# Patient Record
Sex: FEMALE | Race: WHITE | Employment: OTHER | URBAN - NONMETROPOLITAN AREA
[De-identification: names, ages, dates, MRNs, and addresses within clinical notes are randomized per-mention and may not be internally consistent; named-entity substitution may affect disease eponyms.]

---

## 2017-08-17 ENCOUNTER — COMMUNICATION - GICH (OUTPATIENT)
Dept: INTERNAL MEDICINE | Facility: OTHER | Age: 57
End: 2017-08-17

## 2017-08-18 ENCOUNTER — AMBULATORY - GICH (OUTPATIENT)
Dept: SCHEDULING | Facility: OTHER | Age: 57
End: 2017-08-18

## 2017-08-19 ENCOUNTER — AMBULATORY - GICH (OUTPATIENT)
Dept: RADIOLOGY | Facility: OTHER | Age: 57
End: 2017-08-19

## 2017-08-23 ENCOUNTER — AMBULATORY - GICH (OUTPATIENT)
Dept: INTERNAL MEDICINE | Facility: OTHER | Age: 57
End: 2017-08-23

## 2017-08-23 ENCOUNTER — HISTORY (OUTPATIENT)
Dept: INTERNAL MEDICINE | Facility: OTHER | Age: 57
End: 2017-08-23

## 2017-08-24 ENCOUNTER — HISTORY (OUTPATIENT)
Dept: INTERNAL MEDICINE | Facility: OTHER | Age: 57
End: 2017-08-24

## 2017-08-24 ENCOUNTER — OFFICE VISIT - GICH (OUTPATIENT)
Dept: INTERNAL MEDICINE | Facility: OTHER | Age: 57
End: 2017-08-24

## 2017-08-24 DIAGNOSIS — R53.81 OTHER MALAISE: ICD-10-CM

## 2017-08-24 DIAGNOSIS — Z71.89 OTHER SPECIFIED COUNSELING: Chronic | ICD-10-CM

## 2017-08-24 DIAGNOSIS — L92.1 NECROBIOSIS LIPOIDICA, NOT ELSEWHERE CLASSIFIED: ICD-10-CM

## 2017-08-24 DIAGNOSIS — R73.09 OTHER ABNORMAL GLUCOSE: ICD-10-CM

## 2017-08-24 DIAGNOSIS — Z87.891 PERSONAL HISTORY OF NICOTINE DEPENDENCE: ICD-10-CM

## 2017-08-24 DIAGNOSIS — E88.01 ALPHA-1-ANTITRYPSIN DEFICIENCY (H): ICD-10-CM

## 2017-08-24 DIAGNOSIS — K22.2 ESOPHAGEAL OBSTRUCTION: ICD-10-CM

## 2017-08-24 DIAGNOSIS — L30.1 DYSHIDROSIS: ICD-10-CM

## 2017-08-24 DIAGNOSIS — E55.9 VITAMIN D DEFICIENCY: ICD-10-CM

## 2017-08-24 DIAGNOSIS — J44.9 CHRONIC OBSTRUCTIVE PULMONARY DISEASE (H): ICD-10-CM

## 2017-08-24 DIAGNOSIS — I70.0 ATHEROSCLEROSIS OF AORTA (H): ICD-10-CM

## 2017-08-24 DIAGNOSIS — M25.542 ARTHRALGIA OF LEFT HAND: ICD-10-CM

## 2017-08-24 DIAGNOSIS — M17.11 PRIMARY OSTEOARTHRITIS OF RIGHT KNEE: ICD-10-CM

## 2017-08-24 DIAGNOSIS — R73.03 PREDIABETES: ICD-10-CM

## 2017-08-24 DIAGNOSIS — L40.9 PSORIASIS: ICD-10-CM

## 2017-08-24 DIAGNOSIS — R94.2 ABNORMAL RESULTS OF PULMONARY FUNCTION STUDIES: ICD-10-CM

## 2017-08-24 DIAGNOSIS — M85.80 OTHER SPECIFIED DISORDERS OF BONE DENSITY AND STRUCTURE, UNSPECIFIED SITE (CODE): ICD-10-CM

## 2017-08-24 DIAGNOSIS — R53.83 OTHER FATIGUE: ICD-10-CM

## 2017-08-24 DIAGNOSIS — Z98.890 OTHER SPECIFIED POSTPROCEDURAL STATES: ICD-10-CM

## 2017-08-24 DIAGNOSIS — M25.541 ARTHRALGIA OF RIGHT HAND: ICD-10-CM

## 2017-08-24 DIAGNOSIS — K44.9 DIAPHRAGMATIC HERNIA WITHOUT OBSTRUCTION OR GANGRENE: ICD-10-CM

## 2017-08-24 LAB
CHOL/HDL RATIO - HISTORICAL: 5.42
CHOLESTEROL TOTAL: 206 MG/DL
CK SERPL-CCNC: 73 IU/L (ref 30–223)
ESTIMATED AVERAGE GLUCOSE: 120 MG/DL
HDLC SERPL-MCNC: 38 MG/DL (ref 23–92)
HEMOGLOBIN A1C MONITORING (POCT) - HISTORICAL: 5.8 % (ref 4–6.2)
LDLC SERPL CALC-MCNC: 136 MG/DL
NON-HDL CHOLESTEROL - HISTORICAL: 168 MG/DL
PATIENT STATUS - HISTORICAL: ABNORMAL
RHEUMATOID FACTOR - HISTORICAL: <14 IU/ML
TRIGL SERPL-MCNC: 162 MG/DL
VIT B12 SERPL-MCNC: 802 PG/ML (ref 180–914)

## 2017-08-24 ASSESSMENT — PATIENT HEALTH QUESTIONNAIRE - PHQ9: SUM OF ALL RESPONSES TO PHQ QUESTIONS 1-9: 0

## 2017-08-25 LAB
ANA INTERPRETATION: POSITIVE
ANA PATTERN 1 - HISTORICAL: ABNORMAL
ANA TITER 1: ABNORMAL
SPECIMEN STATUS - HISTORICAL: ABNORMAL

## 2017-08-29 LAB — CCP ANTIBODY,IGG/IGA - HISTORICAL: <4.6 CU

## 2017-09-05 ENCOUNTER — COMMUNICATION - GICH (OUTPATIENT)
Dept: INTERNAL MEDICINE | Facility: OTHER | Age: 57
End: 2017-09-05

## 2017-09-15 ENCOUNTER — HISTORY (OUTPATIENT)
Dept: INTERNAL MEDICINE | Facility: OTHER | Age: 57
End: 2017-09-15

## 2017-09-15 ENCOUNTER — HOSPITAL ENCOUNTER (OUTPATIENT)
Dept: RADIOLOGY | Facility: OTHER | Age: 57
End: 2017-09-15
Attending: INTERNAL MEDICINE

## 2017-09-15 ENCOUNTER — OFFICE VISIT - GICH (OUTPATIENT)
Dept: INTERNAL MEDICINE | Facility: OTHER | Age: 57
End: 2017-09-15

## 2017-09-15 DIAGNOSIS — E55.9 VITAMIN D DEFICIENCY: ICD-10-CM

## 2017-09-15 DIAGNOSIS — J44.9 CHRONIC OBSTRUCTIVE PULMONARY DISEASE (H): ICD-10-CM

## 2017-09-15 DIAGNOSIS — J43.2 CENTRILOBULAR EMPHYSEMA (H): ICD-10-CM

## 2017-09-15 DIAGNOSIS — L30.1 DYSHIDROSIS: ICD-10-CM

## 2017-09-15 DIAGNOSIS — R73.03 PREDIABETES: ICD-10-CM

## 2017-09-15 DIAGNOSIS — M54.81 OCCIPITAL NEURALGIA: ICD-10-CM

## 2017-09-15 DIAGNOSIS — L40.9 PSORIASIS: ICD-10-CM

## 2017-09-21 ENCOUNTER — AMBULATORY - GICH (OUTPATIENT)
Dept: PEDIATRICS | Facility: OTHER | Age: 57
End: 2017-09-21

## 2017-09-25 ENCOUNTER — HOSPITAL ENCOUNTER (OUTPATIENT)
Dept: RADIOLOGY | Facility: OTHER | Age: 57
End: 2017-09-25
Attending: INTERNAL MEDICINE

## 2017-10-05 ENCOUNTER — HOSPITAL ENCOUNTER (OUTPATIENT)
Dept: RADIOLOGY | Facility: OTHER | Age: 57
End: 2017-10-05
Attending: INTERNAL MEDICINE

## 2017-10-05 DIAGNOSIS — M54.81 OCCIPITAL NEURALGIA: ICD-10-CM

## 2017-10-16 ENCOUNTER — HOSPITAL ENCOUNTER (OUTPATIENT)
Dept: RADIOLOGY | Facility: OTHER | Age: 57
End: 2017-10-16
Attending: INTERNAL MEDICINE

## 2017-10-16 ENCOUNTER — HISTORY (OUTPATIENT)
Dept: RADIOLOGY | Facility: OTHER | Age: 57
End: 2017-10-16

## 2017-10-16 ENCOUNTER — HISTORY (OUTPATIENT)
Dept: INTERNAL MEDICINE | Facility: OTHER | Age: 57
End: 2017-10-16

## 2017-10-16 ENCOUNTER — OFFICE VISIT - GICH (OUTPATIENT)
Dept: INTERNAL MEDICINE | Facility: OTHER | Age: 57
End: 2017-10-16

## 2017-10-16 DIAGNOSIS — Z86.59 PERSONAL HISTORY OF OTHER MENTAL AND BEHAVIORAL DISORDERS: ICD-10-CM

## 2017-10-16 DIAGNOSIS — M54.81 OCCIPITAL NEURALGIA: ICD-10-CM

## 2017-10-16 DIAGNOSIS — Z23 ENCOUNTER FOR IMMUNIZATION: ICD-10-CM

## 2017-10-16 DIAGNOSIS — R10.13 EPIGASTRIC PAIN: ICD-10-CM

## 2017-10-16 DIAGNOSIS — E53.8 DEFICIENCY OF OTHER SPECIFIED B GROUP VITAMINS (CODE): ICD-10-CM

## 2017-10-16 DIAGNOSIS — M54.2 CERVICALGIA: ICD-10-CM

## 2017-10-16 DIAGNOSIS — J44.9 CHRONIC OBSTRUCTIVE PULMONARY DISEASE (H): ICD-10-CM

## 2017-10-17 ENCOUNTER — HOSPITAL ENCOUNTER (OUTPATIENT)
Dept: RADIOLOGY | Facility: OTHER | Age: 57
End: 2017-10-17
Attending: INTERNAL MEDICINE

## 2017-10-17 DIAGNOSIS — M54.2 CERVICALGIA: ICD-10-CM

## 2017-10-17 DIAGNOSIS — M54.81 OCCIPITAL NEURALGIA: ICD-10-CM

## 2017-10-23 ENCOUNTER — AMBULATORY - GICH (OUTPATIENT)
Dept: LAB | Facility: OTHER | Age: 57
End: 2017-10-23

## 2017-10-23 DIAGNOSIS — R10.13 EPIGASTRIC PAIN: ICD-10-CM

## 2017-11-01 ENCOUNTER — HISTORY (OUTPATIENT)
Dept: SURGERY | Facility: OTHER | Age: 57
End: 2017-11-01

## 2017-11-01 ENCOUNTER — OFFICE VISIT - GICH (OUTPATIENT)
Dept: SURGERY | Facility: OTHER | Age: 57
End: 2017-11-01

## 2017-11-01 DIAGNOSIS — K21.9 GASTRO-ESOPHAGEAL REFLUX DISEASE WITHOUT ESOPHAGITIS: ICD-10-CM

## 2017-11-07 ENCOUNTER — AMBULATORY - GICH (OUTPATIENT)
Dept: SCHEDULING | Facility: OTHER | Age: 57
End: 2017-11-07

## 2017-11-20 ENCOUNTER — OFFICE VISIT - GICH (OUTPATIENT)
Dept: INTERNAL MEDICINE | Facility: OTHER | Age: 57
End: 2017-11-20

## 2017-11-20 ENCOUNTER — HISTORY (OUTPATIENT)
Dept: INTERNAL MEDICINE | Facility: OTHER | Age: 57
End: 2017-11-20

## 2017-11-20 DIAGNOSIS — G89.29 OTHER CHRONIC PAIN: ICD-10-CM

## 2017-11-20 DIAGNOSIS — M54.2 CERVICALGIA: ICD-10-CM

## 2017-11-20 DIAGNOSIS — M54.12 RADICULOPATHY OF CERVICAL REGION: ICD-10-CM

## 2017-11-20 DIAGNOSIS — L40.9 PSORIASIS: ICD-10-CM

## 2017-11-20 DIAGNOSIS — L40.50 ARTHROPATHIC PSORIASIS (H): ICD-10-CM

## 2017-11-21 ENCOUNTER — COMMUNICATION - GICH (OUTPATIENT)
Dept: INTERNAL MEDICINE | Facility: OTHER | Age: 57
End: 2017-11-21

## 2017-11-21 DIAGNOSIS — L40.9 PSORIASIS: ICD-10-CM

## 2017-11-28 ENCOUNTER — AMBULATORY - GICH (OUTPATIENT)
Dept: SCHEDULING | Facility: OTHER | Age: 57
End: 2017-11-28

## 2017-11-29 ENCOUNTER — HISTORY (OUTPATIENT)
Dept: SURGERY | Facility: OTHER | Age: 57
End: 2017-11-29

## 2017-11-29 ENCOUNTER — OFFICE VISIT - GICH (OUTPATIENT)
Dept: SURGERY | Facility: OTHER | Age: 57
End: 2017-11-29

## 2017-11-29 ENCOUNTER — HOSPITAL ENCOUNTER (OUTPATIENT)
Dept: RADIOLOGY | Facility: OTHER | Age: 57
End: 2017-11-29
Attending: INTERNAL MEDICINE

## 2017-11-29 ENCOUNTER — AMBULATORY - GICH (OUTPATIENT)
Dept: SCHEDULING | Facility: OTHER | Age: 57
End: 2017-11-29

## 2017-11-29 DIAGNOSIS — G89.29 OTHER CHRONIC PAIN: ICD-10-CM

## 2017-11-29 DIAGNOSIS — M54.12 RADICULOPATHY OF CERVICAL REGION: ICD-10-CM

## 2017-11-29 DIAGNOSIS — M54.2 CERVICALGIA: ICD-10-CM

## 2017-11-29 DIAGNOSIS — K21.9 GASTRO-ESOPHAGEAL REFLUX DISEASE WITHOUT ESOPHAGITIS: ICD-10-CM

## 2017-12-21 ENCOUNTER — AMBULATORY - GICH (OUTPATIENT)
Dept: SCHEDULING | Facility: OTHER | Age: 57
End: 2017-12-21

## 2017-12-27 NOTE — PROGRESS NOTES
Patient Information     Patient Name MRN Sex Ember Lanza [id number] Female 1960      Progress Notes by Carola Ortiz at 10/16/2017  1:17 PM     Author:  Carola Ortiz Service:  (none) Author Type:  Other Clinical Staff     Filed:  10/16/2017  1:17 PM Date of Service:  10/16/2017  1:17 PM Status:  Signed     :  Carola Ortiz (Other Clinical Staff)            Falls Risk Criteria:    Age 65 and older or under age 4        Sensory deficits    Poor vision    Use of ambulatory aides    Impaired judgment    Unable to walk independently    Meets High Risk criteria for falls:  no

## 2017-12-27 NOTE — PROGRESS NOTES
Patient Information     Patient Name MRN Sex Ember Lanza [id number] Female 1960      Progress Notes by Pay[facility]. [facility] at 2017 10:38 AM     Author:  Payton [facility].T. [facility] Service:  (none) Author Type:  [employer] - Registered Radiologic Technologist     Filed:  2017 10:38 AM Date of Service:  2017 10:38 AM Status:  Signed     :  Payton German R.T. (AR[facility]) ([facility] - Registered Radiologic Technologist)            Falls Risk Criteria:    Age 65 and older or under age 4        Sensory deficits    Poor vision    Use of ambulatory aides    Impaired judgment    Unable to walk independently    Meets High Risk criteria for falls:  no

## 2017-12-27 NOTE — PROGRESS NOTES
Patient Information     Patient Name MRN Sex Ember Waggoner [id number] Female 1960      Progress Notes by Jagdish Ruiz MD at 2017  9:50 AM     Author:  Jagdish Ruiz MD Service:  (none) Author Type:  Physician     Filed:  2017 10:59 AM Encounter Date:  2017 Status:  Signed     :  Jagdish Ruiz MD (Physician)            GENERAL SURGERY CONSULTATION NOTE    Ember Tavares   [address] [address] [address]  56 y.o.  female  Admission Date/Time: No admission date for patient encounter.  Primary Care Provider:  Tim Boyd MD    I was asked to see this patient by Tim Boyd MD for evaluation of GERD.     HPI: Ember has had years of reflux symptoms. She has gained weigh over the last 10 years and had worsened reflux.  She takes a H2 blocker bid and TUMS 2-5 times a week. She has regurgitation symptoms with food 3-5 nights. She sleeps with the head of the bed elevated. She avoids chololate as a trigger. Mint does not seem to bother. She had violent nausea and vomiting with Carafate. She has had multiple EGDs with balloon dilations for dysphagia and stricture vs achalasia.  She does not know what her exact diagnosis has been.  She has not had manometery or a ph probe.  She has had an esophogram already. Has dysphagia of meats and breads when needing dilation.     REVIEW OF SYSTEMS:    GENERAL: No fevers or chills. Denies fatigue, recent weight loss. + weight gain, loss of appetite  HEENT: No sinus drainage. No changes with vision or hearing. + difficulty swallowing.   LYMPHATICS:  No swollen nodes in axilla, neck or groin.  CARDIOVASCULAR: Denies chest pain, palpitations and dyspnea on exertion.  PULMONARY: No shortness of breath or cough. No increase in sputum production.  GI: Denies melena, bright red blood in stools. No hematemesis. Has had IBS  : No dysuria or hematuria.  SKIN: No recent rashes or ulcers.   HEMATOLOGY:  No history of  easy bruising or bleeding.  ENDOCRINE:  No history of diabetes or thyroid problems.  NEUROLOGY:  No history of seizures or headaches. No motor or sensory changes.        Patient Active Problem List       Diagnosis  Date Noted     Epigastric pain  10/16/2017     Occipital neuralgia of left side  09/15/2017     Hiatal hernia  08/24/2017     Schatzki's ring of distal esophagus  08/24/2017     History of esophageal dilatation  08/24/2017     Atherosclerosis of abdominal aorta (HC)  08/24/2017     [facility] Studies:  2/19/2013 -- CTA ABDOMEN AND PELVIS WITH BILATERAL LOWER EXTREMITY RUNOFF    CLINICAL HISTORY: Iliac and mesenteric ischemia.    TECHNIQUE: 125 mL Omnipaque 300 IV contrast was administered. 3-D  arterial reformations were performed.    FINDINGS: There is a well-defined ovoid density at the medial right  costophrenic angle. This measures 2.3 x 0.9 x 0.6 cm. It demonstrates  central low attenuation similar to the adjacent abdominal fat near the  liver. This could be a tiny Bochdalek hernia. The liver, spleen,  pancreas, adrenal glands, and kidneys are unremarkable. The  gallbladder is surgically absent. No biliary ductal dilation. No  mesenteric or retroperitoneal adenopathy. No ascites or fluid  collection. There is a moderate amount of retained stool seen  throughout most of the colon. No obstruction. No adjacent inflammatory  change. No ascites or fluid collection. Ureters and bladder are  unremarkable.    There is mild partially calcified atherosclerotic plaque within the  abdominal aorta. No aneurysm. There is narrowing of the lumen just  proximal to the bifurcation to 6 mm.   There is moderate narrowing of the proximal celiac artery without associated calcification.   The superior mesenteric artery appears to be widely patent with good opacification of distal branch vessels. The MARIAH also appears to be well opacified.   There are single bilateral renal arteries which are widely patent.    There is  mild partially calcified plaque within the common iliac arteries with minimal narrowing.   The external iliac and common femoral arteries are widely patent.   Superficial femoral and popliteal arteries are widely patent bilaterally.   There is three-vessel runoff with good opacification in the lower leg to the ankle and foot.    IMPRESSION:  1. Mild aortoiliac atherosclerotic disease. No aneurysm. There is narrowing of the distal aorta just proximal to the bifurcation.  2. There is moderate narrowing of the celiac artery which could  be due to noncalcified plaque. No SMA or MARIAH compromise definitely seen.  3. Probable tiny Bochdalek hernia at the right costophrenic angle. A hamartoma would be a differential consideration though is less likely given the central fat density.  4. Possible constipation.  5. No evidence of vascular compromise in the lower extremities.      Examination Interpreted at: [facility], [place], [place]  The Interpreting Physician is MONICA VORA  Exam was completed at [facility]        Dyshidrotic hand dermatitis  08/24/2017     Psoriasis  08/24/2017     Prediabetes - New Dx 8/24/2017 - A1c 5.8%  08/24/2017     Vitamin D deficiency  08/24/2017     COPD, severe (HC) - PFTs 8/4/2014 [facility] - follows with Dr. Luna, Pulmonology at [facility]  08/24/2017 8/4/2014 -- PULMONARY FUNCTION    SITE:  [facility]  ORDERED BY:  VANNESA LUNA    CLINICAL SUMMARY: 53-year-old female with a history of severe COPD.     TECHNICIAN NOTE: Good effort.     DATA: FVC 1.85 (61%). FEV1 1.04 (45%). FEV1/FVC ratio 56%. DLCO 13.5 to 62%.     Flow volume curve is consistent with airway obstruction.     IMPRESSION:  1. Severe obstructive pulmonary disease.   2. Mild decrease in diffusing capacity.        Decreased diffusion capacity of lung - MILD - PFT 8/2014 08/24/2017     Decreased bone density - 12/20/2013 -- DXA SPINE HIP --  [facility] - Hip T-score of -1.7  08/24/2017 12/20/2013 -- DXA SPINE HIP -- [facility]        FINDINGS: The bone mineral density was done using a [employer] machine.   The lumbar spine was 1.139 g/cm2 which is in approximately one half standard deviations above the mean for age-matched persons.   The total hip BMD was 0.789 g/cm2 which is approximately one standard deviations below expected for age.   The T-score was -0.4 at the spine and -1.7 at the total hip for females which places her in the WHO category of osteopenic with bone density between 10 and 25% below young normal. Fracture risk is moderate.    RECOMMENDATION: Treatment is recommended in the form of bisphosphonate and Evista. Hormone therapy may be an option based on review of risks and benefits of treatment. All patients should assure an adequate intake of dietary calcium (1200 mg per day) and   vitamin D (400-800 international units daily). Followup examination recommended in December of 2015.        Arthritis of knee, right - medial compartment  08/24/2017     Centrilobular emphysema (HC)  08/23/2017     Alpha-1-antitrypsin deficiency carrier (HC) - Heterozygous  08/23/2017     Osteopenia  08/23/2017     Irritable bowel syndrome (IBS)  08/23/2017     Esophageal reflux  08/23/2017     CONTACT DERMATITIS  07/01/2010     MYALGIA  07/01/2010     ABDOMINAL PAIN, CHRONIC  03/10/2010     16 year duration          CONSTIPATION  03/10/2010     SINUSITIS, CHRONIC  03/10/2010     History of tobacco abuse  03/10/2010     Past Medical History:     Diagnosis  Date     Chronic abdominal pain      Chronic sinusitis      Dysphagia      Fractured skull (HC) 1972     Past Surgical History:      Procedure  Laterality Date     ANORECTAL MANOMETRY  10/09/2007     BIOPSY BREAST  1999, 2001, 2004, 2008    benign       CHOLECYSTECTOMY  2004     COLONOSCOPY  x's 3 last 2009    negative       CT CORONARY ANGIOGRAM (IA)  2009    negative       CVL HEART CATH LEFT   04/20/2009    [facility]       ESOPHAGEAL DILATION  10/30/2015    Dr. Rainer Allen, [facility]       ESOPHAGOGASTRODUODENOSCOPY      dilated in 04,07,09  Dr. Allen       LAPAROTOMY      1990 lysis of adhesions/endometriosis       REPAIR RECTOCELE  07/29/2009     VAGINAL HYSTERECTOMY  1994    ovaries remain       Family History      Problem  Relation Age of Onset     Arthritis Father      Peripheral vascular disease Mother      Diabetes Mother      Arthritis Mother      Premenopausal breast cancer Sister      Eczema Brother      Narcolepsy Daughter      Vasculitis Daughter      Seizures Daughter      Social History Narrative    Graduated from high school plus 3 years collage    EMT and .  Lives in [place]    Patient previously smoked.      Alcohol Use - yes    Drug Use - no    Regular Exercise - yes     - Ervin.  3 children.        Works at [employer]      Social History     Social History Main Topics          Smoking status:   Former Smoker      Packs/day:  0.50      Years:  32.00      Types:  Cigarettes      Quit date:  8/18/2012      Smokeless tobacco:   Never Used       Comment: quit date of 8-       Alcohol use   1.2 oz/week     2 Standard drinks or equivalent per week        Comment: 1-2 drinks every 1-2 weeks       Drug use:   No      Sexual activity:   Not on file      Current Outpatient Prescriptions       Medication  Sig Dispense Refill     albuterol HFA (VENTOLIN HFA) 90 mcg/actuation inhaler 3 puffs TID as needed for breathing difficulties       aspirin chewable 81 mg chewable tablet Take 1 tablet by mouth once daily with a meal.       cetirizine (ZYRTEC) 10 mg tablet Take one tablet by mouth once or twice daily as needed for hand dermatitis/itching.  0     Cholecalciferol, Vitamin D3, (VITAMIN D-3) 5,000 unit tab Take 1 tablet by mouth once weekly.  0     cyanocobalamin (VITAMIN B12) 1,000 mcg/mL injection Inject 1 mL intramuscular every 3 weeks. To 4 weeks  1000 mcg 15     fluticasone-salmeterol (ADVAIR DISKUS) 500-50 mcg/Dose diskus inhaler Inhale 1 Puff by mouth every 12 hours.       medication order composer Daily topical solution as needed for Psoriasis flare up, patient unsure of medication name at this time.       ranitidine (ZANTAC 75) 75 mg tablet Take 1 tablet by mouth once daily if needed.       tiotropium bromide (SPIRIVA RESPIMAT) 2.5 mcg/actuation mist Inhale 2 Puffs by mouth once daily. For COPD 4 g 11     triamcinolone (ARISTOCORT) 0.1 % ointment Apply  topically to affected area(s) 3 times daily. -- for hand dermatitis 80 g 3     venlafaxine (EFFEXOR XR) 37.5 mg Extended-Release capsule Take 1 capsule by mouth once daily with a meal. 90 capsule 3     No current facility-administered medications for this visit.      Medications have been reviewed by me and are current to the best of my knowledge and ability.      ALLERGIES/SENSITIVITIES:   Allergies     Allergen  Reactions     Carafate [Sucralfate] Nausea And Vomiting     Crestor [Rosuvastatin] Nausea And Vomiting     Lipitor [Atorvastatin] Myalgia     Bactrim [Sulfamethoxazole-Trimethoprim] Yeast Infection     Levofloxacin Nausea And Vomiting     Morphine Chest Pain     Penicillins Respiratory Arrest       PHYSICAL EXAM:     General Appearance:  Appears well  /70  Pulse 72  Wt 64.7 kg (142 lb 9.6 oz)  Breastfeeding? No  BMI 25.26 kg/m2  Body mass index is 25.26 kg/(m^2).  RESPIRATORY: CTAB, no wheeze  CARDIOVASCULAR: RRR, S1, S2, no murmur  GASTROINTESTINAL: Mildly protuberant, Lap emily incisions noted, nontender.     ADDITIONAL COMMENTS:  I reviewed the patient s new imaging test results.      Her esophagram shows mild narrowing of the distal esophagus with reflux to the cervical esophagus.    CONSULTATION ASSESSMENT AND PLAN:    56 y.o. female with lifestyle limiting GERD and some dysphagia. Dysphagia is likely related to Schatzki ring.      - pH probe and manometry  - Will review procedure  note from EGDs from [facility]  - Follow up in 2-4 weeks  - Appears to be a good candidate for antireflux surgery.  - May plan on EDG at completion of case given Deepti Ruiz MD  General Surgery  10:35 AM 11/1/2017

## 2017-12-28 NOTE — PROGRESS NOTES
Patient Information     Patient Name MRN Sex     Ember Tavares [id number] Female 1960      Progress Notes by Jagdish Ruiz MD at 2017  9:30 AM     Author:  Jagdish Ruiz MD Service:  (none) Author Type:  Physician     Filed:  2017  9:57 AM Encounter Date:  2017 Status:  Signed     :  Jagdish Ruiz MD (Physician)             PROGRESS NOTE    cc: Nissen evaluation    HPI: Ember Tavares is a 57 y.o. female with long standing reflux and esophageal ring requiring multiple dilations for associated dysphagia. He has gone off H2 blockers and has had significant worsening of reflux and acid symptoms. She notes improved bloating.  She is scheduled for manometry and pH probe studies on the .      EXAM:    /70  Pulse 64  Wt 62.1 kg (137 lb)  Breastfeeding? No  BMI 24.27 kg/m2@TMAXR(24)@     GENERAL: alert, NAD  CV: RRR, no murmurs  RESPIRATORY: no dyspnea, clear bilat  ABDOMEN: non-distended, +BS, soft, appropriately tender, incision c/d/i, no signs of infection  EXTREMITY: no edema, neg Sima's  SKIN: warm and dry, no jaundice no rashes  NEURO: cranial nerves II-XII grossly intact, motor exam intact    LABS  No results for input(s): WBC, RBC, HGB, HCT, MCV, MCH, MCHC, PLT, MPV in the last 720 hours.    No results for input(s): SODIUM, POTASSIUM, CHLORIDE, [id number], BUN, CREATININE, GLUCOSE, CALCIUM in the last 720 hours.  No results for input(s): ALKPHOSPH, PROTEIN, BILITOTAL, AST, ALT in the last 720 hours.    IMAGING  I personally reviewed patient's GI consultation     ASSESSMENT  Seems appropriate for nissen fundoplication.  Await manometry and pH probe studies.     PLAN  - Would suggest 4 quadrant biopsy of ring at time of EGD or cutting with electrocautery knife as she has failed repeated dilations.    - Follow up in 4 weeks once studies are done..      Jagdish Ruiz MD  9:52 AM 2017

## 2017-12-28 NOTE — PROGRESS NOTES
Patient Information     Patient Name MRN Sex Ember Lanza [id number] Female 1960      Progress Notes by Va Gauthier R.T. ([facility]) at 10/5/2017 10:49 AM     Author:  [facility] Service:  (none) Author Type:  [employer] - Registered Radiologic Technologist     Filed:  10/5/2017 10:49 AM Date of Service:  10/5/2017 10:49 AM Status:  Signed     :  Va Gauthier R.T. (ARRT) ([facility] - Registered Radiologic Technologist)            Falls Risk Criteria:    Age 65 and older or under age 4        Sensory deficits    Poor vision    Use of ambulatory aides    Impaired judgment    Unable to walk independently    Meets High Risk criteria for falls:  no

## 2017-12-28 NOTE — PATIENT INSTRUCTIONS
Patient Information     Patient Name MRN Sex Ember Lanza [id number] Female 1960      Patient Instructions by Tim Boyd MD at 9/15/2017  1:20 PM     Author:  Tim Boyd MD  Service:  (none) Author Type:  Physician     Filed:  9/15/2017  2:06 PM  Encounter Date:  9/15/2017 Status:  Addendum     :  Tim Boyd MD (Physician)        Related Notes: Original Note by Tim Boyd MD (Physician) filed at 9/15/2017  2:06 PM            1. Vitamin D deficiency  -- continue vitamin D replacement    2. Prediabetes - New Dx 2017 - A1c 5.8%  -- reduce carbohydrate intake and watch the flour / processed foods.    3. Dyshidrotic hand dermatitis  4. Psoriasis  -- glad the steroid topical ointment has helped.    5. Occipital neuralgia of left side  - US INJ ANES OCCIPITAL NERVE LEFT  - they will call with date/time of appointment.      6. Centrilobular emphysema (HC)  7. COPD, severe (HC) - PFTs 2014 [facility] - follows with Dr. Akers, Pulmonology at [facility]    Start:  - tiotropium bromide (SPIRIVA RESPIMAT) 2.5 mcg/actuation mist; Inhale 2 Puffs by mouth once daily. For COPD  Dispense: 4 g; Refill: 11    - XR CHEST 2 VIEWS PA AND LATERAL - today.     Return in approximately 1 month(s), or sooner as needed for follow-up with Dr. Boyd.  -- follow-up COPD    Clinic : [phone]  Appointment line: [phone]

## 2017-12-28 NOTE — PROGRESS NOTES
Patient Information     Patient Name MRN Sex Ember Lanza [id number] Female 1960      Progress Notes by Tim Boyd MD at 10/16/2017  9:00 AM     Author:  Tim Boyd MD Service:  (none) Author Type:  Physician     Filed:  10/16/2017 10:50 AM Encounter Date:  10/16/2017 Status:  Signed     :  Tim Boyd MD (Physician)            Nursing Notes:   Mercedez Cordova  10/16/2017  9:24 AM  Signed  Patient presents in the clinic for a follow up. Patient also has concerns regarding ongoing neck pain.  Mercedez Cordova, ROGERS............................ 10/16/2017 9:12 AM    Ember Tavares presents to clinic today for:   Chief Complaint    Patient presents with      Follow Up     HPI: Ms. Tavares is a 56 y.o. female who presents today for evaluation of above.     (J44.9) COPD, severe (HC) - PFTs 2014 [facility] - follows with Dr. Akers, Pulmonology at [facility]  (primary encounter diagnosis)  (Z86.59) History of depression  (E53.8) B12 deficiency  (Z23) Needs flu shot  (M54.2) Cervical pain  (M54.81) Occipital neuralgia of left side  (R10.13) Epigastric pain     Patient has severe COPD.  She's doing quite well with her current medication regimen.  Patient states she can take a deep breath now.  Which she is very happy about.  She would like to make sure these are all refilled.    Depression, doing quite well, she restarted Effexor again.  She would like refills of this.  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    B12 deficiency, improved energy levels once given a B12 shot at her last appointment.  She would like another one today.    Flu shot due today, orders placed.    Cervical pain.  States that she has a lot of issues with neck spasms even has some headache induction due to occipital neuralgia of the left side of her posterior scalp/head.  She got a occipital nerve injection/block done recently but states it only helped moderately.  She  is wondering about having more neck problems.  She would like to get cervical imaging.  Orders placed.    Epigastric pain, ongoing for 2-4 weeks now.  States that she gets a lot of belching and bloating abdominal distention cramping under her left upper ribs.  Worse in the morning before she eats.  She almost can milk it or push it down -- with rubbing on her upper abdomen down towards her navel.  States that it does hurt but it does seem to make it better.  She will then later on have more pain in her lower to midabdomen.  + will get some upper abdominal pain as well.  No bloody or tarry stools.  No notable stool changes.    Ms. Tavares's Body mass index is 25.33 kg/(m^2). This is out of the normal range for a 56 y.o. Normal range for ages 18+ is between 18.5 and 24.9. To lose weight we reviewed risks and benefits of appropriate options such as diet, exercise, and medications. Patient's strategy will be  self-directed nutrition plan and self-directed exercise program   BP Readings from Last 1 Encounters:10/16/17 : 118/70  Ms. Trotter blood pressure is within the normal range for adults. Per JNC-8 guidelines normal adult blood pressure is < 120/80, pre-hypertensive is between 120/80 and 139/89, and hypertension is 140/90 or greater.    Functional Capacity: > 4 METS.   Reports that she can climb a flight of stairs without any chest pain/heaviness   shortness of breath on exertion is stable.   Patient reports no current symptoms of fevers, chills  No cough. No resting shortness of breath.   No change in bladder habits. No melena, hematochezia. No Hematuria.   No rashes. No palpitations.  No orthopnea/paroxysmal nocturnal dyspnea   No vision or hearing issues.   No significant mood issues   No bruising.     KARL:  No flowsheet data found.    PHQ9:  PHQ Depression Screening 9/15/2017 10/16/2017   Date of PHQ exam (doc flow) 9/15/2017 10/16/2017   1. Lack of interest/pleasure 0 - Not at all 0 - Not at all   2. Feeling  down/depressed 0 - Not at all 0 - Not at all   PHQ-2 TOTAL SCORE 0 0   3. Trouble sleeping - -   4. Decreased energy - -   5. Appetite change - -   6. Feelings of failure - -   7. Trouble concentrating - -   8. Activity level - -   9. Hurting yourself - -   PHQ-9 TOTAL SCORE - -   PHQ-9 Severity Level - -   Functional Impairment - -   Some recent data might be hidden          I have personally reviewed the past medical history, past surgical history, medications, allergies, family and social history as listed below, on 10/16/2017.    Patient Active Problem List       Diagnosis  Date Noted     Epigastric pain  10/16/2017     Occipital neuralgia of left side  09/15/2017     Hiatal hernia  08/24/2017     Schatzki's ring of distal esophagus  08/24/2017     History of esophageal dilatation  08/24/2017     Atherosclerosis of abdominal aorta (HC)  08/24/2017     [facility] Studies:  2/19/2013 -- CTA ABDOMEN AND PELVIS WITH BILATERAL LOWER EXTREMITY RUNOFF    CLINICAL HISTORY: Iliac and mesenteric ischemia.    TECHNIQUE: 125 mL Omnipaque 300 IV contrast was administered. 3-D  arterial reformations were performed.    FINDINGS: There is a well-defined ovoid density at the medial right  costophrenic angle. This measures 2.3 x 0.9 x 0.6 cm. It demonstrates  central low attenuation similar to the adjacent abdominal fat near the  liver. This could be a tiny Bochdalek hernia. The liver, spleen,  pancreas, adrenal glands, and kidneys are unremarkable. The  gallbladder is surgically absent. No biliary ductal dilation. No  mesenteric or retroperitoneal adenopathy. No ascites or fluid  collection. There is a moderate amount of retained stool seen  throughout most of the colon. No obstruction. No adjacent inflammatory  change. No ascites or fluid collection. Ureters and bladder are  unremarkable.    There is mild partially calcified atherosclerotic plaque within the  abdominal aorta. No aneurysm. There is narrowing of the lumen  just  proximal to the bifurcation to 6 mm.   There is moderate narrowing of the proximal celiac artery without associated calcification.   The superior mesenteric artery appears to be widely patent with good opacification of distal branch vessels. The MARIAH also appears to be well opacified.   There are single bilateral renal arteries which are widely patent.    There is mild partially calcified plaque within the common iliac arteries with minimal narrowing.   The external iliac and common femoral arteries are widely patent.   Superficial femoral and popliteal arteries are widely patent bilaterally.   There is three-vessel runoff with good opacification in the lower leg to the ankle and foot.    IMPRESSION:  1. Mild aortoiliac atherosclerotic disease. No aneurysm. There is narrowing of the distal aorta just proximal to the bifurcation.  2. There is moderate narrowing of the celiac artery which could  be due to noncalcified plaque. No SMA or MARIAH compromise definitely seen.  3. Probable tiny Bochdalek hernia at the right costophrenic angle. A hamartoma would be a differential consideration though is less likely given the central fat density.  4. Possible constipation.  5. No evidence of vascular compromise in the lower extremities.      Examination Interpreted at: [facility], [place], [place]  The Interpreting Physician is MONICA VORA  Exam was completed at [facility]        Dyshidrotic hand dermatitis  08/24/2017     Psoriasis  08/24/2017     Prediabetes - New Dx 8/24/2017 - A1c 5.8%  08/24/2017     Vitamin D deficiency  08/24/2017     COPD, severe (HC) - PFTs 8/4/2014 [facility] - follows with Dr. Luna, Pulmonology at [facility]  08/24/2017 8/4/2014 -- PULMONARY FUNCTION    SITE:  [facility]  ORDERED BY:  VANNESA LUNA    CLINICAL SUMMARY: 53-year-old female with a history of severe COPD.     TECHNICIAN NOTE: Good effort.      DATA: FVC 1.85 (61%). FEV1 1.04 (45%). FEV1/FVC ratio 56%. DLCO 13.5 to 62%.     Flow volume curve is consistent with airway obstruction.     IMPRESSION:  1. Severe obstructive pulmonary disease.   2. Mild decrease in diffusing capacity.        Decreased diffusion capacity of lung - MILD - PFT 8/2014 08/24/2017     Decreased bone density - 12/20/2013 -- DXA SPINE HIP -- [facility] - Hip T-score of -1.7  08/24/2017 12/20/2013 -- DXA SPINE HIP -- [facility]        FINDINGS: The bone mineral density was done using a Cardeeo machine.   The lumbar spine was 1.139 g/cm2 which is in approximately one half standard deviations above the mean for age-matched persons.   The total hip BMD was 0.789 g/cm2 which is approximately one standard deviations below expected for age.   The T-score was -0.4 at the spine and -1.7 at the total hip for females which places her in the WHO category of osteopenic with bone density between 10 and 25% below young normal. Fracture risk is moderate.    RECOMMENDATION: Treatment is recommended in the form of bisphosphonate and Evista. Hormone therapy may be an option based on review of risks and benefits of treatment. All patients should assure an adequate intake of dietary calcium (1200 mg per day) and   vitamin D (400-800 international units daily). Followup examination recommended in December of 2015.        Arthritis of knee, right - medial compartment  08/24/2017     Centrilobular emphysema (HC)  08/23/2017     Alpha-1-antitrypsin deficiency carrier (HC) - Heterozygous  08/23/2017     Osteopenia  08/23/2017     Irritable bowel syndrome (IBS)  08/23/2017     Esophageal reflux  08/23/2017     CONTACT DERMATITIS  07/01/2010     MYALGIA  07/01/2010     ABDOMINAL PAIN, CHRONIC  03/10/2010     16 year duration          CONSTIPATION  03/10/2010     SINUSITIS, CHRONIC  03/10/2010     History of tobacco abuse  03/10/2010     Past Medical History:     Diagnosis  Date     Chronic  abdominal pain      Chronic sinusitis      Dysphagia      Fractured skull (HC) 1972     Past Surgical History:      Procedure  Laterality Date     ANORECTAL MANOMETRY  10/09/2007     BIOPSY BREAST  1999, 2001, 2004, 2008    benign       CHOLECYSTECTOMY  2004     COLONOSCOPY  x's 3 last 2009    negative       CT CORONARY ANGIOGRAM (IA)  2009    negative       CVL HEART CATH LEFT  04/20/2009    [facility]       ESOPHAGEAL DILATION  10/30/2015    Dr. Rainer Allen, [facility]       ESOPHAGOGASTRODUODENOSCOPY      dilated in 04,07,09  Dr. Allen       LAPAROTOMY      1990 lysis of adhesions/endometriosis       REPAIR RECTOCELE  07/29/2009     VAGINAL HYSTERECTOMY  1994    ovaries remain       Current Outpatient Prescriptions       Medication  Sig Dispense Refill     albuterol HFA (VENTOLIN HFA) 90 mcg/actuation inhaler 3 puffs TID as needed for breathing difficulties       aspirin chewable 81 mg chewable tablet Take 1 tablet by mouth once daily with a meal.       cetirizine (ZYRTEC) 10 mg tablet Take one tablet by mouth once or twice daily as needed for hand dermatitis/itching.  0     Cholecalciferol, Vitamin D3, (VITAMIN D-3) 5,000 unit tab Take 1 tablet by mouth once weekly.  0     cyanocobalamin (VITAMIN B12) 1,000 mcg/mL injection Inject 1 mL intramuscular every 3 weeks. To 4 weeks 1000 mcg 15     fluticasone-salmeterol (ADVAIR DISKUS) 500-50 mcg/Dose diskus inhaler Inhale 1 Puff by mouth every 12 hours.       medication order composer Daily topical solution as needed for Psoriasis flare up, patient unsure of medication name at this time.       ranitidine (ZANTAC 75) 75 mg tablet Take 1 tablet by mouth once daily if needed.       tiotropium bromide (SPIRIVA RESPIMAT) 2.5 mcg/actuation mist Inhale 2 Puffs by mouth once daily. For COPD 4 g 11     triamcinolone (ARISTOCORT) 0.1 % ointment Apply  topically to affected area(s) 3 times daily. -- for hand dermatitis 80 g 3     venlafaxine (EFFEXOR XR) 37.5 mg  "Extended-Release capsule Take 1 capsule by mouth once daily with a meal. 90 capsule 3     Allergies     Allergen  Reactions     Carafate [Sucralfate] Nausea And Vomiting     Crestor [Rosuvastatin] Nausea And Vomiting     Lipitor [Atorvastatin] Myalgia     Bactrim [Sulfamethoxazole-Trimethoprim] Yeast Infection     Levofloxacin Nausea And Vomiting     Morphine Chest Pain     Penicillins Respiratory Arrest     Family History      Problem  Relation Age of Onset     Arthritis Father      Peripheral vascular disease Mother      Diabetes Mother      Arthritis Mother      Premenopausal breast cancer Sister      Eczema Brother      Narcolepsy Daughter      Vasculitis Daughter      Seizures Daughter      Family Status     Relation  Status     Father  at age 51     Mother      Sister      Brother      Daughter Alive     Social History     Social History        Marital status:       Spouse name: Ervin     Number of children:  N/A     Years of education:  N/A     Social History Main Topics          Smoking status:   Former Smoker      Packs/day:  0.50      Years:  32.00      Types:  Cigarettes      Quit date:  2012      Smokeless tobacco:   Never Used       Comment: quit date of 2012       Alcohol use   1.2 oz/week     2 Standard drinks or equivalent per week        Comment: 1-2 drinks every 1-2 weeks       Drug use:   No      Sexual activity:   Not on file      Other Topics  Concern     Not on file      Social History Narrative     Graduated from high school plus 3 years collage    [employer] and .  Lives in [place]    Patient previously smoked.      Alcohol Use - yes    Drug Use - no    Regular Exercise - yes     - Ervin.  3 children.        Works at [employer]     Pertinent ROS was performed and was negative as noted in HPI above.     EXAM:   Vitals:     10/16/17 0916   BP: 118/70   Pulse: 64   Weight: 64.9 kg (143 lb)   Height: 1.6 m (5' 3\")     BP Readings from Last 3 " "Encounters:    10/16/17 118/70   09/15/17 120/72   08/24/17 100/64     Wt Readings from Last 3 Encounters:    10/16/17 64.9 kg (143 lb)   09/15/17 65.2 kg (143 lb 12.8 oz)   08/24/17 62.2 kg (137 lb 2 oz)     Estimated body mass index is 25.33 kg/(m^2) as calculated from the following:    Height as of this encounter: 1.6 m (5' 3\").    Weight as of this encounter: 64.9 kg (143 lb).     EXAM:  Constitutional: Pleasant, alert, appropriate appearance for age. No acute distress  Eyes:  Extraocular muscles intact, Sclera non-icteric, Conjunctiva without erythema  Lymphatic Exam: Non-palpable nodes in neck, clavicular regions  Pulmonary: Prolonged expiratory phase noted and Lungs are clear to auscultation bilaterally, without wheezes or crackles  Cardiovascular Exam: regular rate and rhythm, no pedal edema  Gastrointestinal Exam: Soft, + tender in upper abdomen, mildly distended, positive bowel sounds  Integument: No abnormal rashes, sores, or ulcerations noted  Neurologic Exam: CN 3-12 grossly intact   Musculoskeletal Exam: Gait and station appear grossly normal  Psychiatric Exam: Awake and Alert, Affect and mood appropriate  Speech is fluent, Thought process is normal    INVESTIGATIONS:  Results for orders placed or performed in visit on 08/24/17      Hgb A1c      Result  Value Ref Range    HEMOGLOBIN A1C MONITORING (POCT) 5.8 4.0 - 6.2 %    ESTIMATED AVERAGE GLUCOSE  120 mg/dL   VITAMIN B12      Result  Value Ref Range    VITAMIN B12 802 180 - 914 pg/mL   CK TOTAL      Result  Value Ref Range    CK,TOTAL 73 30 - 223 IU/L   JENS TEST      Result  Value Ref Range    ANTINUCLEAR ANTIBODY (JENS) Positive (A) Negative    JENS PATTERN 1 Homogenous (A) (none)    JENS TITER 1 1:320 (A) (none)    SPECIMEN STATUS Serum will be saved for 30 Days    RHEUMATOID FACTOR      Result  Value Ref Range    RHEUMATOID FACTOR QUANT. <14.0 <14.0 IU/mL   CCP      Result  Value Ref Range    CCP Antibody,IgG/IgA <4.6 <=19.9 CU   LIPID PANEL    "   Result  Value Ref Range    CHOLESTEROL,TOTAL 206 (H) <200 mg/dL    TRIGLYCERIDES 162 (H) <150 mg/dL    HDL CHOLESTEROL 38 23 - 92 mg/dL    NON-HDL CHOLESTEROL 168 (H) <145 mg/dl    CHOL/HDL RATIO            5.42 (H) <4.50                    LDL CHOLESTEROL 136 (H) <100 mg/dL    PATIENT STATUS            FASTING                       ASSESSMENT AND PLAN:  Ember was seen today for follow up.    Diagnoses and all orders for this visit:    COPD, severe (HC) - PFTs 8/4/2014 [facility] - follows with Dr. Akers, Pulmonology at [facility]    History of depression  -     venlafaxine (EFFEXOR XR) 37.5 mg Extended-Release capsule; Take 1 capsule by mouth once daily with a meal.    B12 deficiency  -     cyanocobalamin 1,000 mcg injection (VITAMIN B12); Inject 1 mL intramuscular one time.  -     cyanocobalamin (VITAMIN B12) 1,000 mcg/mL injection; Inject 1 mL intramuscular every 3 weeks. To 4 weeks  -     CA ADMIN VACC INITIAL    Needs flu shot  -     FLU VACCINE => 3 YRS PF QUADRIVALENT IIV4 IM  -     CA ADMIN VACC INITIAL SEASONAL AFFILIATE ONLY    Cervical pain  -     MR SPINE CERVICAL WO; Future    Occipital neuralgia of left side  -     MR SPINE CERVICAL WO; Future    Epigastric pain  -     H PYLORI ANTIGEN,STOOL; Future  -     XR ESOPHAGUS AIR CONTRAST; Future      lab results and schedule of future lab studies reviewed with patient, reviewed diet, exercise and weight control, recommended sodium restriction    -- Expected clinical course discussed   -- Medications and their side effects discussed    The 10-year ASCVD risk score ([place] HO Jr, et al., 2013) is: 2.5%    Values used to calculate the score:      Age: 56 years      Sex: Female      Is Non- : No      Diabetic: No      Tobacco smoker: No      Systolic Blood Pressure: 118 mmHg      Is BP treated: No      HDL Cholesterol: 38 mg/dL      Total Cholesterol: 206 mg/dL      Return in about 3 weeks (around 11/6/2017).    Patient  Instructions   1. History of depression  - venlafaxine (EFFEXOR XR) 37.5 mg Extended-Release capsule; Take 1 capsule by mouth once daily with a meal.  Dispense: 90 capsule; Refill: 3    2. COPD, severe (HC) - PFTs 8/4/2014 [facility] - follows with Dr. Akers, Pulmonology at [facility]    3. B12 deficiency  - cyanocobalamin 1,000 mcg injection (VITAMIN B12); Inject 1 mL intramuscular one time.  - cyanocobalamin (VITAMIN B12) 1,000 mcg/mL injection; Inject 1 mL intramuscular every 3 weeks. To 4 weeks  Dispense: 1000 mcg; Refill: 15    4. Needs flu shot  - FLU VACCINE => 3 YRS PF QUADRIVALENT IIV4 IM    5. Cervical pain  6. Occipital neuralgia of left side  - MR SPINE CERVICAL WO  - they will call with date/time of appointment.      7. Epigastric pain  - H PYLORI ANTIGEN,STOOL; Future  - XR ESOPHAGUS AIR CONTRAST; Future    -- bring in stool sample for Helicobacter Pylori testing.     ? Hiatal hernia.       Return in approximately 3 week(s), or sooner as needed for follow-up with Dr. Boyd.  -- follow-up MRI results and stomach issues.     Clinic : [phone]  Appointment line: [phone]        Return in approximately 6 month(s), or sooner as needed for follow-up with Dr. Boyd.  -- for follow-up COPD.     Clinic : [phone]  Appointment line: [phone]      Tim Boyd MD

## 2017-12-28 NOTE — PROGRESS NOTES
Patient Information     Patient Name MRN Sex Ember Lanza [id number] Female 1960      Progress Notes by Tim Boyd MD at 2017 10:20 AM     Author:  Tim Boyd MD Service:  (none) Author Type:  Physician     Filed:  2017  8:23 AM Encounter Date:  2017 Status:  Signed     :  Tim Boyd MD (Physician)            Nursing Notes:   Melissa Li  2017 10:48 AM  Signed  Patient presents to the clinic for establish care.    Melissa Li LPN        2017 10:32 AM    Ember Tavares presents to clinic today for:   Chief Complaint    Patient presents with      Establish Care     HPI: Ms. Tavares is a 56 y.o. female who presents today for evaluation of above.     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.1) Necrobiosis lipoidica  (R73.09) Elevated random blood glucose level  (R53.81,  R53.83) Malaise and fatigue  (M25.541,  M25.542) Arthralgia of both hands  (R73.03) Prediabetes - New Dx 2017 - A1c 5.8%  (E55.9) Vitamin D deficiency  (R94.2) Decreased diffusion capacity of lung - MILD - PFT 2014  (M85.80) Decreased bone density - 2013 -- DXA SPINE HIP -- [facility] - Hip T-score of -1.7  (E88.01) Alpha-1-antitrypsin deficiency carrier (HC) - Heterozygous  (J44.9) COPD, severe (HC) - PFTs 2014 [facility] - follows with Dr. Akers, Pulmonology at [facility]  (M17.11) Arthritis of knee, right - medial compartment     Patient presents to establish care.     Reports most of her issues are related to her gastrointestinal tract.  She has had to have her esophagus dilated a few times.  She has had a number of workups for stool changes,  diarrhea.    Celiac testing was negative.  Testing for carcinoid syndrome was negative - had normal 5-HIAA.     COPD/emphysema.  She follows with Dr. Akers at [facility].  She was last seen there in 2014.  She has been doing well with her current inhalers.  Previously smoked. Now quit.     Skin rash - on hands, arms, left shin - appears to be dyshidrotic dermatitis of hands, has psoriasis on elbows and knees.   Has a large lesion on her left shin -- appears to be necrobiosis lipoidica.  She is wondered about diabetes.  Her family has diabetes.  Previous and noted to have random elevated glucose levels.    Check A1c today.  Hemoglobin A1c came back elevated in the prediabetic range today.  -- Statin already started today due to her aortic atherosclerosis.  She does not have peripheral vascular disease of the legs.  -- Patient is already taking low-dose aspirin daily.    + Patient reports fatigue and malaise. -- We talked about low vitamin D, low B12.  Her B12 levels previously were in the 400 range.  Vitamin D level previously was around 7.  This was improved up to around 70 with oral supplementation.  Uncertain what her recent B12 level was.  Check labs today.  -- TSH was recently checked with [facility] and was normal.    Bone density was updated a couple years ago. --Her lumbar spine was okay but she was in the lower end of the osteopenia or even early osteoporosis range for her hips.  She is now on vitamin D supplementation.  She may need to consider - NON-ORAL - bone density medications.  Due to her swallowing problems.     Right knee arthritis, noted on outside record x-rays.    Severe COPD noted on her last only function studies.    Ms. Tavares's Body mass index is 24.29 kg/(m^2). This is within the normal range for a 56 y.o. Normal range for ages 18+ is between 18.5 and 24.9.   BP Readings from Last 1 Encounters:08/24/17 : 100/64  Ms. Trotter blood pressure is within the normal range for adults.  Per JNC-8 guidelines normal adult blood pressure is < 120/80, pre-hypertensive is between 120/80 and 139/89, and hypertension is 140/90 or greater.    Functional Capacity: > or about 4 METS.   Reports that she can climb a flight of stairs without any chest pain/heaviness.   + some exertional but stable shortness of breath.   Patient reports no current symptoms of fevers, chills, nausea/vomiting.   No cough. No resting shortness of breath.   No change in bowel/bladder habits. No melena, hematochezia. No Hematuria.   No rashes. No palpitations.  No orthopnea/paroxysmal nocturnal dyspnea   No vision or hearing issues.   No significant mood issues   No bruising.     KARL:  No flowsheet data found.    PHQ9:  PHQ Depression Screening 8/24/2017   Date of PHQ exam (doc flow) 8/24/2017   1. Lack of interest/pleasure 0 - Not at all   2. Feeling down/depressed 0 - Not at all   PHQ-2 TOTAL SCORE 0   3. Trouble sleeping 0 - Not at all   4. Decreased energy 0 - Not at all   5. Appetite change 0 - Not at all   6. Feelings of failure 0 - Not at all   7. Trouble concentrating 0 - Not at all   8. Activity level 0 - Not at all   9. Hurting yourself 0 - Not at all   PHQ-9 TOTAL SCORE 0   PHQ-9 Severity Level none   Functional Impairment not difficult at all   Some recent data might be hidden         I have personally reviewed the past medical history, past surgical history, medications, allergies, family and social history as listed below, on 8/24/2017.    Patient Active Problem List       Diagnosis  Date Noted     Hiatal hernia  08/24/2017     Schatzki's ring of distal esophagus  08/24/2017     History of esophageal dilatation  08/24/2017     Atherosclerosis of abdominal aorta (HC)  08/24/2017      Studies:  2/19/2013 -- CTA ABDOMEN AND PELVIS WITH BILATERAL LOWER EXTREMITY RUNOFF    CLINICAL HISTORY: Iliac and mesenteric ischemia.    TECHNIQUE: 125 mL Omnipaque 300 IV contrast was administered. 3-D  arterial  reformations were performed.    FINDINGS: There is a well-defined ovoid density at the medial right  costophrenic angle. This measures 2.3 x 0.9 x 0.6 cm. It demonstrates  central low attenuation similar to the adjacent abdominal fat near the  liver. This could be a tiny Bochdalek hernia. The liver, spleen,  pancreas, adrenal glands, and kidneys are unremarkable. The  gallbladder is surgically absent. No biliary ductal dilation. No  mesenteric or retroperitoneal adenopathy. No ascites or fluid  collection. There is a moderate amount of retained stool seen  throughout most of the colon. No obstruction. No adjacent inflammatory  change. No ascites or fluid collection. Ureters and bladder are  unremarkable.    There is mild partially calcified atherosclerotic plaque within the  abdominal aorta. No aneurysm. There is narrowing of the lumen just  proximal to the bifurcation to 6 mm.   There is moderate narrowing of the proximal celiac artery without associated calcification.   The superior mesenteric artery appears to be widely patent with good opacification of distal branch vessels. The MARIAH also appears to be well opacified.   There are single bilateral renal arteries which are widely patent.    There is mild partially calcified plaque within the common iliac arteries with minimal narrowing.   The external iliac and common femoral arteries are widely patent.   Superficial femoral and popliteal arteries are widely patent bilaterally.   There is three-vessel runoff with good opacification in the lower leg to the ankle and foot.    IMPRESSION:  1. Mild aortoiliac atherosclerotic disease. No aneurysm. There is narrowing of the distal aorta just proximal to the bifurcation.  2. There is moderate narrowing of the celiac artery which could  be due to noncalcified plaque. No SMA or MARIAH compromise definitely seen.  3. Probable tiny Bochdalek hernia at the right costophrenic angle. A hamartoma would be a differential  consideration though is less likely given the central fat density.  4. Possible constipation.  5. No evidence of vascular compromise in the lower extremities.      Examination Interpreted at: [facility], [place], [place]  The Interpreting Physician is MONICA VORA  Exam was completed at [facility]        Dyshidrotic hand dermatitis  08/24/2017     Psoriasis  08/24/2017     Necrobiosis lipoidica  08/24/2017     Prediabetes - New Dx 8/24/2017 - A1c 5.8%  08/24/2017     Vitamin D deficiency  08/24/2017     COPD, severe (HC) - PFTs 8/4/2014 [facility] - follows with Dr. Luna, Pulmonology at [facility]  08/24/2017 8/4/2014 -- PULMONARY FUNCTION    SITE:  [facility]  ORDERED BY:  VANNESA LUNA    CLINICAL SUMMARY: 53-year-old female with a history of severe COPD.     TECHNICIAN NOTE: Good effort.     DATA: FVC 1.85 (61%). FEV1 1.04 (45%). FEV1/FVC ratio 56%. DLCO 13.5 to 62%.     Flow volume curve is consistent with airway obstruction.     IMPRESSION:  1. Severe obstructive pulmonary disease.   2. Mild decrease in diffusing capacity.        Decreased diffusion capacity of lung - MILD - PFT 8/2014 08/24/2017     Decreased bone density - 12/20/2013 -- DXA SPINE HIP -- [facility] - Hip T-score of -1.7  08/24/2017 12/20/2013 -- DXA SPINE HIP -- [facility]        FINDINGS: The bone mineral density was done using a Marley Spoon machine.   The lumbar spine was 1.139 g/cm2 which is in approximately one half standard deviations above the mean for age-matched persons.   The total hip BMD was 0.789 g/cm2 which is approximately one standard deviations below expected for age.   The T-score was -0.4 at the spine and -1.7 at the total hip for females which places her in the WHO category of osteopenic with bone density between 10 and 25% below young normal. Fracture risk is moderate.    RECOMMENDATION: Treatment is recommended in the form  of bisphosphonate and Evista. Hormone therapy may be an option based on review of risks and benefits of treatment. All patients should assure an adequate intake of dietary calcium (1200 mg per day) and   vitamin D (400-800 international units daily). Followup examination recommended in December of 2015.        Arthritis of knee, right - medial compartment  08/24/2017     Centrilobular emphysema (HC)  08/23/2017     Alpha-1-antitrypsin deficiency carrier (HC) - Heterozygous  08/23/2017     Osteopenia  08/23/2017     Irritable bowel syndrome (IBS)  08/23/2017     Esophageal reflux  08/23/2017     CONTACT DERMATITIS  07/01/2010     MYALGIA  07/01/2010     ABDOMINAL PAIN, CHRONIC  03/10/2010     16 year duration          CONSTIPATION  03/10/2010     SINUSITIS, CHRONIC  03/10/2010     History of tobacco abuse  03/10/2010     Past Medical History:     Diagnosis  Date     Chronic abdominal pain      Chronic sinusitis      Dysphagia      Fractured skull (HC) 1972     Past Surgical History:      Procedure  Laterality Date     ANORECTAL MANOMETRY  10/09/2007     BIOPSY BREAST  1999, 2001, 2004, 2008    benign       CHOLECYSTECTOMY  2004     COLONOSCOPY  x's 3 last 2009    negative       CT CORONARY ANGIOGRAM (IA)  2009    negative       CVL HEART CATH LEFT  04/20/2009    [facility]       ESOPHAGEAL DILATION  10/30/2015    Dr. Rainer Allen, [facility]       ESOPHAGOGASTRODUODENOSCOPY      dilated in 04,07,09  Dr. Allen       LAPAROTOMY      1990 lysis of adhesions/endometriosis       REPAIR RECTOCELE  07/29/2009     VAGINAL HYSTERECTOMY  1994    ovaries remain       Current Outpatient Prescriptions       Medication  Sig Dispense Refill     albuterol HFA (VENTOLIN HFA) 90 mcg/actuation inhaler 3 puffs TID as needed for breathing difficulties       aspirin chewable 81 mg chewable tablet Take 1 tablet by mouth once daily with a meal.       cetirizine (ZYRTEC) 10 mg tablet Take one tablet by mouth once or twice daily as  needed for hand dermatitis/itching.  0     cetirizine (ZYRTEC) 10 mg tablet Take 1 tablet by mouth once daily if needed for Other (Specify).       Cholecalciferol, Vitamin D3, (VITAMIN D-3) 5,000 unit tab Take 1 tablet by mouth once every other day.       fluticasone-salmeterol (ADVAIR DISKUS) 500-50 mcg/Dose diskus inhaler Inhale 1 Puff by mouth every 12 hours.       medication order composer Daily topical solution as needed for Psoriasis flare up, patient unsure of medication name at this time.       ranitidine (ZANTAC 75) 75 mg tablet Take 1 tablet by mouth once daily if needed.       rosuvastatin (CRESTOR) 5 mg tablet Take 1 tablet by mouth at bedtime. -- start once weekly for now 30 tablet 1     sucralfate (CARAFATE) 1 gram tablet Take 1 tablet by mouth 4 times daily before meals and at bedtime. - as needed for Heartburn/hiatal hernia 120 tablet 11     triamcinolone (ARISTOCORT) 0.1 % ointment Apply  topically to affected area(s) 3 times daily. -- for hand dermatitis 80 g 3     Allergies     Allergen  Reactions     Bactrim [Sulfamethoxazole-Trimethoprim] Yeast Infection     Levofloxacin Nausea And Vomiting     Morphine Chest Pain     Penicillins Respiratory Arrest     Family History      Problem  Relation Age of Onset     Arthritis Father      Peripheral vascular disease Mother      Diabetes Mother      Arthritis Mother      Premenopausal breast cancer Sister      Eczema Brother      Narcolepsy Daughter      Vasculitis Daughter      Seizures Daughter      Family Status     Relation  Status     Father  at age 51     Mother      Sister      Brother      Daughter Alive     Social History     Social History        Marital status:       Spouse name: Ervin     Number of children:  N/A     Years of education:  N/A     Social History Main Topics          Smoking status:   Former Smoker      Packs/day:  0.50      Years:  32.00      Types:  Cigarettes      Quit date:  2012      Smokeless tobacco:    "Never Used       Comment: quit date of 8-       Alcohol use   1.2 oz/week     2 Standard drinks or equivalent per week        Comment: 1-2 drinks every 1-2 weeks       Drug use:   No      Sexual activity:   Not on file      Other Topics  Concern     Not on file      Social History Narrative     Graduated from high school plus 3 years collage    EM[employer] and .  Lives in [place]    Patient previously smoked.      Alcohol Use - yes    Drug Use - no    Regular Exercise - yes     - Ervin.  3 children.        Works at [employer]     Complete ROS was performed and was negative unless otherwise noted in HPI above.     EXAM:   Vitals:     08/24/17 1042   BP: 100/64   Pulse: 88   Weight: 62.2 kg (137 lb 2 oz)   Height: 1.6 m (5' 3\")     BP Readings from Last 3 Encounters:    08/24/17 100/64   07/01/10 102/62   03/10/10 116/70     Wt Readings from Last 3 Encounters:    08/24/17 62.2 kg (137 lb 2 oz)   07/01/10 49 kg (108 lb)   03/10/10 47.2 kg (104 lb)     Estimated body mass index is 24.29 kg/(m^2) as calculated from the following:    Height as of this encounter: 1.6 m (5' 3\").    Weight as of this encounter: 62.2 kg (137 lb 2 oz).     EXAM:  Constitutional: Pleasant, alert, appropriate appearance for age. No acute distress  ENT: Normocephalic, Atraumatic, Thyroid without nodules or tenderness   Nose/Mouth: Oral pharynx without erythema or exudates, Nose is patent bilaterally, no rhinorrhea and Dental hygeine adequate   Eyes:  Extraocular muscles intact, Sclera non-icteric, Conjunctiva without erythema  Lymphatic Exam: Non-palpable nodes in neck, clavicular regions  Pulmonary: Lungs are clear to auscultation bilaterally, without wheezes or crackles  Cardiovascular Exam: regular rate and rhythm, brisk carotid upstroke without bruits, peripheral pulses very brisk, no pedal edema  Gastrointestinal Exam: Soft, non-tender, non-distended, positive bowel sounds  Integument: left shin rash - has small " water blisters as well as some crusty dry rash.  Some of the water blisters are running along the sides of her fingers.   Hand rash - overlying left palm.     Psoriasis on knees, bilateral elbows -- right > left .     Appears to have a moderately sized necrobiosis lipoidica lesion of the left shin, pretibial area.  She reports this is essentially asymptomatic.    -- Has been slowly enlarging over time.  Uncertain if this was ever biopsied.  Also records indicate that they had talked about biopsying this but I do not see any pathology reports.    Neurologic Exam: CN 3-12 grossly intact Nonfocal; symmetric DTRs, normal gross motor movement, tone, and coordination. No tremor.  Sensation intact to light touch in upper and lower extremities  Musculoskeletal Exam: Moves upper and lower extremities symmetrically, No focal weakness  Gait and station appear grossly normal  Psychiatric Exam: Awake and Alert, Affect and mood appropriate  Speech is fluent, Thought process is normal    INVESTIGATIONS:   Results for orders placed or performed in visit on 08/24/17      Hgb A1c      Result  Value Ref Range    HEMOGLOBIN A1C MONITORING (POCT) 5.8 4.0 - 6.2 %    ESTIMATED AVERAGE GLUCOSE  120 mg/dL   VITAMIN B12      Result  Value Ref Range    VITAMIN B12 802 180 - 914 pg/mL   CK TOTAL      Result  Value Ref Range    CK,TOTAL 73 30 - 223 IU/L   RHEUMATOID FACTOR      Result  Value Ref Range    RHEUMATOID FACTOR QUANT. <14.0 <14.0 IU/mL   LIPID PANEL      Result  Value Ref Range    CHOLESTEROL,TOTAL 206 (H) <200 mg/dL    TRIGLYCERIDES 162 (H) <150 mg/dL    HDL CHOLESTEROL 38 23 - 92 mg/dL    NON-HDL CHOLESTEROL 168 (H) <145 mg/dl    CHOL/HDL RATIO            5.42 (H) <4.50                    LDL CHOLESTEROL 136 (H) <100 mg/dL    PATIENT STATUS            FASTING                       Outside hospital records are requested/reviewed.  Patient has numerous records from [facility].  Old DEXA scans are reviewed.  Pulmonologist  function studies are reviewed.  Mammography.  Abdominal CT.  Lungs CTs.  Vascular CT / studies.      ASSESSMENT AND PLAN:  Ember was seen today for establish care.    Diagnoses and all orders for this visit:    Encounter to establish care with new doctor    Hiatal hernia  -     sucralfate (CARAFATE) 1 gram tablet; Take 1 tablet by mouth 4 times daily before meals and at bedtime. - as needed for Heartburn/hiatal hernia    Schatzki's ring of distal esophagus  -     sucralfate (CARAFATE) 1 gram tablet; Take 1 tablet by mouth 4 times daily before meals and at bedtime. - as needed for Heartburn/hiatal hernia    History of esophageal dilatation  -     sucralfate (CARAFATE) 1 gram tablet; Take 1 tablet by mouth 4 times daily before meals and at bedtime. - as needed for Heartburn/hiatal hernia    History of tobacco abuse    Atherosclerosis of abdominal aorta (HC)  -     rosuvastatin (CRESTOR) 5 mg tablet; Take 1 tablet by mouth at bedtime. -- start once weekly for now  -     LIPID PANEL; Future  -     LIPID PANEL    Dyshidrotic hand dermatitis  -     triamcinolone (ARISTOCORT) 0.1 % ointment; Apply  topically to affected area(s) 3 times daily. -- for hand dermatitis    Psoriasis    Necrobiosis lipoidica    Elevated random blood glucose level  -     Hgb A1c; Future  -     Hgb A1c    Malaise and fatigue  -     cyanocobalamin 1,000 mcg injection (VITAMIN B12); Inject 1 mL intramuscular one time.  -     VITAMIN B12; Future  -     CK TOTAL; Future  -     JENS TEST; Future  -     VITAMIN B12  -     CK TOTAL  -     JENS TEST    Arthralgia of both hands  -     CK TOTAL; Future  -     JENS TEST; Future  -     RHEUMATOID FACTOR; Future  -     CCP; Future  -     CK TOTAL  -     JENS TEST  -     RHEUMATOID FACTOR  -     CCP    Prediabetes - New Dx 8/24/2017 - A1c 5.8%    Vitamin D deficiency    Decreased diffusion capacity of lung - MILD - PFT 8/2014    Decreased bone density - 12/20/2013 -- DXA SPINE HIP -- [facility] - Hip T-score  of -1.7    Alpha-1-antitrypsin deficiency carrier (HC) - Heterozygous    COPD, severe (HC) - PFTs 8/4/2014 [facility] - follows with Dr. Akers, Pulmonology at [facility]    Arthritis of knee, right - medial compartment    lab results and schedule of future lab studies reviewed with patient, reviewed diet, exercise and weight control, cardiovascular risk and specific lipid/LDL goals reviewed    -- Expected clinical course discussed   -- Medications and their side effects discussed    The 10-year ASCVD risk score (Gloster HO Jr, et al., 2013) is: 1.8%    Values used to calculate the score:      Age: 56 years      Sex: Female      Is Non- : No      Diabetic: No      Tobacco smoker: No      Systolic Blood Pressure: 100 mmHg      Is BP treated: No      HDL Cholesterol: 38 mg/dL      Total Cholesterol: 206 mg/dL    Ember is also recommended to eat a heart-healthy diet, do regular aerobic exercises, maintain a desirable body weight, and avoid tobacco products. These recommendations are from the American Heart Association (AHA) which stresses the importance of lifestyle changes to lower cardiovascular disease risk.     Return in about 1 month (around 9/24/2017) for -- follow-up B12, fatigue.    Patient Instructions   [facility] Studies:  2/19/2013 -- CTA ABDOMEN AND PELVIS WITH BILATERAL LOWER EXTREMITY RUNOFF    CLINICAL HISTORY: Iliac and mesenteric ischemia.    TECHNIQUE: 125 mL Omnipaque 300 IV contrast was administered. 3-D  arterial reformations were performed.    FINDINGS: There is a well-defined ovoid density at the medial right  costophrenic angle. This measures 2.3 x 0.9 x 0.6 cm. It demonstrates  central low attenuation similar to the adjacent abdominal fat near the  liver. This could be a tiny Bochdalek hernia. The liver, spleen,  pancreas, adrenal glands, and kidneys are unremarkable. The  gallbladder is surgically absent. No biliary ductal dilation. No  mesenteric or  retroperitoneal adenopathy. No ascites or fluid  collection. There is a moderate amount of retained stool seen  throughout most of the colon. No obstruction. No adjacent inflammatory  change. No ascites or fluid collection. Ureters and bladder are  unremarkable.    There is mild partially calcified atherosclerotic plaque within the  abdominal aorta. No aneurysm. There is narrowing of the lumen just  proximal to the bifurcation to 6 mm. There is moderate narrowing of  the proximal celiac artery without associated calcification. The  superior mesenteric artery appears to be widely patent with good  opacification of distal branch vessels. The MARIAH also appears to be  well opacified. There are single bilateral renal arteries which are  widely patent.    There is mild partially calcified plaque within the common iliac  arteries with minimal narrowing. The external iliac and common femoral  arteries are widely patent. Superficial femoral and popliteal arteries  are widely patent bilaterally. There is three-vessel runoff with good  opacification in the lower leg to the ankle and foot.    IMPRESSION:  1. Mild aortoiliac atherosclerotic disease. No aneurysm. There is  narrowing of the distal aorta just proximal to the bifurcation.  2. There is moderate narrowing of the celiac artery which could be due  to noncalcified plaque. No SMA or MARIAH compromise definitely seen.  3. Probable tiny Bochdalek hernia at the right costophrenic angle. A  hamartoma would be a differential consideration though is less likely  given the central fat density.  4. Possible constipation.  5. No evidence of vascular compromise in the lower extremities.      Examination Interpreted at: [facility], [place], [place]  The Interpreting Physician is MONICA VORA  Exam was completed at [facility]    1. Encounter to establish care with new doctor    2. Hiatal hernia  3. Schatzki's ring of distal esophagus  4.  History of esophageal dilatation    START:   - sucralfate (CARAFATE) 1 gram tablet; Take 1 tablet by mouth 4 times daily before meals and at bedtime. - as needed for Heartburn/hiatal hernia  Dispense: 120 tablet; Refill: 11    5. History of tobacco abuse      6. Atherosclerosis of abdominal aorta (HC)    -- Start trial of crestor -- once weekly for now, increase as tolerated.     - rosuvastatin (CRESTOR) 5 mg tablet; Take 1 tablet by mouth at bedtime. -- start once weekly for now  Dispense: 30 tablet; Refill: 1    7. Dyshidrotic hand dermatitis    -- Dose Increase.     - cetirizine (ZYRTEC) 10 mg tablet; Take one tablet by mouth once or twice daily as needed for hand dermatitis/itching.; Refill: 0  - triamcinolone (ARISTOCORT) 0.1 % ointment; Apply  topically to affected area(s) 3 times daily. -- for hand dermatitis  Dispense: 80 g; Refill: 3    8. Psoriasis    -- consider dermatology referral.     9. Necrobiosis lipoidica  -- evaluate for possible diabetes.     10. Elevated random blood glucose level  - Hgb A1c; Future    11. Malaise and fatigue  - cyanocobalamin 1,000 mcg injection (VITAMIN B12); Inject 1 mL intramuscular one time.  - VITAMIN B12; Future  - CK TOTAL; Future  - JENS TEST; Future    12. Arthralgia of both hands  - CK TOTAL; Future  - JENS TEST; Future  - RHEUMATOID FACTOR; Future  - CCP; Future    Vitamin B12 deficiency:    Vitamin B12 deficiency can cause numerous symptoms.  Fatigue and malaise, low energy levels, low blood counts or anemia, poor mood or depression, neuropathy or pins and needles/burning sensation of the hands and feet.    -- trial of B12 shot today.    -- If B12 shot improves your energy and your blood counts, we can do B12 shots every 3-4 weeks up to every 2 or 3 months if needed.    -- Omeprazole (Proton Pump Inhibitors in general) and metformin can lower B12 levels (inhibits absorption).    -- Consider B12 tablet or B-complex tablet -- once daily.     -- Some people are able to  take and absorb oral B12 or B complex tablets.   -- Some people are NOT able to absorb oral B12 very well.    If you decide to proceed with oral B12 replacement, but your B12 levels do not improve, you likely will need to use B12 shots instead.        Return in approximately 1 month(s), or sooner as needed for follow-up with Dr. Boyd.  -- follow-up B12, fatigue    Clinic : [phone]  Appointment line: [phone]   Index [place] Related topics   Hiatal Hernia   ________________________________________________________________________  KEY POINTS    A hiatal hernia is a condition in which part of the stomach pokes through the diaphragm up into the chest. Usually it does not cause symptoms or problems.    Treatment is usually not needed if you have no symptoms. If you have symptoms, treatment may include quitting smoking, changing your diet, or losing weight. Your healthcare provider may prescribe medicines or you may need surgery.    Ask your healthcare provider how to take care of yourself at home, and what symptoms or problems you should watch for and what to do if you have them.  ________________________________________________________________________  What is a hiatal hernia?   A hiatal hernia is a condition in which part of the stomach pokes through the diaphragm up into the chest. The diaphragm is a muscle between your chest and belly that helps you breathe.  Hiatal hernias are common after middle age. Usually they do not cause symptoms or problems.  What is the cause?  The exact cause of hiatal hernias is not known. They happen more often after age 50 and in people who smoke or are overweight.  What are the symptoms?  Many people with a hiatal hernia never have any symptoms. However, in some cases it causes stomach acid to flow back into the esophagus. The esophagus is the tube that carries food from your throat to your stomach. The backward movement of stomach acid is called reflux. Symptoms of  reflux may include:    Burning pain or warmth in your chest or throat, usually in the breastbone area    Bitter or sour taste in your mouth    Belching and a feeling of bloating or fullness in your stomach    Frequent unexplained dry cough  How is it diagnosed?  Because many hiatal hernias do not cause symptoms, they are often found during exams for other problems. Your healthcare provider will ask about your symptoms and medical history and examine you. Tests may include:    Barium swallow, which is an X-ray taken of the upper part of your digestive tract after you swallow barium. Barium is a liquid that helps your esophagus and stomach show up well on the X-ray.    Endoscopy, which uses a slim, flexible, lighted tube passed through your mouth to look at your esophagus and stomach  How is it treated?  Treatment is usually not needed if you have no symptoms.  If you have heartburn or other symptoms of reflux, your healthcare provider may recommend or prescribe:    Lifestyle changes such as quitting smoking, changing your diet, or losing weight    Medicine to lower the acid in your stomach  If your symptoms are severe and are not controlled by changes in your diet, weight loss, or medicine, your provider may recommend surgery to repair the hernia.  How can I take care of myself?  To feel better and prevent problems:    Follow your healthcare provider s treatment plan. Take your medicines exactly as prescribed.    Take nonprescription antacids after meals and at bedtime, according to your provider s recommendation.    Eat smaller, more frequent meals. Avoid overeating and late-evening snacks or meals.    If certain foods or drinks seem to cause your symptoms or make them worse, avoid those foods.    Try to keep a healthy weight. If you are overweight, lose weight. Extra weight puts pressure on your stomach. The pressure can cause stomach contents to push up into your esophagus.    If you smoke, try to quit. Smoking  can increase stomach acid. Talk to your healthcare provider about ways to quit smoking.    Wear loose fitting clothing without belts.  It may also help if you:    Sit up during meals and wait 2 to 3 hours after eating before you lie down. It s best not to eat for 2 to 3 hours before you go to bed.    Raise the head of your bed 6 to 8 inches by putting the frame on wood blocks. If you cannot raise the frame of the bed, try placing a foam wedge under the head of your mattress. Sleeping on your left side may also help. Just using extra pillows will not help.    Chew sugarless gum after meals. Some studies have shown that this decreases reflux.  Ask your healthcare provider:    How and when you will get your test results    How long it will take to recover from this illness    If there are activities you should avoid and when you can return to your normal activities    How to take care of yourself at home    What symptoms or problems you should watch for and what to do if you have them  Make sure you know when you should come back for a checkup. Keep all appointments for provider visits or tests.  Developed by [employer].  Adult [employer]visor 2016.3 published by [employer].  Last modified: 2016-03-23  Last reviewed: 2015-10-19  This content is reviewed periodically and is subject to change as new health information becomes available. The information is intended to inform and educate and is not a replacement for medical evaluation, advice, diagnosis or treatment by a healthcare professional.  References   Adult [employer]visor 2016.3 Index    Copyright   2016 [employer], a division of [employer] All rights reserved.           Tim Boyd MD

## 2017-12-28 NOTE — PROGRESS NOTES
Patient Information     Patient Name MRN Sex Ember Lanza [id number] Female 1960      Progress Notes by Tim Boyd MD at 9/15/2017  1:20 PM     Author:  Tim Boyd MD Service:  (none) Author Type:  Physician     Filed:  2017  1:18 PM Encounter Date:  9/15/2017 Status:  Signed     :  Tim Boyd MD (Physician)            Nursing Notes:   Tiffany Ji  9/15/2017  2:22 PM  Signed  Patient is here for a 1 month follow up.  Tiffany Ji LPN...................9/15/2017   2:15 PM   Ember Tavares presents to clinic today for:   Chief Complaint     Patient presents with       Follow Up      1 Month Follow Up-B12     Fatigue      HPI: Ms. Tavares is a 56 y.o. female who presents today for evaluation of above.     (E55.9) Vitamin D deficiency  (primary encounter diagnosis)  (R73.03) Prediabetes - New Dx 2017 - A1c 5.8%  (L30.1) Dyshidrotic hand dermatitis  (L40.9) Psoriasis  (M54.81) Occipital neuralgia of left side  (J43.2) Centrilobular emphysema (HC)  (J44.9) COPD, severe (HC) - PFTs 2014 [facility] - follows with Dr. Akers, Pulmonology at [facility]     Patient presents for follow-up of a number of issues.    Vitamin D deficiency - recent diagnosis, aching all replacement.  Vitamin D levels have improved.  Advised to continue.    Prediabetes, new diagnosis.  Discussed need for reduced oral caloric intake, regular exercise.  Try to avoid carbohydrates and baked goods.  We talked about medication options at this time she would like to try diet and exercise.    Hand dermatitis, significantly improved with topical triamcinolone ointment.    Psoriasis plaques also improve with topical triamcinolone ointment.  Advised to continue.    Left-sided occipital neuralgia, reports history of occipital nerve block in the past.  She's been having some sharp stabbing electrical pins and needles symptoms originating from the left occiput radiating superiorly  and anteriorly over the left side of her scalp.  At times her scalp will be hypersensitive.  We discussed treatment options.  She was on gabapentin in the past she would like to try nerve block.  Orders placed.    Emphysema with severe COPD.  She has not currently taking any type of ipratropium.  She does not make much of any phlegm or mucus.  We discussed some inhaler options.  Once daily long-lasting epitrochlear and recommended. New Prescription sent to pharmacy.  Start Spiriva.     Ms. Hoyoss Body mass index is 25.47 kg/(m^2). This is out of the normal range for a 56 y.o. Normal range for ages 18+ is between 18.5 and 24.9. To lose weight we reviewed risks and benefits of appropriate options such as diet, exercise, and medications. Patient's strategy will be  self-directed nutrition plan and self-directed exercise program   BP Readings from Last 1 Encounters:09/15/17 : 120/72  Ms. Trotter blood pressure is out of the normal range for adults. Per JNC-8 guidelines normal adult blood pressure is < 120/80, pre-hypertensive is between 120/80 and 139/89, and hypertension is 140/90 or greater. Risks of hypertension were discussed. Patient's strategy will be weight loss, increased activity and reduced salt intake    Functional Capacity: > 4 METS.   Reports that she can climb a flight of stairs without any chest pain/heaviness   Patient reports no current symptoms of fevers, chills, nausea/vomiting.   Occasional cough. + some exertional shortness of breath. -- wants to try new / additional inhaler.     No change in bowel/bladder habits. No melena, hematochezia. No Hematuria.   + Improvement in her rashes. No palpitations.  No orthopnea/paroxysmal nocturnal dyspnea   No vision or hearing issues.   No significant mood issues   No bruising.     KARL:  No flowsheet data found.    PHQ9:  PHQ Depression Screening 8/24/2017 9/15/2017   Date of PHQ exam (doc flow) 8/24/2017 9/15/2017   1. Lack of interest/pleasure 0 - Not  at all 0 - Not at all   2. Feeling down/depressed 0 - Not at all 0 - Not at all   PHQ-2 TOTAL SCORE 0 0   3. Trouble sleeping 0 - Not at all -   4. Decreased energy 0 - Not at all -   5. Appetite change 0 - Not at all -   6. Feelings of failure 0 - Not at all -   7. Trouble concentrating 0 - Not at all -   8. Activity level 0 - Not at all -   9. Hurting yourself 0 - Not at all -   PHQ-9 TOTAL SCORE 0 -   PHQ-9 Severity Level none -   Functional Impairment not difficult at all -   Some recent data might be hidden          I have personally reviewed the past medical history, past surgical history, medications, allergies, family and social history as listed below, on 9/15/2017.    Patient Active Problem List       Diagnosis  Date Noted     Occipital neuralgia of left side  09/15/2017     Hiatal hernia  08/24/2017     Schatzki's ring of distal esophagus  08/24/2017     History of esophageal dilatation  08/24/2017     Atherosclerosis of abdominal aorta (HC)  08/24/2017     [facility] Studies:  2/19/2013 -- CTA ABDOMEN AND PELVIS WITH BILATERAL LOWER EXTREMITY RUNOFF    CLINICAL HISTORY: Iliac and mesenteric ischemia.    TECHNIQUE: 125 mL Omnipaque 300 IV contrast was administered. 3-D  arterial reformations were performed.    FINDINGS: There is a well-defined ovoid density at the medial right  costophrenic angle. This measures 2.3 x 0.9 x 0.6 cm. It demonstrates  central low attenuation similar to the adjacent abdominal fat near the  liver. This could be a tiny Bochdalek hernia. The liver, spleen,  pancreas, adrenal glands, and kidneys are unremarkable. The  gallbladder is surgically absent. No biliary ductal dilation. No  mesenteric or retroperitoneal adenopathy. No ascites or fluid  collection. There is a moderate amount of retained stool seen  throughout most of the colon. No obstruction. No adjacent inflammatory  change. No ascites or fluid collection. Ureters and bladder are  unremarkable.    There is mild  partially calcified atherosclerotic plaque within the  abdominal aorta. No aneurysm. There is narrowing of the lumen just  proximal to the bifurcation to 6 mm.   There is moderate narrowing of the proximal celiac artery without associated calcification.   The superior mesenteric artery appears to be widely patent with good opacification of distal branch vessels. The MARIAH also appears to be well opacified.   There are single bilateral renal arteries which are widely patent.    There is mild partially calcified plaque within the common iliac arteries with minimal narrowing.   The external iliac and common femoral arteries are widely patent.   Superficial femoral and popliteal arteries are widely patent bilaterally.   There is three-vessel runoff with good opacification in the lower leg to the ankle and foot.    IMPRESSION:  1. Mild aortoiliac atherosclerotic disease. No aneurysm. There is narrowing of the distal aorta just proximal to the bifurcation.  2. There is moderate narrowing of the celiac artery which could  be due to noncalcified plaque. No SMA or MARIAH compromise definitely seen.  3. Probable tiny Bochdalek hernia at the right costophrenic angle. A hamartoma would be a differential consideration though is less likely given the central fat density.  4. Possible constipation.  5. No evidence of vascular compromise in the lower extremities.      Examination Interpreted at: [facility], [place], [place]  The Interpreting Physician is MONICA VORA  Exam was completed at [facility]        Dyshidrotic hand dermatitis  08/24/2017     Psoriasis  08/24/2017     Prediabetes - New Dx 8/24/2017 - A1c 5.8%  08/24/2017     Vitamin D deficiency  08/24/2017     COPD, severe (HC) - PFTs 8/4/2014 [facility] - follows with Dr. Akers, Pulmonology at [facility]  08/24/2017 8/4/2014 -- PULMONARY FUNCTION    SITE:  [facility]  ORDERED BY:  VANNESA CHILDERS  JEREMY    CLINICAL SUMMARY: 53-year-old female with a history of severe COPD.     TECHNICIAN NOTE: Good effort.     DATA: FVC 1.85 (61%). FEV1 1.04 (45%). FEV1/FVC ratio 56%. DLCO 13.5 to 62%.     Flow volume curve is consistent with airway obstruction.     IMPRESSION:  1. Severe obstructive pulmonary disease.   2. Mild decrease in diffusing capacity.        Decreased diffusion capacity of lung - MILD - PFT 8/2014 08/24/2017     Decreased bone density - 12/20/2013 -- DXA SPINE HIP -- [employer] - Hip T-score of -1.7  08/24/2017 12/20/2013 -- DXA SPINE HIP -- [email or web address]        FINDINGS: The bone mineral density was done using a [employer] machine.   The lumbar spine was 1.139 g/cm2 which is in approximately one half standard deviations above the mean for age-matched persons.   The total hip BMD was 0.789 g/cm2 which is approximately one standard deviations below expected for age.   The T-score was -0.4 at the spine and -1.7 at the total hip for females which places her in the WHO category of osteopenic with bone density between 10 and 25% below young normal. Fracture risk is moderate.    RECOMMENDATION: Treatment is recommended in the form of bisphosphonate and Evista. Hormone therapy may be an option based on review of risks and benefits of treatment. All patients should assure an adequate intake of dietary calcium (1200 mg per day) and   vitamin D (400-800 international units daily). Followup examination recommended in December of 2015.        Arthritis of knee, right - medial compartment  08/24/2017     Centrilobular emphysema (HC)  08/23/2017     Alpha-1-antitrypsin deficiency carrier (HC) - Heterozygous  08/23/2017     Osteopenia  08/23/2017     Irritable bowel syndrome (IBS)  08/23/2017     Esophageal reflux  08/23/2017     CONTACT DERMATITIS  07/01/2010     MYALGIA  07/01/2010     ABDOMINAL PAIN, CHRONIC  03/10/2010     16 year duration          CONSTIPATION  03/10/2010     SINUSITIS, CHRONIC   03/10/2010     History of tobacco abuse  03/10/2010     Past Medical History:     Diagnosis  Date     Chronic abdominal pain      Chronic sinusitis      Dysphagia      Fractured skull (HC) 1972     Past Surgical History:      Procedure  Laterality Date     ANORECTAL MANOMETRY  10/09/2007     BIOPSY BREAST  1999, 2001, 2004, 2008    benign       CHOLECYSTECTOMY  2004     COLONOSCOPY  x's 3 last 2009    negative       CT CORONARY ANGIOGRAM (IA)  2009    negative       CVL HEART CATH LEFT  04/20/2009    [facility]       ESOPHAGEAL DILATION  10/30/2015    Dr. Rainer Allen, [facility]       ESOPHAGOGASTRODUODENOSCOPY      dilated in 04,07,09  Dr. Allen       LAPAROTOMY      1990 lysis of adhesions/endometriosis       REPAIR RECTOCELE  07/29/2009     VAGINAL HYSTERECTOMY  1994    ovaries remain       Current Outpatient Prescriptions       Medication  Sig Dispense Refill     albuterol HFA (VENTOLIN HFA) 90 mcg/actuation inhaler 3 puffs TID as needed for breathing difficulties       aspirin chewable 81 mg chewable tablet Take 1 tablet by mouth once daily with a meal.       cetirizine (ZYRTEC) 10 mg tablet Take one tablet by mouth once or twice daily as needed for hand dermatitis/itching.  0     cetirizine (ZYRTEC) 10 mg tablet Take 1 tablet by mouth once daily if needed for Other (Specify).       Cholecalciferol, Vitamin D3, (VITAMIN D-3) 5,000 unit tab Take 1 tablet by mouth once weekly.  0     fluticasone-salmeterol (ADVAIR DISKUS) 500-50 mcg/Dose diskus inhaler Inhale 1 Puff by mouth every 12 hours.       medication order composer Daily topical solution as needed for Psoriasis flare up, patient unsure of medication name at this time.       ranitidine (ZANTAC 75) 75 mg tablet Take 1 tablet by mouth once daily if needed.       rosuvastatin (CRESTOR) 5 mg tablet Take 1 tablet by mouth at bedtime. -- start once weekly for now 30 tablet 1     tiotropium bromide (SPIRIVA RESPIMAT) 2.5 mcg/actuation mist Inhale 2  "Puffs by mouth once daily. For COPD 4 g 11     triamcinolone (ARISTOCORT) 0.1 % ointment Apply  topically to affected area(s) 3 times daily. -- for hand dermatitis 80 g 3     Allergies     Allergen  Reactions     Carafate [Sucralfate] Nausea And Vomiting     Bactrim [Sulfamethoxazole-Trimethoprim] Yeast Infection     Levofloxacin Nausea And Vomiting     Morphine Chest Pain     Penicillins Respiratory Arrest     Family History      Problem  Relation Age of Onset     Arthritis Father      Peripheral vascular disease Mother      Diabetes Mother      Arthritis Mother      Premenopausal breast cancer Sister      Eczema Brother      Narcolepsy Daughter      Vasculitis Daughter      Seizures Daughter      Family Status     Relation  Status     Father  at age 51     Mother      Sister      Brother      Daughter Alive     Social History     Social History        Marital status:       Spouse name: Ervin     Number of children:  N/A     Years of education:  N/A     Social History Main Topics          Smoking status:   Former Smoker      Packs/day:  0.50      Years:  32.00      Types:  Cigarettes      Quit date:  2012      Smokeless tobacco:   Never Used       Comment: quit date of 2012       Alcohol use   1.2 oz/week     2 Standard drinks or equivalent per week        Comment: 1-2 drinks every 1-2 weeks       Drug use:   No      Sexual activity:   Not on file      Other Topics  Concern     Not on file      Social History Narrative     Graduated from high school plus 3 years collage    [employer] and .  Lives in [place]    Patient previously smoked.      Alcohol Use - yes    Drug Use - no    Regular Exercise - yes     - Ervin.  3 children.        Works at [employer]       Pertinent ROS was performed and was negative as noted in HPI above.     EXAM:   Vitals:     09/15/17 1304   BP: 120/72   Pulse: 76   Resp: 16   Weight: 65.2 kg (143 lb 12.8 oz)   Height: 1.6 m (5' 3\")     BP " "Readings from Last 3 Encounters:    09/15/17 120/72   08/24/17 100/64   07/01/10 102/62     Wt Readings from Last 3 Encounters:    09/15/17 65.2 kg (143 lb 12.8 oz)   08/24/17 62.2 kg (137 lb 2 oz)   07/01/10 49 kg (108 lb)     Estimated body mass index is 25.47 kg/(m^2) as calculated from the following:    Height as of this encounter: 1.6 m (5' 3\").    Weight as of this encounter: 65.2 kg (143 lb 12.8 oz).     EXAM:  Constitutional: Pleasant, alert, appropriate appearance for age. No acute distress  Eyes:  Extraocular muscles intact, Sclera non-icteric, Conjunctiva without erythema  Lymphatic Exam: Non-palpable nodes in neck, clavicular regions  Pulmonary: Prolonged expiratory phase noted and Lungs are clear to auscultation bilaterally, without wheezes or crackles  Cardiovascular Exam: regular rate and rhythm, no pedal edema  Gastrointestinal Exam: Soft, non-tender, non-distended, positive bowel sounds  Integument: Hand dermatitis and left shin - hand dermatitis has improved.  A lot of the previous inflammatory changes have healed.  Only has a small patch of dermatitis in the mid palm now.  The left shin dermatitis or psoriasis lesion also has improved.  Previously looked like a patch of necrobiosis lipoidica, today it is more scaly silver-like appearance with mildly redder pinkish underlying base, more consistent with psoriasis patch.  Neurologic Exam: + Left occipital neuralgia noted - scalp and skin hypersensitivity noted to light touch and palpation.  More focal pain over the left occiput.  CN 3-12 grossly intact   Musculoskeletal Exam: Moves upper and lower extremities symmetrically, No focal weakness  Gait and station appear grossly normal  Psychiatric Exam: Awake and Alert, Affect and mood appropriate  Speech is fluent, Thought process is normal    INVESTIGATIONS:  Results for orders placed or performed in visit on 08/24/17      Hgb A1c      Result  Value Ref Range    HEMOGLOBIN A1C MONITORING (POCT) 5.8 4.0 " - 6.2 %    ESTIMATED AVERAGE GLUCOSE  120 mg/dL   VITAMIN B12      Result  Value Ref Range    VITAMIN B12 802 180 - 914 pg/mL   CK TOTAL      Result  Value Ref Range    CK,TOTAL 73 30 - 223 IU/L   JENS TEST      Result  Value Ref Range    ANTINUCLEAR ANTIBODY (JENS) Positive (A) Negative    JENS PATTERN 1 Homogenous (A) (none)    JENS TITER 1 1:320 (A) (none)    SPECIMEN STATUS Serum will be saved for 30 Days    RHEUMATOID FACTOR      Result  Value Ref Range    RHEUMATOID FACTOR QUANT. <14.0 <14.0 IU/mL   CCP      Result  Value Ref Range    CCP Antibody,IgG/IgA <4.6 <=19.9 CU   LIPID PANEL      Result  Value Ref Range    CHOLESTEROL,TOTAL 206 (H) <200 mg/dL    TRIGLYCERIDES 162 (H) <150 mg/dL    HDL CHOLESTEROL 38 23 - 92 mg/dL    NON-HDL CHOLESTEROL 168 (H) <145 mg/dl    CHOL/HDL RATIO            5.42 (H) <4.50                    LDL CHOLESTEROL 136 (H) <100 mg/dL    PATIENT STATUS            FASTING                     9/15/2017 - PROCEDURE:  XR CHEST 2 VIEWS PA AND LATERAL    HISTORY: Centrilobular emphysema (HC).    COMPARISON:  None.    FINDINGS:   The cardiac silhouette is normal in size. The pulmonary vasculature is normal.  The lungs are hyperinflated but clear. No pleural effusion or pneumothorax.    IMPRESSION:  Hyperinflation, suggesting COPD.      Electronically Signed By: Cristina Chung M.D. on 9/15/2017 2:47 PM    ASSESSMENT AND PLAN:  Ember was seen today for follow up and fatigue.    Diagnoses and all orders for this visit:    Vitamin D deficiency    Prediabetes - New Dx 8/24/2017 - A1c 5.8%    Dyshidrotic hand dermatitis    Psoriasis    Occipital neuralgia of left side  -     US INJ ANES OCCIPITAL NERVE LEFT; Future    Centrilobular emphysema (HC)  -     tiotropium bromide (SPIRIVA RESPIMAT) 2.5 mcg/actuation mist; Inhale 2 Puffs by mouth once daily. For COPD  -     XR CHEST 2 VIEWS PA AND LATERAL; Future    COPD, severe (HC) - PFTs 8/4/2014 [facility] - follows with Dr. Akers, Pulmonology at  [facility]  -     tiotropium bromide (SPIRIVA RESPIMAT) 2.5 mcg/actuation mist; Inhale 2 Puffs by mouth once daily. For COPD  -     XR CHEST 2 VIEWS PA AND LATERAL; Future    lab results and schedule of future lab studies reviewed with patient, recommended sodium restriction, cardiovascular risk and specific lipid/LDL goals reviewed    -- Expected clinical course discussed   -- Medications and their side effects discussed    The 10-year ASCVD risk score (Javiercomfort TEAGUE Jr, et al., 2013) is: 2.6%    Values used to calculate the score:      Age: 56 years      Sex: Female      Is Non- : No      Diabetic: No      Tobacco smoker: No      Systolic Blood Pressure: 120 mmHg      Is BP treated: No      HDL Cholesterol: 38 mg/dL      Total Cholesterol: 206 mg/dL    Ember is also recommended to eat a heart-healthy diet, do regular aerobic exercises, maintain a desirable body weight, and avoid tobacco products. These recommendations are from the American Heart Association (AHA) which stresses the importance of lifestyle changes to lower cardiovascular disease risk.     Return in about 1 month (around 10/15/2017) for -- follow-up COPD.    Patient Instructions   1. Vitamin D deficiency  -- continue vitamin D replacement    2. Prediabetes - New Dx 8/24/2017 - A1c 5.8%  -- reduce carbohydrate intake and watch the flour / processed foods.    3. Dyshidrotic hand dermatitis  4. Psoriasis  -- glad the steroid topical ointment has helped.    5. Occipital neuralgia of left side  - US INJ ANES OCCIPITAL NERVE LEFT  - they will call with date/time of appointment.      6. Centrilobular emphysema (HC)  7. COPD, severe (HC) - PFTs 8/4/2014 [facility] - follows with Dr. Akers, Pulmonology at [facility]    Start:  - tiotropium bromide (SPIRIVA RESPIMAT) 2.5 mcg/actuation mist; Inhale 2 Puffs by mouth once daily. For COPD  Dispense: 4 g; Refill: 11    - XR CHEST 2 VIEWS PA AND LATERAL - today.     Return in  approximately 1 month(s), or sooner as needed for follow-up with Dr. Boyd.  -- follow-up COPD    Clinic : [phone]  Appointment line: [phone]        Tim Boyd MD

## 2017-12-28 NOTE — PROGRESS NOTES
Patient Information     Patient Name MRN Sex Ember Lanza [id number] Female 1960      Progress Notes by Payton German R.T. (ARRT) at 2017 10:38 AM     Author:  Payton German R.T. ([facility] Service:  (none) Author Type:  [employer] - Registered Radiologic Technologist     Filed:  2017 10:38 AM Date of Service:  2017 10:38 AM Status:  Signed     :  Payton German R.T. ([facility]) ([facility] - Registered Radiologic Technologist)            [place] Protocol    A. Pre-procedure verification complete yes  1-relevant information / documentation available, reviewed and properly matched to the patient; 2-consent accurate and complete, 3-equipment and supplies available    B. Site marking complete Yes  Site marked if not in continuous attendance with patient    C. TIME OUT completed yes  Time Out was conducted just prior to starting procedure to verify the eight required elements: 1-patient identity, 2-consent accurate and complete, 3-position, 4-correct side/site marked (if applicable), 5-procedure, 6-relevant images / results properly labeled and displayed (if applicable), 7-antibiotics / irrigation fluids (if applicable), 8-safety precautions.

## 2017-12-28 NOTE — TELEPHONE ENCOUNTER
Patient Information     Patient Name MRN Sex Ember Lanza [id number] Female 1960      Telephone Encounter by Denise Pierson at 2017 10:10 AM     Author:  Denise Pierson Service:  (none) Author Type:  (none)     Filed:  2017 10:13 AM Encounter Date:  2017 Status:  Signed     :  Denise Pierson

## 2017-12-28 NOTE — PROGRESS NOTES
Patient Information     Patient Name MRN Sex Ember Lanza [id number] Female 1960      Progress Notes by Tim Boyd MD at 2017  8:20 AM     Author:  Tim Boyd MD Service:  (none) Author Type:  Physician     Filed:  2017  2:55 PM Encounter Date:  2017 Status:  Signed     :  Tim Boyd MD (Physician)            Nursing Notes:   GuillermoMelissa aguirre  2017  8:45 AM  Signed  Patient presents to the clinic for follow up with MRI.      Melissa GuillermoROGERS        2017 8:27 AM    Ember Tavares presents to clinic today for:   Chief Complaint    Patient presents with      Follow Up     HPI: Ms. Tavares is a 57 y.o. female who presents today for evaluation of above.     (L40.9) Psoriasis  (primary encounter diagnosis)  (L40.50) Psoriatic arthritis (HC)  (M54.2) Pain of cervical facet joint  (M54.12,  G89.29) Chronic radicular cervical pain     Patient is for follow-up of a few issues.  She is wondering if she has psoriatic arthritis.  A number of her joints hurt.  She has nail changes, skin changes.  -- Topical steroids have not been super helpful.  Start different medication today.    -- She would like to start oral medication for her psoriasis.  She would also like to see rheumatology.  Referral sent.  She may need to consider a biologic agent.    Still with neck pain - low, in approximately C5-C7 range, ongoing for past 6-7 months with pain rating down to the mid back level.  Reports pain currently 2 out of 10.  Does have some shooting pain down the back.  Some localized pain as well.  Mostly using over-the-counter pain medication for treatment management.  - MRI recently obtained - reviewed results with patient today. Has some reproducible pain with palpation over facet joints in this area.    -- reviewed MRI with Dr. Clayton.      -- He recommended consideration of #1 cervical epidural steroid injection, second -- potential -- injection would be a C3-4,  C4-5 facet injection.    Ms. Trotter Body mass index is 24.69 kg/(m^2). This is within the normal range for a 57 y.o. Normal range for ages 18+ is between 18.5 and 24.9.   BP Readings from Last 1 Encounters:11/20/17 : 108/64  Ms. Trotter blood pressure is within the normal range for adults. Per JNC-8 guidelines normal adult blood pressure is < 120/80, pre-hypertensive is between 120/80 and 139/89, and hypertension is 140/90 or greater.    Functional Capacity: > 4 METS.   -- reports breathing is controlled.   -- at times, limited due to joint pains.   + worsening rash, nail changes.     Reports that she can climb a flight of stairs without any chest pain/heaviness or shortness of breath.   Patient reports no current symptoms of fevers, chills, nausea/vomiting.   No cough. No shortness of breath.   No change in bowel/bladder habits. No melena, hematochezia. No Hematuria.   No palpitations.  No orthopnea/paroxysmal nocturnal dyspnea   No vision or hearing issues.  No bruising.     KARL:  No flowsheet data found.    PHQ9:  PHQ Depression Screening 10/16/2017 11/20/2017   Date of PHQ exam (doc flow) 10/16/2017 11/20/2017   1. Lack of interest/pleasure 0 - Not at all 0 - Not at all   2. Feeling down/depressed 0 - Not at all 0 - Not at all   PHQ-2 TOTAL SCORE 0 0   3. Trouble sleeping - 0 - Not at all   4. Decreased energy - 0 - Not at all   5. Appetite change - 0 - Not at all   6. Feelings of failure - 0 - Not at all   7. Trouble concentrating - 0 - Not at all   8. Activity level - 0 - Not at all   9. Hurting yourself - 0 - Not at all   PHQ-9 TOTAL SCORE - 0   PHQ-9 Severity Level - none   Functional Impairment - not difficult at all   Some recent data might be hidden          I have personally reviewed the past medical history, past surgical history, medications, allergies, family and social history as listed below, on 11/20/2017.    Patient Active Problem List       Diagnosis  Date Noted     Psoriatic arthritis  (HC)  11/20/2017     Pain of cervical facet joint  11/20/2017     Chronic radicular cervical pain  11/20/2017     Epigastric pain  10/16/2017     Occipital neuralgia of left side  09/15/2017     Hiatal hernia  08/24/2017     Schatzki's ring of distal esophagus  08/24/2017     History of esophageal dilatation  08/24/2017     Atherosclerosis of abdominal aorta (HC)  08/24/2017     [facility] Studies:  2/19/2013 -- CTA ABDOMEN AND PELVIS WITH BILATERAL LOWER EXTREMITY RUNOFF    CLINICAL HISTORY: Iliac and mesenteric ischemia.    TECHNIQUE: 125 mL Omnipaque 300 IV contrast was administered. 3-D  arterial reformations were performed.    FINDINGS: There is a well-defined ovoid density at the medial right  costophrenic angle. This measures 2.3 x 0.9 x 0.6 cm. It demonstrates  central low attenuation similar to the adjacent abdominal fat near the  liver. This could be a tiny Bochdalek hernia. The liver, spleen,  pancreas, adrenal glands, and kidneys are unremarkable. The  gallbladder is surgically absent. No biliary ductal dilation. No  mesenteric or retroperitoneal adenopathy. No ascites or fluid  collection. There is a moderate amount of retained stool seen  throughout most of the colon. No obstruction. No adjacent inflammatory  change. No ascites or fluid collection. Ureters and bladder are  unremarkable.    There is mild partially calcified atherosclerotic plaque within the  abdominal aorta. No aneurysm. There is narrowing of the lumen just  proximal to the bifurcation to 6 mm.   There is moderate narrowing of the proximal celiac artery without associated calcification.   The superior mesenteric artery appears to be widely patent with good opacification of distal branch vessels. The MARIAH also appears to be well opacified.   There are single bilateral renal arteries which are widely patent.    There is mild partially calcified plaque within the common iliac arteries with minimal narrowing.   The external iliac and  common femoral arteries are widely patent.   Superficial femoral and popliteal arteries are widely patent bilaterally.   There is three-vessel runoff with good opacification in the lower leg to the ankle and foot.    IMPRESSION:  1. Mild aortoiliac atherosclerotic disease. No aneurysm. There is narrowing of the distal aorta just proximal to the bifurcation.  2. There is moderate narrowing of the celiac artery which could  be due to noncalcified plaque. No SMA or MARIAH compromise definitely seen.  3. Probable tiny Bochdalek hernia at the right costophrenic angle. A hamartoma would be a differential consideration though is less likely given the central fat density.  4. Possible constipation.  5. No evidence of vascular compromise in the lower extremities.      Examination Interpreted at: [facility], [place], [place]  The Interpreting Physician is MONICA VORA  Exam was completed at [facility]        Dyshidrotic hand dermatitis  08/24/2017     Psoriasis  08/24/2017     Prediabetes - New Dx 8/24/2017 - A1c 5.8%  08/24/2017     Vitamin D deficiency  08/24/2017     COPD, severe (HC) - PFTs 8/4/2014 [facility] - follows with Dr. Luna, Pulmonology at [facility]  08/24/2017 8/4/2014 -- PULMONARY FUNCTION    SITE:  [facility]  ORDERED BY:  VANNESA LUNA    CLINICAL SUMMARY: 53-year-old female with a history of severe COPD.     TECHNICIAN NOTE: Good effort.     DATA: FVC 1.85 (61%). FEV1 1.04 (45%). FEV1/FVC ratio 56%. DLCO 13.5 to 62%.     Flow volume curve is consistent with airway obstruction.     IMPRESSION:  1. Severe obstructive pulmonary disease.   2. Mild decrease in diffusing capacity.        Decreased diffusion capacity of lung - MILD - PFT 8/2014 08/24/2017     Decreased bone density - 12/20/2013 -- DXA SPINE HIP -- [facility] - Hip T-score of -1.7  08/24/2017 12/20/2013 -- DXA SPINE HIP -- [facility]  [facility]        FINDINGS: The bone mineral density was done using a MoboFree machine.   The lumbar spine was 1.139 g/cm2 which is in approximately one half standard deviations above the mean for age-matched persons.   The total hip BMD was 0.789 g/cm2 which is approximately one standard deviations below expected for age.   The T-score was -0.4 at the spine and -1.7 at the total hip for females which places her in the WHO category of osteopenic with bone density between 10 and 25% below young normal. Fracture risk is moderate.    RECOMMENDATION: Treatment is recommended in the form of bisphosphonate and Evista. Hormone therapy may be an option based on review of risks and benefits of treatment. All patients should assure an adequate intake of dietary calcium (1200 mg per day) and   vitamin D (400-800 international units daily). Followup examination recommended in December of 2015.        Arthritis of knee, right - medial compartment  08/24/2017     Centrilobular emphysema (HC)  08/23/2017     Alpha-1-antitrypsin deficiency carrier (HC) - Heterozygous  08/23/2017     Osteopenia  08/23/2017     Irritable bowel syndrome (IBS)  08/23/2017     Esophageal reflux  08/23/2017     CONTACT DERMATITIS  07/01/2010     MYALGIA  07/01/2010     ABDOMINAL PAIN, CHRONIC  03/10/2010     16 year duration          CONSTIPATION  03/10/2010     SINUSITIS, CHRONIC  03/10/2010     History of tobacco abuse  03/10/2010     Past Medical History:     Diagnosis  Date     Chronic abdominal pain      Chronic sinusitis      Dysphagia      Fractured skull (HC) 1972     Past Surgical History:      Procedure  Laterality Date     ANORECTAL MANOMETRY  10/09/2007     BIOPSY BREAST  1999, 2001, 2004, 2008    benign       CHOLECYSTECTOMY  2004     COLONOSCOPY  x's 3 last 2009    negative       CT CORONARY ANGIOGRAM (IA)  2009    negative       CVL HEART CATH LEFT  04/20/2009    [facility]       ESOPHAGEAL DILATION  10/30/2015    Dr. Rainer Allen,  [facility]       ESOPHAGOGASTRODUODENOSCOPY      dilated in 04,07,09  Dr. Allen       LAPAROTOMY      1990 lysis of adhesions/endometriosis       REPAIR RECTOCELE  07/29/2009     VAGINAL HYSTERECTOMY  1994    ovaries remain       Current Outpatient Prescriptions       Medication  Sig Dispense Refill     albuterol HFA (VENTOLIN HFA) 90 mcg/actuation inhaler 3 puffs TID as needed for breathing difficulties       aspirin chewable 81 mg chewable tablet Take 1 tablet by mouth once daily with a meal.       calcipotriene 0.005% (DOVONEX) 0.005 % ointment Apply  topically to affected area(s) 2 times daily. For Plaque Psoriasis -- mix with Clobetasol cream/ointment 120 g 6     cetirizine (ZYRTEC) 10 mg tablet Take one tablet by mouth once or twice daily as needed for hand dermatitis/itching.  0     Cholecalciferol, Vitamin D3, (VITAMIN D-3) 5,000 unit tab Take 1 tablet by mouth once weekly.  0     cyanocobalamin (VITAMIN B12) 1,000 mcg/mL injection Inject 1 mL intramuscular every 3 weeks. To 4 weeks 1000 mcg 15     fluticasone-salmeterol (ADVAIR DISKUS) 500-50 mcg/Dose diskus inhaler Inhale 1 Puff by mouth every 12 hours.       medication order composer Daily topical solution as needed for Psoriasis flare up, patient unsure of medication name at this time.       ranitidine (ZANTAC 75) 75 mg tablet Take 1 tablet by mouth once daily if needed.       tiotropium bromide (SPIRIVA RESPIMAT) 2.5 mcg/actuation mist Inhale 2 Puffs by mouth once daily. For COPD 4 g 11     triamcinolone (ARISTOCORT) 0.1 % ointment Apply  topically to affected area(s) 3 times daily. -- for hand dermatitis 80 g 3     venlafaxine (EFFEXOR XR) 37.5 mg Extended-Release capsule Take 1 capsule by mouth once daily with a meal. 90 capsule 3     Allergies     Allergen  Reactions     Carafate [Sucralfate] Nausea And Vomiting     Crestor [Rosuvastatin] Nausea And Vomiting     Lipitor [Atorvastatin] Myalgia     Bactrim [Sulfamethoxazole-Trimethoprim] Yeast  "Infection     Levofloxacin Nausea And Vomiting     Morphine Chest Pain     Penicillins Respiratory Arrest     Family History      Problem  Relation Age of Onset     Arthritis Father      Peripheral vascular disease Mother      Diabetes Mother      Arthritis Mother      Premenopausal breast cancer Sister      Eczema Brother      Narcolepsy Daughter      Vasculitis Daughter      Seizures Daughter      Family Status     Relation  Status     Father  at age 51     Mother      Sister      Brother      Daughter Alive     Social History     Social History        Marital status:       Spouse name: Ervin     Number of children:  N/A     Years of education:  N/A     Social History Main Topics          Smoking status:   Former Smoker      Packs/day:  0.50      Years:  32.00      Types:  Cigarettes      Quit date:  2012      Smokeless tobacco:   Never Used       Comment: quit date of 2012       Alcohol use   1.2 oz/week     2 Standard drinks or equivalent per week        Comment: 1-2 drinks every 1-2 weeks       Drug use:   No      Sexual activity:   Not on file      Other Topics  Concern     Not on file      Social History Narrative     Graduated from high school plus 3 years collage    [employer] and .  Lives in [place]    Patient previously smoked.      Alcohol Use - yes    Drug Use - no    Regular Exercise - yes     - Ervin.  3 children.        Works at [employer]     Pertinent ROS was performed and was negative as noted in HPI above.     EXAM:   Vitals:     17 0828   BP: 108/64   Pulse: 72   Weight: 63.2 kg (139 lb 6 oz)     BP Readings from Last 3 Encounters:    17 108/64   17 110/70   10/16/17 118/70     Wt Readings from Last 3 Encounters:    17 63.2 kg (139 lb 6 oz)   17 64.7 kg (142 lb 9.6 oz)   10/16/17 64.9 kg (143 lb)     Estimated body mass index is 24.69 kg/(m^2) as calculated from the following:    Height as of 10/16/17: 1.6 m (5' 3\").    " Weight as of this encounter: 63.2 kg (139 lb 6 oz).     EXAM:  Constitutional: well groomed / good hygiene, casual dress  Lymphatic Exam: Non-palpable nodes in neck, clavicular regions  Pulmonary: Lungs are clear to auscultation bilaterally, without wheezes or crackles  Cardiovascular Exam: regular rate and rhythm, no pedal edema  Integument: + plaque psoriasis noted on shins, hands. Pitting nail changes noted.   Neurologic Exam: CN 3-12 grossly intact   Musculoskeletal Exam: right hand, 2nd finger DIP joint - with tenderness to palpation.   Psychiatric Exam: Awake and Alert, Affect and mood appropriate  Speech is fluent, Thought process is normal    INVESTIGATIONS:  Results for orders placed or performed in visit on 10/23/17      H PYLORI ANTIGEN,STOOL      Result  Value Ref Range    H.PYLORI AGN STOOL Negative Negative       ASSESSMENT AND PLAN:  Ember was seen today for follow up.    Diagnoses and all orders for this visit:    Psoriasis  -     AMB CONSULT TO RHEUMATOLOGY; Future  -     calcipotriene 0.005% (DOVONEX) 0.005 % ointment; Apply  topically to affected area(s) 2 times daily. For Plaque Psoriasis -- mix with Clobetasol cream/ointment    Psoriatic arthritis (HC)  -     AMB CONSULT TO RHEUMATOLOGY; Future    Pain of cervical facet joint  -     XR INJ EPIDURAL INTERLAMINAR CERVICAL; Future    Chronic radicular cervical pain  -     XR INJ EPIDURAL INTERLAMINAR CERVICAL; Future    reviewed diet, exercise and weight control, recommended sodium restriction    -- Expected clinical course discussed   -- Medications and their side effects discussed    The 10-year ASCVD risk score (Breakscomfort TEAGUE Jr, et al., 2013) is: 2.3%    Values used to calculate the score:      Age: 57 years      Sex: Female      Is Non- : No      Diabetic: No      Tobacco smoker: No      Systolic Blood Pressure: 108 mmHg      Is BP treated: No      HDL Cholesterol: 38 mg/dL      Total Cholesterol: 206 mg/dL    Ember is also  recommended to eat a heart-healthy diet, do regular aerobic exercises, maintain a desirable body weight, and avoid tobacco products. These recommendations are from the American Heart Association (AHA) which stresses the importance of lifestyle changes to lower cardiovascular disease risk.     Return if symptoms worsen or fail to improve.    Patient Instructions   1. Psoriasis    - AMB CONSULT TO RHEUMATOLOGY  - they will call with date/time of appointment.      - calcipotriene 0.005% (DOVONEX) 0.005 % ointment; Apply  topically to affected area(s) 2 times daily. For Plaque Psoriasis -- mix with Clobetasol cream/ointment  Dispense: 120 g; Refill: 6    2. Psoriatic arthritis (HC)  - AMB CONSULT TO RHEUMATOLOGY; Future    3. Pain of cervical facet joint    -- will review your recent MRI with Dr. Clayton - consider facet injection.     Return as needed for follow-up with Dr. Boyd.    Clinic : [phone]  Appointment line: [phone]        Tim Boyd MD

## 2017-12-28 NOTE — PROGRESS NOTES
Patient Information     Patient Name MRN Sex Ember Lanza [id number] Female 1960      Progress Notes by Mignon Estrada at 10/17/2017 11:15 AM     Author:  Mignon Estrada Service:  (none) Author Type:  Other Clinical Staff     Filed:  10/17/2017 11:15 AM Date of Service:  10/17/2017 11:15 AM Status:  Signed     :  Mignon Estrada (Other Clinical Staff)            Falls Risk Criteria:    Age 65 and older or under age 4        Sensory deficits    Poor vision    Use of ambulatory aides    Impaired judgment    Unable to walk independently    Meets High Risk criteria for falls:  no

## 2017-12-28 NOTE — PROGRESS NOTES
Patient Information     Patient Name MRN Sex Ember Lanza [id number] Female 1960      Progress Notes by Payton German R.T. (ARRT) at 2017 10:38 AM     Author:  Payton German R.T. (ARRT) Service:  (none) Author Type:  [employer] - Registered Radiologic Technologist     Filed:  2017 10:38 AM Date of Service:  2017 10:38 AM Status:  Signed     :  Payton German R.T. (ARRT) ([facility] - Registered Radiologic Technologist)            RECOVERY TIME  You may experience numbness and/or relief of your pain for up to 4-6 hours after the injection.  Your usual symptoms may return the night of the procedure and may possible be more severe than usual a day or two following.  Please keep track of your pain over the next several days and report how long the relief lasts to the doctor who referred you for this procedure.    The beneficial effects of the steroids usually require 2 to 3 days to take effect, buy may take as long as 5 to 7 days.  If there is no change in the pain, then investigation can be focused on other possible sources of your pain.  In either case, the information is useful to the doctor who referred you for this procedure.    POSSIBLE SIDE EFFECTS  Facial flushing (redness), occasional low grade fevers of 99.5F or less, hiccups, insomnia, headaches, increased heart rate, abdominal cramping, and/or a bloating feeling are side effects of the steroid medications and will go away 3 to 4 days after the injection.    Diabetic Patients  The steroids you have received may significantly increase your blood sugar levels.  Monitor your blood sugar level closely (4-6 times per day) for a period of 4 days or until your blood sugar level normalizes.  If your blood sugar level elevates significantly or you experience confusion, dizziness, sweating, please notify our primary physician and make him/her aware that you have received steroids.

## 2017-12-29 ENCOUNTER — HISTORY (OUTPATIENT)
Dept: FAMILY MEDICINE | Facility: OTHER | Age: 57
End: 2017-12-29

## 2017-12-29 ENCOUNTER — OFFICE VISIT - GICH (OUTPATIENT)
Dept: FAMILY MEDICINE | Facility: OTHER | Age: 57
End: 2017-12-29

## 2017-12-29 DIAGNOSIS — J04.0 ACUTE LARYNGITIS: ICD-10-CM

## 2017-12-29 DIAGNOSIS — J02.9 ACUTE PHARYNGITIS: ICD-10-CM

## 2017-12-29 NOTE — PATIENT INSTRUCTIONS
Patient Information     Patient Name MRN Sex Ember Lanza [id number] Female 1960      Patient Instructions by Tim Boyd MD at 10/16/2017  9:00 AM     Author:  Tim Boyd MD  Service:  (none) Author Type:  Physician     Filed:  10/16/2017  9:34 AM  Encounter Date:  10/16/2017 Status:  Addendum     :  Tim Boyd MD (Physician)        Related Notes: Original Note by Tim Boyd MD (Physician) filed at 10/16/2017  9:33 AM            1. History of depression  - venlafaxine (EFFEXOR XR) 37.5 mg Extended-Release capsule; Take 1 capsule by mouth once daily with a meal.  Dispense: 90 capsule; Refill: 3    2. COPD, severe (HC) - PFTs 2014 [facility] - follows with Dr. Akers, Pulmonology at [facility]    3. B12 deficiency  - cyanocobalamin 1,000 mcg injection (VITAMIN B12); Inject 1 mL intramuscular one time.  - cyanocobalamin (VITAMIN B12) 1,000 mcg/mL injection; Inject 1 mL intramuscular every 3 weeks. To 4 weeks  Dispense: 1000 mcg; Refill: 15    4. Needs flu shot  - FLU VACCINE => 3 YRS PF QUADRIVALENT IIV4 IM    5. Cervical pain  6. Occipital neuralgia of left side  - MR SPINE CERVICAL WO  - they will call with date/time of appointment.      7. Epigastric pain  - H PYLORI ANTIGEN,STOOL; Future  - XR ESOPHAGUS AIR CONTRAST; Future    -- bring in stool sample for Helicobacter Pylori testing.     ? Hiatal hernia.       Return in approximately 3 week(s), or sooner as needed for follow-up with Dr. Boyd.  -- follow-up MRI results and stomach issues.     Clinic : [phone]  Appointment line: [phone]        Return in approximately 6 month(s), or sooner as needed for follow-up with Dr. Boyd.  -- for follow-up COPD.     Clinic : [phone]  Appointment line: [phone]

## 2017-12-29 NOTE — PATIENT INSTRUCTIONS
Patient Information     Patient Name MRN Sex Ember Lanza [id number] Female 1960      Patient Instructions by Tim Boyd MD at 2017  8:20 AM     Author:  Tim Boyd MD Service:  (none) Author Type:  Physician     Filed:  2017  8:49 AM Encounter Date:  2017 Status:  Signed     :  Tim Boyd MD (Physician)            1. Psoriasis    - AMB CONSULT TO RHEUMATOLOGY  - they will call with date/time of appointment.      - calcipotriene 0.005% (DOVONEX) 0.005 % ointment; Apply  topically to affected area(s) 2 times daily. For Plaque Psoriasis -- mix with Clobetasol cream/ointment  Dispense: 120 g; Refill: 6    2. Psoriatic arthritis (HC)  - AMB CONSULT TO RHEUMATOLOGY; Future    3. Pain of cervical facet joint    -- will review your recent MRI with Dr. Clayton - consider facet injection.     Return as needed for follow-up with Dr. Boyd.    Clinic : [phone]  Appointment line: [phone]

## 2017-12-30 NOTE — NURSING NOTE
Patient Information     Patient Name MRN Sex Ember Lanza [id number] Female 1960      Nursing Note by Gale Camargo at 2017  9:50 AM     Author:  Gale Camargo Service:  (none) Author Type:  (none)     Filed:  2017 10:05 AM Encounter Date:  2017 Status:  Signed     :  Gale Camargo            Patient is here today for a nissen work up. Patient is aware that she is due for a mammogram.  Gale Camargo LPN.......................... 2017  10:02 AM

## 2017-12-30 NOTE — NURSING NOTE
Patient Information     Patient Name MRN Ember Padron [id number] Female 1960      Nursing Note by Mercedez Cordova at 10/16/2017  9:00 AM     Author:  Mercedez Cordova Service:  (none) Author Type:  (none)     Filed:  10/16/2017  9:24 AM Encounter Date:  10/16/2017 Status:  Signed     :  Mercedez Cordova            Patient presents in the clinic for a follow up. Patient also has concerns regarding ongoing neck pain.  Mercedez Cordova LPN............................ 10/16/2017 9:12 AM

## 2017-12-30 NOTE — NURSING NOTE
Patient Information     Patient Name MRN Sex Ember Lanza [id number] Female 1960      Nursing Note by Melissa Li at 2017  8:20 AM     Author:  Melissa Li Service:  (none) Author Type:  (none)     Filed:  2017  8:45 AM Encounter Date:  2017 Status:  Signed     :  Melissa Li            Patient presents to the clinic for follow up with MRI.      Melissa Li LPN        2017 8:27 AM

## 2017-12-30 NOTE — NURSING NOTE
Patient Information     Patient Name MRN Sex Ember Lanza [id number] Female 1960      Nursing Note by Gale Camargo at 2017  9:30 AM     Author:  Gale Camargo Service:  (none) Author Type:  (none)     Filed:  2017  9:37 AM Encounter Date:  2017 Status:  Signed     :  Gale Camargo            Patient is here today for a follow up, she had a EGD done yesterday.   Gale Camargo LPN.......................... 2017  9:35 AM

## 2017-12-30 NOTE — NURSING NOTE
Patient Information     Patient Name MRN Ember Padron [id number] Female 1960      Nursing Note by Tiffany Ji at 9/15/2017  1:20 PM     Author:  Tiffany Ji Service:  (none) Author Type:  (none)     Filed:  9/15/2017  2:22 PM Encounter Date:  9/15/2017 Status:  Signed     :  Tiffany Ji            Patient is here for a 1 month follow up.  Tiffany Ji LPN...................9/15/2017   2:15 PM

## 2017-12-30 NOTE — NURSING NOTE
Patient Information     Patient Name MRN Sex Ember Lanza [id number] Female 1960      Nursing Note by Melissa iL at 2017 10:20 AM     Author:  Melissa Li Service:  (none) Author Type:  (none)     Filed:  2017 10:48 AM Encounter Date:  2017 Status:  Signed     :  Melissa Li            Patient presents to the clinic for establish care.    Melissa Li LPN        2017 10:32 AM

## 2018-01-26 ENCOUNTER — DOCUMENTATION ONLY (OUTPATIENT)
Dept: FAMILY MEDICINE | Facility: OTHER | Age: 58
End: 2018-01-26

## 2018-01-26 PROBLEM — J43.2 CENTRILOBULAR EMPHYSEMA (H): Status: ACTIVE | Noted: 2017-08-23

## 2018-01-26 PROBLEM — K44.9 HIATAL HERNIA: Status: ACTIVE | Noted: 2017-08-24

## 2018-01-26 PROBLEM — J44.9 COPD, SEVERE (H): Status: ACTIVE | Noted: 2017-08-24

## 2018-01-26 PROBLEM — M17.11 ARTHRITIS OF KNEE, RIGHT: Status: ACTIVE | Noted: 2017-08-24

## 2018-01-26 PROBLEM — M54.2 PAIN OF CERVICAL FACET JOINT: Status: ACTIVE | Noted: 2017-11-20

## 2018-01-26 PROBLEM — R73.03 PREDIABETES: Status: ACTIVE | Noted: 2017-08-24

## 2018-01-26 PROBLEM — M54.81 OCCIPITAL NEURALGIA OF LEFT SIDE: Status: ACTIVE | Noted: 2017-09-15

## 2018-01-26 PROBLEM — Z14.8 ALPHA-1-ANTITRYPSIN DEFICIENCY CARRIER: Status: ACTIVE | Noted: 2017-08-23

## 2018-01-26 PROBLEM — M85.80 OSTEOPENIA: Status: ACTIVE | Noted: 2017-08-23

## 2018-01-26 PROBLEM — Z98.890 HISTORY OF ESOPHAGEAL DILATATION: Status: ACTIVE | Noted: 2017-08-24

## 2018-01-26 PROBLEM — M85.80 DECREASED BONE DENSITY: Status: ACTIVE | Noted: 2017-08-24

## 2018-01-26 PROBLEM — L40.50 PSORIATIC ARTHRITIS (H): Status: ACTIVE | Noted: 2017-11-20

## 2018-01-26 PROBLEM — M54.12 CHRONIC RADICULAR CERVICAL PAIN: Status: ACTIVE | Noted: 2017-11-20

## 2018-01-26 PROBLEM — I70.0 ATHEROSCLEROSIS OF ABDOMINAL AORTA (H): Status: ACTIVE | Noted: 2017-08-24

## 2018-01-26 PROBLEM — L40.9 PSORIASIS: Status: ACTIVE | Noted: 2017-08-24

## 2018-01-26 PROBLEM — K22.2 SCHATZKI'S RING OF DISTAL ESOPHAGUS: Status: ACTIVE | Noted: 2017-08-24

## 2018-01-26 PROBLEM — R94.2 DECREASED DIFFUSION CAPACITY OF LUNG: Status: ACTIVE | Noted: 2017-08-24

## 2018-01-26 PROBLEM — K58.9 IRRITABLE BOWEL SYNDROME (IBS): Status: ACTIVE | Noted: 2017-08-23

## 2018-01-26 PROBLEM — R10.13 EPIGASTRIC PAIN: Status: ACTIVE | Noted: 2017-10-16

## 2018-01-26 PROBLEM — G89.29 CHRONIC RADICULAR CERVICAL PAIN: Status: ACTIVE | Noted: 2017-11-20

## 2018-01-26 PROBLEM — L30.1 DYSHIDROTIC HAND DERMATITIS: Status: ACTIVE | Noted: 2017-08-24

## 2018-01-26 PROBLEM — E55.9 VITAMIN D DEFICIENCY: Status: ACTIVE | Noted: 2017-08-24

## 2018-01-26 PROBLEM — K21.9 ESOPHAGEAL REFLUX: Status: ACTIVE | Noted: 2017-08-23

## 2018-01-26 PROBLEM — K21.9 GERD (GASTROESOPHAGEAL REFLUX DISEASE): Status: ACTIVE | Noted: 2017-11-29

## 2018-01-26 RX ORDER — CYANOCOBALAMIN 1000 UG/ML
1000 INJECTION, SOLUTION INTRAMUSCULAR; SUBCUTANEOUS
COMMUNITY
Start: 2017-10-16 | End: 2019-03-28

## 2018-01-26 RX ORDER — CALCIPOTRIENE 50 UG/G
OINTMENT TOPICAL 2 TIMES DAILY PRN
COMMUNITY
Start: 2017-11-20 | End: 2019-09-27

## 2018-01-26 RX ORDER — CETIRIZINE HYDROCHLORIDE 10 MG/1
TABLET ORAL
COMMUNITY
Start: 2017-08-24 | End: 2018-04-17

## 2018-01-26 RX ORDER — CLOBETASOL PROPIONATE 0.5 MG/G
OINTMENT TOPICAL 2 TIMES DAILY PRN
COMMUNITY
Start: 2017-11-21 | End: 2019-09-27

## 2018-01-26 RX ORDER — TRIAMCINOLONE ACETONIDE 1 MG/G
OINTMENT TOPICAL 3 TIMES DAILY
Status: ON HOLD | COMMUNITY
Start: 2017-08-24 | End: 2019-03-29

## 2018-01-26 RX ORDER — ASPIRIN 81 MG/1
81 TABLET, CHEWABLE ORAL
COMMUNITY
End: 2019-09-27

## 2018-01-26 RX ORDER — VENLAFAXINE HYDROCHLORIDE 37.5 MG/1
37.5 CAPSULE, EXTENDED RELEASE ORAL
COMMUNITY
Start: 2017-10-16 | End: 2018-04-17

## 2018-01-26 RX ORDER — ALBUTEROL SULFATE 90 UG/1
3 AEROSOL, METERED RESPIRATORY (INHALATION) 3 TIMES DAILY PRN
COMMUNITY
End: 2018-09-19

## 2018-01-27 VITALS
DIASTOLIC BLOOD PRESSURE: 64 MMHG | RESPIRATION RATE: 16 BRPM | HEIGHT: 63 IN | HEART RATE: 76 BPM | WEIGHT: 143 LBS | HEART RATE: 72 BPM | SYSTOLIC BLOOD PRESSURE: 110 MMHG | WEIGHT: 137 LBS | BODY MASS INDEX: 25.48 KG/M2 | BODY MASS INDEX: 24.27 KG/M2 | HEART RATE: 64 BPM | SYSTOLIC BLOOD PRESSURE: 120 MMHG | DIASTOLIC BLOOD PRESSURE: 72 MMHG | WEIGHT: 139.38 LBS | BODY MASS INDEX: 24.69 KG/M2 | HEART RATE: 64 BPM | BODY MASS INDEX: 25.34 KG/M2 | WEIGHT: 143.8 LBS | DIASTOLIC BLOOD PRESSURE: 70 MMHG | HEIGHT: 63 IN | SYSTOLIC BLOOD PRESSURE: 108 MMHG | SYSTOLIC BLOOD PRESSURE: 118 MMHG | DIASTOLIC BLOOD PRESSURE: 70 MMHG

## 2018-01-27 VITALS
WEIGHT: 142.6 LBS | SYSTOLIC BLOOD PRESSURE: 110 MMHG | HEART RATE: 72 BPM | WEIGHT: 137.13 LBS | BODY MASS INDEX: 24.3 KG/M2 | HEART RATE: 88 BPM | DIASTOLIC BLOOD PRESSURE: 64 MMHG | DIASTOLIC BLOOD PRESSURE: 70 MMHG | BODY MASS INDEX: 25.26 KG/M2 | SYSTOLIC BLOOD PRESSURE: 100 MMHG | HEIGHT: 63 IN

## 2018-01-28 ENCOUNTER — HEALTH MAINTENANCE LETTER (OUTPATIENT)
Age: 58
End: 2018-01-28

## 2018-02-03 ASSESSMENT — PATIENT HEALTH QUESTIONNAIRE - PHQ9
SUM OF ALL RESPONSES TO PHQ QUESTIONS 1-9: 0
SUM OF ALL RESPONSES TO PHQ QUESTIONS 1-9: 0

## 2018-02-04 ENCOUNTER — OFFICE VISIT - GICH (OUTPATIENT)
Dept: FAMILY MEDICINE | Facility: OTHER | Age: 58
End: 2018-02-04

## 2018-02-04 ENCOUNTER — HISTORY (OUTPATIENT)
Dept: FAMILY MEDICINE | Facility: OTHER | Age: 58
End: 2018-02-04

## 2018-02-04 DIAGNOSIS — R11.2 NAUSEA WITH VOMITING: ICD-10-CM

## 2018-02-04 DIAGNOSIS — K52.9 NONINFECTIVE GASTROENTERITIS AND COLITIS: ICD-10-CM

## 2018-02-08 ENCOUNTER — TRANSFERRED RECORDS (OUTPATIENT)
Dept: HEALTH INFORMATION MANAGEMENT | Facility: OTHER | Age: 58
End: 2018-02-08

## 2018-02-09 VITALS
SYSTOLIC BLOOD PRESSURE: 128 MMHG | TEMPERATURE: 98.3 F | RESPIRATION RATE: 20 BRPM | DIASTOLIC BLOOD PRESSURE: 70 MMHG | BODY MASS INDEX: 25.43 KG/M2 | HEIGHT: 62 IN | HEART RATE: 62 BPM | WEIGHT: 138.2 LBS

## 2018-02-09 VITALS
RESPIRATION RATE: 16 BRPM | SYSTOLIC BLOOD PRESSURE: 122 MMHG | HEART RATE: 88 BPM | WEIGHT: 127.4 LBS | HEIGHT: 62 IN | BODY MASS INDEX: 23.45 KG/M2 | DIASTOLIC BLOOD PRESSURE: 86 MMHG | TEMPERATURE: 98 F

## 2018-02-12 NOTE — PROGRESS NOTES
Patient Information     Patient Name MRN Sex Ember Lanza [id number] Female 1960      Progress Notes by Soraya Roa NP at 2017  2:45 PM     Author:  Soraya Roa NP Service:  (none) Author Type:  PHYS- Nurse Practitioner     Filed:  2017  4:50 PM Encounter Date:  2017 Status:  Signed     :  Soraya Roa NP (PHYS- Nurse Practitioner)            Nursing Notes:   Griselda Jimenez  2017  3:38 PM  Signed  Patient presents to the clinic for sore throat that started on . C/o pain on right side of neck. C/o no appetite.   Griselda Jimenez LPN............................ 2017 3:09 PM    SUBJECTIVE:    Ember Tavares is a 57 y.o. female who presents for sore throat, lost voice    Pharyngitis    This is a new problem. Episode onset: 3-4 days. The problem has been unchanged. The pain is worse on the right side. There has been no fever (Hot and cold, no fever). The pain is at a severity of 2/10. Associated symptoms include congestion, coughing, headaches and a hoarse voice. Pertinent negatives include no drooling, ear discharge, plugged ear sensation, shortness of breath, stridor, swollen glands, trouble swallowing or vomiting. Associated symptoms comments: Ears are itching. She has had no exposure to strep or mono. She has tried gargles, NSAIDs and acetaminophen (Excedrin) for the symptoms. The treatment provided mild relief.       Current Outpatient Prescriptions on File Prior to Visit       Medication  Sig Dispense Refill     albuterol HFA (VENTOLIN HFA) 90 mcg/actuation inhaler 3 puffs TID as needed for breathing difficulties       aspirin chewable 81 mg chewable tablet Take 1 tablet by mouth once daily with a meal.       calcipotriene 0.005% (DOVONEX) 0.005 % ointment Apply  topically to affected area(s) 2 times daily. For Plaque Psoriasis -- mix with Clobetasol cream/ointment 120 g 6     cetirizine (ZYRTEC) 10 mg tablet Take one tablet by  "mouth once or twice daily as needed for hand dermatitis/itching.  0     Cholecalciferol, Vitamin D3, (VITAMIN D-3) 5,000 unit tab Take 1 tablet by mouth once weekly.  0     clobetasol 0.05% (TEMOVATE 0.05% OINTMENT) 0.05 % ointment Apply  topically to affected area(s) 2 times daily. Apply topically to affected area(s) 2 times daily.For Plaque Psoriasis-mix with Dovonex 120 g 6     cyanocobalamin (VITAMIN B12) 1,000 mcg/mL injection Inject 1 mL intramuscular every 3 weeks. To 4 weeks 1000 mcg 15     fluticasone-salmeterol (ADVAIR DISKUS) 500-50 mcg/Dose diskus inhaler Inhale 1 Puff by mouth every 12 hours.       medication order composer Daily topical solution as needed for Psoriasis flare up, patient unsure of medication name at this time.       ranitidine (ZANTAC 75) 75 mg tablet Take 1 tablet by mouth once daily if needed.       tiotropium bromide (SPIRIVA RESPIMAT) 2.5 mcg/actuation mist Inhale 2 Puffs by mouth once daily. For COPD 4 g 11     triamcinolone (ARISTOCORT) 0.1 % ointment Apply  topically to affected area(s) 3 times daily. -- for hand dermatitis 80 g 3     venlafaxine (EFFEXOR XR) 37.5 mg Extended-Release capsule Take 1 capsule by mouth once daily with a meal. 90 capsule 3     No current facility-administered medications on file prior to visit.        REVIEW OF SYSTEMS:  Review of Systems   HENT: Positive for congestion and hoarse voice. Negative for drooling, ear discharge and trouble swallowing.    Respiratory: Positive for cough. Negative for shortness of breath and stridor.    Gastrointestinal: Negative for vomiting.   Neurological: Positive for headaches.       OBJECTIVE:  /70 (Cuff Site: Left Arm, Position: Sitting, Cuff Size: Adult Regular)  Pulse 62  Temp 98.3  F (36.8  C) (Tympanic)  Resp 20  Ht 1.575 m (5' 2\")  Wt 62.7 kg (138 lb 3.2 oz)  Breastfeeding? No  BMI 25.28 kg/m2    EXAM:   Physical Exam   Constitutional: She is well-developed, well-nourished, and in no distress. "   HENT:   Head: Normocephalic and atraumatic.   Right Ear: Tympanic membrane and ear canal normal.   Left Ear: Tympanic membrane and ear canal normal.   Nose: Nose normal. Right sinus exhibits no maxillary sinus tenderness and no frontal sinus tenderness. Left sinus exhibits no maxillary sinus tenderness and no frontal sinus tenderness.   Mouth/Throat: Uvula is midline, oropharynx is clear and moist and mucous membranes are normal.   Eyes: Conjunctivae are normal.   Neck: Neck supple.   Cardiovascular: Normal rate, regular rhythm and normal heart sounds.    Pulmonary/Chest: Effort normal and breath sounds normal. No respiratory distress. She has no wheezes. She has no rales.   Lymphadenopathy:     She has no cervical adenopathy.   Skin: Skin is warm and dry.   Psychiatric: Mood and affect normal.   Nursing note and vitals reviewed.      ASSESSMENT/PLAN:    ICD-10-CM    1. Sore throat J02.9 dexamethasone 8 mg inj for oral, topical or inhalation use (DECADRON)   2. Laryngitis, acute J04.0         Plan:  Home cares and OTC gone over. Exam normal. Voice is hoarse. Decadron gone over. F/U if needed. Symptoms likely due to virus. No antibiotic is needed at this time. Symptoms typically worse on days 2-5 and then stabilize and you are sick for days 5-12. Days 12-14 there is slow resolution and if there is a cough, studies show it can linger longer, however one is not as ill as in the beginning. If symptoms begin worsening or fail to improve after 14 days, return to clinic for reevaluation. All questions were answered and she is in agreement with plan.         BEENA GEORGE NP ....................  12/29/2017   4:50 PM

## 2018-02-12 NOTE — PATIENT INSTRUCTIONS
Patient Information     Patient Name MRN Ember Padron [id number] Female 1960      Patient Instructions by Soraya Roa NP at 2017  2:45 PM     Author:  Soraya Roa NP Service:  (none) Author Type:  PHYS- Nurse Practitioner     Filed:  2017  3:47 PM Encounter Date:  2017 Status:  Signed     :  Soraya Roa NP (PHYS- Nurse Practitioner)            Laryngitis   ________________________________________________________________________  KEY POINTS    Laryngitis is irritation and swelling of your vocal cords and voice box (larynx). It causes hoarseness, which means your voice sounds unnaturally low, deep, or raspy. Sometimes you can only whisper or hardly speak at all.    If another health problem is causing the laryngitis, treatment for that problem will also treat the laryngitis. The main treatment is resting your voice as much as you can.    Follow the full course of treatment prescribed by your healthcare provider. Ask your healthcare provider if there are activities you should avoid and when you can return to your normal activities.  ________________________________________________________________________  What is laryngitis?  Laryngitis is irritation and swelling of your vocal cords and voice box (larynx). It causes hoarseness, which means your voice sounds unnaturally low, deep, or raspy. Sometimes you can only whisper or hardly speak at all.  Laryngitis may be acute or chronic. Acute laryngitis starts suddenly and lasts no more than a few days. Laryngitis is chronic if symptoms last for at least 3 weeks.  What is the cause?  Acute laryngitis is usually a symptom of a cold, flu, bronchitis, sinusitis, and other infections or allergies. Chronic laryngitis can be caused by:    Heavy smoking or drinking    Overuse of your voice, such as in teaching or public speaking, singing or shouting    Coughing often and hard    Exposure to dust, chemicals, or  cigarette smoke  Health problems that can cause changes in your vocal cords include:    Drainage from your sinuses (post-nasal drip) that you may not notice    Thyroid disease    Noncancerous growths on the vocal cords    Acid reflux from the stomach    Cancer of the vocal cords  What are the symptoms?  Symptoms may include:    Low, raspy voice and hoarseness    A dry cough (meaning that you don t cough up mucus)    A throat that feels dry or sore    A voice that weakens throughout the day  You may lose your voice completely and just be able to whisper.  How is it diagnosed?  Your healthcare provider will ask about your symptoms and medical history and examine you. To check for possible causes of your symptoms, you may have tests such as:    Laryngoscopy, in which a slim, flexible, lighted tube is passed through your mouth to look at your vocal cords    A biopsy, which is the removal of a small sample of tissue for testing    CT scan, which uses X-rays and a computer to show detailed pictures of the throat    MRI, which uses a strong magnetic field and radio waves to show detailed pictures of the throat  Your healthcare provider may check you for allergies or infections also.  How is it treated?  If another health problem is causing the laryngitis, such as thyroid disease, acid reflux, or sinusitis, treatment for that problem will also treat the laryngitis. If no health problem has caused laryngitis, the main treatment is resting your voice as much as you can. Symptoms should improve in a few days or a couple of weeks.  Your healthcare provider may recommend using a steroid spray to help the larynx (voice box) heal faster. Using a steroid for a long time can have serious side effects. Take steroid medicine exactly as your healthcare provider prescribes. Don t take more or less of it than prescribed by your provider and don t take it longer than prescribed. Don t stop taking a steroid without your provider's  approval. You may have to lower your dosage slowly before stopping it.  How can I take care of myself?  Follow the full course of treatment prescribed by your healthcare provider. In addition:    Don t smoke, and stay away from others who are smoking.    Avoid breathing dust and chemical fumes.    Rest your voice as much as possible.    Drink extra fluids, such as water, fruit juice, and tea.    Use a humidifier to put more moisture in the air. Avoid steam vaporizers because they can cause burns. Be sure to keep the humidifier clean, as recommended in the 's instructions. It's important to keep bacteria and mold from growing in the water container.  Ask your healthcare provider:    How and when you will get your test results    How long it will take to recover    If there are activities you should avoid and when you can return to your normal activities    How to take care of yourself at home    What symptoms or problems you should watch for and what to do if you have them  Make sure you know when you should come back for a checkup. Keep all appointments for provider visits or tests.  How can I help prevent laryngitis?    Get plenty of rest when you have a viral or bacterial infection, such as a cold or sinusitis.    Ask your healthcare provider how to treat your allergies to prevent sinus infections.    Avoid vocal strain by not yelling, screaming, or talking loudly, especially when you have a cold or other throat or sinus infection.    If you smoke, try to quit. Talk to your healthcare provider about ways to quit smoking.    Avoid secondhand smoke.    If you want to drink alcohol, ask your healthcare provider how much is safe for you to drink.    If you have frequent heartburn or reflux disease, see your healthcare provider about preventing or treating these problems.

## 2018-02-13 NOTE — NURSING NOTE
Patient Information     Patient Name MRN Ember Padron [id number] Female 1960      Nursing Note by Tiffany Ji at 2018  2:30 PM     Author:  Tiffany Ji Service:  (none) Author Type:  (none)     Filed:  2018  3:16 PM Encounter Date:  2018 Status:  Signed     :  Tiffany Ji            Stated that diarrhea and vomiting started about 4-5 days ago, fatigue, chills, aches.   States has been sick for over a month and a half with URI symptoms  Tiffany Ji LPN...................2018   2:59 PM

## 2018-02-13 NOTE — PROGRESS NOTES
Patient Information     Patient Name MRN Sex Ember Lanza [id number] Female 1960      Progress Notes by Bobby Austin MD at 2018  2:30 PM     Author:  Bobby Austin MD Service:  (none) Author Type:  Physician     Filed:  2018  3:35 PM Encounter Date:  2018 Status:  Signed     :  Bobby Austin MD (Physician)            SUBJECTIVE:    Ember Tavares is a 57 y.o. female who presents for cold and congestion vomiting diarrhea    HPI Comments: Patient arrives here for cold and congestion. Cold and congestion is been going on since December. More recently though she's developed some diarrhea. Diarrhea was associate with vomiting. Not associated with any travel food or antibiotics. She denies any blood or mucus associated with it. She did have an exposure with her significant other. But he had diarrhea for one day only. She reports some abdominal tenderness throughout. She states that she tries eating even crackers and she vomits. She's having a lot of gas both burping and flatulence. Patient has been taken Imodium with a little relief. The diarrhea has slowed down this morning. And is starting to firm up      Allergies     Allergen  Reactions     Carafate [Sucralfate] Nausea And Vomiting     Crestor [Rosuvastatin] Nausea And Vomiting     Lipitor [Atorvastatin] Myalgia     Bactrim [Sulfamethoxazole-Trimethoprim] Yeast Infection     Levofloxacin Nausea And Vomiting     Morphine Chest Pain     Penicillins Respiratory Arrest   ,   Family History      Problem  Relation Age of Onset     Arthritis Father      Peripheral vascular disease Mother      Diabetes Mother      Arthritis Mother      Premenopausal breast cancer Sister      Eczema Brother      Narcolepsy Daughter      Vasculitis Daughter      Seizures Daughter    ,   Current Outpatient Prescriptions on File Prior to Visit       Medication  Sig Dispense Refill     albuterol HFA (VENTOLIN HFA) 90 mcg/actuation inhaler 3 puffs  TID as needed for breathing difficulties       aspirin chewable 81 mg chewable tablet Take 1 tablet by mouth once daily with a meal.       calcipotriene 0.005% (DOVONEX) 0.005 % ointment Apply  topically to affected area(s) 2 times daily. For Plaque Psoriasis -- mix with Clobetasol cream/ointment 120 g 6     cetirizine (ZYRTEC) 10 mg tablet Take one tablet by mouth once or twice daily as needed for hand dermatitis/itching.  0     Cholecalciferol, Vitamin D3, (VITAMIN D-3) 5,000 unit tab Take 1 tablet by mouth once weekly.  0     clobetasol 0.05% (TEMOVATE 0.05% OINTMENT) 0.05 % ointment Apply  topically to affected area(s) 2 times daily. Apply topically to affected area(s) 2 times daily.For Plaque Psoriasis-mix with Dovonex 120 g 6     cyanocobalamin (VITAMIN B12) 1,000 mcg/mL injection Inject 1 mL intramuscular every 3 weeks. To 4 weeks 1000 mcg 15     fluticasone-salmeterol (ADVAIR DISKUS) 500-50 mcg/Dose diskus inhaler Inhale 1 Puff by mouth every 12 hours.       medication order composer Daily topical solution as needed for Psoriasis flare up, patient unsure of medication name at this time.       ranitidine (ZANTAC 75) 75 mg tablet Take 1 tablet by mouth once daily if needed.       tiotropium bromide (SPIRIVA RESPIMAT) 2.5 mcg/actuation mist Inhale 2 Puffs by mouth once daily. For COPD 4 g 11     triamcinolone (ARISTOCORT) 0.1 % ointment Apply  topically to affected area(s) 3 times daily. -- for hand dermatitis 80 g 3     venlafaxine (EFFEXOR XR) 37.5 mg Extended-Release capsule Take 1 capsule by mouth once daily with a meal. 90 capsule 3     No current facility-administered medications on file prior to visit.    ,   Past Surgical History:      Procedure  Laterality Date     ANORECTAL MANOMETRY  10/09/2007     BIOPSY BREAST  1999, 2001, 2004, 2008    benign       CHOLECYSTECTOMY  2004     COLONOSCOPY  x's 3 last 2009    negative       CT CORONARY ANGIOGRAM ([place])  2009    negative       CVL HEART CATH LEFT   "04/20/2009    [facility]       ESOPHAGEAL DILATION  10/30/2015    Dr. Rainer Allen, [facility]       ESOPHAGOGASTRODUODENOSCOPY      dilated in 04,07,09  Dr. Allen       LAPAROTOMY      1990 lysis of adhesions/endometriosis       REPAIR RECTOCELE  07/29/2009     VAGINAL HYSTERECTOMY  1994    ovaries remain     ,   Social History        Substance Use Topics          Smoking status:   Former Smoker      Packs/day:  0.50      Years:  32.00      Types:  Cigarettes      Quit date:  8/18/2012      Smokeless tobacco:   Never Used       Comment: quit date of 8-       Alcohol use   1.2 oz/week     2 Standard drinks or equivalent per week        Comment: 1-2 drinks every 1-2 weeks      and   Social History        Substance Use Topics          Smoking status:   Former Smoker      Packs/day:  0.50      Years:  32.00      Types:  Cigarettes      Quit date:  8/18/2012      Smokeless tobacco:   Never Used       Comment: quit date of 8-       Alcohol use   1.2 oz/week     2 Standard drinks or equivalent per week        Comment: 1-2 drinks every 1-2 weeks         REVIEW OF SYSTEMS:  ROS    OBJECTIVE:  /86 (Cuff Site: Left Arm, Position: Sitting, Cuff Size: Adult Regular)  Pulse 88  Temp 98  F (36.7  C) (Tympanic)  Resp 16  Ht 1.575 m (5' 2\")  Wt 57.8 kg (127 lb 6.4 oz)  Breastfeeding? No  BMI 23.3 kg/m2    EXAM:   Physical Exam   Constitutional: She is well-developed, well-nourished, and in no distress.   HENT:   Head: Normocephalic.   Right Ear: External ear normal.   Left Ear: External ear normal.   Pulmonary/Chest: Effort normal and breath sounds normal. No respiratory distress. She has no wheezes.   Abdominal: Soft. There is tenderness (throughout both upper and lower quadrants).   Hyperactive bowel sounds       ASSESSMENT/PLAN:    ICD-10-CM    1. Gastroenteritis K52.9 ondansetron (ZOFRAN ODT) 4 mg disintegrating tablet   2. Non-intractable vomiting with nausea, unspecified vomiting type " R11.2 ondansetron (ZOFRAN ODT) 4 mg disintegrating tablet        Plan:  Zofran for the vomiting. Also recommended continue Imodium a Marcos diet avoiding dairy products. Follow-up with her primary physician for the ongoing congestion.

## 2018-03-15 ENCOUNTER — OFFICE VISIT (OUTPATIENT)
Dept: SURGERY | Facility: OTHER | Age: 58
End: 2018-03-15
Attending: SURGERY
Payer: COMMERCIAL

## 2018-03-15 VITALS
DIASTOLIC BLOOD PRESSURE: 68 MMHG | WEIGHT: 129.6 LBS | SYSTOLIC BLOOD PRESSURE: 100 MMHG | BODY MASS INDEX: 23.85 KG/M2 | HEIGHT: 62 IN

## 2018-03-15 DIAGNOSIS — K21.9 GASTROESOPHAGEAL REFLUX DISEASE, ESOPHAGITIS PRESENCE NOT SPECIFIED: Primary | ICD-10-CM

## 2018-03-15 PROCEDURE — 99213 OFFICE O/P EST LOW 20 MIN: CPT | Performed by: SURGERY

## 2018-03-15 ASSESSMENT — PAIN SCALES - GENERAL: PAINLEVEL: NO PAIN (0)

## 2018-03-15 NOTE — MR AVS SNAPSHOT
"              After Visit Summary   3/15/2018    Ember Tavares    MRN: [id number]           Patient Information     Date Of Birth          1960        Visit Information        Provider Department      3/15/2018 9:20 AM Jagdish Ruiz MD [facility]        Today's Diagnoses     Gastroesophageal reflux disease, esophagitis presence not specified    -  1       Follow-ups after your visit        Your next 10 appointments already scheduled     Apr 17, 2018  8:20 AM CDT   [facility] Boyd MD   [facility]    [address]  [place] [id number] [phone]              Who to contact     If you have questions or need follow up information about today's clinic visit or your schedule please contact [facility] directly at [phone].  Normal or non-critical lab and imaging results will be communicated to you by MyChart, letter or phone within 4 business days after the clinic has received the results. If you do not hear from us within 7 days, please contact the clinic through MyChart or phone. If you have a critical or abnormal lab result, we will notify you by phone as soon as possible.  Submit refill requests through [employer] or call your pharmacy and they will forward the refill request to us. Please allow 3 business days for your refill to be completed.          Additional Information About Your Visit        Care EveryWhere ID     This is your Care EveryWhere ID. This could be used by other organizations to access your [facility] medical records  [id number]        Your Vitals Were     Height BMI (Body Mass Index)                5' 2\" (1.575 m) 23.7 kg/m2           Blood Pressure from Last 3 Encounters:   03/15/18 100/68   02/04/18 122/86   12/29/17 128/70    Weight from Last 3 Encounters:   03/15/18 129 lb 9.6 oz (58.8 kg)   02/04/18 127 lb 6.4 oz (57.8 kg)   12/29/17 138 lb 3.2 oz (62.7 " kg)              Today, you had the following     No orders found for display       Primary Care Provider Office Phone # Fax #    Tim Boyd [phone] [address]    [place] [address]        Equal Access to Services     ANGELATHOMAS ANDERS : Hadii alexandr bustillos pippao Sojennifer, waaxda luqadaha, qaybta kaalmada adedonte, james burk jsanuj raymundo phucduarte mercer . So [facility] [phone].    ATENCIÓN: Si habla español, tiene a hui disposición servicios gratuitos de asistencia lingüística. Llame al [phone].    We comply with applicable federal civil rights laws and [place] laws. We do not discriminate on the basis of race, color, national origin, age, disability, sex, sexual orientation, or gender identity.            Thank you!     Thank you for choosing [facility] AND [facility]  for your care. Our goal is always to provide you with excellent care. Hearing back from our patients is one way we can continue to improve our services. Please take a few minutes to complete the written survey that you may receive in the mail after your visit with us. Thank you!             Your Updated Medication List - Protect others around you: Learn how to safely use, store and throw away your medicines at www.disposemymeds.org.          This list is accurate as of 3/15/18 11:22 AM.  Always use your most recent med list.                   Brand Name Dispense Instructions for use Diagnosis    ADVAIR DISKUS 500-50 MCG/DOSE diskus inhaler   Generic drug:  fluticasone-salmeterol      Inhale 1 puff into the lungs every 12 hours        albuterol 108 (90 BASE) MCG/ACT Inhaler    PROAIR HFA/PROVENTIL HFA/VENTOLIN HFA     Inhale 3 puffs into the lungs 3 times daily as needed        aspirin 81 MG chewable tablet      Take 81 mg by mouth daily with food        calcipotriene 0.005 % Oint      Apply topically 2 times daily        cetirizine 10 MG tablet    zyrTEC     Take one tablet by mouth once or twice daily  as needed for hand dermatitis/itching.        clobetasol 0.05 % ointment    TEMOVATE     Apply topically 2 times daily        cyanocobalamin 1000 MCG/ML injection    VITAMIN B12     Inject 1,000 mcg into the muscle        D 5000 5000 UNITS Tabs   Generic drug:  Cholecalciferol      Take 5,000 Units by mouth every 7 days        ranitidine 75 MG tablet    ZANTAC     Take 75 mg by mouth daily as needed        tiotropium 2.5 MCG/ACT inhalation aerosol    SPIRIVA RESPIMAT     Inhale 2 puffs into the lungs daily        triamcinolone 0.1 % ointment    KENALOG     Apply topically 3 times daily        venlafaxine 37.5 MG 24 hr capsule    EFFEXOR-XR     Take 37.5 mg by mouth daily with food

## 2018-03-15 NOTE — PROGRESS NOTES
GENERAL SURGERY CONSULTATION NOTE    Ember Tavares   [id number] [address] [address]  56 y.o.  female  Admission Date/Time: No admission date for patient encounter.  Primary Care Provider:  Tim Boyd MD    I was asked to see this patient by Tim Boyd MD for evaluation of GERD.     HPI: Ember has had years of reflux symptoms. She has gained weigh over the last 10 years and had worsened reflux.  She takes a H2 blocker bid and TUMS 2-5 times a week. She has regurgitation symptoms with food 3-5 nights. She sleeps with the head of the bed elevated. She avoids chololate as a trigger. Mint does not seem to bother. She had violent nausea and vomiting with Carafate. She has had multiple EGDs with balloon dilations for esophageal ring.  She has not had manometery or a ph probe.  She has had an esophogram already. Has dysphagia of meats and breads when needing dilation. She has some bloating symptoms and will have 1-2 BM per week.  Takes laxatives as needed.     REVIEW OF SYSTEMS:    GENERAL: No fevers or chills. Denies fatigue, recent weight loss. + weight gain, loss of appetite  HEENT: No sinus drainage. No changes with vision or hearing. + difficulty swallowing.   LYMPHATICS:  No swollen nodes in axilla, neck or groin.  CARDIOVASCULAR: Denies chest pain, palpitations and dyspnea on exertion.  PULMONARY: No shortness of breath or cough. No increase in sputum production.  GI: Denies melena, bright red blood in stools. No hematemesis. Has had IBS  : No dysuria or hematuria.  SKIN: No recent rashes or ulcers.   HEMATOLOGY:  No history of easy bruising or bleeding.  ENDOCRINE:  No history of diabetes or thyroid problems.  NEUROLOGY:  No history of seizures or headaches. No motor or sensory changes.        Patient Active Problem List    Diagnosis Date Noted     Epigastric pain 10/16/2017     Occipital neuralgia of left side 09/15/2017     Hiatal hernia 08/24/2017     Schatzki's ring of distal  esophagus 08/24/2017     History of esophageal dilatation 08/24/2017     Atherosclerosis of abdominal aorta (HC) 08/24/2017     [facility] Studies:  2/19/2013 -- CTA ABDOMEN AND PELVIS WITH BILATERAL LOWER EXTREMITY RUNOFF    CLINICAL HISTORY: Iliac and mesenteric ischemia.    TECHNIQUE: 125 mL Omnipaque 300 IV contrast was administered. 3-D  arterial reformations were performed.    FINDINGS: There is a well-defined ovoid density at the medial right  costophrenic angle. This measures 2.3 x 0.9 x 0.6 cm. It demonstrates  central low attenuation similar to the adjacent abdominal fat near the  liver. This could be a tiny Bochdalek hernia. The liver, spleen,  pancreas, adrenal glands, and kidneys are unremarkable. The  gallbladder is surgically absent. No biliary ductal dilation. No  mesenteric or retroperitoneal adenopathy. No ascites or fluid  collection. There is a moderate amount of retained stool seen  throughout most of the colon. No obstruction. No adjacent inflammatory  change. No ascites or fluid collection. Ureters and bladder are  unremarkable.    There is mild partially calcified atherosclerotic plaque within the  abdominal aorta. No aneurysm. There is narrowing of the lumen just  proximal to the bifurcation to 6 mm.   There is moderate narrowing of the proximal celiac artery without associated calcification.   The superior mesenteric artery appears to be widely patent with good opacification of distal branch vessels. The MARIAH also appears to be well opacified.   There are single bilateral renal arteries which are widely patent.    There is mild partially calcified plaque within the common iliac arteries with minimal narrowing.   The external iliac and common femoral arteries are widely patent.   Superficial femoral and popliteal arteries are widely patent bilaterally.   There is three-vessel runoff with good opacification in the lower leg to the ankle and foot.    IMPRESSION:  1. Mild aortoiliac  atherosclerotic disease. No aneurysm. There is narrowing of the distal aorta just proximal to the bifurcation.  2. There is moderate narrowing of the celiac artery which could  be due to noncalcified plaque. No SMA or MARIAH compromise definitely seen.  3. Probable tiny Bochdalek hernia at the right costophrenic angle. A hamartoma would be a differential consideration though is less likely given the central fat density.  4. Possible constipation.  5. No evidence of vascular compromise in the lower extremities.      Examination Interpreted at: [facility], [place], [place]  The Interpreting Physician is MONICA VORA  Exam was completed at [facility]       Dyshidrotic hand dermatitis 08/24/2017     Psoriasis 08/24/2017     Prediabetes - New Dx 8/24/2017 - A1c 5.8% 08/24/2017     Vitamin D deficiency 08/24/2017     COPD, severe (HC) - PFTs 8/4/2014 [facility] - follows with Dr. Luna, Pulmonology at [facility] 08/24/2017 8/4/2014 -- PULMONARY FUNCTION    SITE:  [facility]  ORDERED BY:  VANNESA LUNA    CLINICAL SUMMARY: 53-year-old female with a history of severe COPD.     TECHNICIAN NOTE: Good effort.     DATA: FVC 1.85 (61%). FEV1 1.04 (45%). FEV1/FVC ratio 56%. DLCO 13.5 to 62%.     Flow volume curve is consistent with airway obstruction.     IMPRESSION:  1. Severe obstructive pulmonary disease.   2. Mild decrease in diffusing capacity.       Decreased diffusion capacity of lung - MILD - PFT 8/2014 08/24/2017     Decreased bone density - 12/20/2013 -- DXA SPINE HIP -- [facility] - Hip T-score of -1.7 08/24/2017 12/20/2013 -- DXA SPINE HIP -- [facility]        FINDINGS: The bone mineral density was done using a Lovli machine.   The lumbar spine was 1.139 g/cm2 which is in approximately one half standard deviations above the mean for age-matched persons.   The total hip BMD was 0.789 g/cm2 which is approximately one  standard deviations below expected for age.   The T-score was -0.4 at the spine and -1.7 at the total hip for females which places her in the WHO category of osteopenic with bone density between 10 and 25% below young normal. Fracture risk is moderate.    RECOMMENDATION: Treatment is recommended in the form of bisphosphonate and Evista. Hormone therapy may be an option based on review of risks and benefits of treatment. All patients should assure an adequate intake of dietary calcium (1200 mg per day) and   vitamin D (400-800 international units daily). Followup examination recommended in December of 2015.       Arthritis of knee, right - medial compartment 08/24/2017     Centrilobular emphysema (HC) 08/23/2017     Alpha-1-antitrypsin deficiency carrier (HC) - Heterozygous 08/23/2017     Osteopenia 08/23/2017     Irritable bowel syndrome (IBS) 08/23/2017     Esophageal reflux 08/23/2017     CONTACT DERMATITIS 07/01/2010     MYALGIA 07/01/2010     ABDOMINAL PAIN, CHRONIC 03/10/2010     16 year duration         CONSTIPATION 03/10/2010     SINUSITIS, CHRONIC 03/10/2010     History of tobacco abuse 03/10/2010     Past Medical History:   Diagnosis Date     Chronic abdominal pain      Chronic sinusitis      Dysphagia      Fractured skull (HC) 1972     Past Surgical History:   Procedure Laterality Date     ANORECTAL MANOMETRY  10/09/2007     BIOPSY BREAST  1999, 2001, 2004, 2008    benign     CHOLECYSTECTOMY  2004     COLONOSCOPY  x's 3 last 2009    negative     CT CORONARY ANGIOGRAM ([place])  2009    negative     CVL HEART CATH LEFT  04/20/2009    [facility]     ESOPHAGEAL DILATION  10/30/2015    Dr. Rainer Allen, [facility]     ESOPHAGOGASTRODUODENOSCOPY      dilated in 04,07,09  Dr. Allen     LAPAROTOMY      1990 lysis of adhesions/endometriosis     REPAIR RECTOCELE  07/29/2009     VAGINAL HYSTERECTOMY  1994    ovaries remain     Family History   Problem Relation Age of Onset     Arthritis Father      Peripheral  vascular disease Mother      Diabetes Mother      Arthritis Mother      Premenopausal breast cancer Sister      Eczema Brother      Narcolepsy Daughter      Vasculitis Daughter      Seizures Daughter      Social History Narrative    Graduated from high school plus 3 years collage    EMT and .  Lives in [place]    Patient previously smoked.      Alcohol Use - yes    Drug Use - no    Regular Exercise - yes     - Ervin.  3 children.        Works at [employer]      Social History     Social History Main Topics     Smoking status: Former Smoker     Packs/day: 0.50     Years: 32.00     Types: Cigarettes     Quit date: 8/18/2012     Smokeless tobacco: Never Used      Comment: quit date of 8-     Alcohol use 1.2 oz/week     2 Standard drinks or equivalent per week      Comment: 1-2 drinks every 1-2 weeks     Drug use: No     Sexual activity: Not on file     Current Outpatient Prescriptions   Medication Sig Dispense Refill     albuterol HFA (VENTOLIN HFA) 90 mcg/actuation inhaler 3 puffs TID as needed for breathing difficulties       aspirin chewable 81 mg chewable tablet Take 1 tablet by mouth once daily with a meal.       cetirizine (ZYRTEC) 10 mg tablet Take one tablet by mouth once or twice daily as needed for hand dermatitis/itching.  0     Cholecalciferol, Vitamin D3, (VITAMIN D-3) 5,000 unit tab Take 1 tablet by mouth once weekly.  0     cyanocobalamin (VITAMIN B12) 1,000 mcg/mL injection Inject 1 mL intramuscular every 3 weeks. To 4 weeks 1000 mcg 15     fluticasone-salmeterol (ADVAIR DISKUS) 500-50 mcg/Dose diskus inhaler Inhale 1 Puff by mouth every 12 hours.       medication order composer Daily topical solution as needed for Psoriasis flare up, patient unsure of medication name at this time.       ranitidine (ZANTAC 75) 75 mg tablet Take 1 tablet by mouth once daily if needed.       tiotropium bromide (SPIRIVA RESPIMAT) 2.5 mcg/actuation mist Inhale 2 Puffs by mouth once daily.  For COPD 4 g 11     triamcinolone (ARISTOCORT) 0.1 % ointment Apply  topically to affected area(s) 3 times daily. -- for hand dermatitis 80 g 3     venlafaxine (EFFEXOR XR) 37.5 mg Extended-Release capsule Take 1 capsule by mouth once daily with a meal. 90 capsule 3     No current facility-administered medications for this visit.      Medications have been reviewed by me and are current to the best of my knowledge and ability.      ALLERGIES/SENSITIVITIES:   Allergies   Allergen Reactions     Carafate [Sucralfate] Nausea And Vomiting     Crestor [Rosuvastatin] Nausea And Vomiting     Lipitor [Atorvastatin] Myalgia     Bactrim [Sulfamethoxazole-Trimethoprim] Yeast Infection     Levofloxacin Nausea And Vomiting     Morphine Chest Pain     Penicillins Respiratory Arrest       PHYSICAL EXAM:     General Appearance:  Appears well  /70  Pulse 72  Wt 64.7 kg (142 lb 9.6 oz)  Breastfeeding? No  BMI 25.26 kg/m2  Body mass index is 25.26 kg/(m^2).  RESPIRATORY: CTAB, no wheeze  CARDIOVASCULAR: RRR, S1, S2, no murmur  GASTROINTESTINAL: Mildly protuberant, Lap emily incisions noted, nontender.     ADDITIONAL COMMENTS:  I reviewed the patient s new imaging test results.      Her esophagram shows mild narrowing of the distal esophagus with reflux to the cervical esophagus.    CONSULTATION ASSESSMENT AND PLAN:    56 y.o. female with lifestyle limiting GERD and dysphagia related to Schatzki ring improved with dilation.        - Plan for lap Nissen on 3/26    Jagdish Ruiz MD on 3/15/2018 at 11:13 AM

## 2018-03-23 ENCOUNTER — ANESTHESIA EVENT (OUTPATIENT)
Dept: SURGERY | Facility: OTHER | Age: 58
End: 2018-03-23
Payer: COMMERCIAL

## 2018-03-25 RX ORDER — CLINDAMYCIN PHOSPHATE 900 MG/50ML
900 INJECTION, SOLUTION INTRAVENOUS
Status: DISCONTINUED | OUTPATIENT
Start: 2018-03-25 | End: 2018-03-25 | Stop reason: CLARIF

## 2018-03-26 ENCOUNTER — HOSPITAL ENCOUNTER (OUTPATIENT)
Facility: OTHER | Age: 58
Discharge: HOME | End: 2018-03-27
Attending: SURGERY | Admitting: SURGERY
Payer: COMMERCIAL

## 2018-03-26 ENCOUNTER — ANESTHESIA (OUTPATIENT)
Dept: SURGERY | Facility: OTHER | Age: 58
End: 2018-03-26
Payer: COMMERCIAL

## 2018-03-26 ENCOUNTER — SURGERY (OUTPATIENT)
Age: 58
End: 2018-03-26

## 2018-03-26 DIAGNOSIS — K21.00 GASTROESOPHAGEAL REFLUX DISEASE WITH ESOPHAGITIS: Primary | ICD-10-CM

## 2018-03-26 DIAGNOSIS — Z98.890 S/P NISSEN FUNDOPLICATION (WITHOUT GASTROSTOMY TUBE) PROCEDURE: ICD-10-CM

## 2018-03-26 PROCEDURE — 40000306 ZZH STATISTIC PRE PROC ASSESS II: Performed by: SURGERY

## 2018-03-26 PROCEDURE — 37000009 ZZH ANESTHESIA TECHNICAL FEE, EACH ADDTL 15 MIN: Performed by: SURGERY

## 2018-03-26 PROCEDURE — 40000275 ZZH STATISTIC RCP TIME EA 10 MIN

## 2018-03-26 PROCEDURE — 25800025 ZZH RX 258: Performed by: SURGERY

## 2018-03-26 PROCEDURE — 25000128 H RX IP 250 OP 636: Performed by: NURSE ANESTHETIST, CERTIFIED REGISTERED

## 2018-03-26 PROCEDURE — 43280 LAPAROSCOPY FUNDOPLASTY: CPT

## 2018-03-26 PROCEDURE — 25000125 ZZHC RX 250: Performed by: SURGERY

## 2018-03-26 PROCEDURE — 27210794 ZZH OR GENERAL SUPPLY STERILE: Performed by: SURGERY

## 2018-03-26 PROCEDURE — 94640 AIRWAY INHALATION TREATMENT: CPT

## 2018-03-26 PROCEDURE — 36000063 ZZH SURGERY LEVEL 4 EA 15 ADDTL MIN: Performed by: SURGERY

## 2018-03-26 PROCEDURE — 71000014 ZZH RECOVERY PHASE 1 LEVEL 2 FIRST HR: Performed by: SURGERY

## 2018-03-26 PROCEDURE — 43280 LAPAROSCOPY FUNDOPLASTY: CPT | Performed by: SURGERY

## 2018-03-26 PROCEDURE — 25000125 ZZHC RX 250: Performed by: NURSE ANESTHETIST, CERTIFIED REGISTERED

## 2018-03-26 PROCEDURE — 25000128 H RX IP 250 OP 636: Performed by: SURGERY

## 2018-03-26 PROCEDURE — 37000008 ZZH ANESTHESIA TECHNICAL FEE, 1ST 30 MIN: Performed by: SURGERY

## 2018-03-26 PROCEDURE — 36000093 ZZH SURGERY LEVEL 4 1ST 30 MIN: Performed by: SURGERY

## 2018-03-26 RX ORDER — PROCHLORPERAZINE MALEATE 10 MG
10 TABLET ORAL EVERY 6 HOURS PRN
Status: DISCONTINUED | OUTPATIENT
Start: 2018-03-26 | End: 2018-03-27 | Stop reason: HOSPADM

## 2018-03-26 RX ORDER — GLYCOPYRROLATE 0.2 MG/ML
INJECTION, SOLUTION INTRAMUSCULAR; INTRAVENOUS PRN
Status: DISCONTINUED | OUTPATIENT
Start: 2018-03-26 | End: 2018-03-26

## 2018-03-26 RX ORDER — ONDANSETRON 2 MG/ML
4 INJECTION INTRAMUSCULAR; INTRAVENOUS EVERY 30 MIN PRN
Status: DISCONTINUED | OUTPATIENT
Start: 2018-03-26 | End: 2018-03-26

## 2018-03-26 RX ORDER — METOCLOPRAMIDE HYDROCHLORIDE 5 MG/ML
10 INJECTION INTRAMUSCULAR; INTRAVENOUS EVERY 6 HOURS PRN
Status: DISCONTINUED | OUTPATIENT
Start: 2018-03-26 | End: 2018-03-27 | Stop reason: HOSPADM

## 2018-03-26 RX ORDER — SODIUM CHLORIDE, SODIUM LACTATE, POTASSIUM CHLORIDE, CALCIUM CHLORIDE 600; 310; 30; 20 MG/100ML; MG/100ML; MG/100ML; MG/100ML
INJECTION, SOLUTION INTRAVENOUS CONTINUOUS
Status: DISCONTINUED | OUTPATIENT
Start: 2018-03-26 | End: 2018-03-26

## 2018-03-26 RX ORDER — HYDROMORPHONE HYDROCHLORIDE 1 MG/ML
0.2 INJECTION, SOLUTION INTRAMUSCULAR; INTRAVENOUS; SUBCUTANEOUS
Status: DISCONTINUED | OUTPATIENT
Start: 2018-03-26 | End: 2018-03-26 | Stop reason: DRUGHIGH

## 2018-03-26 RX ORDER — FENTANYL CITRATE 50 UG/ML
INJECTION, SOLUTION INTRAMUSCULAR; INTRAVENOUS PRN
Status: DISCONTINUED | OUTPATIENT
Start: 2018-03-26 | End: 2018-03-26

## 2018-03-26 RX ORDER — ASPIRIN 81 MG/1
81 TABLET, CHEWABLE ORAL DAILY
Status: DISCONTINUED | OUTPATIENT
Start: 2018-03-26 | End: 2018-03-26

## 2018-03-26 RX ORDER — ONDANSETRON 2 MG/ML
INJECTION INTRAMUSCULAR; INTRAVENOUS PRN
Status: DISCONTINUED | OUTPATIENT
Start: 2018-03-26 | End: 2018-03-26

## 2018-03-26 RX ORDER — LIDOCAINE HYDROCHLORIDE 20 MG/ML
INJECTION, SOLUTION INFILTRATION; PERINEURAL PRN
Status: DISCONTINUED | OUTPATIENT
Start: 2018-03-26 | End: 2018-03-26

## 2018-03-26 RX ORDER — PROPOFOL 10 MG/ML
INJECTION, EMULSION INTRAVENOUS CONTINUOUS PRN
Status: DISCONTINUED | OUTPATIENT
Start: 2018-03-26 | End: 2018-03-26

## 2018-03-26 RX ORDER — KETOROLAC TROMETHAMINE 30 MG/ML
INJECTION, SOLUTION INTRAMUSCULAR; INTRAVENOUS PRN
Status: DISCONTINUED | OUTPATIENT
Start: 2018-03-26 | End: 2018-03-26

## 2018-03-26 RX ORDER — IPRATROPIUM BROMIDE AND ALBUTEROL SULFATE 2.5; .5 MG/3ML; MG/3ML
3 SOLUTION RESPIRATORY (INHALATION) EVERY 4 HOURS PRN
Status: DISCONTINUED | OUTPATIENT
Start: 2018-03-26 | End: 2018-03-27 | Stop reason: HOSPADM

## 2018-03-26 RX ORDER — ONDANSETRON 2 MG/ML
4 INJECTION INTRAMUSCULAR; INTRAVENOUS EVERY 6 HOURS
Status: DISCONTINUED | OUTPATIENT
Start: 2018-03-26 | End: 2018-03-27 | Stop reason: HOSPADM

## 2018-03-26 RX ORDER — NALOXONE HYDROCHLORIDE 0.4 MG/ML
.1-.4 INJECTION, SOLUTION INTRAMUSCULAR; INTRAVENOUS; SUBCUTANEOUS
Status: DISCONTINUED | OUTPATIENT
Start: 2018-03-26 | End: 2018-03-27 | Stop reason: HOSPADM

## 2018-03-26 RX ORDER — ONDANSETRON 4 MG/1
4 TABLET, ORALLY DISINTEGRATING ORAL EVERY 6 HOURS
Status: DISCONTINUED | OUTPATIENT
Start: 2018-03-26 | End: 2018-03-27 | Stop reason: HOSPADM

## 2018-03-26 RX ORDER — ALBUTEROL SULFATE 0.83 MG/ML
1 SOLUTION RESPIRATORY (INHALATION) EVERY 6 HOURS PRN
Status: DISCONTINUED | OUTPATIENT
Start: 2018-03-26 | End: 2018-03-27 | Stop reason: HOSPADM

## 2018-03-26 RX ORDER — NEOSTIGMINE METHYLSULFATE 1 MG/ML
VIAL (ML) INJECTION PRN
Status: DISCONTINUED | OUTPATIENT
Start: 2018-03-26 | End: 2018-03-26

## 2018-03-26 RX ORDER — LIDOCAINE 40 MG/G
CREAM TOPICAL
Status: DISCONTINUED | OUTPATIENT
Start: 2018-03-26 | End: 2018-03-27 | Stop reason: HOSPADM

## 2018-03-26 RX ORDER — CALCIPOTRIENE 50 UG/G
1 OINTMENT TOPICAL 2 TIMES DAILY PRN
Status: DISCONTINUED | OUTPATIENT
Start: 2018-03-26 | End: 2018-03-26

## 2018-03-26 RX ORDER — CLOBETASOL PROPIONATE 0.5 MG/G
OINTMENT TOPICAL 2 TIMES DAILY
Status: DISCONTINUED | OUTPATIENT
Start: 2018-03-26 | End: 2018-03-26

## 2018-03-26 RX ORDER — DEXAMETHASONE SODIUM PHOSPHATE 4 MG/ML
INJECTION, SOLUTION INTRA-ARTICULAR; INTRALESIONAL; INTRAMUSCULAR; INTRAVENOUS; SOFT TISSUE PRN
Status: DISCONTINUED | OUTPATIENT
Start: 2018-03-26 | End: 2018-03-26

## 2018-03-26 RX ORDER — OXYCODONE AND ACETAMINOPHEN 5; 325 MG/1; MG/1
1 TABLET ORAL EVERY 4 HOURS PRN
Status: DISCONTINUED | OUTPATIENT
Start: 2018-03-26 | End: 2018-03-27 | Stop reason: HOSPADM

## 2018-03-26 RX ORDER — TRIAMCINOLONE ACETONIDE 1 MG/G
OINTMENT TOPICAL 3 TIMES DAILY PRN
Status: DISCONTINUED | OUTPATIENT
Start: 2018-03-26 | End: 2018-03-26

## 2018-03-26 RX ORDER — BUPIVACAINE HYDROCHLORIDE AND EPINEPHRINE 5; 5 MG/ML; UG/ML
INJECTION, SOLUTION PERINEURAL PRN
Status: DISCONTINUED | OUTPATIENT
Start: 2018-03-26 | End: 2018-03-26

## 2018-03-26 RX ORDER — NALOXONE HYDROCHLORIDE 0.4 MG/ML
.1-.4 INJECTION, SOLUTION INTRAMUSCULAR; INTRAVENOUS; SUBCUTANEOUS
Status: DISCONTINUED | OUTPATIENT
Start: 2018-03-26 | End: 2018-03-26

## 2018-03-26 RX ORDER — KETAMINE HYDROCHLORIDE 50 MG/ML
INJECTION, SOLUTION INTRAMUSCULAR; INTRAVENOUS PRN
Status: DISCONTINUED | OUTPATIENT
Start: 2018-03-26 | End: 2018-03-26

## 2018-03-26 RX ORDER — METOCLOPRAMIDE 10 MG/1
10 TABLET ORAL EVERY 6 HOURS PRN
Status: DISCONTINUED | OUTPATIENT
Start: 2018-03-26 | End: 2018-03-27 | Stop reason: HOSPADM

## 2018-03-26 RX ORDER — VENLAFAXINE HYDROCHLORIDE 37.5 MG/1
37.5 CAPSULE, EXTENDED RELEASE ORAL
Status: DISCONTINUED | OUTPATIENT
Start: 2018-03-27 | End: 2018-03-26

## 2018-03-26 RX ORDER — ACETAMINOPHEN 10 MG/ML
INJECTION, SOLUTION INTRAVENOUS PRN
Status: DISCONTINUED | OUTPATIENT
Start: 2018-03-26 | End: 2018-03-26

## 2018-03-26 RX ORDER — CLINDAMYCIN PHOSPHATE 900 MG/50ML
900 INJECTION, SOLUTION INTRAVENOUS SEE ADMIN INSTRUCTIONS
Status: DISCONTINUED | OUTPATIENT
Start: 2018-03-26 | End: 2018-03-26 | Stop reason: HOSPADM

## 2018-03-26 RX ORDER — PROPOFOL 10 MG/ML
INJECTION, EMULSION INTRAVENOUS PRN
Status: DISCONTINUED | OUTPATIENT
Start: 2018-03-26 | End: 2018-03-26

## 2018-03-26 RX ORDER — HYDROMORPHONE HYDROCHLORIDE 1 MG/ML
.5-1 INJECTION, SOLUTION INTRAMUSCULAR; INTRAVENOUS; SUBCUTANEOUS
Status: DISCONTINUED | OUTPATIENT
Start: 2018-03-26 | End: 2018-03-27 | Stop reason: HOSPADM

## 2018-03-26 RX ORDER — SODIUM CHLORIDE, SODIUM LACTATE, POTASSIUM CHLORIDE, CALCIUM CHLORIDE 600; 310; 30; 20 MG/100ML; MG/100ML; MG/100ML; MG/100ML
INJECTION, SOLUTION INTRAVENOUS CONTINUOUS
Status: DISCONTINUED | OUTPATIENT
Start: 2018-03-26 | End: 2018-03-26 | Stop reason: HOSPADM

## 2018-03-26 RX ORDER — FENTANYL CITRATE 50 UG/ML
25-50 INJECTION, SOLUTION INTRAMUSCULAR; INTRAVENOUS
Status: DISCONTINUED | OUTPATIENT
Start: 2018-03-26 | End: 2018-03-26

## 2018-03-26 RX ORDER — BUDESONIDE 0.5 MG/2ML
0.5 INHALANT ORAL 2 TIMES DAILY
Status: DISCONTINUED | OUTPATIENT
Start: 2018-03-26 | End: 2018-03-27 | Stop reason: HOSPADM

## 2018-03-26 RX ORDER — LIDOCAINE 40 MG/G
CREAM TOPICAL
Status: DISCONTINUED | OUTPATIENT
Start: 2018-03-26 | End: 2018-03-26 | Stop reason: HOSPADM

## 2018-03-26 RX ORDER — ONDANSETRON 4 MG/1
4 TABLET, ORALLY DISINTEGRATING ORAL EVERY 30 MIN PRN
Status: DISCONTINUED | OUTPATIENT
Start: 2018-03-26 | End: 2018-03-26

## 2018-03-26 RX ADMIN — KETAMINE HYDROCHLORIDE 30 MG: 50 INJECTION, SOLUTION INTRAMUSCULAR; INTRAVENOUS at 10:44

## 2018-03-26 RX ADMIN — CLINDAMYCIN IN 5 PERCENT DEXTROSE 900 MG: 18 INJECTION, SOLUTION INTRAVENOUS at 10:46

## 2018-03-26 RX ADMIN — FENTANYL CITRATE 100 MCG: 50 INJECTION, SOLUTION INTRAMUSCULAR; INTRAVENOUS at 10:44

## 2018-03-26 RX ADMIN — GLYCOPYRROLATE 0.6 MG: 0.2 INJECTION INTRAMUSCULAR; INTRAVENOUS at 12:50

## 2018-03-26 RX ADMIN — LIDOCAINE HYDROCHLORIDE 70 MG: 20 INJECTION, SOLUTION INFILTRATION; PERINEURAL at 10:48

## 2018-03-26 RX ADMIN — SODIUM CHLORIDE, SODIUM LACTATE, POTASSIUM CHLORIDE, AND CALCIUM CHLORIDE: 600; 310; 30; 20 INJECTION, SOLUTION INTRAVENOUS at 17:36

## 2018-03-26 RX ADMIN — PROPOFOL 150 MG: 10 INJECTION, EMULSION INTRAVENOUS at 10:48

## 2018-03-26 RX ADMIN — BUDESONIDE 0.5 MG: 0.5 SUSPENSION RESPIRATORY (INHALATION) at 21:47

## 2018-03-26 RX ADMIN — DEXAMETHASONE SODIUM PHOSPHATE 4 MG: 4 INJECTION, SOLUTION INTRAMUSCULAR; INTRAVENOUS at 10:52

## 2018-03-26 RX ADMIN — SODIUM CHLORIDE, SODIUM LACTATE, POTASSIUM CHLORIDE, AND CALCIUM CHLORIDE: 600; 310; 30; 20 INJECTION, SOLUTION INTRAVENOUS at 12:46

## 2018-03-26 RX ADMIN — BUPIVACAINE HYDROCHLORIDE AND EPINEPHRINE BITARTRATE 30 MG: 5; .0091 INJECTION, SOLUTION PERINEURAL at 12:50

## 2018-03-26 RX ADMIN — ONDANSETRON 4 MG: 2 INJECTION INTRAMUSCULAR; INTRAVENOUS at 23:32

## 2018-03-26 RX ADMIN — ROCURONIUM BROMIDE 10 MG: 10 INJECTION, SOLUTION INTRAVENOUS at 11:32

## 2018-03-26 RX ADMIN — PHENYLEPHRINE HYDROCHLORIDE 100 MCG: 10 INJECTION INTRAVENOUS at 11:40

## 2018-03-26 RX ADMIN — ONDANSETRON 4 MG: 2 INJECTION INTRAMUSCULAR; INTRAVENOUS at 17:35

## 2018-03-26 RX ADMIN — ACETAMINOPHEN 1000 MG: 10 INJECTION, SOLUTION INTRAVENOUS at 11:16

## 2018-03-26 RX ADMIN — NEOSTIGMINE METHYLSULFATE 3 MG: 1 INJECTION INTRAVENOUS at 12:50

## 2018-03-26 RX ADMIN — SODIUM CHLORIDE 1000 ML: 900 IRRIGANT IRRIGATION at 12:46

## 2018-03-26 RX ADMIN — MIDAZOLAM HYDROCHLORIDE 2 MG: 1 INJECTION, SOLUTION INTRAMUSCULAR; INTRAVENOUS at 10:44

## 2018-03-26 RX ADMIN — PROPOFOL 250 MCG/KG/MIN: 10 INJECTION, EMULSION INTRAVENOUS at 10:51

## 2018-03-26 RX ADMIN — PHENYLEPHRINE HYDROCHLORIDE 100 MCG: 10 INJECTION INTRAVENOUS at 11:10

## 2018-03-26 RX ADMIN — BUDESONIDE 0.5 MG: 0.5 SUSPENSION RESPIRATORY (INHALATION) at 18:41

## 2018-03-26 RX ADMIN — ONDANSETRON HYDROCHLORIDE 4 MG: 2 SOLUTION INTRAMUSCULAR; INTRAVENOUS at 10:47

## 2018-03-26 RX ADMIN — SODIUM CHLORIDE, SODIUM LACTATE, POTASSIUM CHLORIDE, AND CALCIUM CHLORIDE: 600; 310; 30; 20 INJECTION, SOLUTION INTRAVENOUS at 11:18

## 2018-03-26 RX ADMIN — SODIUM CHLORIDE, SODIUM LACTATE, POTASSIUM CHLORIDE, AND CALCIUM CHLORIDE: 600; 310; 30; 20 INJECTION, SOLUTION INTRAVENOUS at 09:25

## 2018-03-26 RX ADMIN — SODIUM CHLORIDE, SODIUM LACTATE, POTASSIUM CHLORIDE, AND CALCIUM CHLORIDE: 600; 310; 30; 20 INJECTION, SOLUTION INTRAVENOUS at 14:26

## 2018-03-26 RX ADMIN — HYDROMORPHONE HYDROCHLORIDE 0.2 MG: 1 INJECTION, SOLUTION INTRAMUSCULAR; INTRAVENOUS; SUBCUTANEOUS at 14:26

## 2018-03-26 RX ADMIN — ROCURONIUM BROMIDE 30 MG: 10 INJECTION, SOLUTION INTRAVENOUS at 10:48

## 2018-03-26 RX ADMIN — ROCURONIUM BROMIDE 10 MG: 10 INJECTION, SOLUTION INTRAVENOUS at 12:15

## 2018-03-26 RX ADMIN — KETOROLAC TROMETHAMINE 15 MG: 30 INJECTION, SOLUTION INTRAMUSCULAR at 10:55

## 2018-03-26 ASSESSMENT — ACTIVITIES OF DAILY LIVING (ADL)
AMBULATION: 0 - INDEPENDENT
DRESS: 0-->INDEPENDENT
TRANSFERRING: 0 - INDEPENDENT
BATHING: 0-->INDEPENDENT
RETIRED_COMMUNICATION: 0-->UNDERSTANDS/COMMUNICATES WITHOUT DIFFICULTY
BATHING: 0 - INDEPENDENT
DRESS: 0 - INDEPENDENT
TOILETING: 0 - INDEPENDENT
AMBULATION: 0-->INDEPENDENT
TOILETING: 0-->INDEPENDENT
SWALLOWING: 0 - SWALLOWS FOODS/LIQUIDS WITHOUT DIFFICULTY
EATING: 0 - INDEPENDENT
CHANGE_IN_FUNCTIONAL_STATUS_SINCE_ONSET_OF_CURRENT_ILLNESS/INJURY: NO
SWALLOWING: 0-->SWALLOWS FOODS/LIQUIDS WITHOUT DIFFICULTY
COGNITION: 0 - NO COGNITION ISSUES REPORTED
TRANSFERRING: 0-->INDEPENDENT
FALL_HISTORY_WITHIN_LAST_SIX_MONTHS: NO
RETIRED_EATING: 0-->INDEPENDENT
COMMUNICATION: 0 - UNDERSTANDS/COMMUNICATES WITHOUT DIFFICULTY

## 2018-03-26 ASSESSMENT — COPD QUESTIONNAIRES
COPD: 1
CAT_SEVERITY: SEVERE

## 2018-03-26 NOTE — IP AVS SNAPSHOT
MRN:[id number]                      After Visit Summary   3/26/2018    Ember Tavares    MRN: [id number]           Thank you!     Thank you for choosing [facility] for your care. Our goal is always to provide you with excellent care. Hearing back from our patients is one way we can continue to improve our services. Please take a few minutes to complete the written survey that you may receive in the mail after you visit with us. Thank you!        Patient Information     Date Of Birth          1960        Designated Caregiver       Most Recent Value    Caregiver    Will someone help with your care after discharge? yes    Name of designated caregiver Johan    Phone number of caregiver [phone]    Caregiver address N/A      About your hospital stay     You were admitted on:  March 26, 2018 You last received care in the:  [facility]    You were discharged on:  March 27, 2018        Reason for your hospital stay       Nissen Fundoplication                  Who to Call     For medical emergencies, please call 911.  For non-urgent questions about your medical care, please call your primary care provider or clinic, [phone]  For questions related to your surgery, please call your surgery clinic        Attending Provider     Provider Specialty    Jagdish Ruiz MD Surgery       Primary Care Provider Office Phone # Fax #    Tim Boyd [phone]      After Care Instructions     Diet       Follow this diet upon discharge: Orders Placed This Encounter      Full Liquid Diet advancing to pureed and then soft            Shower       No shower for 24 hours post procedure. May shower Postoperative Day (POD)  1                  Follow-up Appointments     Follow-up and recommended labs and tests        Follow up with me,  Jagdish Ruiz, within 2 weeks to evaluate after surgery.  No follow up labs or test are needed.                  Your next 10 appointments  already scheduled     Apr 17, 2018  8:20 AM CDT   SHORT with Tim Boyd MD   [facility] ([facility])    [address]  [place] [id number] [phone]              Pending Results     No orders found for last 3 day(s).            Admission Information     Date & Time Provider Department Dept. Phone    3/26/2018 Jagdish Ruiz MD [facility] [phone]      Your Vitals Were     Blood Pressure Temperature Respirations Weight Pulse Oximetry BMI (Body Mass Index)    121/73 (BP Location: Right arm, Cuff Size: Adult Regular) 98.4  F (36.9  C) (Tympanic) 20 59.6 kg (131 lb 6.3 oz) 91% 24.03 kg/m2      Care EveryWhere ID     This is your Care EveryWhere ID. This could be used by other organizations to access your [facility] medical records  [id number]        Equal Access to Services     TERESA MCNAMARA AH: Hadii alexandr daso Sojennifer, waaxda luqadaha, qaybta kaalmada adeegyaashley, james mercer . So [facility] [phone].    ATENCIÓN: Si habla español, tiene a hui disposición servicios gratuitos de asistencia lingüística. Llame al [phone].    We comply with applicable federal civil rights laws and [place] laws. We do not discriminate on the basis of race, color, national origin, age, disability, sex, sexual orientation, or gender identity.               Review of your medicines      START taking        Dose / Directions    ondansetron 4 MG ODT tab   Commonly known as:  ZOFRAN-ODT        Dose:  4 mg   Take 1 tablet (4 mg) by mouth every 6 hours   Quantity:  40 tablet   Refills:  3       * oxyCODONE-acetaminophen  MG per tablet   Commonly known as:  PERCOCET   Used for:  Gastroesophageal reflux disease with esophagitis        Dose:  1-2 tablet   Take 1-2 tablets by mouth every 4 hours as needed for pain (moderate to severe)   Quantity:  30 tablet   Refills:  0       * oxyCODONE-acetaminophen 5-325 MG per  tablet   Commonly known as:  PERCOCET        Dose:  1 tablet   Take 1 tablet by mouth every 4 hours as needed for moderate to severe pain   Quantity:  20 tablet   Refills:  0       * Notice:  This list has 2 medication(s) that are the same as other medications prescribed for you. Read the directions carefully, and ask your doctor or other care provider to review them with you.      CONTINUE these medicines which have NOT CHANGED        Dose / Directions    ADVAIR DISKUS 500-50 MCG/DOSE diskus inhaler   Generic drug:  fluticasone-salmeterol        Dose:  1 puff   Inhale 1 puff into the lungs every 12 hours   Refills:  0       albuterol 108 (90 BASE) MCG/ACT Inhaler   Commonly known as:  PROAIR HFA/PROVENTIL HFA/VENTOLIN HFA        Dose:  3 puff   Inhale 3 puffs into the lungs 3 times daily as needed   Refills:  0       aspirin 81 MG chewable tablet        Dose:  81 mg   Take 81 mg by mouth daily with food   Refills:  0       calcipotriene 0.005 % Oint        Apply topically 2 times daily   Refills:  0       cetirizine 10 MG tablet   Commonly known as:  zyrTEC        Take one tablet by mouth once or twice daily as needed for hand dermatitis/itching.   Refills:  0       clobetasol 0.05 % ointment   Commonly known as:  TEMOVATE        Apply topically 2 times daily   Refills:  0       cyanocobalamin 1000 MCG/ML injection   Commonly known as:  VITAMIN B12        Dose:  1000 mcg   Inject 1,000 mcg into the muscle   Refills:  0       D 5000 5000 UNITS Tabs   Generic drug:  Cholecalciferol        Dose:  5000 Units   Take 5,000 Units by mouth every 7 days   Refills:  0       ranitidine 75 MG tablet   Commonly known as:  ZANTAC        Dose:  75 mg   Take 75 mg by mouth daily as needed   Refills:  0       tiotropium 2.5 MCG/ACT inhalation aerosol   Commonly known as:  SPIRIVA RESPIMAT        Dose:  2 puff   Inhale 2 puffs into the lungs daily   Refills:  0       triamcinolone 0.1 % ointment   Commonly known as:  KENALOG         Apply topically 3 times daily   Refills:  0       venlafaxine 37.5 MG 24 hr capsule   Commonly known as:  EFFEXOR-XR        Dose:  37.5 mg   Take 37.5 mg by mouth daily with food   Refills:  0            Where to get your medicines      These medications were sent to [facility], - [place], [place] - [address]  [address], [place] [address]     Phone:  [phone]     ondansetron 4 MG ODT tab         Some of these will need a paper prescription and others can be bought over the counter. Ask your nurse if you have questions.     Bring a paper prescription for each of these medications     oxyCODONE-acetaminophen  MG per tablet    oxyCODONE-acetaminophen 5-325 MG per tablet                Protect others around you: Learn how to safely use, store and throw away your medicines at www.disposemymeds.org.        Information about OPIOIDS     PRESCRIPTION OPIOIDS: WHAT YOU NEED TO KNOW    Prescription opioids can be used to help relieve moderate to severe pain and are often prescribed following a surgery or injury, or for certain health conditions. These medications can be an important part of treatment but also come with serious risks. It is important to work with your health care provider to make sure you are getting the safest, most effective care.    WHAT ARE THE RISKS AND SIDE EFFECTS OF OPIOID USE?  Prescription opioids carry serious risks of addiction and overdose, especially with prolonged use. An opioid overdose, often marked by slowed breathing can cause sudden death. The use of prescription opioids can have a number of side effects as well, even when taken as directed:      Tolerance - meaning you might need to take more of a medication for the same pain relief    Physical dependence - meaning you have symptoms of withdrawal when a medication is stopped    Increased sensitivity to pain    Constipation    Nausea, vomiting, and dry mouth    Sleepiness and  dizziness    Confusion    Depression    Low levels of testosterone that can result in lower sex drive, energy, and strength    Itching and sweating    RISKS ARE GREATER WITH:    History of drug misuse, substance use disorder, or overdose    Mental health conditions (such as depression or anxiety)    Sleep apnea    Older age (65 years or older)    Pregnancy    Avoid alcohol while taking prescription opioids.   Also, unless specifically advised by your health care provider, medications to avoid include:    Benzodiazepines (such as Xanax or Valium)    Muscle relaxants (such as Soma or Flexeril)    Hypnotics (such as Ambien or Lunesta)    Other prescription opioids    KNOW YOUR OPTIONS:  Talk to your health care provider about ways to manage your pain that do not involve prescription opioids. Some of these options may actually work better and have fewer risks and side effects:    Pain relievers such as acetaminophen, ibuprofen, and naproxen    Some medications that are also used for depression or seizures    Physical therapy and exercise    Cognitive behavioral therapy, a psychological, goal-directed approach, in which patients learn how to modify physical, behavioral, and emotional triggers of pain and stress    IF YOU ARE PRESCRIBED OPIOIDS FOR PAIN:    Never take opioids in greater amounts or more often than prescribed    Follow up with your primary health care provider and work together to create a plan on how to manage your pain.    Talk about ways to help manage your pain that do not involve prescription opioids    Talk about all concerns and side effects    Help prevent misuse and abuse    Never sell or share prescription opioids    Never use another person's prescription opioids    Store prescription opioids in a secure place and out of reach of others (this may include visitors, children, friends, and family)    Visit www.cdc.gov/drugoverdose to learn about risks of opioid abuse and overdose    If you believe  you may be struggling with addiction, tell your health care provider and ask for guidance or call [facility]'s National Helpline at 2-165-834-HELP    LEARN MORE / www.cdc.gov/drugoverdose/prescribing/guideline.html    Safely dispose of unused prescription opioids: Find your local drug take-back programs and more information about the importance of safe disposal at www.doseofreality.[place].gov             Medication List: This is a list of all your medications and when to take them. Check marks below indicate your daily home schedule. Keep this list as a reference.      Medications           Morning Afternoon Evening Bedtime As Needed    ADVAIR DISKUS 500-50 MCG/DOSE diskus inhaler   Inhale 1 puff into the lungs every 12 hours   Generic drug:  fluticasone-salmeterol                                albuterol 108 (90 BASE) MCG/ACT Inhaler   Commonly known as:  PROAIR HFA/PROVENTIL HFA/VENTOLIN HFA   Inhale 3 puffs into the lungs 3 times daily as needed                                aspirin 81 MG chewable tablet   Take 81 mg by mouth daily with food                                calcipotriene 0.005 % Oint   Apply topically 2 times daily                                cetirizine 10 MG tablet   Commonly known as:  zyrTEC   Take one tablet by mouth once or twice daily as needed for hand dermatitis/itching.                                clobetasol 0.05 % ointment   Commonly known as:  TEMOVATE   Apply topically 2 times daily                                cyanocobalamin 1000 MCG/ML injection   Commonly known as:  VITAMIN B12   Inject 1,000 mcg into the muscle                                D 5000 5000 UNITS Tabs   Take 5,000 Units by mouth every 7 days   Generic drug:  Cholecalciferol                                ondansetron 4 MG ODT tab   Commonly known as:  ZOFRAN-ODT   Take 1 tablet (4 mg) by mouth every 6 hours   Last time this was given:  4 mg on 3/27/2018 12:25 PM                                * oxyCODONE-acetaminophen   MG per tablet   Commonly known as:  PERCOCET   Take 1-2 tablets by mouth every 4 hours as needed for pain (moderate to severe)                                * oxyCODONE-acetaminophen 5-325 MG per tablet   Commonly known as:  PERCOCET   Take 1 tablet by mouth every 4 hours as needed for moderate to severe pain                                ranitidine 75 MG tablet   Commonly known as:  ZANTAC   Take 75 mg by mouth daily as needed                                tiotropium 2.5 MCG/ACT inhalation aerosol   Commonly known as:  SPIRIVA RESPIMAT   Inhale 2 puffs into the lungs daily                                triamcinolone 0.1 % ointment   Commonly known as:  KENALOG   Apply topically 3 times daily                                venlafaxine 37.5 MG 24 hr capsule   Commonly known as:  EFFEXOR-XR   Take 37.5 mg by mouth daily with food                                * Notice:  This list has 2 medication(s) that are the same as other medications prescribed for you. Read the directions carefully, and ask your doctor or other care provider to review them with you.

## 2018-03-26 NOTE — ANESTHESIA CARE TRANSFER NOTE
Patient: Ember Tavares    Procedure(s):  Laparoscopic Nissen Fundoplication - Wound Class: II-Clean Contaminated    Diagnosis: GERD  Diagnosis Additional Information: No value filed.    Anesthesia Type:   General     Note:  Airway :Face Mask  Patient transferred to:PACU  Handoff Report: Identifed the Patient, Identified the Reponsible Provider, Reviewed the pertinent medical history, Discussed the surgical course, Reviewed Intra-OP anesthesia mangement and issues during anesthesia, Set expectations for post-procedure period and Allowed opportunity for questions and acknowledgement of understanding      Vitals: (Last set prior to Anesthesia Care Transfer)              Electronically Signed By: АЛЕКСАНДР COHEN CRNA  March 26, 2018  2:06 PM

## 2018-03-26 NOTE — INTERVAL H&P NOTE
I saw and examined Ember EZRA Tavares.  I have reviewed the history and physical and find no changes to the patient's medical status or condition with the exceptions noted below.     Jagdish Ruiz   10:18 AM 3/26/2018

## 2018-03-26 NOTE — OP NOTE
Procedure Date: 03/26/2018      DATE OF PROCEDURE:  3/26/2018.      PREOPERATIVE DIAGNOSIS:  Gastroesophageal reflux disease with esophagitis.      POSTOPERATIVE DIAGNOSIS:  Gastroesophageal reflux disease with esophagitis.      PROCEDURE:  Laparoscopic Nissen fundoplication.      SURGEON: Jagdish Ruiz MD.      ASSISTANT:   Gricelda Kaufman CST.      ESTIMATED BLOOD LOSS:  40 mL.      SPECIMENS:  None.      FINDINGS:  No hiatal hernia.  Scattered adhesions from previous surgeries.      DESCRIPTION OF PROCEDURE:  The patient was taken to the OR and positioned supine on the operating table.  The abdomen was sterilely prepped and draped in the usual manner.  After a timeout was performed, we began by making a supraumbilical incision transversely.  Through this, we inserted a Veress needle and confirmed placement intraabdominally with a saline drop test.  This was then insufflated to 15 mmHg.  Abdomen was entered under direct vision with a Visiport technique.  Once we were in the abdomen, it was inspected for any injury.  There is no injury noted.  We then proceeded to place an operating port in the right subcostal position and a port for the liver retractor to go in a right lateral subcostal position.  The left subcostal trocar was inserted for the surgeon's right hand port, as well as an assistant's port put over in a left lateral subcostal position.  Once we were able to get all her ports in and a liver retractor placed we then proceeded to dissect pars flaccida.  The pars flaccida was taken down with a LigaSure.  This was taken and followed up to the right saurav.  The right saurav was then freed from the esophagus.  This was then dissected caudal towards the diaphragmatic saurav.  With this, we dissected in the area between the 2 diaphragmatic crura.  A retroesophageal window was created.  Adhesions between the esophagus and the saurav were taken down with use of LigaSure.  The crura was then followed up and around to the  left crura from the anterior side as well.  Any adhesions between this were taken down.  We then proceeded to mobilize the stomach.  A LigaSure was used to divide the short gastric vessels.  This was done all the way up from about 2/3 down on the greater curvature all the way up to the left crura.   Both the superficial and deep short gastrics were taken down.  Once we had done this, we had good mobilization of the stomach.  There was no bleeding during the division of the short gastrics.  Once we had these divided, we then proceeded to clean up our retroesophageal space.  We then proceeded to examine the hiatus.  There was minimal hiatal hernia, really nothing to be dissected or freed from the chest.  We did snug up the hiatus with 1 suture with the EndoStitch.  Once this was done, then we proceeded to remove and subsequently placed a Penrose drain for retraction of the esophagus.  The bougie was then replaced once again.  The fundus of the stomach was grasped and placed behind through the retroesophageal window.  Once this was done, we then confirmed that it had good mobility and it was tension free with a shoeshine maneuver.  After this, we then proceeded to do our plication stitches.  A total of 3 plication stitches were placed.  The first stitch did include esophagus as one of the portions of the bite.  These were all made of Ethibond and done with EndoStitch device.  A fourth stitch was placed between the fundal wrap and the left crura.  Once we were done with this, we then proceeded to irrigate and suction the abdomen.  Any clot was suctioned free.  There was no bleeding.  Liver retractor was removed and a small defect from the liver retractor was controlled with electrocautery.  Once this was controlled there was no further bleeding.  We then irrigated both colic gutters and above the liver on the right.  The 10 mm assistant's right hand port was closed with a 0 Vicryl suture.  The remaining incisions were  closed with 4-0 Monocryl and Steri-Strips.  At the end of the case all suture, needle and instruments counts were correct.         MAUDE CHAVIRA MD             D: 2018   T: 2018   MT: JUAN      Name:     ZBIGNIEW JOHNSON   MRN:      [id number]        Account:        [id number]   :      1960           Procedure Date: 2018      Document: [id number]

## 2018-03-26 NOTE — PROGRESS NOTES
Diet After Nissen Fundoplication Surgery  This diet information is for patients who have recently hadNissen fundoplication surgery to correct reflux disease or to repair various types of hernias, such as hiatal hernia and intrathoracic stomach. This diet may also be used for other gastrointestinal surgeries, such as Hellermyotomy and repair of achalasia. The diet will help control diarrhea, excess gas and swallowing problems, which may occur after this type of surgery.  Keeping Your Stomach from Stretching  Eat small, frequent meals (six to eight per day). This will help you consume the majority of the nutrients you need without causing your stomach to feel full or distended.   Drinking large amounts of fluids withmeals can stretch your stomach. You may drink fluids between meals as often as you like, but limit fluids to 1/2 cup (4 fluid ounces) with meals and one cup (8 fluid ounces) with snacks.   Sit upright while eating andstay upright for 30 minutes after each meal. Gravity can help food move through your digestive tract. Do not lie down after eating. Sit upright for 2 hours after your last meal or snack of the day.   Eat very slowly. Take your time when eating.   Take small bites and chew your food well to help aid in swallowing anddigestion.   Avoid crusty breads and sticky, gummy foods, such as bananas, fresh doughy breads, rolls and doughnuts. These types of foods become sticky and difficult to swallow.   Toasted breads tend to be bettertolerated.   Lastly, if you eat sweets, consume them at the end of your meal to avoid a group of symptoms referred to as  dumping syndrome . This describes the rapid emptying of foods from the stomach to the smallintestine. Sweetened beverages, candy and desserts move more rapidly and dump quickly into the intestines. This can cause symptoms of nausea, weakness, cold sweats, cramps, diarrhea and dizzy spells.  Avoiding Gas  Avoid drinking through a straw. Do not chew gum or  tobacco. These actions cause you to swallow air, which produces excess gas in your stomach. Chew with your mouth closed.   Avoidany foods that cause stomach gas and distention. These foods include corn, dried beans, peas, lentils, onions, broccoli, cauliflower and any food from the cabbage family.   Avoid carbonated drinks, alcohol, citrus andtomato products.  When will I be able to eat a soft diet?  After Nissen fundoplication surgery, your diet will be advanced slowly by your surgeon. Generally, you will be on a clear liquid diet for the first few meals. Then you will advanceto the full liquid diet for a meal or two and eventually to a Nissen soft diet.  Please be aware that each patient's tolerance to food is different. Yourdoctor will advance your diet depending on how well you progress after surgery.  Clear Liquid Diet   The first diet after surgery is the clear liquid diet. It includes the followingliquids:  Apple juice   Cranberry juice   Grape juice   Chicken broth  Beef broth   Flavored gelatin (Jell-O )   Decaf tea and coffee   Caffeinated beveragesare permitted based on tolerance   Popsicles   Italian ice  Carbonated drinks (sodas) are not allowed for thefirst six to eight weeks after surgery. After this time you can try them again in small amounts.  Full Liquid Diet  The full liquid diet contains anything on the clear liquid diet, plus:  Milk, soy, rice and almond (no chocolate)   Cream of wheat, cream ofrice, grits   Strained creamed soups (no tomato or broccoli)   Vanilla and strawberry-flavored ice cream   Sherbet   Blended, custard styled or whipped yogurt (plain or vanilla only)   Vanilla and butterscotchpudding (no chocolate or coconut)   Nutritional drinks including Ensure , Boost , [place] Instant Breakfast  (no chocolate-flavored)  Note: Dairy products, such as milk, ice cream andpudding, may cause diarrhea in some people just after surgery. You may need to avoid milk products. If so,  substitute them with lactose-free beverages, such as soy, rice, Lactaid  or almond milks.  Nissen Soft Diet  Food Category Foods to Choose Foods to Avoid   Beverages Milk, such as, whole, 2%, 1%, non-fat, or skim, soy,rice, almond   Caffeinated and decaf tea and coffee   Powdered drink mixes (in moderation)   Non-citrus juices (apple, grape, cranberry or blends of these)   Fruit nectars   Nutritional drinks including Boost , Ensure , [place] Instant Breakfast  Chocolate milk, cocoa or other chocolate-flavored drinks   Carbonated drinks   Alcohol  Citrus juices like orange, grapefruit, lemon and lime   Breads Pancakes, Japanese toast and waffles   Crackers (saltine, butter, soda, messi, Goldfish  and Cheese Nips )   Toasted bread Untoasted bread, bagels, Reveles and hard rolls, English muffins   Crackers with nuts, seeds, fresh or dried fruit, coconut, or highly seasoned, such as garlic oronion-flavored   Sweet rolls, coffee cake or doughnuts   Cereals Well cooked cereals, such as oatmeal (plain or flavored)   Cold cereal(Cornflakes , Rice Krispies , Cheerios , Special K  plain, Rice Chex  and puffed rice) Very coarse cereal,such as bran, shredded wheat   Any cereal with fresh or dried fruit, coconut, seeds or nuts   Desserts  Eat in moderation and do not eat desserts or sweets by themselves. Plain cakes, cookies and cream-filled pies   Vanilla andbutterscotch pudding or custard   Ice cream, ice milk, frozen yogurt and sherbet   Gelatin made from allowed foods   Fruit ices and popsicles Desserts containing chocolate, coconut, nuts, seeds, fresh or dried fruit, peppermint or spearmint   Eggs  Poached, hard boiled or scrambled Fried eggs and highly seasoned eggs (deviled eggs)   Fats  Eat in moderation. Butter and margarine   Mayonnaise and vegetable oils   Mildlyseasoned cream sauces and gravies   Plain cream cheese   Sour cream Highly seasoned salad dressings, cream sauces and gravies   [place], almonte fat, ham fat,  lard and salt pork   Fried foods   Nuts   Fruits Fruit juice   Any canned or cooked fruit except those listed in the AVOID column ALL fresh fruits, such as citrus, bananas and pineapple   Cannedpineapple   Dried fruits, such as raisins, berries   Fruits with seeds, such as berries, kiwi and figs   Meat, Fish, Poultry, and Dairy Products Meats may be ground, minced or chopped to ease swallowing and digestion   Tender, well cooked and moist cuts of beef, chicken, turkey and pork   Veal and lamb   Flaky,cooked fish   Canned tuna   Cottage and ricotta cheeses   Mild cheese, such as American, brick, mozzarella and baby Swiss   Creamy peanut butter   Plain custard or blended fruit yogurt   Moist casseroles,such as macaroni & cheese, tuna noodle   Grilled or toasted cheese sandwich Tough meats with a lot of gristle   Fried, highly seasoned, smoked and fatty meat, fish or poultry, such as frankfurters, luncheon meats, sausage, almonte, spare ribs, beef brisket, sardines, anchovies,duck and goose   Chili and other entrees made with pepper or chili pepper   Shellfish   Strongly flavored cheeses, such as sharp cheese, extra sharp cheddar, cheese containing peppers or other seasonings   Crunchy peanut butter   Any yogurt with nuts, seeds, coconut, strawberries or raspberries   Potatoes and Starches Peeled, mashed or boiledwhite or sweet potatoes   Oven-baked potatoes without skin   Well cooked white rice, enriched noodles, barley, spaghetti, macaroni and other pastas Fried potatoes, potato skins and potato chips   Hard and soft taco shells   Fried, brown or wild rice   Soups Mildly flavored meat stocks   Cream soups made from allowed foods Highly seasoned soups and tomato based soups, cream soups made with gas producing vegetables, such as broccoli, cauliflower, onion, etc.   Sweets and Snacks  Use in moderation and do not eat large amounts of sweets by themselves. Syrup, honey, jelly and seedless jam   Plain hard candies and  plain candies made with allowed ingredients   Molasses   Marshmallows   Other candy made from allowed ingredients   Thin pretzels Jam, marmalade and preserves   Chocolate in any form   Any candy containing nuts, coconut, seeds, peppermint, spearmint or dried or fresh fruit   Popcorn, potato chips, tortilla chips   Soft orhard thick pretzels, such as sourdough   Vegetables Well cooked soft vegetables without seeds or skins, suchas asparagus tips, beets, carrots, green and wax beans, chopped spinach, tender canned baby peas, squash and pumpkin Raw vegetables, tomatoes, tomato juice, tomato sauce and V-8  juice   Gas producing vegetables, such as broccoli, Brussel sprouts, cabbage, cauliflower, onions, corn, cucumber, green peppers, rutabagas, turnips, radishes and sauerkraut   Dried beans, peasand lentils   Miscellaneous Salt and spices in moderation   Mustard and vinegar in moderation Fried or highly seasoned foods   Coconut and seeds   Pickles andolives   Chili sauces, ketchup, barbecue sauce, horseradish, black pepper, chili powder and onion and garlic seasonings   Any other strongly flavored seasoning, condiment, spice or herb not tolerated   Any food nottolerated   Sample Menu  Breakfast   cup canned fruit (non-citrus)     to   cup cereal   1 small pancake   1 tsp. margarine  1 tsp. jelly     cup 2% milk   1 tsp. sugar   Mid-Morning Snack 2 messi crackers   1 T creamy peanut butter   1 tsp. jelly   1 cup tea   Lunch   cup tuna salad (no raw vegetables)   3 to 4 saltine crackers     cup canned peaches     cup fruit juice (non-citrus)   1 tsp. mayonnaise    Mid-Afternoon Snack 4 saltine crackers   1 T cream cheese   1 cup 2% milk   Dinner 3 oz. roasted chicken (finely ground) with sauce     cup cooked white rice     cup cooked carrots     cup canned pears   1 tsp. margarine     cup tea   1 tsp. sugar   Evening Snack   cup cottage cheese     cup applesauce     cup 2% milk   Please note: You will need extra fluids  throughout the day to meet your fluid needs.

## 2018-03-26 NOTE — PROGRESS NOTES
[facility] ADMISSION NOTE    Patient admitted to room 315 at approximately 1410 via cart from surgery. Patient was accompanied by transport tech.     Verbal SBAR report received from Jesi prior to patient arrival.     Patient trasferred to bed via air cristiano. Patient alert and oriented X 3. Pain is controlled with current analgesics.  Medication(s) being used: narcotic analgesics including.  . Admission vital signs: Blood pressure 116/79, temperature 97  F (36.1  C), temperature source Temporal, resp. rate 20, SpO2 97 %, not currently breastfeeding. Patient was oriented to plan of care, call light, bed controls, tv, telephone, bathroom and visiting hours.     The following safety risks were identified during admission: fall. Yellow risk band applied: YES.     Gaby Dutta

## 2018-03-26 NOTE — IP AVS SNAPSHOT
[facility]    [address]    [place] [address]    Phone:  [phone]    Fax:  [phone]                                       After Visit Summary   3/26/2018    Ember Tavares    MRN: [id number]           After Visit Summary Signature Page     I have received my discharge instructions, and my questions have been answered. I have discussed any challenges I see with this plan with the nurse or doctor.    ..........................................................................................................................................  Patient/Patient Representative Signature      ..........................................................................................................................................  Patient Representative Print Name and Relationship to Patient    ..................................................               ................................................  Date                                            Time    ..........................................................................................................................................  Reviewed by Signature/Title    ...................................................              ..............................................  Date                                                            Time

## 2018-03-26 NOTE — BRIEF OP NOTE
[facility] Brief Operative Note    Pre-operative diagnosis: GERD   Post-operative diagnosis * No post-op diagnosis entered *     Procedure: Procedure(s):  Laparoscopic Nissen Fundoplication - Wound Class: II-Clean Contaminated   Surgeon(s): Surgeon(s) and Role:     * Jagdish Ruiz MD - Primary   Estimated blood loss: 40 mL    Specimens: * No specimens in log *   Findings: No hiatal hernia, scattered adhesions.

## 2018-03-26 NOTE — H&P (VIEW-ONLY)
GENERAL SURGERY CONSULTATION NOTE    Ember Tavares   [id number] [address] [address]  56 y.o.  female  Admission Date/Time: No admission date for patient encounter.  Primary Care Provider:  Tim Boyd MD    I was asked to see this patient by Tim Boyd MD for evaluation of GERD.     HPI: Ember has had years of reflux symptoms. She has gained weigh over the last 10 years and had worsened reflux.  She takes a H2 blocker bid and TUMS 2-5 times a week. She has regurgitation symptoms with food 3-5 nights. She sleeps with the head of the bed elevated. She avoids chololate as a trigger. Mint does not seem to bother. She had violent nausea and vomiting with Carafate. She has had multiple EGDs with balloon dilations for esophageal ring.  She has not had manometery or a ph probe.  She has had an esophogram already. Has dysphagia of meats and breads when needing dilation. She has some bloating symptoms and will have 1-2 BM per week.  Takes laxatives as needed.     REVIEW OF SYSTEMS:    GENERAL: No fevers or chills. Denies fatigue, recent weight loss. + weight gain, loss of appetite  HEENT: No sinus drainage. No changes with vision or hearing. + difficulty swallowing.   LYMPHATICS:  No swollen nodes in axilla, neck or groin.  CARDIOVASCULAR: Denies chest pain, palpitations and dyspnea on exertion.  PULMONARY: No shortness of breath or cough. No increase in sputum production.  GI: Denies melena, bright red blood in stools. No hematemesis. Has had IBS  : No dysuria or hematuria.  SKIN: No recent rashes or ulcers.   HEMATOLOGY:  No history of easy bruising or bleeding.  ENDOCRINE:  No history of diabetes or thyroid problems.  NEUROLOGY:  No history of seizures or headaches. No motor or sensory changes.        Patient Active Problem List    Diagnosis Date Noted     Epigastric pain 10/16/2017     Occipital neuralgia of left side 09/15/2017     Hiatal hernia 08/24/2017     Schatzki's ring of distal  esophagus 08/24/2017     History of esophageal dilatation 08/24/2017     Atherosclerosis of abdominal aorta (HC) 08/24/2017     [facility] Studies:  2/19/2013 -- CTA ABDOMEN AND PELVIS WITH BILATERAL LOWER EXTREMITY RUNOFF    CLINICAL HISTORY: Iliac and mesenteric ischemia.    TECHNIQUE: 125 mL Omnipaque 300 IV contrast was administered. 3-D  arterial reformations were performed.    FINDINGS: There is a well-defined ovoid density at the medial right  costophrenic angle. This measures 2.3 x 0.9 x 0.6 cm. It demonstrates  central low attenuation similar to the adjacent abdominal fat near the  liver. This could be a tiny Bochdalek hernia. The liver, spleen,  pancreas, adrenal glands, and kidneys are unremarkable. The  gallbladder is surgically absent. No biliary ductal dilation. No  mesenteric or retroperitoneal adenopathy. No ascites or fluid  collection. There is a moderate amount of retained stool seen  throughout most of the colon. No obstruction. No adjacent inflammatory  change. No ascites or fluid collection. Ureters and bladder are  unremarkable.    There is mild partially calcified atherosclerotic plaque within the  abdominal aorta. No aneurysm. There is narrowing of the lumen just  proximal to the bifurcation to 6 mm.   There is moderate narrowing of the proximal celiac artery without associated calcification.   The superior mesenteric artery appears to be widely patent with good opacification of distal branch vessels. The MARIAH also appears to be well opacified.   There are single bilateral renal arteries which are widely patent.    There is mild partially calcified plaque within the common iliac arteries with minimal narrowing.   The external iliac and common femoral arteries are widely patent.   Superficial femoral and popliteal arteries are widely patent bilaterally.   There is three-vessel runoff with good opacification in the lower leg to the ankle and foot.    IMPRESSION:  1. Mild aortoiliac  atherosclerotic disease. No aneurysm. There is narrowing of the distal aorta just proximal to the bifurcation.  2. There is moderate narrowing of the celiac artery which could  be due to noncalcified plaque. No SMA or MARIAH compromise definitely seen.  3. Probable tiny Bochdalek hernia at the right costophrenic angle. A hamartoma would be a differential consideration though is less likely given the central fat density.  4. Possible constipation.  5. No evidence of vascular compromise in the lower extremities.      Examination Interpreted at: [facility], [place], [place]  The Interpreting Physician is MONICA VORA  Exam was completed at [facility]       Dyshidrotic hand dermatitis 08/24/2017     Psoriasis 08/24/2017     Prediabetes - New Dx 8/24/2017 - A1c 5.8% 08/24/2017     Vitamin D deficiency 08/24/2017     COPD, severe (HC) - PFTs 8/4/2014 [facility] - follows with Dr. Luna, Pulmonology at [facility] 08/24/2017 8/4/2014 -- PULMONARY FUNCTION    SITE:  [facility]  ORDERED BY:  VANNESA LUNA    CLINICAL SUMMARY: 53-year-old female with a history of severe COPD.     TECHNICIAN NOTE: Good effort.     DATA: FVC 1.85 (61%). FEV1 1.04 (45%). FEV1/FVC ratio 56%. DLCO 13.5 to 62%.     Flow volume curve is consistent with airway obstruction.     IMPRESSION:  1. Severe obstructive pulmonary disease.   2. Mild decrease in diffusing capacity.       Decreased diffusion capacity of lung - MILD - PFT 8/2014 08/24/2017     Decreased bone density - 12/20/2013 -- DXA SPINE HIP -- [facility] - Hip T-score of -1.7 08/24/2017 12/20/2013 -- DXA SPINE HIP -- [facility]        FINDINGS: The bone mineral density was done using a [employer] machine.   The lumbar spine was 1.139 g/cm2 which is in approximately one half standard deviations above the mean for age-matched persons.   The total hip BMD was 0.789 g/cm2 which is approximately one  standard deviations below expected for age.   The T-score was -0.4 at the spine and -1.7 at the total hip for females which places her in the WHO category of osteopenic with bone density between 10 and 25% below young normal. Fracture risk is moderate.    RECOMMENDATION: Treatment is recommended in the form of bisphosphonate and Evista. Hormone therapy may be an option based on review of risks and benefits of treatment. All patients should assure an adequate intake of dietary calcium (1200 mg per day) and   vitamin D (400-800 international units daily). Followup examination recommended in December of 2015.       Arthritis of knee, right - medial compartment 08/24/2017     Centrilobular emphysema (HC) 08/23/2017     Alpha-1-antitrypsin deficiency carrier (HC) - Heterozygous 08/23/2017     Osteopenia 08/23/2017     Irritable bowel syndrome (IBS) 08/23/2017     Esophageal reflux 08/23/2017     CONTACT DERMATITIS 07/01/2010     MYALGIA 07/01/2010     ABDOMINAL PAIN, CHRONIC 03/10/2010     16 year duration         CONSTIPATION 03/10/2010     SINUSITIS, CHRONIC 03/10/2010     History of tobacco abuse 03/10/2010     Past Medical History:   Diagnosis Date     Chronic abdominal pain      Chronic sinusitis      Dysphagia      Fractured skull (HC) 1972     Past Surgical History:   Procedure Laterality Date     ANORECTAL MANOMETRY  10/09/2007     BIOPSY BREAST  1999, 2001, 2004, 2008    benign     CHOLECYSTECTOMY  2004     COLONOSCOPY  x's 3 last 2009    negative     CT CORONARY ANGIOGRAM ([place])  2009    negative     CVL HEART CATH LEFT  04/20/2009    [facility]     ESOPHAGEAL DILATION  10/30/2015    Dr. Rainer Allen, [facility]     ESOPHAGOGASTRODUODENOSCOPY      dilated in 04,07,09  Dr. Allen     LAPAROTOMY      1990 lysis of adhesions/endometriosis     REPAIR RECTOCELE  07/29/2009     VAGINAL HYSTERECTOMY  1994    ovaries remain     Family History   Problem Relation Age of Onset     Arthritis Father      Peripheral  vascular disease Mother      Diabetes Mother      Arthritis Mother      Premenopausal breast cancer Sister      Eczema Brother      Narcolepsy Daughter      Vasculitis Daughter      Seizures Daughter      Social History Narrative    Graduated from high school plus 3 years collage    EMT and .  Lives in [place]    Patient previously smoked.      Alcohol Use - yes    Drug Use - no    Regular Exercise - yes     - Ervin.  3 children.        Works at [employer]      Social History     Social History Main Topics     Smoking status: Former Smoker     Packs/day: 0.50     Years: 32.00     Types: Cigarettes     Quit date: 8/18/2012     Smokeless tobacco: Never Used      Comment: quit date of 8-     Alcohol use 1.2 oz/week     2 Standard drinks or equivalent per week      Comment: 1-2 drinks every 1-2 weeks     Drug use: No     Sexual activity: Not on file     Current Outpatient Prescriptions   Medication Sig Dispense Refill     albuterol HFA (VENTOLIN HFA) 90 mcg/actuation inhaler 3 puffs TID as needed for breathing difficulties       aspirin chewable 81 mg chewable tablet Take 1 tablet by mouth once daily with a meal.       cetirizine (ZYRTEC) 10 mg tablet Take one tablet by mouth once or twice daily as needed for hand dermatitis/itching.  0     Cholecalciferol, Vitamin D3, (VITAMIN D-3) 5,000 unit tab Take 1 tablet by mouth once weekly.  0     cyanocobalamin (VITAMIN B12) 1,000 mcg/mL injection Inject 1 mL intramuscular every 3 weeks. To 4 weeks 1000 mcg 15     fluticasone-salmeterol (ADVAIR DISKUS) 500-50 mcg/Dose diskus inhaler Inhale 1 Puff by mouth every 12 hours.       medication order composer Daily topical solution as needed for Psoriasis flare up, patient unsure of medication name at this time.       ranitidine (ZANTAC 75) 75 mg tablet Take 1 tablet by mouth once daily if needed.       tiotropium bromide (SPIRIVA RESPIMAT) 2.5 mcg/actuation mist Inhale 2 Puffs by mouth once daily.  For COPD 4 g 11     triamcinolone (ARISTOCORT) 0.1 % ointment Apply  topically to affected area(s) 3 times daily. -- for hand dermatitis 80 g 3     venlafaxine (EFFEXOR XR) 37.5 mg Extended-Release capsule Take 1 capsule by mouth once daily with a meal. 90 capsule 3     No current facility-administered medications for this visit.      Medications have been reviewed by me and are current to the best of my knowledge and ability.      ALLERGIES/SENSITIVITIES:   Allergies   Allergen Reactions     Carafate [Sucralfate] Nausea And Vomiting     Crestor [Rosuvastatin] Nausea And Vomiting     Lipitor [Atorvastatin] Myalgia     Bactrim [Sulfamethoxazole-Trimethoprim] Yeast Infection     Levofloxacin Nausea And Vomiting     Morphine Chest Pain     Penicillins Respiratory Arrest       PHYSICAL EXAM:     General Appearance:  Appears well  /70  Pulse 72  Wt 64.7 kg (142 lb 9.6 oz)  Breastfeeding? No  BMI 25.26 kg/m2  Body mass index is 25.26 kg/(m^2).  RESPIRATORY: CTAB, no wheeze  CARDIOVASCULAR: RRR, S1, S2, no murmur  GASTROINTESTINAL: Mildly protuberant, Lap emily incisions noted, nontender.     ADDITIONAL COMMENTS:  I reviewed the patient s new imaging test results.      Her esophagram shows mild narrowing of the distal esophagus with reflux to the cervical esophagus.    CONSULTATION ASSESSMENT AND PLAN:    56 y.o. female with lifestyle limiting GERD and dysphagia related to Schatzki ring improved with dilation.        - Plan for lap Nissen on 3/26    Jagdish Ruiz MD on 3/15/2018 at 11:13 AM

## 2018-03-26 NOTE — ANESTHESIA PREPROCEDURE EVALUATION
Anesthesia Evaluation     .             ROS/MED HX    ENT/Pulmonary:     (+)severe COPD, , . .    Neurologic:  - neg neurologic ROS     Cardiovascular:  - neg cardiovascular ROS       METS/Exercise Tolerance:  >4 METS   Hematologic:  - neg hematologic  ROS       Musculoskeletal:  - neg musculoskeletal ROS (+) arthritis, , , -       GI/Hepatic:     (+) GERD Symptomatic, hiatal hernia,       Renal/Genitourinary:  - ROS Renal section negative       Endo:  - neg endo ROS       Psychiatric:  - neg psychiatric ROS       Infectious Disease:  - neg infectious disease ROS       Malignancy:      - no malignancy   Other:    (+) No chance of pregnancy C-spine cleared: N/A, no H/O Chronic Pain,no other significant disability   - neg other ROS                 Physical Exam  Normal systems: cardiovascular and pulmonary    Airway   Mallampati: II  TM distance: >3 FB  Neck ROM: full    Dental   (+) upper dentures    Cardiovascular   Rhythm and rate: regular and normal      Pulmonary    breath sounds clear to auscultation                    Anesthesia Plan      History & Physical Review      ASA Status:  3 .    NPO Status:  > 8 hours    Plan for General with Propofol induction. Maintenance will be Balanced.    PONV prophylaxis:  Ondansetron (or other 5HT-3) and Dexamethasone or Solumedrol       Postoperative Care  Postoperative pain management:  IV analgesics and Multi-modal analgesia.      Consents  Anesthetic plan, risks, benefits and alternatives discussed with:  Patient.  Use of blood products discussed: No .   .                          .

## 2018-03-27 ENCOUNTER — APPOINTMENT (OUTPATIENT)
Dept: GENERAL RADIOLOGY | Facility: OTHER | Age: 58
End: 2018-03-27
Attending: SURGERY
Payer: COMMERCIAL

## 2018-03-27 VITALS
WEIGHT: 131.39 LBS | BODY MASS INDEX: 24.03 KG/M2 | DIASTOLIC BLOOD PRESSURE: 73 MMHG | RESPIRATION RATE: 20 BRPM | TEMPERATURE: 98.4 F | OXYGEN SATURATION: 91 % | SYSTOLIC BLOOD PRESSURE: 121 MMHG

## 2018-03-27 PROCEDURE — 25000128 H RX IP 250 OP 636: Performed by: SURGERY

## 2018-03-27 PROCEDURE — 40000275 ZZH STATISTIC RCP TIME EA 10 MIN

## 2018-03-27 PROCEDURE — 74220 X-RAY XM ESOPHAGUS 1CNTRST: CPT

## 2018-03-27 PROCEDURE — 25000125 ZZHC RX 250: Performed by: SURGERY

## 2018-03-27 RX ORDER — ONDANSETRON 4 MG/1
4 TABLET, ORALLY DISINTEGRATING ORAL EVERY 6 HOURS
Qty: 40 TABLET | Refills: 3 | Status: SHIPPED | OUTPATIENT
Start: 2018-03-27 | End: 2018-04-17

## 2018-03-27 RX ORDER — OXYCODONE AND ACETAMINOPHEN 10; 325 MG/1; MG/1
1-2 TABLET ORAL EVERY 4 HOURS PRN
Qty: 30 TABLET | Refills: 0 | Status: SHIPPED | OUTPATIENT
Start: 2018-03-27 | End: 2018-04-17

## 2018-03-27 RX ORDER — OXYCODONE AND ACETAMINOPHEN 5; 325 MG/1; MG/1
1 TABLET ORAL EVERY 4 HOURS PRN
Qty: 20 TABLET | Refills: 0 | Status: SHIPPED | OUTPATIENT
Start: 2018-03-27 | End: 2018-04-17

## 2018-03-27 RX ADMIN — ONDANSETRON 4 MG: 4 TABLET, ORALLY DISINTEGRATING ORAL at 12:25

## 2018-03-27 RX ADMIN — ONDANSETRON 4 MG: 2 INJECTION INTRAMUSCULAR; INTRAVENOUS at 06:11

## 2018-03-27 RX ADMIN — DIATRIZOATE MEGLUMINE AND DIATRIZOATE SODIUM 65 ML: 660; 100 SOLUTION ORAL; RECTAL at 10:36

## 2018-03-27 RX ADMIN — BUDESONIDE 0.5 MG: 0.5 SUSPENSION RESPIRATORY (INHALATION) at 09:37

## 2018-03-27 NOTE — PROGRESS NOTES
NSG DISCHARGE NOTE    Patient discharged to home at 2:55 PM via ambulation. Accompanied by spouse and staff. Discharge instructions reviewed with patient, opportunity offered to ask questions,  denies any current questions. Prescriptions sent to patients preferred pharmacy, hard copy of Percocet prescription hand carried by patient . All belongings sent with patient. IV access has been removed. Follow-up scheduled appts identified.    Minnie Bhatt

## 2018-03-27 NOTE — PROGRESS NOTES
Patient tolerating clear liquids. Drinking adequately. Voiding large amounts. Patient saline locked per MD orders. Denise Lew RN on 3/26/2018 at 9:44 PM

## 2018-03-27 NOTE — PLAN OF CARE
Problem: Patient Care Overview  Goal: Plan of Care/Patient Progress Review  Outcome: No Change  No change since previous assessment time 1943. Denise Lew RN on 3/27/2018 at 6:25 AM

## 2018-03-27 NOTE — DISCHARGE SUMMARY
[facility] Discharge Summary    Ember Tavares MRN# [id number]   Age: 57 year old YOB: 1960     Date of Admission:  3/26/2018  Date of Discharge::  3/27/2018  Admitting Physician:  Jagdish Ruiz MD  Discharge Physician:  Jagdish Ruiz MD     Home clinic: [facility]          Admission Diagnoses:   S/P Nissen fundoplication (without gastrostomy tube) procedure [Z98.890]          Discharge Diagnosis:   Gastro-esophageal reflux          Procedures:   Procedure(s): Nissen procedure, laproscopic       No procedures performed during this admission           Medications Prior to Admission:     Prescriptions Prior to Admission   Medication Sig Dispense Refill Last Dose     albuterol (PROAIR HFA/PROVENTIL HFA/VENTOLIN HFA) 108 (90 BASE) MCG/ACT Inhaler Inhale 3 puffs into the lungs 3 times daily as needed   3/26/2018 at Unknown time     calcipotriene 0.005 % OINT Apply topically 2 times daily   3/25/2018 at Unknown time     clobetasol (TEMOVATE) 0.05 % ointment Apply topically 2 times daily   3/25/2018 at Unknown time     fluticasone-salmeterol (ADVAIR DISKUS) 500-50 MCG/DOSE diskus inhaler Inhale 1 puff into the lungs every 12 hours   3/26/2018 at Unknown time     tiotropium (SPIRIVA RESPIMAT) 2.5 MCG/ACT inhalation aerosol Inhale 2 puffs into the lungs daily   3/26/2018 at Unknown time     aspirin 81 MG chewable tablet Take 81 mg by mouth daily with food   3/14/18am     cetirizine (ZYRTEC) 10 MG tablet Take one tablet by mouth once or twice daily as needed for hand dermatitis/itching.   3/14/2018     Cholecalciferol (D 5000) 5000 UNITS TABS Take 5,000 Units by mouth every 7 days   3/14/18am     cyanocobalamin (VITAMIN B12) 1000 MCG/ML injection Inject 1,000 mcg into the muscle   More than a month at Unknown time     ranitidine (ZANTAC) 75 MG tablet Take 75 mg by mouth daily as needed   3/14/2018     triamcinolone (KENALOG) 0.1 % ointment Apply topically 3 times daily         venlafaxine (EFFEXOR-XR) 37.5 MG 24 hr capsule Take 37.5 mg by mouth daily with food   3/21/2018             Discharge Medications:     Current Discharge Medication List      START taking these medications    Details   oxyCODONE-acetaminophen (PERCOCET)  MG per tablet Take 1-2 tablets by mouth every 4 hours as needed for pain (moderate to severe)  Qty: 30 tablet, Refills: 0    Associated Diagnoses: Gastroesophageal reflux disease with esophagitis      ondansetron (ZOFRAN-ODT) 4 MG ODT tab Take 1 tablet (4 mg) by mouth every 6 hours  Qty: 40 tablet, Refills: 3    Associated Diagnoses: S/P Nissen fundoplication (without gastrostomy tube) procedure      oxyCODONE-acetaminophen (PERCOCET) 5-325 MG per tablet Take 1 tablet by mouth every 4 hours as needed for moderate to severe pain  Qty: 20 tablet, Refills: 0    Associated Diagnoses: S/P Nissen fundoplication (without gastrostomy tube) procedure         CONTINUE these medications which have NOT CHANGED    Details   albuterol (PROAIR HFA/PROVENTIL HFA/VENTOLIN HFA) 108 (90 BASE) MCG/ACT Inhaler Inhale 3 puffs into the lungs 3 times daily as needed      calcipotriene 0.005 % OINT Apply topically 2 times daily      clobetasol (TEMOVATE) 0.05 % ointment Apply topically 2 times daily      fluticasone-salmeterol (ADVAIR DISKUS) 500-50 MCG/DOSE diskus inhaler Inhale 1 puff into the lungs every 12 hours      tiotropium (SPIRIVA RESPIMAT) 2.5 MCG/ACT inhalation aerosol Inhale 2 puffs into the lungs daily      aspirin 81 MG chewable tablet Take 81 mg by mouth daily with food      cetirizine (ZYRTEC) 10 MG tablet Take one tablet by mouth once or twice daily as needed for hand dermatitis/itching.      Cholecalciferol (D 5000) 5000 UNITS TABS Take 5,000 Units by mouth every 7 days      cyanocobalamin (VITAMIN B12) 1000 MCG/ML injection Inject 1,000 mcg into the muscle      ranitidine (ZANTAC) 75 MG tablet Take 75 mg by mouth daily as needed      triamcinolone (KENALOG) 0.1 %  ointment Apply topically 3 times daily      venlafaxine (EFFEXOR-XR) 37.5 MG 24 hr capsule Take 37.5 mg by mouth daily with food                   Consultations:   No consultations were requested during this admission          Brief History of Illness:   Reason for admission requiring a surgical or invasive procedure:   Gastro-esophageal reflux   The patient underwent the following procedure(s):   Nissen procedure, laproscopic   There were no immediate complications during this procedure.    Please refer to the full operative summary for details.    Pt had suffered GERD for years.  She consented to undergo laparoscopic nissen fundoplication.         Hospital Course:   Ember Tavares had an uneventful recovery. She discharged home POD # 1 from lap nissen.                 Discharge Instructions and Follow-Up:   Discharge diet: Full liquid   Discharge activity: Activity as tolerated   Discharge follow-up: Follow up with me in 14 days   Wound care: Ice to area for comfort  Remove dressing in 1-2 days           Discharge Disposition:   Discharged to home      Attestation:  I have reviewed today's vital signs, notes, medications, labs and imaging.  Amount of time performed on this discharge summary: 25 minutes.    Jagdish Ruiz MD

## 2018-03-27 NOTE — PLAN OF CARE
Problem: Surgery Nonspecified (Adult)  Goal: Signs and Symptoms of Listed Potential Problems Will be Absent, Minimized or Managed (Surgery Nonspecified)  Signs and symptoms of listed potential problems will be absent, minimized or managed by discharge/transition of care (reference Surgery Nonspecified (Adult) CPG).   Outcome: Improving  Patient has been independently ambulating hallways and bringing self to restroom. Five small drsgs on abd remain clean and intact. Denies nausea. Bowel tones active x 4. Tolerating clear liquids. Has not requested anything for pain, despite c/o of some discomfort in both shoulders and upper spine. Will continue to monitor and intervene as needed. Denise Lew, RN on 3/27/2018 at 6:28 AM

## 2018-03-27 NOTE — PHARMACY - DISCHARGE MEDICATION RECONCILIATION AND EDUCATION
Pharmacy:  Discharge Counseling and Medication Reconciliation    Ember MUNGUIA Anusha  [address] [address] [address] [phone] (home)   57 year old female  PCP: Tim Boyd    Allergies: Atorvastatin; Rosuvastatin; Sucralfate; Levofloxacin; Morphine; Penicillins; Sulfamethoxazole w/trimethoprim; and Sulfamethoxazole-trimethoprim    Discharge Counseling:    Pharmacist met with patient (and/or family) today to review the medication portion of the After Visit Summary (with an emphasis on NEW medications) and to address patient's questions/concerns.    Summary of Education: Patient was educated on the use, duration and side effects of ondansetron and oxycodone-acetaminophen. Max daily dose of acetaminophen from all sources is 4000 mg.  Also educated that if constipation became an issue, to use senna.    Materials Provided:  MedCounselor sheets printed from Clinical Pharmacology on: oxycodone-acetaminophen and ondansetron    Discharge Medication Reconciliation:    Tia Jefferson has reviewed the patient's discharge medication orders and has compared them to the inpatient medication administration record and to what the patient was taking prior to admission - any discrepancies have been resolved.    It has been determined that the patient has an adequate supply of medications available or which can be obtained from the patient's preferred pharmacy, which HE/SHE has confirmed as: [facility]    Thank you for the consult.    Tia Jefferson........March 27, 2018 2:54 PM

## 2018-04-10 ENCOUNTER — OFFICE VISIT (OUTPATIENT)
Dept: SURGERY | Facility: OTHER | Age: 58
End: 2018-04-10
Attending: SURGERY
Payer: COMMERCIAL

## 2018-04-10 VITALS
TEMPERATURE: 97 F | SYSTOLIC BLOOD PRESSURE: 102 MMHG | HEART RATE: 68 BPM | HEIGHT: 62 IN | WEIGHT: 128.6 LBS | BODY MASS INDEX: 23.66 KG/M2 | DIASTOLIC BLOOD PRESSURE: 64 MMHG

## 2018-04-10 DIAGNOSIS — Z98.890 S/P NISSEN FUNDOPLICATION (WITHOUT GASTROSTOMY TUBE) PROCEDURE: Primary | ICD-10-CM

## 2018-04-10 PROCEDURE — 99024 POSTOP FOLLOW-UP VISIT: CPT | Performed by: SURGERY

## 2018-04-10 ASSESSMENT — PAIN SCALES - GENERAL: PAINLEVEL: NO PAIN (0)

## 2018-04-10 NOTE — PROGRESS NOTES
"Patient presents for post surgical visit after lap nissen on 3/26/18. Patient has done well. No problems with incisions.  No reflux.  Able to sleep nearly flat. Breathing has improved. Occasional epigastric pain and incisional pain. Able to swallow without issue. Has not tried steak yet.  Tolerating soft diet.     /64 (BP Location: Right arm, Patient Position: Chair, Cuff Size: Adult Regular)  Pulse 68  Temp 97  F (36.1  C) (Tympanic)  Ht 5' 2\" (1.575 m)  Wt 128 lb 9.6 oz (58.3 kg)  Breastfeeding? No  BMI 23.52 kg/m2    General: NAD, pleasant and cooperative with exam and interview.  Abdomen: healing incisions. No sign of infection. No pain with palpation.  Psychiatry: awake, alert and oriented. Appropriate affect.    Assessment/Plan:  Discussed surgery.  Can eat anything she would like at this point. Patient can return to normal activities. Patient will call with questions or concerns.      FU PRN       Discussed constipation at length.  She has what sounds like IBS- with constipation.  She has had a extensive workup in the past. She will increase her fiber to 20-40 grams a day.      - Miralax PRN   - Senna and docusate as second line   - MOM and MAg citrate for refractory constipation.    Jagdish Ruiz MD on 4/10/2018 at 12:09 PM       "

## 2018-04-10 NOTE — MR AVS SNAPSHOT
"              After Visit Summary   4/10/2018    Ember Tavares    MRN: [id number]           Patient Information     Date Of Birth          1960        Visit Information        Provider Department      4/10/2018 8:30 AM Jagdish Ruiz MD [facility]        Today's Diagnoses     S/P Nissen fundoplication (without gastrostomy tube) procedure    -  1       Follow-ups after your visit        Follow-up notes from your care team     Return if symptoms worsen or fail to improve.      Your next 10 appointments already scheduled     Apr 17, 2018  8:20 AM CDT   SHORT with Tim Boyd MD   [facility] ([facility])    [address]  [place] [address] [phone]              Who to contact     If you have questions or need follow up information about today's clinic visit or your schedule please contact [facility] directly at [phone].  Normal or non-critical lab and imaging results will be communicated to you by MyChart, letter or phone within 4 business days after the clinic has received the results. If you do not hear from us within 7 days, please contact the clinic through [employer] or phone. If you have a critical or abnormal lab result, we will notify you by phone as soon as possible.  Submit refill requests through [employer] or call your pharmacy and they will forward the refill request to us. Please allow 3 business days for your refill to be completed.          Additional Information About Your Visit        Care EveryWhere ID     This is your Care EveryWhere ID. This could be used by other organizations to access your [place] medical records  [id number]        Your Vitals Were     Pulse Temperature Height Breastfeeding? BMI (Body Mass Index)       68 97  F (36.1  C) (Tympanic) 5' 2\" (1.575 m) No 23.52 kg/m2        Blood Pressure from Last 3 Encounters:   04/10/18 102/64   03/27/18 121/73   03/15/18 " 100/68    Weight from Last 3 Encounters:   04/10/18 128 lb 9.6 oz (58.3 kg)   03/27/18 131 lb 6.3 oz (59.6 kg)   03/15/18 129 lb 9.6 oz (58.8 kg)              Today, you had the following     No orders found for display       Primary Care Provider Office Phone # Fax #    Tim Boyd [phone] [address] [facility] [address]        Equal Access to Services     [facility] : Hadii aad ku hadasho Soomaali, waaxda luqadaha, qaybta kaalmada adeegyada, waxay bhargavi hayadam mercer . So [facility] [phone].    ATENCIÓN: Si habla español, tiene a hui disposición servicios gratuitos de asistencia lingüística. Llame al [phone].    We comply with applicable federal civil rights laws and [place] laws. We do not discriminate on the basis of race, color, national origin, age, disability, sex, sexual orientation, or gender identity.            Thank you!     Thank you for choosing [facility] hospitals  for your care. Our goal is always to provide you with excellent care. Hearing back from our patients is one way we can continue to improve our services. Please take a few minutes to complete the written survey that you may receive in the mail after your visit with us. Thank you!             Your Updated Medication List - Protect others around you: Learn how to safely use, store and throw away your medicines at www.disposemymeds.org.          This list is accurate as of 4/10/18  2:38 PM.  Always use your most recent med list.                   Brand Name Dispense Instructions for use Diagnosis    ADVAIR DISKUS 500-50 MCG/DOSE diskus inhaler   Generic drug:  fluticasone-salmeterol      Inhale 1 puff into the lungs every 12 hours        albuterol 108 (90 Base) MCG/ACT Inhaler    PROAIR HFA/PROVENTIL HFA/VENTOLIN HFA     Inhale 3 puffs into the lungs 3 times daily as needed        aspirin 81 MG chewable tablet      Take 81 mg by mouth daily with food         calcipotriene 0.005 % Oint      Apply topically 2 times daily        cetirizine 10 MG tablet    zyrTEC     Take one tablet by mouth once or twice daily as needed for hand dermatitis/itching.        clobetasol 0.05 % ointment    TEMOVATE     Apply topically 2 times daily        cyanocobalamin 1000 MCG/ML injection    VITAMIN B12     Inject 1,000 mcg into the muscle        D 5000 5000 units Tabs   Generic drug:  Cholecalciferol      Take 5,000 Units by mouth every 7 days        ondansetron 4 MG ODT tab    ZOFRAN-ODT    40 tablet    Take 1 tablet (4 mg) by mouth every 6 hours    S/P Nissen fundoplication (without gastrostomy tube) procedure       * oxyCODONE-acetaminophen  MG per tablet    PERCOCET    30 tablet    Take 1-2 tablets by mouth every 4 hours as needed for pain (moderate to severe)    Gastroesophageal reflux disease with esophagitis       * oxyCODONE-acetaminophen 5-325 MG per tablet    PERCOCET    20 tablet    Take 1 tablet by mouth every 4 hours as needed for moderate to severe pain    S/P Nissen fundoplication (without gastrostomy tube) procedure       ranitidine 75 MG tablet    ZANTAC     Take 75 mg by mouth daily as needed        tiotropium 2.5 MCG/ACT inhalation aerosol    SPIRIVA RESPIMAT     Inhale 2 puffs into the lungs daily        triamcinolone 0.1 % ointment    KENALOG     Apply topically 3 times daily        venlafaxine 37.5 MG 24 hr capsule    EFFEXOR-XR     Take 37.5 mg by mouth daily with food        * Notice:  This list has 2 medication(s) that are the same as other medications prescribed for you. Read the directions carefully, and ask your doctor or other care provider to review them with you.

## 2018-04-10 NOTE — NURSING NOTE
Patient is here for a post op, is having some LUQ soreness.   Tiffany Ji LPN...................4/10/2018   8:11 AM

## 2018-04-11 ASSESSMENT — PATIENT HEALTH QUESTIONNAIRE - PHQ9: SUM OF ALL RESPONSES TO PHQ QUESTIONS 1-9: 0

## 2018-04-13 PROBLEM — J43.9 PULMONARY EMPHYSEMA (H): Status: ACTIVE | Noted: 2017-08-23

## 2018-04-17 ENCOUNTER — OFFICE VISIT (OUTPATIENT)
Dept: INTERNAL MEDICINE | Facility: OTHER | Age: 58
End: 2018-04-17
Attending: INTERNAL MEDICINE
Payer: COMMERCIAL

## 2018-04-17 VITALS
HEART RATE: 76 BPM | DIASTOLIC BLOOD PRESSURE: 64 MMHG | WEIGHT: 131 LBS | SYSTOLIC BLOOD PRESSURE: 110 MMHG | BODY MASS INDEX: 23.96 KG/M2

## 2018-04-17 DIAGNOSIS — E53.8 VITAMIN B12 DEFICIENCY: ICD-10-CM

## 2018-04-17 DIAGNOSIS — Z14.8 ALPHA-1-ANTITRYPSIN DEFICIENCY CARRIER: ICD-10-CM

## 2018-04-17 DIAGNOSIS — L40.50 PSORIATIC ARTHRITIS (H): ICD-10-CM

## 2018-04-17 DIAGNOSIS — J44.9 COPD, SEVERE (H): Primary | ICD-10-CM

## 2018-04-17 DIAGNOSIS — N95.1 MENOPAUSAL SYNDROME (HOT FLASHES): ICD-10-CM

## 2018-04-17 DIAGNOSIS — Z98.890 S/P NISSEN FUNDOPLICATION (WITHOUT GASTROSTOMY TUBE) PROCEDURE: ICD-10-CM

## 2018-04-17 DIAGNOSIS — J43.1 PANLOBULAR EMPHYSEMA (H): ICD-10-CM

## 2018-04-17 DIAGNOSIS — L30.1 DYSHIDROTIC HAND DERMATITIS: ICD-10-CM

## 2018-04-17 DIAGNOSIS — R94.2 DECREASED DIFFUSION CAPACITY OF LUNG: ICD-10-CM

## 2018-04-17 PROCEDURE — 96372 THER/PROPH/DIAG INJ SC/IM: CPT

## 2018-04-17 PROCEDURE — 99214 OFFICE O/P EST MOD 30 MIN: CPT | Performed by: INTERNAL MEDICINE

## 2018-04-17 PROCEDURE — 25000128 H RX IP 250 OP 636: Performed by: INTERNAL MEDICINE

## 2018-04-17 RX ORDER — CYANOCOBALAMIN 1000 UG/ML
1000 INJECTION, SOLUTION INTRAMUSCULAR; SUBCUTANEOUS ONCE
Status: COMPLETED | OUTPATIENT
Start: 2018-04-17 | End: 2018-04-17

## 2018-04-17 RX ORDER — VENLAFAXINE HYDROCHLORIDE 37.5 MG/1
37.5 CAPSULE, EXTENDED RELEASE ORAL
Qty: 90 CAPSULE | Refills: 3 | Status: SHIPPED | OUTPATIENT
Start: 2018-04-17 | End: 2019-03-06

## 2018-04-17 RX ORDER — CYANOCOBALAMIN (VITAMIN B-12) 2500 MCG
2500 TABLET, SUBLINGUAL SUBLINGUAL DAILY
Qty: 90 TABLET | Refills: 3 | Status: SHIPPED | OUTPATIENT
Start: 2018-04-17 | End: 2019-05-08

## 2018-04-17 RX ORDER — CETIRIZINE HYDROCHLORIDE 10 MG/1
TABLET ORAL
Qty: 180 TABLET | Refills: 3 | Status: SHIPPED | OUTPATIENT
Start: 2018-04-17 | End: 2019-09-27

## 2018-04-17 RX ADMIN — CYANOCOBALAMIN 1000 MCG: 1000 INJECTION, SOLUTION INTRAMUSCULAR at 08:51

## 2018-04-17 ASSESSMENT — ENCOUNTER SYMPTOMS
DYSURIA: 0
BRUISES/BLEEDS EASILY: 0
FATIGUE: 0
SHORTNESS OF BREATH: 0
AGITATION: 0
WOUND: 0
ABDOMINAL PAIN: 0
WHEEZING: 0
CONFUSION: 0
DIARRHEA: 0
FEVER: 0
ARTHRALGIAS: 1
CHILLS: 0
ROS SKIN COMMENTS: + PSORIASIS
LIGHT-HEADEDNESS: 0
PALPITATIONS: 0
COUGH: 0
NAUSEA: 0
EYE PAIN: 0
MYALGIAS: 0
DIZZINESS: 0
VOMITING: 0
HEMATURIA: 0

## 2018-04-17 ASSESSMENT — PAIN SCALES - GENERAL: PAINLEVEL: NO PAIN (0)

## 2018-04-17 ASSESSMENT — ANXIETY QUESTIONNAIRES
GAD7 TOTAL SCORE: 0
2. NOT BEING ABLE TO STOP OR CONTROL WORRYING: NOT AT ALL
3. WORRYING TOO MUCH ABOUT DIFFERENT THINGS: NOT AT ALL
1. FEELING NERVOUS, ANXIOUS, OR ON EDGE: NOT AT ALL
7. FEELING AFRAID AS IF SOMETHING AWFUL MIGHT HAPPEN: NOT AT ALL
IF YOU CHECKED OFF ANY PROBLEMS ON THIS QUESTIONNAIRE, HOW DIFFICULT HAVE THESE PROBLEMS MADE IT FOR YOU TO DO YOUR WORK, TAKE CARE OF THINGS AT HOME, OR GET ALONG WITH OTHER PEOPLE: NOT DIFFICULT AT ALL
5. BEING SO RESTLESS THAT IT IS HARD TO SIT STILL: NOT AT ALL
6. BECOMING EASILY ANNOYED OR IRRITABLE: NOT AT ALL

## 2018-04-17 ASSESSMENT — PATIENT HEALTH QUESTIONNAIRE - PHQ9: 5. POOR APPETITE OR OVEREATING: NOT AT ALL

## 2018-04-17 NOTE — NURSING NOTE
Patient presents to the clinic for medication management.        Melissa Li LPN 4/17/2018 8:19 AM

## 2018-04-17 NOTE — PROGRESS NOTES
Nursing Notes:   Melissa Li LPN  4/17/2018  8:28 AM  Signed  Patient presents to the clinic for medication management.        Melissa Li LPN 4/17/2018 8:19 AM      Nursing note reviewed with patient.  Accurracy and completeness verified.   Ms. Tavares is a 57 year old female who:  Patient presents with:  Recheck Medication    HPI     ICD-10-CM    1. COPD, severe (H) J44.9 fluticasone-salmeterol (ADVAIR DISKUS) 500-50 MCG/DOSE diskus inhaler     tiotropium (SPIRIVA RESPIMAT) 2.5 MCG/ACT inhalation aerosol   2. Panlobular emphysema (H) J43.1 fluticasone-salmeterol (ADVAIR DISKUS) 500-50 MCG/DOSE diskus inhaler     tiotropium (SPIRIVA RESPIMAT) 2.5 MCG/ACT inhalation aerosol   3. Decreased diffusion capacity of lung R94.2    4. Psoriatic arthritis (H) L40.50 RHEUMATOLOGY REFERRAL   5. Vitamin B12 deficiency E53.8 cyanocobalamin (VITAMIN B12) injection 1,000 mcg     cyanocobalamin (VITAMIN B-12) 2500 MCG sublingual tablet   6. S/P Nissen fundoplication (without gastrostomy tube) procedure Z98.890    7. Alpha-1-antitrypsin deficiency carrier (H) E88.01    8. Menopausal syndrome (hot flashes) N95.1 venlafaxine (EFFEXOR-XR) 37.5 MG 24 hr capsule   9. Dyshidrotic hand dermatitis L30.1 cetirizine (ZYRTEC) 10 MG tablet     Severe COPD and emphysema.  Has alpha-1 antitrypsin deficiency carrier status but not the disease.  States that her breathing is markedly improved after completing her Nissen fundoplication.  States that she is no longer having to sleep upright.  She is sleeping flat at night.  She is no longer having sore throat or wheezing.  No longer having any heartburn issues at all.  She is no longer taking famotidine.    Psoriatic arthritis, reports stable.  Would like to see rheumatology.  Referral sent.    Vitamin B12 deficiency, she would like to try sublingual B12 tablets.  B12 shot administered today.  She has not had a B12 shot for almost 3 months now.    Recently completed Nissen fundoplication.   States heartburn is markedly improved.  Breathing has improved because she is no longer refluxing acid.  No longer having pneumonitis from reflux.  She is very happy with the surgery.  Very minimal abdominal discomfort postoperatively.    Menopausal symptoms, Effexor has been helpful.  She would like to continue.  Refill sent to pharmacy.    Hand dermatitis, reports chronic.  Stable with Zyrtec.  Refills sent to pharmacy.    Functional Capacity: > or about 4 METS.   Reports that she can climb a flight of stairs without any chest pain/heaviness or shortness of breath.   No orthopnea/paroxysmal nocturnal dyspnea  Review of Systems   Constitutional: Negative for chills, fatigue and fever.   HENT: Negative for congestion and hearing loss.    Eyes: Negative for pain and visual disturbance.   Respiratory: Negative for cough, shortness of breath and wheezing.    Cardiovascular: Negative for chest pain and palpitations.   Gastrointestinal: Negative for abdominal pain, diarrhea, nausea and vomiting.        + doing very well after Nissen surgery   Endocrine: Negative for cold intolerance and heat intolerance.   Genitourinary: Negative for dysuria and hematuria.   Musculoskeletal: Positive for arthralgias. Negative for myalgias.   Skin: Positive for rash. Negative for pallor and wound.        + psoriasis   Allergic/Immunologic: Negative for immunocompromised state.   Neurological: Negative for dizziness and light-headedness.   Hematological: Does not bruise/bleed easily.   Psychiatric/Behavioral: Negative for agitation and confusion.        KARL:   KARL-7 SCORE 4/17/2018   Total Score 0     PHQ9:  PHQ-9 SCORE 11/20/2017 4/10/2018 4/17/2018   Total Score 0 0 0       I have personally reviewed the past medical history, past surgical history, medications, allergies, family and social history as listed below, on 4/17/2018.    Allergies   Allergen Reactions     Atorvastatin Muscle Pain (Myalgia)     Rosuvastatin Nausea and Vomiting      Sucralfate Nausea and Vomiting     Levofloxacin Nausea and Vomiting     Morphine      Other reaction(s): Chest Pain     Penicillins      Other reaction(s): Respiratory Arrest     Sulfamethoxazole W/Trimethoprim Nausea and Vomiting     Yeast infection       Current Outpatient Prescriptions   Medication Sig Dispense Refill     cetirizine (ZYRTEC) 10 MG tablet Take one tablet by mouth once or twice daily as needed for hand dermatitis/itching. 180 tablet 3     fluticasone-salmeterol (ADVAIR DISKUS) 500-50 MCG/DOSE diskus inhaler Inhale 1 puff into the lungs every 12 hours Rinse mouth well after use to prevent Thrush 1 Inhaler 11     tiotropium (SPIRIVA RESPIMAT) 2.5 MCG/ACT inhalation aerosol Inhale 2 puffs into the lungs daily 4 g 11     venlafaxine (EFFEXOR-XR) 37.5 MG 24 hr capsule Take 1 capsule (37.5 mg) by mouth daily with food 90 capsule 3     cyanocobalamin (VITAMIN B-12) 2500 MCG sublingual tablet Place 2,500 mcg under the tongue daily 90 tablet 3     albuterol (PROAIR HFA/PROVENTIL HFA/VENTOLIN HFA) 108 (90 BASE) MCG/ACT Inhaler Inhale 3 puffs into the lungs 3 times daily as needed       aspirin 81 MG chewable tablet Take 81 mg by mouth daily with food       calcipotriene 0.005 % OINT Apply topically 2 times daily       Cholecalciferol (D 5000) 5000 UNITS TABS Take 5,000 Units by mouth every 7 days       clobetasol (TEMOVATE) 0.05 % ointment Apply topically 2 times daily       cyanocobalamin (VITAMIN B12) 1000 MCG/ML injection Inject 1,000 mcg into the muscle       triamcinolone (KENALOG) 0.1 % ointment Apply topically 3 times daily       [DISCONTINUED] fluticasone-salmeterol (ADVAIR DISKUS) 500-50 MCG/DOSE diskus inhaler Inhale 1 puff into the lungs every 12 hours       [DISCONTINUED] tiotropium (SPIRIVA RESPIMAT) 2.5 MCG/ACT inhalation aerosol Inhale 2 puffs into the lungs daily       [DISCONTINUED] venlafaxine (EFFEXOR-XR) 37.5 MG 24 hr capsule Take 37.5 mg by mouth daily with food          Patient  Active Problem List    Diagnosis Date Noted     Vitamin B12 deficiency 04/17/2018     Priority: Medium     Menopausal syndrome (hot flashes) 04/17/2018     Priority: Medium     S/P Nissen fundoplication (without gastrostomy tube) procedure 03/26/2018     Priority: Medium     GERD (gastroesophageal reflux disease) 11/29/2017     Priority: Medium     Chronic radicular cervical pain 11/20/2017     Priority: Medium     Pain of cervical facet joint 11/20/2017     Priority: Medium     Psoriatic arthritis (H) 11/20/2017     Priority: Medium     Epigastric pain 10/16/2017     Priority: Medium     Occipital neuralgia of left side 09/15/2017     Priority: Medium     Arthritis of knee, right 08/24/2017     Priority: Medium     Atherosclerosis of abdominal aorta (H) 08/24/2017     Priority: Medium     Overview:   [facility] Studies:  2/19/2013 -- CTA ABDOMEN AND PELVIS WITH BILATERAL LOWER EXTREMITY RUNOFF    CLINICAL HISTORY: Iliac and mesenteric ischemia.    TECHNIQUE: 125 mL Omnipaque 300 IV contrast was administered. 3-D  arterial reformations were performed.    FINDINGS: There is a well-defined ovoid density at the medial right  costophrenic angle. This measures 2.3 x 0.9 x 0.6 cm. It demonstrates  central low attenuation similar to the adjacent abdominal fat near the  liver. This could be a tiny Bochdalek hernia. The liver, spleen,  pancreas, adrenal glands, and kidneys are unremarkable. The  gallbladder is surgically absent. No biliary ductal dilation. No  mesenteric or retroperitoneal adenopathy. No ascites or fluid  collection. There is a moderate amount of retained stool seen  throughout most of the colon. No obstruction. No adjacent inflammatory  change. No ascites or fluid collection. Ureters and bladder are  unremarkable.    There is mild partially calcified atherosclerotic plaque within the  abdominal aorta. No aneurysm. There is narrowing of the lumen just  proximal to the bifurcation to 6 mm.   There is  moderate narrowing of the proximal celiac artery without associated calcification.   The superior mesenteric artery appears to be widely patent with good opacification of distal branch vessels. The MARIAH also appears to be well opacified.   There are single bilateral renal arteries which are widely patent.    There is mild partially calcified plaque within the common iliac arteries with minimal narrowing.   The external iliac and common femoral arteries are widely patent.   Superficial femoral and popliteal arteries are widely patent bilaterally.   There is three-vessel runoff with good opacification in the lower leg to the ankle and foot.    IMPRESSION:  1. Mild aortoiliac atherosclerotic disease. No aneurysm. There is narrowing of the distal aorta just proximal to the bifurcation.  2. There is moderate narrowing of the celiac artery which could  be due to noncalcified plaque. No SMA or MARIAH compromise definitely seen.  3. Probable tiny Bochdalek hernia at the right costophrenic angle. A hamartoma would be a differential consideration though is less likely given the central fat density.  4. Possible constipation.  5. No evidence of vascular compromise in the lower extremities.    Examination Interpreted at: [facility], [place], [place]  The Interpreting Physician is MONICA VORA  Exam was completed at [facility]       COPD, severe (H) 08/24/2017     Priority: Medium     Overview:   8/4/2014 -- PULMONARY FUNCTION    SITE:  [facility]  ORDERED BY:  VANNESA LUNA    CLINICAL SUMMARY: 53-year-old female with a history of severe COPD.     TECHNICIAN NOTE: Good effort.     DATA: FVC 1.85 (61%). FEV1 1.04 (45%). FEV1/FVC ratio 56%. DLCO 13.5 to 62%.     Flow volume curve is consistent with airway obstruction.     IMPRESSION:  1. Severe obstructive pulmonary disease.   2. Mild decrease in diffusing capacity.       Decreased bone density 08/24/2017      Priority: Medium     Overview:   12/20/2013 -- DXA SPINE HIP -- [facility]    FINDINGS: The bone mineral density was done using a [employer] machine.   The lumbar spine was 1.139 g/cm2 which is in approximately one half standard deviations above the mean for age-matched persons.   The total hip BMD was 0.789 g/cm2 which is approximately one standard deviations below expected for age.   The T-score was -0.4 at the spine and -1.7 at the total hip for females which places her in the WHO category of osteopenic with bone density between 10 and 25% below young normal. Fracture risk is moderate.    RECOMMENDATION: Treatment is recommended in the form of bisphosphonate and Evista. Hormone therapy may be an option based on review of risks and benefits of treatment. All patients should assure an adequate intake of dietary calcium (1200 mg per day) and   vitamin D (400-800 international units daily). Followup examination recommended in December of 2015.       Decreased diffusion capacity of lung 08/24/2017     Priority: Medium     Dyshidrotic hand dermatitis 08/24/2017     Priority: Medium     Hiatal hernia 08/24/2017     Priority: Medium     History of esophageal dilatation 08/24/2017     Priority: Medium     Prediabetes 08/24/2017     Priority: Medium     Psoriasis 08/24/2017     Priority: Medium     Schatzki's ring of distal esophagus 08/24/2017     Priority: Medium     Vitamin D deficiency 08/24/2017     Priority: Medium     Alpha-1-antitrypsin deficiency carrier (H) 08/23/2017     Priority: Medium     Pulmonary emphysema (H) 08/23/2017     Priority: Medium     Esophageal reflux 08/23/2017     Priority: Medium     Irritable bowel syndrome 08/23/2017     Priority: Medium     Osteopenia 08/23/2017     Priority: Medium     PAD (peripheral artery disease) (H) 10/13/2015     Priority: Medium     Other and unspecified hyperlipidemia 08/01/2012     Priority: Medium     Contact dermatitis 07/01/2010     Priority: Medium      Myalgia 07/01/2010     Priority: Medium     Chronic abdominal pain 03/10/2010     Priority: Medium     Overview:   16 year duration       Constipation, chronic 03/10/2010     Priority: Medium     History of tobacco abuse 03/10/2010     Priority: Medium     Sinusitis, chronic 03/10/2010     Priority: Medium     Past Medical History:   Diagnosis Date     Abdominal pain     No Comments Provided     Chronic sinusitis     No Comments Provided     Closed fracture of skull (H)     1972     Dysphagia     No Comments Provided     Past Surgical History:   Procedure Laterality Date     AS UGI ENDOSCOPY W ESOPHAGEAL DILATION BALLOON <30MM      10/30/2015,[id number],ESOPHAGEAL DILATION,Dr. Rainer Allen, [facility]     BIOPSY BREAST      1999, 2001, 2004, 2008,[id number],BIOPSY BREAST,benign     CHOLECYSTECTOMY      2004     COLONOSCOPY      x's 3 last 2009,negative     CT CORONARY ANGIOGRAM      2009,11140.0,CT CORONARY ANGIOGRAM (IA),negative     ESOPHAGOSCOPY, GASTROSCOPY, DUODENOSCOPY (EGD), COMBINED      dilated in 04,07,09  Dr. Allen      LEFT HEART CATHETERIZATION      04/20/2009,[id number],CVL HEART CATH LEFT,[facility]     HYSTERECTOMY VAGINAL      1994,ovaries remain     LAPAROSCOPIC NISSEN FUNDOPLICATION N/A 3/26/2018    Procedure: LAPAROSCOPIC NISSEN FUNDOPLICATION;  Laparoscopic Nissen Fundoplication;  Surgeon: Jagdish Ruiz MD;  Location: [facility] OR     LAPAROTOMY EXPLORATORY      1990 lysis of adhesions/endometriosis     RECTOCELE REPAIR      07/29/2009,[id number],REPAIR RECTOCELE     Social History     Social History     Marital status:      Spouse name: Ervin     Number of children: N/A     Years of education: N/A     Social History Main Topics     Smoking status: Former Smoker     Packs/day: 0.50     Years: 32.00     Types: Cigarettes     Quit date: 8/18/2012     Smokeless tobacco: Never Used      Comment: Quit smoking: quit date of 8-     Alcohol use 1.2 oz/week      Comment: Alcoholic Drinks/day: 1-2  "drinks every 1-2 weeks     Drug use: No      Comment: Drug use: No     Sexual activity: Not Asked     Other Topics Concern     None     Social History Narrative    Graduated from high school plus 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  Lives in [place]  Patient previously smoked.    Alcohol Use - yes  Drug Use - no  Regular Exercise - yes   - Ervin.  3 children.    Works at [employer]     Family History   Problem Relation Age of Onset     Arthritis Father      Arthritis     Peripheral Vascular Disease Mother      Peripheral vascular disease     DIABETES Mother      Diabetes     Arthritis Mother      Arthritis     Breast Cancer Sister      Premenopausal breast cancer     Eczema Brother      Eczema     Other - See Comments Daughter      Narcolepsy     Other - See Comments Daughter      Vasculitis     Seizure Disorder Daughter      Seizures       EXAM:   Vitals:    04/17/18 0820   BP: 110/64   BP Location: Right arm   Patient Position: Sitting   Cuff Size: Adult Regular   Pulse: 76   Weight: 131 lb (59.4 kg)       Current Pain Score: No Pain (0)     BP Readings from Last 3 Encounters:   04/17/18 110/64   04/10/18 102/64   03/27/18 121/73    Wt Readings from Last 3 Encounters:   04/17/18 131 lb (59.4 kg)   04/10/18 128 lb 9.6 oz (58.3 kg)   03/27/18 131 lb 6.3 oz (59.6 kg)      Estimated body mass index is 23.96 kg/(m^2) as calculated from the following:    Height as of 4/10/18: 5' 2\" (1.575 m).    Weight as of this encounter: 131 lb (59.4 kg).     Physical Exam   Constitutional: She is oriented to person, place, and time. She appears well-developed and well-nourished. No distress.   HENT:   Head: Normocephalic and atraumatic.   Eyes: Conjunctivae are normal. No scleral icterus.   Neck: No thyromegaly present.   Cardiovascular: Normal rate and regular rhythm.    Pulmonary/Chest: Effort normal. No respiratory distress. She has no wheezes.   Abdominal: Soft. There is no tenderness.   Musculoskeletal: She " exhibits no tenderness or deformity.   Lymphadenopathy:     She has no cervical adenopathy.   Neurological: She is alert and oriented to person, place, and time. No cranial nerve deficit.   Skin: Skin is warm and dry.   Psychiatric: She has a normal mood and affect.       INVESTIGATIONS:  Results for orders placed or performed during the hospital encounter of 03/26/18   XR Esophagram    Narrative    Procedure: XR ESOPHAGRAM    HISTORY:  water soluble- S/p Nissen; .     TECHNIQUE: The patient drank Gastrografin and multiple fluoroscopic  images were obtained. Fluoroscopy time was .09 minutes.    COMPARISON: 10/16/2017    FINDINGS:    The caliber of the esophagus is normal. There is no extravasation of  contrast to suggest a leak. There is no stricture. There are  postoperative changes compatible with Nissen fundoplication.      Impression    IMPRESSION: Unremarkable esophagram status post Nissen's  fundoplication.      BAY PENA MD       ASSESSMENT AND PLAN:  Problem List Items Addressed This Visit        Respiratory    Pulmonary emphysema (H)    Relevant Medications    cetirizine (ZYRTEC) 10 MG tablet    fluticasone-salmeterol (ADVAIR DISKUS) 500-50 MCG/DOSE diskus inhaler    tiotropium (SPIRIVA RESPIMAT) 2.5 MCG/ACT inhalation aerosol    COPD, severe (H) - Primary    Relevant Medications    cetirizine (ZYRTEC) 10 MG tablet    fluticasone-salmeterol (ADVAIR DISKUS) 500-50 MCG/DOSE diskus inhaler    tiotropium (SPIRIVA RESPIMAT) 2.5 MCG/ACT inhalation aerosol    Decreased diffusion capacity of lung       Digestive    Vitamin B12 deficiency    Relevant Medications    cyanocobalamin (VITAMIN B12) injection 1,000 mcg (Completed)    cyanocobalamin (VITAMIN B-12) 2500 MCG sublingual tablet       Musculoskeletal and Integumentary    Dyshidrotic hand dermatitis    Relevant Medications    cetirizine (ZYRTEC) 10 MG tablet    Psoriatic arthritis (H)    Relevant Medications    cetirizine (ZYRTEC) 10 MG tablet    Other  Relevant Orders    RHEUMATOLOGY REFERRAL       Urinary    Menopausal syndrome (hot flashes)    Relevant Medications    venlafaxine (EFFEXOR-XR) 37.5 MG 24 hr capsule       Other    Alpha-1-antitrypsin deficiency carrier (H)    S/P Nissen fundoplication (without gastrostomy tube) procedure        reviewed diet, exercise and weight control  -- Expected clinical course discussed    -- Medications and their side effects discussed    The 10-year ASCVD risk score (Javier TEAGUE Jr, et al., 2013) is: 2.3%    Values used to calculate the score:      Age: 57 years      Sex: Female      Is Non- : No      Diabetic: No      Tobacco smoker: No      Systolic Blood Pressure: 110 mmHg      Is BP treated: No      HDL Cholesterol: 38 mg/dL      Total Cholesterol: 206 mg/dL    Patient Instructions   Glad you are sleeping better, breathing better.... Glad you are no longer having heartburn.     Medications refilled.     Rheumatology referral sent  - they will call with date/time of appointment.      1. COPD, severe (H)  - fluticasone-salmeterol (ADVAIR DISKUS) 500-50 MCG/DOSE diskus inhaler; Inhale 1 puff into the lungs every 12 hours Rinse mouth well after use to prevent Thrush  Dispense: 1 Inhaler; Refill: 11  - tiotropium (SPIRIVA RESPIMAT) 2.5 MCG/ACT inhalation aerosol; Inhale 2 puffs into the lungs daily  Dispense: 4 g; Refill: 11    2. Panlobular emphysema (H)  - fluticasone-salmeterol (ADVAIR DISKUS) 500-50 MCG/DOSE diskus inhaler; Inhale 1 puff into the lungs every 12 hours Rinse mouth well after use to prevent Thrush  Dispense: 1 Inhaler; Refill: 11  - tiotropium (SPIRIVA RESPIMAT) 2.5 MCG/ACT inhalation aerosol; Inhale 2 puffs into the lungs daily  Dispense: 4 g; Refill: 11    3. Decreased diffusion capacity of lung    4. Psoriatic arthritis (H)  - RHEUMATOLOGY REFERRAL    5. Vitamin B12 deficiency    - cyanocobalamin (VITAMIN B12) injection 1,000 mcg; Inject 1 mL (1,000 mcg) into the muscle once    START:    - cyanocobalamin (VITAMIN B-12) 2500 MCG sublingual tablet; Place 2,500 mcg under the tongue daily  Dispense: 90 tablet; Refill: 3    6. S/P Nissen fundoplication (without gastrostomy tube) procedure    7. Alpha-1-antitrypsin deficiency carrier (H)    8. Menopausal syndrome (hot flashes)  - venlafaxine (EFFEXOR-XR) 37.5 MG 24 hr capsule; Take 1 capsule (37.5 mg) by mouth daily with food  Dispense: 90 capsule; Refill: 3    9. Dyshidrotic hand dermatitis  - cetirizine (ZYRTEC) 10 MG tablet; Take one tablet by mouth once or twice daily as needed for hand dermatitis/itching.  Dispense: 180 tablet; Refill: 3    Return in approximately 1 year, or sooner as needed for follow-up with Dr. Boyd.    Clinic : [phone]  Appointment line: [phone]      Tim Boyd MD  Internal Medicine  [facility]

## 2018-04-17 NOTE — MR AVS SNAPSHOT
After Visit Summary   4/17/2018    Ember Tavares    MRN: [id number]           Patient Information     Date Of Birth          1960        Visit Information        Provider Department      4/17/2018 8:20 AM Tim Boyd MD [facility]        Today's Diagnoses     COPD, severe (H)    -  1    Panlobular emphysema (H)        Decreased diffusion capacity of lung        Psoriatic arthritis (H)        Vitamin B12 deficiency        S/P Nissen fundoplication (without gastrostomy tube) procedure        Alpha-1-antitrypsin deficiency carrier (H)        Menopausal syndrome (hot flashes)        Dyshidrotic hand dermatitis          Care Instructions    Glad you are sleeping better, breathing better.... Glad you are no longer having heartburn.     Medications refilled.     Rheumatology referral sent  - they will call with date/time of appointment.      1. COPD, severe (H)  - fluticasone-salmeterol (ADVAIR DISKUS) 500-50 MCG/DOSE diskus inhaler; Inhale 1 puff into the lungs every 12 hours Rinse mouth well after use to prevent Thrush  Dispense: 1 Inhaler; Refill: 11  - tiotropium (SPIRIVA RESPIMAT) 2.5 MCG/ACT inhalation aerosol; Inhale 2 puffs into the lungs daily  Dispense: 4 g; Refill: 11    2. Panlobular emphysema (H)  - fluticasone-salmeterol (ADVAIR DISKUS) 500-50 MCG/DOSE diskus inhaler; Inhale 1 puff into the lungs every 12 hours Rinse mouth well after use to prevent Thrush  Dispense: 1 Inhaler; Refill: 11  - tiotropium (SPIRIVA RESPIMAT) 2.5 MCG/ACT inhalation aerosol; Inhale 2 puffs into the lungs daily  Dispense: 4 g; Refill: 11    3. Decreased diffusion capacity of lung    4. Psoriatic arthritis (H)  - RHEUMATOLOGY REFERRAL    5. Vitamin B12 deficiency    - cyanocobalamin (VITAMIN B12) injection 1,000 mcg; Inject 1 mL (1,000 mcg) into the muscle once    START:   - cyanocobalamin (VITAMIN B-12) 2500 MCG sublingual tablet; Place 2,500 mcg under the tongue daily  Dispense: 90  tablet; Refill: 3    6. S/P Nissen fundoplication (without gastrostomy tube) procedure    7. Alpha-1-antitrypsin deficiency carrier (H)    8. Menopausal syndrome (hot flashes)  - venlafaxine (EFFEXOR-XR) 37.5 MG 24 hr capsule; Take 1 capsule (37.5 mg) by mouth daily with food  Dispense: 90 capsule; Refill: 3    9. Dyshidrotic hand dermatitis  - cetirizine (ZYRTEC) 10 MG tablet; Take one tablet by mouth once or twice daily as needed for hand dermatitis/itching.  Dispense: 180 tablet; Refill: 3    Return in approximately 1 year, or sooner as needed for follow-up with Dr. Boyd.    Clinic : [phone]  Appointment line: [phone]            Follow-ups after your visit        Additional Services     RHEUMATOLOGY REFERRAL       Your provider [facility] [phone] Fax [phone] [email or web address]    Please be aware that coverage of these services is subject to the terms and limitations of your health insurance plan.  Call member services at your health plan with any benefit or coverage questions.      Please bring the following with you to your appointment:    (1) Any X-Rays, CTs or MRIs which have been performed.  Contact the facility where they were done to arrange for  prior to your scheduled appointment.    (2) List of current medications   (3) This referral request   (4) Any documents/labs given to you for this referral                  Follow-up notes from your care team     Return in about 1 year (around 4/17/2019).      Who to contact     If you have questions or need follow up information about today's clinic visit or your schedule please contact [facility] directly at [phone].  Normal or non-critical lab and imaging results will be communicated to you by MyChart, letter or phone within 4 business days after the clinic has received the results. If you do not hear from us  within 7 days, please contact the clinic through [employer] or phone. If you have a critical or abnormal lab result, we will notify you by phone as soon as possible.  Submit refill requests through [employer] or call your pharmacy and they will forward the refill request to us. Please allow 3 business days for your refill to be completed.          Additional Information About Your Visit        Care EveryWhere ID     This is your Care EveryWhere ID. This could be used by other organizations to access your [facility] medical records  [id number]        Your Vitals Were     Pulse BMI (Body Mass Index)                76 23.96 kg/m2           Blood Pressure from Last 3 Encounters:   04/17/18 110/64   04/10/18 102/64   03/27/18 121/73    Weight from Last 3 Encounters:   04/17/18 131 lb (59.4 kg)   04/10/18 128 lb 9.6 oz (58.3 kg)   03/27/18 131 lb 6.3 oz (59.6 kg)              We Performed the Following     RHEUMATOLOGY REFERRAL          Today's Medication Changes          These changes are accurate as of 4/17/18  8:40 AM.  If you have any questions, ask your nurse or doctor.               These medicines have changed or have updated prescriptions.        Dose/Directions    cetirizine 10 MG tablet   Commonly known as:  zyrTEC   This may have changed:  See the new instructions.   Used for:  Dyshidrotic hand dermatitis   Changed by:  Tim Boyd MD        Take one tablet by mouth once or twice daily as needed for hand dermatitis/itching.   Quantity:  180 tablet   Refills:  3       * cyanocobalamin 1000 MCG/ML injection   Commonly known as:  VITAMIN B12   This may have changed:  Another medication with the same name was added. Make sure you understand how and when to take each.   Changed by:  Tim Boyd MD        Dose:  1000 mcg   Inject 1,000 mcg into the muscle   Refills:  0       * cyanocobalamin 2500 MCG sublingual tablet   Commonly known as:  VITAMIN B-12   This may have changed:  You were already taking a medication  with the same name, and this prescription was added. Make sure you understand how and when to take each.   Used for:  Vitamin B12 deficiency   Changed by:  Tim Boyd MD        Dose:  2500 mcg   Place 2,500 mcg under the tongue daily   Quantity:  90 tablet   Refills:  3       fluticasone-salmeterol 500-50 MCG/DOSE diskus inhaler   Commonly known as:  ADVAIR DISKUS   This may have changed:  additional instructions   Used for:  COPD, severe (H), Panlobular emphysema (H)   Changed by:  Tim Boyd MD        Dose:  1 puff   Inhale 1 puff into the lungs every 12 hours Rinse mouth well after use to prevent Thrush   Quantity:  1 Inhaler   Refills:  11       * Notice:  This list has 2 medication(s) that are the same as other medications prescribed for you. Read the directions carefully, and ask your doctor or other care provider to review them with you.      Stop taking these medicines if you haven't already. Please contact your care team if you have questions.     ondansetron 4 MG ODT tab   Commonly known as:  ZOFRAN-ODT   Stopped by:  Tim Boyd MD           ranitidine 75 MG tablet   Commonly known as:  ZANTAC   Stopped by:  Tim Boyd MD                Where to get your medicines      These medications were sent to [facility] - [place], [place] - [address]  [address], [facility] [address]     Phone:  [phone]     cetirizine 10 MG tablet    cyanocobalamin 2500 MCG sublingual tablet    fluticasone-salmeterol 500-50 MCG/DOSE diskus inhaler    tiotropium 2.5 MCG/ACT inhalation aerosol    venlafaxine 37.5 MG 24 hr capsule                Primary Care Provider Office Phone # Fax #    Tim Boyd [phone] [address] [facility] [address]        Equal Access to Services     [facility] ANDERS : Olivia Baker, wakaylene lumayco, qaybta kaalgabriel david, james farley. [facility] [phone].    ATENCIÓN: Si  negrita ware, tiene a hui disposición servicios gratuitos de asistencia lingüística. Ab aranda [phone].    We comply with applicable federal civil rights laws and [place] laws. We do not discriminate on the basis of race, color, national origin, age, disability, sex, sexual orientation, or gender identity.            Thank you!     Thank you for choosing [facility]  for your care. Our goal is always to provide you with excellent care. Hearing back from our patients is one way we can continue to improve our services. Please take a few minutes to complete the written survey that you may receive in the mail after your visit with us. Thank you!             Your Updated Medication List - Protect others around you: Learn how to safely use, store and throw away your medicines at www.disposemymeds.org.          This list is accurate as of 4/17/18  8:40 AM.  Always use your most recent med list.                   Brand Name Dispense Instructions for use Diagnosis    albuterol 108 (90 Base) MCG/ACT Inhaler    PROAIR HFA/PROVENTIL HFA/VENTOLIN HFA     Inhale 3 puffs into the lungs 3 times daily as needed        aspirin 81 MG chewable tablet      Take 81 mg by mouth daily with food        calcipotriene 0.005 % Oint      Apply topically 2 times daily        cetirizine 10 MG tablet    zyrTEC    180 tablet    Take one tablet by mouth once or twice daily as needed for hand dermatitis/itching.    Dyshidrotic hand dermatitis       clobetasol 0.05 % ointment    TEMOVATE     Apply topically 2 times daily        * cyanocobalamin 1000 MCG/ML injection    VITAMIN B12     Inject 1,000 mcg into the muscle        * cyanocobalamin 2500 MCG sublingual tablet    VITAMIN B-12    90 tablet    Place 2,500 mcg under the tongue daily    Vitamin B12 deficiency       D 5000 5000 units Tabs   Generic drug:  Cholecalciferol      Take 5,000 Units by mouth every 7 days        fluticasone-salmeterol 500-50 MCG/DOSE diskus  inhaler    ADVAIR DISKUS    1 Inhaler    Inhale 1 puff into the lungs every 12 hours Rinse mouth well after use to prevent Thrush    COPD, severe (H), Panlobular emphysema (H)       tiotropium 2.5 MCG/ACT inhalation aerosol    SPIRIVA RESPIMAT    4 g    Inhale 2 puffs into the lungs daily    COPD, severe (H), Panlobular emphysema (H)       triamcinolone 0.1 % ointment    KENALOG     Apply topically 3 times daily        venlafaxine 37.5 MG 24 hr capsule    EFFEXOR-XR    90 capsule    Take 1 capsule (37.5 mg) by mouth daily with food    Menopausal syndrome (hot flashes)       * Notice:  This list has 2 medication(s) that are the same as other medications prescribed for you. Read the directions carefully, and ask your doctor or other care provider to review them with you.

## 2018-04-18 ASSESSMENT — ANXIETY QUESTIONNAIRES: GAD7 TOTAL SCORE: 0

## 2018-04-18 ASSESSMENT — PATIENT HEALTH QUESTIONNAIRE - PHQ9: SUM OF ALL RESPONSES TO PHQ QUESTIONS 1-9: 0

## 2018-05-30 ENCOUNTER — TRANSFERRED RECORDS (OUTPATIENT)
Dept: HEALTH INFORMATION MANAGEMENT | Facility: OTHER | Age: 58
End: 2018-05-30

## 2018-05-31 DIAGNOSIS — L40.50 PSORIATIC ARTHRITIS (H): Primary | ICD-10-CM

## 2018-05-31 LAB
ALBUMIN SERPL-MCNC: 4.1 G/DL (ref 3.5–5.7)
ALT SERPL W P-5'-P-CCNC: 18 U/L (ref 7–52)
AST SERPL W P-5'-P-CCNC: 18 U/L (ref 13–39)
CREAT SERPL-MCNC: 0.83 MG/DL (ref 0.6–1.2)
CRP SERPL-MCNC: 1.4 MG/L
ERYTHROCYTE [DISTWIDTH] IN BLOOD BY AUTOMATED COUNT: 13.2 % (ref 10–15)
GFR SERPL CREATININE-BSD FRML MDRD: 71 ML/MIN/1.7M2
HCT VFR BLD AUTO: 44.2 % (ref 35–47)
HGB BLD-MCNC: 14.6 G/DL (ref 11.7–15.7)
MCH RBC QN AUTO: 28.9 PG (ref 26.5–33)
MCHC RBC AUTO-ENTMCNC: 33 G/DL (ref 31.5–36.5)
MCV RBC AUTO: 87 FL (ref 78–100)
PLATELET # BLD AUTO: 311 10E9/L (ref 150–450)
RBC # BLD AUTO: 5.06 10E12/L (ref 3.8–5.2)
WBC # BLD AUTO: 9.5 10E9/L (ref 4–11)

## 2018-05-31 PROCEDURE — 84450 TRANSFERASE (AST) (SGOT): CPT

## 2018-05-31 PROCEDURE — 82040 ASSAY OF SERUM ALBUMIN: CPT

## 2018-05-31 PROCEDURE — 84460 ALANINE AMINO (ALT) (SGPT): CPT

## 2018-05-31 PROCEDURE — 85027 COMPLETE CBC AUTOMATED: CPT

## 2018-05-31 PROCEDURE — 82565 ASSAY OF CREATININE: CPT

## 2018-05-31 PROCEDURE — 36415 COLL VENOUS BLD VENIPUNCTURE: CPT

## 2018-05-31 PROCEDURE — 86140 C-REACTIVE PROTEIN: CPT

## 2018-06-21 ENCOUNTER — MYC MEDICAL ADVICE (OUTPATIENT)
Dept: INTERNAL MEDICINE | Facility: OTHER | Age: 58
End: 2018-06-21

## 2018-07-23 NOTE — PROGRESS NOTES
"Patient Information     Patient Name  Ember Tavares MRN  [id number] Sex  Female   1960      Letter by Tim Boyd MD at      Author:  Tim Boyd MD Service:  (none) Author Type:  (none)    Filed:   Encounter Date:  2017 Status:  (Other)           Ember Tavares  [address] [address] [address]          2017    Dear Ms. Tavares:    Welcome to [employer]!   Remember to activate your account quickly -  Your activation code expires in 45 days!    With [employer], you can view your health information, email your provider, schedule clinic appointments, get after visit instructions and more - online, anytime.     To activate your [employer] account, you will need:     to visit [email or web address]    your [employer] activation code: Activation code not generated    Current [employer] Status: Patient Declined     the last four digits of your Social Security number (SSN)     your date of birth.     Step-by-step instructions on how to set up your [employer] account are shown on the following page. If you requested access to your child/dependent s [employer] account or you have been granted access to another adult s [employer] account, instructions for viewing their information are also on the following page.     For questions or technical assistance, call [phone].    Thank you for choosing [employer] activation instructions     Activation code: Activation code not generated  Current [employer] Status: Patient Declined     Step 1: Go to [email or web address]     Step 2: Click on Enter your activation code.     Step 3: Type in your activation code (provided above), the last four digits of your Social Security number (SSN) and date of birth. This information is only required once. The next time you sign in to your account you will only need your username and password. If you receive an error that states \"Invalid Social Security number  or  Invalid date of birth\" that " means the information we have on file does not match the information entered. Please call [phone] for assistance.     Step 4: Choose a username that is easy for you to remember, but hard for others to guess. Your username must:     be between five and 24 characters     contain only letters and numbers (no symbols)   Once selected, your username cannot be changed.     Step 5: Choose a password. Your password must:     be at least eight characters     contain at least one uppercase letter     contain one lowercase letter     contain one number or symbol     be different than your username.     Step 6: Choose a security question. This will allow you to reset your password.     Step 7: Enter the answer to your security question. The answer cannot be the same as your password.     Step 8: Enter your email address. You will receive email notifications when new information is available in [employer]. You are now ready to start using [employer]!     If you requested proxy access for a child s or another adult s [employer] account, you will be able to access their health record by clicking on their name    Instructions for Child/Dependent Access  To view your child s record, click on your child's name under your name on the right hand side of the screen.   Instructions for Adult Proxy Access  To view your adult proxy record, click on the adult's name under your name on the right hand side of the screen.    In the event you have technical difficulties, please call   [employer] Services at [phone].

## 2018-07-23 NOTE — PROGRESS NOTES
Patient Information     Patient Name  Ember Tavares MRN  [id number] Sex  Female   1960      Letter by Tim Boyd MD at      Author:  Tim Boyd MD Service:  (none) Author Type:  (none)    Filed:   Date of Service:   Status:  (Other)           Ember Tavares  [address] [address] [address]          2017    Dear Ms. Tavares:    Following are the tests completed during your last clinic visit.  The results of these tests are included below.      9/15/2017 -- PROCEDURE:  XR CHEST 2 VIEWS PA AND LATERAL    HISTORY: Centrilobular emphysema (HC).    COMPARISON:  None.    FINDINGS:   The cardiac silhouette is normal in size. The pulmonary vasculature is normal.  The lungs are hyperinflated but clear. No pleural effusion or pneumothorax.    IMPRESSION:  Hyperinflation, suggesting COPD.      Electronically Signed By: Cristina Chung M.D. on 9/15/2017 2:47 PM        Results for orders placed or performed in visit on 17      Hgb A1c      Result  Value Ref Range    HEMOGLOBIN A1C MONITORING (POCT) 5.8 4.0 - 6.2 %    ESTIMATED AVERAGE GLUCOSE  120 mg/dL   VITAMIN B12      Result  Value Ref Range    VITAMIN B12 802 180 - 914 pg/mL   CK TOTAL      Result  Value Ref Range    CK,TOTAL 73 30 - 223 IU/L   JENS TEST      Result  Value Ref Range    ANTINUCLEAR ANTIBODY (JENS) Positive (A) Negative    JENS PATTERN 1 Homogenous (A) (none)    JENS TITER 1 1:320 (A) (none)    SPECIMEN STATUS Serum will be saved for 30 Days    RHEUMATOID FACTOR      Result  Value Ref Range    RHEUMATOID FACTOR QUANT. <14.0 <14.0 IU/mL   CCP      Result  Value Ref Range    CCP Antibody,IgG/IgA <4.6 <=19.9 CU   LIPID PANEL      Result  Value Ref Range    CHOLESTEROL,TOTAL 206 (H) <200 mg/dL    TRIGLYCERIDES 162 (H) <150 mg/dL    HDL CHOLESTEROL 38 23 - 92 mg/dL    NON-HDL CHOLESTEROL 168 (H) <145 mg/dl    CHOL/HDL RATIO            5.42 (H) <4.50                    LDL CHOLESTEROL 136 (H) <100 mg/dL     PATIENT STATUS            FASTING                         If you have any further questions or problems contact my office at  [phone]   --- or send [employer] message --- otherwise schedule an appointment.    Clinic : [phone]  Appointment line: [phone]     Thank you,    Tim Boyd MD    Internal Medicine  [facility]     Reviewed and electronically signed by provider.

## 2018-07-23 NOTE — PROGRESS NOTES
Patient Information     Patient Name  Ember Tavares MRN  [id number] Sex  Female   1960      Letter by Tim Boyd MD at      Author:  Tim Boyd MD Service:  (none) Author Type:  (none)    Filed:   Encounter Date:  2017 Status:  (Other)           Ember Tavares  [address] [address]          2017    Dear Ms. Tavares:    Following are the tests completed during your last clinic visit.  The results of these tests are included below.      B12 levels appear adequate.    Total CK or muscle enzymes were normal.    Rheumatoid factor came back normal.    Your cholesterol levels are quite high.  -- Start Crestor as we discussed in clinic.     Hemoglobin A1c is a test for prediabetes and diabetes.   The normal range is up to 5.6%.    Between 5.7 and 6.4% indicates prediabetes.   6.5% and higher indicates Diabetes.   As you see below -- you fall in the Prediabetic range.      HEMOGLOBIN A1C MONITORING (POCT) (%)    Date Value   2017 5.8        Get out and walk for 10 to 15 minutes after each meal -- this significantly lowers the spike in blood sugar after eating.     Keep working to try obtain/maintain healthy weight, weight loss, healthy diet and regular exercise.     I would recommend a focus on weight loss and increased exercise with a goal of 30 minutes of exercise at least 5 times per week in order to help prevent worsening of your blood sugar control.     You may want to consider enrolling in the [facility] Diabetes Prevention Program.   The program provides a supportive environment where participants work together in a small group led by a trained Lifestyle  in a classroom setting.     Education and meetings are held over a 12-month period, beginning with 16 weekly sessions followed by monthly maintenance meetings.     Research by the National Institutes of Health has proven that programs like the [facility] s Diabetes Prevention Program can reduce the  number of cases of type 2 diabetes by 58%. The reduction was even greater, 71%, among adults aged 60 or older. The [facility] s Diabetes Prevention Program is part of the Centers for Disease Control and Prevention-led National Diabetes Prevention Program and is nationally supported by the Diabetes Prevention and Control [place].     The focus is on small lifestyle changes that people can make to prevent diabetes. The groups have been successful in the past, with most participants reaching their goal of 7% weight loss and 150 minutes of activity each week.   The cost is $100 for each participant.     Please call Kristin Klinefelter, MS, RDN, LD at [phone] or email   [email or web address] if you are interested in getting more information.       Metformin is a medication taken once or twice daily with meals -- that is used to help treat diabetes/prediabetes and can help reduce heart attacks and strokes by up to 40% over 5 to 10 years of use.  Side effects are some stomach cramps or diarrhea - which generally improves over a couple weeks. In addition to lifestyle modifications with diet and exercise -- this can help with weight loss.     Typically will -- Start taking 1 tablet daily for 1 week, then increase to 2 tablets daily (take with meals)     If you are interested, we could start this medication and recheck labs in 3-4 months.      Results for orders placed or performed in visit on 08/24/17      Hgb A1c      Result  Value Ref Range    HEMOGLOBIN A1C MONITORING (POCT) 5.8 4.0 - 6.2 %    ESTIMATED AVERAGE GLUCOSE  120 mg/dL   VITAMIN B12      Result  Value Ref Range    VITAMIN B12 802 180 - 914 pg/mL   CK TOTAL      Result  Value Ref Range    CK,TOTAL 73 30 - 223 IU/L   RHEUMATOID FACTOR      Result  Value Ref Range    RHEUMATOID FACTOR QUANT. <14.0 <14.0 IU/mL   LIPID PANEL      Result  Value Ref Range    CHOLESTEROL,TOTAL 206 (H) <200 mg/dL    TRIGLYCERIDES 162 (H) <150 mg/dL    HDL CHOLESTEROL 38 23 - 92  mg/dL    NON-HDL CHOLESTEROL 168 (H) <145 mg/dl    CHOL/HDL RATIO            5.42 (H) <4.50                    LDL CHOLESTEROL 136 (H) <100 mg/dL    PATIENT STATUS            FASTING                         If you have any further questions or problems contact my office at  [phone]   --- or send Net[employer]m message --- otherwise schedule an appointment.    Clinic : [phone]  Appointment line: [phone]     Thank you,    Tim Boyd MD    Internal Medicine  [facility]     Reviewed and electronically signed by provider.

## 2018-07-23 NOTE — PROGRESS NOTES
Patient Information     Patient Name  Ember Tavares MRN  [id number] Sex  Female   1960      Letter by Tim Boyd MD at      Author:  Tim Boyd MD Service:  (none) Author Type:  (none)    Filed:   Date of Service:   Status:  (Other)           Ember Tavares  [address] [address] [address]          2017    Dear Ms. Tavares:    Following are the tests completed during your last clinic visit.  The results of these tests are included below.      I sent a surgical consultation request for you to see Dr. Ruiz, to discuss potential treatment options of your reflux.    Reflux witnessed almost up to the level of your throat.    10/16/2017 -- XR ESOPHAGUS AIR CONTRAST    HISTORY: 56 yearsFemale Epigastric pain    TECHNIQUE: An air contrast esophagram was performed.    COMPARISON: None    FINDINGS: There is very mild narrowing of the distal esophagus. There is no evidence of mass or stricture otherwise. Gastroesophageal reflux was witnessed ascending to the level of the cervical esophagus.    IMPRESSION: Exam is positive for gastroesophageal reflux ascending to the level of the cervical esophagus.    There is mild narrowing of the distal esophagus.    Electronically Signed By: Rj Morin M.D. on 10/16/2017 3:13 PM    If you have any further questions or problems contact my office at  [phone]   --- or send [employer] message --- otherwise schedule an appointment.    Clinic : [phone]  Appointment line: [phone]     Thank you,    Tim Boyd MD    Internal Medicine  [facility]     Reviewed and electronically signed by provider.

## 2018-07-24 NOTE — PROGRESS NOTES
Patient Information     Patient Name  Ember Tavares MRN  [id number] Sex  Female   1960      Letter by Tim Boyd MD at      Author:  Tim Boyd MD Service:  (none) Author Type:  (none)    Filed:   Date of Service:   Status:  (Other)           Ember Tavares  [address] [address] [address]          2017    Dear Ms. Tavares:    Following are the tests completed during your last clinic visit.  The results of these tests are included below.        Neck MRI shows some mild degenerative changes.    No focal significant problems that need surgical consultation or neck injections.      10/17/2017 -- MR SPINE CERVICAL WO    HISTORY: 56 years Female with 6-7 months of neck pain radiating down to the mid back level    COMPARISONS:None    TECHNIQUE: Sagittal T1, PD, T2 fat-sat, axial T2 and  FIESTA imaging of the cervical spine was performed.    FINDINGS: Signal intensity and caliber of the cervical spinal cord is normal. The cervical medullary junction is normal in position.    C2-C3: There is mild loss of disc height and signal. The central canal and neuroforamen are patent.    C3-C4: There is mild loss of disc space height and signal. There is a mild right paracentral disc bulge or protrusion that imparts mild mass effect upon the ventral surface of the cervical spinal cord to the right of midline. The central spinal canal and neural foramen are patent.    C4-C5: There is mild loss of disc height and signal. The central spinal canal is patent. Both neuroforamen are patent. There is right-sided facet osteoarthritis.    C5-C6: There is mild loss of disc height and signal. A mild broad-based disc bulge is present. The central spinal canal and neuroforamen are patent.    C6-C7: There is mild loss of disc signal. A small central disc bulge or protrusion is present. This indents the ventral thecal sac and touches the cervical spinal cord to the right of midline. The central  spinal canal and neuroforamen are patent.    C7-T1: There is normal disc space height and signal. The central spinal canal and neural foramen are patent.    IMPRESSION: There is a small right paracentral disc protrusion at C6-C7. This imparts very mild mass effect upon the cervical spinal cord to the right of midline.    There is a small right paracentral disc protrusion at C3-C4 which imparts mild mass effect upon the ventral surface of the cervical spinal cord to the right of midline.    A mild broad-based disc bulge is present at C4-C5 and C5-C6.    Electronically Signed By: Rj Morin M.D. on 10/17/2017 2:18 PM    If you have any further questions or problems contact my office at  [phone]   --- or send [employer] message --- otherwise schedule an appointment.    Clinic : [phone]  Appointment line: [phone]     Thank you,    Tim Boyd MD    Internal Medicine  [facility]     Reviewed and electronically signed by provider.

## 2018-07-24 NOTE — PROGRESS NOTES
Patient Information     Patient Name  Ember Tavares MRN  [id number] Sex  Female   1960      Letter by Tim Boyd MD at      Author:  Tim Boyd MD Service:  (none) Author Type:  (none)    Filed:   Date of Service:   Status:  (Other)           Ember Tavares  [address] [address] [address]          2017    Dear Ms. Tavares:    Following are the tests completed during your last clinic visit.  The results of these tests are included below.      10/16/2017 - XR ESOPHAGUS AIR CONTRAST    HISTORY: 56 yearsFemale Epigastric pain    TECHNIQUE: An air contrast esophagram was performed.    COMPARISON: None    FINDINGS: There is very mild narrowing of the distal esophagus. There is no evidence of mass or stricture otherwise. Gastroesophageal reflux was witnessed ascending to the level of the cervical esophagus.    IMPRESSION: Exam is positive for gastroesophageal reflux ascending to the level of the cervical esophagus.    There is mild narrowing of the distal esophagus.    Electronically Signed By: Rj Morin M.D. on 10/16/2017 3:13 PM    If you have any further questions or problems contact my office at  [phone]   --- or send [employer] message --- otherwise schedule an appointment.    Clinic : [phone]  Appointment line: [phone]     Thank you,    Tim Boyd MD    Internal Medicine  [facility]     Reviewed and electronically signed by provider.

## 2018-07-24 NOTE — PROGRESS NOTES
Patient Information     Patient Name  Ember Tavares MRN  [id number] Sex  Female   1960      Letter by Tim Boyd MD at      Author:  Tim Boyd MD Service:  (none) Author Type:  (none)    Filed:   Encounter Date:  2017 Status:  (Other)           Ember Tavares  [address] [address] [address]          2017    Dear Ms. Tavares:    Following are the tests completed during your last clinic visit.  The results of these tests are included below.      Your CCP lab, testing for rheumatoid arthritis, was normal.    Hemoglobin A1c is minimally elevated into the prediabetic range.    Muscle enzyme or total CK, was normal.    Your antinuclear antibody lab was borderline.  15-30% of normal people can have minimally elevated JENS levels up to 1:320 just like yours.  This level can fluctuate however.  If your symptoms seem to worsen, would recommend evaluation by rheumatology.    Cholesterol levels are quite high.    Results for orders placed or performed in visit on 17      Hgb A1c      Result  Value Ref Range    HEMOGLOBIN A1C MONITORING (POCT) 5.8 4.0 - 6.2 %    ESTIMATED AVERAGE GLUCOSE  120 mg/dL   VITAMIN B12      Result  Value Ref Range    VITAMIN B12 802 180 - 914 pg/mL   CK TOTAL      Result  Value Ref Range    CK,TOTAL 73 30 - 223 IU/L   JENS TEST      Result  Value Ref Range    ANTINUCLEAR ANTIBODY (JENS) Positive (A) Negative    JENS PATTERN 1 Homogenous (A) (none)    JENS TITER 1 1:320 (A) (none)    SPECIMEN STATUS Serum will be saved for 30 Days    RHEUMATOID FACTOR      Result  Value Ref Range    RHEUMATOID FACTOR QUANT. <14.0 <14.0 IU/mL   CCP      Result  Value Ref Range    CCP Antibody,IgG/IgA <4.6 <=19.9 CU   LIPID PANEL      Result  Value Ref Range    CHOLESTEROL,TOTAL 206 (H) <200 mg/dL    TRIGLYCERIDES 162 (H) <150 mg/dL    HDL CHOLESTEROL 38 23 - 92 mg/dL    NON-HDL CHOLESTEROL 168 (H) <145 mg/dl    CHOL/HDL RATIO            5.42 (H) <4.50                     LDL CHOLESTEROL 136 (H) <100 mg/dL    PATIENT STATUS            FASTING                         If you have any further questions or problems contact my office at  [phone]   --- or send [employer] message --- otherwise schedule an appointment.    Clinic : [phone]  Appointment line: [phone]     Thank you,    Tim Boyd MD    Internal Medicine  [facility]     Reviewed and electronically signed by provider.

## 2018-08-18 ENCOUNTER — OFFICE VISIT (OUTPATIENT)
Dept: FAMILY MEDICINE | Facility: OTHER | Age: 58
End: 2018-08-18
Attending: FAMILY MEDICINE
Payer: COMMERCIAL

## 2018-08-18 VITALS
DIASTOLIC BLOOD PRESSURE: 70 MMHG | HEIGHT: 64 IN | WEIGHT: 128.6 LBS | HEART RATE: 90 BPM | BODY MASS INDEX: 21.95 KG/M2 | SYSTOLIC BLOOD PRESSURE: 120 MMHG | TEMPERATURE: 97.1 F

## 2018-08-18 DIAGNOSIS — H16.001 ULCER OF RIGHT CORNEA: ICD-10-CM

## 2018-08-18 PROCEDURE — 99213 OFFICE O/P EST LOW 20 MIN: CPT | Performed by: FAMILY MEDICINE

## 2018-08-18 RX ORDER — ERYTHROMYCIN 5 MG/G
0.5 OINTMENT OPHTHALMIC AT BEDTIME
Qty: 1 TUBE | Refills: 0 | Status: SHIPPED | OUTPATIENT
Start: 2018-08-18 | End: 2019-03-06

## 2018-08-18 RX ORDER — FOLIC ACID 1 MG/1
1 TABLET ORAL DAILY
Refills: 0 | COMMUNITY
Start: 2018-06-25 | End: 2020-10-22

## 2018-08-18 ASSESSMENT — PAIN SCALES - GENERAL: PAINLEVEL: MILD PAIN (3)

## 2018-08-18 ASSESSMENT — ENCOUNTER SYMPTOMS: EYE PAIN: 1

## 2018-08-18 NOTE — NURSING NOTE
"Patient presents with pain in right eye for 2 days. Patient felt like a hair was in eye. Patient states she woke up with redness and pain. Ella Rojas LPN .............8/18/2018  2:21 PM  Visual Acuity Screening   Visual acuity OD (right eye): 20/ 50   Visual acuity OS (left eye): 20/ 50   Visual acuity OU (both eyes): 20/ 40   Corrective lenses worn: yes  Ella Rojas LPN .............8/18/2018  2:22 PM           Chief Complaint   Patient presents with     Eye Problem       Initial /70 (BP Location: Right arm, Patient Position: Sitting, Cuff Size: Adult Regular)  Pulse 90  Temp 97.1  F (36.2  C) (Tympanic)  Ht 5' 3.78\" (1.62 m)  Wt 128 lb 9.6 oz (58.3 kg)  Breastfeeding? No  BMI 22.23 kg/m2 Estimated body mass index is 22.23 kg/(m^2) as calculated from the following:    Height as of this encounter: 5' 3.78\" (1.62 m).    Weight as of this encounter: 128 lb 9.6 oz (58.3 kg).  Medication Reconciliation: complete    Ella Rojas LPN  "

## 2018-08-18 NOTE — NURSING NOTE
flurscein ordered by Bobby Austin MD.  Medication administered per order in [employer]   Lot # [id number]  Exp. 07/2020  NDC 759072669679  Patient tolerated well.    Sodium chloride ordered by Bobby Austin MD.  Medication administered per order in [employer]   Lot # [id number]  Exp. Dec 19  NDC 7724-9981-25  Patient tolerated well.  Ella Rojas LPN..................8/18/2018  3:06 PM

## 2018-08-18 NOTE — MR AVS SNAPSHOT
"              After Visit Summary   8/18/2018    Ember Tavares    MRN: [id number]           Patient Information     Date Of Birth          1960        Visit Information        Provider Department      8/18/2018 2:00 PM Bobby Austin MD [facility]        Today's Diagnoses     Ulcer of right cornea           Follow-ups after your visit        Who to contact     If you have questions or need follow up information about today's clinic visit or your schedule please contact [facility] directly at [phone].  Normal or non-critical lab and imaging results will be communicated to you by [employer], letter or phone within 4 business days after the clinic has received the results. If you do not hear from us within 7 days, please contact the clinic through [employer] or phone. If you have a critical or abnormal lab result, we will notify you by phone as soon as possible.  Submit refill requests through [employer] or call your pharmacy and they will forward the refill request to us. Please allow 3 business days for your refill to be completed.          Additional Information About Your Visit        MyChart Information     [employer] gives you secure access to your electronic health record. If you see a primary care provider, you can also send messages to your care team and make appointments. If you have questions, please call your primary care clinic.  If you do not have a primary care provider, please call [phone] and they will assist you.        Care EveryWhere ID     This is your Care EveryWhere ID. This could be used by other organizations to access your [facility] medical records  [id number]        Your Vitals Were     Pulse Temperature Height Breastfeeding? BMI (Body Mass Index)       90 97.1  F (36.2  C) (Tympanic) 5' 3.78\" (1.62 m) No 22.23 kg/m2        Blood Pressure from Last 3 Encounters:   08/18/18 120/70   04/17/18 110/64   04/10/18 102/64    Weight from Last 3 " Encounters:   08/18/18 128 lb 9.6 oz (58.3 kg)   04/17/18 131 lb (59.4 kg)   04/10/18 128 lb 9.6 oz (58.3 kg)              Today, you had the following     No orders found for display         Today's Medication Changes          These changes are accurate as of 8/18/18  3:12 PM.  If you have any questions, ask your nurse or doctor.               Start taking these medicines.        Dose/Directions    erythromycin ophthalmic ointment   Commonly known as:  ROMYCIN   Used for:  Ulcer of right cornea   Started by:  Bobby Austin MD        Dose:  0.5 inch   Place 0.5 inches into the right eye At Bedtime   Quantity:  1 Tube   Refills:  0            Where to get your medicines      These medications were sent to [id number] - [place], [place] - [address], [facility] [address]     Phone:  [phone]     erythromycin ophthalmic ointment                Primary Care Provider Office Phone # Fax #    Tim Boyd [phone]       [address] [facility] [address]        Equal Access to Services     [facility] AH: Hadii alexandr bustillos hadtimo Sojennifer, waaxda luqadaha, qaybta kaalmada adejuanyada, james mercer . So [facility] [phone].    ATENCIÓN: Si habla español, tiene a hui disposición servicios gratuitos de asistencia lingüística. Llame al [phone].    We comply with applicable federal civil rights laws and [place] laws. We do not discriminate on the basis of race, color, national origin, age, disability, sex, sexual orientation, or gender identity.            Thank you!     Thank you for choosing [facility]  for your care. Our goal is always to provide you with excellent care. Hearing back from our patients is one way we can continue to improve our services. Please take a few minutes to complete the written survey that you may receive in the mail after your visit with us. Thank  you!             Your Updated Medication List - Protect others around you: Learn how to safely use, store and throw away your medicines at www.disposemymeds.org.          This list is accurate as of 8/18/18  3:12 PM.  Always use your most recent med list.                   Brand Name Dispense Instructions for use Diagnosis    albuterol 108 (90 Base) MCG/ACT inhaler    PROAIR HFA/PROVENTIL HFA/VENTOLIN HFA     Inhale 3 puffs into the lungs 3 times daily as needed        aspirin 81 MG chewable tablet      Take 81 mg by mouth daily with food        calcipotriene 0.005 % Oint      Apply topically 2 times daily        cetirizine 10 MG tablet    zyrTEC    180 tablet    Take one tablet by mouth once or twice daily as needed for hand dermatitis/itching.    Dyshidrotic hand dermatitis       clobetasol 0.05 % ointment    TEMOVATE     Apply topically 2 times daily        * cyanocobalamin 1000 MCG/ML injection    VITAMIN B12     Inject 1,000 mcg into the muscle        * cyanocobalamin 2500 MCG sublingual tablet    VITAMIN B-12    90 tablet    Place 2,500 mcg under the tongue daily    Vitamin B12 deficiency       D 5000 5000 units Tabs   Generic drug:  Cholecalciferol      Take 5,000 Units by mouth every 7 days        erythromycin ophthalmic ointment    ROMYCIN    1 Tube    Place 0.5 inches into the right eye At Bedtime    Ulcer of right cornea       fluticasone-salmeterol 500-50 MCG/DOSE diskus inhaler    ADVAIR DISKUS    1 Inhaler    Inhale 1 puff into the lungs every 12 hours Rinse mouth well after use to prevent Thrush    COPD, severe (H), Panlobular emphysema (H)       folic acid 1 MG tablet    FOLVITE          methotrexate 2.5 MG tablet CHEMO           tiotropium 2.5 MCG/ACT inhalation aerosol    SPIRIVA RESPIMAT    4 g    Inhale 2 puffs into the lungs daily    COPD, severe (H), Panlobular emphysema (H)       triamcinolone 0.1 % ointment    KENALOG     Apply topically 3 times daily        venlafaxine 37.5 MG 24 hr capsule     EFFEXOR-XR    90 capsule    Take 1 capsule (37.5 mg) by mouth daily with food    Menopausal syndrome (hot flashes)       * Notice:  This list has 2 medication(s) that are the same as other medications prescribed for you. Read the directions carefully, and ask your doctor or other care provider to review them with you.

## 2018-08-18 NOTE — PROGRESS NOTES
SUBJECTIVE:   Ember Tavares is a 57 year old female who presents to clinic today for the following health issues: Right eye pain    HPI Comments: Patient arrives here because of right eye pain.  She reports it seems to be migratory in nature.  On sometimes medial sometimes lateral.  They were camping and exposed to dust and sand.  There is some mild photophobia.  No discharge.  A little redness.    Eye Problem            Patient Active Problem List    Diagnosis Date Noted     Ulcer of right cornea 08/18/2018     Priority: Medium     Vitamin B12 deficiency 04/17/2018     Priority: Medium     Menopausal syndrome (hot flashes) 04/17/2018     Priority: Medium     S/P Nissen fundoplication (without gastrostomy tube) procedure 03/26/2018     Priority: Medium     GERD (gastroesophageal reflux disease) 11/29/2017     Priority: Medium     Chronic radicular cervical pain 11/20/2017     Priority: Medium     Pain of cervical facet joint 11/20/2017     Priority: Medium     Psoriatic arthritis (H) 11/20/2017     Priority: Medium     Epigastric pain 10/16/2017     Priority: Medium     Occipital neuralgia of left side 09/15/2017     Priority: Medium     Arthritis of knee, right 08/24/2017     Priority: Medium     Atherosclerosis of abdominal aorta (H) 08/24/2017     Priority: Medium     Overview:   [facility] Studies:  2/19/2013 -- CTA ABDOMEN AND PELVIS WITH BILATERAL LOWER EXTREMITY RUNOFF    CLINICAL HISTORY: Iliac and mesenteric ischemia.    TECHNIQUE: 125 mL Omnipaque 300 IV contrast was administered. 3-D  arterial reformations were performed.    FINDINGS: There is a well-defined ovoid density at the medial right  costophrenic angle. This measures 2.3 x 0.9 x 0.6 cm. It demonstrates  central low attenuation similar to the adjacent abdominal fat near the  liver. This could be a tiny Bochdalek hernia. The liver, spleen,  pancreas, adrenal glands, and kidneys are unremarkable. The  gallbladder is surgically absent. No  biliary ductal dilation. No  mesenteric or retroperitoneal adenopathy. No ascites or fluid  collection. There is a moderate amount of retained stool seen  throughout most of the colon. No obstruction. No adjacent inflammatory  change. No ascites or fluid collection. Ureters and bladder are  unremarkable.    There is mild partially calcified atherosclerotic plaque within the  abdominal aorta. No aneurysm. There is narrowing of the lumen just  proximal to the bifurcation to 6 mm.   There is moderate narrowing of the proximal celiac artery without associated calcification.   The superior mesenteric artery appears to be widely patent with good opacification of distal branch vessels. The MARIAH also appears to be well opacified.   There are single bilateral renal arteries which are widely patent.    There is mild partially calcified plaque within the common iliac arteries with minimal narrowing.   The external iliac and common femoral arteries are widely patent.   Superficial femoral and popliteal arteries are widely patent bilaterally.   There is three-vessel runoff with good opacification in the lower leg to the ankle and foot.    IMPRESSION:  1. Mild aortoiliac atherosclerotic disease. No aneurysm. There is narrowing of the distal aorta just proximal to the bifurcation.  2. There is moderate narrowing of the celiac artery which could  be due to noncalcified plaque. No SMA or MARIAH compromise definitely seen.  3. Probable tiny Bochdalek hernia at the right costophrenic angle. A hamartoma would be a differential consideration though is less likely given the central fat density.  4. Possible constipation.  5. No evidence of vascular compromise in the lower extremities.    Examination Interpreted at: [facility], [place], [place]  The Interpreting Physician is MONICA VORA  Exam was completed at [facility]       COPD, severe (H) 08/24/2017     Priority: Medium     Overview:   8/4/2014  -- PULMONARY FUNCTION    SITE:  [facility]  ORDERED BY:  VANNESA LUNA    CLINICAL SUMMARY: 53-year-old female with a history of severe COPD.     TECHNICIAN NOTE: Good effort.     DATA: FVC 1.85 (61%). FEV1 1.04 (45%). FEV1/FVC ratio 56%. DLCO 13.5 to 62%.     Flow volume curve is consistent with airway obstruction.     IMPRESSION:  1. Severe obstructive pulmonary disease.   2. Mild decrease in diffusing capacity.       Decreased bone density 08/24/2017     Priority: Medium     Overview:   12/20/2013 -- DXA SPINE HIP -- [facility]    FINDINGS: The bone mineral density was done using a Sonics machine.   The lumbar spine was 1.139 g/cm2 which is in approximately one half standard deviations above the mean for age-matched persons.   The total hip BMD was 0.789 g/cm2 which is approximately one standard deviations below expected for age.   The T-score was -0.4 at the spine and -1.7 at the total hip for females which places her in the WHO category of osteopenic with bone density between 10 and 25% below young normal. Fracture risk is moderate.    RECOMMENDATION: Treatment is recommended in the form of bisphosphonate and Evista. Hormone therapy may be an option based on review of risks and benefits of treatment. All patients should assure an adequate intake of dietary calcium (1200 mg per day) and   vitamin D (400-800 international units daily). Followup examination recommended in December of 2015.       Decreased diffusion capacity of lung 08/24/2017     Priority: Medium     Dyshidrotic hand dermatitis 08/24/2017     Priority: Medium     Hiatal hernia 08/24/2017     Priority: Medium     History of esophageal dilatation 08/24/2017     Priority: Medium     Prediabetes 08/24/2017     Priority: Medium     Psoriasis 08/24/2017     Priority: Medium     Schatzki's ring of distal esophagus 08/24/2017     Priority: Medium     Vitamin D deficiency 08/24/2017     Priority: Medium      Alpha-1-antitrypsin deficiency carrier (H) 08/23/2017     Priority: Medium     Pulmonary emphysema (H) 08/23/2017     Priority: Medium     Esophageal reflux 08/23/2017     Priority: Medium     Irritable bowel syndrome 08/23/2017     Priority: Medium     Osteopenia 08/23/2017     Priority: Medium     PAD (peripheral artery disease) (H) 10/13/2015     Priority: Medium     Other and unspecified hyperlipidemia 08/01/2012     Priority: Medium     Contact dermatitis 07/01/2010     Priority: Medium     Myalgia 07/01/2010     Priority: Medium     Chronic abdominal pain 03/10/2010     Priority: Medium     Overview:   16 year duration       Constipation, chronic 03/10/2010     Priority: Medium     History of tobacco abuse 03/10/2010     Priority: Medium     Sinusitis, chronic 03/10/2010     Priority: Medium     Past Medical History:   Diagnosis Date     Abdominal pain     No Comments Provided     Chronic sinusitis     No Comments Provided     Closed fracture of skull (H)     1972     Dysphagia     No Comments Provided      Past Surgical History:   Procedure Laterality Date     AS UGI ENDOSCOPY W ESOPHAGEAL DILATION BALLOON <30MM      10/30/2015,[id number],ESOPHAGEAL DILATION,Dr. Rainer Allen, [facility]     BIOPSY BREAST      1999, 2001, 2004, 2008,[id number],BIOPSY BREAST,benign     CHOLECYSTECTOMY      2004     COLONOSCOPY      x's 3 last 2009,negative     CT CORONARY ANGIOGRAM      2009,20742.0,CT CORONARY ANGIOGRAM (IA),negative     ESOPHAGOSCOPY, GASTROSCOPY, DUODENOSCOPY (EGD), COMBINED      dilated in 04,07,09  Dr. Allen      LEFT HEART CATHETERIZATION      04/20/2009,[id number],CVL HEART CATH LEFT,[facility]     HYSTERECTOMY VAGINAL      1994,ovaries remain     LAPAROSCOPIC NISSEN FUNDOPLICATION N/A 3/26/2018    Procedure: LAPAROSCOPIC NISSEN FUNDOPLICATION;  Laparoscopic Nissen Fundoplication;  Surgeon: Jagdish Ruiz MD;  Location: [facility] OR     LAPAROTOMY EXPLORATORY      1990 lysis of adhesions/endometriosis      RECTOCELE REPAIR      07/29/2009,[id number],REPAIR RECTOCELE     Current Outpatient Prescriptions   Medication Sig Dispense Refill     erythromycin (ROMYCIN) ophthalmic ointment Place 0.5 inches into the right eye At Bedtime 1 Tube 0     albuterol (PROAIR HFA/PROVENTIL HFA/VENTOLIN HFA) 108 (90 BASE) MCG/ACT Inhaler Inhale 3 puffs into the lungs 3 times daily as needed       aspirin 81 MG chewable tablet Take 81 mg by mouth daily with food       calcipotriene 0.005 % OINT Apply topically 2 times daily       cetirizine (ZYRTEC) 10 MG tablet Take one tablet by mouth once or twice daily as needed for hand dermatitis/itching. 180 tablet 3     Cholecalciferol (D 5000) 5000 UNITS TABS Take 5,000 Units by mouth every 7 days       clobetasol (TEMOVATE) 0.05 % ointment Apply topically 2 times daily       cyanocobalamin (VITAMIN B-12) 2500 MCG sublingual tablet Place 2,500 mcg under the tongue daily 90 tablet 3     cyanocobalamin (VITAMIN B12) 1000 MCG/ML injection Inject 1,000 mcg into the muscle       fluticasone-salmeterol (ADVAIR DISKUS) 500-50 MCG/DOSE diskus inhaler Inhale 1 puff into the lungs every 12 hours Rinse mouth well after use to prevent Thrush 1 Inhaler 11     folic acid (FOLVITE) 1 MG tablet   0     methotrexate 2.5 MG tablet CHEMO   2     tiotropium (SPIRIVA RESPIMAT) 2.5 MCG/ACT inhalation aerosol Inhale 2 puffs into the lungs daily 4 g 11     triamcinolone (KENALOG) 0.1 % ointment Apply topically 3 times daily       venlafaxine (EFFEXOR-XR) 37.5 MG 24 hr capsule Take 1 capsule (37.5 mg) by mouth daily with food 90 capsule 3     Allergies   Allergen Reactions     Atorvastatin Muscle Pain (Myalgia)     Rosuvastatin Nausea and Vomiting     Sucralfate Nausea and Vomiting     Levofloxacin Nausea and Vomiting     Morphine      Other reaction(s): Chest Pain     Penicillins      Other reaction(s): Respiratory Arrest     Sulfamethoxazole W/Trimethoprim Nausea and Vomiting     Yeast infection       Review of Systems  "  Eyes: Positive for pain.        OBJECTIVE:     /70 (BP Location: Right arm, Patient Position: Sitting, Cuff Size: Adult Regular)  Pulse 90  Temp 97.1  F (36.2  C) (Tympanic)  Ht 5' 3.78\" (1.62 m)  Wt 128 lb 9.6 oz (58.3 kg)  Breastfeeding? No  BMI 22.23 kg/m2  Body mass index is 22.23 kg/(m^2).  Physical Exam   Constitutional: She appears well-developed.   Eyes: Conjunctivae and EOM are normal. Pupils are equal, round, and reactive to light. Right eye exhibits no discharge. Left eye exhibits discharge.       none     ASSESSMENT/PLAN:     Fluorescein was applied and there was a very small corneal defect at about 8:00.  Almost misted on my exam.  Both upper and lower lids were inverted I do not see any foreign body.    1. Ulcer of right cornea  Recommended if no improvement Monday to follow up with a formal eye exam and ophthalmology or optometry.Follow-up here if getting worse  - erythromycin (ROMYCIN) ophthalmic ointment; Place 0.5 inches into the right eye At Bedtime  Dispense: 1 Tube; Refill: 0      Bobby Austin MD  [facility] AND [facility]  "

## 2018-08-28 ENCOUNTER — TRANSFERRED RECORDS (OUTPATIENT)
Dept: HEALTH INFORMATION MANAGEMENT | Facility: OTHER | Age: 58
End: 2018-08-28

## 2018-08-31 DIAGNOSIS — Z79.899 ENCOUNTER FOR LONG-TERM (CURRENT) USE OF HIGH-RISK MEDICATION: Primary | ICD-10-CM

## 2018-08-31 LAB
ALBUMIN SERPL-MCNC: 4.6 G/DL (ref 3.5–5.7)
ALT SERPL W P-5'-P-CCNC: 25 U/L (ref 7–52)
AST SERPL W P-5'-P-CCNC: 22 U/L (ref 13–39)
CREAT SERPL-MCNC: 0.9 MG/DL (ref 0.6–1.2)
ERYTHROCYTE [DISTWIDTH] IN BLOOD BY AUTOMATED COUNT: 14.6 % (ref 10–15)
GFR SERPL CREATININE-BSD FRML MDRD: 65 ML/MIN/1.7M2
HCT VFR BLD AUTO: 43.7 % (ref 35–47)
HGB BLD-MCNC: 14.6 G/DL (ref 11.7–15.7)
MCH RBC QN AUTO: 29.7 PG (ref 26.5–33)
MCHC RBC AUTO-ENTMCNC: 33.4 G/DL (ref 31.5–36.5)
MCV RBC AUTO: 89 FL (ref 78–100)
PLATELET # BLD AUTO: 356 10E9/L (ref 150–450)
RBC # BLD AUTO: 4.91 10E12/L (ref 3.8–5.2)
WBC # BLD AUTO: 8.3 10E9/L (ref 4–11)

## 2018-08-31 PROCEDURE — 84450 TRANSFERASE (AST) (SGOT): CPT

## 2018-08-31 PROCEDURE — 82040 ASSAY OF SERUM ALBUMIN: CPT

## 2018-08-31 PROCEDURE — 82565 ASSAY OF CREATININE: CPT

## 2018-08-31 PROCEDURE — 36415 COLL VENOUS BLD VENIPUNCTURE: CPT

## 2018-08-31 PROCEDURE — 85027 COMPLETE CBC AUTOMATED: CPT

## 2018-08-31 PROCEDURE — 84460 ALANINE AMINO (ALT) (SGPT): CPT

## 2018-09-18 ENCOUNTER — MYC MEDICAL ADVICE (OUTPATIENT)
Dept: INTERNAL MEDICINE | Facility: OTHER | Age: 58
End: 2018-09-18

## 2018-09-18 DIAGNOSIS — J44.9 COPD, SEVERE (H): ICD-10-CM

## 2018-09-18 DIAGNOSIS — R06.2 WHEEZING: Primary | ICD-10-CM

## 2018-09-19 RX ORDER — ALBUTEROL SULFATE 90 UG/1
3 AEROSOL, METERED RESPIRATORY (INHALATION) 3 TIMES DAILY PRN
Qty: 3 INHALER | Refills: 3 | Status: SHIPPED | OUTPATIENT
Start: 2018-09-19 | End: 2019-05-08

## 2018-10-15 ENCOUNTER — OFFICE VISIT (OUTPATIENT)
Dept: FAMILY MEDICINE | Facility: OTHER | Age: 58
End: 2018-10-15
Payer: COMMERCIAL

## 2018-10-15 VITALS
DIASTOLIC BLOOD PRESSURE: 80 MMHG | OXYGEN SATURATION: 97 % | SYSTOLIC BLOOD PRESSURE: 112 MMHG | HEART RATE: 90 BPM | TEMPERATURE: 97.6 F | BODY MASS INDEX: 21.78 KG/M2 | WEIGHT: 126 LBS

## 2018-10-15 DIAGNOSIS — N95.1 MENOPAUSAL SYNDROME (HOT FLASHES): ICD-10-CM

## 2018-10-15 DIAGNOSIS — J44.9 COPD, SEVERE (H): ICD-10-CM

## 2018-10-15 DIAGNOSIS — J20.9 ACUTE BRONCHITIS, UNSPECIFIED ORGANISM: Primary | ICD-10-CM

## 2018-10-15 PROCEDURE — 99213 OFFICE O/P EST LOW 20 MIN: CPT | Performed by: NURSE PRACTITIONER

## 2018-10-15 RX ORDER — AZITHROMYCIN 250 MG/1
TABLET, FILM COATED ORAL
Qty: 6 TABLET | Refills: 0 | Status: SHIPPED | OUTPATIENT
Start: 2018-10-15 | End: 2019-03-06

## 2018-10-15 NOTE — PROGRESS NOTES
HPI:    Ember Tavares is a 57 year old female who presents to clinic today for respiratory illness. Has had sx for 3 weeks. Having cough, runny nose/nasal congestion. PND. LGT, chills and then sweats. She has been taking ibuprofen. She has been around ill people at work, works at [employer]. She does have COPD. Using rescue inhaler more than normal. Hx of smoking.     Past Medical History:   Diagnosis Date     Abdominal pain     No Comments Provided     Chronic sinusitis     No Comments Provided     Closed fracture of skull (H)     1972     Dysphagia     No Comments Provided       Past Surgical History:   Procedure Laterality Date     AS UGI ENDOSCOPY W ESOPHAGEAL DILATION BALLOON <30MM      10/30/2015,[id number],ESOPHAGEAL DILATION,Dr. Rainer Allen, [facility]     BIOPSY BREAST      1999, 2001, 2004, 2008,[id number],BIOPSY BREAST,benign     CHOLECYSTECTOMY      2004     COLONOSCOPY      x's 3 last 2009,negative     CT CORONARY ANGIOGRAM      2009,35650.0,CT CORONARY ANGIOGRAM (IA),negative     ESOPHAGOSCOPY, GASTROSCOPY, DUODENOSCOPY (EGD), COMBINED      dilated in 04,07,09  Dr. Allen     HC LEFT HEART CATHETERIZATION      04/20/2009,[id number],CVL HEART CATH LEFT,[facility]     HYSTERECTOMY VAGINAL      1994,ovaries remain     LAPAROSCOPIC NISSEN FUNDOPLICATION N/A 3/26/2018    Procedure: LAPAROSCOPIC NISSEN FUNDOPLICATION;  Laparoscopic Nissen Fundoplication;  Surgeon: Jagdish Ruiz MD;  Location: [facility] OR     LAPAROTOMY EXPLORATORY      1990 lysis of adhesions/endometriosis     RECTOCELE REPAIR      07/29/2009,[id number],REPAIR RECTOCELE       Family History   Problem Relation Age of Onset     Arthritis Father      Arthritis     Peripheral Vascular Disease Mother      Peripheral vascular disease     Diabetes Mother      Diabetes     Arthritis Mother      Arthritis     Breast Cancer Sister      Premenopausal breast cancer     Eczema Brother      Eczema     Other - See Comments Daughter      Narcolepsy     Other - See  Comments Daughter      Vasculitis     Seizure Disorder Daughter      Seizures       Social History     Social History     Marital status:      Spouse name: Ervin     Number of children: N/A     Years of education: N/A     Occupational History     Not on file.     Social History Main Topics     Smoking status: Former Smoker     Packs/day: 0.50     Years: 32.00     Types: Cigarettes     Quit date: 8/18/2012     Smokeless tobacco: Never Used      Comment: Quit smoking: quit date of 8-     Alcohol use 1.2 oz/week      Comment: Alcoholic Drinks/day: 1-2 drinks every 1-2 weeks     Drug use: No     Sexual activity: Not on file     Other Topics Concern     Not on file     Social History Narrative    Graduated from high school plus 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  Lives in [place]  Patient previously smoked.    Alcohol Use - yes  Drug Use - no  Regular Exercise - yes   - Ervin.  3 children.    Works at [employer]       Current Outpatient Prescriptions   Medication Sig Dispense Refill     azithromycin (ZITHROMAX) 250 MG tablet Two tablets first day, then one tablet daily for four days. 6 tablet 0     albuterol (PROAIR HFA/PROVENTIL HFA/VENTOLIN HFA) 108 (90 Base) MCG/ACT inhaler Inhale 3 puffs into the lungs 3 times daily as needed for shortness of breath / dyspnea or wheezing 3 Inhaler 3     aspirin 81 MG chewable tablet Take 81 mg by mouth daily with food       calcipotriene 0.005 % OINT Apply topically 2 times daily       cetirizine (ZYRTEC) 10 MG tablet Take one tablet by mouth once or twice daily as needed for hand dermatitis/itching. 180 tablet 3     Cholecalciferol (D 5000) 5000 UNITS TABS Take 5,000 Units by mouth every 7 days       clobetasol (TEMOVATE) 0.05 % ointment Apply topically 2 times daily       cyanocobalamin (VITAMIN B-12) 2500 MCG sublingual tablet Place 2,500 mcg under the tongue daily 90 tablet 3     cyanocobalamin (VITAMIN B12) 1000 MCG/ML injection Inject 1,000 mcg  into the muscle       erythromycin (ROMYCIN) ophthalmic ointment Place 0.5 inches into the right eye At Bedtime 1 Tube 0     fluticasone-salmeterol (ADVAIR DISKUS) 500-50 MCG/DOSE diskus inhaler Inhale 1 puff into the lungs every 12 hours Rinse mouth well after use to prevent Thrush 1 Inhaler 11     folic acid (FOLVITE) 1 MG tablet   0     methotrexate 2.5 MG tablet CHEMO   2     tiotropium (SPIRIVA RESPIMAT) 2.5 MCG/ACT inhalation aerosol Inhale 2 puffs into the lungs daily 4 g 11     triamcinolone (KENALOG) 0.1 % ointment Apply topically 3 times daily       venlafaxine (EFFEXOR-XR) 37.5 MG 24 hr capsule Take 1 capsule (37.5 mg) by mouth daily with food 90 capsule 3       Allergies   Allergen Reactions     Atorvastatin Muscle Pain (Myalgia)     Rosuvastatin Nausea and Vomiting     Sucralfate Nausea and Vomiting     Levofloxacin Nausea and Vomiting     Morphine      Other reaction(s): Chest Pain     Penicillins      Other reaction(s): Respiratory Arrest     Sulfamethoxazole W/Trimethoprim Nausea and Vomiting     Yeast infection       ROS:  Pertinent positives and negatives are noted in HPI.    EXAM:  General appearance: well appearing female, in no acute distress  Head: normocephalic, atraumatic  Ears: TM's with cone of light, no erythema, canals clear bilaterally  Eyes: conjunctivae normal  Orophayrnx: moist mucous membranes, tonsils without erythema, exudates or petechiae, no post nasal drip seen  Neck: supple without adenopathy  Respiratory: clear to auscultation bilaterally but diminished. O2 sats 97% on RA  Cardiac: RRR with no murmurs  Psychological: normal affect, alert and pleasant    ASSESSMENT AND PLAN:    1. Acute bronchitis, unspecified organism    2. COPD, severe (H)      Continue with current inhalers. Tx with azithromycin for bronchitis, sx present for 3+ weeks. Reviewed need to complete all antibiotics. Discussed typical course of illness, symptomatic treatment and when to return to clinic. Patient  in agreement with plan and all questions were answered.         Zeinab Ruiz..................10/15/2018 12:31 PM

## 2018-10-15 NOTE — MR AVS SNAPSHOT
After Visit Summary   10/15/2018    Ember Tavares    MRN: [id number]           Patient Information     Date Of Birth          1960        Visit Information        Provider Department      10/15/2018 12:15 PM Zeinab Ruiz APRN CNP [facility]        Today's Diagnoses     Acute bronchitis, unspecified organism    -  1    COPD, severe (H)           Follow-ups after your visit        Who to contact     If you have questions or need follow up information about today's clinic visit or your schedule please contact [facility] directly at [phone].  Normal or non-critical lab and imaging results will be communicated to you by [employer], letter or phone within 4 business days after the clinic has received the results. If you do not hear from us within 7 days, please contact the clinic through [employer] or phone. If you have a critical or abnormal lab result, we will notify you by phone as soon as possible.  Submit refill requests through [employer] or call your pharmacy and they will forward the refill request to us. Please allow 3 business days for your refill to be completed.          Additional Information About Your Visit        MyChart Information     [employer] gives you secure access to your electronic health record. If you see a primary care provider, you can also send messages to your care team and make appointments. If you have questions, please call your primary care clinic.  If you do not have a primary care provider, please call [phone] and they will assist you.        Care EveryWhere ID     This is your Care EveryWhere ID. This could be used by other organizations to access your [place] medical records  [id number]        Your Vitals Were     Pulse Temperature Pulse Oximetry Breastfeeding? BMI (Body Mass Index)       90 97.6  F (36.4  C) (Tympanic) 97% No 21.78 kg/m2        Blood Pressure from Last 3 Encounters:   10/15/18 112/80   08/18/18  120/70   04/17/18 110/64    Weight from Last 3 Encounters:   10/15/18 126 lb (57.2 kg)   08/18/18 128 lb 9.6 oz (58.3 kg)   04/17/18 131 lb (59.4 kg)              Today, you had the following     No orders found for display         Today's Medication Changes          These changes are accurate as of 10/15/18 12:41 PM.  If you have any questions, ask your nurse or doctor.               Start taking these medicines.        Dose/Directions    azithromycin 250 MG tablet   Commonly known as:  ZITHROMAX   Used for:  Acute bronchitis, unspecified organism   Started by:  Zeinab Ruiz APRN CNP        Two tablets first day, then one tablet daily for four days.   Quantity:  6 tablet   Refills:  0            Where to get your medicines      These medications were sent to [facility] - [place], [place] - [address]  [address], [facility] [address]     Phone:  [phone]     azithromycin 250 MG tablet                Primary Care Provider Office Phone # Fax #    Tim Boyd [phone]       [address] [facility] [address]        Equal Access to Services     [facility]: Hadii alexandr Baker, wakathyda lumayco, qaybta kaalmada joann, james mercer . So [facility] [phone].    ATENCIÓN: Si habla español, tiene a hui disposición servicios gratuitos de asistencia lingüística. Llame al [phone].    We comply with applicable federal civil rights laws and [place] laws. We do not discriminate on the basis of race, color, national origin, age, disability, sex, sexual orientation, or gender identity.            Thank you!     Thank you for choosing [facility] AND [facility]  for your care. Our goal is always to provide you with excellent care. Hearing back from our patients is one way we can continue to improve our services. Please take a few minutes to complete the written survey that you may receive in the mail after your visit  with us. Thank you!             Your Updated Medication List - Protect others around you: Learn how to safely use, store and throw away your medicines at www.disposemymeds.org.          This list is accurate as of 10/15/18 12:41 PM.  Always use your most recent med list.                   Brand Name Dispense Instructions for use Diagnosis    albuterol 108 (90 Base) MCG/ACT inhaler    PROAIR HFA/PROVENTIL HFA/VENTOLIN HFA    3 Inhaler    Inhale 3 puffs into the lungs 3 times daily as needed for shortness of breath / dyspnea or wheezing    COPD, severe (H), Wheezing       aspirin 81 MG chewable tablet      Take 81 mg by mouth daily with food        azithromycin 250 MG tablet    ZITHROMAX    6 tablet    Two tablets first day, then one tablet daily for four days.    Acute bronchitis, unspecified organism       calcipotriene 0.005 % Oint      Apply topically 2 times daily        cetirizine 10 MG tablet    zyrTEC    180 tablet    Take one tablet by mouth once or twice daily as needed for hand dermatitis/itching.    Dyshidrotic hand dermatitis       clobetasol 0.05 % ointment    TEMOVATE     Apply topically 2 times daily        * cyanocobalamin 1000 MCG/ML injection    VITAMIN B12     Inject 1,000 mcg into the muscle        * cyanocobalamin 2500 MCG sublingual tablet    VITAMIN B-12    90 tablet    Place 2,500 mcg under the tongue daily    Vitamin B12 deficiency       D 5000 5000 units Tabs   Generic drug:  Cholecalciferol      Take 5,000 Units by mouth every 7 days        erythromycin ophthalmic ointment    ROMYCIN    1 Tube    Place 0.5 inches into the right eye At Bedtime    Ulcer of right cornea       fluticasone-salmeterol 500-50 MCG/DOSE diskus inhaler    ADVAIR DISKUS    1 Inhaler    Inhale 1 puff into the lungs every 12 hours Rinse mouth well after use to prevent Thrush    COPD, severe (H), Panlobular emphysema (H)       folic acid 1 MG tablet    FOLVITE          methotrexate 2.5 MG tablet CHEMO            tiotropium 2.5 MCG/ACT inhalation aerosol    SPIRIVA RESPIMAT    4 g    Inhale 2 puffs into the lungs daily    COPD, severe (H), Panlobular emphysema (H)       triamcinolone 0.1 % ointment    KENALOG     Apply topically 3 times daily        venlafaxine 37.5 MG 24 hr capsule    EFFEXOR-XR    90 capsule    Take 1 capsule (37.5 mg) by mouth daily with food    Menopausal syndrome (hot flashes)       * Notice:  This list has 2 medication(s) that are the same as other medications prescribed for you. Read the directions carefully, and ask your doctor or other care provider to review them with you.

## 2018-10-15 NOTE — NURSING NOTE
Patient presents to clinic today for a possible URI. Head congestion, cough, blowing nose, post nasal drainage, ears are itchy, nausea, up most of the night coughing - productive. Symptoms x 3 weeks  Gloria Deshpande CMA..............10/15/2018........12:11 PM    Medication Reconciliation: complete    Gloria Deshpande CMA

## 2018-10-18 RX ORDER — VENLAFAXINE HYDROCHLORIDE 37.5 MG/1
CAPSULE, EXTENDED RELEASE ORAL
Qty: 90 CAPSULE | Refills: 3 | OUTPATIENT
Start: 2018-10-18

## 2018-10-18 NOTE — TELEPHONE ENCOUNTER
Effexor refilled on 4/17/18 #90 x 3 refills to [facility].    Lizet Spence RN on 10/18/2018 at 12:12 PM

## 2018-12-05 DIAGNOSIS — Z79.899 NEED FOR PROPHYLACTIC CHEMOTHERAPY: Primary | ICD-10-CM

## 2018-12-05 LAB
ALBUMIN SERPL-MCNC: 4.7 G/DL (ref 3.5–5.7)
ALT SERPL W P-5'-P-CCNC: 26 U/L (ref 7–52)
AST SERPL W P-5'-P-CCNC: 23 U/L (ref 13–39)
CREAT SERPL-MCNC: 0.81 MG/DL (ref 0.6–1.2)
ERYTHROCYTE [DISTWIDTH] IN BLOOD BY AUTOMATED COUNT: 14.2 % (ref 10–15)
GFR SERPL CREATININE-BSD FRML MDRD: 73 ML/MIN/1.7M2
HCT VFR BLD AUTO: 43.7 % (ref 35–47)
HGB BLD-MCNC: 14.3 G/DL (ref 11.7–15.7)
MCH RBC QN AUTO: 31.3 PG (ref 26.5–33)
MCHC RBC AUTO-ENTMCNC: 32.7 G/DL (ref 31.5–36.5)
MCV RBC AUTO: 96 FL (ref 78–100)
PLATELET # BLD AUTO: 335 10E9/L (ref 150–450)
RBC # BLD AUTO: 4.57 10E12/L (ref 3.8–5.2)
WBC # BLD AUTO: 11.8 10E9/L (ref 4–11)

## 2018-12-05 PROCEDURE — 82565 ASSAY OF CREATININE: CPT

## 2018-12-05 PROCEDURE — 36415 COLL VENOUS BLD VENIPUNCTURE: CPT

## 2018-12-05 PROCEDURE — 84450 TRANSFERASE (AST) (SGOT): CPT

## 2018-12-05 PROCEDURE — 85027 COMPLETE CBC AUTOMATED: CPT

## 2018-12-05 PROCEDURE — 82040 ASSAY OF SERUM ALBUMIN: CPT

## 2018-12-05 PROCEDURE — 84460 ALANINE AMINO (ALT) (SGPT): CPT

## 2018-12-18 ENCOUNTER — TRANSFERRED RECORDS (OUTPATIENT)
Dept: HEALTH INFORMATION MANAGEMENT | Facility: OTHER | Age: 58
End: 2018-12-18

## 2019-03-06 ENCOUNTER — OFFICE VISIT (OUTPATIENT)
Dept: INTERNAL MEDICINE | Facility: OTHER | Age: 59
End: 2019-03-06
Attending: INTERNAL MEDICINE
Payer: COMMERCIAL

## 2019-03-06 ENCOUNTER — OFFICE VISIT (OUTPATIENT)
Dept: SURGERY | Facility: OTHER | Age: 59
End: 2019-03-06
Attending: SURGERY
Payer: COMMERCIAL

## 2019-03-06 VITALS
HEIGHT: 63 IN | DIASTOLIC BLOOD PRESSURE: 66 MMHG | WEIGHT: 133.2 LBS | BODY MASS INDEX: 23.6 KG/M2 | HEART RATE: 84 BPM | TEMPERATURE: 97.2 F | SYSTOLIC BLOOD PRESSURE: 104 MMHG

## 2019-03-06 VITALS
SYSTOLIC BLOOD PRESSURE: 104 MMHG | RESPIRATION RATE: 16 BRPM | BODY MASS INDEX: 23.59 KG/M2 | TEMPERATURE: 97.2 F | DIASTOLIC BLOOD PRESSURE: 66 MMHG | WEIGHT: 133.13 LBS | HEART RATE: 84 BPM | HEIGHT: 63 IN

## 2019-03-06 DIAGNOSIS — N95.1 MENOPAUSAL SYNDROME (HOT FLASHES): ICD-10-CM

## 2019-03-06 DIAGNOSIS — J43.1 PANLOBULAR EMPHYSEMA (H): ICD-10-CM

## 2019-03-06 DIAGNOSIS — Z98.890 S/P NISSEN FUNDOPLICATION (WITHOUT GASTROSTOMY TUBE) PROCEDURE: ICD-10-CM

## 2019-03-06 DIAGNOSIS — I73.9 PAD (PERIPHERAL ARTERY DISEASE) (H): ICD-10-CM

## 2019-03-06 DIAGNOSIS — R10.31 INGUINAL PAIN, RIGHT: Primary | ICD-10-CM

## 2019-03-06 DIAGNOSIS — R10.31 RIGHT INGUINAL PAIN: Primary | ICD-10-CM

## 2019-03-06 DIAGNOSIS — J44.9 COPD, SEVERE (H): ICD-10-CM

## 2019-03-06 PROCEDURE — 99242 OFF/OP CONSLTJ NEW/EST SF 20: CPT | Performed by: SURGERY

## 2019-03-06 PROCEDURE — 99214 OFFICE O/P EST MOD 30 MIN: CPT | Performed by: INTERNAL MEDICINE

## 2019-03-06 RX ORDER — VENLAFAXINE HYDROCHLORIDE 37.5 MG/1
37.5 CAPSULE, EXTENDED RELEASE ORAL
Qty: 90 CAPSULE | Refills: 3 | Status: SHIPPED | OUTPATIENT
Start: 2019-03-06 | End: 2020-10-22

## 2019-03-06 ASSESSMENT — ENCOUNTER SYMPTOMS
FEVER: 0
CHILLS: 0
SHORTNESS OF BREATH: 1
DIZZINESS: 0
ABDOMINAL PAIN: 1
CONFUSION: 0
DYSURIA: 0
NAUSEA: 0
MYALGIAS: 0
BRUISES/BLEEDS EASILY: 0
APPETITE CHANGE: 1
EYE PAIN: 0
COUGH: 0
HEMATURIA: 0
WOUND: 0
AGITATION: 0
DIARRHEA: 0
PALPITATIONS: 0
CHEST TIGHTNESS: 0
ADENOPATHY: 0
BACK PAIN: 0
ARTHRALGIAS: 1
FATIGUE: 0
WHEEZING: 0
VOMITING: 0
ROS SKIN COMMENTS: + PSORIASIS
LIGHT-HEADEDNESS: 0

## 2019-03-06 ASSESSMENT — ANXIETY QUESTIONNAIRES
3. WORRYING TOO MUCH ABOUT DIFFERENT THINGS: NOT AT ALL
GAD7 TOTAL SCORE: 0
7. FEELING AFRAID AS IF SOMETHING AWFUL MIGHT HAPPEN: NOT AT ALL
6. BECOMING EASILY ANNOYED OR IRRITABLE: NOT AT ALL
5. BEING SO RESTLESS THAT IT IS HARD TO SIT STILL: NOT AT ALL
1. FEELING NERVOUS, ANXIOUS, OR ON EDGE: NOT AT ALL
IF YOU CHECKED OFF ANY PROBLEMS ON THIS QUESTIONNAIRE, HOW DIFFICULT HAVE THESE PROBLEMS MADE IT FOR YOU TO DO YOUR WORK, TAKE CARE OF THINGS AT HOME, OR GET ALONG WITH OTHER PEOPLE: NOT DIFFICULT AT ALL
2. NOT BEING ABLE TO STOP OR CONTROL WORRYING: NOT AT ALL

## 2019-03-06 ASSESSMENT — PATIENT HEALTH QUESTIONNAIRE - PHQ9
5. POOR APPETITE OR OVEREATING: NOT AT ALL
SUM OF ALL RESPONSES TO PHQ QUESTIONS 1-9: 0

## 2019-03-06 ASSESSMENT — MIFFLIN-ST. JEOR
SCORE: 1154.35
SCORE: 1154.01

## 2019-03-06 ASSESSMENT — PAIN SCALES - GENERAL
PAINLEVEL: MILD PAIN (2)
PAINLEVEL: MILD PAIN (2)

## 2019-03-06 NOTE — NURSING NOTE
"Chief Complaint   Patient presents with     Consult     possible right inguinal hernia       Initial /66 (BP Location: Right arm, Patient Position: Sitting, Cuff Size: Adult Regular)   Pulse 84   Temp 97.2  F (36.2  C) (Tympanic)   Ht 1.594 m (5' 2.75\")   Wt 60.4 kg (133 lb 3.2 oz)   Breastfeeding? No   BMI 23.78 kg/m   Estimated body mass index is 23.78 kg/m  as calculated from the following:    Height as of this encounter: 1.594 m (5' 2.75\").    Weight as of this encounter: 60.4 kg (133 lb 3.2 oz).  Medication Reconciliation: complete    Julia Reed LPN  "

## 2019-03-06 NOTE — PATIENT INSTRUCTIONS
1. Inguinal pain, right  -- appears to have hernia....     Surgical consult today with Dr. Patel.     Consider Gas-X and beano -- to help with gas / bloating.     Consider trial of Zyrtec, Claritin, or Allegra -- as needed to help with itching or allergies.     Return as needed for follow-up with Dr. Boyd.    Clinic : [phone]  Appointment line: [phone]
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
## 2019-03-06 NOTE — PROGRESS NOTES
Surgical Clinic Consult  Referring physician:  Tim Boyd   Primary physician:     Tim Boyd    Chief complaint:   Right groin pain    History of present illness:  This is a 58 year old female I am seeing in consultation for right groin pain.  The patient has had a 2-month history of right groin pain and swelling.  There is no association with time of day, food or bowel habits.  She does not recall any injury.     Past medical history:   Past Medical History:   Diagnosis Date     Abdominal pain     No Comments Provided     Chronic sinusitis     No Comments Provided     Closed fracture of skull (H)     1972     Dysphagia     No Comments Provided       Pastsurgical history:  Past Surgical History:   Procedure Laterality Date     AS UGI ENDOSCOPY W ESOPHAGEAL DILATION BALLOON <30MM      10/30/2015,[id number],ESOPHAGEAL DILATION,Dr. Rainer Allen, [facility]     BIOPSY BREAST      1999, 2001, 2004, 2008,[id number],BIOPSY BREAST,benign     CHOLECYSTECTOMY      2004     COLONOSCOPY  06/01/2016    x's 3 negative     CT CORONARY ANGIOGRAM      2009,05328.0,CT CORONARY ANGIOGRAM (IA),negative     ESOPHAGOSCOPY, GASTROSCOPY, DUODENOSCOPY (EGD), COMBINED      dilated in 04,07,09  Dr. Allen      LEFT HEART CATHETERIZATION      04/20/2009,[id number],CVL HEART CATH LEFT,[facility]     HYSTERECTOMY VAGINAL      1994,ovaries remain     LAPAROSCOPIC NISSEN FUNDOPLICATION N/A 3/26/2018    Procedure: LAPAROSCOPIC NISSEN FUNDOPLICATION;  Laparoscopic Nissen Fundoplication;  Surgeon: Jagdish Ruiz MD;  Location: [facility] OR     LAPAROTOMY EXPLORATORY      1990 lysis of adhesions/endometriosis     RECTOCELE REPAIR      07/29/2009,[id number],REPAIR RECTOCELE       Current medications:  Current Outpatient Medications   Medication Sig Dispense Refill     albuterol (PROAIR HFA/PROVENTIL HFA/VENTOLIN HFA) 108 (90 Base) MCG/ACT inhaler Inhale 3 puffs into the lungs 3 times daily as needed for shortness of breath / dyspnea or wheezing 3  Inhaler 3     aspirin 81 MG chewable tablet Take 81 mg by mouth daily with food       calcipotriene 0.005 % OINT Apply topically 2 times daily       cetirizine (ZYRTEC) 10 MG tablet Take one tablet by mouth once or twice daily as needed for hand dermatitis/itching. 180 tablet 3     Cholecalciferol (D 5000) 5000 UNITS TABS Take 5,000 Units by mouth every 7 days       clobetasol (TEMOVATE) 0.05 % ointment Apply topically 2 times daily       cyanocobalamin (VITAMIN B-12) 2500 MCG sublingual tablet Place 2,500 mcg under the tongue daily 90 tablet 3     cyanocobalamin (VITAMIN B12) 1000 MCG/ML injection Inject 1,000 mcg into the muscle       fluticasone-salmeterol (ADVAIR DISKUS) 500-50 MCG/DOSE inhaler Inhale 1 puff into the lungs every 12 hours Rinse mouth well after use to prevent Thrush 1 Inhaler 11     folic acid (FOLVITE) 1 MG tablet   0     methotrexate 2.5 MG tablet CHEMO   2     tiotropium (SPIRIVA RESPIMAT) 2.5 MCG/ACT inhaler Inhale 2 puffs into the lungs daily 4 g 11     triamcinolone (KENALOG) 0.1 % ointment Apply topically 3 times daily       venlafaxine (EFFEXOR-XR) 37.5 MG 24 hr capsule Take 1 capsule (37.5 mg) by mouth daily with food 90 capsule 3       Allergies:  Allergies   Allergen Reactions     Atorvastatin Muscle Pain (Myalgia)     Rosuvastatin Nausea and Vomiting     Sucralfate Nausea and Vomiting     Levofloxacin Nausea and Vomiting     Morphine      Other reaction(s): Chest Pain     Penicillins      Other reaction(s): Respiratory Arrest     Sulfamethoxazole W/Trimethoprim Nausea and Vomiting     Yeast infection       Family history:  Family History   Problem Relation Age of Onset     Arthritis Father         Arthritis     Peripheral Vascular Disease Mother         Peripheral vascular disease     Diabetes Mother         Diabetes     Arthritis Mother         Arthritis     Breast Cancer Sister         Premenopausal breast cancer     Eczema Brother         Eczema     Other - See Comments Daughter          Narcolepsy     Other - See Comments Daughter         Vasculitis     Seizure Disorder Daughter         Seizures       Social history:  Social History     Socioeconomic History     Marital status:      Spouse name: Ervin     Number of children: Not on file     Years of education: Not on file     Highest education level: Not on file   Occupational History     Not on file   Social Needs     Financial resource strain: Not on file     Food insecurity:     Worry: Not on file     Inability: Not on file     Transportation needs:     Medical: Not on file     Non-medical: Not on file   Tobacco Use     Smoking status: Former Smoker     Packs/day: 0.50     Years: 32.00     Pack years: 16.00     Types: Cigarettes     Last attempt to quit: 2012     Years since quittin.5     Smokeless tobacco: Never Used     Tobacco comment: Quit smoking: quit date of 2012   Substance and Sexual Activity     Alcohol use: Yes     Alcohol/week: 1.2 oz     Comment: Alcoholic Drinks/day: 1-2 drinks every 1-2 weeks     Drug use: No     Sexual activity: No   Lifestyle     Physical activity:     Days per week: Not on file     Minutes per session: Not on file     Stress: Not on file   Relationships     Social connections:     Talks on phone: Not on file     Gets together: Not on file     Attends Hinduism service: Not on file     Active member of club or organization: Not on file     Attends meetings of clubs or organizations: Not on file     Relationship status: Not on file     Intimate partner violence:     Fear of current or ex partner: Not on file     Emotionally abused: Not on file     Physically abused: Not on file     Forced sexual activity: Not on file   Other Topics Concern     Parent/sibling w/ CABG, MI or angioplasty before 65F 55M? 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  Lives in [place]  Patient previously smoked.    Alcohol Use - yes  Drug  "Use - no  Regular Exercise - yes   - Ervin.  3 children.    Works at [employer]       PROBLEM LIST:  Patient Active Problem List   Diagnosis     Chronic abdominal pain     Alpha-1-antitrypsin deficiency carrier (H)     Arthritis of knee, right     Atherosclerosis of abdominal aorta (H)     Pulmonary emphysema (H)     Chronic radicular cervical pain     Constipation, chronic     Contact dermatitis     COPD, severe (H)     Decreased bone density     Decreased diffusion capacity of lung     Dyshidrotic hand dermatitis     Epigastric pain     Esophageal reflux     GERD (gastroesophageal reflux disease)     Hiatal hernia     History of esophageal dilatation     History of tobacco abuse     Irritable bowel syndrome     Myalgia     Occipital neuralgia of left side     Osteopenia     Pain of cervical facet joint     Prediabetes     Psoriasis     Psoriatic arthritis (H)     Schatzki's ring of distal esophagus     Sinusitis, chronic     Vitamin D deficiency     S/P Nissen fundoplication (without gastrostomy tube) procedure     Other and unspecified hyperlipidemia     PAD (peripheral artery disease) (H)     Vitamin B12 deficiency     Menopausal syndrome (hot flashes)     Ulcer of right cornea     Review of systems:  COMPLETE REVIEW OF SYSTEMS: Loss of appetite  Gastrointestinal: Nausea constipation gas bloating and difficulty swallowing  Cardiovascular: Denies problems  Respiratory: Asthma and shortness of breath  Genitourinary: Pelvic pain  Musculoskeletal: Joint and muscle pain  Skin: Itching  Neurologic: Denies problems  Psychiatric: Denies problems  Endocrine: Cold and heat intolerance  Heme/Lymphatic: Denies problems  Vascular: Denies problems      Physical exam: /66 (BP Location: Right arm, Patient Position: Sitting, Cuff Size: Adult Regular)   Pulse 84   Temp 97.2  F (36.2  C) (Tympanic)   Ht 1.594 m (5' 2.75\")   Wt 60.4 kg (133 lb 3.2 oz)   Breastfeeding? No   BMI 23.78 kg/m        General: this " is a pleasant female patient in no acute distress.  Patient is awake alert and oriented x3 .     Abdominal: Laparoscopic scars.  Right lower quadrant tattoo.  Right groin is tender to palpation.  I do not appreciate any bulging in either inguinal area.  No masses.  No masses in the femoral area on the right.  The patient was examined both supine and standing, coughing and straining.    Assessment:   Right groin pain, most likely a strain.  I do not appreciate an inguinal hernia on today's examination.  I do not appreciate any adenopathy.     Plan:    Ibuprofen, hot or cold compresses, rest for 10 days.  If not improving, CT scan of the abdomen and pelvis.      Yan Patel MD FACS

## 2019-03-06 NOTE — NURSING NOTE
"Patient presents to the clinic for fatigue, general itching, right shoulder cramping, bloating and right lower abdominal pain over the past month.    Chief Complaint   Patient presents with     Clinic Care Coordination - Follow-up       Initial /66 (BP Location: Right arm, Patient Position: Sitting, Cuff Size: Adult Regular)   Pulse 84   Temp 97.2  F (36.2  C) (Tympanic)   Resp 16   Ht 1.594 m (5' 2.75\")   Wt 60.4 kg (133 lb 2 oz)   BMI 23.77 kg/m   Estimated body mass index is 23.77 kg/m  as calculated from the following:    Height as of this encounter: 1.594 m (5' 2.75\").    Weight as of this encounter: 60.4 kg (133 lb 2 oz).  Medication Reconciliation: complete    Melissa Li LPN    "

## 2019-03-07 ASSESSMENT — ANXIETY QUESTIONNAIRES: GAD7 TOTAL SCORE: 0

## 2019-03-28 ENCOUNTER — OFFICE VISIT (OUTPATIENT)
Dept: FAMILY MEDICINE | Facility: OTHER | Age: 59
End: 2019-03-28
Attending: NURSE PRACTITIONER
Payer: COMMERCIAL

## 2019-03-28 ENCOUNTER — HOSPITAL ENCOUNTER (OUTPATIENT)
Facility: OTHER | Age: 59
Setting detail: OBSERVATION
Discharge: HOME | End: 2019-03-29
Attending: FAMILY MEDICINE | Admitting: FAMILY MEDICINE
Payer: COMMERCIAL

## 2019-03-28 ENCOUNTER — NURSE TRIAGE (OUTPATIENT)
Dept: INTERNAL MEDICINE | Facility: OTHER | Age: 59
End: 2019-03-28

## 2019-03-28 ENCOUNTER — APPOINTMENT (OUTPATIENT)
Dept: GENERAL RADIOLOGY | Facility: OTHER | Age: 59
End: 2019-03-28
Attending: FAMILY MEDICINE
Payer: COMMERCIAL

## 2019-03-28 VITALS
OXYGEN SATURATION: 95 % | BODY MASS INDEX: 23.94 KG/M2 | HEART RATE: 118 BPM | HEIGHT: 62 IN | RESPIRATION RATE: 16 BRPM | TEMPERATURE: 97.9 F | SYSTOLIC BLOOD PRESSURE: 120 MMHG | WEIGHT: 130.1 LBS | DIASTOLIC BLOOD PRESSURE: 70 MMHG

## 2019-03-28 DIAGNOSIS — Z87.891 PERSONAL HISTORY OF TOBACCO USE, PRESENTING HAZARDS TO HEALTH: ICD-10-CM

## 2019-03-28 DIAGNOSIS — J44.89 OBSTRUCTIVE CHRONIC BRONCHITIS WITHOUT EXACERBATION (H): ICD-10-CM

## 2019-03-28 DIAGNOSIS — Z53.9 ERRONEOUS ENCOUNTER--DISREGARD: Primary | ICD-10-CM

## 2019-03-28 DIAGNOSIS — R07.89 ATYPICAL CHEST PAIN: ICD-10-CM

## 2019-03-28 DIAGNOSIS — R07.9 CHEST PAIN, UNSPECIFIED TYPE: Primary | ICD-10-CM

## 2019-03-28 LAB
ALBUMIN SERPL-MCNC: 4.6 G/DL (ref 3.5–5.7)
ALP SERPL-CCNC: 70 U/L (ref 34–104)
ALT SERPL W P-5'-P-CCNC: 26 U/L (ref 7–52)
ANION GAP SERPL CALCULATED.3IONS-SCNC: 9 MMOL/L (ref 3–14)
APTT PPP: 38 SEC (ref 26–39)
AST SERPL W P-5'-P-CCNC: 22 U/L (ref 13–39)
BASOPHILS # BLD AUTO: 0.1 10E9/L (ref 0–0.2)
BASOPHILS NFR BLD AUTO: 0.5 %
BILIRUB SERPL-MCNC: 0.2 MG/DL (ref 0.3–1)
BUN SERPL-MCNC: 15 MG/DL (ref 7–25)
CALCIUM SERPL-MCNC: 9.7 MG/DL (ref 8.6–10.3)
CHLORIDE SERPL-SCNC: 103 MMOL/L (ref 98–107)
CO2 SERPL-SCNC: 26 MMOL/L (ref 21–31)
CREAT SERPL-MCNC: 0.8 MG/DL (ref 0.6–1.2)
D DIMER PPP DDU-MCNC: <200 NG/ML D-DU (ref 0–230)
DIFFERENTIAL METHOD BLD: NORMAL
EOSINOPHIL # BLD AUTO: 0 10E9/L (ref 0–0.7)
EOSINOPHIL NFR BLD AUTO: 0 %
ERYTHROCYTE [DISTWIDTH] IN BLOOD BY AUTOMATED COUNT: 14 % (ref 10–15)
GFR SERPL CREATININE-BSD FRML MDRD: 74 ML/MIN/{1.73_M2}
GLUCOSE SERPL-MCNC: 146 MG/DL (ref 70–105)
HCT VFR BLD AUTO: 43.7 % (ref 35–47)
HGB BLD-MCNC: 14.5 G/DL (ref 11.7–15.7)
IMM GRANULOCYTES # BLD: 0 10E9/L (ref 0–0.4)
IMM GRANULOCYTES NFR BLD: 0.3 %
INR PPP: 0.91 (ref 0–1.3)
LIPASE SERPL-CCNC: 20 U/L (ref 11–82)
LYMPHOCYTES # BLD AUTO: 1.6 10E9/L (ref 0.8–5.3)
LYMPHOCYTES NFR BLD AUTO: 16.1 %
MAGNESIUM SERPL-MCNC: 2.2 MG/DL (ref 1.9–2.7)
MCH RBC QN AUTO: 30.7 PG (ref 26.5–33)
MCHC RBC AUTO-ENTMCNC: 33.2 G/DL (ref 31.5–36.5)
MCV RBC AUTO: 93 FL (ref 78–100)
MONOCYTES # BLD AUTO: 0.5 10E9/L (ref 0–1.3)
MONOCYTES NFR BLD AUTO: 4.8 %
NEUTROPHILS # BLD AUTO: 7.8 10E9/L (ref 1.6–8.3)
NEUTROPHILS NFR BLD AUTO: 78.3 %
PLATELET # BLD AUTO: 345 10E9/L (ref 150–450)
POTASSIUM SERPL-SCNC: 4.1 MMOL/L (ref 3.5–5.1)
PROT SERPL-MCNC: 7.7 G/DL (ref 6.4–8.9)
RBC # BLD AUTO: 4.72 10E12/L (ref 3.8–5.2)
SODIUM SERPL-SCNC: 138 MMOL/L (ref 134–144)
TROPONIN I SERPL-MCNC: <0.03 UG/L (ref 0–0.03)
TROPONIN I SERPL-MCNC: <0.03 UG/L (ref 0–0.03)
WBC # BLD AUTO: 10 10E9/L (ref 4–11)

## 2019-03-28 PROCEDURE — 85730 THROMBOPLASTIN TIME PARTIAL: CPT | Performed by: FAMILY MEDICINE

## 2019-03-28 PROCEDURE — 99285 EMERGENCY DEPT VISIT HI MDM: CPT | Mod: Z6 | Performed by: FAMILY MEDICINE

## 2019-03-28 PROCEDURE — 84484 ASSAY OF TROPONIN QUANT: CPT | Performed by: FAMILY MEDICINE

## 2019-03-28 PROCEDURE — 93010 ELECTROCARDIOGRAM REPORT: CPT | Performed by: INTERNAL MEDICINE

## 2019-03-28 PROCEDURE — 99234 HOSP IP/OBS SM DT SF/LOW 45: CPT | Performed by: FAMILY MEDICINE

## 2019-03-28 PROCEDURE — 99285 EMERGENCY DEPT VISIT HI MDM: CPT | Performed by: FAMILY MEDICINE

## 2019-03-28 PROCEDURE — 83690 ASSAY OF LIPASE: CPT | Performed by: FAMILY MEDICINE

## 2019-03-28 PROCEDURE — 71046 X-RAY EXAM CHEST 2 VIEWS: CPT

## 2019-03-28 PROCEDURE — 80053 COMPREHEN METABOLIC PANEL: CPT | Performed by: FAMILY MEDICINE

## 2019-03-28 PROCEDURE — 83735 ASSAY OF MAGNESIUM: CPT | Performed by: FAMILY MEDICINE

## 2019-03-28 PROCEDURE — 25000132 ZZH RX MED GY IP 250 OP 250 PS 637: Performed by: FAMILY MEDICINE

## 2019-03-28 PROCEDURE — 85610 PROTHROMBIN TIME: CPT | Performed by: FAMILY MEDICINE

## 2019-03-28 PROCEDURE — 36415 COLL VENOUS BLD VENIPUNCTURE: CPT | Performed by: FAMILY MEDICINE

## 2019-03-28 PROCEDURE — G0378 HOSPITAL OBSERVATION PER HR: HCPCS

## 2019-03-28 PROCEDURE — 85025 COMPLETE CBC W/AUTO DIFF WBC: CPT | Performed by: FAMILY MEDICINE

## 2019-03-28 PROCEDURE — 85379 FIBRIN DEGRADATION QUANT: CPT | Performed by: FAMILY MEDICINE

## 2019-03-28 PROCEDURE — 93005 ELECTROCARDIOGRAM TRACING: CPT | Performed by: FAMILY MEDICINE

## 2019-03-28 PROCEDURE — 25800030 ZZH RX IP 258 OP 636: Performed by: FAMILY MEDICINE

## 2019-03-28 RX ORDER — NITROGLYCERIN 0.4 MG/1
0.4 TABLET SUBLINGUAL EVERY 5 MIN PRN
Status: DISCONTINUED | OUTPATIENT
Start: 2019-03-28 | End: 2019-03-29 | Stop reason: HOSPADM

## 2019-03-28 RX ORDER — ASPIRIN 81 MG/1
162 TABLET, CHEWABLE ORAL ONCE
Status: COMPLETED | OUTPATIENT
Start: 2019-03-28 | End: 2019-03-28

## 2019-03-28 RX ORDER — DEXTROSE MONOHYDRATE, SODIUM CHLORIDE, AND POTASSIUM CHLORIDE 50; 1.49; 4.5 G/1000ML; G/1000ML; G/1000ML
INJECTION, SOLUTION INTRAVENOUS CONTINUOUS
Status: DISCONTINUED | OUTPATIENT
Start: 2019-03-28 | End: 2019-03-29

## 2019-03-28 RX ADMIN — ASPIRIN 81 MG 162 MG: 81 TABLET ORAL at 22:25

## 2019-03-28 RX ADMIN — DEXTROSE MONOHYDRATE, SODIUM CHLORIDE, AND POTASSIUM CHLORIDE: 50; 4.5; 1.49 INJECTION, SOLUTION INTRAVENOUS at 23:08

## 2019-03-28 ASSESSMENT — MIFFLIN-ST. JEOR
SCORE: 1132.35
SCORE: 1135.87
SCORE: 1133.25

## 2019-03-28 ASSESSMENT — PAIN SCALES - GENERAL: PAINLEVEL: MILD PAIN (2)

## 2019-03-28 NOTE — ED TRIAGE NOTES
Has been having chest pain for 3 days, and right now she is not having any chest pain.  Her allergies are acting up and so is her COPD.  She is more SOB.  She just told her  about her chest pain from yesterday, and she is here to be evaluated.

## 2019-03-28 NOTE — NURSING NOTE
Patient in clinic for back pain and chest pressure x 2 days. Blood pressure fluctuating from 178/102 to 120's/70.Tx with baby aspirin yesterday.  Kailey Thomson LPN....................  3/28/2019   5:24 PM    Chief Complaint   Patient presents with     Chest Pain       Medication Reconciliation: complete    Kailey Thomson LPN

## 2019-03-28 NOTE — TELEPHONE ENCOUNTER
Should probably be seen today...   Could go to ER or [facility] tonight after work.     Tim Boyd MD

## 2019-03-28 NOTE — TELEPHONE ENCOUNTER
"S - Chest/Back pain yesterday.     B - Started yesterday at 1130, lasted about 3 hours. Dx of COPD    A - Had a sharp pain in between shoulder blades on back to chest yesterday for about 3-4 hours. Rates worst pain at 4/10. Not with activity. Some SOB during, but also has COPD. Chewed two aspirin and gradually got better. Feels \"okay\" today, at work now. 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 Agreed that she will get to ED if chest pain reoccurs. Cristina Fernandes RN on 3/28/2019 at 3:22 PM    "

## 2019-03-28 NOTE — ED PROVIDER NOTES
History   No chief complaint on file.    HPI  Ember Tavares is a 58 year old female who presents for evaluation of chest pain . Symptoms started yesterday ,She developed sudden sharp pain between her shoulder blades. Started while having a cup of coffee. At same time she developed some sob. Tried inhaler , did not help . She then developed chest tightness . Patient with history of COPD but statres it did not feel like her typical lung symptoms. Described discomfort as heavy feeling. Took 4 baby aspirin yesterday at time of chest pressure and symptoms resolved. Today she has just felt some tightness throughout . Movement increases chest discomfort.     Allergies:  Allergies   Allergen Reactions     Atorvastatin Muscle Pain (Myalgia)     Rosuvastatin Nausea and Vomiting     Sucralfate Nausea and Vomiting     Levofloxacin Nausea and Vomiting     Morphine      Other reaction(s): Chest Pain     Penicillins      Other reaction(s): Respiratory Arrest     Sulfamethoxazole W/Trimethoprim Nausea and Vomiting     Yeast infection       Problem List:    Patient Active Problem List    Diagnosis Date Noted     Ulcer of right cornea 08/18/2018     Priority: Medium     Vitamin B12 deficiency 04/17/2018     Priority: Medium     Menopausal syndrome (hot flashes) 04/17/2018     Priority: Medium     S/P Nissen fundoplication (without gastrostomy tube) procedure 03/26/2018     Priority: Medium     GERD (gastroesophageal reflux disease) 11/29/2017     Priority: Medium     Chronic radicular cervical pain 11/20/2017     Priority: Medium     Pain of cervical facet joint 11/20/2017     Priority: Medium     Psoriatic arthritis (H) 11/20/2017     Priority: Medium     Epigastric pain 10/16/2017     Priority: Medium     Occipital neuralgia of left side 09/15/2017     Priority: Medium     Arthritis of knee, right 08/24/2017     Priority: Medium     Atherosclerosis of abdominal aorta (H) 08/24/2017     Priority: Medium     Overview:   [facility] Studies:  2/19/2013 -- CTA ABDOMEN AND PELVIS WITH BILATERAL LOWER EXTREMITY RUNOFF    CLINICAL HISTORY: Iliac and mesenteric ischemia.    TECHNIQUE: 125 mL Omnipaque 300 IV contrast was administered. 3-D  arterial reformations were performed.    FINDINGS: There is a well-defined ovoid density at the medial right  costophrenic angle. This measures 2.3 x 0.9 x 0.6 cm. It demonstrates  central low attenuation similar to the adjacent abdominal fat near the  liver. This could be a tiny Bochdalek hernia. The liver, spleen,  pancreas, adrenal glands, and kidneys are unremarkable. The  gallbladder is surgically absent. No biliary ductal dilation. No  mesenteric or retroperitoneal adenopathy. No ascites or fluid  collection. There is a moderate amount of retained stool seen  throughout most of the colon. No obstruction. No adjacent inflammatory  change. No ascites or fluid collection. Ureters and bladder are  unremarkable.    There is mild partially calcified atherosclerotic plaque within the  abdominal aorta. No aneurysm. There is narrowing of the lumen just  proximal to the bifurcation to 6 mm.   There is moderate narrowing of the proximal celiac artery without associated calcification.   The superior mesenteric artery appears to be widely patent with good opacification of distal branch vessels. The MARIAH also appears to be well opacified.   There are single bilateral renal arteries which are widely patent.    There is mild partially calcified plaque within the common iliac arteries with minimal narrowing.   The external iliac and common femoral arteries are widely patent.   Superficial femoral and popliteal arteries are widely patent bilaterally.   There is three-vessel runoff with good opacification in the lower leg to the ankle and foot.    IMPRESSION:  1. Mild aortoiliac atherosclerotic disease. No aneurysm. There is narrowing of the distal aorta just proximal to the bifurcation.  2. There is moderate narrowing  of the celiac artery which could  be due to noncalcified plaque. No SMA or MARIAH compromise definitely seen.  3. Probable tiny Bochdalek hernia at the right costophrenic angle. A hamartoma would be a differential consideration though is less likely given the central fat density.  4. Possible constipation.  5. No evidence of vascular compromise in the lower extremities.    Examination Interpreted at: [facility], [place], [place]  The Interpreting Physician is MONICA VORA  Exam was completed at [facility]       COPD, severe (H) 08/24/2017     Priority: Medium     Overview:   8/4/2014 -- PULMONARY FUNCTION    SITE:  [facility]  ORDERED BY:  VANNESA LUNA    CLINICAL SUMMARY: 53-year-old female with a history of severe COPD.     TECHNICIAN NOTE: Good effort.     DATA: FVC 1.85 (61%). FEV1 1.04 (45%). FEV1/FVC ratio 56%. DLCO 13.5 to 62%.     Flow volume curve is consistent with airway obstruction.     IMPRESSION:  1. Severe obstructive pulmonary disease.   2. Mild decrease in diffusing capacity.       Decreased bone density 08/24/2017     Priority: Medium     Overview:   12/20/2013 -- DXA SPINE HIP -- [facility]    FINDINGS: The bone mineral density was done using a [employer] machine.   The lumbar spine was 1.139 g/cm2 which is in approximately one half standard deviations above the mean for age-matched persons.   The total hip BMD was 0.789 g/cm2 which is approximately one standard deviations below expected for age.   The T-score was -0.4 at the spine and -1.7 at the total hip for females which places her in the WHO category of osteopenic with bone density between 10 and 25% below young normal. Fracture risk is moderate.    RECOMMENDATION: Treatment is recommended in the form of bisphosphonate and Evista. Hormone therapy may be an option based on review of risks and benefits of treatment. All patients should assure an adequate intake of  dietary calcium (1200 mg per day) and   vitamin D (400-800 international units daily). Followup examination recommended in December of 2015.       Decreased diffusion capacity of lung 08/24/2017     Priority: Medium     Dyshidrotic hand dermatitis 08/24/2017     Priority: Medium     Hiatal hernia 08/24/2017     Priority: Medium     History of esophageal dilatation 08/24/2017     Priority: Medium     Prediabetes 08/24/2017     Priority: Medium     Psoriasis 08/24/2017     Priority: Medium     Schatzki's ring of distal esophagus 08/24/2017     Priority: Medium     Vitamin D deficiency 08/24/2017     Priority: Medium     Alpha-1-antitrypsin deficiency carrier (H) 08/23/2017     Priority: Medium     Pulmonary emphysema (H) 08/23/2017     Priority: Medium     Esophageal reflux 08/23/2017     Priority: Medium     Irritable bowel syndrome 08/23/2017     Priority: Medium     Osteopenia 08/23/2017     Priority: Medium     PAD (peripheral artery disease) (H) 10/13/2015     Priority: Medium     Other and unspecified hyperlipidemia 08/01/2012     Priority: Medium     Contact dermatitis 07/01/2010     Priority: Medium     Myalgia 07/01/2010     Priority: Medium     Chronic abdominal pain 03/10/2010     Priority: Medium     Overview:   16 year duration       Constipation, chronic 03/10/2010     Priority: Medium     History of tobacco abuse 03/10/2010     Priority: Medium     Sinusitis, chronic 03/10/2010     Priority: Medium        Past Medical History:    Past Medical History:   Diagnosis Date     Abdominal pain      Chronic sinusitis      Closed fracture of skull (H)      Dysphagia        Past Surgical History:    Past Surgical History:   Procedure Laterality Date     AS UGI ENDOSCOPY W ESOPHAGEAL DILATION BALLOON <30MM      10/30/2015,[id number],ESOPHAGEAL DILATION,Dr. Rainer Allen, [facility]     BIOPSY BREAST      1999, 2001, 2004, 2008,[id number],BIOPSY BREAST,benign     CHOLECYSTECTOMY      2004     COLONOSCOPY  06/01/2016     x's 3 negative     CT CORONARY ANGIOGRAM      2009,15409.0,CT CORONARY ANGIOGRAM (IA),negative     ESOPHAGOSCOPY, GASTROSCOPY, DUODENOSCOPY (EGD), COMBINED      dilated in ,,  Dr. Alejandro ROMAN LEFT HEART CATHETERIZATION      2009,[id number],CVL HEART CATH LEFT,[facility]     HYSTERECTOMY VAGINAL      ,ovaries remain     LAPAROSCOPIC NISSEN FUNDOPLICATION N/A 3/26/2018    Procedure: LAPAROSCOPIC NISSEN FUNDOPLICATION;  Laparoscopic Nissen Fundoplication;  Surgeon: Jagdish Ruiz MD;  Location: [facility] OR     LAPAROTOMY EXPLORATORY       lysis of adhesions/endometriosis     RECTOCELE REPAIR      2009,[id number],REPAIR RECTOCELE       Family History:    Family History   Problem Relation Age of Onset     Arthritis Father         Arthritis     Peripheral Vascular Disease Mother         Peripheral vascular disease     Diabetes Mother         Diabetes     Arthritis Mother         Arthritis     Breast Cancer Sister         Premenopausal breast cancer     Eczema Brother         Eczema     Other - See Comments Daughter         Narcolepsy     Other - See Comments Daughter         Vasculitis     Seizure Disorder Daughter         Seizures       Social History:  Marital Status:   [2]  Social History     Tobacco Use     Smoking status: Former Smoker     Packs/day: 0.50     Years: 32.00     Pack years: 16.00     Types: Cigarettes     Last attempt to quit: 2012     Years since quittin.6     Smokeless tobacco: Never Used     Tobacco comment: Quit smoking: quit date of 2012   Substance Use Topics     Alcohol use: Yes     Alcohol/week: 1.2 oz     Comment: Alcoholic Drinks/day: 1-2 drinks every 1-2 weeks     Drug use: No        Medications:      albuterol (PROAIR HFA/PROVENTIL HFA/VENTOLIN HFA) 108 (90 Base) MCG/ACT inhaler   aspirin 81 MG chewable tablet   calcipotriene 0.005 % OINT   cetirizine (ZYRTEC) 10 MG tablet   Cholecalciferol (D 5000) 5000 UNITS TABS   clobetasol (TEMOVATE) 0.05 %  "ointment   cyanocobalamin (VITAMIN B-12) 2500 MCG sublingual tablet   fluticasone-salmeterol (ADVAIR DISKUS) 500-50 MCG/DOSE inhaler   folic acid (FOLVITE) 1 MG tablet   tiotropium (SPIRIVA RESPIMAT) 2.5 MCG/ACT inhaler   triamcinolone (KENALOG) 0.1 % ointment   venlafaxine (EFFEXOR-XR) 37.5 MG 24 hr capsule         Review of Systems   Constitutional: Negative for chills and fever.   HENT: Negative for congestion.    Eyes: Negative for visual disturbance.   Respiratory: Positive for cough, chest tightness and shortness of breath. Negative for stridor.    Cardiovascular: Positive for chest pain.   Gastrointestinal: Negative.  Negative for abdominal pain.   Genitourinary: Negative.  Negative for hematuria.   Musculoskeletal: Negative.  Negative for back pain.   Skin: Negative for rash and wound.   Neurological: Negative.  Negative for syncope.   Hematological: Negative.  Negative for adenopathy. Does not bruise/bleed easily.   Psychiatric/Behavioral: Negative.  Negative for confusion.       Physical Exam   BP: 129/83  Pulse: 107  Heart Rate: 96  Temp: 98.4  F (36.9  C)  Resp: 20  Height: 158.8 cm (5' 2.5\")  Weight: 59 kg (130 lb)  SpO2: 95 %      Physical Exam   Constitutional: She is oriented to person, place, and time. She appears well-developed and well-nourished. No distress.   HENT:   Head: Normocephalic.   Eyes: Pupils are equal, round, and reactive to light.   Neck: Normal range of motion. Neck supple. No JVD present.   Cardiovascular: Normal rate and regular rhythm.   Pulmonary/Chest: She has no wheezes. She has no rales. She exhibits no tenderness.   Diminished breath sounds throughout    Abdominal: Soft. Bowel sounds are normal. She exhibits no distension and no mass. There is no tenderness. There is no rebound and no guarding. No hernia.   Musculoskeletal: Normal range of motion.   Neurological: She is alert and oriented to person, place, and time.   Skin: Skin is warm. She is not diaphoretic.   Nursing " note and vitals reviewed.      ED Course        Procedures          Patient presents to ER for evaluation of 1 day of recurrent chest discomfort. Patient directed from [facility] NO chest pain at time of arrival . Patient with known PVD and COPD  . Continues to smoke. Patient triaged to exam room . Vital signs reviewed. Cardiac monitors placed EKG obtained. EKG without acute ST segment changes. Labs and diagnostics ordered AT this time patient care transitioned to oncoming provider Dr Marin       Results for orders placed or performed during the hospital encounter of 03/28/19   XR Chest 2 Views    Narrative    XR CHEST 2 VW    HISTORY: 58 years Female chest pain    COMPARISON: 9/15/2017    TECHNIQUE: 2 views of the chest were obtained.    FINDINGS: Two views of the chest were obtained. Heart size and  pulmonary vascularity are within normal limits, lungs are clear on  both views. No consolidating air space opacities are present.          Impression    IMPRESSION: Clear chest.    SYDNI CARRIZALES MD   CBC with platelets differential   Result Value Ref Range    WBC 10.0 4.0 - 11.0 10e9/L    RBC Count 4.72 3.8 - 5.2 10e12/L    Hemoglobin 14.5 11.7 - 15.7 g/dL    Hematocrit 43.7 35.0 - 47.0 %    MCV 93 78 - 100 fl    MCH 30.7 26.5 - 33.0 pg    MCHC 33.2 31.5 - 36.5 g/dL    RDW 14.0 10.0 - 15.0 %    Platelet Count 345 150 - 450 10e9/L    Diff Method Automated Method     % Neutrophils 78.3 %    % Lymphocytes 16.1 %    % Monocytes 4.8 %    % Eosinophils 0.0 %    % Basophils 0.5 %    % Immature Granulocytes 0.3 %    Absolute Neutrophil 7.8 1.6 - 8.3 10e9/L    Absolute Lymphocytes 1.6 0.8 - 5.3 10e9/L    Absolute Monocytes 0.5 0.0 - 1.3 10e9/L    Absolute Eosinophils 0.0 0.0 - 0.7 10e9/L    Absolute Basophils 0.1 0.0 - 0.2 10e9/L    Abs Immature Granulocytes 0.0 0 - 0.4 10e9/L   Troponin I   Result Value Ref Range    Troponin I ES <0.030 0.000 - 0.034 ug/L   INR   Result Value Ref Range    INR 0.91 0 - 1.3   Partial  thromboplastin time   Result Value Ref Range    PTT 38 26 - 39 sec   Comprehensive metabolic panel   Result Value Ref Range    Sodium 138 134 - 144 mmol/L    Potassium 4.1 3.5 - 5.1 mmol/L    Chloride 103 98 - 107 mmol/L    Carbon Dioxide 26 21 - 31 mmol/L    Anion Gap 9 3 - 14 mmol/L    Glucose 146 (H) 70 - 105 mg/dL    Urea Nitrogen 15 7 - 25 mg/dL    Creatinine 0.80 0.60 - 1.20 mg/dL    GFR Estimate 74 >60 mL/min/[1.73_m2]    GFR Estimate If Black 89 >60 mL/min/[1.73_m2]    Calcium 9.7 8.6 - 10.3 mg/dL    Bilirubin Total 0.2 (L) 0.3 - 1.0 mg/dL    Albumin 4.6 3.5 - 5.7 g/dL    Protein Total 7.7 6.4 - 8.9 g/dL    Alkaline Phosphatase 70 34 - 104 U/L    ALT 26 7 - 52 U/L    AST 22 13 - 39 U/L   Magnesium   Result Value Ref Range    Magnesium 2.2 1.9 - 2.7 mg/dL   Lipase   Result Value Ref Range    Lipase 20 11 - 82 U/L   D-Dimer ([facility])   Result Value Ref Range    D-Dimer ng/mL <200 0 - 230 ng/ml D-DU   Troponin I   Result Value Ref Range    Troponin I ES <0.030 0.000 - 0.034 ug/L   Troponin I   Result Value Ref Range    Troponin I ES <0.030 0.000 - 0.034 ug/L   CBC with platelets differential   Result Value Ref Range    WBC 10.3 4.0 - 11.0 10e9/L    RBC Count 4.26 3.8 - 5.2 10e12/L    Hemoglobin 13.2 11.7 - 15.7 g/dL    Hematocrit 40.1 35.0 - 47.0 %    MCV 94 78 - 100 fl    MCH 31.0 26.5 - 33.0 pg    MCHC 32.9 31.5 - 36.5 g/dL    RDW 14.3 10.0 - 15.0 %    Platelet Count 317 150 - 450 10e9/L    Diff Method Automated Method     % Neutrophils 58.0 %    % Lymphocytes 31.3 %    % Monocytes 7.9 %    % Eosinophils 1.6 %    % Basophils 0.9 %    % Immature Granulocytes 0.3 %    Absolute Neutrophil 6.0 1.6 - 8.3 10e9/L    Absolute Lymphocytes 3.2 0.8 - 5.3 10e9/L    Absolute Monocytes 0.8 0.0 - 1.3 10e9/L    Absolute Eosinophils 0.2 0.0 - 0.7 10e9/L    Absolute Basophils 0.1 0.0 - 0.2 10e9/L    Abs Immature Granulocytes 0.0 0 - 0.4 10e9/L   Basic metabolic panel   Result Value Ref Range    Sodium 140 134 - 144 mmol/L     Potassium 3.8 3.5 - 5.1 mmol/L    Chloride 107 98 - 107 mmol/L    Carbon Dioxide 29 21 - 31 mmol/L    Anion Gap 4 3 - 14 mmol/L    Glucose 116 (H) 70 - 105 mg/dL    Urea Nitrogen 11 7 - 25 mg/dL    Creatinine 0.79 0.60 - 1.20 mg/dL    GFR Estimate 75 >60 mL/min/[1.73_m2]    GFR Estimate If Black >90 >60 mL/min/[1.73_m2]    Calcium 8.8 8.6 - 10.3 mg/dL   Troponin I   Result Value Ref Range    Troponin I ES <0.030 0.000 - 0.034 ug/L           Results for orders placed or performed during the hospital encounter of 03/28/19 (from the past 24 hour(s))   CBC with platelets differential   Result Value Ref Range    WBC 10.0 4.0 - 11.0 10e9/L    RBC Count 4.72 3.8 - 5.2 10e12/L    Hemoglobin 14.5 11.7 - 15.7 g/dL    Hematocrit 43.7 35.0 - 47.0 %    MCV 93 78 - 100 fl    MCH 30.7 26.5 - 33.0 pg    MCHC 33.2 31.5 - 36.5 g/dL    RDW 14.0 10.0 - 15.0 %    Platelet Count 345 150 - 450 10e9/L    Diff Method Automated Method     % Neutrophils 78.3 %    % Lymphocytes 16.1 %    % Monocytes 4.8 %    % Eosinophils 0.0 %    % Basophils 0.5 %    % Immature Granulocytes 0.3 %    Absolute Neutrophil 7.8 1.6 - 8.3 10e9/L    Absolute Lymphocytes 1.6 0.8 - 5.3 10e9/L    Absolute Monocytes 0.5 0.0 - 1.3 10e9/L    Absolute Eosinophils 0.0 0.0 - 0.7 10e9/L    Absolute Basophils 0.1 0.0 - 0.2 10e9/L    Abs Immature Granulocytes 0.0 0 - 0.4 10e9/L       Medications - No data to display    Assessments & Plan (with Medical Decision Making)     I have reviewed the nursing notes.    I have reviewed the findings, diagnosis, plan and need for follow up with the patient.         Medication List      There are no discharge medications for this visit.         Final diagnoses:   Atypical chest pain       3/28/2019   [facility] Gricel Lawson MD  03/29/19 [id number]

## 2019-03-28 NOTE — TELEPHONE ENCOUNTER
Message relayed to patient, agreed to go into [facility]/ED after work. Cristina Fernandes, RN on 3/28/2019 at 4:14 PM

## 2019-03-29 ENCOUNTER — APPOINTMENT (OUTPATIENT)
Dept: CARDIOLOGY | Facility: OTHER | Age: 59
End: 2019-03-29
Attending: FAMILY MEDICINE
Payer: COMMERCIAL

## 2019-03-29 VITALS
WEIGHT: 131.17 LBS | TEMPERATURE: 96.8 F | HEART RATE: 78 BPM | SYSTOLIC BLOOD PRESSURE: 112 MMHG | BODY MASS INDEX: 24.14 KG/M2 | HEIGHT: 62 IN | DIASTOLIC BLOOD PRESSURE: 65 MMHG | OXYGEN SATURATION: 91 % | RESPIRATION RATE: 16 BRPM

## 2019-03-29 LAB
ANION GAP SERPL CALCULATED.3IONS-SCNC: 4 MMOL/L (ref 3–14)
BASOPHILS # BLD AUTO: 0.1 10E9/L (ref 0–0.2)
BASOPHILS NFR BLD AUTO: 0.9 %
BUN SERPL-MCNC: 11 MG/DL (ref 7–25)
CALCIUM SERPL-MCNC: 8.8 MG/DL (ref 8.6–10.3)
CHLORIDE SERPL-SCNC: 107 MMOL/L (ref 98–107)
CO2 SERPL-SCNC: 29 MMOL/L (ref 21–31)
CREAT SERPL-MCNC: 0.79 MG/DL (ref 0.6–1.2)
DIFFERENTIAL METHOD BLD: NORMAL
EOSINOPHIL # BLD AUTO: 0.2 10E9/L (ref 0–0.7)
EOSINOPHIL NFR BLD AUTO: 1.6 %
ERYTHROCYTE [DISTWIDTH] IN BLOOD BY AUTOMATED COUNT: 14.3 % (ref 10–15)
GFR SERPL CREATININE-BSD FRML MDRD: 75 ML/MIN/{1.73_M2}
GLUCOSE SERPL-MCNC: 116 MG/DL (ref 70–105)
HCT VFR BLD AUTO: 40.1 % (ref 35–47)
HGB BLD-MCNC: 13.2 G/DL (ref 11.7–15.7)
IMM GRANULOCYTES # BLD: 0 10E9/L (ref 0–0.4)
IMM GRANULOCYTES NFR BLD: 0.3 %
LYMPHOCYTES # BLD AUTO: 3.2 10E9/L (ref 0.8–5.3)
LYMPHOCYTES NFR BLD AUTO: 31.3 %
MCH RBC QN AUTO: 31 PG (ref 26.5–33)
MCHC RBC AUTO-ENTMCNC: 32.9 G/DL (ref 31.5–36.5)
MCV RBC AUTO: 94 FL (ref 78–100)
MONOCYTES # BLD AUTO: 0.8 10E9/L (ref 0–1.3)
MONOCYTES NFR BLD AUTO: 7.9 %
NEUTROPHILS # BLD AUTO: 6 10E9/L (ref 1.6–8.3)
NEUTROPHILS NFR BLD AUTO: 58 %
PLATELET # BLD AUTO: 317 10E9/L (ref 150–450)
POTASSIUM SERPL-SCNC: 3.8 MMOL/L (ref 3.5–5.1)
RBC # BLD AUTO: 4.26 10E12/L (ref 3.8–5.2)
SODIUM SERPL-SCNC: 140 MMOL/L (ref 134–144)
TROPONIN I SERPL-MCNC: <0.03 UG/L (ref 0–0.03)
TROPONIN I SERPL-MCNC: <0.03 UG/L (ref 0–0.03)
WBC # BLD AUTO: 10.3 10E9/L (ref 4–11)

## 2019-03-29 PROCEDURE — 36415 COLL VENOUS BLD VENIPUNCTURE: CPT | Performed by: FAMILY MEDICINE

## 2019-03-29 PROCEDURE — 85025 COMPLETE CBC W/AUTO DIFF WBC: CPT | Performed by: FAMILY MEDICINE

## 2019-03-29 PROCEDURE — G0378 HOSPITAL OBSERVATION PER HR: HCPCS

## 2019-03-29 PROCEDURE — 80048 BASIC METABOLIC PNL TOTAL CA: CPT | Performed by: FAMILY MEDICINE

## 2019-03-29 PROCEDURE — 25800030 ZZH RX IP 258 OP 636: Performed by: FAMILY MEDICINE

## 2019-03-29 PROCEDURE — 93306 TTE W/DOPPLER COMPLETE: CPT

## 2019-03-29 PROCEDURE — 84484 ASSAY OF TROPONIN QUANT: CPT | Mod: 91 | Performed by: FAMILY MEDICINE

## 2019-03-29 PROCEDURE — 93306 TTE W/DOPPLER COMPLETE: CPT | Mod: 26 | Performed by: INTERNAL MEDICINE

## 2019-03-29 RX ORDER — NITROGLYCERIN 0.4 MG/1
TABLET SUBLINGUAL
Qty: 10 TABLET | Refills: 3 | Status: SHIPPED | OUTPATIENT
Start: 2019-03-29 | End: 2020-10-22

## 2019-03-29 RX ADMIN — DEXTROSE MONOHYDRATE, SODIUM CHLORIDE, AND POTASSIUM CHLORIDE: 50; 4.5; 1.49 INJECTION, SOLUTION INTRAVENOUS at 07:27

## 2019-03-29 ASSESSMENT — ENCOUNTER SYMPTOMS
FEVER: 0
ADENOPATHY: 0
NEUROLOGICAL NEGATIVE: 1
STRIDOR: 0
PSYCHIATRIC NEGATIVE: 1
HEMATURIA: 0
MUSCULOSKELETAL NEGATIVE: 1
GASTROINTESTINAL NEGATIVE: 1
ABDOMINAL PAIN: 0
COUGH: 1
WOUND: 0
BRUISES/BLEEDS EASILY: 0
CONFUSION: 0
HEMATOLOGIC/LYMPHATIC NEGATIVE: 1
CHEST TIGHTNESS: 1
CHILLS: 0
SHORTNESS OF BREATH: 1
BACK PAIN: 0

## 2019-03-29 NOTE — PROGRESS NOTES
Seen in ER for s/s.     Soraya Roa NP on 3/28/2019 at 8:47 PM    This encounter was opened in error. Please disregard.

## 2019-03-29 NOTE — PLAN OF CARE
Pt VS Temp: 96.8  F (36  C) Temp src: Tympanic BP: 112/65 Pulse: 78 Heart Rate: 74 Resp: 16 SpO2: 91 % O2 Device: None (Room air)      Remains without chest pain. Was able to eat 100% breakfast this AM and has now been resting in bed. IV fluids discontinued and site WDL. Elizabeth Blood RN on 3/29/2019 at 11:46 AM\

## 2019-03-29 NOTE — PLAN OF CARE
"Patient alert, orientated, in no apparent acute distress. Snuggling in bed with granddaughter \"Viky\". Denies chest pain, discomfort or SOB at this time.  "

## 2019-03-29 NOTE — PROGRESS NOTES
Discharge Note    Data:  Ember Tavares discharged to home at 1310 via wheel chair. Accompanied by spouse and staff.    Action:  Written discharge/follow-up instructions were provided to patient. Prescriptions sent to patients preferred pharmacy. All belongings sent with patient.    Response:  Patient verbalized understanding of discharge instructions, reason for discharge, and necessary follow-up appointments.    Elizabeth Blood RN on 3/29/2019 at 1:11 PM

## 2019-03-29 NOTE — PLAN OF CARE
Pt VS Temp: 96.6  F (35.9  C) Temp src: Tympanic BP: 117/58 Pulse: 78 Heart Rate: 86 Resp: 16 SpO2: 98 % O2 Device: None (Room air)      Remains without chest pain and A&O X4. Lung sounds clear and heart rhythm regular. Skin intact and bowel sounds active. Independent in room. IV fluids going at 125 mL/hr and site WDL. Elizabeth Blood RN on 3/29/2019 at 9:10 AM

## 2019-03-29 NOTE — PHARMACY - DISCHARGE MEDICATION RECONCILIATION AND EDUCATION
Pharmacy:  Discharge Counseling and Medication Reconciliation    Ember MUNGUIA Anusha  [address] [address] [id number] [phone] (home)   58 year old female  PCP: Tim Boyd    Allergies: Atorvastatin; Rosuvastatin; Sucralfate; Levofloxacin; Morphine; Penicillins; and Sulfamethoxazole w/trimethoprim    Discharge Counseling:    Pharmacist met with patient (and/or family) today to review the medication portion of the After Visit Summary (with an emphasis on NEW medications) and to address patient's questions/concerns.    Summary of Education: indication, correct use, side effects, and toxicity precautions discussed regarding new medications      Materials Provided:  MedCounselor sheets printed from Clinical Pharmacology on: nitroglycerin    Discharge Medication Reconciliation:    Nova Saha RPH has reviewed the patient's discharge medication orders and has compared them to the inpatient medication administration record and to what the patient was taking prior to admission - any discrepancies have been resolved.    It has been determined that the patient has an adequate supply of medications available or which can be obtained from the patient's preferred pharmacy.    Thank you for the consult.    Nova Saha RPH........March 29, 2019 1:03 PM

## 2019-03-29 NOTE — PHARMACY
Pharmacy -- Admission Medication Reconciliation    Prior to admission (PTA) medications were reviewed and the patient's PTA medication list was updated.    Sources Consulted: Patient Interview, Chart Review, [employer]    The reliability of this Medication Reconciliation is: Reliability: Reliable    The following significant changes were made:  Changed Ointments to PRN  Removed Triamcinolone     FYI- She takes her Vitamin D on Wednesdays     Added directions to Folic Acid, confirmed that patient IS NOT on methotrexate any longer.     In addition, the patient's allergies were reviewed with the patient and updated as follows:   Allergies: Atorvastatin; Rosuvastatin; Sucralfate; Levofloxacin; Morphine; Penicillins; and Sulfamethoxazole w/trimethoprim    The pharmacist has reviewed with the patient that all personal medications should be removed from the building or locked in the belongings safe.  Patient shall only take medications ordered by the physician and administered by the nursing staff.       Medication barriers identified: None noted.    Medication adherence concerns: None   Understanding of emergency medications: KAITLIN Stout RPH, 3/29/2019,  10:47 AM

## 2019-03-29 NOTE — DISCHARGE SUMMARY
[facility]    H&P/Discharge Summary  Hospitalist    Date of Admission:  3/28/2019  Date of Discharge:  3/29/2019  1:10 PM  Discharging Provider: Altagracia Chatterjee  Date of Service (when I saw the patient): 03/29/19    Discharge Diagnoses   Active Problems:    Chest pain (3/28/2019)      History of Present Illness   Ember Tavares is an 58 year old female who presented with a 2-day history of chest pain.  On 3/27/2019, 1 day prior to admission, she had an episode of chest pain lasting for 4 hours, starting as a sharp pain between the shoulder blades and then radiating anteriorly.  Anterior pain was described as heavy and somewhat different from the posterior pain.  That initial pain resolved, but then she had a recurrence of pain the following day and presented to the emergency department.  She had no recurrent pain in the emergency department and felt well.  However, she has a history of severe COPD, esophageal reflux with a hiatal hernia, prediabetes, and psoriatic arthritis, and also has peripheral vascular disease.  In the ED she was calculated as having a HEART score in the 4-6 range.    Hospital course  Ember Tavares was admitted on 3/28/2019.  The following problems were addressed during her hospitalization:    Atypical chest pain: EKG was reassuring and serial troponins x3 were all normal.  Echocardiogram was obtained and was unremarkable.  Patient had no recurrence of pain and felt well at the time of discharge.  Exam was also unremarkable.  Because of her significant vascular disease and risk factors, arrangements were made for outpatient stress echo.  Patient was cautioned to continue her daily baby aspirin and given a prescription for nitroglycerin with instructions for its use.  Severe COPD: Patient remained asymptomatic in terms of cough and exertional dyspnea.  She noted that she has been having more allergy related symptoms recently and her usual allergy medications were  continued along with usual respiratory medications.      Significant Results and Procedures   Procedures    Pending Results   These results will be followed up by N/A  Unresulted Labs Ordered in the Past 30 Days of this Admission     No orders found for last 61 day(s).          Code Status   Full Code       Primary Care Physician   Tim Boyd    Physical Exam   Temp: 96.8  F (36  C) Temp src: Tympanic BP: 112/65 Pulse: 78 Heart Rate: 74 Resp: 16 SpO2: 91 % O2 Device: None (Room air)    Vitals:    03/28/19 1747 03/28/19 2249   Weight: 59 kg (130 lb) 59.5 kg (131 lb 2.8 oz)     Vital Signs with Ranges  Temp:  [96.5  F (35.8  C)-98.4  F (36.9  C)] 96.8  F (36  C)  Pulse:  [] 78  Heart Rate:  [74-96] 74  Resp:  [11-20] 16  BP: (108-138)/(58-95) 112/65  SpO2:  [91 %-98 %] 91 %  I/O last 3 completed shifts:  In: 240 [P.O.:240]  Out: 550 [Urine:550]    Constitutional: Alert, well-appearing middle-aged woman in NAD  Eyes: PERRLA, EOMI  ENT: Normocephalic, OP pink and moist  Respiratory: Slightly diminished breath sounds in the bases, no crackles or wheezes  Cardiovascular: Regular rate and rhythm without murmur  GI: Soft with no mass, tenderness, or HSM    Discharge Disposition   Discharged to home  Condition at discharge: Stable    Consultations This Hospital Stay   None    Time Spent on this Encounter   IlAtagracia, personally saw the patient today and spent greater than 30 minutes discharging this patient.    Discharge Orders      Reason for your hospital stay    Chest pain with normal heart enzyme tests and echocardiogram     Follow-up and recommended labs and tests     Stress test to be scheduled  Follow up with Dr. Boyd in 5-7 days     Activity    Your activity upon discharge: avoid strenuous activity until after your stress test     Full Code     Echocardiogram Exercise Stress    Administration of IV contrast will be tailored to this examination per the appropriate written protocol listed in the  Echocardiography department Protocol Book, or by the supervising Cardiologist. This may result in an order change.    Use of contrast is at the discretion of the supervising Cardiologist.     Diet    Follow this diet upon discharge: Orders Placed This Encounter      Regular Diet Adult - eat a heart-healthy diet     Discharge Medications   Discharge Medication List as of 3/29/2019 12:49 PM      START taking these medications    Details   nitroGLYcerin (NITROSTAT) 0.4 MG sublingual tablet For chest pain place 1 tablet under the tongue every 5 minutes for 3 doses. If symptoms persist 5 minutes after 1st dose call 911., Disp-10 tablet, R-3, E-Prescribe         CONTINUE these medications which have NOT CHANGED    Details   albuterol (PROAIR HFA/PROVENTIL HFA/VENTOLIN HFA) 108 (90 Base) MCG/ACT inhaler Inhale 3 puffs into the lungs 3 times daily as needed for shortness of breath / dyspnea or wheezing, Disp-3 Inhaler, R-3, E-Prescribe      aspirin 81 MG chewable tablet Take 81 mg by mouth daily with food, Historical      calcipotriene 0.005 % OINT Apply topically 2 times daily as needed Historical      cetirizine (ZYRTEC) 10 MG tablet Take one tablet by mouth once or twice daily as needed for hand dermatitis/itching., Disp-180 tablet, R-3, E-Prescribe      Cholecalciferol (D 5000) 5000 UNITS TABS Take 5,000 Units by mouth every 7 days, Historical      clobetasol (TEMOVATE) 0.05 % ointment Apply topically 2 times daily as needed Historical      cyanocobalamin (VITAMIN B-12) 2500 MCG sublingual tablet Place 2,500 mcg under the tongue daily, Disp-90 tablet, R-3, E-Prescribe      fluticasone-salmeterol (ADVAIR DISKUS) 500-50 MCG/DOSE inhaler Inhale 1 puff into the lungs every 12 hours Rinse mouth well after use to prevent Thrush, Disp-1 Inhaler, R-11, E-Prescribe      folic acid (FOLVITE) 1 MG tablet Take 1 mg by mouth daily , R-0, Historical      tiotropium (SPIRIVA RESPIMAT) 2.5 MCG/ACT inhaler Inhale 2 puffs into the lungs  daily, Disp-4 g, R-11, E-Prescribe      venlafaxine (EFFEXOR-XR) 37.5 MG 24 hr capsule Take 1 capsule (37.5 mg) by mouth daily with food, Disp-90 capsule, R-3, E-Prescribe           Allergies   Allergies   Allergen Reactions     Atorvastatin Muscle Pain (Myalgia)     Rosuvastatin Nausea and Vomiting     Sucralfate Nausea and Vomiting     Levofloxacin Nausea and Vomiting     Morphine      Other reaction(s): Chest Pain     Penicillins      Other reaction(s): Respiratory Arrest     Sulfamethoxazole W/Trimethoprim Nausea and Vomiting     Yeast infection     Data   Most Recent 3 CBC's:  Recent Labs   Lab Test 03/29/19  0555 03/28/19 1820 12/05/18  1555   WBC 10.3 10.0 11.8*   HGB 13.2 14.5 14.3   MCV 94 93 96    345 335      Most Recent 3 BMP's:  Recent Labs   Lab Test 03/29/19  0555 03/28/19  1820 12/05/18  1555    138  --    POTASSIUM 3.8 4.1  --    CHLORIDE 107 103  --    CO2 29 26  --    BUN 11 15  --    CR 0.79 0.80 0.81   ANIONGAP 4 9  --    COLEMAN 8.8 9.7  --    * 146*  --      Most Recent 2 LFT's:  Recent Labs   Lab Test 03/28/19 1820 12/05/18  1555   AST 22 23   ALT 26 26   ALKPHOS 70  --    BILITOTAL 0.2*  --      Most Recent INR's and Anticoagulation Dosing History:  Anticoagulation Dose History     Recent Dosing and Labs Latest Ref Rng & Units 3/28/2019    INR 0 - 1.3 0.91        Most Recent 3 Troponins:  Recent Labs   Lab Test 03/29/19  0555 03/29/19  0001 03/28/19  2030   TROPI <0.030 <0.030 <0.030     Most Recent Cholesterol Panel:  Recent Labs   Lab Test 08/24/17  1158   *   HDL 38   TRIG 162*     Most Recent 6 Bacteria Isolates From Any Culture (See EPIC Reports for Culture Details):No lab results found.  Most Recent TSH, T4 and A1c Labs:No lab results found.  Results for orders placed or performed during the hospital encounter of 03/28/19   XR Chest 2 Views    Narrative    XR CHEST 2 VW    HISTORY: 58 years Female chest pain    COMPARISON: 9/15/2017    TECHNIQUE: 2 views of the  chest were obtained.    FINDINGS: Two views of the chest were obtained. Heart size and  pulmonary vascularity are within normal limits, lungs are clear on  both views. No consolidating air space opacities are present.          Impression    IMPRESSION: Clear chest.    SYDNI CARRIZALES MD

## 2019-03-29 NOTE — PROGRESS NOTES
" [facility] ADMISSION NOTE    Patient admitted to room 355 at approximately 2245 via cart from emergency room. Patient was accompanied by Family.     Verbal SBAR report received from [facility] prior to patient arrival.     Patient ambulated to bed independently. Patient alert and oriented X 3. The patient is not having any pain. 0-10 Pain Scale: 0. Admission vital signs: Blood pressure 136/84, pulse 79, temperature 96.8  F (36  C), temperature source Tympanic, resp. rate 16, height 1.575 m (5' 2\"), weight 59.5 kg (131 lb 2.8 oz), SpO2 94 %, not currently breastfeeding. Patient and other:granddaughter were oriented to plan of care, call light, bed controls, tv, telephone, bathroom and visiting hours.       Sindy Arechiga RN on 3/28/2019 at 10:45 PM  "

## 2019-03-29 NOTE — ED PROVIDER NOTES
Pleasant 58-year-old female presents with 2 days of chest pressure and pain.  Patient signed out to me by Dr. Clark.  See her history for details as well.  Patient has no recurrent pain here in the emergency department.  Patient has history of peripheral arterial disease, COPD and mesenteric thrombosis.    EKG is reassuring, serial troponins are negative.      HEART Score  Background  Calculates the overall risk of adverse event in patient's presenting with chest pain.  Based on 5 criteria (each assigned 0-2 points) including suspiciousness of history, EKG, age, risk factors and troponin.    Data  58 year old female  has Chronic abdominal pain; Alpha-1-antitrypsin deficiency carrier (H); Arthritis of knee, right; Atherosclerosis of abdominal aorta (H); Pulmonary emphysema (H); Chronic radicular cervical pain; Constipation, chronic; Contact dermatitis; COPD, severe (H); Decreased bone density; Decreased diffusion capacity of lung; Dyshidrotic hand dermatitis; Epigastric pain; Esophageal reflux; GERD (gastroesophageal reflux disease); Hiatal hernia; History of esophageal dilatation; History of tobacco abuse; Irritable bowel syndrome; Myalgia; Occipital neuralgia of left side; Osteopenia; Pain of cervical facet joint; Prediabetes; Psoriasis; Psoriatic arthritis (H); Schatzki's ring of distal esophagus; Sinusitis, chronic; Vitamin D deficiency; S/P Nissen fundoplication (without gastrostomy tube) procedure; Other and unspecified hyperlipidemia; PAD (peripheral artery disease) (H); Vitamin B12 deficiency; Menopausal syndrome (hot flashes); and Ulcer of right cornea on their problem list.   reports that she quit smoking about 6 years ago. Her smoking use included cigarettes. She has a 16.00 pack-year smoking history. she has never used smokeless tobacco.  family history includes Arthritis in her father and mother; Breast Cancer in her sister; Diabetes in her mother; Eczema in her brother; Other - See Comments in her  daughter and daughter; Peripheral Vascular Disease in her mother; Seizure Disorder in her daughter.  Lab Results   Component Value Date    TROPI <0.030 03/28/2019     Criteria   0-2 points for each of 5 items (maximum of 10 points):  Score 1- History moderately suspicious for coronary syndrome  Score 1- EKG: Left axis deviation, right bundle branch block  Score 1- Age 45 to 65 years old  Score 2- Three or more risk factors for or history of atherosclerotic disease  Score 0- Within normal limits for troponin levels  Interpretation  Risk of adverse outcome  Heart Score: 5  Total Score 4-6- Adverse Outcome Risk 20.3% - Supports admission with standard rule-out management -serial troponins and stress testing      Heart score positive for increased risk for major adverse cardiac event.  Discussed options with patient.  Will admit for serial troponins and monitoring.  Discussed with Dr. Redmond.  Patient family verbalized understanding plan are in agreement.  Patient remains chest pain-free at time of admission.       Salomón Lima MD  03/28/19 5938

## 2019-04-05 ENCOUNTER — MYC MEDICAL ADVICE (OUTPATIENT)
Dept: INTERNAL MEDICINE | Facility: OTHER | Age: 59
End: 2019-04-05

## 2019-04-05 NOTE — TELEPHONE ENCOUNTER
Called patient back with the response from Dr. Boyd.  Melissa Amezcua LPN .......4/5/2019 1:40 PM

## 2019-04-05 NOTE — TELEPHONE ENCOUNTER
It would be very unlikely to have part of the IV left in her arm.    More likely her vein was leaking blood after the IV was removed.      Sometimes warm packs can help facilitate the bruising to go away.    Tim Boyd MD

## 2019-04-06 NOTE — PROGRESS NOTES
"Nursing Notes:   Melissa Li LPN  4/8/2019 10:01 AM  Signed  Patient presents to the clinic for hospital follow up.    Chief Complaint   Patient presents with     Hospital F/U       Initial /80 (BP Location: Right arm, Patient Position: Sitting, Cuff Size: Adult Regular)   Pulse 84   Temp 96.2  F (35.7  C) (Tympanic)   Resp 16   Wt 61.3 kg (135 lb 2 oz)   BMI 24.71 kg/m    Estimated body mass index is 24.71 kg/m  as calculated from the following:    Height as of 3/28/19: 1.575 m (5' 2\").    Weight as of this encounter: 61.3 kg (135 lb 2 oz).  Medication Reconciliation: complete    Melissa Li LPN    Nursing note reviewed with patient.  Accuracy and completeness verified.   Ms. Tavares is a 58 year old female who:  Patient presents with:  Hospital F/U      ICD-10-CM    1. Hospital discharge follow-up Z09    2. Chest pain, unspecified type R07.9 metoprolol tartrate (LOPRESSOR) 25 MG tablet     CTA Angiogram coronary artery   3. PAD (peripheral artery disease) (H) I73.9 pravastatin (PRAVACHOL) 20 MG tablet     metoprolol tartrate (LOPRESSOR) 25 MG tablet   4. COPD, severe (H) J44.9    5. Mixed hyperlipidemia E78.2 pravastatin (PRAVACHOL) 20 MG tablet   6. Seasonal allergic rhinitis due to pollen J30.1 montelukast (SINGULAIR) 10 MG tablet     HPI  Patient was hospitalized overnight 3/28-3/29/2019 at [facility].  She presented to the emergency room after a 2-day history of chest pain.  Initial episode lasted about 4 hours initially a sharp pain between her shoulder blades radiating anteriorly.  Chest felt tight. BP was 140/88. Increased pain, then /102. Chewed 4x baby aspirin. Anterior pain was heavy and different than the posterior pain.    -- This initial pain resolved. BP dropped to 128/80. The following day she had recurrence of pain and subsequently presented to the emergency room.    Patient has history of severe COPD, esophageal reflux with hiatal hernia, prediabetes, psoriatic " arthritis and also peripheral vascular disease.    Serial troponins x3 were negative.  Initial echocardiogram in hospital was negative.    Stress echo was ordered at the time of discharge.    Patient had a left heart cath back in April 2009, due to having an abnormal stress echo.  Cath showed No significant coronary artery disease.  She did have anterolateral mild hypokinesis and mild inferior wall hypokinesis.  + She did have peripheral vascular disease however.  She had a tight left iliac artery lesion just beyond the bifurcation.  Abdominal aorta just prior to the iliac artery also had some diffuse moderate disease.  Right iliac had moderate disease in the mid section.    Mixed hyperlipidemia, has tried Lipitor and Crestor  which resulted in myalgias.    -- Start trial of pravastatin 2x weekly.     + Has noted more shortness of breath on exertion, especially if carrying something.     CT angiogram coronary arteries ordered  - they will call with date/time of appointment.      Has been having progression of exertional shortness of breath especially with carrying items.  She has known peripheral vascular disease.  Continue low-dose aspirin 81 mg daily.  Start metoprolol 12.5-25 mg twice a day.    History of severe COPD, no longer smoking.    Seasonal allergic rhinitis, taking Zyrtec once or twice a day.  This helps moderately but her allergies have started up it worse again recently.  Start trial of Singulair.    Functional Capacity: about 4 METS. + some shortness of breath on exertion and chest pressure with exertion.     Review of Systems   Constitutional: Positive for appetite change. Negative for chills, fatigue and fever.   HENT: Positive for congestion, postnasal drip, rhinorrhea, sinus pressure, sneezing and sore throat. Negative for hearing loss.    Eyes: Negative for pain and visual disturbance.   Respiratory: Positive for shortness of breath (+ occasionally - will have more problems if carrying  something). Negative for cough, chest tightness and wheezing.    Cardiovascular: Positive for chest pain. Negative for palpitations.        + some intermittent claudication leg pain symptoms   Gastrointestinal: Positive for abdominal pain (+ painful lump in right inguinal area). Negative for diarrhea, nausea and vomiting.        + doing very well after Nissen surgery -- getting some mild gas / bloating at times.    Endocrine: Negative for cold intolerance and heat intolerance.   Genitourinary: Negative for dysuria and hematuria.   Musculoskeletal: Positive for arthralgias. Negative for back pain and myalgias.   Skin: Negative for pallor, rash and wound.        + psoriasis   Allergic/Immunologic: Negative for immunocompromised state.   Neurological: Negative for dizziness, syncope and light-headedness.   Hematological: Negative for adenopathy. Does not bruise/bleed easily.   Psychiatric/Behavioral: Negative for agitation and confusion.        KARL:   KARL-7 SCORE 4/17/2018 3/6/2019 4/8/2019   Total Score 0 0 0     PHQ9:  PHQ-9 SCORE 4/17/2018 3/6/2019 4/8/2019   PHQ-9 Total Score 0 0 0       I have personally reviewed the past medical history, past surgical history, medications, allergies, family and social history as listed below.     Allergies   Allergen Reactions     Atorvastatin Muscle Pain (Myalgia)     Rosuvastatin Nausea and Vomiting     Sucralfate Nausea and Vomiting     Levofloxacin Nausea and Vomiting     Morphine      Other reaction(s): Chest Pain     Penicillins      Other reaction(s): Respiratory Arrest     Sulfamethoxazole W/Trimethoprim Nausea and Vomiting     Yeast infection       Current Outpatient Medications   Medication Sig Dispense Refill     albuterol (PROAIR HFA/PROVENTIL HFA/VENTOLIN HFA) 108 (90 Base) MCG/ACT inhaler Inhale 3 puffs into the lungs 3 times daily as needed for shortness of breath / dyspnea or wheezing 3 Inhaler 3     aspirin 81 MG chewable tablet Take 81 mg by mouth daily with  food       calcipotriene 0.005 % OINT Apply topically 2 times daily as needed        cetirizine (ZYRTEC) 10 MG tablet Take one tablet by mouth once or twice daily as needed for hand dermatitis/itching. 180 tablet 3     Cholecalciferol (D 5000) 5000 UNITS TABS Take 5,000 Units by mouth every 7 days       clobetasol (TEMOVATE) 0.05 % ointment Apply topically 2 times daily as needed        cyanocobalamin (VITAMIN B-12) 2500 MCG sublingual tablet Place 2,500 mcg under the tongue daily 90 tablet 3     fluticasone-salmeterol (ADVAIR DISKUS) 500-50 MCG/DOSE inhaler Inhale 1 puff into the lungs every 12 hours Rinse mouth well after use to prevent Thrush 1 Inhaler 11     folic acid (FOLVITE) 1 MG tablet Take 1 mg by mouth daily   0     metoprolol tartrate (LOPRESSOR) 25 MG tablet Take 0.5-1 tablets (12.5-25 mg) by mouth 2 times daily 60 tablet 0     montelukast (SINGULAIR) 10 MG tablet Take 0.5-1 tablets (5-10 mg) by mouth At Bedtime - for allergies 90 tablet 3     nitroGLYcerin (NITROSTAT) 0.4 MG sublingual tablet For chest pain place 1 tablet under the tongue every 5 minutes for 3 doses. If symptoms persist 5 minutes after 1st dose call 911. 10 tablet 3     pravastatin (PRAVACHOL) 20 MG tablet Take 1 tablet (20 mg) by mouth twice a week 15 tablet 3     tiotropium (SPIRIVA RESPIMAT) 2.5 MCG/ACT inhaler Inhale 2 puffs into the lungs daily 4 g 11     venlafaxine (EFFEXOR-XR) 37.5 MG 24 hr capsule Take 1 capsule (37.5 mg) by mouth daily with food 90 capsule 3        Patient Active Problem List    Diagnosis Date Noted     Seasonal allergic rhinitis due to pollen 04/08/2019     Priority: Medium     Chest pain 03/28/2019     Priority: Medium     Ulcer of right cornea 08/18/2018     Priority: Medium     Vitamin B12 deficiency 04/17/2018     Priority: Medium     Menopausal syndrome (hot flashes) 04/17/2018     Priority: Medium     S/P Nissen fundoplication (without gastrostomy tube) procedure 03/26/2018     Priority: Medium      GERD (gastroesophageal reflux disease) 11/29/2017     Priority: Medium     Chronic radicular cervical pain 11/20/2017     Priority: Medium     Psoriatic arthritis (H) 11/20/2017     Priority: Medium     Occipital neuralgia of left side 09/15/2017     Priority: Medium     Arthritis of knee, right 08/24/2017     Priority: Medium     Atherosclerosis of abdominal aorta (H) 08/24/2017     Priority: Medium     Overview:   [facility] Studies:  2/19/2013 -- CTA ABDOMEN AND PELVIS WITH BILATERAL LOWER EXTREMITY RUNOFF    CLINICAL HISTORY: Iliac and mesenteric ischemia.    TECHNIQUE: 125 mL Omnipaque 300 IV contrast was administered. 3-D  arterial reformations were performed.    FINDINGS: There is a well-defined ovoid density at the medial right  costophrenic angle. This measures 2.3 x 0.9 x 0.6 cm. It demonstrates  central low attenuation similar to the adjacent abdominal fat near the  liver. This could be a tiny Bochdalek hernia. The liver, spleen,  pancreas, adrenal glands, and kidneys are unremarkable. The  gallbladder is surgically absent. No biliary ductal dilation. No  mesenteric or retroperitoneal adenopathy. No ascites or fluid  collection. There is a moderate amount of retained stool seen  throughout most of the colon. No obstruction. No adjacent inflammatory  change. No ascites or fluid collection. Ureters and bladder are  unremarkable.    There is mild partially calcified atherosclerotic plaque within the  abdominal aorta. No aneurysm. There is narrowing of the lumen just  proximal to the bifurcation to 6 mm.   There is moderate narrowing of the proximal celiac artery without associated calcification.   The superior mesenteric artery appears to be widely patent with good opacification of distal branch vessels. The MARIAH also appears to be well opacified.   There are single bilateral renal arteries which are widely patent.    There is mild partially calcified plaque within the common iliac arteries with minimal  narrowing.   The external iliac and common femoral arteries are widely patent.   Superficial femoral and popliteal arteries are widely patent bilaterally.   There is three-vessel runoff with good opacification in the lower leg to the ankle and foot.    IMPRESSION:  1. Mild aortoiliac atherosclerotic disease. No aneurysm. There is narrowing of the distal aorta just proximal to the bifurcation.  2. There is moderate narrowing of the celiac artery which could  be due to noncalcified plaque. No SMA or MARIAH compromise definitely seen.  3. Probable tiny Bochdalek hernia at the right costophrenic angle. A hamartoma would be a differential consideration though is less likely given the central fat density.  4. Possible constipation.  5. No evidence of vascular compromise in the lower extremities.    Examination Interpreted at: [facility], [place], [place]  The Interpreting Physician is MONICA VORA  Exam was completed at [facility]       COPD, severe (H) 08/24/2017     Priority: Medium     Overview:   8/4/2014 -- PULMONARY FUNCTION    SITE:  [facility]  ORDERED BY:  VANNESA LUNA    CLINICAL SUMMARY: 53-year-old female with a history of severe COPD.     TECHNICIAN NOTE: Good effort.     DATA: FVC 1.85 (61%). FEV1 1.04 (45%). FEV1/FVC ratio 56%. DLCO 13.5 to 62%.     Flow volume curve is consistent with airway obstruction.     IMPRESSION:  1. Severe obstructive pulmonary disease.   2. Mild decrease in diffusing capacity.       Decreased diffusion capacity of lung 08/24/2017     Priority: Medium     Dyshidrotic hand dermatitis 08/24/2017     Priority: Medium     Hiatal hernia 08/24/2017     Priority: Medium     History of esophageal dilatation 08/24/2017     Priority: Medium     Prediabetes 08/24/2017     Priority: Medium     Psoriasis 08/24/2017     Priority: Medium     Schatzki's ring of distal esophagus 08/24/2017     Priority: Medium     Vitamin D  deficiency 08/24/2017     Priority: Medium     Alpha-1-antitrypsin deficiency carrier (H) 08/23/2017     Priority: Medium     Irritable bowel syndrome 08/23/2017     Priority: Medium     Osteopenia 08/23/2017     Priority: Medium     PAD (peripheral artery disease) (H) 10/13/2015     Priority: Medium     Mixed hyperlipidemia 08/01/2012     Priority: Medium     Contact dermatitis 07/01/2010     Priority: Medium     Myalgia 07/01/2010     Priority: Medium     Chronic abdominal pain 03/10/2010     Priority: Medium     Overview:   16 year duration       Constipation, chronic 03/10/2010     Priority: Medium     History of tobacco abuse 03/10/2010     Priority: Medium     Sinusitis, chronic 03/10/2010     Priority: Medium     Past Medical History:   Diagnosis Date     Alpha-1-antitrypsin deficiency carrier (H) 8/23/2017     Atherosclerosis of abdominal aorta (H) 8/24/2017    Overview:  [facility] Studies: 2/19/2013 -- CTA ABDOMEN AND PELVIS WITH BILATERAL LOWER EXTREMITY RUNOFF  CLINICAL HISTORY: Iliac and mesenteric ischemia.  TECHNIQUE: 125 mL Omnipaque 300 IV contrast was administered. 3-D arterial reformations were performed.  FINDINGS: There is a well-defined ovoid density at the medial right costophrenic angle. This measures 2.3 x 0.9 x 0.6 cm. It demonstra     Chronic abdominal pain 3/10/2010    Overview:  16 year duration     Chronic sinusitis     No Comments Provided     Closed fracture of skull (H)     1972     COPD, severe (H) 8/24/2017    Overview:  8/4/2014 -- PULMONARY FUNCTION  SITE:  [facility] ORDERED BY:  VANNESA LUNA  CLINICAL SUMMARY: 53-year-old female with a history of severe COPD.   TECHNICIAN NOTE: Good effort.   DATA: FVC 1.85 (61%). FEV1 1.04 (45%). FEV1/FVC ratio 56%. DLCO 13.5 to 62%.   Flow volume curve is consistent with airway obstruction.   IMPRESSION: 1. Severe obstructive pulmon     GERD (gastroesophageal reflux disease) 11/29/2017     Hiatal hernia  2017     Other and unspecified hyperlipidemia 2012     PAD (peripheral artery disease) (H) 10/13/2015     Psoriasis 2017     Psoriatic arthritis (H) 2017     Schatzki's ring of distal esophagus 2017     Ulcer of right cornea 2018     Vitamin B12 deficiency 2018     Vitamin D deficiency 2017     Past Surgical History:   Procedure Laterality Date     AS UGI ENDOSCOPY W ESOPHAGEAL DILATION BALLOON <30MM  10/30/2015    ESOPHAGEAL DILATION,Dr. Rainer Allen, [facility]     BIOPSY BREAST Bilateral     , , BIOPSY BREAST,benign     CHOLECYSTECTOMY      Laparoscopic     COLONOSCOPY  2016    x's 3 negative     CT CORONARY ANGIOGRAM      CT CORONARY ANGIOGRAM (IA),negative     ESOPHAGOSCOPY, GASTROSCOPY, DUODENOSCOPY (EGD), COMBINED  2009    dilated, Dr. Allen     HYSTERECTOMY VAGINAL      ovaries remain     LAPAROSCOPIC NISSEN FUNDOPLICATION N/A 3/26/2018    Procedure: LAPAROSCOPIC NISSEN FUNDOPLICATION;  Laparoscopic Nissen Fundoplication;  Surgeon: Jagdish Ruiz MD;  Location: [facility] OR     LAPAROTOMY EXPLORATORY      lysis of adhesions/endometriosis     RECTOCELE REPAIR  2009     Social History     Socioeconomic History     Marital status:      Spouse name: Ervin     Number of children: None     Years of education: None     Highest education level: None   Occupational History     None   Social Needs     Financial resource strain: None     Food insecurity:     Worry: None     Inability: None     Transportation needs:     Medical: None     Non-medical: None   Tobacco Use     Smoking status: Former Smoker     Packs/day: 0.50     Years: 32.00     Pack years: 16.00     Types: Cigarettes     Last attempt to quit: 2012     Years since quittin.6     Smokeless tobacco: Never Used     Tobacco comment: Quit smoking: quit date of 2012   Substance and Sexual Activity     Alcohol use: Yes     Alcohol/week: 1.2 oz     Comment:  Alcoholic Drinks/day: 1-2 drinks every 1-2 weeks     Drug use: No     Sexual activity: Never   Lifestyle     Physical activity:     Days per week: None     Minutes per session: None     Stress: None   Relationships     Social connections:     Talks on phone: None     Gets together: None     Attends Yazdanism service: None     Active member of club or organization: None     Attends meetings of clubs or organizations: None     Relationship status: None     Intimate partner violence:     Fear of current or ex partner: None     Emotionally abused: None     Physically abused: None     Forced sexual activity: None   Other Topics Concern     Parent/sibling w/ CABG, MI or angioplasty before 65F 55M? 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  Lives in [place].  Patient previously smoked.    Alcohol Use - yes  Drug Use - no  Regular Exercise - yes   - Johan  3 biologic children, 1 adopted, 2 stepchildren.    Works at [employer]     Family History   Problem Relation Age of Onset     Arthritis Father         Arthritis     Emphysema Father 51        Alpha-1-AT deficiency     Peripheral Vascular Disease Mother         Peripheral vascular disease     Diabetes Mother         Diabetes     Arthritis Mother         Arthritis     Breast Cancer Sister         Premenopausal breast cancer     Eczema Brother         Eczema     Narcolepsy Brother      Other - See Comments Daughter         Narcolepsy     Narcolepsy Daughter      Brain Tumor Daughter      Seizure Disorder Daughter      Depression Daughter      Graves' disease Daughter        EXAM:   Vitals:    04/08/19 0954   BP: 136/80   BP Location: Right arm   Patient Position: Sitting   Cuff Size: Adult Regular   Pulse: 84   Resp: 16   Temp: 96.2  F (35.7  C)   TempSrc: Tympanic   Weight: 61.3 kg (135 lb 2 oz)       Current Pain Score: No Pain (0)     BP Readings from Last 3 Encounters:   04/08/19 136/80  "  03/29/19 112/65   03/28/19 120/70      Wt Readings from Last 3 Encounters:   04/08/19 61.3 kg (135 lb 2 oz)   03/28/19 59.5 kg (131 lb 2.8 oz)   03/28/19 59 kg (130 lb 1.6 oz)      Estimated body mass index is 24.71 kg/m  as calculated from the following:    Height as of 3/28/19: 1.575 m (5' 2\").    Weight as of this encounter: 61.3 kg (135 lb 2 oz).     Physical Exam   Constitutional: She appears well-developed and well-nourished. No distress.   HENT:   Head: Normocephalic and atraumatic.   Eyes: Conjunctivae are normal. No scleral icterus.   Neck: Neck supple.   Cardiovascular: Normal rate and regular rhythm.   Pulmonary/Chest: Effort normal. She has no wheezes. She has no rales.   Abdominal: Soft. She exhibits mass (+ right inguinal mass - tender to palpation). There is tenderness.   Musculoskeletal: She exhibits no deformity.   Lymphadenopathy:     She has no cervical adenopathy.   Neurological: She is alert.   Skin: Skin is warm and dry. No rash noted. She is not diaphoretic.   Psychiatric: She has a normal mood and affect.        Procedures   INVESTIGATIONS:  Results for orders placed or performed during the hospital encounter of 03/28/19   XR Chest 2 Views    Narrative    XR CHEST 2 VW    HISTORY: 58 years Female chest pain    COMPARISON: 9/15/2017    TECHNIQUE: 2 views of the chest were obtained.    FINDINGS: Two views of the chest were obtained. Heart size and  pulmonary vascularity are within normal limits, lungs are clear on  both views. No consolidating air space opacities are present.          Impression    IMPRESSION: Clear chest.    SYDNI CARRIZALES MD   CBC with platelets differential   Result Value Ref Range    WBC 10.0 4.0 - 11.0 10e9/L    RBC Count 4.72 3.8 - 5.2 10e12/L    Hemoglobin 14.5 11.7 - 15.7 g/dL    Hematocrit 43.7 35.0 - 47.0 %    MCV 93 78 - 100 fl    MCH 30.7 26.5 - 33.0 pg    MCHC 33.2 31.5 - 36.5 g/dL    RDW 14.0 10.0 - 15.0 %    Platelet Count 345 150 - 450 10e9/L    Diff Method " Automated Method     % Neutrophils 78.3 %    % Lymphocytes 16.1 %    % Monocytes 4.8 %    % Eosinophils 0.0 %    % Basophils 0.5 %    % Immature Granulocytes 0.3 %    Absolute Neutrophil 7.8 1.6 - 8.3 10e9/L    Absolute Lymphocytes 1.6 0.8 - 5.3 10e9/L    Absolute Monocytes 0.5 0.0 - 1.3 10e9/L    Absolute Eosinophils 0.0 0.0 - 0.7 10e9/L    Absolute Basophils 0.1 0.0 - 0.2 10e9/L    Abs Immature Granulocytes 0.0 0 - 0.4 10e9/L   Troponin I   Result Value Ref Range    Troponin I ES <0.030 0.000 - 0.034 ug/L   INR   Result Value Ref Range    INR 0.91 0 - 1.3   Partial thromboplastin time   Result Value Ref Range    PTT 38 26 - 39 sec   Comprehensive metabolic panel   Result Value Ref Range    Sodium 138 134 - 144 mmol/L    Potassium 4.1 3.5 - 5.1 mmol/L    Chloride 103 98 - 107 mmol/L    Carbon Dioxide 26 21 - 31 mmol/L    Anion Gap 9 3 - 14 mmol/L    Glucose 146 (H) 70 - 105 mg/dL    Urea Nitrogen 15 7 - 25 mg/dL    Creatinine 0.80 0.60 - 1.20 mg/dL    GFR Estimate 74 >60 mL/min/[1.73_m2]    GFR Estimate If Black 89 >60 mL/min/[1.73_m2]    Calcium 9.7 8.6 - 10.3 mg/dL    Bilirubin Total 0.2 (L) 0.3 - 1.0 mg/dL    Albumin 4.6 3.5 - 5.7 g/dL    Protein Total 7.7 6.4 - 8.9 g/dL    Alkaline Phosphatase 70 34 - 104 U/L    ALT 26 7 - 52 U/L    AST 22 13 - 39 U/L   Magnesium   Result Value Ref Range    Magnesium 2.2 1.9 - 2.7 mg/dL   Lipase   Result Value Ref Range    Lipase 20 11 - 82 U/L   D-Dimer ([facility])   Result Value Ref Range    D-Dimer ng/mL <200 0 - 230 ng/ml D-DU   Troponin I   Result Value Ref Range    Troponin I ES <0.030 0.000 - 0.034 ug/L   Troponin I   Result Value Ref Range    Troponin I ES <0.030 0.000 - 0.034 ug/L   CBC with platelets differential   Result Value Ref Range    WBC 10.3 4.0 - 11.0 10e9/L    RBC Count 4.26 3.8 - 5.2 10e12/L    Hemoglobin 13.2 11.7 - 15.7 g/dL    Hematocrit 40.1 35.0 - 47.0 %    MCV 94 78 - 100 fl    MCH 31.0 26.5 - 33.0 pg    MCHC 32.9 31.5 - 36.5 g/dL    RDW 14.3 10.0 - 15.0  %    Platelet Count 317 150 - 450 10e9/L    Diff Method Automated Method     % Neutrophils 58.0 %    % Lymphocytes 31.3 %    % Monocytes 7.9 %    % Eosinophils 1.6 %    % Basophils 0.9 %    % Immature Granulocytes 0.3 %    Absolute Neutrophil 6.0 1.6 - 8.3 10e9/L    Absolute Lymphocytes 3.2 0.8 - 5.3 10e9/L    Absolute Monocytes 0.8 0.0 - 1.3 10e9/L    Absolute Eosinophils 0.2 0.0 - 0.7 10e9/L    Absolute Basophils 0.1 0.0 - 0.2 10e9/L    Abs Immature Granulocytes 0.0 0 - 0.4 10e9/L   Basic metabolic panel   Result Value Ref Range    Sodium 140 134 - 144 mmol/L    Potassium 3.8 3.5 - 5.1 mmol/L    Chloride 107 98 - 107 mmol/L    Carbon Dioxide 29 21 - 31 mmol/L    Anion Gap 4 3 - 14 mmol/L    Glucose 116 (H) 70 - 105 mg/dL    Urea Nitrogen 11 7 - 25 mg/dL    Creatinine 0.79 0.60 - 1.20 mg/dL    GFR Estimate 75 >60 mL/min/[1.73_m2]    GFR Estimate If Black >90 >60 mL/min/[1.73_m2]    Calcium 8.8 8.6 - 10.3 mg/dL   Troponin I   Result Value Ref Range    Troponin I ES <0.030 0.000 - 0.034 ug/L   EKG 12-lead, tracing only    Narrative    EKG Interpretation:    Rhythm: Normal Sinus   Rate: 92  Axis: Left  QRS interval: Prolonged  Conduction: Right bundle branch block  ST Segments: Normal  MI: None  QTc interval: Normal  APC's Present: No  VPC's Present: No    Impression: Abnormal EKG.  No comparison available.    Lorrie Gordon DO  Internal Medicine     Echocardiogram Complete    Narrative    [id number]  [id number]  [id number]  [id number]^JULIANNA^BECCA        [facility]  [address] [place] [address]     Name: ZBIGNIEW JOHNSON  MRN: [id number]  : 1960  Study Date: 2019 08:53 AM  Age: 58 yrs  Gender: Female  Patient Location: [facility]  Reason For Study: Chest Pain  Ordering Physician: BECCA LEVINE  Performed By: Katelyn Shaver RDCS, RVT     BSA: 1.6 m2  Height: 62 in  Weight: 131 lb  HR: 75  BP: 117/58  mmHg  _____________________________________________________________________________  __        Procedure  Complete Portable Echo Adult.  _____________________________________________________________________________  __        Interpretation Summary  Left ventricular function, chamber size, wall motion, and wall thickness are  normal. EF is 55-60%.  Global right ventricular function is normal.  No significant valvular abnormalities.  The inferior vena cava was normal in size with preserved respiratory  variability.  No pericardial effusion is present.     There is no prior study for direct comparison.  _____________________________________________________________________________  __        Left Ventricle  Left ventricular function, chamber size, wall motion, and wall thickness are  normal.The EF is 55-60%. Left ventricular diastolic function is normal.     Right Ventricle  The right ventricle is normal size. Global right ventricular function is  normal. The right ventricular wall motion is normal.     Atria  Both atria appear normal.     Mitral Valve  The mitral valve is normal. Trace mitral insufficiency is present.     Aortic Valve  Aortic valve is normal in structure and function. The aortic valve is  tricuspid.        Tricuspid Valve  The tricuspid valve is normal. Trace tricuspid insufficiency is present. Right  ventricular systolic pressure is 25mmHg above the right atrial pressure.  Pulmonary artery systolic pressure is 28 mmHg (25+RA). Pulmonary artery  systolic pressure is normal.     Pulmonic Valve  The pulmonic valve is normal.     Vessels  The aorta root is normal. The pulmonary artery and bifurcation cannot be  assessed. The inferior vena cava was normal in size with preserved respiratory  variability. Estimated mean right atrial pressure is 3 mmHg (normal).     Pericardium  No pericardial effusion is present. Prominent epicardial fat is noted.     Compared to Previous Study  There is no prior study for  direct comparison.        Attestation  I have personally viewed the imaging and agree with the interpretation and  report as documented by the fellow, Angelina Longoria, and/or edited by me.  _____________________________________________________________________________  __  MMode/2D Measurements & Calculations     IVSd: 0.73 cm  LVIDd: 3.7 cm  LVIDs: 2.7 cm  LVPWd: 0.78 cm  FS: 25.7 %  LV mass(C)d: 76.1 grams  LV mass(C)dI: 47.6 grams/m2  asc Aorta Diam: 2.6 cm  LVOT diam: 2.0 cm  LVOT area: 3.1 cm2  LA Volume (BP): 21.8 ml  LA Volume Index (BP): 13.6 ml/m2  RWT: 0.42           Doppler Measurements & Calculations  MV E max ralph: 86.3 cm/sec  MV A max ralph: 78.1 cm/sec  MV E/A: 1.1  MV dec time: 0.18 sec  Ao V2 max: 117.0 cm/sec  Ao max P.0 mmHg  DARCIE(V,D): 2.4 cm2  LV V1 max PG: 3.3 mmHg  LV V1 max: 91.2 cm/sec  TR max ralph: 250.0 cm/sec  TR max P.0 mmHg  AV Ralph Ratio (DI): 0.78  E/E' av.9  Lateral E/e': 7.1  Medial E/e': 8.6              _____________________________________________________________________________  __        Report approved by: Emiliano MEADOWS 2019 09:54 AM          ASSESSMENT AND PLAN:  Problem List Items Addressed This Visit        Nervous and Auditory    Chest pain    Relevant Medications    metoprolol tartrate (LOPRESSOR) 25 MG tablet    Other Relevant Orders    CTA Angiogram coronary artery       Respiratory    COPD, severe (H)    Relevant Medications    montelukast (SINGULAIR) 10 MG tablet    Seasonal allergic rhinitis due to pollen    Relevant Medications    montelukast (SINGULAIR) 10 MG tablet       Endocrine    Mixed hyperlipidemia    Relevant Medications    pravastatin (PRAVACHOL) 20 MG tablet       Circulatory    PAD (peripheral artery disease) (H)    Relevant Medications    pravastatin (PRAVACHOL) 20 MG tablet    metoprolol tartrate (LOPRESSOR) 25 MG tablet      Other Visit Diagnoses     Hospital discharge follow-up    -  Primary        reviewed diet, exercise and weight  control, cardiovascular risk and specific lipid/LDL goals reviewed, use of aspirin to prevent MI and TIA's discussed  -- Expected clinical course discussed    -- Medications and their side effects discussed    The 10-year ASCVD risk score ([employer] TEAGUE Jr., et al., 2013) is: 3.8%    Values used to calculate the score:      Age: 58 years      Sex: Female      Is Non- : No      Diabetic: No      Tobacco smoker: No      Systolic Blood Pressure: 136 mmHg      Is BP treated: No      HDL Cholesterol: 38 mg/dL      Total Cholesterol: 206 mg/dL    Patient Instructions   Start trial of Pravastatin 2x weekly.     Cancel stress ECHO.     CT coronary arteries ordered  - they will call with date/time of appointment.      Start Metoprolol 12.5 to 25 mg twice daily -- for pulse.     Continue low-dose aspirin daily.     Start Singulair tablets --- for allergies if needed at bedtime.     Return as needed for follow-up with Dr. Boyd.    Clinic : [phone]  Appointment line: [phone]      Tim Boyd MD  Internal Medicine  [facility]     Portions of this note were dictated using speech recognition software. The note has been proofread but errors in the text may have been overlooked. Please contact me if there are any concerns regarding the accuracy of the dictation.

## 2019-04-08 ENCOUNTER — OFFICE VISIT (OUTPATIENT)
Dept: INTERNAL MEDICINE | Facility: OTHER | Age: 59
End: 2019-04-08
Attending: INTERNAL MEDICINE
Payer: COMMERCIAL

## 2019-04-08 VITALS
HEART RATE: 84 BPM | TEMPERATURE: 96.2 F | DIASTOLIC BLOOD PRESSURE: 80 MMHG | SYSTOLIC BLOOD PRESSURE: 136 MMHG | WEIGHT: 135.13 LBS | RESPIRATION RATE: 16 BRPM | BODY MASS INDEX: 24.71 KG/M2

## 2019-04-08 DIAGNOSIS — Z09 HOSPITAL DISCHARGE FOLLOW-UP: Primary | ICD-10-CM

## 2019-04-08 DIAGNOSIS — R07.9 CHEST PAIN, UNSPECIFIED TYPE: ICD-10-CM

## 2019-04-08 DIAGNOSIS — I73.9 PAD (PERIPHERAL ARTERY DISEASE) (H): ICD-10-CM

## 2019-04-08 DIAGNOSIS — E78.2 MIXED HYPERLIPIDEMIA: ICD-10-CM

## 2019-04-08 DIAGNOSIS — J30.1 SEASONAL ALLERGIC RHINITIS DUE TO POLLEN: ICD-10-CM

## 2019-04-08 DIAGNOSIS — J44.9 COPD, SEVERE (H): ICD-10-CM

## 2019-04-08 PROCEDURE — 99214 OFFICE O/P EST MOD 30 MIN: CPT | Performed by: INTERNAL MEDICINE

## 2019-04-08 RX ORDER — METOPROLOL TARTRATE 25 MG/1
12.5-25 TABLET, FILM COATED ORAL 2 TIMES DAILY
Qty: 60 TABLET | Refills: 0 | Status: SHIPPED | OUTPATIENT
Start: 2019-04-08 | End: 2019-05-08

## 2019-04-08 RX ORDER — PRAVASTATIN SODIUM 20 MG
20 TABLET ORAL
Qty: 15 TABLET | Refills: 3 | Status: SHIPPED | OUTPATIENT
Start: 2019-04-08 | End: 2019-11-29

## 2019-04-08 RX ORDER — MONTELUKAST SODIUM 10 MG/1
5-10 TABLET ORAL AT BEDTIME
Qty: 90 TABLET | Refills: 3 | Status: ON HOLD | OUTPATIENT
Start: 2019-04-08 | End: 2019-12-03

## 2019-04-08 ASSESSMENT — ENCOUNTER SYMPTOMS
PALPITATIONS: 0
APPETITE CHANGE: 1
ABDOMINAL PAIN: 1
DIARRHEA: 0
FATIGUE: 0
AGITATION: 0
RHINORRHEA: 1
EYE PAIN: 0
WOUND: 0
FEVER: 0
ROS SKIN COMMENTS: + PSORIASIS
SINUS PRESSURE: 1
CONFUSION: 0
BACK PAIN: 0
BRUISES/BLEEDS EASILY: 0
NAUSEA: 0
SHORTNESS OF BREATH: 1
VOMITING: 0
HEMATURIA: 0
WHEEZING: 0
DYSURIA: 0
LIGHT-HEADEDNESS: 0
ADENOPATHY: 0
CHEST TIGHTNESS: 0
CHILLS: 0
MYALGIAS: 0
DIZZINESS: 0
SORE THROAT: 1
COUGH: 0
ARTHRALGIAS: 1

## 2019-04-08 ASSESSMENT — ANXIETY QUESTIONNAIRES

## 2019-04-08 ASSESSMENT — PAIN SCALES - GENERAL: PAINLEVEL: NO PAIN (0)

## 2019-04-08 ASSESSMENT — PATIENT HEALTH QUESTIONNAIRE - PHQ9
SUM OF ALL RESPONSES TO PHQ QUESTIONS 1-9: 0
5. POOR APPETITE OR OVEREATING: NOT AT ALL

## 2019-04-08 NOTE — PATIENT INSTRUCTIONS
Start trial of Pravastatin 2x weekly.     Cancel stress ECHO.     CT coronary arteries ordered  - they will call with date/time of appointment.      Start Metoprolol 12.5 to 25 mg twice daily -- for pulse.     Continue low-dose aspirin daily.     Start Singulair tablets --- for allergies if needed at bedtime.     Return as needed for follow-up with Dr. Boyd.    Clinic : [phone]  Appointment line: [phone]

## 2019-04-09 ASSESSMENT — ANXIETY QUESTIONNAIRES: GAD7 TOTAL SCORE: 0

## 2019-04-11 ENCOUNTER — TELEPHONE (OUTPATIENT)
Dept: CARDIOLOGY | Facility: OTHER | Age: 59
End: 2019-04-11

## 2019-04-11 NOTE — TELEPHONE ENCOUNTER
Patient verified .  Reminder call for stress test with instructions given.  Patient verbalized understanding.

## 2019-04-17 ENCOUNTER — HOSPITAL ENCOUNTER (OUTPATIENT)
Dept: CT IMAGING | Facility: OTHER | Age: 59
Discharge: HOME | End: 2019-04-17
Attending: INTERNAL MEDICINE | Admitting: INTERNAL MEDICINE
Payer: COMMERCIAL

## 2019-04-17 VITALS
OXYGEN SATURATION: 90 % | BODY MASS INDEX: 24.84 KG/M2 | DIASTOLIC BLOOD PRESSURE: 62 MMHG | HEIGHT: 62 IN | SYSTOLIC BLOOD PRESSURE: 103 MMHG | HEART RATE: 65 BPM | RESPIRATION RATE: 18 BRPM | WEIGHT: 135 LBS

## 2019-04-17 DIAGNOSIS — R07.9 CHEST PAIN, UNSPECIFIED TYPE: ICD-10-CM

## 2019-04-17 PROCEDURE — 25000132 ZZH RX MED GY IP 250 OP 250 PS 637: Performed by: RADIOLOGY

## 2019-04-17 PROCEDURE — 75574 CT ANGIO HRT W/3D IMAGE: CPT

## 2019-04-17 PROCEDURE — 25500064 ZZH RX 255 OP 636: Performed by: INTERNAL MEDICINE

## 2019-04-17 RX ORDER — METOPROLOL TARTRATE 100 MG
100 TABLET ORAL
Status: COMPLETED | OUTPATIENT
Start: 2019-04-17 | End: 2019-04-17

## 2019-04-17 RX ORDER — METOPROLOL TARTRATE 1 MG/ML
10 INJECTION, SOLUTION INTRAVENOUS
Status: DISCONTINUED | OUTPATIENT
Start: 2019-04-17 | End: 2019-04-18 | Stop reason: HOSPADM

## 2019-04-17 RX ORDER — NITROGLYCERIN 0.4 MG/1
0.4 TABLET SUBLINGUAL
Status: COMPLETED | OUTPATIENT
Start: 2019-04-17 | End: 2019-04-17

## 2019-04-17 RX ORDER — IODIXANOL 320 MG/ML
100 INJECTION, SOLUTION INTRAVASCULAR ONCE
Status: COMPLETED | OUTPATIENT
Start: 2019-04-17 | End: 2019-04-17

## 2019-04-17 RX ADMIN — NITROGLYCERIN 0.4 MG: 0.4 TABLET SUBLINGUAL at 14:05

## 2019-04-17 RX ADMIN — METOPROLOL TARTRATE 50 MG: 50 TABLET ORAL at 13:11

## 2019-04-17 RX ADMIN — IODIXANOL 100 ML: 320 INJECTION, SOLUTION INTRAVASCULAR at 14:08

## 2019-04-17 ASSESSMENT — MIFFLIN-ST. JEOR: SCORE: 1150.61

## 2019-04-17 NOTE — PROGRESS NOTES
1259 Patient arrived for a CCTA. . They were connected to a cardiac monitor and vital signs were obtained. The patient's heart rate was 59  . Medications and allergies were reviewed. And the patient was given Lopressor 50 mg per protocol. The procedure was explained, and the patient was instructed to rest and relax, as much as possible. A saline lock was established. The patient's heart rate after the medication was 54. The patient was offered the restroom, and then they walked to CT. The cardiac monitor was placed there, and the test was completed per protocol. The patient was given one bottle of water, and the IV that was used for the procedure was discontinued per protocol and they were told to  Drink at least 3 other additional glasses of water today, per post contrast instructions. The patient left ambulatory in stable condition. With instructions to follow with Dr. Boyd 4/18/2019 per appointment for testing restults

## 2019-04-17 NOTE — LETTER
2019      Ember Tavares  [address] [address] [address]        Dear ,    We are writing to inform you of your test results.      Resulted Orders   CTA Angiogram coronary artery    Narrative    PROCEDURE:  CTA ANGIOGRAM CORONARY ARTERY    HISTORY:58 years Female  PAD - Fatigue, chest pain/pressure; Chest  pain, unspecified type    COMPARISON:  None.    FINDINGS:    Examination quality: Satisfactory.    CORONARY CALCIUM SCORE:    Left Main:                            0.0  Left anterior descendin.2  Left Circumflex:                  0.0  Right coronary artery:        0.0    TOTAL:                               0.2    The estimated probability of a non-zero calcium score for a Female of  age 57 years is 30%. The observed calcium score of 0.2 is at 69  percentile for subjects of the same age, gender, and race/ethnicity  who are free of clinical cardiovascular disease and treated diabetes.      Nonvascular findings  Pleural spaces:  Unremarkable  Lungs: There is an incompletely imaged subpleural nodular density at  the right costophrenic sulcus measuring 10 mm in diameter. Visualized  lungs are otherwise clear.  Mediastinum:  No evidence of mass or lymphadenopathy.  Chest wall:  No concerning findings      Coronary Vessels  Left main coronary artery: Widely patent  Left anterior descending artery: Widely patent  Left circumflex artery: Widely patent  Right coronary artery: Widely patent          Impression    IMPRESSION:      Total calcium score of 0.2 is at 69 percentile for subjects of the  same age, gender, and race/ethnicity who are free of clinical  cardiovascular disease and treated diabetes.    There is no significant coronary artery stenosis.    Incidental note is made of a 10 mm, partially imaged, lung nodule at  the right posterior costophrenic sulcus. Recommend CT chest for  further evaluation .    SYDNI CARRIZALES MD       If you have any questions or concerns,  please call the clinic at the number listed above.       Sincerely,         Elliott Nurse

## 2019-04-18 ENCOUNTER — TELEPHONE (OUTPATIENT)
Dept: INTERNAL MEDICINE | Facility: OTHER | Age: 59
End: 2019-04-18

## 2019-04-18 DIAGNOSIS — R91.1 NODULE OF RIGHT LUNG: Primary | ICD-10-CM

## 2019-04-23 ENCOUNTER — HOSPITAL ENCOUNTER (OUTPATIENT)
Dept: CT IMAGING | Facility: OTHER | Age: 59
Discharge: HOME | End: 2019-04-23
Attending: INTERNAL MEDICINE | Admitting: INTERNAL MEDICINE
Payer: COMMERCIAL

## 2019-04-23 DIAGNOSIS — R91.1 NODULE OF RIGHT LUNG: ICD-10-CM

## 2019-04-23 PROCEDURE — 71260 CT THORAX DX C+: CPT

## 2019-04-23 PROCEDURE — 25500064 ZZH RX 255 OP 636: Performed by: INTERNAL MEDICINE

## 2019-04-23 RX ADMIN — IOHEXOL 100 ML: 350 INJECTION, SOLUTION INTRAVENOUS at 16:58

## 2019-04-30 ENCOUNTER — TELEPHONE (OUTPATIENT)
Dept: INTERNAL MEDICINE | Facility: OTHER | Age: 59
End: 2019-04-30

## 2019-04-30 NOTE — TELEPHONE ENCOUNTER
Patient did not get this letter and would like it releases to [facility] and patient would like to know the details of this scan.  Melissa Li LPN 4/30/2019 4:03 PM        1. The nodule seen on prior CT contains macroscopic fat and may  represent a pulmonary hamartoma. Additional nodules measuring up to 7  mm. Suggest follow-up in 6 months.  2. Advanced emphysema.     BAY PENA MD

## 2019-05-06 ENCOUNTER — TELEPHONE (OUTPATIENT)
Dept: INTERNAL MEDICINE | Facility: OTHER | Age: 59
End: 2019-05-06

## 2019-05-06 NOTE — TELEPHONE ENCOUNTER
Patient has requested an appointment via [employer] between 5/8/19-5/13/19 for thyroid.     Please advise.

## 2019-05-08 ENCOUNTER — TELEPHONE (OUTPATIENT)
Dept: SURGERY | Facility: OTHER | Age: 59
End: 2019-05-08

## 2019-05-08 ENCOUNTER — OFFICE VISIT (OUTPATIENT)
Dept: INTERNAL MEDICINE | Facility: OTHER | Age: 59
End: 2019-05-08
Attending: INTERNAL MEDICINE
Payer: COMMERCIAL

## 2019-05-08 VITALS
WEIGHT: 135.5 LBS | SYSTOLIC BLOOD PRESSURE: 120 MMHG | HEART RATE: 68 BPM | RESPIRATION RATE: 20 BRPM | TEMPERATURE: 96.5 F | DIASTOLIC BLOOD PRESSURE: 74 MMHG | BODY MASS INDEX: 24.78 KG/M2

## 2019-05-08 DIAGNOSIS — E53.8 VITAMIN B12 DEFICIENCY: ICD-10-CM

## 2019-05-08 DIAGNOSIS — R13.10 DIFFICULTY SWALLOWING SOLIDS: Primary | ICD-10-CM

## 2019-05-08 DIAGNOSIS — Z12.31 VISIT FOR SCREENING MAMMOGRAM: ICD-10-CM

## 2019-05-08 DIAGNOSIS — R22.1 FULLNESS OF NECK: ICD-10-CM

## 2019-05-08 DIAGNOSIS — Z98.890 STATUS POST BALLOON DILATATION OF ESOPHAGEAL STRICTURE: ICD-10-CM

## 2019-05-08 DIAGNOSIS — R07.9 CHEST PAIN, UNSPECIFIED TYPE: ICD-10-CM

## 2019-05-08 DIAGNOSIS — I73.9 PAD (PERIPHERAL ARTERY DISEASE) (H): ICD-10-CM

## 2019-05-08 DIAGNOSIS — J44.9 COPD, SEVERE (H): ICD-10-CM

## 2019-05-08 DIAGNOSIS — R06.2 WHEEZING: ICD-10-CM

## 2019-05-08 LAB
ANION GAP SERPL CALCULATED.3IONS-SCNC: 6 MMOL/L (ref 3–14)
BUN SERPL-MCNC: 12 MG/DL (ref 7–25)
CALCIUM SERPL-MCNC: 9.5 MG/DL (ref 8.6–10.3)
CHLORIDE SERPL-SCNC: 101 MMOL/L (ref 98–107)
CO2 SERPL-SCNC: 33 MMOL/L (ref 21–31)
CREAT SERPL-MCNC: 0.9 MG/DL (ref 0.6–1.2)
GFR SERPL CREATININE-BSD FRML MDRD: 65 ML/MIN/{1.73_M2}
GLUCOSE SERPL-MCNC: 95 MG/DL (ref 70–105)
POTASSIUM SERPL-SCNC: 3.8 MMOL/L (ref 3.5–5.1)
SODIUM SERPL-SCNC: 140 MMOL/L (ref 134–144)
TSH SERPL DL<=0.05 MIU/L-ACNC: 1.85 IU/ML (ref 0.34–5.6)
VIT B12 SERPL-MCNC: 715 PG/ML (ref 180–914)

## 2019-05-08 PROCEDURE — 84443 ASSAY THYROID STIM HORMONE: CPT | Mod: ZL | Performed by: INTERNAL MEDICINE

## 2019-05-08 PROCEDURE — 99214 OFFICE O/P EST MOD 30 MIN: CPT | Performed by: INTERNAL MEDICINE

## 2019-05-08 PROCEDURE — 82607 VITAMIN B-12: CPT | Mod: ZL | Performed by: INTERNAL MEDICINE

## 2019-05-08 PROCEDURE — 36415 COLL VENOUS BLD VENIPUNCTURE: CPT | Mod: ZL | Performed by: INTERNAL MEDICINE

## 2019-05-08 PROCEDURE — 80048 BASIC METABOLIC PNL TOTAL CA: CPT | Mod: ZL | Performed by: INTERNAL MEDICINE

## 2019-05-08 RX ORDER — ALBUTEROL SULFATE 0.83 MG/ML
2.5 SOLUTION RESPIRATORY (INHALATION) EVERY 6 HOURS PRN
Qty: 2 BOX | Refills: 11 | Status: SHIPPED | OUTPATIENT
Start: 2019-05-08 | End: 2020-10-22

## 2019-05-08 RX ORDER — CYANOCOBALAMIN (VITAMIN B-12) 2500 MCG
2500 TABLET, SUBLINGUAL SUBLINGUAL DAILY
Qty: 90 TABLET | Refills: 3 | Status: SHIPPED | OUTPATIENT
Start: 2019-05-08 | End: 2020-10-22

## 2019-05-08 RX ORDER — METOPROLOL TARTRATE 25 MG/1
12.5-25 TABLET, FILM COATED ORAL 2 TIMES DAILY
Qty: 180 TABLET | Refills: 3 | Status: ON HOLD | OUTPATIENT
Start: 2019-05-08 | End: 2019-12-03

## 2019-05-08 RX ORDER — ALBUTEROL SULFATE 90 UG/1
3 AEROSOL, METERED RESPIRATORY (INHALATION) 3 TIMES DAILY PRN
Qty: 25.5 G | Refills: 3 | Status: SHIPPED | OUTPATIENT
Start: 2019-05-08 | End: 2019-10-16

## 2019-05-08 ASSESSMENT — ENCOUNTER SYMPTOMS
BRUISES/BLEEDS EASILY: 0
EYE PAIN: 0
WHEEZING: 0
FEVER: 0
DIARRHEA: 0
ABDOMINAL PAIN: 0
LIGHT-HEADEDNESS: 0
WOUND: 0
RHINORRHEA: 0
CHILLS: 0
SINUS PRESSURE: 0
PALPITATIONS: 0
CONSTIPATION: 1
CHEST TIGHTNESS: 0
AGITATION: 0
BACK PAIN: 0
CONFUSION: 0
HEMATURIA: 0
DIZZINESS: 0
ADENOPATHY: 0
NAUSEA: 0
VOMITING: 0
MYALGIAS: 0
FATIGUE: 0
SHORTNESS OF BREATH: 1
APPETITE CHANGE: 1
ARTHRALGIAS: 1
SORE THROAT: 0
COUGH: 0
DYSURIA: 0

## 2019-05-08 ASSESSMENT — PATIENT HEALTH QUESTIONNAIRE - PHQ9
SUM OF ALL RESPONSES TO PHQ QUESTIONS 1-9: 0
5. POOR APPETITE OR OVEREATING: NOT AT ALL

## 2019-05-08 ASSESSMENT — PAIN SCALES - GENERAL: PAINLEVEL: NO PAIN (0)

## 2019-05-08 ASSESSMENT — ANXIETY QUESTIONNAIRES
GAD7 TOTAL SCORE: 0
7. FEELING AFRAID AS IF SOMETHING AWFUL MIGHT HAPPEN: NOT AT ALL
2. NOT BEING ABLE TO STOP OR CONTROL WORRYING: NOT AT ALL
1. FEELING NERVOUS, ANXIOUS, OR ON EDGE: NOT AT ALL
6. BECOMING EASILY ANNOYED OR IRRITABLE: NOT AT ALL
3. WORRYING TOO MUCH ABOUT DIFFERENT THINGS: NOT AT ALL
IF YOU CHECKED OFF ANY PROBLEMS ON THIS QUESTIONNAIRE, HOW DIFFICULT HAVE THESE PROBLEMS MADE IT FOR YOU TO DO YOUR WORK, TAKE CARE OF THINGS AT HOME, OR GET ALONG WITH OTHER PEOPLE: NOT DIFFICULT AT ALL
5. BEING SO RESTLESS THAT IT IS HARD TO SIT STILL: NOT AT ALL

## 2019-05-08 NOTE — PROGRESS NOTES
"Nursing Notes:   Melissa Li LPN  5/8/2019 12:04 PM  Signed  Patient presents to the clinic for thyroid check due to swelling and discomfort of the right side of the neck on and off over the past several months.  Patient also reports increase in hair loss, changes in temperature and fluctuation in weight over the past 4 months.    Chief Complaint   Patient presents with     Thyroid Problem       Initial /74 (BP Location: Right arm, Patient Position: Sitting, Cuff Size: Adult Regular)   Pulse 68   Temp 96.5  F (35.8  C) (Tympanic)   Resp 20   Wt 61.5 kg (135 lb 8 oz)   BMI 24.78 kg/m    Estimated body mass index is 24.78 kg/m  as calculated from the following:    Height as of 4/17/19: 1.575 m (5' 2\").    Weight as of this encounter: 61.5 kg (135 lb 8 oz).  Medication Reconciliation: complete    Melissa Li LPN    Nursing note reviewed with patient.  Accuracy and completeness verified.   Ms. Tavares is a 58 year old female who:  Patient presents with:  Thyroid Problem      ICD-10-CM    1. Difficulty swallowing solids R13.10 CT Soft Tissue Neck w Contrast     GASTROENTEROLOGY ADULT REF PROCEDURE ONLY   2. Fullness of neck R22.1 TSH Reflex [facility]     CT Soft Tissue Neck w Contrast     Basic Metabolic Panel     Basic Metabolic Panel     TSH Reflex [facility]   3. Status post balloon dilatation of esophageal stricture Z98.890 GASTROENTEROLOGY ADULT REF PROCEDURE ONLY   4. Visit for screening mammogram Z12.31 MA Screen Bilateral w/Dillon   5. Vitamin B12 deficiency E53.8 Vitamin B12     vitamin B-12 (CYANOCOBALAMIN) 2500 MCG sublingual tablet     Vitamin B12   6. Chest pain, unspecified type R07.9 metoprolol tartrate (LOPRESSOR) 25 MG tablet   7. PAD (peripheral artery disease) (H) I73.9 metoprolol tartrate (LOPRESSOR) 25 MG tablet   8. COPD, severe (H) J44.9 albuterol (PROAIR HFA/PROVENTIL HFA/VENTOLIN HFA) 108 (90 Base) MCG/ACT inhaler     albuterol (PROVENTIL) (2.5 MG/3ML) 0.083% neb solution   9. Wheezing R06.2 " albuterol (PROAIR HFA/PROVENTIL HFA/VENTOLIN HFA) 108 (90 Base) MCG/ACT inhaler     HPI  Patient presents with a lot of endocrinology concerns possibly relating to thyroid problems.  She would like labs checked today.    She is been having some trouble swallowing solids again.  She has had her throat/esophagus dilated a number of times in the past.  Last time was about 2 years ago.  Recommend EGD.    The right side of her neck feels tight or has pressure at times.  Does have problems swallowing dry food will catch.  Is long as she take some liquids with it it will typically be okay.  This is worsening the past 4 to 6 months.  Check thyroid levels, soft tissue neck CT scan.  EGD/upper endoscopy also ordered.  Check lab work.    Mammogram due, orders placed.    Vitamin B12 deficiency, has been taking replacement, she would like to recheck her lab levels.    History of chest pain, peripheral artery disease.  Doing well with metoprolol.  Needs refills.  --Still has some claudication symptoms but they are improved.    Severe COPD, she would like refills of her albuterol inhaler and nebulizer.    Functional Capacity: > or about 4 METS.   Review of Systems   Constitutional: Positive for appetite change. Negative for chills, fatigue and fever.   HENT: Negative for congestion, hearing loss, postnasal drip, rhinorrhea, sinus pressure, sneezing and sore throat.         + fullness in right neck   Eyes: Negative for pain and visual disturbance.   Respiratory: Positive for shortness of breath (+ occasionally - will have more problems if carrying something). Negative for cough, chest tightness and wheezing.    Cardiovascular: Positive for chest pain. Negative for palpitations.        + some intermittent claudication leg pain symptoms   Gastrointestinal: Positive for constipation. Negative for abdominal pain, diarrhea, nausea and vomiting.        + doing very well after Nissen surgery -- getting some mild gas / bloating at times.      + food will stick in throat - dry food - needs to wash down with liquid.   -- Hx of esophageal dilation in the past.    Endocrine: Positive for cold intolerance and heat intolerance.   Genitourinary: Negative for dysuria and hematuria.   Musculoskeletal: Positive for arthralgias. Negative for back pain and myalgias.   Skin: Positive for rash (  Psoriasis rash on elbows, shins). Negative for pallor and wound.        + psoriasis  + dry skin.   Hair loss / falling out   Allergic/Immunologic: Negative for immunocompromised state.   Neurological: Negative for dizziness, syncope and light-headedness.   Hematological: Negative for adenopathy. Does not bruise/bleed easily.   Psychiatric/Behavioral: Negative for agitation and confusion.      KARL:   KARL-7 SCORE 3/6/2019 4/8/2019 5/8/2019   Total Score 0 0 0     PHQ9:  PHQ-9 SCORE 3/6/2019 4/8/2019 5/8/2019   PHQ-9 Total Score 0 0 0       I have personally reviewed the past medical history, past surgical history, medications, allergies, family and social history as listed below.     Allergies   Allergen Reactions     Atorvastatin Muscle Pain (Myalgia)     Rosuvastatin Nausea and Vomiting     Sucralfate Nausea and Vomiting     Levofloxacin Nausea and Vomiting     Morphine      Other reaction(s): Chest Pain     Penicillins      Other reaction(s): Respiratory Arrest     Sulfamethoxazole W/Trimethoprim Nausea and Vomiting     Yeast infection       Current Outpatient Medications   Medication Sig Dispense Refill     albuterol (PROAIR HFA/PROVENTIL HFA/VENTOLIN HFA) 108 (90 Base) MCG/ACT inhaler Inhale 3 puffs into the lungs 3 times daily as needed for shortness of breath / dyspnea or wheezing 25.5 g 3     albuterol (PROVENTIL) (2.5 MG/3ML) 0.083% neb solution Take 1 vial (2.5 mg) by nebulization every 6 hours as needed for shortness of breath / dyspnea or wheezing 2 Box 11     aspirin 81 MG chewable tablet Take 81 mg by mouth daily with food       calcipotriene 0.005 % OINT  Apply topically 2 times daily as needed        cetirizine (ZYRTEC) 10 MG tablet Take one tablet by mouth once or twice daily as needed for hand dermatitis/itching. 180 tablet 3     Cholecalciferol (D 5000) 5000 UNITS TABS Take 5,000 Units by mouth every 7 days       clobetasol (TEMOVATE) 0.05 % ointment Apply topically 2 times daily as needed        fluticasone-salmeterol (ADVAIR DISKUS) 500-50 MCG/DOSE inhaler Inhale 1 puff into the lungs every 12 hours Rinse mouth well after use to prevent Thrush 1 Inhaler 11     folic acid (FOLVITE) 1 MG tablet Take 1 mg by mouth daily   0     metoprolol tartrate (LOPRESSOR) 25 MG tablet Take 0.5-1 tablets (12.5-25 mg) by mouth 2 times daily 180 tablet 3     montelukast (SINGULAIR) 10 MG tablet Take 0.5-1 tablets (5-10 mg) by mouth At Bedtime - for allergies 90 tablet 3     nitroGLYcerin (NITROSTAT) 0.4 MG sublingual tablet For chest pain place 1 tablet under the tongue every 5 minutes for 3 doses. If symptoms persist 5 minutes after 1st dose call 911. 10 tablet 3     pravastatin (PRAVACHOL) 20 MG tablet Take 1 tablet (20 mg) by mouth twice a week 15 tablet 3     tiotropium (SPIRIVA RESPIMAT) 2.5 MCG/ACT inhaler Inhale 2 puffs into the lungs daily 4 g 11     venlafaxine (EFFEXOR-XR) 37.5 MG 24 hr capsule Take 1 capsule (37.5 mg) by mouth daily with food 90 capsule 3     vitamin B-12 (CYANOCOBALAMIN) 2500 MCG sublingual tablet Take 1 tablet (2,500 mcg) by mouth daily 90 tablet 3        Patient Active Problem List    Diagnosis Date Noted     Difficulty swallowing solids 05/08/2019     Priority: Medium     Nodule of right lung 04/18/2019     Priority: Medium     Seasonal allergic rhinitis due to pollen 04/08/2019     Priority: Medium     Chest pain 03/28/2019     Priority: Medium     Ulcer of right cornea 08/18/2018     Priority: Medium     Vitamin B12 deficiency 04/17/2018     Priority: Medium     Menopausal syndrome (hot flashes) 04/17/2018     Priority: Medium     S/P Nissen  fundoplication (without gastrostomy tube) procedure 03/26/2018     Priority: Medium     GERD (gastroesophageal reflux disease) 11/29/2017     Priority: Medium     Chronic radicular cervical pain 11/20/2017     Priority: Medium     Psoriatic arthritis (H) 11/20/2017     Priority: Medium     Occipital neuralgia of left side 09/15/2017     Priority: Medium     Arthritis of knee, right 08/24/2017     Priority: Medium     Atherosclerosis of abdominal aorta (H) 08/24/2017     Priority: Medium     Overview:   [facility] Studies:  2/19/2013 -- CTA ABDOMEN AND PELVIS WITH BILATERAL LOWER EXTREMITY RUNOFF    CLINICAL HISTORY: Iliac and mesenteric ischemia.    TECHNIQUE: 125 mL Omnipaque 300 IV contrast was administered. 3-D  arterial reformations were performed.    FINDINGS: There is a well-defined ovoid density at the medial right  costophrenic angle. This measures 2.3 x 0.9 x 0.6 cm. It demonstrates  central low attenuation similar to the adjacent abdominal fat near the  liver. This could be a tiny Bochdalek hernia. The liver, spleen,  pancreas, adrenal glands, and kidneys are unremarkable. The  gallbladder is surgically absent. No biliary ductal dilation. No  mesenteric or retroperitoneal adenopathy. No ascites or fluid  collection. There is a moderate amount of retained stool seen  throughout most of the colon. No obstruction. No adjacent inflammatory  change. No ascites or fluid collection. Ureters and bladder are  unremarkable.    There is mild partially calcified atherosclerotic plaque within the  abdominal aorta. No aneurysm. There is narrowing of the lumen just  proximal to the bifurcation to 6 mm.   There is moderate narrowing of the proximal celiac artery without associated calcification.   The superior mesenteric artery appears to be widely patent with good opacification of distal branch vessels. The MARIAH also appears to be well opacified.   There are single bilateral renal arteries which are widely  patent.    There is mild partially calcified plaque within the common iliac arteries with minimal narrowing.   The external iliac and common femoral arteries are widely patent.   Superficial femoral and popliteal arteries are widely patent bilaterally.   There is three-vessel runoff with good opacification in the lower leg to the ankle and foot.    IMPRESSION:  1. Mild aortoiliac atherosclerotic disease. No aneurysm. There is narrowing of the distal aorta just proximal to the bifurcation.  2. There is moderate narrowing of the celiac artery which could  be due to noncalcified plaque. No SMA or MARIAH compromise definitely seen.  3. Probable tiny Bochdalek hernia at the right costophrenic angle. A hamartoma would be a differential consideration though is less likely given the central fat density.  4. Possible constipation.  5. No evidence of vascular compromise in the lower extremities.    Examination Interpreted at: [facility], [place], [place]  The Interpreting Physician is MONICA VORA  Exam was completed at [facility]       COPD, severe (H) 08/24/2017     Priority: Medium     Overview:   8/4/2014 -- PULMONARY FUNCTION    SITE:  [facility]  ORDERED BY:  VANNESA LUNA    CLINICAL SUMMARY: 53-year-old female with a history of severe COPD.     TECHNICIAN NOTE: Good effort.     DATA: FVC 1.85 (61%). FEV1 1.04 (45%). FEV1/FVC ratio 56%. DLCO 13.5 to 62%.     Flow volume curve is consistent with airway obstruction.     IMPRESSION:  1. Severe obstructive pulmonary disease.   2. Mild decrease in diffusing capacity.       Decreased diffusion capacity of lung 08/24/2017     Priority: Medium     Dyshidrotic hand dermatitis 08/24/2017     Priority: Medium     Hiatal hernia 08/24/2017     Priority: Medium     Status post balloon dilatation of esophageal stricture 08/24/2017     Priority: Medium     Prediabetes 08/24/2017     Priority: Medium     Psoriasis  08/24/2017     Priority: Medium     Schatzki's ring of distal esophagus 08/24/2017     Priority: Medium     Vitamin D deficiency 08/24/2017     Priority: Medium     Alpha-1-antitrypsin deficiency carrier (H) 08/23/2017     Priority: Medium     Irritable bowel syndrome 08/23/2017     Priority: Medium     Osteopenia 08/23/2017     Priority: Medium     PAD (peripheral artery disease) (H) 10/13/2015     Priority: Medium     Mixed hyperlipidemia 08/01/2012     Priority: Medium     Contact dermatitis 07/01/2010     Priority: Medium     Myalgia 07/01/2010     Priority: Medium     Chronic abdominal pain 03/10/2010     Priority: Medium     Overview:   16 year duration       Constipation, chronic 03/10/2010     Priority: Medium     History of tobacco abuse 03/10/2010     Priority: Medium     Sinusitis, chronic 03/10/2010     Priority: Medium     Past Medical History:   Diagnosis Date     Alpha-1-antitrypsin deficiency carrier (H) 8/23/2017     Atherosclerosis of abdominal aorta (H) 8/24/2017    Overview:  [facility] Studies: 2/19/2013 -- CTA ABDOMEN AND PELVIS WITH BILATERAL LOWER EXTREMITY RUNOFF  CLINICAL HISTORY: Iliac and mesenteric ischemia.  TECHNIQUE: 125 mL Omnipaque 300 IV contrast was administered. 3-D arterial reformations were performed.  FINDINGS: There is a well-defined ovoid density at the medial right costophrenic angle. This measures 2.3 x 0.9 x 0.6 cm. It demonstra     Chronic abdominal pain 3/10/2010    Overview:  16 year duration     Chronic sinusitis     No Comments Provided     Closed fracture of skull (H)     1972     COPD, severe (H) 8/24/2017    Overview:  8/4/2014 -- PULMONARY FUNCTION  SITE:  [facility] ORDERED BY:  VANNESA LUAN  CLINICAL SUMMARY: 53-year-old female with a history of severe COPD.   TECHNICIAN NOTE: Good effort.   DATA: FVC 1.85 (61%). FEV1 1.04 (45%). FEV1/FVC ratio 56%. DLCO 13.5 to 62%.   Flow volume curve is consistent with airway obstruction.    IMPRESSION: 1. Severe obstructive pulmon     GERD (gastroesophageal reflux disease) 2017     Hiatal hernia 2017     Other and unspecified hyperlipidemia 2012     PAD (peripheral artery disease) (H) 10/13/2015     Psoriasis 2017     Psoriatic arthritis (H) 2017     Schatzki's ring of distal esophagus 2017     Ulcer of right cornea 2018     Vitamin B12 deficiency 2018     Vitamin D deficiency 2017     Past Surgical History:   Procedure Laterality Date     AS UGI ENDOSCOPY W ESOPHAGEAL DILATION BALLOON <30MM  10/30/2015    ESOPHAGEAL DILATION,Dr. Rainer Allen, [facility]     BIOPSY BREAST Bilateral     , , BIOPSY BREAST,benign     CHOLECYSTECTOMY      Laparoscopic     COLONOSCOPY  2016    x's 3 negative     CT CORONARY ANGIOGRAM      CT CORONARY ANGIOGRAM (IA),negative     ESOPHAGOSCOPY, GASTROSCOPY, DUODENOSCOPY (EGD), COMBINED  2009    dilated, Dr. Allen     HYSTERECTOMY VAGINAL      ovaries remain     LAPAROSCOPIC NISSEN FUNDOPLICATION N/A 3/26/2018    Procedure: LAPAROSCOPIC NISSEN FUNDOPLICATION;  Laparoscopic Nissen Fundoplication;  Surgeon: Jagdish Ruiz MD;  Location: [facility] OR     LAPAROTOMY EXPLORATORY      lysis of adhesions/endometriosis     RECTOCELE REPAIR  2009     Social History     Socioeconomic History     Marital status:      Spouse name: Ervin     Number of children: None     Years of education: None     Highest education level: None   Occupational History     None   Social Needs     Financial resource strain: None     Food insecurity:     Worry: None     Inability: None     Transportation needs:     Medical: None     Non-medical: None   Tobacco Use     Smoking status: Former Smoker     Packs/day: 0.50     Years: 32.00     Pack years: 16.00     Types: Cigarettes     Last attempt to quit: 2012     Years since quittin.7     Smokeless tobacco: Never Used     Tobacco comment: Quit smoking: quit  date of 8-   Substance and Sexual Activity     Alcohol use: Yes     Alcohol/week: 1.2 oz     Comment: Alcoholic Drinks/day: 1-2 drinks every 1-2 weeks     Drug use: No     Sexual activity: Never   Lifestyle     Physical activity:     Days per week: None     Minutes per session: None     Stress: None   Relationships     Social connections:     Talks on phone: None     Gets together: None     Attends Gnosticism service: None     Active member of club or organization: None     Attends meetings of clubs or organizations: None     Relationship status: None     Intimate partner violence:     Fear of current or ex partner: None     Emotionally abused: None     Physically abused: None     Forced sexual activity: None   Other Topics Concern     Parent/sibling w/ CABG, MI or angioplasty before 65F 55M? 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  Lives in [place].  Patient previously smoked.    Alcohol Use - yes  Drug Use - no  Regular Exercise - yes   - Johan  3 biologic children, 1 adopted, 2 stepchildren.    Works at [employer]     Family History   Problem Relation Age of Onset     Arthritis Father         Arthritis     Emphysema Father 51        Alpha-1-AT deficiency     Peripheral Vascular Disease Mother         Peripheral vascular disease     Diabetes Mother         Diabetes     Arthritis Mother         Arthritis     Breast Cancer Sister         Premenopausal breast cancer     Eczema Brother         Eczema     Narcolepsy Brother      Other - See Comments Daughter         Narcolepsy     Narcolepsy Daughter      Brain Tumor Daughter      Seizure Disorder Daughter      Depression Daughter      Graves' disease Daughter        EXAM:   Vitals:    05/08/19 1152   BP: 120/74   BP Location: Right arm   Patient Position: Sitting   Cuff Size: Adult Regular   Pulse: 68   Resp: 20   Temp: 96.5  F (35.8  C)   TempSrc: Tympanic   Weight: 61.5 kg  "(135 lb 8 oz)       Current Pain Score: No Pain (0)     BP Readings from Last 3 Encounters:   05/08/19 120/74   04/17/19 103/62   04/08/19 136/80      Wt Readings from Last 3 Encounters:   05/08/19 61.5 kg (135 lb 8 oz)   04/17/19 61.2 kg (135 lb)   04/08/19 61.3 kg (135 lb 2 oz)      Estimated body mass index is 24.78 kg/m  as calculated from the following:    Height as of 4/17/19: 1.575 m (5' 2\").    Weight as of this encounter: 61.5 kg (135 lb 8 oz).     Physical Exam   Constitutional: She appears well-developed and well-nourished. No distress.   HENT:   Head: Normocephalic and atraumatic.   Mouth/Throat: No oropharyngeal exudate.   Eyes: Conjunctivae are normal. No scleral icterus.   Neck: Normal range of motion. Neck supple. No tracheal deviation present. No thyromegaly present.   Cardiovascular: Normal rate and regular rhythm.   Pulmonary/Chest: Effort normal.   + prolonged expiratory phase   Abdominal: Soft. There is no tenderness.   Musculoskeletal: She exhibits no deformity.   Lymphadenopathy:     She has no cervical adenopathy.   Neurological: She is alert.   Skin: Skin is warm and dry. Rash (  + psoriasis rash  on elbows) noted. She is not diaphoretic.   + psoriasis nail changes   Psychiatric: She has a normal mood and affect.     Procedures   INVESTIGATIONS:  Results for orders placed or performed in visit on 05/08/19   Basic Metabolic Panel   Result Value Ref Range    Sodium 140 134 - 144 mmol/L    Potassium 3.8 3.5 - 5.1 mmol/L    Chloride 101 98 - 107 mmol/L    Carbon Dioxide 33 (H) 21 - 31 mmol/L    Anion Gap 6 3 - 14 mmol/L    Glucose 95 70 - 105 mg/dL    Urea Nitrogen 12 7 - 25 mg/dL    Creatinine 0.90 0.60 - 1.20 mg/dL    GFR Estimate 65 >60 mL/min/[1.73_m2]    GFR Estimate If Black 78 >60 mL/min/[1.73_m2]    Calcium 9.5 8.6 - 10.3 mg/dL   Vitamin B12   Result Value Ref Range    Vitamin B12 715 180 - 914 pg/mL   TSH Reflex [facility]   Result Value Ref Range    TSH Reflex 1.85 0.34 - 5.60 IU/mL "       ASSESSMENT AND PLAN:  Problem List Items Addressed This Visit        Nervous and Auditory    Chest pain    Relevant Medications    metoprolol tartrate (LOPRESSOR) 25 MG tablet       Respiratory    COPD, severe (H)    Relevant Medications    albuterol (PROAIR HFA/PROVENTIL HFA/VENTOLIN HFA) 108 (90 Base) MCG/ACT inhaler    albuterol (PROVENTIL) (2.5 MG/3ML) 0.083% neb solution       Digestive    Vitamin B12 deficiency    Relevant Medications    vitamin B-12 (CYANOCOBALAMIN) 2500 MCG sublingual tablet    Other Relevant Orders    Vitamin B12 (Completed)       Circulatory    PAD (peripheral artery disease) (H)    Relevant Medications    metoprolol tartrate (LOPRESSOR) 25 MG tablet       Other    Status post balloon dilatation of esophageal stricture    Relevant Orders    GASTROENTEROLOGY ADULT REF PROCEDURE ONLY    Difficulty swallowing solids - Primary    Relevant Orders    CT Soft Tissue Neck w Contrast    GASTROENTEROLOGY ADULT REF PROCEDURE ONLY      Other Visit Diagnoses     Fullness of neck        Relevant Orders    TSH Reflex [facility] (Completed)    CT Soft Tissue Neck w Contrast    Basic Metabolic Panel (Completed)    Visit for screening mammogram        Relevant Orders    MA Screen Bilateral w/Dillon    Wheezing        Relevant Medications    albuterol (PROAIR HFA/PROVENTIL HFA/VENTOLIN HFA) 108 (90 Base) MCG/ACT inhaler    albuterol (PROVENTIL) (2.5 MG/3ML) 0.083% neb solution        reviewed diet, exercise and weight control, recommended sodium restriction  -- Expected clinical course discussed    -- Medications and their side effects discussed    The 10-year ASCVD risk score (Javier TEAGUE Jr., et al., 2013) is: 3%    Values used to calculate the score:      Age: 58 years      Sex: Female      Is Non- : No      Diabetic: No      Tobacco smoker: No      Systolic Blood Pressure: 120 mmHg      Is BP treated: No      HDL Cholesterol: 38 mg/dL      Total Cholesterol: 206 mg/dL    Patient  Instructions   1. Difficulty swallowing solids  - CT Soft Tissue Neck w Contrast; Future  - they will call with date/time of appointment.    - GASTROENTEROLOGY ADULT REF PROCEDURE ONLY - EGD ordered  - they will call with date/time of appointment.      2. Fullness of neck  - TSH Reflex [facility]; Future  - CT Soft Tissue Neck w Contrast; Future  - Basic Metabolic Panel; Future    3. Status post balloon dilatation of esophageal stricture  - GASTROENTEROLOGY ADULT REF PROCEDURE ONLY    4. Visit for screening mammogram  - MA Screen Bilateral w/Dillon; Future  - they will call with date/time of appointment.      5. Vitamin B12 deficiency  - Vitamin B12; Future  - vitamin B-12 (CYANOCOBALAMIN) 2500 MCG sublingual tablet; Take 1 tablet (2,500 mcg) by mouth daily  Dispense: 90 tablet; Refill: 3    6. Chest pain, unspecified type  7. PAD (peripheral artery disease) (H)  - metoprolol tartrate (LOPRESSOR) 25 MG tablet; Take 0.5-1 tablets (12.5-25 mg) by mouth 2 times daily  Dispense: 180 tablet; Refill: 3    8. COPD, severe (H)  - albuterol (PROAIR HFA/PROVENTIL HFA/VENTOLIN HFA) 108 (90 Base) MCG/ACT inhaler; Inhale 3 puffs into the lungs 3 times daily as needed for shortness of breath / dyspnea or wheezing  Dispense: 25.5 g; Refill: 3  - albuterol (PROVENTIL) (2.5 MG/3ML) 0.083% neb solution; Take 1 vial (2.5 mg) by nebulization every 6 hours as needed for shortness of breath / dyspnea or wheezing  Dispense: 2 Box; Refill: 11    9. Wheezing  - albuterol (PROAIR HFA/PROVENTIL HFA/VENTOLIN HFA) 108 (90 Base) MCG/ACT inhaler; Inhale 3 puffs into the lungs 3 times daily as needed for shortness of breath / dyspnea or wheezing  Dispense: 25.5 g; Refill: 3      Return as needed for follow-up with Dr. Boyd.    Clinic : [phone]  Appointment line: [phone]     Tim Boyd MD  Internal Medicine  [facility] and [facility]     Portions of this note were dictated using speech recognition software. The note has been  proofread but errors in the text may have been overlooked. Please contact me if there are any concerns regarding the accuracy of the dictation.

## 2019-05-08 NOTE — PATIENT INSTRUCTIONS
1. Difficulty swallowing solids  - CT Soft Tissue Neck w Contrast; Future  - they will call with date/time of appointment.    - GASTROENTEROLOGY ADULT REF PROCEDURE ONLY - EGD ordered  - they will call with date/time of appointment.      2. Fullness of neck  - TSH Reflex [facility]; Future  - CT Soft Tissue Neck w Contrast; Future  - Basic Metabolic Panel; Future    3. Status post balloon dilatation of esophageal stricture  - GASTROENTEROLOGY ADULT REF PROCEDURE ONLY    4. Visit for screening mammogram  - MA Screen Bilateral w/Dillon; Future  - they will call with date/time of appointment.      5. Vitamin B12 deficiency  - Vitamin B12; Future  - vitamin B-12 (CYANOCOBALAMIN) 2500 MCG sublingual tablet; Take 1 tablet (2,500 mcg) by mouth daily  Dispense: 90 tablet; Refill: 3    6. Chest pain, unspecified type  7. PAD (peripheral artery disease) (H)  - metoprolol tartrate (LOPRESSOR) 25 MG tablet; Take 0.5-1 tablets (12.5-25 mg) by mouth 2 times daily  Dispense: 180 tablet; Refill: 3    8. COPD, severe (H)  - albuterol (PROAIR HFA/PROVENTIL HFA/VENTOLIN HFA) 108 (90 Base) MCG/ACT inhaler; Inhale 3 puffs into the lungs 3 times daily as needed for shortness of breath / dyspnea or wheezing  Dispense: 25.5 g; Refill: 3  - albuterol (PROVENTIL) (2.5 MG/3ML) 0.083% neb solution; Take 1 vial (2.5 mg) by nebulization every 6 hours as needed for shortness of breath / dyspnea or wheezing  Dispense: 2 Box; Refill: 11    9. Wheezing  - albuterol (PROAIR HFA/PROVENTIL HFA/VENTOLIN HFA) 108 (90 Base) MCG/ACT inhaler; Inhale 3 puffs into the lungs 3 times daily as needed for shortness of breath / dyspnea or wheezing  Dispense: 25.5 g; Refill: 3      Return as needed for follow-up with Dr. Boyd.    Clinic : [phone]  Appointment line: [phone]

## 2019-05-08 NOTE — TELEPHONE ENCOUNTER
Patient needs to see Dr Jagdish Ruiz( her surgeon )  For dysphagia after Nissen. This can be more complicated than just needing a scope.

## 2019-05-08 NOTE — TELEPHONE ENCOUNTER
Patient referred by Dr. Boyd  For a Egd.  Diagnosis is difficulty swallowing solids and status post balloon dilatation of esophageal stricture.   Please advise.  Natacha Field on 5/8/2019 at 3:00 PM

## 2019-05-08 NOTE — LETTER
May 8, 2019      Ember Tavares  [address] [address] [address]        Dear ,    We are writing to inform you of your test results.    Lab levels have improved.    Kidney function is normal.    Thyroid levels are normal.    Vitamin B12 levels have room to increase.  If you are still having a lot of tiredness vertigo or fatigue, you could increase your B12/B complex dose.    Oftentimes people do better if the B12 level is closer to 1000 or 1100.    Resulted Orders   Basic Metabolic Panel   Result Value Ref Range    Sodium 140 134 - 144 mmol/L    Potassium 3.8 3.5 - 5.1 mmol/L    Chloride 101 98 - 107 mmol/L    Carbon Dioxide 33 (H) 21 - 31 mmol/L    Anion Gap 6 3 - 14 mmol/L    Glucose 95 70 - 105 mg/dL    Urea Nitrogen 12 7 - 25 mg/dL    Creatinine 0.90 0.60 - 1.20 mg/dL    GFR Estimate 65 >60 mL/min/[1.73_m2]    GFR Estimate If Black 78 >60 mL/min/[1.73_m2]    Calcium 9.5 8.6 - 10.3 mg/dL   Vitamin B12   Result Value Ref Range    Vitamin B12 715 180 - 914 pg/mL   TSH Reflex [facility]   Result Value Ref Range    TSH Reflex 1.85 0.34 - 5.60 IU/mL       If you have any questions or concerns, please call the clinic at the number listed above.       Sincerely,        Tim Boyd MD

## 2019-05-09 ASSESSMENT — ANXIETY QUESTIONNAIRES: GAD7 TOTAL SCORE: 0

## 2019-05-14 ENCOUNTER — TELEPHONE (OUTPATIENT)
Dept: SURGERY | Facility: OTHER | Age: 59
End: 2019-05-14

## 2019-05-14 ENCOUNTER — HOSPITAL ENCOUNTER (OUTPATIENT)
Dept: CT IMAGING | Facility: OTHER | Age: 59
Discharge: HOME | End: 2019-05-14
Attending: INTERNAL MEDICINE | Admitting: INTERNAL MEDICINE
Payer: COMMERCIAL

## 2019-05-14 ENCOUNTER — HOSPITAL ENCOUNTER (OUTPATIENT)
Dept: MAMMOGRAPHY | Facility: OTHER | Age: 59
End: 2019-05-14
Attending: INTERNAL MEDICINE
Payer: COMMERCIAL

## 2019-05-14 ENCOUNTER — OFFICE VISIT (OUTPATIENT)
Dept: SURGERY | Facility: OTHER | Age: 59
End: 2019-05-14
Attending: SURGERY
Payer: COMMERCIAL

## 2019-05-14 VITALS
DIASTOLIC BLOOD PRESSURE: 74 MMHG | BODY MASS INDEX: 25.51 KG/M2 | SYSTOLIC BLOOD PRESSURE: 106 MMHG | HEIGHT: 62 IN | HEART RATE: 68 BPM | RESPIRATION RATE: 18 BRPM | WEIGHT: 138.6 LBS | TEMPERATURE: 97.3 F

## 2019-05-14 DIAGNOSIS — Z12.31 VISIT FOR SCREENING MAMMOGRAM: ICD-10-CM

## 2019-05-14 DIAGNOSIS — R13.12 OROPHARYNGEAL DYSPHAGIA: ICD-10-CM

## 2019-05-14 DIAGNOSIS — R22.1 FULLNESS OF NECK: ICD-10-CM

## 2019-05-14 DIAGNOSIS — R13.10 DIFFICULTY SWALLOWING SOLIDS: ICD-10-CM

## 2019-05-14 DIAGNOSIS — E07.9 SWELLING OF THYROID GLAND: Primary | ICD-10-CM

## 2019-05-14 PROCEDURE — 99213 OFFICE O/P EST LOW 20 MIN: CPT | Performed by: SURGERY

## 2019-05-14 PROCEDURE — 70491 CT SOFT TISSUE NECK W/DYE: CPT

## 2019-05-14 PROCEDURE — 25500064 ZZH RX 255 OP 636: Performed by: INTERNAL MEDICINE

## 2019-05-14 PROCEDURE — 77063 BREAST TOMOSYNTHESIS BI: CPT

## 2019-05-14 RX ADMIN — IOHEXOL 100 ML: 350 INJECTION, SOLUTION INTRAVENOUS at 08:29

## 2019-05-14 ASSESSMENT — MIFFLIN-ST. JEOR: SCORE: 1166.94

## 2019-05-14 ASSESSMENT — PAIN SCALES - GENERAL: PAINLEVEL: NO PAIN (0)

## 2019-05-14 NOTE — LETTER
May 14, 2019      Ember Tavares  [address] [address] [address]        Dear ,    We are writing to inform you of your test results.    -- Neck CT looks okay.     Resulted Orders   CT Soft Tissue Neck w Contrast    Narrative    PROCEDURE: CT SOFT TISSUE NECK W CONTRAST 5/14/2019 8:37 AM    HISTORY: Difficulty swallowing, fullness of neck with swallowing on  the right, history of esophageal dilatation.; Difficulty swallowing  solids; Fullness of neck    COMPARISONS: None.    Meds/Dose Given: 100 ml Omnipaque 350    TECHNIQUE: Axial postcontrast enhanced images with coronal and  sagittal reformatted images.    FINDINGS: No enlarged cervical lymph nodes are seen. Salivary glands  appear normal and fairly symmetrical. There is no prevertebral soft  tissue swelling. Hypopharynx and larynx have a normal appearance.    Thyroid gland enhances homogeneously.    Changes of centrilobular emphysema are seen in the lung apices. This  was also seen on a recent CT of the chest.    There is straightening of the normal cervical lordosis. No compression  fracture is seen. Multilevel facet degenerative changes seen.         Impression    IMPRESSION: No mass or lymph node enlargement.    ALIYA BERG MD       If you have any questions or concerns, please call the clinic at the number listed above.       Sincerely,        [facility]

## 2019-05-14 NOTE — NURSING NOTE
"Chief Complaint   Patient presents with     Clinic Care Coordination - Follow-up       Initial /74 (BP Location: Right arm, Patient Position: Sitting, Cuff Size: Adult Regular)   Pulse 68   Temp 97.3  F (36.3  C) (Temporal)   Resp 18   Ht 1.575 m (5' 2\")   Wt 62.9 kg (138 lb 9.6 oz)   Breastfeeding? No   BMI 25.35 kg/m   Estimated body mass index is 25.35 kg/m  as calculated from the following:    Height as of this encounter: 1.575 m (5' 2\").    Weight as of this encounter: 62.9 kg (138 lb 9.6 oz).  Medication Reconciliation: complete    Julia Reed LPN  "

## 2019-05-14 NOTE — PROGRESS NOTES
GENERAL SURGERY CONSULTATION NOTE    Ember MUNGUIA Anusha   [address] [address] [address]  58 year old  female  Admission Date/Time: No admission date for patient encounter.  Primary Care Provider:  Tim Boyd was asked to see this patient by Dr. Boyd for evaluation of thyroid swelling and dysphagia.     HPI: Pt notes food sticking in the back of through and esophagus. Pt feels swelling in the right neck when this happens. Pt also has some regurgitation of food. Notes increased need to swallow with water. Pt has hx of lower esophageal ring with dilation by GI every 2 years. Pt also had nissen last year. No reflux symptoms. No heartburn.  Gets hot feeling and some palpitations.     REVIEW OF SYSTEMS:    GENERAL: No fevers or chills. Denies fatigue, recent weight loss.  HEENT: No sinus drainage. No changes with vision or hearing. ++ difficulty swallowing.   LYMPHATICS:  No swollen nodes in axilla, neck or groin.  CARDIOVASCULAR: Denies chest pain, palpitations and dyspnea on exertion.  PULMONARY: No shortness of breath or cough. No increase in sputum production.  GI: Denies melena, bright red blood in stools. No hematemesis. No constipation or diarrhea.  : No dysuria or hematuria.  SKIN: No recent rashes or ulcers.   HEMATOLOGY:  No history of easy bruising or bleeding.  ENDOCRINE:  No history of diabetes or thyroid problems.  ++ see HPI  NEUROLOGY:  No history of seizures or headaches. No motor or sensory changes.        Patient Active Problem List   Diagnosis     Chronic abdominal pain     Alpha-1-antitrypsin deficiency carrier (H)     Arthritis of knee, right     Atherosclerosis of abdominal aorta (H)     Chronic radicular cervical pain     Constipation, chronic     Contact dermatitis     COPD, severe (H)     Decreased diffusion capacity of lung     Dyshidrotic hand dermatitis     GERD (gastroesophageal reflux disease)     Hiatal hernia     Status post balloon dilatation of esophageal  stricture     History of tobacco abuse     Irritable bowel syndrome     Myalgia     Occipital neuralgia of left side     Osteopenia     Prediabetes     Psoriasis     Psoriatic arthritis (H)     Schatzki's ring of distal esophagus     Sinusitis, chronic     Vitamin D deficiency     S/P Nissen fundoplication (without gastrostomy tube) procedure     Mixed hyperlipidemia     PAD (peripheral artery disease) (H)     Vitamin B12 deficiency     Menopausal syndrome (hot flashes)     Ulcer of right cornea     Chest pain     Seasonal allergic rhinitis due to pollen     Nodule of right lung     Difficulty swallowing solids       Past Medical History:   Diagnosis Date     Alpha-1-antitrypsin deficiency carrier (H) 8/23/2017     Atherosclerosis of abdominal aorta (H) 8/24/2017    Overview:  [facility] Studies: 2/19/2013 -- CTA ABDOMEN AND PELVIS WITH BILATERAL LOWER EXTREMITY RUNOFF  CLINICAL HISTORY: Iliac and mesenteric ischemia.  TECHNIQUE: 125 mL Omnipaque 300 IV contrast was administered. 3-D arterial reformations were performed.  FINDINGS: There is a well-defined ovoid density at the medial right costophrenic angle. This measures 2.3 x 0.9 x 0.6 cm. It demonstra     Chronic abdominal pain 3/10/2010    Overview:  16 year duration     Chronic sinusitis     No Comments Provided     Closed fracture of skull (H)     1972     COPD, severe (H) 8/24/2017    Overview:  8/4/2014 -- PULMONARY FUNCTION  SITE:  [facility] ORDERED BY:  VANNESA LUNA  CLINICAL SUMMARY: 53-year-old female with a history of severe COPD.   TECHNICIAN NOTE: Good effort.   DATA: FVC 1.85 (61%). FEV1 1.04 (45%). FEV1/FVC ratio 56%. DLCO 13.5 to 62%.   Flow volume curve is consistent with airway obstruction.   IMPRESSION: 1. Severe obstructive pulmon     GERD (gastroesophageal reflux disease) 11/29/2017     Hiatal hernia 8/24/2017     Other and unspecified hyperlipidemia 8/1/2012     PAD (peripheral artery disease) (H)  10/13/2015     Psoriasis 8/24/2017     Psoriatic arthritis (H) 11/20/2017     Schatzki's ring of distal esophagus 8/24/2017     Ulcer of right cornea 8/18/2018     Vitamin B12 deficiency 4/17/2018     Vitamin D deficiency 8/24/2017       Past Surgical History:   Procedure Laterality Date     AS UGI ENDOSCOPY W ESOPHAGEAL DILATION BALLOON <30MM  10/30/2015    ESOPHAGEAL DILATION,Dr. Rainer Allen, [facility]     BIOPSY BREAST Bilateral     1999, 2001, BIOPSY BREAST,benign     CHOLECYSTECTOMY  2004    Laparoscopic     COLONOSCOPY  06/01/2016    x's 3 negative     CT CORONARY ANGIOGRAM  2009    CT CORONARY ANGIOGRAM (IA),negative     ESOPHAGOSCOPY, GASTROSCOPY, DUODENOSCOPY (EGD), COMBINED  04/07/2009    dilated, Dr. Allen     HYSTERECTOMY VAGINAL  1994    ovaries remain     LAPAROSCOPIC NISSEN FUNDOPLICATION N/A 3/26/2018    Procedure: LAPAROSCOPIC NISSEN FUNDOPLICATION;  Laparoscopic Nissen Fundoplication;  Surgeon: Jagdish Ruiz MD;  Location: [facility] OR     LAPAROTOMY EXPLORATORY  1990    lysis of adhesions/endometriosis     RECTOCELE REPAIR  07/29/2009       Family History   Problem Relation Age of Onset     Arthritis Father         Arthritis     Emphysema Father 51        Alpha-1-AT deficiency     Peripheral Vascular Disease Mother         Peripheral vascular disease     Diabetes Mother         Diabetes     Arthritis Mother         Arthritis     Breast Cancer Sister         Premenopausal breast cancer     Eczema Brother         Eczema     Narcolepsy Brother      Other - See Comments Daughter         Narcolepsy     Narcolepsy Daughter      Brain Tumor Daughter      Seizure Disorder Daughter      Depression Daughter      Graves' disease Daughter        Social History     Social History Narrative    Graduated from high school plus 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  Lives in [place].  Patient previously smoked.    Alcohol Use - yes  Drug Use - no  Regular Exercise - yes   - Johan  3 biologic  children, 1 adopted, 2 stepchildren.    Works at [employer]       Social History     Socioeconomic History     Marital status:      Spouse name: Ervin     Number of children: Not on file     Years of education: Not on file     Highest education level: Not on file   Occupational History     Not on file   Social Needs     Financial resource strain: Not on file     Food insecurity:     Worry: Not on file     Inability: Not on file     Transportation needs:     Medical: Not on file     Non-medical: Not on file   Tobacco Use     Smoking status: Former Smoker     Packs/day: 0.50     Years: 32.00     Pack years: 16.00     Types: Cigarettes     Last attempt to quit: 2012     Years since quittin.7     Smokeless tobacco: Never Used     Tobacco comment: Quit smoking: quit date of 2012   Substance and Sexual Activity     Alcohol use: Yes     Alcohol/week: 1.2 oz     Comment: Alcoholic Drinks/day: 1-2 drinks every 1-2 weeks     Drug use: No     Sexual activity: Never   Lifestyle     Physical activity:     Days per week: Not on file     Minutes per session: Not on file     Stress: Not on file   Relationships     Social connections:     Talks on phone: Not on file     Gets together: Not on file     Attends [employer] service: Not on file     Active member of club or organization: Not on file     Attends meetings of clubs or organizations: Not on file     Relationship status: Not on file     Intimate partner violence:     Fear of current or ex partner: Not on file     Emotionally abused: Not on file     Physically abused: Not on file     Forced sexual activity: Not on file   Other Topics Concern     Parent/sibling w/ CABG, MI or angioplasty before 65F 55M? 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  Lives in [place].  Patient previously smoked.    Alcohol Use - yes  Drug Use - no  Regular Exercise - yes   - Johan  3 biologic  children, 1 adopted, 2 stepchildren.    Works at [employer]         Current Outpatient Medications on File Prior to Visit:  albuterol (PROAIR HFA/PROVENTIL HFA/VENTOLIN HFA) 108 (90 Base) MCG/ACT inhaler Inhale 3 puffs into the lungs 3 times daily as needed for shortness of breath / dyspnea or wheezing   albuterol (PROVENTIL) (2.5 MG/3ML) 0.083% neb solution Take 1 vial (2.5 mg) by nebulization every 6 hours as needed for shortness of breath / dyspnea or wheezing   aspirin 81 MG chewable tablet Take 81 mg by mouth daily with food   calcipotriene 0.005 % OINT Apply topically 2 times daily as needed    cetirizine (ZYRTEC) 10 MG tablet Take one tablet by mouth once or twice daily as needed for hand dermatitis/itching.   Cholecalciferol (D 5000) 5000 UNITS TABS Take 5,000 Units by mouth every 7 days   clobetasol (TEMOVATE) 0.05 % ointment Apply topically 2 times daily as needed    fluticasone-salmeterol (ADVAIR DISKUS) 500-50 MCG/DOSE inhaler Inhale 1 puff into the lungs every 12 hours Rinse mouth well after use to prevent Thrush   folic acid (FOLVITE) 1 MG tablet Take 1 mg by mouth daily    metoprolol tartrate (LOPRESSOR) 25 MG tablet Take 0.5-1 tablets (12.5-25 mg) by mouth 2 times daily   montelukast (SINGULAIR) 10 MG tablet Take 0.5-1 tablets (5-10 mg) by mouth At Bedtime - for allergies   nitroGLYcerin (NITROSTAT) 0.4 MG sublingual tablet For chest pain place 1 tablet under the tongue every 5 minutes for 3 doses. If symptoms persist 5 minutes after 1st dose call 911.   pravastatin (PRAVACHOL) 20 MG tablet Take 1 tablet (20 mg) by mouth twice a week   tiotropium (SPIRIVA RESPIMAT) 2.5 MCG/ACT inhaler Inhale 2 puffs into the lungs daily   venlafaxine (EFFEXOR-XR) 37.5 MG 24 hr capsule Take 1 capsule (37.5 mg) by mouth daily with food   vitamin B-12 (CYANOCOBALAMIN) 2500 MCG sublingual tablet Take 1 tablet (2,500 mcg) by mouth daily     No current facility-administered medications on file prior to visit.  "      ALLERGIES/SENSITIVITIES:   Allergies   Allergen Reactions     Atorvastatin Muscle Pain (Myalgia)     Rosuvastatin Nausea and Vomiting     Sucralfate Nausea and Vomiting     Levofloxacin Nausea and Vomiting     Morphine      Other reaction(s): Chest Pain     Penicillins      Other reaction(s): Respiratory Arrest     Sulfamethoxazole W/Trimethoprim Nausea and Vomiting     Yeast infection       PHYSICAL EXAM:     /74 (BP Location: Right arm, Patient Position: Sitting, Cuff Size: Adult Regular)   Pulse 68   Temp 97.3  F (36.3  C) (Temporal)   Resp 18   Ht 1.575 m (5' 2\")   Wt 62.9 kg (138 lb 9.6 oz)   Breastfeeding? No   BMI 25.35 kg/m      General Appearance:   Appears well  HEENT: smooth thyroid with mild right sided fullness.   Heart & CV:  RRR no murmur.  Intact distal pulses, good cap refill.  LUNGS:  CTA B/L, no wheezing or crackles.  Abd:  Soft, non-tender, well healed  Ext: No deformity.       ADDITIONAL COMMENTS:      CONSULTATION ASSESSMENT AND PLAN:    58 year old female with thyroid fullness, neck swelling, and dysphagia (both pharyngeal and esophageal) s/p nissen.     - Thyroid US  - EGD with dilation (will look for Zenker at this exam)  - Possible swallow to check for Zenker if not evident on EGD.     Jagdish Ruiz MD on 5/14/2019 at 2:30 PM      "

## 2019-05-14 NOTE — H&P (VIEW-ONLY)
GENERAL SURGERY CONSULTATION NOTE    Ember MUNGUIA Anusha   [id number] [address] [id number]  58 year old  female  Admission Date/Time: No admission date for patient encounter.  Primary Care Provider:  Tim Boyd was asked to see this patient by Dr. Boyd for evaluation of thyroid swelling and dysphagia.     HPI: Pt notes food sticking in the back of through and esophagus. Pt feels swelling in the right neck when this happens. Pt also has some regurgitation of food. Notes increased need to swallow with water. Pt has hx of lower esophageal ring with dilation by GI every 2 years. Pt also had nissen last year. No reflux symptoms. No heartburn.  Gets hot feeling and some palpitations.     REVIEW OF SYSTEMS:    GENERAL: No fevers or chills. Denies fatigue, recent weight loss.  HEENT: No sinus drainage. No changes with vision or hearing. ++ difficulty swallowing.   LYMPHATICS:  No swollen nodes in axilla, neck or groin.  CARDIOVASCULAR: Denies chest pain, palpitations and dyspnea on exertion.  PULMONARY: No shortness of breath or cough. No increase in sputum production.  GI: Denies melena, bright red blood in stools. No hematemesis. No constipation or diarrhea.  : No dysuria or hematuria.  SKIN: No recent rashes or ulcers.   HEMATOLOGY:  No history of easy bruising or bleeding.  ENDOCRINE:  No history of diabetes or thyroid problems.  ++ see HPI  NEUROLOGY:  No history of seizures or headaches. No motor or sensory changes.        Patient Active Problem List   Diagnosis     Chronic abdominal pain     Alpha-1-antitrypsin deficiency carrier (H)     Arthritis of knee, right     Atherosclerosis of abdominal aorta (H)     Chronic radicular cervical pain     Constipation, chronic     Contact dermatitis     COPD, severe (H)     Decreased diffusion capacity of lung     Dyshidrotic hand dermatitis     GERD (gastroesophageal reflux disease)     Hiatal hernia     Status post balloon dilatation of esophageal  stricture     History of tobacco abuse     Irritable bowel syndrome     Myalgia     Occipital neuralgia of left side     Osteopenia     Prediabetes     Psoriasis     Psoriatic arthritis (H)     Schatzki's ring of distal esophagus     Sinusitis, chronic     Vitamin D deficiency     S/P Nissen fundoplication (without gastrostomy tube) procedure     Mixed hyperlipidemia     PAD (peripheral artery disease) (H)     Vitamin B12 deficiency     Menopausal syndrome (hot flashes)     Ulcer of right cornea     Chest pain     Seasonal allergic rhinitis due to pollen     Nodule of right lung     Difficulty swallowing solids       Past Medical History:   Diagnosis Date     Alpha-1-antitrypsin deficiency carrier (H) 8/23/2017     Atherosclerosis of abdominal aorta (H) 8/24/2017    Overview:  [facility] Studies: 2/19/2013 -- CTA ABDOMEN AND PELVIS WITH BILATERAL LOWER EXTREMITY RUNOFF  CLINICAL HISTORY: Iliac and mesenteric ischemia.  TECHNIQUE: 125 mL Omnipaque 300 IV contrast was administered. 3-D arterial reformations were performed.  FINDINGS: There is a well-defined ovoid density at the medial right costophrenic angle. This measures 2.3 x 0.9 x 0.6 cm. It demonstra     Chronic abdominal pain 3/10/2010    Overview:  16 year duration     Chronic sinusitis     No Comments Provided     Closed fracture of skull (H)     1972     COPD, severe (H) 8/24/2017    Overview:  8/4/2014 -- PULMONARY FUNCTION  SITE:  [facility] ORDERED BY:  VANNESA LUNA  CLINICAL SUMMARY: 53-year-old female with a history of severe COPD.   TECHNICIAN NOTE: Good effort.   DATA: FVC 1.85 (61%). FEV1 1.04 (45%). FEV1/FVC ratio 56%. DLCO 13.5 to 62%.   Flow volume curve is consistent with airway obstruction.   IMPRESSION: 1. Severe obstructive pulmon     GERD (gastroesophageal reflux disease) 11/29/2017     Hiatal hernia 8/24/2017     Other and unspecified hyperlipidemia 8/1/2012     PAD (peripheral artery disease) (H)  10/13/2015     Psoriasis 8/24/2017     Psoriatic arthritis (H) 11/20/2017     Schatzki's ring of distal esophagus 8/24/2017     Ulcer of right cornea 8/18/2018     Vitamin B12 deficiency 4/17/2018     Vitamin D deficiency 8/24/2017       Past Surgical History:   Procedure Laterality Date     AS UGI ENDOSCOPY W ESOPHAGEAL DILATION BALLOON <30MM  10/30/2015    ESOPHAGEAL DILATION,Dr. Rainer Allen, [facility]     BIOPSY BREAST Bilateral     1999, 2001, BIOPSY BREAST,benign     CHOLECYSTECTOMY  2004    Laparoscopic     COLONOSCOPY  06/01/2016    x's 3 negative     CT CORONARY ANGIOGRAM  2009    CT CORONARY ANGIOGRAM (IA),negative     ESOPHAGOSCOPY, GASTROSCOPY, DUODENOSCOPY (EGD), COMBINED  04/07/2009    dilated, Dr. Allen     HYSTERECTOMY VAGINAL  1994    ovaries remain     LAPAROSCOPIC NISSEN FUNDOPLICATION N/A 3/26/2018    Procedure: LAPAROSCOPIC NISSEN FUNDOPLICATION;  Laparoscopic Nissen Fundoplication;  Surgeon: Jagdish Ruiz MD;  Location: [facility] OR     LAPAROTOMY EXPLORATORY  1990    lysis of adhesions/endometriosis     RECTOCELE REPAIR  07/29/2009       Family History   Problem Relation Age of Onset     Arthritis Father         Arthritis     Emphysema Father 51        Alpha-1-AT deficiency     Peripheral Vascular Disease Mother         Peripheral vascular disease     Diabetes Mother         Diabetes     Arthritis Mother         Arthritis     Breast Cancer Sister         Premenopausal breast cancer     Eczema Brother         Eczema     Narcolepsy Brother      Other - See Comments Daughter         Narcolepsy     Narcolepsy Daughter      Brain Tumor Daughter      Seizure Disorder Daughter      Depression Daughter      Graves' disease Daughter        Social History     Social History Narrative    Graduated from high school plus 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  Lives in [place].  Patient previously smoked.    Alcohol Use - yes  Drug Use - no  Regular Exercise - yes   - Johan  3 biologic  children, 1 adopted, 2 stepchildren.    Works at [employer]       Social History     Socioeconomic History     Marital status:      Spouse name: Ervin     Number of children: Not on file     Years of education: Not on file     Highest education level: Not on file   Occupational History     Not on file   Social Needs     Financial resource strain: Not on file     Food insecurity:     Worry: Not on file     Inability: Not on file     Transportation needs:     Medical: Not on file     Non-medical: Not on file   Tobacco Use     Smoking status: Former Smoker     Packs/day: 0.50     Years: 32.00     Pack years: 16.00     Types: Cigarettes     Last attempt to quit: 2012     Years since quittin.7     Smokeless tobacco: Never Used     Tobacco comment: Quit smoking: quit date of 2012   Substance and Sexual Activity     Alcohol use: Yes     Alcohol/week: 1.2 oz     Comment: Alcoholic Drinks/day: 1-2 drinks every 1-2 weeks     Drug use: No     Sexual activity: Never   Lifestyle     Physical activity:     Days per week: Not on file     Minutes per session: Not on file     Stress: Not on file   Relationships     Social connections:     Talks on phone: Not on file     Gets together: Not on file     Attends Hoahaoism service: Not on file     Active member of club or organization: Not on file     Attends meetings of clubs or organizations: Not on file     Relationship status: Not on file     Intimate partner violence:     Fear of current or ex partner: Not on file     Emotionally abused: Not on file     Physically abused: Not on file     Forced sexual activity: Not on file   Other Topics Concern     Parent/sibling w/ CABG, MI or angioplasty before 65F 55M? 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  Lives in [place].  Patient previously smoked.    Alcohol Use - yes  Drug Use - no  Regular Exercise - yes   - Johan  3 biologic  children, 1 adopted, 2 stepchildren.    Works at [employer]         Current Outpatient Medications on File Prior to Visit:  albuterol (PROAIR HFA/PROVENTIL HFA/VENTOLIN HFA) 108 (90 Base) MCG/ACT inhaler Inhale 3 puffs into the lungs 3 times daily as needed for shortness of breath / dyspnea or wheezing   albuterol (PROVENTIL) (2.5 MG/3ML) 0.083% neb solution Take 1 vial (2.5 mg) by nebulization every 6 hours as needed for shortness of breath / dyspnea or wheezing   aspirin 81 MG chewable tablet Take 81 mg by mouth daily with food   calcipotriene 0.005 % OINT Apply topically 2 times daily as needed    cetirizine (ZYRTEC) 10 MG tablet Take one tablet by mouth once or twice daily as needed for hand dermatitis/itching.   Cholecalciferol (D 5000) 5000 UNITS TABS Take 5,000 Units by mouth every 7 days   clobetasol (TEMOVATE) 0.05 % ointment Apply topically 2 times daily as needed    fluticasone-salmeterol (ADVAIR DISKUS) 500-50 MCG/DOSE inhaler Inhale 1 puff into the lungs every 12 hours Rinse mouth well after use to prevent Thrush   folic acid (FOLVITE) 1 MG tablet Take 1 mg by mouth daily    metoprolol tartrate (LOPRESSOR) 25 MG tablet Take 0.5-1 tablets (12.5-25 mg) by mouth 2 times daily   montelukast (SINGULAIR) 10 MG tablet Take 0.5-1 tablets (5-10 mg) by mouth At Bedtime - for allergies   nitroGLYcerin (NITROSTAT) 0.4 MG sublingual tablet For chest pain place 1 tablet under the tongue every 5 minutes for 3 doses. If symptoms persist 5 minutes after 1st dose call 911.   pravastatin (PRAVACHOL) 20 MG tablet Take 1 tablet (20 mg) by mouth twice a week   tiotropium (SPIRIVA RESPIMAT) 2.5 MCG/ACT inhaler Inhale 2 puffs into the lungs daily   venlafaxine (EFFEXOR-XR) 37.5 MG 24 hr capsule Take 1 capsule (37.5 mg) by mouth daily with food   vitamin B-12 (CYANOCOBALAMIN) 2500 MCG sublingual tablet Take 1 tablet (2,500 mcg) by mouth daily     No current facility-administered medications on file prior to visit.  "      ALLERGIES/SENSITIVITIES:   Allergies   Allergen Reactions     Atorvastatin Muscle Pain (Myalgia)     Rosuvastatin Nausea and Vomiting     Sucralfate Nausea and Vomiting     Levofloxacin Nausea and Vomiting     Morphine      Other reaction(s): Chest Pain     Penicillins      Other reaction(s): Respiratory Arrest     Sulfamethoxazole W/Trimethoprim Nausea and Vomiting     Yeast infection       PHYSICAL EXAM:     /74 (BP Location: Right arm, Patient Position: Sitting, Cuff Size: Adult Regular)   Pulse 68   Temp 97.3  F (36.3  C) (Temporal)   Resp 18   Ht 1.575 m (5' 2\")   Wt 62.9 kg (138 lb 9.6 oz)   Breastfeeding? No   BMI 25.35 kg/m      General Appearance:   Appears well  HEENT: smooth thyroid with mild right sided fullness.   Heart & CV:  RRR no murmur.  Intact distal pulses, good cap refill.  LUNGS:  CTA B/L, no wheezing or crackles.  Abd:  Soft, non-tender, well healed  Ext: No deformity.       ADDITIONAL COMMENTS:      CONSULTATION ASSESSMENT AND PLAN:    58 year old female with thyroid fullness, neck swelling, and dysphagia (both pharyngeal and esophageal) s/p nissen.     - Thyroid US  - EGD with dilation (will look for Zenker at this exam)  - Possible swallow to check for Zenker if not evident on EGD.     Jagdish Ruiz MD on 5/14/2019 at 2:30 PM      "

## 2019-05-14 NOTE — TELEPHONE ENCOUNTER
Screening Questions for the Scheduling of Screening Colonoscopies   (If Colonoscopy is diagnostic, Provider should review the chart before scheduling.)  Are you younger than 50 or older than 80?  NO   Do you take aspirin or fish oil?  YES - ASPIRIN   (if yes, tell patient to stop 1 week prior to Colonoscopy)  Do you take warfarin (Coumadin), clopidogrel (Plavix), apixaban (Eliquis), dabigatram (Pradaxa), rivaroxaban (Xarelto) or any blood thinner? NO   Do you use oxygen at home?  NO   Do you have kidney disease?  NO   Are you on dialysis? NO   Have you had a stroke or heart attack in the last year? NO   Have you had a stent in your heart or any blood vessel in the last year? NO   Have you had a transplant of any organ? NO  Have you had a colonoscopy or upper endoscopy (EGD) before? YES          When?  2017   Date of scheduled EGD  05/30/2019  Provider  [facility]   Pharmacy

## 2019-05-20 ENCOUNTER — HOSPITAL ENCOUNTER (OUTPATIENT)
Dept: ULTRASOUND IMAGING | Facility: OTHER | Age: 59
Discharge: HOME | End: 2019-05-20
Attending: SURGERY | Admitting: SURGERY
Payer: COMMERCIAL

## 2019-05-20 DIAGNOSIS — E07.9 SWELLING OF THYROID GLAND: ICD-10-CM

## 2019-05-20 PROCEDURE — 76536 US EXAM OF HEAD AND NECK: CPT

## 2019-05-30 ENCOUNTER — HOSPITAL ENCOUNTER (OUTPATIENT)
Facility: OTHER | Age: 59
Discharge: HOME | End: 2019-05-30
Attending: SURGERY | Admitting: SURGERY
Payer: COMMERCIAL

## 2019-05-30 ENCOUNTER — ANESTHESIA (OUTPATIENT)
Dept: SURGERY | Facility: OTHER | Age: 59
End: 2019-05-30
Payer: COMMERCIAL

## 2019-05-30 ENCOUNTER — ANESTHESIA EVENT (OUTPATIENT)
Dept: SURGERY | Facility: OTHER | Age: 59
End: 2019-05-30
Payer: COMMERCIAL

## 2019-05-30 VITALS
HEART RATE: 75 BPM | DIASTOLIC BLOOD PRESSURE: 72 MMHG | RESPIRATION RATE: 16 BRPM | SYSTOLIC BLOOD PRESSURE: 86 MMHG | OXYGEN SATURATION: 90 % | WEIGHT: 138 LBS | BODY MASS INDEX: 25.24 KG/M2 | TEMPERATURE: 97.6 F

## 2019-05-30 DIAGNOSIS — K25.3 ACUTE GASTRIC ULCER WITHOUT HEMORRHAGE OR PERFORATION: Primary | ICD-10-CM

## 2019-05-30 PROCEDURE — 25800030 ZZH RX IP 258 OP 636: Performed by: SURGERY

## 2019-05-30 PROCEDURE — 40000010 ZZH STATISTIC ANES STAT CODE-CRNA PER MINUTE: Performed by: SURGERY

## 2019-05-30 PROCEDURE — 43239 EGD BIOPSY SINGLE/MULTIPLE: CPT | Performed by: NURSE ANESTHETIST, CERTIFIED REGISTERED

## 2019-05-30 PROCEDURE — 25000125 ZZHC RX 250: Performed by: SURGERY

## 2019-05-30 PROCEDURE — 88305 TISSUE EXAM BY PATHOLOGIST: CPT

## 2019-05-30 PROCEDURE — 43239 EGD BIOPSY SINGLE/MULTIPLE: CPT | Mod: XU | Performed by: SURGERY

## 2019-05-30 PROCEDURE — 43249 ESOPH EGD DILATION <30 MM: CPT

## 2019-05-30 PROCEDURE — 25000125 ZZHC RX 250: Performed by: NURSE ANESTHETIST, CERTIFIED REGISTERED

## 2019-05-30 PROCEDURE — 43249 ESOPH EGD DILATION <30 MM: CPT | Performed by: SURGERY

## 2019-05-30 PROCEDURE — 25000128 H RX IP 250 OP 636: Performed by: NURSE ANESTHETIST, CERTIFIED REGISTERED

## 2019-05-30 RX ORDER — SODIUM CHLORIDE, SODIUM LACTATE, POTASSIUM CHLORIDE, CALCIUM CHLORIDE 600; 310; 30; 20 MG/100ML; MG/100ML; MG/100ML; MG/100ML
INJECTION, SOLUTION INTRAVENOUS CONTINUOUS
Status: DISCONTINUED | OUTPATIENT
Start: 2019-05-30 | End: 2019-05-30 | Stop reason: HOSPADM

## 2019-05-30 RX ORDER — NALOXONE HYDROCHLORIDE 0.4 MG/ML
.1-.4 INJECTION, SOLUTION INTRAMUSCULAR; INTRAVENOUS; SUBCUTANEOUS
Status: DISCONTINUED | OUTPATIENT
Start: 2019-05-30 | End: 2019-05-30 | Stop reason: HOSPADM

## 2019-05-30 RX ORDER — FLUMAZENIL 0.1 MG/ML
0.2 INJECTION, SOLUTION INTRAVENOUS
Status: DISCONTINUED | OUTPATIENT
Start: 2019-05-30 | End: 2019-05-30 | Stop reason: HOSPADM

## 2019-05-30 RX ORDER — PROPOFOL 10 MG/ML
INJECTION, EMULSION INTRAVENOUS CONTINUOUS PRN
Status: DISCONTINUED | OUTPATIENT
Start: 2019-05-30 | End: 2019-05-30

## 2019-05-30 RX ORDER — OMEPRAZOLE 40 MG/1
40 CAPSULE, DELAYED RELEASE ORAL DAILY
Qty: 90 CAPSULE | Refills: 0 | Status: SHIPPED | OUTPATIENT
Start: 2019-05-30 | End: 2019-09-27

## 2019-05-30 RX ORDER — PROPOFOL 10 MG/ML
INJECTION, EMULSION INTRAVENOUS PRN
Status: DISCONTINUED | OUTPATIENT
Start: 2019-05-30 | End: 2019-05-30

## 2019-05-30 RX ORDER — ONDANSETRON 2 MG/ML
4 INJECTION INTRAMUSCULAR; INTRAVENOUS
Status: DISCONTINUED | OUTPATIENT
Start: 2019-05-30 | End: 2019-05-30 | Stop reason: HOSPADM

## 2019-05-30 RX ORDER — LIDOCAINE 40 MG/G
CREAM TOPICAL
Status: DISCONTINUED | OUTPATIENT
Start: 2019-05-30 | End: 2019-05-30 | Stop reason: HOSPADM

## 2019-05-30 RX ADMIN — SODIUM CHLORIDE, POTASSIUM CHLORIDE, SODIUM LACTATE AND CALCIUM CHLORIDE: 600; 310; 30; 20 INJECTION, SOLUTION INTRAVENOUS at 09:36

## 2019-05-30 RX ADMIN — PROPOFOL 100 MG: 10 INJECTION, EMULSION INTRAVENOUS at 10:06

## 2019-05-30 RX ADMIN — LIDOCAINE HYDROCHLORIDE 0.1 ML: 10 INJECTION, SOLUTION EPIDURAL; INFILTRATION; INTRACAUDAL; PERINEURAL at 09:36

## 2019-05-30 RX ADMIN — SODIUM CHLORIDE, POTASSIUM CHLORIDE, SODIUM LACTATE AND CALCIUM CHLORIDE: 600; 310; 30; 20 INJECTION, SOLUTION INTRAVENOUS at 10:01

## 2019-05-30 RX ADMIN — PROPOFOL 75 MCG/KG/MIN: 10 INJECTION, EMULSION INTRAVENOUS at 10:02

## 2019-05-30 NOTE — OP NOTE
PROCEDURE NOTE    SURGEON:Jagdish Ruiz    PRE-OP DIAGNOSIS:   Dysphagia, hx of recurrent Schatzki ring, s/p nissen      POST-OP DIAGNOSIS: Antral ulcer, nissen anatomy, lower esophageal ring     PROCEDURE PLANNED:   Diagnostic EGD, flexible, balloon dilation        PROCEDURE PERFORMED:  EGD with biopsy, flexible, balloon dilation    SPECIMEN:    ID Type Source Tests Collected by Time Destination   A : duodenum biopsies Biopsy Small Intestine, Duodenum SURGICAL PATHOLOGY EXAM Jagdish Ruiz MD 5/30/2019 10:10 AM    B : antrum biopsies Biopsy Stomach, Antrum SURGICAL PATHOLOGY EXAM Jagdish Ruiz MD 5/30/2019 10:12 AM    C : G junction biopsies Biopsy Gastro Esophageal Junction SURGICAL PATHOLOGY EXAM Jagdish Ruiz MD 5/30/2019 10:13 AM            ANESTHESIA: Monitor Anesthesia Care  Coverage requested       CRNA: Jose Martin Wayne APRN CRNA      ESTIMATED BLOOD LOSS: None    COMPLICATIONS:  None    INDICATION FOR THE PROCEDURE:The patient is a 58 year oldfemale. The patient presents with dysphagia. I explained to the patient the risks, benefits and alternatives to diagnostic EGD for evaluating for dysphagia and recurrence of Schatzki ring. We specifically discussed the risks of bleeding, infection, perforation and the risks of sedation. The patient's questions were answered and the patient wished to proceed. Informed consent paperwork was completed.    PROCEDURE: The patient was taken to the endoscopy suite. Appropriate monitors were attached. The patient was placed in the left lateral decubitus position. Bite block was positioned. Timeout was performed confirming the patient's identity and procedure to be performed. After appropriate sedation was confirmed, the flexible endoscope was advanced into the oropharynx. The posterior oropharynx appeared grossly normal. The scope was advanced into the proximal esophagus. The esophagus was insufflated with air. The scope was advanced under direct visualization. No  acute abnormalities of the esophagus were noted. The scope was advanced into the stomach. The scope was advanced through the pylorus into the duodenal bulb. The bulb and distal duodenum appeared grossly normal.  The scope was withdrawn back into the stomach. Antral biopsy was obtained and sent to pathology. A nearly healed antral ulcer was noted. The scope was retroflexed and the GE junction inspected. Nissen anatomy was noted. The scope was returned to a neutral position and the stomach was decompressed. The scope was withdrawn to the GE junction and biopsy obtained. The endoscopic balloon was inflated to 20 mm and held for 30 seconds and then dilated again to 20 mm.  The mucosa of the esophagus was inspected while withdrawing the scope. No abnormalities were noted. The scope was withdrawn from the patient. The bite block was removed. The patient tolerated the procedure with no immediately apparent complication. The patient was taken to recovery instable condition.    FOLLOW UP:  RECOMMENDATIONS PPI X a 4-6 weeks for ulcer.     Jagdish Ruiz MD on 5/30/2019 at 10:27 AM

## 2019-05-30 NOTE — PATIENT INSTRUCTIONS
Patient Information     Patient Name MRN Sex Ember Lanza [id number] Female 1960      Patient Instructions by Tim Boyd MD at 2017 11:13 AM     Author:  Tim Boyd MD  Service:  (none) Author Type:  Physician     Filed:  2017 11:16 AM  Encounter Date:  2017 Status:  Addendum     :  Tim Boyd MD (Physician)        Related Notes: Original Note by Tim Boyd MD (Physician) filed at 2017 11:13 AM            [facility] Studies:  2013 -- CTA ABDOMEN AND PELVIS WITH BILATERAL LOWER EXTREMITY RUNOFF    CLINICAL HISTORY: Iliac and mesenteric ischemia.    TECHNIQUE: 125 mL Omnipaque 300 IV contrast was administered. 3-D  arterial reformations were performed.    FINDINGS: There is a well-defined ovoid density at the medial right  costophrenic angle. This measures 2.3 x 0.9 x 0.6 cm. It demonstrates  central low attenuation similar to the adjacent abdominal fat near the  liver. This could be a tiny Bochdalek hernia. The liver, spleen,  pancreas, adrenal glands, and kidneys are unremarkable. The  gallbladder is surgically absent. No biliary ductal dilation. No  mesenteric or retroperitoneal adenopathy. No ascites or fluid  collection. There is a moderate amount of retained stool seen  throughout most of the colon. No obstruction. No adjacent inflammatory  change. No ascites or fluid collection. Ureters and bladder are  unremarkable.    There is mild partially calcified atherosclerotic plaque within the  abdominal aorta. No aneurysm. There is narrowing of the lumen just  proximal to the bifurcation to 6 mm. There is moderate narrowing of  the proximal celiac artery without associated calcification. The  superior mesenteric artery appears to be widely patent with good  opacification of distal branch vessels. The MARIAH also appears to be  well opacified. There are single bilateral renal arteries which are  widely patent.    There is mild partially  The patient elects to proceed with nonsurgical treatment. calcified plaque within the common iliac  arteries with minimal narrowing. The external iliac and common femoral  arteries are widely patent. Superficial femoral and popliteal arteries  are widely patent bilaterally. There is three-vessel runoff with good  opacification in the lower leg to the ankle and foot.    IMPRESSION:  1. Mild aortoiliac atherosclerotic disease. No aneurysm. There is  narrowing of the distal aorta just proximal to the bifurcation.  2. There is moderate narrowing of the celiac artery which could be due  to noncalcified plaque. No SMA or MARIAH compromise definitely seen.  3. Probable tiny Bochdalek hernia at the right costophrenic angle. A  hamartoma would be a differential consideration though is less likely  given the central fat density.  4. Possible constipation.  5. No evidence of vascular compromise in the lower extremities.      Examination Interpreted at: [facility], [place], [place]  The Interpreting Physician is MONICA VORA  Exam was completed at [facility]    1. Encounter to establish care with new doctor    2. Hiatal hernia  3. Schatzki's ring of distal esophagus  4. History of esophageal dilatation    START:   - sucralfate (CARAFATE) 1 gram tablet; Take 1 tablet by mouth 4 times daily before meals and at bedtime. - as needed for Heartburn/hiatal hernia  Dispense: 120 tablet; Refill: 11    5. History of tobacco abuse      6. Atherosclerosis of abdominal aorta (HC)    -- Start trial of crestor -- once weekly for now, increase as tolerated.     - rosuvastatin (CRESTOR) 5 mg tablet; Take 1 tablet by mouth at bedtime. -- start once weekly for now  Dispense: 30 tablet; Refill: 1    7. Dyshidrotic hand dermatitis    -- Dose Increase.     - cetirizine (ZYRTEC) 10 mg tablet; Take one tablet by mouth once or twice daily as needed for hand dermatitis/itching.; Refill: 0  - triamcinolone (ARISTOCORT) 0.1 % ointment; Apply  topically to affected area(s)  3 times daily. -- for hand dermatitis  Dispense: 80 g; Refill: 3    8. Psoriasis    -- consider dermatology referral.     9. Necrobiosis lipoidica  -- evaluate for possible diabetes.     10. Elevated random blood glucose level  - Hgb A1c; Future    11. Malaise and fatigue  - cyanocobalamin 1,000 mcg injection (VITAMIN B12); Inject 1 mL intramuscular one time.  - VITAMIN B12; Future  - CK TOTAL; Future  - JENS TEST; Future    12. Arthralgia of both hands  - CK TOTAL; Future  - JENS TEST; Future  - RHEUMATOID FACTOR; Future  - CCP; Future    Vitamin B12 deficiency:    Vitamin B12 deficiency can cause numerous symptoms.  Fatigue and malaise, low energy levels, low blood counts or anemia, poor mood or depression, neuropathy or pins and needles/burning sensation of the hands and feet.    -- trial of B12 shot today.    -- If B12 shot improves your energy and your blood counts, we can do B12 shots every 3-4 weeks up to every 2 or 3 months if needed.    -- Omeprazole (Proton Pump Inhibitors in general) and metformin can lower B12 levels (inhibits absorption).    -- Consider B12 tablet or B-complex tablet -- once daily.     -- Some people are able to take and absorb oral B12 or B complex tablets.   -- Some people are NOT able to absorb oral B12 very well.    If you decide to proceed with oral B12 replacement, but your B12 levels do not improve, you likely will need to use B12 shots instead.        Return in approximately 1 month(s), or sooner as needed for follow-up with Dr. Boyd.  -- follow-up B12, fatigue    Clinic : [phone]  Appointment line: [phone]   Index Divehi Related topics   Hiatal Hernia   ________________________________________________________________________  KEY POINTS    A hiatal hernia is a condition in which part of the stomach pokes through the diaphragm up into the chest. Usually it does not cause symptoms or problems.    Treatment is usually not needed if you have no symptoms. If you  have symptoms, treatment may include quitting smoking, changing your diet, or losing weight. Your healthcare provider may prescribe medicines or you may need surgery.    Ask your healthcare provider how to take care of yourself at home, and what symptoms or problems you should watch for and what to do if you have them.  ________________________________________________________________________  What is a hiatal hernia?   A hiatal hernia is a condition in which part of the stomach pokes through the diaphragm up into the chest. The diaphragm is a muscle between your chest and belly that helps you breathe.  Hiatal hernias are common after middle age. Usually they do not cause symptoms or problems.  What is the cause?  The exact cause of hiatal hernias is not known. They happen more often after age 50 and in people who smoke or are overweight.  What are the symptoms?  Many people with a hiatal hernia never have any symptoms. However, in some cases it causes stomach acid to flow back into the esophagus. The esophagus is the tube that carries food from your throat to your stomach. The backward movement of stomach acid is called reflux. Symptoms of reflux may include:    Burning pain or warmth in your chest or throat, usually in the breastbone area    Bitter or sour taste in your mouth    Belching and a feeling of bloating or fullness in your stomach    Frequent unexplained dry cough  How is it diagnosed?  Because many hiatal hernias do not cause symptoms, they are often found during exams for other problems. Your healthcare provider will ask about your symptoms and medical history and examine you. Tests may include:    Barium swallow, which is an X-ray taken of the upper part of your digestive tract after you swallow barium. Barium is a liquid that helps your esophagus and stomach show up well on the X-ray.    Endoscopy, which uses a slim, flexible, lighted tube passed through your mouth to look at your esophagus and  stomach  How is it treated?  Treatment is usually not needed if you have no symptoms.  If you have heartburn or other symptoms of reflux, your healthcare provider may recommend or prescribe:    Lifestyle changes such as quitting smoking, changing your diet, or losing weight    Medicine to lower the acid in your stomach  If your symptoms are severe and are not controlled by changes in your diet, weight loss, or medicine, your provider may recommend surgery to repair the hernia.  How can I take care of myself?  To feel better and prevent problems:    Follow your healthcare provider s treatment plan. Take your medicines exactly as prescribed.    Take nonprescription antacids after meals and at bedtime, according to your provider s recommendation.    Eat smaller, more frequent meals. Avoid overeating and late-evening snacks or meals.    If certain foods or drinks seem to cause your symptoms or make them worse, avoid those foods.    Try to keep a healthy weight. If you are overweight, lose weight. Extra weight puts pressure on your stomach. The pressure can cause stomach contents to push up into your esophagus.    If you smoke, try to quit. Smoking can increase stomach acid. Talk to your healthcare provider about ways to quit smoking.    Wear loose fitting clothing without belts.  It may also help if you:    Sit up during meals and wait 2 to 3 hours after eating before you lie down. It s best not to eat for 2 to 3 hours before you go to bed.    Raise the head of your bed 6 to 8 inches by putting the frame on wood blocks. If you cannot raise the frame of the bed, try placing a foam wedge under the head of your mattress. Sleeping on your left side may also help. Just using extra pillows will not help.    Chew sugarless gum after meals. Some studies have shown that this decreases reflux.  Ask your healthcare provider:    How and when you will get your test results    How long it will take to recover from this illness    If  there are activities you should avoid and when you can return to your normal activities    How to take care of yourself at home    What symptoms or problems you should watch for and what to do if you have them  Make sure you know when you should come back for a checkup. Keep all appointments for provider visits or tests.  Developed by [employer].  Adult Advisor 2016.3 published by [employer].  Last modified: 2016-03-23  Last reviewed: 2015-10-19  This content is reviewed periodically and is subject to change as new health information becomes available. The information is intended to inform and educate and is not a replacement for medical evaluation, advice, diagnosis or treatment by a healthcare professional.  References   Adult Advisor 2016.3 Index    Copyright   2016 [employer], a division of [employer] All rights reserved.

## 2019-05-30 NOTE — ANESTHESIA PREPROCEDURE EVALUATION
Anesthesia Pre-Procedure Evaluation    Patient: Ember Tavares   MRN: [id number] : 1960          Preoperative Diagnosis: dysphagia, esophaeal ring    Procedure(s):  ESOPHAGOGASTRODUODENOSCOPY, WITH BIOPSY and Dilation    Past Medical History:   Diagnosis Date     Alpha-1-antitrypsin deficiency carrier (H) 2017     Atherosclerosis of abdominal aorta (H) 2017    Overview:  [facility] Studies: 2013 -- CTA ABDOMEN AND PELVIS WITH BILATERAL LOWER EXTREMITY RUNOFF  CLINICAL HISTORY: Iliac and mesenteric ischemia.  TECHNIQUE: 125 mL Omnipaque 300 IV contrast was administered. 3-D arterial reformations were performed.  FINDINGS: There is a well-defined ovoid density at the medial right costophrenic angle. This measures 2.3 x 0.9 x 0.6 cm. It demonstra     Chronic abdominal pain 3/10/2010    Overview:  16 year duration     Chronic sinusitis     No Comments Provided     Closed fracture of skull (H)     1972     COPD, severe (H) 2017    Overview:  2014 -- PULMONARY FUNCTION  SITE:  [facility] ORDERED BY:  VANNESA LUNA  CLINICAL SUMMARY: 53-year-old female with a history of severe COPD.   TECHNICIAN NOTE: Good effort.   DATA: FVC 1.85 (61%). FEV1 1.04 (45%). FEV1/FVC ratio 56%. DLCO 13.5 to 62%.   Flow volume curve is consistent with airway obstruction.   IMPRESSION: 1. Severe obstructive pulmon     GERD (gastroesophageal reflux disease) 2017     Hiatal hernia 2017     Other and unspecified hyperlipidemia 2012     PAD (peripheral artery disease) (H) 10/13/2015     Psoriasis 2017     Psoriatic arthritis (H) 2017     Schatzki's ring of distal esophagus 2017     Ulcer of right cornea 2018     Vitamin B12 deficiency 2018     Vitamin D deficiency 2017     Past Surgical History:   Procedure Laterality Date     AS UGI ENDOSCOPY W ESOPHAGEAL DILATION BALLOON <30MM  10/30/2015    ESOPHAGEAL DILATION,Dr. Rainer Allen,  [facility]     BIOPSY BREAST Bilateral     1999, 2001, BIOPSY BREAST,benign     CHOLECYSTECTOMY  2004    Laparoscopic     COLONOSCOPY  06/01/2016    x's 3 negative     CT CORONARY ANGIOGRAM  2009    CT CORONARY ANGIOGRAM (IA),negative     ESOPHAGOSCOPY, GASTROSCOPY, DUODENOSCOPY (EGD), COMBINED  04/07/2009    dilated, Dr. Allen     HYSTERECTOMY VAGINAL  1994    ovaries remain     LAPAROSCOPIC NISSEN FUNDOPLICATION N/A 3/26/2018    Procedure: LAPAROSCOPIC NISSEN FUNDOPLICATION;  Laparoscopic Nissen Fundoplication;  Surgeon: Jagdish Ruiz MD;  Location: [facility] OR     LAPAROTOMY EXPLORATORY  1990    lysis of adhesions/endometriosis     RECTOCELE REPAIR  07/29/2009       Anesthesia Evaluation     . Pt has had prior anesthetic. Type: General and MAC           ROS/MED HX    ENT/Pulmonary:  - neg pulmonary ROS     Neurologic:  - neg neurologic ROS     Cardiovascular:  - neg cardiovascular ROS       METS/Exercise Tolerance:     Hematologic:  - neg hematologic  ROS       Musculoskeletal:  - neg musculoskeletal ROS       GI/Hepatic:         Renal/Genitourinary:  - ROS Renal section negative       Endo:  - neg endo ROS       Psychiatric:  - neg psychiatric ROS       Infectious Disease:  - neg infectious disease ROS       Malignancy:      - no malignancy   Other:    - neg other ROS                      Physical Exam  Normal systems: cardiovascular and pulmonary    Airway   Mallampati: II  TM distance: >3 FB  Neck ROM: full    Dental   (+) upper dentures    Cardiovascular       Pulmonary             Lab Results   Component Value Date    WBC 10.3 03/29/2019    HGB 13.2 03/29/2019    HCT 40.1 03/29/2019     03/29/2019    CRP 1.4 (H) 05/31/2018     05/08/2019    POTASSIUM 3.8 05/08/2019    CHLORIDE 101 05/08/2019    CO2 33 (H) 05/08/2019    BUN 12 05/08/2019    CR 0.90 05/08/2019    GLC 95 05/08/2019    COLEMAN 9.5 05/08/2019    MAG 2.2 03/28/2019    ALBUMIN 4.6 03/28/2019    PROTTOTAL 7.7 03/28/2019    ALT 26  "03/28/2019    AST 22 03/28/2019    ALKPHOS 70 03/28/2019    BILITOTAL 0.2 (L) 03/28/2019    LIPASE 20 03/28/2019    PTT 38 03/28/2019    INR 0.91 03/28/2019       Preop Vitals  BP Readings from Last 3 Encounters:   05/30/19 (!) 86/72   05/14/19 106/74   05/08/19 120/74    Pulse Readings from Last 3 Encounters:   05/30/19 75   05/14/19 68   05/08/19 68      Resp Readings from Last 3 Encounters:   05/30/19 16   05/14/19 18   05/08/19 20    SpO2 Readings from Last 3 Encounters:   05/30/19 92%   04/17/19 90%   03/29/19 91%      Temp Readings from Last 1 Encounters:   05/30/19 97.6  F (36.4  C) (Tympanic)    Ht Readings from Last 1 Encounters:   05/14/19 1.575 m (5' 2\")      Wt Readings from Last 1 Encounters:   05/30/19 62.6 kg (138 lb)    Estimated body mass index is 25.24 kg/m  as calculated from the following:    Height as of 5/14/19: 1.575 m (5' 2\").    Weight as of this encounter: 62.6 kg (138 lb).       Anesthesia Plan      History & Physical Review      ASA Status:  2 .    NPO Status:  > 8 hours    Plan for MAC Maintenance will be TIVA.           Postoperative Care      Consents  Anesthetic plan, risks, benefits and alternatives discussed with:  Patient.  Use of blood products discussed: No .   .                 Roni Ledesma DO  "

## 2019-05-30 NOTE — ANESTHESIA POSTPROCEDURE EVALUATION
Patient: Ember Tavares    Procedure(s):  ESOPHAGOGASTRODUODENOSCOPY, WITH BIOPSY and Dilation    Diagnosis:dysphagia, esophaeal ring  Diagnosis Additional Information: No value filed.    Anesthesia Type:  MAC    Note:  Anesthesia Post Evaluation    Patient location during evaluation: Phase 2  Patient participation: Able to fully participate in evaluation  Level of consciousness: awake and alert  Pain management: adequate  Airway patency: patent  Cardiovascular status: blood pressure returned to baseline, acceptable and hemodynamically stable  Respiratory status: acceptable  Hydration status: acceptable  PONV: none     Anesthetic complications: None          Last vitals:  Vitals:    05/30/19 0915 05/30/19 1037   BP: (!) 86/72    Pulse: 75    Resp: 16    Temp: 97.6  F (36.4  C) 97.6  F (36.4  C)   SpO2: 92% 90%         Electronically Signed By: АЛЕКСАНДР Aj CRNA  May 30, 2019  10:58 AM

## 2019-05-30 NOTE — ADDENDUM NOTE
Addendum  created 05/30/19 1420 by Roni Ledesma DO    Attestation recorded in Intraprocedure, Intraprocedure Attestations filed

## 2019-05-30 NOTE — DISCHARGE INSTRUCTIONS
[facility]  Adult Discharge Orders & Instructions      For 24 hours after surgery:  1. Get plenty of rest.  A responsible adult must stay with you for at least 24 hours after you leave the hospital.   2. You may feel lightheaded.  IF so, sit for a few minutes before standing.  Have someone help you get up.   3. You may have a slight fever. Call the doctor if your fever is over 101 F (38.3 C) (taken under the tongue) or lasts longer than 24 hours.  4. You may have a dry mouth, a sore throat, muscle aches or trouble sleeping.  These should go away after 24 hours.  5. Do not make important or legal decisions.  6.   Do not drive or use heavy equipment.  If you have weakness or tingling, don't drive or use heavy equipment until this feeling goes away.                                                                                                                                                                         To contact a doctor, call    [phone]______________

## 2019-05-30 NOTE — INTERVAL H&P NOTE
I saw and examined Ember EZRA Tavares.  I have reviewed the history and physical and find no changes to the patient's medical status or condition with the exceptions noted below.     Jagdish Ruiz   9:24 AM 5/30/2019

## 2019-06-04 ENCOUNTER — MYC MEDICAL ADVICE (OUTPATIENT)
Dept: SURGERY | Facility: OTHER | Age: 59
End: 2019-06-04

## 2019-06-07 ENCOUNTER — MYC MEDICAL ADVICE (OUTPATIENT)
Dept: SURGERY | Facility: OTHER | Age: 59
End: 2019-06-07

## 2019-06-10 ENCOUNTER — MYC MEDICAL ADVICE (OUTPATIENT)
Dept: SURGERY | Facility: OTHER | Age: 59
End: 2019-06-10

## 2019-06-10 DIAGNOSIS — K29.00 ACUTE SUPERFICIAL GASTRITIS WITHOUT HEMORRHAGE: Primary | ICD-10-CM

## 2019-06-10 RX ORDER — SUCRALFATE 1 G/1
1 TABLET ORAL 4 TIMES DAILY
Qty: 120 TABLET | Refills: 3 | Status: SHIPPED | OUTPATIENT
Start: 2019-06-10 | End: 2019-09-27

## 2019-09-01 NOTE — PATIENT INSTRUCTIONS
Glad you are sleeping better, breathing better.... Glad you are no longer having heartburn.     Medications refilled.     Rheumatology referral sent  - they will call with date/time of appointment.      1. COPD, severe (H)  - fluticasone-salmeterol (ADVAIR DISKUS) 500-50 MCG/DOSE diskus inhaler; Inhale 1 puff into the lungs every 12 hours Rinse mouth well after use to prevent Thrush  Dispense: 1 Inhaler; Refill: 11  - tiotropium (SPIRIVA RESPIMAT) 2.5 MCG/ACT inhalation aerosol; Inhale 2 puffs into the lungs daily  Dispense: 4 g; Refill: 11    2. Panlobular emphysema (H)  - fluticasone-salmeterol (ADVAIR DISKUS) 500-50 MCG/DOSE diskus inhaler; Inhale 1 puff into the lungs every 12 hours Rinse mouth well after use to prevent Thrush  Dispense: 1 Inhaler; Refill: 11  - tiotropium (SPIRIVA RESPIMAT) 2.5 MCG/ACT inhalation aerosol; Inhale 2 puffs into the lungs daily  Dispense: 4 g; Refill: 11    3. Decreased diffusion capacity of lung    4. Psoriatic arthritis (H)  - RHEUMATOLOGY REFERRAL    5. Vitamin B12 deficiency    - cyanocobalamin (VITAMIN B12) injection 1,000 mcg; Inject 1 mL (1,000 mcg) into the muscle once    START:   - cyanocobalamin (VITAMIN B-12) 2500 MCG sublingual tablet; Place 2,500 mcg under the tongue daily  Dispense: 90 tablet; Refill: 3    6. S/P Nissen fundoplication (without gastrostomy tube) procedure    7. Alpha-1-antitrypsin deficiency carrier (H)    8. Menopausal syndrome (hot flashes)  - venlafaxine (EFFEXOR-XR) 37.5 MG 24 hr capsule; Take 1 capsule (37.5 mg) by mouth daily with food  Dispense: 90 capsule; Refill: 3    9. Dyshidrotic hand dermatitis  - cetirizine (ZYRTEC) 10 MG tablet; Take one tablet by mouth once or twice daily as needed for hand dermatitis/itching.  Dispense: 180 tablet; Refill: 3    Return in approximately 1 year, or sooner as needed for follow-up with Dr. Boyd.    Clinic : [phone]  Appointment line: [phone]     in the setting of [facility]  - will monitor serial cardiac makers, c/o chest pain earlier in ED, but currently CP free

## 2019-09-27 ENCOUNTER — OFFICE VISIT (OUTPATIENT)
Dept: FAMILY MEDICINE | Facility: OTHER | Age: 59
End: 2019-09-27
Attending: NURSE PRACTITIONER
Payer: COMMERCIAL

## 2019-09-27 VITALS
BODY MASS INDEX: 24.84 KG/M2 | OXYGEN SATURATION: 97 % | RESPIRATION RATE: 16 BRPM | HEART RATE: 80 BPM | WEIGHT: 135 LBS | HEIGHT: 62 IN | TEMPERATURE: 98.1 F | DIASTOLIC BLOOD PRESSURE: 64 MMHG | SYSTOLIC BLOOD PRESSURE: 98 MMHG

## 2019-09-27 DIAGNOSIS — I70.0 ATHEROSCLEROSIS OF ABDOMINAL AORTA (H): ICD-10-CM

## 2019-09-27 DIAGNOSIS — Z98.890 S/P NISSEN FUNDOPLICATION (WITHOUT GASTROSTOMY TUBE) PROCEDURE: ICD-10-CM

## 2019-09-27 DIAGNOSIS — Z14.8 ALPHA-1-ANTITRYPSIN DEFICIENCY CARRIER: ICD-10-CM

## 2019-09-27 DIAGNOSIS — I73.9 PAD (PERIPHERAL ARTERY DISEASE) (H): ICD-10-CM

## 2019-09-27 DIAGNOSIS — E78.2 MIXED HYPERLIPIDEMIA: ICD-10-CM

## 2019-09-27 DIAGNOSIS — J44.9 COPD, SEVERE (H): ICD-10-CM

## 2019-09-27 DIAGNOSIS — Z01.818 PREOP GENERAL PHYSICAL EXAM: Primary | ICD-10-CM

## 2019-09-27 PROCEDURE — 99213 OFFICE O/P EST LOW 20 MIN: CPT | Performed by: NURSE PRACTITIONER

## 2019-09-27 ASSESSMENT — MIFFLIN-ST. JEOR: SCORE: 1150.61

## 2019-09-27 ASSESSMENT — PAIN SCALES - GENERAL: PAINLEVEL: NO PAIN (0)

## 2019-09-27 ASSESSMENT — PATIENT HEALTH QUESTIONNAIRE - PHQ9: SUM OF ALL RESPONSES TO PHQ QUESTIONS 1-9: 0

## 2019-09-27 NOTE — NURSING NOTE
"Chief Complaint   Patient presents with     Pre-Op Exam     ORAL SURGERY         Initial BP 98/64   Pulse 80   Temp 98.1  F (36.7  C) (Temporal)   Resp 16   Ht 1.575 m (5' 2\")   Wt 61.2 kg (135 lb)   SpO2 97%   BMI 24.69 kg/m   Estimated body mass index is 24.69 kg/m  as calculated from the following:    Height as of this encounter: 1.575 m (5' 2\").    Weight as of this encounter: 61.2 kg (135 lb).    Medication Reconciliation: complete      Norma J. Gosselin, LPN  "

## 2019-09-27 NOTE — PROGRESS NOTES
[facility] AND \[facility]\""  [address]  [place] [id number] [phone]  Dept: [phone]    PRE-OP EVALUATION:  Today's date: 2019    Ember Tavares (: 1960) presents for pre-operative evaluation assessment as requested by Dr. Boyce.  She requires evaluation and anesthesia risk assessment prior to undergoing surgery/procedure for treatment of Oral surgery .    Proposed Surgery/ Procedure: Major Dental Surgery - Full mouth reconstruction  Date of Surgery/ Procedure: 10/2019  Time of Surgery/ Procedure:   Hospital/Surgical Facility: [facility] [place]  Fax number for surgical facility: [phone]  Primary Physician: Tim Boyd  Type of Anesthesia Anticipated: Local with MAC    Patient has a Health Care Directive or Living Will:  NO    1. NO - Do you have a history of heart attack, stroke, stent, bypass or surgery on an artery in the head, neck, heart or legs?  2. NO - Do you ever have any pain or discomfort in your chest?  3. NO - Do you have a history of  Heart Failure?  4. NO - Are you troubled by shortness of breath when: walking on the level, up a slight hill or at night?  5. NO - Do you currently have a cold, bronchitis or other respiratory infection?  6. NO - Do you have a cough, shortness of breath or wheezing?  7. YES - DO YOU SOMETIMES GET PAINS IN THE CALVES OF YOUR LEGS WHEN YOU WALK? Yes, after sitting a long time, is significantly better than in the past as she is no longer as sedentary as she was previously  8. YES - DO YOU OR ANYONE IN YOUR FAMILY HAVE PREVIOUS HISTORY OF BLOOD CLOTS? Mother had a stroke 2019   9. NO - Do you or does anyone in your family have a serious bleeding problem such as prolonged bleeding following surgeries or cuts?  10. NO - Have you ever had problems with anemia or been told to take iron pills?  11. NO - Have you had any abnormal blood loss such as black, tarry or bloody stools, or abnormal vaginal bleeding?  12. NO - Have you  ever had a blood transfusion?  13. NO - Have you or any of your relatives ever had problems with anesthesia?  14. NO - Do you have sleep apnea, excessive snoring or daytime drowsiness?  15. NO - Do you have any prosthetic heart valves?  16. NO - Do you have prosthetic joints?  17. NO - Is there any chance that you may be pregnant?      HPI:     HPI related to upcoming procedure: Has had difficulties with top denture fitting properly for a while despite several adjustments. Has also had trouble with bottom teeth. Currently planning on complete extraction of bottom teeth with full upper and lower dental implants.       See problem list for active medical problems.  Problems all longstanding and stable, except as noted/documented.  See ROS for pertinent symptoms related to these conditions.    COPD - Patient has a longstanding history of moderate-severe COPD . Patient has been doing well overall noting NO SYMPTOMS and continues on medication regimen consisting of advair, spiriva, singulair, albuterol without adverse reactions or side effects.    DEPRESSION - Patient has a long history of Depression of moderate severity requiring medication for control with recent symptoms being stable..Current symptoms of depression include none.     HYPERLIPIDEMIA - Patient has a long history of significant Hyperlipidemia requiring medication for treatment with recent fair control, though most recent lipid panel in 8/2017. Patient reports no problems or side effects with the medication.       MEDICAL HISTORY:     Patient Active Problem List    Diagnosis Date Noted     Difficulty swallowing solids 05/08/2019     Priority: Medium     Nodule of right lung 04/18/2019     Priority: Medium     Seasonal allergic rhinitis due to pollen 04/08/2019     Priority: Medium     Chest pain 03/28/2019     Priority: Medium     Ulcer of right cornea 08/18/2018     Priority: Medium     Vitamin B12 deficiency 04/17/2018     Priority: Medium     Menopausal  syndrome (hot flashes) 04/17/2018     Priority: Medium     S/P Nissen fundoplication (without gastrostomy tube) procedure 03/26/2018     Priority: Medium     GERD (gastroesophageal reflux disease) 11/29/2017     Priority: Medium     Chronic radicular cervical pain 11/20/2017     Priority: Medium     Psoriatic arthritis (H) 11/20/2017     Priority: Medium     Occipital neuralgia of left side 09/15/2017     Priority: Medium     Arthritis of knee, right 08/24/2017     Priority: Medium     Atherosclerosis of abdominal aorta (H) 08/24/2017     Priority: Medium     Overview:   [facility] Studies:  2/19/2013 -- CTA ABDOMEN AND PELVIS WITH BILATERAL LOWER EXTREMITY RUNOFF    CLINICAL HISTORY: Iliac and mesenteric ischemia.    TECHNIQUE: 125 mL Omnipaque 300 IV contrast was administered. 3-D  arterial reformations were performed.    FINDINGS: There is a well-defined ovoid density at the medial right  costophrenic angle. This measures 2.3 x 0.9 x 0.6 cm. It demonstrates  central low attenuation similar to the adjacent abdominal fat near the  liver. This could be a tiny Bochdalek hernia. The liver, spleen,  pancreas, adrenal glands, and kidneys are unremarkable. The  gallbladder is surgically absent. No biliary ductal dilation. No  mesenteric or retroperitoneal adenopathy. No ascites or fluid  collection. There is a moderate amount of retained stool seen  throughout most of the colon. No obstruction. No adjacent inflammatory  change. No ascites or fluid collection. Ureters and bladder are  unremarkable.    There is mild partially calcified atherosclerotic plaque within the  abdominal aorta. No aneurysm. There is narrowing of the lumen just  proximal to the bifurcation to 6 mm.   There is moderate narrowing of the proximal celiac artery without associated calcification.   The superior mesenteric artery appears to be widely patent with good opacification of distal branch vessels. The MARIAH also appears to be well opacified.    There are single bilateral renal arteries which are widely patent.    There is mild partially calcified plaque within the common iliac arteries with minimal narrowing.   The external iliac and common femoral arteries are widely patent.   Superficial femoral and popliteal arteries are widely patent bilaterally.   There is three-vessel runoff with good opacification in the lower leg to the ankle and foot.    IMPRESSION:  1. Mild aortoiliac atherosclerotic disease. No aneurysm. There is narrowing of the distal aorta just proximal to the bifurcation.  2. There is moderate narrowing of the celiac artery which could  be due to noncalcified plaque. No SMA or MARIAH compromise definitely seen.  3. Probable tiny Bochdalek hernia at the right costophrenic angle. A hamartoma would be a differential consideration though is less likely given the central fat density.  4. Possible constipation.  5. No evidence of vascular compromise in the lower extremities.    Examination Interpreted at: [facility], [place], [place]  The Interpreting Physician is MONICA VORA  Exam was completed at [facility]       COPD, severe (H) 08/24/2017     Priority: Medium     Overview:   8/4/2014 -- PULMONARY FUNCTION    SITE:  [facility]  ORDERED BY:  VANNESA LUNA    CLINICAL SUMMARY: 53-year-old female with a history of severe COPD.     TECHNICIAN NOTE: Good effort.     DATA: FVC 1.85 (61%). FEV1 1.04 (45%). FEV1/FVC ratio 56%. DLCO 13.5 to 62%.     Flow volume curve is consistent with airway obstruction.     IMPRESSION:  1. Severe obstructive pulmonary disease.   2. Mild decrease in diffusing capacity.       Decreased diffusion capacity of lung 08/24/2017     Priority: Medium     Dyshidrotic hand dermatitis 08/24/2017     Priority: Medium     Hiatal hernia 08/24/2017     Priority: Medium     Status post balloon dilatation of esophageal stricture 08/24/2017     Priority: Medium      Prediabetes 08/24/2017     Priority: Medium     Psoriasis 08/24/2017     Priority: Medium     Schatzki's ring of distal esophagus 08/24/2017     Priority: Medium     Vitamin D deficiency 08/24/2017     Priority: Medium     Alpha-1-antitrypsin deficiency carrier (H) 08/23/2017     Priority: Medium     Irritable bowel syndrome 08/23/2017     Priority: Medium     Osteopenia 08/23/2017     Priority: Medium     PAD (peripheral artery disease) (H) 10/13/2015     Priority: Medium     Mixed hyperlipidemia 08/01/2012     Priority: Medium     Contact dermatitis 07/01/2010     Priority: Medium     Myalgia 07/01/2010     Priority: Medium     Chronic abdominal pain 03/10/2010     Priority: Medium     Overview:   16 year duration       Constipation, chronic 03/10/2010     Priority: Medium     History of tobacco abuse 03/10/2010     Priority: Medium     Sinusitis, chronic 03/10/2010     Priority: Medium      Past Medical History:   Diagnosis Date     Alpha-1-antitrypsin deficiency carrier (H) 8/23/2017     Atherosclerosis of abdominal aorta (H) 8/24/2017    Overview:  [facility] Studies: 2/19/2013 -- CTA ABDOMEN AND PELVIS WITH BILATERAL LOWER EXTREMITY RUNOFF  CLINICAL HISTORY: Iliac and mesenteric ischemia.  TECHNIQUE: 125 mL Omnipaque 300 IV contrast was administered. 3-D arterial reformations were performed.  FINDINGS: There is a well-defined ovoid density at the medial right costophrenic angle. This measures 2.3 x 0.9 x 0.6 cm. It demonstra     Chronic abdominal pain 3/10/2010    Overview:  16 year duration     Chronic sinusitis     No Comments Provided     Closed fracture of skull (H)     1972     COPD, severe (H) 8/24/2017    Overview:  8/4/2014 -- PULMONARY FUNCTION  SITE:  [facility] ORDERED BY:  VANNESA LUNA  CLINICAL SUMMARY: 53-year-old female with a history of severe COPD.   TECHNICIAN NOTE: Good effort.   DATA: FVC 1.85 (61%). FEV1 1.04 (45%). FEV1/FVC ratio 56%. DLCO 13.5 to  62%.   Flow volume curve is consistent with airway obstruction.   IMPRESSION: 1. Severe obstructive pulmon     GERD (gastroesophageal reflux disease) 11/29/2017     Hiatal hernia 8/24/2017     Other and unspecified hyperlipidemia 8/1/2012     PAD (peripheral artery disease) (H) 10/13/2015     Psoriasis 8/24/2017     Psoriatic arthritis (H) 11/20/2017     Schatzki's ring of distal esophagus 8/24/2017     Ulcer of right cornea 8/18/2018     Vitamin B12 deficiency 4/17/2018     Vitamin D deficiency 8/24/2017     Past Surgical History:   Procedure Laterality Date     AS UGI ENDOSCOPY W ESOPHAGEAL DILATION BALLOON <30MM  10/30/2015    ESOPHAGEAL DILATION,Dr. Rainer Allen, [facility]     BIOPSY BREAST Bilateral     1999, 2001, BIOPSY BREAST,benign     CHOLECYSTECTOMY  2004    Laparoscopic     COLONOSCOPY  06/01/2016    x's 3 negative     CT CORONARY ANGIOGRAM  2009    CT CORONARY ANGIOGRAM (IA),negative     ESOPHAGOSCOPY, GASTROSCOPY, DUODENOSCOPY (EGD), COMBINED  04/07/2009    dilated, Dr. Allen     ESOPHAGOSCOPY, GASTROSCOPY, DUODENOSCOPY (EGD), COMBINED N/A 5/30/2019    Pickering's esophagus, 3 year follow up     HYSTERECTOMY VAGINAL  1994    ovaries remain     LAPAROSCOPIC NISSEN FUNDOPLICATION N/A 3/26/2018    Procedure: LAPAROSCOPIC NISSEN FUNDOPLICATION;  Laparoscopic Nissen Fundoplication;  Surgeon: Jagdish Ruiz MD;  Location: [facility] OR     LAPAROTOMY EXPLORATORY  1990    lysis of adhesions/endometriosis     RECTOCELE REPAIR  07/29/2009     Current Outpatient Medications   Medication Sig Dispense Refill     albuterol (PROAIR HFA/PROVENTIL HFA/VENTOLIN HFA) 108 (90 Base) MCG/ACT inhaler Inhale 3 puffs into the lungs 3 times daily as needed for shortness of breath / dyspnea or wheezing 25.5 g 3     albuterol (PROVENTIL) (2.5 MG/3ML) 0.083% neb solution Take 1 vial (2.5 mg) by nebulization every 6 hours as needed for shortness of breath / dyspnea or wheezing 2 Box 11     Cholecalciferol (D 5000) 5000 UNITS TABS  Take 5,000 Units by mouth every 7 days       fluticasone-salmeterol (ADVAIR DISKUS) 500-50 MCG/DOSE inhaler Inhale 1 puff into the lungs every 12 hours Rinse mouth well after use to prevent Thrush 1 Inhaler 11     folic acid (FOLVITE) 1 MG tablet Take 1 mg by mouth daily   0     metoprolol tartrate (LOPRESSOR) 25 MG tablet Take 0.5-1 tablets (12.5-25 mg) by mouth 2 times daily 180 tablet 3     montelukast (SINGULAIR) 10 MG tablet Take 0.5-1 tablets (5-10 mg) by mouth At Bedtime - for allergies 90 tablet 3     nitroGLYcerin (NITROSTAT) 0.4 MG sublingual tablet For chest pain place 1 tablet under the tongue every 5 minutes for 3 doses. If symptoms persist 5 minutes after 1st dose call 911. 10 tablet 3     pravastatin (PRAVACHOL) 20 MG tablet Take 1 tablet (20 mg) by mouth twice a week 15 tablet 3     tiotropium (SPIRIVA RESPIMAT) 2.5 MCG/ACT inhaler Inhale 2 puffs into the lungs daily 4 g 11     venlafaxine (EFFEXOR-XR) 37.5 MG 24 hr capsule Take 1 capsule (37.5 mg) by mouth daily with food 90 capsule 3     vitamin B-12 (CYANOCOBALAMIN) 2500 MCG sublingual tablet Take 1 tablet (2,500 mcg) by mouth daily 90 tablet 3     OTC products: None, except as noted above    Allergies   Allergen Reactions     Atorvastatin Muscle Pain (Myalgia)     Rosuvastatin Nausea and Vomiting     Sucralfate Nausea and Vomiting     Levofloxacin Nausea and Vomiting     Morphine      Other reaction(s): Chest Pain     Penicillins      Other reaction(s): Respiratory Arrest     Sulfamethoxazole W/Trimethoprim Nausea and Vomiting     Yeast infection      Latex Allergy: NO    Social History     Tobacco Use     Smoking status: Former Smoker     Packs/day: 0.50     Years: 32.00     Pack years: 16.00     Types: Cigarettes     Last attempt to quit: 2012     Years since quittin.1     Smokeless tobacco: Never Used     Tobacco comment: Quit smoking: quit date of 2012   Substance Use Topics     Alcohol use: Yes     Alcohol/week: 2.0 standard  "drinks     Comment: Alcoholic Drinks/day: 1-2 drinks every 1-2 weeks     History   Drug Use No       REVIEW OF SYSTEMS:   CONSTITUTIONAL: NEGATIVE for fever, chills, change in weight  INTEGUMENTARY/SKIN: NEGATIVE for worrisome rashes, moles or lesions  EYES: NEGATIVE for vision changes or irritation  ENT/MOUTH: POSITIVE for tooth pain  RESP: NEGATIVE for significant cough or SOB  CV: NEGATIVE for chest pain, palpitations or peripheral edema  GI: NEGATIVE for nausea, abdominal pain, heartburn, or change in bowel habits  : NEGATIVE for frequency, dysuria, or hematuria  MUSCULOSKELETAL:POSITIVE  for psoriatic arthritis  NEURO: NEGATIVE for weakness, dizziness or paresthesias  ENDOCRINE: NEGATIVE for temperature intolerance, skin/hair changes  HEME: NEGATIVE for bleeding problems  PSYCHIATRIC: NEGATIVE for changes in mood or affect    EXAM:   BP 98/64   Pulse 80   Temp 98.1  F (36.7  C) (Temporal)   Resp 16   Ht 1.575 m (5' 2\")   Wt 61.2 kg (135 lb)   SpO2 97%   BMI 24.69 kg/m      GENERAL APPEARANCE: healthy, alert and no distress     EYES: EOMI, PERRL     HENT: ear canals and TM's normal, nose and mouth without ulcers or lesions and edentulous upper jaw, poor dentition lower jaw     NECK: no adenopathy, no asymmetry, masses, or scars and thyroid normal to palpation     RESP: lungs clear to auscultation - no rales, rhonchi or wheezes     CV: regular rates and rhythm, normal S1 S2, no S3 or S4 and no murmur, click or rub     ABDOMEN:  soft, nontender, no HSM or masses and bowel sounds normal     MS: extremities normal- no gross deformities noted, no evidence of inflammation in joints, FROM in all extremities.     SKIN: no suspicious lesions or rashes     NEURO: Normal strength and tone, sensory exam grossly normal, mentation intact and speech normal     PSYCH: mentation appears normal. and affect normal/bright     LYMPHATICS: No cervical adenopathy    DIAGNOSTICS:   No labs or EKG required for low risk surgery " (cataract, skin procedure, breast biopsy, etc)    Recent Labs   Lab Test 05/08/19  1222 03/29/19  0555 03/28/19  1820   HGB  --  13.2 14.5   PLT  --  317 345   INR  --   --  0.91    140 138   POTASSIUM 3.8 3.8 4.1   CR 0.90 0.79 0.80        IMPRESSION:   Reason for surgery/procedure: Poor dentition, poorly fitting upper denture, plan for full upper and lower implants  Diagnosis/reason for consult: Pre-op clearance    The proposed surgical procedure is considered LOW risk.    REVISED CARDIAC RISK INDEX  The patient has the following serious cardiovascular risks for perioperative complications such as (MI, PE, VFib and 3  AV Block):  No serious cardiac risks  INTERPRETATION: 0 risks: Class I (very low risk - 0.4% complication rate)    The patient has the following additional risks for perioperative complications:      ICD-10-CM    1. Preop general physical exam Z01.818    2. COPD, severe (H) J44.9    3. Alpha-1-antitrypsin deficiency carrier (H) E88.01    4. Mixed hyperlipidemia E78.2    5. PAD (peripheral artery disease) (H) I73.9    6. Atherosclerosis of abdominal aorta (H) I70.0    7. S/P Nissen fundoplication (without gastrostomy tube) procedure Z98.890        RECOMMENDATIONS:     --Patient is to take all scheduled medications on the day of surgery EXCEPT for modifications listed below.  To refrain from supplements and NSAIDs 7-10 days prior to procedure.     APPROVAL GIVEN to proceed with proposed procedure, without further diagnostic evaluation       Signed Electronically by: Maki Sousa NP    Copy of this evaluation report is provided to requesting physician.    [facility] Preop Guidelines    Revised Cardiac Risk Index

## 2019-09-28 ASSESSMENT — ASTHMA QUESTIONNAIRES: ACT_TOTALSCORE: 23

## 2019-10-11 ENCOUNTER — OFFICE VISIT (OUTPATIENT)
Dept: FAMILY MEDICINE | Facility: OTHER | Age: 59
End: 2019-10-11
Attending: NURSE PRACTITIONER
Payer: COMMERCIAL

## 2019-10-11 ENCOUNTER — APPOINTMENT (OUTPATIENT)
Dept: MRI IMAGING | Facility: OTHER | Age: 59
End: 2019-10-11
Attending: PHYSICIAN ASSISTANT
Payer: COMMERCIAL

## 2019-10-11 ENCOUNTER — HOSPITAL ENCOUNTER (EMERGENCY)
Facility: OTHER | Age: 59
Discharge: HOME | End: 2019-10-11
Attending: PHYSICIAN ASSISTANT | Admitting: PHYSICIAN ASSISTANT
Payer: COMMERCIAL

## 2019-10-11 ENCOUNTER — ANESTHESIA (OUTPATIENT)
Dept: EMERGENCY MEDICINE | Facility: OTHER | Age: 59
End: 2019-10-11
Payer: COMMERCIAL

## 2019-10-11 ENCOUNTER — ANESTHESIA EVENT (OUTPATIENT)
Dept: EMERGENCY MEDICINE | Facility: OTHER | Age: 59
End: 2019-10-11
Payer: COMMERCIAL

## 2019-10-11 VITALS
RESPIRATION RATE: 18 BRPM | OXYGEN SATURATION: 93 % | BODY MASS INDEX: 24.48 KG/M2 | WEIGHT: 133 LBS | TEMPERATURE: 97.6 F | DIASTOLIC BLOOD PRESSURE: 67 MMHG | SYSTOLIC BLOOD PRESSURE: 114 MMHG | HEIGHT: 62 IN | HEART RATE: 66 BPM

## 2019-10-11 VITALS
TEMPERATURE: 97.8 F | SYSTOLIC BLOOD PRESSURE: 119 MMHG | WEIGHT: 138.5 LBS | RESPIRATION RATE: 16 BRPM | BODY MASS INDEX: 25.49 KG/M2 | HEART RATE: 67 BPM | HEIGHT: 62 IN | OXYGEN SATURATION: 97 % | DIASTOLIC BLOOD PRESSURE: 80 MMHG

## 2019-10-11 DIAGNOSIS — H02.402 PTOSIS OF EYELID, LEFT: ICD-10-CM

## 2019-10-11 DIAGNOSIS — G44.52 NEW DAILY PERSISTENT HEADACHE: Primary | ICD-10-CM

## 2019-10-11 DIAGNOSIS — R51.9 HEADACHE: ICD-10-CM

## 2019-10-11 DIAGNOSIS — H53.8 BLURRY VISION, LEFT EYE: ICD-10-CM

## 2019-10-11 LAB
ANION GAP SERPL CALCULATED.3IONS-SCNC: 7 MMOL/L (ref 3–14)
APTT PPP: 39 SEC (ref 22–37)
BASOPHILS # BLD AUTO: 0.1 10E9/L (ref 0–0.2)
BASOPHILS NFR BLD AUTO: 1.2 %
BUN SERPL-MCNC: 12 MG/DL (ref 7–25)
CALCIUM SERPL-MCNC: 9.1 MG/DL (ref 8.6–10.3)
CHLORIDE SERPL-SCNC: 100 MMOL/L (ref 98–107)
CO2 SERPL-SCNC: 30 MMOL/L (ref 21–31)
CREAT SERPL-MCNC: 0.88 MG/DL (ref 0.6–1.2)
DIFFERENTIAL METHOD BLD: NORMAL
EOSINOPHIL # BLD AUTO: 0.3 10E9/L (ref 0–0.7)
EOSINOPHIL NFR BLD AUTO: 3.2 %
ERYTHROCYTE [DISTWIDTH] IN BLOOD BY AUTOMATED COUNT: 13.4 % (ref 10–15)
ERYTHROCYTE [SEDIMENTATION RATE] IN BLOOD BY WESTERGREN METHOD: 7 MM/H (ref 1–15)
GFR SERPL CREATININE-BSD FRML MDRD: 66 ML/MIN/{1.73_M2}
GLUCOSE SERPL-MCNC: 88 MG/DL (ref 70–105)
HCT VFR BLD AUTO: 45.2 % (ref 35–47)
HGB BLD-MCNC: 14.4 G/DL (ref 11.7–15.7)
IMM GRANULOCYTES # BLD: 0 10E9/L (ref 0–0.4)
IMM GRANULOCYTES NFR BLD: 0.3 %
INR PPP: 0.96 (ref 0–1.3)
LYMPHOCYTES # BLD AUTO: 2.6 10E9/L (ref 0.8–5.3)
LYMPHOCYTES NFR BLD AUTO: 33.5 %
MCH RBC QN AUTO: 29.8 PG (ref 26.5–33)
MCHC RBC AUTO-ENTMCNC: 31.9 G/DL (ref 31.5–36.5)
MCV RBC AUTO: 93 FL (ref 78–100)
MONOCYTES # BLD AUTO: 0.6 10E9/L (ref 0–1.3)
MONOCYTES NFR BLD AUTO: 7.9 %
NEUTROPHILS # BLD AUTO: 4.2 10E9/L (ref 1.6–8.3)
NEUTROPHILS NFR BLD AUTO: 53.9 %
PLATELET # BLD AUTO: 327 10E9/L (ref 150–450)
POTASSIUM SERPL-SCNC: 3.7 MMOL/L (ref 3.5–5.1)
RBC # BLD AUTO: 4.84 10E12/L (ref 3.8–5.2)
SODIUM SERPL-SCNC: 137 MMOL/L (ref 134–144)
TROPONIN I SERPL-MCNC: <2.3 PG/ML
WBC # BLD AUTO: 7.7 10E9/L (ref 4–11)

## 2019-10-11 PROCEDURE — 96374 THER/PROPH/DIAG INJ IV PUSH: CPT | Mod: XU | Performed by: PHYSICIAN ASSISTANT

## 2019-10-11 PROCEDURE — 36410 VNPNXR 3YR/> PHY/QHP DX/THER: CPT | Performed by: NURSE ANESTHETIST, CERTIFIED REGISTERED

## 2019-10-11 PROCEDURE — 85730 THROMBOPLASTIN TIME PARTIAL: CPT | Performed by: PHYSICIAN ASSISTANT

## 2019-10-11 PROCEDURE — 85652 RBC SED RATE AUTOMATED: CPT | Performed by: PHYSICIAN ASSISTANT

## 2019-10-11 PROCEDURE — 25500064 ZZH RX 255 OP 636: Performed by: PHYSICIAN ASSISTANT

## 2019-10-11 PROCEDURE — 25000125 ZZHC RX 250: Performed by: NURSE ANESTHETIST, CERTIFIED REGISTERED

## 2019-10-11 PROCEDURE — 84484 ASSAY OF TROPONIN QUANT: CPT | Performed by: PHYSICIAN ASSISTANT

## 2019-10-11 PROCEDURE — 85610 PROTHROMBIN TIME: CPT | Performed by: PHYSICIAN ASSISTANT

## 2019-10-11 PROCEDURE — 70544 MR ANGIOGRAPHY HEAD W/O DYE: CPT | Mod: XU

## 2019-10-11 PROCEDURE — 96375 TX/PRO/DX INJ NEW DRUG ADDON: CPT | Performed by: PHYSICIAN ASSISTANT

## 2019-10-11 PROCEDURE — 85025 COMPLETE CBC W/AUTO DIFF WBC: CPT | Performed by: PHYSICIAN ASSISTANT

## 2019-10-11 PROCEDURE — 80048 BASIC METABOLIC PNL TOTAL CA: CPT | Performed by: PHYSICIAN ASSISTANT

## 2019-10-11 PROCEDURE — 93010 ELECTROCARDIOGRAM REPORT: CPT | Performed by: INTERNAL MEDICINE

## 2019-10-11 PROCEDURE — 99285 EMERGENCY DEPT VISIT HI MDM: CPT | Mod: 25 | Performed by: PHYSICIAN ASSISTANT

## 2019-10-11 PROCEDURE — 36415 COLL VENOUS BLD VENIPUNCTURE: CPT | Performed by: PHYSICIAN ASSISTANT

## 2019-10-11 PROCEDURE — 70553 MRI BRAIN STEM W/O & W/DYE: CPT

## 2019-10-11 PROCEDURE — 99284 EMERGENCY DEPT VISIT MOD MDM: CPT | Mod: Z6 | Performed by: PHYSICIAN ASSISTANT

## 2019-10-11 PROCEDURE — 25000128 H RX IP 250 OP 636: Performed by: PHYSICIAN ASSISTANT

## 2019-10-11 PROCEDURE — 70549 MR ANGIOGRAPH NECK W/O&W/DYE: CPT

## 2019-10-11 PROCEDURE — 93005 ELECTROCARDIOGRAM TRACING: CPT | Performed by: PHYSICIAN ASSISTANT

## 2019-10-11 PROCEDURE — A9577 INJ MULTIHANCE: HCPCS | Performed by: PHYSICIAN ASSISTANT

## 2019-10-11 PROCEDURE — 99212 OFFICE O/P EST SF 10 MIN: CPT | Performed by: NURSE PRACTITIONER

## 2019-10-11 RX ORDER — DEXAMETHASONE SODIUM PHOSPHATE 4 MG/ML
10 INJECTION, SOLUTION INTRA-ARTICULAR; INTRALESIONAL; INTRAMUSCULAR; INTRAVENOUS; SOFT TISSUE ONCE
Status: COMPLETED | OUTPATIENT
Start: 2019-10-11 | End: 2019-10-11

## 2019-10-11 RX ORDER — METOCLOPRAMIDE HYDROCHLORIDE 5 MG/ML
10 INJECTION INTRAMUSCULAR; INTRAVENOUS ONCE
Status: COMPLETED | OUTPATIENT
Start: 2019-10-11 | End: 2019-10-11

## 2019-10-11 RX ORDER — DEXAMETHASONE SODIUM PHOSPHATE 10 MG/ML
10 INJECTION, SOLUTION INTRAMUSCULAR; INTRAVENOUS ONCE
Status: DISCONTINUED | OUTPATIENT
Start: 2019-10-11 | End: 2019-10-11 | Stop reason: HOSPADM

## 2019-10-11 RX ORDER — DIPHENHYDRAMINE HYDROCHLORIDE 50 MG/ML
25 INJECTION INTRAMUSCULAR; INTRAVENOUS ONCE
Status: COMPLETED | OUTPATIENT
Start: 2019-10-11 | End: 2019-10-11

## 2019-10-11 RX ORDER — LIDOCAINE HYDROCHLORIDE 10 MG/ML
INJECTION, SOLUTION INFILTRATION; PERINEURAL PRN
Status: DISCONTINUED | OUTPATIENT
Start: 2019-10-11 | End: 2019-10-11

## 2019-10-11 RX ORDER — PREDNISONE 20 MG/1
TABLET ORAL
Qty: 10 TABLET | Refills: 0 | Status: SHIPPED | OUTPATIENT
Start: 2019-10-11 | End: 2019-11-29

## 2019-10-11 RX ADMIN — GADOBENATE DIMEGLUMINE 15 ML: 529 INJECTION, SOLUTION INTRAVENOUS at 17:06

## 2019-10-11 RX ADMIN — LIDOCAINE HYDROCHLORIDE 0.5 ML: 10 INJECTION, SOLUTION INFILTRATION; PERINEURAL at 15:49

## 2019-10-11 RX ADMIN — SODIUM CHLORIDE 500 ML: 9 INJECTION, SOLUTION INTRAVENOUS at 16:07

## 2019-10-11 RX ADMIN — DIPHENHYDRAMINE HYDROCHLORIDE 25 MG: 50 INJECTION INTRAMUSCULAR; INTRAVENOUS at 16:08

## 2019-10-11 RX ADMIN — DEXAMETHASONE SODIUM PHOSPHATE 10 MG: 4 INJECTION, SOLUTION INTRAMUSCULAR; INTRAVENOUS at 16:08

## 2019-10-11 RX ADMIN — LIDOCAINE HYDROCHLORIDE 0.1 ML: 10 INJECTION, SOLUTION INFILTRATION; PERINEURAL at 15:34

## 2019-10-11 RX ADMIN — LIDOCAINE HYDROCHLORIDE 0.1 ML: 10 INJECTION, SOLUTION INFILTRATION; PERINEURAL at 15:43

## 2019-10-11 RX ADMIN — METOCLOPRAMIDE 10 MG: 5 INJECTION, SOLUTION INTRAMUSCULAR; INTRAVENOUS at 16:13

## 2019-10-11 ASSESSMENT — MIFFLIN-ST. JEOR
SCORE: 1166.48
SCORE: 1141.53

## 2019-10-11 ASSESSMENT — PAIN SCALES - GENERAL: PAINLEVEL: NO PAIN (0)

## 2019-10-11 NOTE — PROGRESS NOTES
"Sudden headache, now persistent.  Persistent left sided frontal and occipital headache for the past 10 days.  Pain is worse with standing and moving.  Pain with go away for maybe 30-60 minutes.  Describes pain as aching, pressure and numbness.  Feels internal cold feeling inside her head just in the area of pain.   Left eye feels heavy and left eye blurry vision noted.  No light headedness or dizziness.  No numbness, tingling or weakness.  No hx of migraines.  No nasal congestion or drainage.  Today started getting some pressure in her sinus pressure in her left maxillary and ethmoid sinus today.  No fevers.  Chronic neck stiffness, no change.  Seen chiropractor 2 days ago without improvement in headache.  Denies any head injury or trauma.  Tried tylenol and sinus medication without relief.    Triaged from: [facility]    DIAGNOSIS:   1. New daily persistent headache    2. Blurry vision, left eye        Initial /80 (BP Location: Left arm, Patient Position: Sitting, Cuff Size: Adult Regular)   Pulse 67   Temp 97.8  F (36.6  C) (Tympanic)   Resp 16   Ht 1.575 m (5' 2\")   Wt 62.8 kg (138 lb 8 oz)   SpO2 97%   Breastfeeding? No   BMI 25.33 kg/m   Estimated body mass index is 25.33 kg/m  as calculated from the following:    Height as of this encounter: 1.575 m (5' 2\").    Weight as of this encounter: 62.8 kg (138 lb 8 oz).    Patient will be transferred to higher level of care.  Patient transferred to ER via wheelchair.  Verbal report provided to ER provider.      GISELA Can NP on 10/11/2019 at 1:57 PM    "

## 2019-10-11 NOTE — ED AVS SNAPSHOT
[facility]  [address]  [place] [address]  Phone:  [phone]  Fax:  [phone]                                    Ember Tavares   MRN: [id number]    Department:  [facility]   Date of Visit:  10/11/2019           After Visit Summary Signature Page    I have received my discharge instructions, and my questions have been answered. I have discussed any challenges I see with this plan with the nurse or doctor.    ..........................................................................................................................................  Patient/Patient Representative Signature      ..........................................................................................................................................  Patient Representative Print Name and Relationship to Patient    ..................................................               ................................................  Date                                   Time    ..........................................................................................................................................  Reviewed by Signature/Title    ...................................................              ..............................................  Date                                               Time          22EPIC Rev 08/18

## 2019-10-11 NOTE — ED TRIAGE NOTES
Pt comes from [facility] clinic with headache, started about 10 days ago, pressure above left eye, states she has blurry vision in the left eye, no hx of migraines or headaches, moves all extremities

## 2019-10-11 NOTE — NURSING NOTE
"Patient presents to clinic for headache x 10 days. States it is worsening.   Chief Complaint   Patient presents with     Headache       Initial /80 (BP Location: Left arm, Patient Position: Sitting, Cuff Size: Adult Regular)   Pulse 67   Temp 97.8  F (36.6  C) (Tympanic)   Resp 16   Ht 1.575 m (5' 2\")   Wt 62.8 kg (138 lb 8 oz)   SpO2 97%   Breastfeeding? No   BMI 25.33 kg/m   Estimated body mass index is 25.33 kg/m  as calculated from the following:    Height as of this encounter: 1.575 m (5' 2\").    Weight as of this encounter: 62.8 kg (138 lb 8 oz).  Medication Reconciliation: complete    Melissa Jo LPN    "

## 2019-10-12 NOTE — DISCHARGE INSTRUCTIONS
Get plenty of fluids and rest.  Be assured that the MRI scans mostly appear to be negative today.  I did talk with Dr. Juares, neurologist at [facility] who feels that he changes that were seen on the MRI iron due to age related.  I did prescribe short course of steroids if you have continuing headache.  Referral was placed for you to follow-up with PCP next week to address findings today as well as measure progress.  Return to the ED if you have worsening or concerning symptoms.

## 2019-10-15 ASSESSMENT — ENCOUNTER SYMPTOMS
BRUISES/BLEEDS EASILY: 0
SHORTNESS OF BREATH: 0
FEVER: 0
WOUND: 0
ADENOPATHY: 0
CHILLS: 0
HEADACHES: 1
CONFUSION: 0
CHEST TIGHTNESS: 0
BACK PAIN: 0
HEMATURIA: 0
ABDOMINAL PAIN: 0

## 2019-10-15 NOTE — ED PROVIDER NOTES
History     Chief Complaint   Patient presents with     Headache     Eye Problem     HPI  Ember Tavares is a 58 year old female who presents with headache and blurry vision.  Pt comes from [facility] clinic with headache, started about 10 days ago, pressure above left eye, states she has blurry vision in the left eye, no hx of migraines or headaches, moves all extremities. Pt has no change in speech, no chest pain, palpitations, abd pain, recent fevers/sicknesses.     Allergies:  Allergies   Allergen Reactions     Atorvastatin Muscle Pain (Myalgia)     Rosuvastatin Nausea and Vomiting     Sucralfate Nausea and Vomiting     Levofloxacin Nausea and Vomiting     Morphine      Other reaction(s): Chest Pain     Penicillins      Other reaction(s): Respiratory Arrest     Sulfamethoxazole W/Trimethoprim Nausea and Vomiting     Yeast infection       Problem List:    Patient Active Problem List    Diagnosis Date Noted     Difficulty swallowing solids 05/08/2019     Priority: Medium     Nodule of right lung 04/18/2019     Priority: Medium     Seasonal allergic rhinitis due to pollen 04/08/2019     Priority: Medium     Chest pain 03/28/2019     Priority: Medium     Ulcer of right cornea 08/18/2018     Priority: Medium     Vitamin B12 deficiency 04/17/2018     Priority: Medium     Menopausal syndrome (hot flashes) 04/17/2018     Priority: Medium     S/P Nissen fundoplication (without gastrostomy tube) procedure 03/26/2018     Priority: Medium     GERD (gastroesophageal reflux disease) 11/29/2017     Priority: Medium     Chronic radicular cervical pain 11/20/2017     Priority: Medium     Psoriatic arthritis (H) 11/20/2017     Priority: Medium     Occipital neuralgia of left side 09/15/2017     Priority: Medium     Arthritis of knee, right 08/24/2017     Priority: Medium     Atherosclerosis of abdominal aorta (H) 08/24/2017     Priority: Medium     Overview:    Studies:  2/19/2013 -- CTA ABDOMEN AND PELVIS WITH  BILATERAL LOWER EXTREMITY RUNOFF    CLINICAL HISTORY: Iliac and mesenteric ischemia.    TECHNIQUE: 125 mL Omnipaque 300 IV contrast was administered. 3-D  arterial reformations were performed.    FINDINGS: There is a well-defined ovoid density at the medial right  costophrenic angle. This measures 2.3 x 0.9 x 0.6 cm. It demonstrates  central low attenuation similar to the adjacent abdominal fat near the  liver. This could be a tiny Bochdalek hernia. The liver, spleen,  pancreas, adrenal glands, and kidneys are unremarkable. The  gallbladder is surgically absent. No biliary ductal dilation. No  mesenteric or retroperitoneal adenopathy. No ascites or fluid  collection. There is a moderate amount of retained stool seen  throughout most of the colon. No obstruction. No adjacent inflammatory  change. No ascites or fluid collection. Ureters and bladder are  unremarkable.    There is mild partially calcified atherosclerotic plaque within the  abdominal aorta. No aneurysm. There is narrowing of the lumen just  proximal to the bifurcation to 6 mm.   There is moderate narrowing of the proximal celiac artery without associated calcification.   The superior mesenteric artery appears to be widely patent with good opacification of distal branch vessels. The MARIAH also appears to be well opacified.   There are single bilateral renal arteries which are widely patent.    There is mild partially calcified plaque within the common iliac arteries with minimal narrowing.   The external iliac and common femoral arteries are widely patent.   Superficial femoral and popliteal arteries are widely patent bilaterally.   There is three-vessel runoff with good opacification in the lower leg to the ankle and foot.    IMPRESSION:  1. Mild aortoiliac atherosclerotic disease. No aneurysm. There is narrowing of the distal aorta just proximal to the bifurcation.  2. There is moderate narrowing of the celiac artery which could  be due to noncalcified  plaque. No SMA or MARIAH compromise definitely seen.  3. Probable tiny Bochdalek hernia at the right costophrenic angle. A hamartoma would be a differential consideration though is less likely given the central fat density.  4. Possible constipation.  5. No evidence of vascular compromise in the lower extremities.    Examination Interpreted at: [facility], [place], [place]  The Interpreting Physician is MONICA VORA  Exam was completed at [facility]       COPD, severe (H) 08/24/2017     Priority: Medium     Overview:   8/4/2014 -- PULMONARY FUNCTION    SITE:  [facility]  ORDERED BY:  VANNESA LUNA    CLINICAL SUMMARY: 53-year-old female with a history of severe COPD.     TECHNICIAN NOTE: Good effort.     DATA: FVC 1.85 (61%). FEV1 1.04 (45%). FEV1/FVC ratio 56%. DLCO 13.5 to 62%.     Flow volume curve is consistent with airway obstruction.     IMPRESSION:  1. Severe obstructive pulmonary disease.   2. Mild decrease in diffusing capacity.       Decreased diffusion capacity of lung 08/24/2017     Priority: Medium     Dyshidrotic hand dermatitis 08/24/2017     Priority: Medium     Hiatal hernia 08/24/2017     Priority: Medium     Status post balloon dilatation of esophageal stricture 08/24/2017     Priority: Medium     Prediabetes 08/24/2017     Priority: Medium     Psoriasis 08/24/2017     Priority: Medium     Schatzki's ring of distal esophagus 08/24/2017     Priority: Medium     Vitamin D deficiency 08/24/2017     Priority: Medium     Alpha-1-antitrypsin deficiency carrier 08/23/2017     Priority: Medium     Irritable bowel syndrome 08/23/2017     Priority: Medium     Osteopenia 08/23/2017     Priority: Medium     PAD (peripheral artery disease) (H) 10/13/2015     Priority: Medium     Mixed hyperlipidemia 08/01/2012     Priority: Medium     Contact dermatitis 07/01/2010     Priority: Medium     Myalgia 07/01/2010     Priority: Medium     Chronic  abdominal pain 03/10/2010     Priority: Medium     Overview:   16 year duration       Constipation, chronic 03/10/2010     Priority: Medium     History of tobacco abuse 03/10/2010     Priority: Medium     Sinusitis, chronic 03/10/2010     Priority: Medium        Past Medical History:    Past Medical History:   Diagnosis Date     Alpha-1-antitrypsin deficiency carrier 8/23/2017     Atherosclerosis of abdominal aorta (H) 8/24/2017     Chronic abdominal pain 3/10/2010     Chronic sinusitis      Closed fracture of skull (H)      COPD, severe (H) 8/24/2017     GERD (gastroesophageal reflux disease) 11/29/2017     Hiatal hernia 8/24/2017     Other and unspecified hyperlipidemia 8/1/2012     PAD (peripheral artery disease) (H) 10/13/2015     Psoriasis 8/24/2017     Psoriatic arthritis (H) 11/20/2017     Schatzki's ring of distal esophagus 8/24/2017     Ulcer of right cornea 8/18/2018     Vitamin B12 deficiency 4/17/2018     Vitamin D deficiency 8/24/2017       Past Surgical History:    Past Surgical History:   Procedure Laterality Date     AS UGI ENDOSCOPY W ESOPHAGEAL DILATION BALLOON <30MM  10/30/2015    ESOPHAGEAL DILATION,Dr. Rainer Allen, [facility]     BIOPSY BREAST Bilateral     1999, 2001, BIOPSY BREAST,benign     CHOLECYSTECTOMY  2004    Laparoscopic     COLONOSCOPY  06/01/2016    x's 3 negative     CT CORONARY ANGIOGRAM  2009    CT CORONARY ANGIOGRAM (IA),negative     ESOPHAGOSCOPY, GASTROSCOPY, DUODENOSCOPY (EGD), COMBINED  04/07/2009    dilated, Dr. Allen     ESOPHAGOSCOPY, GASTROSCOPY, DUODENOSCOPY (EGD), COMBINED N/A 5/30/2019    Pickering's esophagus, 3 year follow up     HYSTERECTOMY VAGINAL  1994    ovaries remain     LAPAROSCOPIC NISSEN FUNDOPLICATION N/A 3/26/2018    Procedure: LAPAROSCOPIC NISSEN FUNDOPLICATION;  Laparoscopic Nissen Fundoplication;  Surgeon: Jagdish Ruiz MD;  Location: [facility] OR     LAPAROTOMY EXPLORATORY  1990    lysis of adhesions/endometriosis     RECTOCELE REPAIR  07/29/2009        Family History:    Family History   Problem Relation Age of Onset     Arthritis Father         Arthritis     Emphysema Father 51        Alpha-1-AT deficiency     Peripheral Vascular Disease Mother         Peripheral vascular disease     Diabetes Mother         Diabetes     Arthritis Mother         Arthritis     Breast Cancer Sister         Premenopausal breast cancer     Eczema Brother         Eczema     Narcolepsy Brother      Other - See Comments Daughter         Narcolepsy     Narcolepsy Daughter      Brain Tumor Daughter      Seizure Disorder Daughter      Depression Daughter      Graves' disease Daughter        Social History:  Marital Status:   [2]  Social History     Tobacco Use     Smoking status: Former Smoker     Packs/day: 0.50     Years: 32.00     Pack years: 16.00     Types: Cigarettes     Last attempt to quit: 2012     Years since quittin.1     Smokeless tobacco: Never Used     Tobacco comment: Quit smoking: quit date of 2012   Substance Use Topics     Alcohol use: Yes     Alcohol/week: 2.0 standard drinks     Comment: Alcoholic Drinks/day: 1-2 drinks every 1-2 weeks     Drug use: No        Medications:    albuterol (PROVENTIL) (2.5 MG/3ML) 0.083% neb solution  Cholecalciferol (D 5000) 5000 UNITS TABS  fluticasone-salmeterol (ADVAIR DISKUS) 500-50 MCG/DOSE inhaler  folic acid (FOLVITE) 1 MG tablet  metoprolol tartrate (LOPRESSOR) 25 MG tablet  montelukast (SINGULAIR) 10 MG tablet  predniSONE (DELTASONE) 20 MG tablet  tiotropium (SPIRIVA RESPIMAT) 2.5 MCG/ACT inhaler  venlafaxine (EFFEXOR-XR) 37.5 MG 24 hr capsule  vitamin B-12 (CYANOCOBALAMIN) 2500 MCG sublingual tablet  albuterol (PROAIR HFA/PROVENTIL HFA/VENTOLIN HFA) 108 (90 Base) MCG/ACT inhaler  nitroGLYcerin (NITROSTAT) 0.4 MG sublingual tablet  pravastatin (PRAVACHOL) 20 MG tablet          Review of Systems   Constitutional: Negative for chills and fever.   HENT: Negative for congestion.    Eyes:        Blurry vision,  "left eye   Respiratory: Negative for chest tightness and shortness of breath.    Cardiovascular: Negative for chest pain.   Gastrointestinal: Negative for abdominal pain.   Genitourinary: Negative for hematuria.   Musculoskeletal: Negative for back pain.   Skin: Negative for rash and wound.   Neurological: Positive for headaches. Negative for syncope.   Hematological: Negative for adenopathy. Does not bruise/bleed easily.   Psychiatric/Behavioral: Negative for confusion.       Physical Exam   BP: 131/88  Pulse: 62  Temp: 97.6  F (36.4  C)  Resp: 18  Height: 157.5 cm (5' 2\")  Weight: 60.3 kg (133 lb)  SpO2: 98 %      Physical Exam  Constitutional:       General: She is not in acute distress.     Appearance: She is well-developed. She is not diaphoretic.   HENT:      Head: Normocephalic and atraumatic.      Nose: Nose normal.   Eyes:      General: No scleral icterus.     Conjunctiva/sclera: Conjunctivae normal.   Neck:      Musculoskeletal: Neck supple.   Cardiovascular:      Rate and Rhythm: Normal rate and regular rhythm.   Pulmonary:      Effort: Pulmonary effort is normal.      Breath sounds: Normal breath sounds.   Abdominal:      Palpations: Abdomen is soft.      Tenderness: There is no tenderness.   Musculoskeletal:         General: No deformity.   Lymphadenopathy:      Cervical: No cervical adenopathy.   Skin:     General: Skin is warm and dry.      Findings: No rash.   Neurological:      General: No focal deficit present.      Mental Status: She is alert and oriented to person, place, and time.      Cranial Nerves: No cranial nerve deficit.      Sensory: No sensory deficit.   Psychiatric:         Mood and Affect: Mood normal.         Behavior: Behavior normal.         Thought Content: Thought content normal.         Judgement: Judgment normal.         ED Course        Procedures               Critical Care time:  none               No results found for this or any previous visit (from the past 24 " hour(s)).    Medications   0.9% sodium chloride BOLUS (0 mLs Intravenous Stopped 10/11/19 1816)   metoclopramide (REGLAN) injection 10 mg (10 mg Intravenous Given 10/11/19 1613)   diphenhydrAMINE (BENADRYL) injection 25 mg (25 mg Intravenous Given 10/11/19 1608)   dexamethasone (DECADRON) injection 10 mg (10 mg Intravenous Given 10/11/19 1608)   gadobenate dimeglumine (MULTIHANCE) injection 15 mL (15 mLs Intravenous Given 10/11/19 1706)       Assessments & Plan (with Medical Decision Making)   Pt is nontoxic in NAD. Concern for neurological event that has been occurring >10days. NIHSS 0. Very slight ptosis of left eyelid, otherwise No obvious neuro deficits, but complaining of blurry vision/headache. VSS, afebrile.     ESR 7, trop neg, unremarkable cbc, cmp. EKG showed NSR.     MR studies show Small multifocal white matter signal abnormalities are  present as described above. The appearance is nonspecific but  possibilities would include chronic small vessel ischemic change or  demyelinating entities such as multiple sclerosis.     There is no evidence of an acute or subacute infarct. No evidence of  intracranial mass.    I did talk with Dr. Juares, neurology at [facility]. He believes MR changes are most likely age related. Recommends short course of steroids.    Pt treated for headache. Upon reassessment she is doing much better.    Referral placed for pt to f/u with pcp for possible further studies prn, as well as measure current progress.     She should return to ED if symptoms worsening. She understands/agrees with plan and pt is discharged.     Ha De Paz PA-C        I have reviewed the nursing notes.    I have reviewed the findings, diagnosis, plan and need for follow up with the patient.       Discharge Medication List as of 10/11/2019  8:28 PM      START taking these medications    Details   predniSONE (DELTASONE) 20 MG tablet Take two tablets (= 40mg) each day for 5 (five) days, Disp-10 tablet, R-0,  E-Prescribe             Final diagnoses:   Headache   Ptosis of eyelid, left       10/11/2019   [facility]     Ha De Paz PA  10/15/19 0915

## 2019-10-16 DIAGNOSIS — J44.9 COPD, SEVERE (H): ICD-10-CM

## 2019-10-16 DIAGNOSIS — R06.2 WHEEZING: ICD-10-CM

## 2019-10-16 RX ORDER — ALBUTEROL SULFATE 90 UG/1
3 AEROSOL, METERED RESPIRATORY (INHALATION) 3 TIMES DAILY PRN
Qty: 25.5 G | Refills: 3 | Status: SHIPPED | OUTPATIENT
Start: 2019-10-16 | End: 2019-12-30

## 2019-10-16 NOTE — TELEPHONE ENCOUNTER
Patient just received last refill. Advanced refill request to prevent uninterrupted service. Saumya Skinner LPN .......................10/16/2019  12:28 PM

## 2019-10-24 ENCOUNTER — TELEPHONE (OUTPATIENT)
Dept: INTERNAL MEDICINE | Facility: OTHER | Age: 59
End: 2019-10-24

## 2019-10-24 DIAGNOSIS — J44.9 COPD, SEVERE (H): ICD-10-CM

## 2019-10-24 DIAGNOSIS — R06.2 WHEEZING: ICD-10-CM

## 2019-10-24 RX ORDER — ALBUTEROL SULFATE 90 UG/1
1-2 AEROSOL, METERED RESPIRATORY (INHALATION) EVERY 4 HOURS PRN
Qty: 1 INHALER | Refills: 11 | Status: SHIPPED | OUTPATIENT
Start: 2019-10-24 | End: 2019-12-02

## 2019-10-24 NOTE — TELEPHONE ENCOUNTER
[facility] - Albuterol denied.  This RX is excluded from the benefit plan.   I faxed  the denial to the nurses at [phone].    BX PH# [phone]

## 2019-10-24 NOTE — TELEPHONE ENCOUNTER
1. COPD, severe (H)  2. Wheezing    Rx now sent to [facility] pharmacy.     - albuterol (PROAIR HFA/PROVENTIL HFA/VENTOLIN HFA) 108 (90 Base) MCG/ACT inhaler; Inhale 1-2 puffs into the lungs every 4 hours as needed for shortness of breath / dyspnea or wheezing  Dispense: 1 Inhaler; Refill: 11    Tim Boyd MD

## 2019-10-25 NOTE — TELEPHONE ENCOUNTER
FYI:  [facility] dispensed 3 Pro-Air inhalers to the patient 1-2 weeks ago with no co-pay.      Melissa Li LPN 10/25/2019 10:31 AM

## 2019-10-25 NOTE — TELEPHONE ENCOUNTER
[facility] is looking into this new prescription-insurance claims it is dispense too soon.      Melissa Li LPN 10/25/2019 8:24 AM

## 2019-11-27 ENCOUNTER — OFFICE VISIT (OUTPATIENT)
Dept: FAMILY MEDICINE | Facility: OTHER | Age: 59
End: 2019-11-27
Attending: PHYSICIAN ASSISTANT
Payer: COMMERCIAL

## 2019-11-27 VITALS
HEART RATE: 89 BPM | OXYGEN SATURATION: 99 % | HEIGHT: 62 IN | BODY MASS INDEX: 24.44 KG/M2 | DIASTOLIC BLOOD PRESSURE: 80 MMHG | TEMPERATURE: 98.6 F | SYSTOLIC BLOOD PRESSURE: 119 MMHG | WEIGHT: 132.8 LBS | RESPIRATION RATE: 16 BRPM

## 2019-11-27 DIAGNOSIS — K04.7 DENTAL INFECTION: Primary | ICD-10-CM

## 2019-11-27 PROCEDURE — 99214 OFFICE O/P EST MOD 30 MIN: CPT | Performed by: PHYSICIAN ASSISTANT

## 2019-11-27 RX ORDER — LACTOBACILLUS RHAMNOSUS GG 10B CELL
1 CAPSULE ORAL DAILY
Qty: 14 CAPSULE | Refills: 0 | Status: SHIPPED | OUTPATIENT
Start: 2019-11-27 | End: 2019-12-11

## 2019-11-27 RX ORDER — CLINDAMYCIN HCL 150 MG
450 CAPSULE ORAL 3 TIMES DAILY
Qty: 63 CAPSULE | Refills: 0 | Status: ON HOLD | OUTPATIENT
Start: 2019-11-27 | End: 2019-12-04

## 2019-11-27 ASSESSMENT — PAIN SCALES - GENERAL: PAINLEVEL: SEVERE PAIN (6)

## 2019-11-27 ASSESSMENT — MIFFLIN-ST. JEOR: SCORE: 1135.63

## 2019-11-28 NOTE — PROGRESS NOTES
SUBJECTIVE: 59 year old female presents to [facility] for evaluation of mouth pain and possible infection. Pain is 6/10- 8/10  Onset 2 days ago, course is gradually worsening  She had oral surgery on 11/14/19 for total mouth reconstruction. She has been wearing her lower plate during the day and taking it out at night time. She noted it has been too painful to wear her lower plate the past few days.   Associated symptoms: swelling, pain, fever up to 101  Treatments: hydrocodone, ibuprofen 800 - every 8 hours, XS tylenol 1000 mg every 4 hours    She did try and call her oral surgeon today, their office was closed today.       Past Medical History:   Diagnosis Date     Alpha-1-antitrypsin deficiency carrier 8/23/2017     Atherosclerosis of abdominal aorta (H) 8/24/2017    Overview:  [facility] Studies: 2/19/2013 -- CTA ABDOMEN AND PELVIS WITH BILATERAL LOWER EXTREMITY RUNOFF  CLINICAL HISTORY: Iliac and mesenteric ischemia.  TECHNIQUE: 125 mL Omnipaque 300 IV contrast was administered. 3-D arterial reformations were performed.  FINDINGS: There is a well-defined ovoid density at the medial right costophrenic angle. This measures 2.3 x 0.9 x 0.6 cm. It demonstra     Chronic abdominal pain 3/10/2010    Overview:  16 year duration     Chronic sinusitis     No Comments Provided     Closed fracture of skull (H)     1972     COPD, severe (H) 8/24/2017    Overview:  8/4/2014 -- PULMONARY FUNCTION  SITE:  [facility] ORDERED BY:  VANNESA LUNA  CLINICAL SUMMARY: 53-year-old female with a history of severe COPD.   TECHNICIAN NOTE: Good effort.   DATA: FVC 1.85 (61%). FEV1 1.04 (45%). FEV1/FVC ratio 56%. DLCO 13.5 to 62%.   Flow volume curve is consistent with airway obstruction.   IMPRESSION: 1. Severe obstructive pulmon     GERD (gastroesophageal reflux disease) 11/29/2017     Hiatal hernia 8/24/2017     Other and unspecified hyperlipidemia 8/1/2012     PAD (peripheral artery disease) (H)  "10/13/2015     Psoriasis 8/24/2017     Psoriatic arthritis (H) 11/20/2017     Schatzki's ring of distal esophagus 8/24/2017     Ulcer of right cornea 8/18/2018     Vitamin B12 deficiency 4/17/2018     Vitamin D deficiency 8/24/2017     Current Outpatient Medications   Medication     albuterol (PROAIR HFA/PROVENTIL HFA/VENTOLIN HFA) 108 (90 Base) MCG/ACT inhaler     albuterol (PROAIR HFA/PROVENTIL HFA/VENTOLIN HFA) 108 (90 Base) MCG/ACT inhaler     albuterol (PROVENTIL) (2.5 MG/3ML) 0.083% neb solution     Cholecalciferol (D 5000) 5000 UNITS TABS     fluticasone-salmeterol (ADVAIR DISKUS) 500-50 MCG/DOSE inhaler     folic acid (FOLVITE) 1 MG tablet     metoprolol tartrate (LOPRESSOR) 25 MG tablet     montelukast (SINGULAIR) 10 MG tablet     nitroGLYcerin (NITROSTAT) 0.4 MG sublingual tablet     pravastatin (PRAVACHOL) 20 MG tablet     predniSONE (DELTASONE) 20 MG tablet     tiotropium (SPIRIVA RESPIMAT) 2.5 MCG/ACT inhaler     venlafaxine (EFFEXOR-XR) 37.5 MG 24 hr capsule     vitamin B-12 (CYANOCOBALAMIN) 2500 MCG sublingual tablet     No current facility-administered medications for this visit.         Allergies   Allergen Reactions     Atorvastatin Muscle Pain (Myalgia)     Rosuvastatin Nausea and Vomiting     Sucralfate Nausea and Vomiting     Levofloxacin Nausea and Vomiting     Morphine      Other reaction(s): Chest Pain     Penicillins      Other reaction(s): Respiratory Arrest     Sulfamethoxazole W/Trimethoprim Nausea and Vomiting     Yeast infection         ROS  General: fever past few days. T.Max at home 101  HENT: POSITIVE per HPI  Respiratory: negative  Abdomen: liquids and soft food. No N/V or diarrhea  Skin: no rash    OBJECTIVE:   Vitals:    11/27/19 1832   BP: 119/80   BP Location: Left arm   Patient Position: Sitting   Cuff Size: Adult Regular   Pulse: 89   Resp: 16   Temp: 98.6  F (37  C)   TempSrc: Tympanic   SpO2: 99%   Weight: 60.2 kg (132 lb 12.8 oz)   Height: 1.575 m (5' 2\")       Vitals as " noted above.  Appears healthy, alert and mild distress.  Ears: normal  Oropharynx: dental surgery, sutures noted. Swelling over lower jaw area where tooth 27 ad tooth 28 would be. there are several screws in jaw. Swelling over lower jaw. No erythema or warmth on cheek or jaw.   Neck: normal, supple and no adenopathy  Cardiac: normal RR, no murmur  Lungs: normal respiration, clear to ausculation  Skin: no rashes  Psychological: normal affect, alert and pleasant      ASSESSMENT:   (K04.7) Dental infection  (primary encounter diagnosis)  Plan: clindamycin (CLEOCIN) 150 MG capsule,         Lactobacillus-Inulin (CULTURELLE DIGESTIVE         HEALTH) CAPS    Dental infection/pain lower jaw  RX per EPIC Clindamycin 450 mg oral tablet 3 times daily x 7 days, culturelle 1 capsule daily x 14 days  Discussed symptomatic treatments per AVS  Follow up with PCP if symptoms persist or worsen  Patient received verbal and written instruction including review of warning signs    Yani Hernandez PA-C on 11/27/2019 at 6:47 PM

## 2019-11-28 NOTE — PATIENT INSTRUCTIONS
"Dental infection, lower jaw following surgery  Start Clindamycin 150 mg oral tablet, take 3 tablets, 3 times daily for 7 days.   Start Culturelle, probiotic, take one tablet per day while on antibiotic, and for one week after antibiotic is completed.   Apply ice 10-20 minutes every 1-2 hours  Ibuprofen and tylenol as needed  Follow up with dentist when they reopen  Follow up with PCP as needed  Patient Education     Dental Pain    A crack or cavity in a tooth can cause tooth pain. This is because the crack or cavity exposes the sensitive inner area of the tooth. An infection in the gum or the root of the tooth can cause pain and swelling. The pain is often made worse when you drink hot or cold beverages. It can also be worse when you bite on hard foods. Pain may spread from the tooth to your ear or the area of the jaw on the same side.  Home care  Follow these tips when caring for yourself at home:    Don't have hot and cold foods and drinks. Your tooth may be sensitive to changes in temperature.    Use toothpaste made for sensitive teeth. Brush gently up and down instead of sideways. Brushing sideways can wear away root surfaces if they are exposed.    If your tooth is chipped or cracked, or if there is a large open cavity, put oil of cloves directly on the tooth to relieve pain. You can buy oil of cloves at drugstores. Some pharmacies carry an over-the-counter \"toothache kit.\" This contains a paste that you can put on the exposed tooth to make it less sensitive.    Put a cold pack on your jaw over the sore area to help reduce pain.    You may use over-the-counter medicine to ease pain, unless your doctor prescribed another medicine. If you have chronic liver or kidney disease, talk with your healthcare provider before using acetaminophen or ibuprofen. Also talk with your provider if you ve had a stomach ulcer or GI bleeding.    If you have signs of an infection, you will be given an antibiotic. Take it as " directed.  Follow-up care  Follow up with your dentist, or as advised. Your pain may go away with the treatment given today. But only a dentist can fully look at and treat the cause of your pain. This will keep the pain from coming back.  Call 911  Call 911 if any of these occur:    Unusual drowsiness    Headache or stiff neck    Weakness or fainting    Difficulty swallowing or breathing  When to seek medical advice  Call your health care provider right away if any of these occur:    Your face becomes swollen or red    Pain gets worse or spreads to your neck    Fever of 100.4  F (38.0  C) or higher, or as directed by your healthcare provider    Pus drains from the tooth  Date Last Reviewed: 10/1/2016    [id number] [employer]. [address] [place] [address], [place] [address]. All rights reserved. This information is not intended as a substitute for professional medical care. Always follow your healthcare professional's instructions.

## 2019-11-28 NOTE — NURSING NOTE
"Patient presents to clinic for dental problem. States mouth infection x 2 days. Patient had oral surgery on 11/14. States she got screws and a bar on the top and bottom. Follow up on 11/18 was all good. States she took plate out 11/24 and could taste novacaine. Patient is having a difficult time eating-changed to soft diet. States pain/swelling is moving down right side of face and neck.   Chief Complaint   Patient presents with     Dental Problem       Initial /80 (BP Location: Left arm, Patient Position: Sitting, Cuff Size: Adult Regular)   Pulse 89   Temp 98.6  F (37  C) (Tympanic)   Resp 16   Ht 1.575 m (5' 2\")   Wt 60.2 kg (132 lb 12.8 oz)   SpO2 99%   Breastfeeding No   BMI 24.29 kg/m   Estimated body mass index is 24.29 kg/m  as calculated from the following:    Height as of this encounter: 1.575 m (5' 2\").    Weight as of this encounter: 60.2 kg (132 lb 12.8 oz).  Medication Reconciliation: complete    Melissa Jo LPN    "

## 2019-11-29 ENCOUNTER — HOSPITAL ENCOUNTER (EMERGENCY)
Facility: OTHER | Age: 59
Discharge: HOME | End: 2019-11-30
Attending: EMERGENCY MEDICINE | Admitting: EMERGENCY MEDICINE
Payer: COMMERCIAL

## 2019-11-29 DIAGNOSIS — M27.2 ABSCESS OF MANDIBLE: ICD-10-CM

## 2019-11-29 PROCEDURE — 99285 EMERGENCY DEPT VISIT HI MDM: CPT | Performed by: EMERGENCY MEDICINE

## 2019-11-29 PROCEDURE — 85025 COMPLETE CBC W/AUTO DIFF WBC: CPT | Performed by: EMERGENCY MEDICINE

## 2019-11-29 PROCEDURE — 99285 EMERGENCY DEPT VISIT HI MDM: CPT | Mod: 25 | Performed by: EMERGENCY MEDICINE

## 2019-11-29 PROCEDURE — 86140 C-REACTIVE PROTEIN: CPT | Performed by: EMERGENCY MEDICINE

## 2019-11-29 PROCEDURE — 96374 THER/PROPH/DIAG INJ IV PUSH: CPT | Mod: XU | Performed by: EMERGENCY MEDICINE

## 2019-11-29 PROCEDURE — 80048 BASIC METABOLIC PNL TOTAL CA: CPT | Performed by: EMERGENCY MEDICINE

## 2019-11-29 PROCEDURE — 36415 COLL VENOUS BLD VENIPUNCTURE: CPT | Performed by: EMERGENCY MEDICINE

## 2019-11-29 PROCEDURE — 96375 TX/PRO/DX INJ NEW DRUG ADDON: CPT | Performed by: EMERGENCY MEDICINE

## 2019-11-29 PROCEDURE — 99284 EMERGENCY DEPT VISIT MOD MDM: CPT | Mod: Z6 | Performed by: EMERGENCY MEDICINE

## 2019-11-29 NOTE — ED AVS SNAPSHOT
[facility]  [address]  [place] [address]  Phone:  [phone]  Fax:  [phone]                                    Ember Tavares   MRN: [id number]    Department:  [facility]   Date of Visit:  11/29/2019           After Visit Summary Signature Page    I have received my discharge instructions, and my questions have been answered. I have discussed any challenges I see with this plan with the nurse or doctor.    ..........................................................................................................................................  Patient/Patient Representative Signature      ..........................................................................................................................................  Patient Representative Print Name and Relationship to Patient    ..................................................               ................................................  Date                                   Time    ..........................................................................................................................................  Reviewed by Signature/Title    ...................................................              ..............................................  Date                                               Time          22EPIC Rev 08/18

## 2019-11-30 ENCOUNTER — APPOINTMENT (OUTPATIENT)
Dept: CT IMAGING | Facility: OTHER | Age: 59
End: 2019-11-30
Attending: EMERGENCY MEDICINE
Payer: COMMERCIAL

## 2019-11-30 VITALS
BODY MASS INDEX: 24.29 KG/M2 | TEMPERATURE: 98.7 F | SYSTOLIC BLOOD PRESSURE: 128 MMHG | HEART RATE: 82 BPM | DIASTOLIC BLOOD PRESSURE: 67 MMHG | RESPIRATION RATE: 16 BRPM | OXYGEN SATURATION: 97 % | WEIGHT: 132.8 LBS

## 2019-11-30 LAB
ANION GAP SERPL CALCULATED.3IONS-SCNC: 11 MMOL/L (ref 3–14)
BASOPHILS # BLD AUTO: 0.1 10E9/L (ref 0–0.2)
BASOPHILS NFR BLD AUTO: 0.4 %
BUN SERPL-MCNC: 11 MG/DL (ref 7–25)
CALCIUM SERPL-MCNC: 8.5 MG/DL (ref 8.6–10.3)
CHLORIDE SERPL-SCNC: 103 MMOL/L (ref 98–107)
CO2 SERPL-SCNC: 24 MMOL/L (ref 21–31)
CREAT SERPL-MCNC: 0.68 MG/DL (ref 0.6–1.2)
CRP SERPL-MCNC: 12.2 MG/L
DIFFERENTIAL METHOD BLD: ABNORMAL
EOSINOPHIL # BLD AUTO: 0.1 10E9/L (ref 0–0.7)
EOSINOPHIL NFR BLD AUTO: 0.6 %
ERYTHROCYTE [DISTWIDTH] IN BLOOD BY AUTOMATED COUNT: 12.7 % (ref 10–15)
GFR SERPL CREATININE-BSD FRML MDRD: 89 ML/MIN/{1.73_M2}
GLUCOSE SERPL-MCNC: 106 MG/DL (ref 70–105)
HCT VFR BLD AUTO: 37.1 % (ref 35–47)
HGB BLD-MCNC: 12.1 G/DL (ref 11.7–15.7)
IMM GRANULOCYTES # BLD: 0.1 10E9/L (ref 0–0.4)
IMM GRANULOCYTES NFR BLD: 0.6 %
LYMPHOCYTES # BLD AUTO: 1.6 10E9/L (ref 0.8–5.3)
LYMPHOCYTES NFR BLD AUTO: 9.4 %
MCH RBC QN AUTO: 29.8 PG (ref 26.5–33)
MCHC RBC AUTO-ENTMCNC: 32.6 G/DL (ref 31.5–36.5)
MCV RBC AUTO: 91 FL (ref 78–100)
MONOCYTES # BLD AUTO: 1.3 10E9/L (ref 0–1.3)
MONOCYTES NFR BLD AUTO: 7.5 %
NEUTROPHILS # BLD AUTO: 14.1 10E9/L (ref 1.6–8.3)
NEUTROPHILS NFR BLD AUTO: 81.5 %
PLATELET # BLD AUTO: 540 10E9/L (ref 150–450)
POTASSIUM SERPL-SCNC: 3.8 MMOL/L (ref 3.5–5.1)
RBC # BLD AUTO: 4.06 10E12/L (ref 3.8–5.2)
SODIUM SERPL-SCNC: 138 MMOL/L (ref 134–144)
WBC # BLD AUTO: 17.3 10E9/L (ref 4–11)

## 2019-11-30 PROCEDURE — 70491 CT SOFT TISSUE NECK W/DYE: CPT | Mod: TC

## 2019-11-30 PROCEDURE — 25500064 ZZH RX 255 OP 636: Performed by: EMERGENCY MEDICINE

## 2019-11-30 PROCEDURE — 25000128 H RX IP 250 OP 636: Performed by: EMERGENCY MEDICINE

## 2019-11-30 PROCEDURE — 96375 TX/PRO/DX INJ NEW DRUG ADDON: CPT | Mod: XU | Performed by: EMERGENCY MEDICINE

## 2019-11-30 PROCEDURE — 96374 THER/PROPH/DIAG INJ IV PUSH: CPT | Mod: XU | Performed by: EMERGENCY MEDICINE

## 2019-11-30 RX ORDER — HYDROMORPHONE HYDROCHLORIDE 1 MG/ML
0.5 INJECTION, SOLUTION INTRAMUSCULAR; INTRAVENOUS; SUBCUTANEOUS ONCE
Status: COMPLETED | OUTPATIENT
Start: 2019-11-30 | End: 2019-11-30

## 2019-11-30 RX ORDER — KETOROLAC TROMETHAMINE 30 MG/ML
30 INJECTION, SOLUTION INTRAMUSCULAR; INTRAVENOUS ONCE
Status: COMPLETED | OUTPATIENT
Start: 2019-11-30 | End: 2019-11-30

## 2019-11-30 RX ORDER — HYDROCODONE BITARTRATE AND ACETAMINOPHEN 5; 325 MG/1; MG/1
1 TABLET ORAL EVERY 6 HOURS PRN
Qty: 6 TABLET | Refills: 0 | Status: ON HOLD | OUTPATIENT
Start: 2019-11-30 | End: 2019-12-04

## 2019-11-30 RX ORDER — HYDROCODONE BITARTRATE AND ACETAMINOPHEN 5; 325 MG/1; MG/1
1 TABLET ORAL EVERY 6 HOURS PRN
Qty: 6 TABLET | Refills: 0 | Status: SHIPPED | OUTPATIENT
Start: 2019-11-30 | End: 2019-11-30

## 2019-11-30 RX ADMIN — KETOROLAC TROMETHAMINE 30 MG: 30 INJECTION, SOLUTION INTRAMUSCULAR; INTRAVENOUS at 00:17

## 2019-11-30 RX ADMIN — HYDROMORPHONE HYDROCHLORIDE 0.5 MG: 1 INJECTION, SOLUTION INTRAMUSCULAR; INTRAVENOUS; SUBCUTANEOUS at 00:17

## 2019-11-30 RX ADMIN — IOHEXOL 100 ML: 350 INJECTION, SOLUTION INTRAVENOUS at 00:37

## 2019-11-30 NOTE — DISCHARGE INSTRUCTIONS
Return for inability to open your jaw more than the width of 2 fingers between your front teeth, return for shortness of breath or difficulty swallowing.  Return for woody induration to the floor the mouth or the cheek on the right side.  Return for high fevers or changes in mental status or severe headache.    Continue on your clindamycin

## 2019-11-30 NOTE — ED TRIAGE NOTES
Pt arrives to the ED via private car with spouse.  Pt had her teeth removed for denture placement and has an abscess on the right lower jaw.  Pt states that the pain started the 4 days ago and has not gotten any better.  Pt has been placed on antibiotics.

## 2019-11-30 NOTE — ED PROVIDER NOTES
"  History     Chief Complaint   Patient presents with     Infected jaw     HPI  Ember Tavares is a 59 year old female who presented to the emergency department via private vehicle for evaluation of right facial pain.    Patient reports that on November 14 she was seen by an oral surgeon in [facility].  At that time she had 11 teeth extracted.  She reports that holes were drilled to place \"pegs and bars\" in anticipation of some future dental implants.  She reports that one week later she developed some increased swelling and pain and was unable to bite down.  She had to remove her dentures because of this.  She reports she was seen at \"a rapid clinic\" here and was started on clindamycin 450 mg 3 times a day.  She continues on that still.  She reports there is been no improvement and indeed her pain seems to be worsening and the amount of swelling may be increasing as well.  She reports she called her oral surgeon tonight who encouraged her to present here for evaluation but did agreed to see her for reassessment in 2 days.    Patient was started on Tylenol with codeine this evening at 1800.  She took 1 tablet at that time.  She notes that she has been unable to sleep for the last week because of the discomfort.    The pain involves the right yeni-face primarily in the V2 distribution but with some involvement of the caudal portion of the one and half of the V3 distribution.  She describes this as a burning discomfort.  It is made worse by any pressure to the right side of the face by jaw opening or trying to tightly opposed to the mandible to the maxilla.  She denies any change in visual acuity specifically denying diplopia or scotomata or visual scintillae.  She does not endorse any nystagmus.  There is been no dysphasia or odynophagia.  No nasal congestion or drainage.  No otalgia or otorrhea.  No change in auditory acuity.  No new onset tinnitus or vertigo.  She does have some mild discomfort extending " down the anterolateral aspect of the left neck but stopping at or above the level of thyroid cartilage.  She believes that she has had a temperature of 102 degrees using a forehead thermometer she is had chills and there is some equivocation between the patient and her spouse as to whether or not she actually had rigors.  She denies nausea or vomiting.          Allergies:  Allergies   Allergen Reactions     Atorvastatin Muscle Pain (Myalgia)     Rosuvastatin Nausea and Vomiting     Sucralfate Nausea and Vomiting     Levofloxacin Nausea and Vomiting     Morphine      Other reaction(s): Chest Pain     Penicillins      Other reaction(s): Respiratory Arrest     Sulfamethoxazole W/Trimethoprim Nausea and Vomiting     Yeast infection       Problem List:        Past Medical History:    Past Medical History:   Diagnosis Date     Alpha-1-antitrypsin deficiency carrier 8/23/2017     Atherosclerosis of abdominal aorta (H) 8/24/2017     Chronic abdominal pain 3/10/2010     Chronic sinusitis      Closed fracture of skull (H)      COPD, severe (H) 8/24/2017     GERD (gastroesophageal reflux disease) 11/29/2017     Hiatal hernia 8/24/2017     Other and unspecified hyperlipidemia 8/1/2012     PAD (peripheral artery disease) (H) 10/13/2015     Psoriasis 8/24/2017     Psoriatic arthritis (H) 11/20/2017     Schatzki's ring of distal esophagus 8/24/2017     Ulcer of right cornea 8/18/2018     Vitamin B12 deficiency 4/17/2018     Vitamin D deficiency 8/24/2017       Past Surgical History:    Past Surgical History:   Procedure Laterality Date     AS UGI ENDOSCOPY W ESOPHAGEAL DILATION BALLOON <30MM  10/30/2015    ESOPHAGEAL DILATION,Dr. Rainer Allen, [facility]     BIOPSY BREAST Bilateral     1999, 2001, BIOPSY BREAST,benign     CHOLECYSTECTOMY  2004    Laparoscopic     COLONOSCOPY  06/01/2016    x's 3 negative     CT CORONARY ANGIOGRAM  2009    CT CORONARY ANGIOGRAM (IA),negative     ESOPHAGOSCOPY, GASTROSCOPY, DUODENOSCOPY (EGD),  COMBINED  2009    dilatedDr. Allen     ESOPHAGOSCOPY, GASTROSCOPY, DUODENOSCOPY (EGD), COMBINED N/A 2019    Pickering's esophagus, 3 year follow up     HYSTERECTOMY VAGINAL  1994    ovaries remain     LAPAROSCOPIC NISSEN FUNDOPLICATION N/A 3/26/2018    Procedure: LAPAROSCOPIC NISSEN FUNDOPLICATION;  Laparoscopic Nissen Fundoplication;  Surgeon: Jagdish Ruiz MD;  Location: [facility] OR     LAPAROTOMY EXPLORATORY  1990    lysis of adhesions/endometriosis     RECTOCELE REPAIR  2009       Family History:    Family History   Problem Relation Age of Onset     Arthritis Father         Arthritis     Emphysema Father 51        Alpha-1-AT deficiency     Peripheral Vascular Disease Mother         Peripheral vascular disease     Diabetes Mother         Diabetes     Arthritis Mother         Arthritis     Breast Cancer Sister         Premenopausal breast cancer     Eczema Brother         Eczema     Narcolepsy Brother      Other - See Comments Daughter         Narcolepsy     Narcolepsy Daughter      Brain Tumor Daughter      Seizure Disorder Daughter      Depression Daughter      Graves' disease Daughter        Social History:  Marital Status:   [2]  Social History     Tobacco Use     Smoking status: Former Smoker     Packs/day: 0.50     Years: 32.00     Pack years: 16.00     Types: Cigarettes     Last attempt to quit: 2012     Years since quittin.2     Smokeless tobacco: Never Used     Tobacco comment: Quit smoking: quit date of 2012   Substance Use Topics     Alcohol use: Yes     Alcohol/week: 2.0 standard drinks     Comment: Alcoholic Drinks/day: 1-2 drinks every 1-2 weeks     Drug use: No        Medications:    acetaminophen-codeine (TYLENOL #3) 300-30 MG tablet  albuterol (PROAIR HFA/PROVENTIL HFA/VENTOLIN HFA) 108 (90 Base) MCG/ACT inhaler  albuterol (PROAIR HFA/PROVENTIL HFA/VENTOLIN HFA) 108 (90 Base) MCG/ACT inhaler  Cholecalciferol (D 5000) 5000 UNITS TABS  clindamycin (CLEOCIN) 150 MG  capsule  fluticasone-salmeterol (ADVAIR DISKUS) 500-50 MCG/DOSE inhaler  folic acid (FOLVITE) 1 MG tablet  HYDROcodone-acetaminophen (NORCO) 5-325 MG tablet  metoprolol tartrate (LOPRESSOR) 25 MG tablet  tiotropium (SPIRIVA RESPIMAT) 2.5 MCG/ACT inhaler  venlafaxine (EFFEXOR-XR) 37.5 MG 24 hr capsule  vitamin B-12 (CYANOCOBALAMIN) 2500 MCG sublingual tablet  albuterol (PROVENTIL) (2.5 MG/3ML) 0.083% neb solution  Lactobacillus-Inulin (CULTURELLE DIGESTIVE HEALTH) CAPS  montelukast (SINGULAIR) 10 MG tablet  nitroGLYcerin (NITROSTAT) 0.4 MG sublingual tablet          Review of Systems as noted above.    Physical Exam   BP: (!) 142/76  Pulse: 82  Heart Rate: 95  Temp: 98.7  F (37.1  C)  Resp: 16  Weight: 60.2 kg (132 lb 12.8 oz)  SpO2: 96 %      Physical Exam The patient's skin is warm and dry. There are no petechiae or purpurae. There is no pallor. There is no jaundice. The skin is intact.  Nailbed capillary refill is brisk. Radial pulses are palpable and are 2+ bilaterally. Dorsalis pedis pulses are also brisk and equal. There is no peripheral edema.  Heart is regular in its rhythm. It is regular in its rate. The patient has a normal S1 and S2. No murmur is noted.   Lungs are clear to auscultation. Airflow is adequate.  No adventitious airway sounds are appreciated.  The patient is able to speak in full sentences. No lip pursing or expiratory grunting is noted. No accessory muscle use was noted.  Bowel tones are present.  Belly is not bloated or distended. There is no guarding. There is no evidence of focal tenderness for the abdomen. No suprapubic distention or tenderness is noted.  Markel's sign is negative. No discrete mass was identified. I did not appreciate any hepatomegaly or splenomegaly by palpation or by percussion. Carotids are palpable. There is no auscultable bruit. Cervical AROM is adequate for flexion and rotation. There is no prominent cervical adenopathy or submandibular adenopathy present tonight.   There is minimal induration just on the caudad aspect of the mandibular notch on the left side may be a centimeter in diameter.  There is no pre-auricular adenopathy tonight.  No anterior or lateral neck mass is appreciated. There is no gross thyromegaly or thyroid nodularity.  Tympanic membranes are intact. They are without bulge or erythema-- on either side. External auditory canals are without erythema or edema.   Nares are patent.   The patient's mandible opened in a symmetric fashion. There is no trismus. There is no swelling of the lips, tongue, or the uvula. There were no intraoral mucosal lesions identified.  Implant sites are inspected.  There is no spontaneous or expressible drainage from the sites.  Transbuccal palpation suggests some fullness in the right cheek not too far removed from Stensen's duct.  There is some observed swelling of the right side of the face.  Patient does have tenderness for the lateral pterygoid on the right side and, to a much lesser extent of the superficial fibers of the masseter.  There is no swelling of the floor the mouth there is no woody induration here no involuntary protrusion of the tongue and no drooling.  Conjunctivae are neither injected nor pallid. Sclerae are anicteric. Pupils are equal in size. They are 2+ mm, OU. Gaze is conjugate. Purkinje images are well aligned.   At work  Patient is awake and alert. Speech is fluent and clear. Cognition is sufficient to construct a linear narrative.  Affect is irritable and constricted. The patient is cooperative and follows simple commands.  Extra-occular motility is intact along transverse, vertical and diagonal planes. There is no ptosis.Pupils are equal in size and are reactive to both direct and indirect photic stimulation. Ergo, CNs III, IV and VI are intact. CN V reveals that the patient can open and close the jaw. Sensation is intact in the V1, V2, and V3 distributions, bilaterally. The patient can purse the lips  symmetrically. There is symmetrically upturned smile, and the patient can puffs out the cheeks on both sides--confirming the integrity for CN VII. The patient's ability to hear (speech tones) in confirmed. The patient can maintain an stable upright posture--all suggesting that CN VIII is intact. The patient can elevate the soft palate symmetrically. So CN IX and X are still functioning. The patient can selectively deviate the tongue against the right and the left cheeks. So, CN XII is intact.      ED Course        Procedures                 Results for orders placed or performed during the hospital encounter of 11/29/19 (from the past 24 hour(s))   CT Soft Tissue Neck w Contrast    Narrative    PROCEDURE INFORMATION:   Exam: CT Neck With Contrast   Exam date and time: 11/30/2019 12:19 AM   Age: 59 years old   Clinical history: Abscess, pharyngeal; Prior surgery; Surgery date: <1 month;   Surgery type: She had oral surgery on 11/14/19 for total mouth reconstruction.   She has been wearing her lower plate during the day and taking it out at night   time. She noted it has been too painful to wear her lower plate the past few   days. ; Additional info: Abscess of pharynx     TECHNIQUE:   Imaging protocol: Computed tomography images of the neck with intravenous   contrast.   Radiation optimization: All CT scans at this facility use at least one of these   dose optimization techniques: automated exposure control; mA and/or kV   adjustment per patient size (includes targeted exams where dose is matched to   clinical indication); or iterative reconstruction.   Contrast material: OMNIPAQUE 350; Contrast volume: 100 ml; Contrast route: IV;      COMPARISON:   CT SOFT TISSUE NECK W CONTRAST 5/14/2019 8:18 AM     FINDINGS:  Soft tissues: Abnormal soft tissue swelling next to the body of the mandible on   the right side. Periosteal fluid collection which may represent abscess. This   measures approximately 3.8 cm in length with a  maximum width of about 5 mm.   Associated soft tissue swelling. Subcutaneous inflammation.    Orbits: The globes are intact. The extraocular muscles and intraconal   structures are normal. No bony orbital wall anomaly.  Nasopharynx: Not remarkable.   Oral Cavity: The extrinsic tongue musculature is normal in appearance.   Oropharynx: Not remarkable.   Hypopharynx: Unremarkable.   Larynx: Unremarkable.   Retropharyngeal space:Unremarkable.   Submandibular/Parotid glands: Normal. Glands are normal in size.   Thyroid: Normal. No enlarged or calcified nodules.    Lymph nodes: Prominent lymph node in region 1B on the right.   Trachea: Unremarkable.   Lungs: Unremarkable as visualized.   Vasculature: Not remarkable.   Bones/joints: Metallic pillar is located within the mandible. No significant   bony lucency adjacent to these bony pillar's.       Impression    IMPRESSION:   Soft tissue swelling adjacent to the body of the mandible on the right side.   Fluid collection which is closely applied to the outer bony cortex of the   mandible consistent with this subperiosteal fluid collection/abscess.   Associated inflammation in the subcutaneous soft tissues. Prominent in the   lymph node on the right side.    THIS DOCUMENT HAS BEEN ELECTRONICALLY SIGNED BY ANNY MCCARTY MD       Medications   ketorolac (TORADOL) injection 30 mg (30 mg Intravenous Given 11/30/19 0017)   HYDROmorphone (PF) (DILAUDID) injection 0.5 mg (0.5 mg Intravenous Given 11/30/19 0017)   iohexol (OMNIPAQUE) 350 mg/mL solution 100 mL (100 mLs Intravenous Given 11/30/19 0037)       Recheck -- patient sleeping at 0057.  She aroused to verbal stimulus.  She reports she felt much much better and was grateful to have been able to get some sleep.  She felt that she was ready for discharge at this time.    Assessments & Plan (with Medical Decision Making)     I have reviewed the nursing notes.    I have reviewed the findings, diagnosis, plan and need for follow up  with the patient.  Consideration was given to the possibility the patient might have a deep space infection.  The CT does confirm that there does seem to be an abscess but the extent of this is such that I think it is reasonable to let the patient follow-up as an outpatient.  I do not see compelling evidence for urgent surgical intervention to drain this area.  I did review with her some of my concerns and those signs and symptoms that she should follow-up that should prompt her to return here.  She will keep her follow-up appointment with her oral surgeon on Monday (2 days from now).    We discussed her use of the Tylenol with codeine.  The bottle actually said she could take 1-2.  Patient did not fully appreciate that and had limited herself to the single tablet.  Historically she has had good relief of pain with Norco but apparently is not entirely happy about associated side effects.  She will be obsessive about taking narcotics as directed.    Discharge Medication List as of 11/30/2019  2:18 AM          Final diagnoses:   Abscess of mandible   The abscess is, as noted above in the CT report not of the mandible itself but of the soft tissue adjacent to the mandible--in the subperiosteal space.      Return for inability to open your jaw more than the width of 2 fingers between your front teeth, return for shortness of breath or difficulty swallowing.  Return for woody induration to the floor the mouth or the cheek on the right side.  Return for high fevers or changes in mental status or severe headache.    Continue on your clindamycin      11/29/2019   [facility]  11/30/19 0705

## 2019-12-02 ENCOUNTER — ANESTHESIA EVENT (OUTPATIENT)
Dept: EMERGENCY MEDICINE | Facility: OTHER | Age: 59
End: 2019-12-02
Payer: COMMERCIAL

## 2019-12-02 ENCOUNTER — TELEPHONE (OUTPATIENT)
Dept: INTERNAL MEDICINE | Facility: OTHER | Age: 59
End: 2019-12-02

## 2019-12-02 ENCOUNTER — ANESTHESIA (OUTPATIENT)
Dept: EMERGENCY MEDICINE | Facility: OTHER | Age: 59
End: 2019-12-02
Payer: COMMERCIAL

## 2019-12-02 ENCOUNTER — HOSPITAL ENCOUNTER (INPATIENT)
Facility: OTHER | Age: 59
LOS: 2 days | Discharge: HOME | End: 2019-12-04
Attending: FAMILY MEDICINE | Admitting: FAMILY MEDICINE
Payer: COMMERCIAL

## 2019-12-02 DIAGNOSIS — K04.7 DENTAL ABSCESS: ICD-10-CM

## 2019-12-02 DIAGNOSIS — Z79.899 OTHER LONG TERM (CURRENT) DRUG THERAPY: ICD-10-CM

## 2019-12-02 DIAGNOSIS — K21.9 GASTRO-ESOPHAGEAL REFLUX DISEASE WITHOUT ESOPHAGITIS: ICD-10-CM

## 2019-12-02 DIAGNOSIS — J44.9 CHRONIC OBSTRUCTIVE PULMONARY DISEASE, UNSPECIFIED COPD TYPE (H): ICD-10-CM

## 2019-12-02 DIAGNOSIS — Z87.891 PERSONAL HISTORY OF NICOTINE DEPENDENCE: ICD-10-CM

## 2019-12-02 LAB
ANION GAP SERPL CALCULATED.3IONS-SCNC: 9 MMOL/L (ref 3–14)
BASOPHILS # BLD AUTO: 0.1 10E9/L (ref 0–0.2)
BASOPHILS NFR BLD AUTO: 0.3 %
BUN SERPL-MCNC: 10 MG/DL (ref 7–25)
CALCIUM SERPL-MCNC: 9.1 MG/DL (ref 8.6–10.3)
CHLORIDE SERPL-SCNC: 98 MMOL/L (ref 98–107)
CO2 SERPL-SCNC: 31 MMOL/L (ref 21–31)
CREAT SERPL-MCNC: 0.77 MG/DL (ref 0.6–1.2)
CRP SERPL-MCNC: 27.3 MG/L
DIFFERENTIAL METHOD BLD: ABNORMAL
EOSINOPHIL # BLD AUTO: 0.1 10E9/L (ref 0–0.7)
EOSINOPHIL NFR BLD AUTO: 0.7 %
ERYTHROCYTE [DISTWIDTH] IN BLOOD BY AUTOMATED COUNT: 13.2 % (ref 10–15)
ERYTHROCYTE [SEDIMENTATION RATE] IN BLOOD BY WESTERGREN METHOD: 92 MM/H (ref 1–15)
GFR SERPL CREATININE-BSD FRML MDRD: 77 ML/MIN/{1.73_M2}
GLUCOSE SERPL-MCNC: 94 MG/DL (ref 70–105)
HCT VFR BLD AUTO: 35.1 % (ref 35–47)
HGB BLD-MCNC: 11.5 G/DL (ref 11.7–15.7)
IMM GRANULOCYTES # BLD: 0.1 10E9/L (ref 0–0.4)
IMM GRANULOCYTES NFR BLD: 0.8 %
LACTATE SERPL-SCNC: 1.3 MMOL/L (ref 0.5–2.2)
LYMPHOCYTES # BLD AUTO: 1.9 10E9/L (ref 0.8–5.3)
LYMPHOCYTES NFR BLD AUTO: 11 %
MCH RBC QN AUTO: 30.1 PG (ref 26.5–33)
MCHC RBC AUTO-ENTMCNC: 32.8 G/DL (ref 31.5–36.5)
MCV RBC AUTO: 92 FL (ref 78–100)
MONOCYTES # BLD AUTO: 1.7 10E9/L (ref 0–1.3)
MONOCYTES NFR BLD AUTO: 9.5 %
NEUTROPHILS # BLD AUTO: 13.7 10E9/L (ref 1.6–8.3)
NEUTROPHILS NFR BLD AUTO: 77.7 %
PLATELET # BLD AUTO: 706 10E9/L (ref 150–450)
POTASSIUM SERPL-SCNC: 3.6 MMOL/L (ref 3.5–5.1)
PROCALCITONIN SERPL-MCNC: 4.25 NG/ML
RBC # BLD AUTO: 3.82 10E12/L (ref 3.8–5.2)
SODIUM SERPL-SCNC: 138 MMOL/L (ref 134–144)
WBC # BLD AUTO: 17.6 10E9/L (ref 4–11)

## 2019-12-02 PROCEDURE — 36410 VNPNXR 3YR/> PHY/QHP DX/THER: CPT | Performed by: NURSE ANESTHETIST, CERTIFIED REGISTERED

## 2019-12-02 PROCEDURE — 85025 COMPLETE CBC W/AUTO DIFF WBC: CPT | Performed by: FAMILY MEDICINE

## 2019-12-02 PROCEDURE — 96365 THER/PROPH/DIAG IV INF INIT: CPT | Performed by: FAMILY MEDICINE

## 2019-12-02 PROCEDURE — 25000128 H RX IP 250 OP 636: Performed by: FAMILY MEDICINE

## 2019-12-02 PROCEDURE — 87040 BLOOD CULTURE FOR BACTERIA: CPT | Mod: 91 | Performed by: FAMILY MEDICINE

## 2019-12-02 PROCEDURE — 99222 1ST HOSP IP/OBS MODERATE 55: CPT | Mod: AI | Performed by: FAMILY MEDICINE

## 2019-12-02 PROCEDURE — 84145 PROCALCITONIN (PCT): CPT | Performed by: FAMILY MEDICINE

## 2019-12-02 PROCEDURE — 36415 COLL VENOUS BLD VENIPUNCTURE: CPT | Performed by: FAMILY MEDICINE

## 2019-12-02 PROCEDURE — 96375 TX/PRO/DX INJ NEW DRUG ADDON: CPT | Performed by: FAMILY MEDICINE

## 2019-12-02 PROCEDURE — 99285 EMERGENCY DEPT VISIT HI MDM: CPT | Mod: 25 | Performed by: FAMILY MEDICINE

## 2019-12-02 PROCEDURE — 25000132 ZZH RX MED GY IP 250 OP 250 PS 637: Performed by: FAMILY MEDICINE

## 2019-12-02 PROCEDURE — 99285 EMERGENCY DEPT VISIT HI MDM: CPT | Mod: Z6 | Performed by: FAMILY MEDICINE

## 2019-12-02 PROCEDURE — 83605 ASSAY OF LACTIC ACID: CPT | Performed by: FAMILY MEDICINE

## 2019-12-02 PROCEDURE — 12000000 ZZH R&B MED SURG/OB

## 2019-12-02 PROCEDURE — 85652 RBC SED RATE AUTOMATED: CPT | Performed by: FAMILY MEDICINE

## 2019-12-02 PROCEDURE — 86140 C-REACTIVE PROTEIN: CPT | Performed by: FAMILY MEDICINE

## 2019-12-02 PROCEDURE — 80048 BASIC METABOLIC PNL TOTAL CA: CPT | Performed by: FAMILY MEDICINE

## 2019-12-02 PROCEDURE — 96368 THER/DIAG CONCURRENT INF: CPT | Performed by: FAMILY MEDICINE

## 2019-12-02 PROCEDURE — 25800030 ZZH RX IP 258 OP 636: Performed by: FAMILY MEDICINE

## 2019-12-02 RX ORDER — ALBUTEROL SULFATE 0.83 MG/ML
2.5 SOLUTION RESPIRATORY (INHALATION) EVERY 6 HOURS PRN
Status: DISCONTINUED | OUTPATIENT
Start: 2019-12-02 | End: 2019-12-04 | Stop reason: HOSPADM

## 2019-12-02 RX ORDER — NALOXONE HYDROCHLORIDE 0.4 MG/ML
.1-.4 INJECTION, SOLUTION INTRAMUSCULAR; INTRAVENOUS; SUBCUTANEOUS
Status: DISCONTINUED | OUTPATIENT
Start: 2019-12-02 | End: 2019-12-02

## 2019-12-02 RX ORDER — NALOXONE HYDROCHLORIDE 0.4 MG/ML
.1-.4 INJECTION, SOLUTION INTRAMUSCULAR; INTRAVENOUS; SUBCUTANEOUS
Status: DISCONTINUED | OUTPATIENT
Start: 2019-12-02 | End: 2019-12-04 | Stop reason: HOSPADM

## 2019-12-02 RX ORDER — FOLIC ACID 1 MG/1
1 TABLET ORAL DAILY
Status: DISCONTINUED | OUTPATIENT
Start: 2019-12-03 | End: 2019-12-04 | Stop reason: HOSPADM

## 2019-12-02 RX ORDER — HYDROMORPHONE HYDROCHLORIDE 1 MG/ML
.3-.5 INJECTION, SOLUTION INTRAMUSCULAR; INTRAVENOUS; SUBCUTANEOUS
Status: DISCONTINUED | OUTPATIENT
Start: 2019-12-02 | End: 2019-12-04 | Stop reason: HOSPADM

## 2019-12-02 RX ORDER — VENLAFAXINE HYDROCHLORIDE 37.5 MG/1
37.5 CAPSULE, EXTENDED RELEASE ORAL
Status: DISCONTINUED | OUTPATIENT
Start: 2019-12-03 | End: 2019-12-04 | Stop reason: HOSPADM

## 2019-12-02 RX ORDER — TIOTROPIUM BROMIDE 18 UG/1
1 CAPSULE ORAL; RESPIRATORY (INHALATION) DAILY
Status: DISCONTINUED | OUTPATIENT
Start: 2019-12-03 | End: 2019-12-04 | Stop reason: HOSPADM

## 2019-12-02 RX ORDER — SODIUM CHLORIDE 9 MG/ML
1000 INJECTION, SOLUTION INTRAVENOUS CONTINUOUS
Status: DISCONTINUED | OUTPATIENT
Start: 2019-12-02 | End: 2019-12-04

## 2019-12-02 RX ORDER — AMOXICILLIN 250 MG
1 CAPSULE ORAL 2 TIMES DAILY PRN
Status: DISCONTINUED | OUTPATIENT
Start: 2019-12-02 | End: 2019-12-04 | Stop reason: HOSPADM

## 2019-12-02 RX ORDER — ONDANSETRON 4 MG/1
4 TABLET, ORALLY DISINTEGRATING ORAL EVERY 6 HOURS PRN
Status: DISCONTINUED | OUTPATIENT
Start: 2019-12-02 | End: 2019-12-04 | Stop reason: HOSPADM

## 2019-12-02 RX ORDER — ALBUTEROL SULFATE 90 UG/1
3 AEROSOL, METERED RESPIRATORY (INHALATION) 3 TIMES DAILY PRN
Status: DISCONTINUED | OUTPATIENT
Start: 2019-12-02 | End: 2019-12-03

## 2019-12-02 RX ORDER — ONDANSETRON 2 MG/ML
4 INJECTION INTRAMUSCULAR; INTRAVENOUS EVERY 6 HOURS PRN
Status: DISCONTINUED | OUTPATIENT
Start: 2019-12-02 | End: 2019-12-04 | Stop reason: HOSPADM

## 2019-12-02 RX ORDER — KETOROLAC TROMETHAMINE 30 MG/ML
30 INJECTION, SOLUTION INTRAMUSCULAR; INTRAVENOUS EVERY 6 HOURS PRN
Status: DISCONTINUED | OUTPATIENT
Start: 2019-12-02 | End: 2019-12-04 | Stop reason: HOSPADM

## 2019-12-02 RX ORDER — FENTANYL CITRATE 50 UG/ML
50 INJECTION, SOLUTION INTRAMUSCULAR; INTRAVENOUS ONCE
Status: COMPLETED | OUTPATIENT
Start: 2019-12-02 | End: 2019-12-02

## 2019-12-02 RX ORDER — AMOXICILLIN 250 MG
2 CAPSULE ORAL 2 TIMES DAILY PRN
Status: DISCONTINUED | OUTPATIENT
Start: 2019-12-02 | End: 2019-12-04 | Stop reason: HOSPADM

## 2019-12-02 RX ORDER — LIDOCAINE/PRILOCAINE 2.5 %-2.5%
CREAM (GRAM) TOPICAL ONCE
Status: DISCONTINUED | OUTPATIENT
Start: 2019-12-02 | End: 2019-12-02 | Stop reason: CLARIF

## 2019-12-02 RX ORDER — HYDROCODONE BITARTRATE AND ACETAMINOPHEN 7.5; 325 MG/1; MG/1
1 TABLET ORAL EVERY 6 HOURS PRN
Status: DISCONTINUED | OUTPATIENT
Start: 2019-12-02 | End: 2019-12-04 | Stop reason: HOSPADM

## 2019-12-02 RX ORDER — MONTELUKAST SODIUM 5 MG/1
5-10 TABLET, CHEWABLE ORAL AT BEDTIME
Status: DISCONTINUED | OUTPATIENT
Start: 2019-12-02 | End: 2019-12-04

## 2019-12-02 RX ORDER — LIDOCAINE 40 MG/G
CREAM TOPICAL ONCE
Status: DISCONTINUED | OUTPATIENT
Start: 2019-12-02 | End: 2019-12-03

## 2019-12-02 RX ORDER — METRONIDAZOLE 500 MG/1
500 TABLET ORAL 3 TIMES DAILY
Status: ON HOLD | COMMUNITY
End: 2019-12-04

## 2019-12-02 RX ORDER — HYDROMORPHONE HYDROCHLORIDE 1 MG/ML
0.2 INJECTION, SOLUTION INTRAMUSCULAR; INTRAVENOUS; SUBCUTANEOUS
Status: DISCONTINUED | OUTPATIENT
Start: 2019-12-02 | End: 2019-12-02

## 2019-12-02 RX ORDER — LIDOCAINE 40 MG/G
CREAM TOPICAL
Status: DISCONTINUED | OUTPATIENT
Start: 2019-12-02 | End: 2019-12-04 | Stop reason: HOSPADM

## 2019-12-02 RX ORDER — POLYETHYLENE GLYCOL 3350 17 G/17G
17 POWDER, FOR SOLUTION ORAL DAILY PRN
Status: DISCONTINUED | OUTPATIENT
Start: 2019-12-02 | End: 2019-12-04 | Stop reason: HOSPADM

## 2019-12-02 RX ORDER — NITROGLYCERIN 0.4 MG/1
0.4 TABLET SUBLINGUAL EVERY 5 MIN PRN
Status: DISCONTINUED | OUTPATIENT
Start: 2019-12-02 | End: 2019-12-04 | Stop reason: HOSPADM

## 2019-12-02 RX ORDER — MEROPENEM 1 G/1
1 INJECTION, POWDER, FOR SOLUTION INTRAVENOUS EVERY 12 HOURS
Status: DISCONTINUED | OUTPATIENT
Start: 2019-12-02 | End: 2019-12-03

## 2019-12-02 RX ADMIN — SODIUM CHLORIDE 1000 ML: 9 INJECTION, SOLUTION INTRAVENOUS at 22:27

## 2019-12-02 RX ADMIN — SODIUM CHLORIDE 1000 ML: 9 INJECTION, SOLUTION INTRAVENOUS at 19:05

## 2019-12-02 RX ADMIN — FENTANYL CITRATE 50 MCG: 50 INJECTION, SOLUTION INTRAMUSCULAR; INTRAVENOUS at 18:42

## 2019-12-02 RX ADMIN — MEROPENEM 1 G: 1 INJECTION, POWDER, FOR SOLUTION INTRAVENOUS at 18:31

## 2019-12-02 RX ADMIN — KETOROLAC TROMETHAMINE 30 MG: 30 INJECTION, SOLUTION INTRAMUSCULAR; INTRAVENOUS at 20:46

## 2019-12-02 RX ADMIN — MONTELUKAST SODIUM 5 MG: 5 TABLET, CHEWABLE ORAL at 22:19

## 2019-12-02 RX ADMIN — METOPROLOL 12.5 MG: 25 TABLET ORAL at 22:20

## 2019-12-02 RX ADMIN — HYDROCODONE BITARTRATE AND ACETAMINOPHEN 1 TABLET: 7.5; 325 TABLET ORAL at 22:39

## 2019-12-02 RX ADMIN — HYDROMORPHONE HYDROCHLORIDE 0.2 MG: 1 INJECTION, SOLUTION INTRAMUSCULAR; INTRAVENOUS; SUBCUTANEOUS at 20:03

## 2019-12-02 RX ADMIN — VANCOMYCIN HYDROCHLORIDE 1500 MG: 1 INJECTION, POWDER, LYOPHILIZED, FOR SOLUTION INTRAVENOUS at 19:06

## 2019-12-02 ASSESSMENT — ENCOUNTER SYMPTOMS
HEADACHES: 1
FACIAL SWELLING: 1
ADENOPATHY: 1
PSYCHIATRIC NEGATIVE: 1
APPETITE CHANGE: 1
CARDIOVASCULAR NEGATIVE: 1
FEVER: 1
RESPIRATORY NEGATIVE: 1
WEAKNESS: 1
LIGHT-HEADEDNESS: 0
FATIGUE: 1
DIAPHORESIS: 0
FACIAL ASYMMETRY: 0
GASTROINTESTINAL NEGATIVE: 1
TROUBLE SWALLOWING: 1
CHILLS: 1
MUSCULOSKELETAL NEGATIVE: 1
DIZZINESS: 0

## 2019-12-02 ASSESSMENT — ACTIVITIES OF DAILY LIVING (ADL): ADLS_ACUITY_SCORE: 11

## 2019-12-02 NOTE — PHARMACY-VANCOMYCIN DOSING SERVICE
"Pharmacy Consult- Vancomycin Assessment    Ember Tavares is a 59 year old female admitted on 2019.    Vancomycin has been ordered per MD, for the indication of: Skin and soft tissue infection/dental abscess    Current Antibiotic Regimen Includes: Vancomycin, Meropenem    Patient Active Problem List   Diagnosis     Chronic abdominal pain     Alpha-1-antitrypsin deficiency carrier     Arthritis of knee, right     Atherosclerosis of abdominal aorta (H)     Chronic radicular cervical pain     Constipation, chronic     Contact dermatitis     COPD, severe (H)     Decreased diffusion capacity of lung     Dyshidrotic hand dermatitis     GERD (gastroesophageal reflux disease)     Hiatal hernia     Status post balloon dilatation of esophageal stricture     History of tobacco abuse     Irritable bowel syndrome     Myalgia     Occipital neuralgia of left side     Osteopenia     Prediabetes     Psoriasis     Psoriatic arthritis (H)     Schatzki's ring of distal esophagus     Sinusitis, chronic     Vitamin D deficiency     S/P Nissen fundoplication (without gastrostomy tube) procedure     Mixed hyperlipidemia     PAD (peripheral artery disease) (H)     Vitamin B12 deficiency     Menopausal syndrome (hot flashes)     Ulcer of right cornea     Chest pain     Seasonal allergic rhinitis due to pollen     Nodule of right lung     Difficulty swallowing solids       Allergies (and reaction): Atorvastatin; Rosuvastatin; Sucralfate; Levofloxacin; Morphine; Penicillins; and Sulfamethoxazole w/trimethoprim    Most recent flowsheet    Most recent flowsheet Height: 157.5 cm (5' 2\")    No intake or output data in the 24 hours ending 19 1734    Tmax = Temp (24hrs), Av.2  F (37.3  C), Min:99.2  F (37.3  C), Max:99.2  F (37.3  C)      Recent Labs   Lab Test 19  1650   WBC 17.6*       Recent Labs   Lab Test 19  1650 19  0010 10/11/19  1453   BUN 10 11 12   CR 0.77 0.68 0.88       estimated creatinine clearance " is 68 mL/min (based on SCr of 0.77 mg/dL).    Cultures Pending: blood x 2    Culture Results: pending    Plan: Due to elevated labs (procal/wbc/LA), will give Vancomycin 1500 mg x 1.  Will adjust if clinically indicated if therapy is continued.    Regimen Start Date: 12/2/19  Recommended Dose: 1500 mg, which provides 24.9 mg/kg/dose  Interval: ONCE  Goal Trough: 10-15 mg/L    Thank You for the consult. Will continue to follow.    Vinita Breaux RPH ....................  12/2/2019   5:34 PM

## 2019-12-02 NOTE — ED NOTES
Unable to gain IV access, pt and  demanding CRNA for IV placement. House Supervisor called for IV start.

## 2019-12-02 NOTE — ED TRIAGE NOTES
Pt presents to the ED ambulatory from home with spouse with c/o dental pain and swelling. Pt had oral reconstructive surgery on 11/14/19 and reports having an abscess.     Was seen in the ED Friday for increased pain. Received dilaudid and toradol at that time. Reports since then pain is improved however now has increased swelling to right side of face/jaw and being unable to open mouth as far. Unable to get in to see dentist until tomorrow.

## 2019-12-02 NOTE — ED PROVIDER NOTES
History     Chief Complaint   Patient presents with     Dental Pain     HPI  Ember Tavares is a 59 year old female who presents for concerns of dental issues. Patient had 11 teeth extracted on 11/14/19. Had dental abcess diagnosed couple days ago . Has been treated with clindamycin, metroniidazole which she started 2 days ago  and pain medication.  Since then she has had increased swelling into the temple  And she is having difficulty opening her jaw. Having fever low 100s on and off . HIghest 102 2 days ago . Patient is getting fluids down. .  Urine is dark but she states normal frequency. Fatigued . NO dizziness  Has not been able to sleep for last 3 days secondary to discomfort Patient states that her pain has improved but having tremendous pressure   Headache. Right side vision slightly blurry   Allergies:  Allergies   Allergen Reactions     Atorvastatin Muscle Pain (Myalgia)     Rosuvastatin Nausea and Vomiting     Sucralfate Nausea and Vomiting     Levofloxacin Nausea and Vomiting     Morphine      Other reaction(s): Chest Pain     Penicillins      Other reaction(s): Respiratory Arrest     Sulfamethoxazole W/Trimethoprim Nausea and Vomiting     Yeast infection       Problem List:    Patient Active Problem List    Diagnosis Date Noted     Difficulty swallowing solids 05/08/2019     Priority: Medium     Nodule of right lung 04/18/2019     Priority: Medium     Seasonal allergic rhinitis due to pollen 04/08/2019     Priority: Medium     Chest pain 03/28/2019     Priority: Medium     Ulcer of right cornea 08/18/2018     Priority: Medium     Vitamin B12 deficiency 04/17/2018     Priority: Medium     Menopausal syndrome (hot flashes) 04/17/2018     Priority: Medium     S/P Nissen fundoplication (without gastrostomy tube) procedure 03/26/2018     Priority: Medium     GERD (gastroesophageal reflux disease) 11/29/2017     Priority: Medium     Chronic radicular cervical pain 11/20/2017     Priority: Medium      Psoriatic arthritis (H) 11/20/2017     Priority: Medium     Occipital neuralgia of left side 09/15/2017     Priority: Medium     Arthritis of knee, right 08/24/2017     Priority: Medium     Atherosclerosis of abdominal aorta (H) 08/24/2017     Priority: Medium     Overview:   [facility] Studies:  2/19/2013 -- CTA ABDOMEN AND PELVIS WITH BILATERAL LOWER EXTREMITY RUNOFF    CLINICAL HISTORY: Iliac and mesenteric ischemia.    TECHNIQUE: 125 mL Omnipaque 300 IV contrast was administered. 3-D  arterial reformations were performed.    FINDINGS: There is a well-defined ovoid density at the medial right  costophrenic angle. This measures 2.3 x 0.9 x 0.6 cm. It demonstrates  central low attenuation similar to the adjacent abdominal fat near the  liver. This could be a tiny Bochdalek hernia. The liver, spleen,  pancreas, adrenal glands, and kidneys are unremarkable. The  gallbladder is surgically absent. No biliary ductal dilation. No  mesenteric or retroperitoneal adenopathy. No ascites or fluid  collection. There is a moderate amount of retained stool seen  throughout most of the colon. No obstruction. No adjacent inflammatory  change. No ascites or fluid collection. Ureters and bladder are  unremarkable.    There is mild partially calcified atherosclerotic plaque within the  abdominal aorta. No aneurysm. There is narrowing of the lumen just  proximal to the bifurcation to 6 mm.   There is moderate narrowing of the proximal celiac artery without associated calcification.   The superior mesenteric artery appears to be widely patent with good opacification of distal branch vessels. The MARIAH also appears to be well opacified.   There are single bilateral renal arteries which are widely patent.    There is mild partially calcified plaque within the common iliac arteries with minimal narrowing.   The external iliac and common femoral arteries are widely patent.   Superficial femoral and popliteal arteries are widely patent  bilaterally.   There is three-vessel runoff with good opacification in the lower leg to the ankle and foot.    IMPRESSION:  1. Mild aortoiliac atherosclerotic disease. No aneurysm. There is narrowing of the distal aorta just proximal to the bifurcation.  2. There is moderate narrowing of the celiac artery which could  be due to noncalcified plaque. No SMA or MARIAH compromise definitely seen.  3. Probable tiny Bochdalek hernia at the right costophrenic angle. A hamartoma would be a differential consideration though is less likely given the central fat density.  4. Possible constipation.  5. No evidence of vascular compromise in the lower extremities.    Examination Interpreted at: [facility], [place], [place]  The Interpreting Physician is MONICA VORA  Exam was completed at [facility]       COPD, severe (H) 08/24/2017     Priority: Medium     Overview:   8/4/2014 -- PULMONARY FUNCTION    SITE:  [facility]  ORDERED BY:  VANNESA LUNA    CLINICAL SUMMARY: 53-year-old female with a history of severe COPD.     TECHNICIAN NOTE: Good effort.     DATA: FVC 1.85 (61%). FEV1 1.04 (45%). FEV1/FVC ratio 56%. DLCO 13.5 to 62%.     Flow volume curve is consistent with airway obstruction.     IMPRESSION:  1. Severe obstructive pulmonary disease.   2. Mild decrease in diffusing capacity.       Decreased diffusion capacity of lung 08/24/2017     Priority: Medium     Dyshidrotic hand dermatitis 08/24/2017     Priority: Medium     Hiatal hernia 08/24/2017     Priority: Medium     Status post balloon dilatation of esophageal stricture 08/24/2017     Priority: Medium     Prediabetes 08/24/2017     Priority: Medium     Psoriasis 08/24/2017     Priority: Medium     Schatzki's ring of distal esophagus 08/24/2017     Priority: Medium     Vitamin D deficiency 08/24/2017     Priority: Medium     Alpha-1-antitrypsin deficiency carrier 08/23/2017     Priority: Medium      Irritable bowel syndrome 08/23/2017     Priority: Medium     Osteopenia 08/23/2017     Priority: Medium     PAD (peripheral artery disease) (H) 10/13/2015     Priority: Medium     Mixed hyperlipidemia 08/01/2012     Priority: Medium     Contact dermatitis 07/01/2010     Priority: Medium     Myalgia 07/01/2010     Priority: Medium     Chronic abdominal pain 03/10/2010     Priority: Medium     Overview:   16 year duration       Constipation, chronic 03/10/2010     Priority: Medium     History of tobacco abuse 03/10/2010     Priority: Medium     Sinusitis, chronic 03/10/2010     Priority: Medium        Past Medical History:    Past Medical History:   Diagnosis Date     Alpha-1-antitrypsin deficiency carrier 8/23/2017     Atherosclerosis of abdominal aorta (H) 8/24/2017     Chronic abdominal pain 3/10/2010     Chronic sinusitis      Closed fracture of skull (H)      COPD, severe (H) 8/24/2017     GERD (gastroesophageal reflux disease) 11/29/2017     Hiatal hernia 8/24/2017     Other and unspecified hyperlipidemia 8/1/2012     PAD (peripheral artery disease) (H) 10/13/2015     Psoriasis 8/24/2017     Psoriatic arthritis (H) 11/20/2017     Schatzki's ring of distal esophagus 8/24/2017     Ulcer of right cornea 8/18/2018     Vitamin B12 deficiency 4/17/2018     Vitamin D deficiency 8/24/2017       Past Surgical History:    Past Surgical History:   Procedure Laterality Date     AS UGI ENDOSCOPY W ESOPHAGEAL DILATION BALLOON <30MM  10/30/2015    ESOPHAGEAL DILATION,Dr. Rainer Allen, [facility]     BIOPSY BREAST Bilateral     1999, 2001, BIOPSY BREAST,benign     CHOLECYSTECTOMY  2004    Laparoscopic     COLONOSCOPY  06/01/2016    x's 3 negative     CT CORONARY ANGIOGRAM  2009    CT CORONARY ANGIOGRAM (IA),negative     ESOPHAGOSCOPY, GASTROSCOPY, DUODENOSCOPY (EGD), COMBINED  04/07/2009    dilated, Dr. Allen     ESOPHAGOSCOPY, GASTROSCOPY, DUODENOSCOPY (EGD), COMBINED N/A 5/30/2019    Pickering's esophagus, 3 year follow  up     HYSTERECTOMY VAGINAL      ovaries remain     LAPAROSCOPIC NISSEN FUNDOPLICATION N/A 3/26/2018    Procedure: LAPAROSCOPIC NISSEN FUNDOPLICATION;  Laparoscopic Nissen Fundoplication;  Surgeon: Jagdish Ruiz MD;  Location: [facility] OR     LAPAROTOMY EXPLORATORY      lysis of adhesions/endometriosis     RECTOCELE REPAIR  2009       Family History:    Family History   Problem Relation Age of Onset     Arthritis Father         Arthritis     Emphysema Father 51        Alpha-1-AT deficiency     Peripheral Vascular Disease Mother         Peripheral vascular disease     Diabetes Mother         Diabetes     Arthritis Mother         Arthritis     Breast Cancer Sister         Premenopausal breast cancer     Eczema Brother         Eczema     Narcolepsy Brother      Other - See Comments Daughter         Narcolepsy     Narcolepsy Daughter      Brain Tumor Daughter      Seizure Disorder Daughter      Depression Daughter      Graves' disease Daughter        Social History:  Marital Status:   [2]  Social History     Tobacco Use     Smoking status: Former Smoker     Packs/day: 0.50     Years: 32.00     Pack years: 16.00     Types: Cigarettes     Last attempt to quit: 2012     Years since quittin.2     Smokeless tobacco: Never Used     Tobacco comment: Quit smoking: quit date of 2012   Substance Use Topics     Alcohol use: Yes     Alcohol/week: 2.0 standard drinks     Comment: Alcoholic Drinks/day: 1-2 drinks every 1-2 weeks     Drug use: No        Medications:    clindamycin (CLEOCIN) 150 MG capsule  HYDROcodone-acetaminophen (NORCO) 5-325 MG tablet  acetaminophen-codeine (TYLENOL #3) 300-30 MG tablet  albuterol (PROAIR HFA/PROVENTIL HFA/VENTOLIN HFA) 108 (90 Base) MCG/ACT inhaler  albuterol (PROAIR HFA/PROVENTIL HFA/VENTOLIN HFA) 108 (90 Base) MCG/ACT inhaler  albuterol (PROVENTIL) (2.5 MG/3ML) 0.083% neb solution  Cholecalciferol (D 5000) 5000 UNITS TABS  fluticasone-salmeterol (ADVAIR DISKUS)  "500-50 MCG/DOSE inhaler  folic acid (FOLVITE) 1 MG tablet  Lactobacillus-Inulin (CULTURELLE DIGESTIVE HEALTH) CAPS  metoprolol tartrate (LOPRESSOR) 25 MG tablet  montelukast (SINGULAIR) 10 MG tablet  nitroGLYcerin (NITROSTAT) 0.4 MG sublingual tablet  tiotropium (SPIRIVA RESPIMAT) 2.5 MCG/ACT inhaler  venlafaxine (EFFEXOR-XR) 37.5 MG 24 hr capsule  vitamin B-12 (CYANOCOBALAMIN) 2500 MCG sublingual tablet          Review of Systems   Constitutional: Positive for appetite change, chills, fatigue and fever. Negative for diaphoresis.   HENT: Positive for dental problem, ear pain, facial swelling, tinnitus and trouble swallowing. Negative for postnasal drip.    Respiratory: Negative.    Cardiovascular: Negative.    Gastrointestinal: Negative.    Genitourinary: Positive for decreased urine volume.   Musculoskeletal: Negative.    Neurological: Positive for weakness and headaches. Negative for dizziness, facial asymmetry and light-headedness.   Hematological: Positive for adenopathy.   Psychiatric/Behavioral: Negative.        Physical Exam   BP: 128/64  Heart Rate: 90  Temp: 99.2  F (37.3  C)  Resp: 16  Height: 157.5 cm (5' 2\")  SpO2: 95 %      Physical Exam  Vitals signs reviewed.   Constitutional:       Appearance: She is ill-appearing.   HENT:      Head: Normocephalic and atraumatic.      Right Ear: Tympanic membrane normal.      Left Ear: Tympanic membrane normal.      Nose: Nose normal.      Mouth/Throat:      Comments: Right lower jaw with edematous gingiva with pinpoint fluctuant  area of purulent drainage   Eyes:      Pupils: Pupils are equal, round, and reactive to light.   Neck:      Comments: Tender right submandibular , cervical adenopathy   Cardiovascular:      Rate and Rhythm: Normal rate and regular rhythm.      Pulses: Normal pulses.      Heart sounds: Normal heart sounds.   Pulmonary:      Effort: Pulmonary effort is normal.      Breath sounds: Normal breath sounds.   Lymphadenopathy:      Cervical: " Cervical adenopathy present.   Skin:     General: Skin is warm and dry.      Capillary Refill: Capillary refill takes less than 2 seconds.   Neurological:      Mental Status: She is alert and oriented to person, place, and time.         ED Course        Procedures    Patient presents to ER for follow up of dental abcess. Patient last evaluated in ER on 11/30. Has been treated with clindamycin and metronidazole for dental abcess status post dental extraction on 11/14 but has had increased facial swelling and now unable to open her mouth. Patient triaged to exam room. Vital signs significant for low grade temp of 99.2 . Medical records reviewed including most recent CT scan . History and exam completed. Patient with significant trismus so exam difficult but significant adenopathy and fluctuant area of lower right gingiva. Labs significant for persistent elevation of white count, procalcition, CRP . Blood cultures ordered and pending  . Discussed with patient recommendation for hospital admission for IV antibiotics and probable drainage once she is able to open her mouth. Discussed patient with DR Redmond who is agreeable to admission . Will start broad spectrum antibiotics with vancomycin, meropenem, IVF. Anesthesia consulted for line placement as patient difficult stick.   Results for orders placed or performed during the hospital encounter of 12/02/19   CBC with platelets differential     Status: Abnormal   Result Value Ref Range    WBC 17.6 (H) 4.0 - 11.0 10e9/L    RBC Count 3.82 3.8 - 5.2 10e12/L    Hemoglobin 11.5 (L) 11.7 - 15.7 g/dL    Hematocrit 35.1 35.0 - 47.0 %    MCV 92 78 - 100 fl    MCH 30.1 26.5 - 33.0 pg    MCHC 32.8 31.5 - 36.5 g/dL    RDW 13.2 10.0 - 15.0 %    Platelet Count 706 (H) 150 - 450 10e9/L    Diff Method Automated Method     % Neutrophils 77.7 %    % Lymphocytes 11.0 %    % Monocytes 9.5 %    % Eosinophils 0.7 %    % Basophils 0.3 %    % Immature Granulocytes 0.8 %    Absolute Neutrophil  13.7 (H) 1.6 - 8.3 10e9/L    Absolute Lymphocytes 1.9 0.8 - 5.3 10e9/L    Absolute Monocytes 1.7 (H) 0.0 - 1.3 10e9/L    Absolute Eosinophils 0.1 0.0 - 0.7 10e9/L    Absolute Basophils 0.1 0.0 - 0.2 10e9/L    Abs Immature Granulocytes 0.1 0 - 0.4 10e9/L   CRP inflammation     Status: Abnormal   Result Value Ref Range    CRP Inflammation 27.3 (H) <0.5 mg/L   Erythrocyte sedimentation rate auto     Status: Abnormal   Result Value Ref Range    Sed Rate 92 (H) 1 - 15 mm/h   Lactic acid     Status: None   Result Value Ref Range    Lactic Acid 1.3 0.5 - 2.2 mmol/L   Basic metabolic panel     Status: None   Result Value Ref Range    Sodium 138 134 - 144 mmol/L    Potassium 3.6 3.5 - 5.1 mmol/L    Chloride 98 98 - 107 mmol/L    Carbon Dioxide 31 21 - 31 mmol/L    Anion Gap 9 3 - 14 mmol/L    Glucose 94 70 - 105 mg/dL    Urea Nitrogen 10 7 - 25 mg/dL    Creatinine 0.77 0.60 - 1.20 mg/dL    GFR Estimate 77 >60 mL/min/[1.73_m2]    GFR Estimate If Black >90 >60 mL/min/[1.73_m2]    Calcium 9.1 8.6 - 10.3 mg/dL   Procalcitonin     Status: None   Result Value Ref Range    Procalcitonin 4.25 ng/ml             No results found for this or any previous visit (from the past 24 hour(s)).    Medications - No data to display    Assessments & Plan (with Medical Decision Making)     I have reviewed the nursing notes.    I have reviewed the findings, diagnosis, plan and need for follow up with the patient.      New Prescriptions    No medications on file       Final diagnoses:   Dental abscess       12/2/2019   [facility]  12/02/19 [id number]

## 2019-12-02 NOTE — TELEPHONE ENCOUNTER
Reason for call: Patient wanting a work in appointment.    Patient is having the following symptoms:  Abscess tooth after oral surgery for pt on 2 antibiotics already from [facility] and ED still very swollen and infected, did not want to go back to either of those places.     The patient is requesting an appointment with  [facility]    Was an appointment offered for this call? Yes    If Yes, what is the date of the appointment?  Checked with all providers in clinic, offered one on 12/04/19     Preferred method for responding to this message: Telephone Call    Phone number patient can be reached at? Home number on file [phone] (home)    If we can't reach you directly, may we leave a detailed response at the number you provided? Yes    Can this message wait until your PCP/provider returns if unavailable today? Patient was notified provider was out for the afternoon and may not receive a call back till Tuesday 12/03/2019 morning.     Carey Aguilera on 12/2/2019 at 1:12 PM

## 2019-12-03 LAB
ANION GAP SERPL CALCULATED.3IONS-SCNC: 7 MMOL/L (ref 3–14)
BUN SERPL-MCNC: 8 MG/DL (ref 7–25)
CALCIUM SERPL-MCNC: 7.7 MG/DL (ref 8.6–10.3)
CHLORIDE SERPL-SCNC: 103 MMOL/L (ref 98–107)
CO2 SERPL-SCNC: 26 MMOL/L (ref 21–31)
CREAT SERPL-MCNC: 0.69 MG/DL (ref 0.6–1.2)
CRP SERPL-MCNC: 19 MG/L
ERYTHROCYTE [DISTWIDTH] IN BLOOD BY AUTOMATED COUNT: 13.2 % (ref 10–15)
GFR SERPL CREATININE-BSD FRML MDRD: 87 ML/MIN/{1.73_M2}
GLUCOSE SERPL-MCNC: 95 MG/DL (ref 70–105)
HCT VFR BLD AUTO: 30.7 % (ref 35–47)
HGB BLD-MCNC: 9.7 G/DL (ref 11.7–15.7)
MCH RBC QN AUTO: 29.1 PG (ref 26.5–33)
MCHC RBC AUTO-ENTMCNC: 31.6 G/DL (ref 31.5–36.5)
MCV RBC AUTO: 92 FL (ref 78–100)
PLATELET # BLD AUTO: 635 10E9/L (ref 150–450)
POTASSIUM SERPL-SCNC: 3.3 MMOL/L (ref 3.5–5.1)
POTASSIUM SERPL-SCNC: 4 MMOL/L (ref 3.5–5.1)
RBC # BLD AUTO: 3.33 10E12/L (ref 3.8–5.2)
SODIUM SERPL-SCNC: 136 MMOL/L (ref 134–144)
WBC # BLD AUTO: 15 10E9/L (ref 4–11)

## 2019-12-03 PROCEDURE — 12000000 ZZH R&B MED SURG/OB

## 2019-12-03 PROCEDURE — 90686 IIV4 VACC NO PRSV 0.5 ML IM: CPT | Performed by: FAMILY MEDICINE

## 2019-12-03 PROCEDURE — 25000128 H RX IP 250 OP 636: Performed by: FAMILY MEDICINE

## 2019-12-03 PROCEDURE — 85027 COMPLETE CBC AUTOMATED: CPT | Performed by: FAMILY MEDICINE

## 2019-12-03 PROCEDURE — 84132 ASSAY OF SERUM POTASSIUM: CPT | Performed by: FAMILY MEDICINE

## 2019-12-03 PROCEDURE — 25000125 ZZHC RX 250: Performed by: FAMILY MEDICINE

## 2019-12-03 PROCEDURE — 80048 BASIC METABOLIC PNL TOTAL CA: CPT | Performed by: FAMILY MEDICINE

## 2019-12-03 PROCEDURE — 40000275 ZZH STATISTIC RCP TIME EA 10 MIN

## 2019-12-03 PROCEDURE — 94640 AIRWAY INHALATION TREATMENT: CPT

## 2019-12-03 PROCEDURE — 25800030 ZZH RX IP 258 OP 636: Performed by: FAMILY MEDICINE

## 2019-12-03 PROCEDURE — 99232 SBSQ HOSP IP/OBS MODERATE 35: CPT | Performed by: FAMILY MEDICINE

## 2019-12-03 PROCEDURE — C9113 INJ PANTOPRAZOLE SODIUM, VIA: HCPCS | Performed by: FAMILY MEDICINE

## 2019-12-03 PROCEDURE — 94640 AIRWAY INHALATION TREATMENT: CPT | Mod: 76

## 2019-12-03 PROCEDURE — 86140 C-REACTIVE PROTEIN: CPT | Performed by: FAMILY MEDICINE

## 2019-12-03 PROCEDURE — 36415 COLL VENOUS BLD VENIPUNCTURE: CPT | Performed by: FAMILY MEDICINE

## 2019-12-03 PROCEDURE — 25000132 ZZH RX MED GY IP 250 OP 250 PS 637: Performed by: FAMILY MEDICINE

## 2019-12-03 RX ORDER — ACETAMINOPHEN 500 MG
500 TABLET ORAL EVERY 4 HOURS PRN
COMMUNITY
End: 2020-10-22

## 2019-12-03 RX ORDER — IBUPROFEN 800 MG/1
800 TABLET, FILM COATED ORAL EVERY 8 HOURS
COMMUNITY
End: 2020-10-22

## 2019-12-03 RX ORDER — POTASSIUM CHLORIDE 1500 MG/1
20-40 TABLET, EXTENDED RELEASE ORAL
Status: DISCONTINUED | OUTPATIENT
Start: 2019-12-03 | End: 2019-12-04 | Stop reason: HOSPADM

## 2019-12-03 RX ORDER — POTASSIUM CHLORIDE 7.45 MG/ML
10 INJECTION INTRAVENOUS
Status: DISCONTINUED | OUTPATIENT
Start: 2019-12-03 | End: 2019-12-04 | Stop reason: HOSPADM

## 2019-12-03 RX ORDER — POTASSIUM CHLORIDE 1.5 G/1.58G
20-40 POWDER, FOR SOLUTION ORAL
Status: DISCONTINUED | OUTPATIENT
Start: 2019-12-03 | End: 2019-12-04 | Stop reason: HOSPADM

## 2019-12-03 RX ORDER — MEROPENEM 1 G/1
1 INJECTION, POWDER, FOR SOLUTION INTRAVENOUS EVERY 8 HOURS
Status: DISCONTINUED | OUTPATIENT
Start: 2019-12-03 | End: 2019-12-04 | Stop reason: HOSPADM

## 2019-12-03 RX ORDER — METOPROLOL TARTRATE 25 MG/1
12.5 TABLET, FILM COATED ORAL EVERY EVENING
COMMUNITY
End: 2020-10-22

## 2019-12-03 RX ORDER — MONTELUKAST SODIUM 10 MG/1
5 TABLET ORAL AT BEDTIME
COMMUNITY
End: 2020-10-22

## 2019-12-03 RX ADMIN — FOLIC ACID 1 MG: 1 TABLET ORAL at 10:39

## 2019-12-03 RX ADMIN — MEROPENEM 1 G: 1 INJECTION, POWDER, FOR SOLUTION INTRAVENOUS at 15:24

## 2019-12-03 RX ADMIN — FLUTICASONE PROPIONATE AND SALMETEROL 1 PUFF: 50; 500 POWDER RESPIRATORY (INHALATION) at 09:36

## 2019-12-03 RX ADMIN — KETOROLAC TROMETHAMINE 30 MG: 30 INJECTION, SOLUTION INTRAMUSCULAR; INTRAVENOUS at 22:01

## 2019-12-03 RX ADMIN — KETOROLAC TROMETHAMINE 30 MG: 30 INJECTION, SOLUTION INTRAMUSCULAR; INTRAVENOUS at 15:32

## 2019-12-03 RX ADMIN — PANTOPRAZOLE SODIUM 40 MG: 40 INJECTION, POWDER, LYOPHILIZED, FOR SOLUTION INTRAVENOUS at 08:54

## 2019-12-03 RX ADMIN — METOPROLOL 12.5 MG: 25 TABLET ORAL at 10:39

## 2019-12-03 RX ADMIN — VANCOMYCIN HYDROCHLORIDE 1250 MG: 1 INJECTION, POWDER, LYOPHILIZED, FOR SOLUTION INTRAVENOUS at 19:47

## 2019-12-03 RX ADMIN — POTASSIUM CHLORIDE 20 MEQ: 1.5 POWDER, FOR SOLUTION ORAL at 15:23

## 2019-12-03 RX ADMIN — METOPROLOL 12.5 MG: 25 TABLET ORAL at 21:14

## 2019-12-03 RX ADMIN — MEROPENEM 1 G: 1 INJECTION, POWDER, FOR SOLUTION INTRAVENOUS at 21:19

## 2019-12-03 RX ADMIN — TIOTROPIUM BROMIDE 18 MCG: 18 CAPSULE ORAL; RESPIRATORY (INHALATION) at 09:36

## 2019-12-03 RX ADMIN — MEROPENEM 1 G: 1 INJECTION, POWDER, FOR SOLUTION INTRAVENOUS at 06:19

## 2019-12-03 RX ADMIN — KETOROLAC TROMETHAMINE 30 MG: 30 INJECTION, SOLUTION INTRAMUSCULAR; INTRAVENOUS at 04:20

## 2019-12-03 RX ADMIN — SODIUM CHLORIDE 1000 ML: 9 INJECTION, SOLUTION INTRAVENOUS at 00:09

## 2019-12-03 RX ADMIN — LIDOCAINE: 40 CREAM TOPICAL at 22:12

## 2019-12-03 RX ADMIN — HYDROMORPHONE HYDROCHLORIDE 0.5 MG: 1 INJECTION, SOLUTION INTRAMUSCULAR; INTRAVENOUS; SUBCUTANEOUS at 02:20

## 2019-12-03 RX ADMIN — SODIUM CHLORIDE 1000 ML: 9 INJECTION, SOLUTION INTRAVENOUS at 19:10

## 2019-12-03 RX ADMIN — HYDROMORPHONE HYDROCHLORIDE 0.5 MG: 1 INJECTION, SOLUTION INTRAMUSCULAR; INTRAVENOUS; SUBCUTANEOUS at 21:15

## 2019-12-03 RX ADMIN — MONTELUKAST SODIUM 5 MG: 5 TABLET, CHEWABLE ORAL at 21:14

## 2019-12-03 RX ADMIN — FLUTICASONE PROPIONATE AND SALMETEROL 1 PUFF: 50; 500 POWDER RESPIRATORY (INHALATION) at 19:19

## 2019-12-03 RX ADMIN — POTASSIUM CHLORIDE 40 MEQ: 1.5 POWDER, FOR SOLUTION ORAL at 10:38

## 2019-12-03 RX ADMIN — VENLAFAXINE HYDROCHLORIDE 37.5 MG: 37.5 CAPSULE, EXTENDED RELEASE ORAL at 10:43

## 2019-12-03 RX ADMIN — INFLUENZA A VIRUS A/BRISBANE/02/2018 IVR-190 (H1N1) ANTIGEN (FORMALDEHYDE INACTIVATED), INFLUENZA A VIRUS A/KANSAS/14/2017 X-327 (H3N2) ANTIGEN (FORMALDEHYDE INACTIVATED), INFLUENZA B VIRUS B/PHUKET/3073/2013 ANTIGEN (FORMALDEHYDE INACTIVATED), AND INFLUENZA B VIRUS B/MARYLAND/15/2016 BX-69A ANTIGEN (FORMALDEHYDE INACTIVATED) 0.5 ML: 15; 15; 15; 15 INJECTION, SUSPENSION INTRAMUSCULAR at 10:36

## 2019-12-03 RX ADMIN — HYDROCODONE BITARTRATE AND ACETAMINOPHEN 1 TABLET: 7.5; 325 TABLET ORAL at 10:38

## 2019-12-03 RX ADMIN — VANCOMYCIN HYDROCHLORIDE 1250 MG: 1 INJECTION, POWDER, LYOPHILIZED, FOR SOLUTION INTRAVENOUS at 08:54

## 2019-12-03 ASSESSMENT — ACTIVITIES OF DAILY LIVING (ADL)
ADLS_ACUITY_SCORE: 11

## 2019-12-03 ASSESSMENT — MIFFLIN-ST. JEOR: SCORE: 1135.63

## 2019-12-03 NOTE — PLAN OF CARE
"Pt is A&O x 4, pain rating 9/10 on right side of face. Given dilaudid, narco, and Toradol staggered administration  Swelling on right side of face significant, applying ice. PCD on.     BP 98/47   Pulse 85   Temp 99.8  F (37.7  C) (Tympanic)   Resp 16   Ht 1.575 m (5' 2\")   SpO2 94%   BMI 24.29 kg/m      "

## 2019-12-03 NOTE — PLAN OF CARE
"Pt having pain and swelling in right side of face, well managed with PRN medications. Becomes slightly dizzy with changing position, resolves quickly. LS clear, BS active. Afebrile.     /61   Pulse 93   Temp 98.3  F (36.8  C) (Tympanic)   Resp 16   Ht 1.575 m (5' 2\")   Wt 60.2 kg (132 lb 12.8 oz)   SpO2 91%   BMI 24.29 kg/m      "

## 2019-12-03 NOTE — PROGRESS NOTES
" NS ADMISSION NOTE    Patient admitted to room 331 at approximately 2000 via __ from emergency room. Patient was accompanied by spouse.     Verbal SBAR report received from ER nurse prior to patient arrival.     Patient ambulated to bed SBA. Patient alert and oriented X 3. Pain is not well controlled.  Medication(s) being used: narcotic analgesics including Dilaudid . 0-10 Pain Scale: 9. Admission vital signs: Blood pressure 118/53, pulse 121, temperature 98.6  F (37  C), temperature source Tympanic, resp. rate 16, height 1.575 m (5' 2\"), SpO2 95 %, not currently breastfeeding. Patient and spouse were oriented to plan of care, call light, bed controls, tv, telephone, bathroom and visiting hours.     Risk Assessment    The following safety risks were identified during admission: fall. Yellow risk band applied: YES.     Skin Initial Assessment    This writer admitted this patient and completed a full skin assessment and Omar score in the Adult PCS flowsheet. Appropriate interventions initiated as needed.     Secondary skin check completed by micky.    Omar Risk Assessment  Sensory Perception: 4-->no impairment  Moisture: 4-->rarely moist  Activity: 4-->walks frequently  Mobility: 4-->no limitation  Nutrition: 3-->adequate  Friction and Shear: 3-->no apparent problem  Omar Score: 22  Bed Support Surface: Fina IsoAir (Range)          Maurisio Chi RN    "

## 2019-12-03 NOTE — PLAN OF CARE
"Pt has right sided facial swelling and tenderness. No redness noted. Pt is able to talk and has had about 25%-50% of her meals. Pt has experienced 4/10 facial pain, PRN pain meds have been helping to decrease pain. /66   Pulse 93   Temp 99.1  F (37.3  C) (Tympanic)   Resp 16   Ht 1.575 m (5' 2\")   Wt 60.2 kg (132 lb 12.8 oz)   SpO2 95%   BMI 24.29 kg/m     "

## 2019-12-03 NOTE — PROGRESS NOTES
Incentive Spirometry education completed.  Pt goal 1950 mls.  Pt achieved 1400 mls.  Pt instructed to perform 10/hr while awake with at least one deep breath and cough per hour until able to perform baseline activity.  RT will follow and re-assess as need.      Gaby Fall, RT on 12/3/2019 at 12:57 PM

## 2019-12-03 NOTE — PHARMACY-VANCOMYCIN DOSING SERVICE
"Pharmacy Consult- Vancomycin Assessment    Ember Tavares is a 59 year old female admitted on 2019.    Vancomycin has been ordered per MD, for the indication of: abscess, facial cellulits    Current Antibiotic Regimen Includes: meropenem and vancomycin    Patient Active Problem List   Diagnosis     Chronic abdominal pain     Alpha-1-antitrypsin deficiency carrier     Arthritis of knee, right     Atherosclerosis of abdominal aorta (H)     Chronic radicular cervical pain     Constipation, chronic     Contact dermatitis     COPD, severe (H)     Decreased diffusion capacity of lung     Dyshidrotic hand dermatitis     GERD (gastroesophageal reflux disease)     Hiatal hernia     Status post balloon dilatation of esophageal stricture     History of tobacco abuse     Irritable bowel syndrome     Myalgia     Occipital neuralgia of left side     Osteopenia     Prediabetes     Psoriasis     Psoriatic arthritis (H)     Schatzki's ring of distal esophagus     Sinusitis, chronic     Vitamin D deficiency     S/P Nissen fundoplication (without gastrostomy tube) procedure     Mixed hyperlipidemia     PAD (peripheral artery disease) (H)     Vitamin B12 deficiency     Menopausal syndrome (hot flashes)     Ulcer of right cornea     Chest pain     Seasonal allergic rhinitis due to pollen     Nodule of right lung     Difficulty swallowing solids     Dental abscess       Allergies (and reaction): Atorvastatin; Rosuvastatin; Sucralfate; Levofloxacin; Morphine; Penicillins; and Sulfamethoxazole w/trimethoprim    Most recent flowsheet Weight: 60.2 kg (132 lb 12.8 oz)  Most recent flowsheet Height: 157.5 cm (5' 2\")      Intake/Output Summary (Last 24 hours) at 12/3/2019 0745  Last data filed at 12/3/2019 0428  Gross per 24 hour   Intake 1335 ml   Output 50 ml   Net 1285 ml       Tmax = Temp (24hrs), Av  F (37.2  C), Min:98.3  F (36.8  C), Max:99.8  F (37.7  C)      Recent Labs   Lab Test 19  0538   WBC 15.0*       Recent Labs "   Lab Test 12/03/19  0538 12/02/19  1650 11/29/19  0010   BUN 8 10 11   CR 0.69 0.77 0.68       estimated creatinine clearance is 75.9 mL/min (based on SCr of 0.69 mg/dL).    Cultures Pending: blood    Culture Results: pending    Plan: Increase vancomycin to 1250 mg (~ 20 mg/kg/dose) IVPB every 12 hours x3 for remainder of 48 hours.    Goal Trough: 15 to 20 mg/L    Thank You for the consult. Will continue to follow.    [facility] ....................  12/3/2019   7:45 AM

## 2019-12-03 NOTE — H&P
[facility]    History and Physical - Hospitalist Service       Date of Admission:  12/2/2019    Assessment & Plan   Ember Tavares is a 59 year old female admitted on 12/2/2019. She presented to the emergency department with worsening right upper jaw pain and purulent drainage into her mouth.    Patient underwent multiple tooth extraction on 11/14/2019 in [place].  1 week later she started to have swelling and pain in the area requiring her to move her temporary dentures.  She presented to the [facility] clinic on 1127 and was started on clindamycin.  On 11/29 presented to the emergency department due to worsening pain.  CT scan revealed dental abscess.  She was continued on clindamycin with plan to follow-up with her oral surgeon as outpatient.    She now has worsening pain, fever and intraorally the abscess appears bigger than previously described, but is intermittently draining.  Her infectious markers have also worsened.  She has difficulty opening her mouth but does not have any evidence of facial cellulitis or extension of the abscess.  Due to penicillin allergy she was started on meropenem and vancomycin in the emergency department.  We will continue antibiotics and try to involve dental specialists for definitive management of the infection.      Primary problem    Dental abscess    Assessment: Interval worsening of dental abscess despite oral clindamycin.  Currently reports intermittent drainage.    Plan: Continue meropenem and vancomycin.  Recheck infectious markers in a.m.  Urgency for drainage diminished by the fact that it is draining on its own intermittently.  I did contact dental specialists and will see if they can assist us with definitive drainage tomorrow.    Active Problems:    Alpha-1-antitrypsin deficiency carrier    Assessment: Currently denies any exacerbation of her severe lung disease.    Plan: Continue chronic breathing treatments.    COPD, severe (H)    Assessment: No  hypoxia or expiratory wheeze.    Plan: Continue Spiriva, Advair and albuterol as needed.    GERD (gastroesophageal reflux disease)    Assessment: Denies any breakthrough acid reflux    Plan: Continue PPI    History of tobacco abuse    Assessment: Patient has quit smoking.    Plan: Congratulated her on cessation.    Psoriatic arthritis (H)    Assessment: Chronic.    Plan: Suggest ambulation    Schatzki's ring of distal esophagus    Assessment: Denies any issues swallowing.  Status post dilation    Plan: Monitor.    PAD (peripheral artery disease) (H)    Assessment: Patient currently denies any symptoms of vascular disease    Plan: Monitor.         Diet: Mechanical/Dental Soft Diet    DVT Prophylaxis: Pneumatic Compression Devices  Peters Catheter: not present  Code Status: Full Code      Disposition Plan   Expected discharge: 1-2d, recommended to prior living arrangement once adequate pain management/ tolerating PO medications, antibiotic plan established and SIRS/Sepsis treated.  Entered: Rigoberto Redmond MD 12/02/2019, 8:47 PM     The patient's care was discussed with the Patient and her     Rigoberto Redmond MD  [facility]    ______________________________________________________________________    Chief Complaint   Mouth/jaw pain and fever    History is obtained from the patient    History of Present Illness   Ember Tavares is a 59 year old female who presented to the emergency department due to increasing right upper jaw pain at site of recent tooth extraction.  Patient reports on 11/14/2019 she had 11 teeth removed down in [place].  This is in preparation for permanent dentures.  About a week later she developed swelling and some brown drainage from the surgical site.  The pain steadily increased to the extent that she was not able to wear her temporary dentures.  She [facility] and was diagnosed with tooth infection and started on clindamycin 450 mg 3 times daily.   Despite this patient continued to have increasing pain and developed fever on 11/29 and presented to the emergency department.  There she was found on CT scan to have dental abscess.  She was continued on the same oral antibiotic.    Since then patient reports ongoing pain.  She is now limited in how much she can open her jaw and her appetite has been decreased.  She has noticed over the past 24 hours she will intermittently have purulent drainage in her mouth.  This seems to alleviate the pressure but also makes her nauseous.  She has continued to have fevers.  She denies any issues swallowing presently.    She also has severe chronic lung disease but has not had any issues with shortness of breath, cough or wheeze.    She denies any chest pain.  She also has a history of severe gastritis but has not had any epigastric pain or any black or bloody stools.    Review of Systems    CONSTITUTIONAL: See HPI  INTEGUMENTARY/SKIN: NEGATIVE for worrisome rashes, moles or lesions  EYES: NEGATIVE for vision changes or irritation  ENT/MOUTH: See HPI  RESP: NEGATIVE for significant cough or SOB  CV: NEGATIVE for chest pain, palpitations or peripheral edema  GI: NEGATIVE for nausea, abdominal pain, heartburn, or change in bowel habits  : NEGATIVE for frequency, dysuria, or hematuria  MUSCULOSKELETAL: NEGATIVE for significant arthralgias or myalgia  NEURO: NEGATIVE for weakness, dizziness or paresthesias  ENDOCRINE: NEGATIVE for temperature intolerance, skin/hair changes  HEME: NEGATIVE for bleeding problems  PSYCHIATRIC: NEGATIVE for changes in mood or affect    Past Medical History    I have reviewed this patient's medical history and updated it with pertinent information if needed.   Past Medical History:   Diagnosis Date     Alpha-1-antitrypsin deficiency carrier 8/23/2017     Atherosclerosis of abdominal aorta (H) 8/24/2017    Overview:  [facility] [employer] Studies: 2/19/2013 -- CTA ABDOMEN AND PELVIS WITH BILATERAL LOWER  EXTREMITY RUNOFF  CLINICAL HISTORY: Iliac and mesenteric ischemia.  TECHNIQUE: 125 mL Omnipaque 300 IV contrast was administered. 3-D arterial reformations were performed.  FINDINGS: There is a well-defined ovoid density at the medial right costophrenic angle. This measures 2.3 x 0.9 x 0.6 cm. It demonstra     Chronic abdominal pain 3/10/2010    Overview:  16 year duration     Chronic sinusitis     No Comments Provided     Closed fracture of skull (H)     1972     COPD, severe (H) 8/24/2017    Overview:  8/4/2014 -- PULMONARY FUNCTION  SITE:  [facility] ORDERED BY:  VANNESA LUNA  CLINICAL SUMMARY: 53-year-old female with a history of severe COPD.   TECHNICIAN NOTE: Good effort.   DATA: FVC 1.85 (61%). FEV1 1.04 (45%). FEV1/FVC ratio 56%. DLCO 13.5 to 62%.   Flow volume curve is consistent with airway obstruction.   IMPRESSION: 1. Severe obstructive pulmon     GERD (gastroesophageal reflux disease) 11/29/2017     Hiatal hernia 8/24/2017     Other and unspecified hyperlipidemia 8/1/2012     PAD (peripheral artery disease) (H) 10/13/2015     Psoriasis 8/24/2017     Psoriatic arthritis (H) 11/20/2017     Schatzki's ring of distal esophagus 8/24/2017     Ulcer of right cornea 8/18/2018     Vitamin B12 deficiency 4/17/2018     Vitamin D deficiency 8/24/2017       Past Surgical History   I have reviewed this patient's surgical history and updated it with pertinent information if needed.  Past Surgical History:   Procedure Laterality Date     AS UGI ENDOSCOPY W ESOPHAGEAL DILATION BALLOON <30MM  10/30/2015    ESOPHAGEAL DILATION,Dr. Rainer Allen, [facility]     BIOPSY BREAST Bilateral     1999, 2001, BIOPSY BREAST,benign     CHOLECYSTECTOMY  2004    Laparoscopic     COLONOSCOPY  06/01/2016    x's 3 negative     CT CORONARY ANGIOGRAM  2009    CT CORONARY ANGIOGRAM (IA),negative     ESOPHAGOSCOPY, GASTROSCOPY, DUODENOSCOPY (EGD), COMBINED  04/07/2009    dilated, Dr. Allen      ESOPHAGOSCOPY, GASTROSCOPY, DUODENOSCOPY (EGD), COMBINED N/A 2019    Pickering's esophagus, 3 year follow up     HYSTERECTOMY VAGINAL  1994    ovaries remain     LAPAROSCOPIC NISSEN FUNDOPLICATION N/A 3/26/2018    Procedure: LAPAROSCOPIC NISSEN FUNDOPLICATION;  Laparoscopic Nissen Fundoplication;  Surgeon: Jagdish Ruiz MD;  Location: [facility] OR     LAPAROTOMY EXPLORATORY  1990    lysis of adhesions/endometriosis     RECTOCELE REPAIR  2009       Social History   I have reviewed this patient's social history and updated it with pertinent information if needed.  Social History     Tobacco Use     Smoking status: Former Smoker     Packs/day: 0.50     Years: 32.00     Pack years: 16.00     Types: Cigarettes     Last attempt to quit: 2012     Years since quittin.2     Smokeless tobacco: Never Used     Tobacco comment: Quit smoking: quit date of 2012   Substance Use Topics     Alcohol use: Yes     Alcohol/week: 2.0 standard drinks     Comment: Alcoholic Drinks/day: 1-2 drinks every 1-2 weeks     Drug use: No       Family History   I have reviewed this patient's family history and updated it with pertinent information if needed.   Family History   Problem Relation Age of Onset     Arthritis Father         Arthritis     Emphysema Father 51        Alpha-1-AT deficiency     Peripheral Vascular Disease Mother         Peripheral vascular disease     Diabetes Mother         Diabetes     Arthritis Mother         Arthritis     Breast Cancer Sister         Premenopausal breast cancer     Eczema Brother         Eczema     Narcolepsy Brother      Other - See Comments Daughter         Narcolepsy     Narcolepsy Daughter      Brain Tumor Daughter      Seizure Disorder Daughter      Depression Daughter      Graves' disease Daughter        Prior to Admission Medications   Prior to Admission Medications   Prescriptions Last Dose Informant Patient Reported? Taking?   Cholecalciferol (D 5000) 5000 UNITS TABS   Yes No    Sig: Take 5,000 Units by mouth every 7 days   HYDROcodone-acetaminophen (NORCO) 5-325 MG tablet 12/1/2019 at Unknown time  No Yes   Sig: Take 1 tablet by mouth every 6 hours as needed for severe pain   Lactobacillus-Inulin ([facility] DIGESTIVE [facility]) CAPS   No No   Sig: Take 1 capsule by mouth daily for 14 days   acetaminophen-codeine (TYLENOL #3) 300-30 MG tablet   Yes No   Sig: Take 1-2 tablets by mouth every 6 hours as needed for severe pain   albuterol (PROAIR HFA/PROVENTIL HFA/VENTOLIN HFA) 108 (90 Base) MCG/ACT inhaler   No No   Sig: Inhale 3 puffs into the lungs 3 times daily as needed for shortness of breath / dyspnea or wheezing   albuterol (PROVENTIL) (2.5 MG/3ML) 0.083% neb solution   No No   Sig: Take 1 vial (2.5 mg) by nebulization every 6 hours as needed for shortness of breath / dyspnea or wheezing   clindamycin (CLEOCIN) 150 MG capsule 12/2/2019 at Unknown time  No Yes   Sig: Take 3 capsules (450 mg) by mouth 3 times daily for 7 days   fluticasone-salmeterol (ADVAIR DISKUS) 500-50 MCG/DOSE inhaler   No No   Sig: Inhale 1 puff into the lungs every 12 hours Rinse mouth well after use to prevent Thrush   folic acid (FOLVITE) 1 MG tablet   Yes No   Sig: Take 1 mg by mouth daily    metoprolol tartrate (LOPRESSOR) 25 MG tablet   No No   Sig: Take 0.5-1 tablets (12.5-25 mg) by mouth 2 times daily   metroNIDAZOLE (FLAGYL) 500 MG tablet   Yes Yes   Sig: Take 500 mg by mouth 3 times daily   montelukast (SINGULAIR) 10 MG tablet   No No   Sig: Take 0.5-1 tablets (5-10 mg) by mouth At Bedtime - for allergies   nitroGLYcerin (NITROSTAT) 0.4 MG sublingual tablet   No No   Sig: For chest pain place 1 tablet under the tongue every 5 minutes for 3 doses. If symptoms persist 5 minutes after 1st dose call 911.   tiotropium (SPIRIVA RESPIMAT) 2.5 MCG/ACT inhaler   No No   Sig: Inhale 2 puffs into the lungs daily   venlafaxine (EFFEXOR-XR) 37.5 MG 24 hr capsule   No No   Sig: Take 1 capsule (37.5 mg) by mouth daily  with food   vitamin B-12 (CYANOCOBALAMIN) 2500 MCG sublingual tablet   No No   Sig: Take 1 tablet (2,500 mcg) by mouth daily      Facility-Administered Medications: None     Allergies   Allergies   Allergen Reactions     Atorvastatin Muscle Pain (Myalgia)     Rosuvastatin Nausea and Vomiting     Sucralfate Nausea and Vomiting     Levofloxacin Nausea and Vomiting     Morphine      Other reaction(s): Chest Pain     Penicillins      Other reaction(s): Respiratory Arrest     Sulfamethoxazole W/Trimethoprim Nausea and Vomiting     Yeast infection       Physical Exam   Vital Signs: Temp: 98.6  F (37  C) Temp src: Tympanic BP: 133/77   Heart Rate: 88 Resp: 16 SpO2: 95 % O2 Device: None (Room air)    Weight: 0 lbs 0 oz    Constitutional: Patient is awake, alert and cooperative.  She is uncomfortable but not in acute distress.  Eyes: Lids and lashes normal, pupils equal, round and reactive to light, extra ocular muscles intact, sclera clear, conjunctiva normal  ENT: Normocephalic, without obvious abnormality, atraumatic, sinuses nontender on palpation, external ears without lesions, TMs normal.  Posterior right oral cavity with fullness of the buccal gum.  There does appear to be a small opening which currently does not have any purulent exudate.  There is no surrounding erythema.  She has mild tenderness with palpation of the right SCM but no lymphadenopathy.  No tenderness of TM joint.  No mastoid tenderness.  Hematologic / Lymphatic: Presently no lymphadenopathy appreciated  Respiratory: Clear to auscultation bilaterally.  No wheezing.  Cardiovascular: Regular rate and rhythm.  No murmurs.  No peripheral edema  GI: Abdomen soft, nontender  Skin: Patient has some mild swelling of the right side of the jaw with no erythema or warmth.  No other rashes.  Musculoskeletal: No synovitis.  Muscle strength age and body habitus appropriateas well as equal and even.   Neurologic: No neurologic deficits.  Neuropsychiatric: General:  calm and normal eye contact  Orientation: oriented to self, place, time and situation  Memory and insight: memory for past and recent events intact and thought process normal    Data   Data reviewed today: I reviewed all medications, new labs and imaging results over the last 24 hours. I personally reviewed no images or EKG's today.    Recent Labs   Lab 12/02/19  1650 11/29/19  0010   WBC 17.6* 17.3*   HGB 11.5* 12.1   MCV 92 91   * 540*    138   POTASSIUM 3.6 3.8   CHLORIDE 98 103   CO2 31 24   BUN 10 11   CR 0.77 0.68   ANIONGAP 9 11   COLEMAN 9.1 8.5*   GLC 94 106*     No results found for this or any previous visit (from the past 24 hour(s)).

## 2019-12-03 NOTE — PHARMACY-VANCOMYCIN DOSING SERVICE
"Pharmacy Consult- Vancomycin Assessment    Ember Tavares is a 59 year old female admitted on 2019.    Vancomycin has been ordered per MD, for the indication of: Abscess    Current Antibiotic Regimen Includes: Vancomycin per pharmacy, meropenem    Patient Active Problem List   Diagnosis     Chronic abdominal pain     Alpha-1-antitrypsin deficiency carrier     Arthritis of knee, right     Atherosclerosis of abdominal aorta (H)     Chronic radicular cervical pain     Constipation, chronic     Contact dermatitis     COPD, severe (H)     Decreased diffusion capacity of lung     Dyshidrotic hand dermatitis     GERD (gastroesophageal reflux disease)     Hiatal hernia     Status post balloon dilatation of esophageal stricture     History of tobacco abuse     Irritable bowel syndrome     Myalgia     Occipital neuralgia of left side     Osteopenia     Prediabetes     Psoriasis     Psoriatic arthritis (H)     Schatzki's ring of distal esophagus     Sinusitis, chronic     Vitamin D deficiency     S/P Nissen fundoplication (without gastrostomy tube) procedure     Mixed hyperlipidemia     PAD (peripheral artery disease) (H)     Vitamin B12 deficiency     Menopausal syndrome (hot flashes)     Ulcer of right cornea     Chest pain     Seasonal allergic rhinitis due to pollen     Nodule of right lung     Difficulty swallowing solids     Dental abscess       Allergies (and reaction): Atorvastatin; Rosuvastatin; Sucralfate; Levofloxacin; Morphine; Penicillins; and Sulfamethoxazole w/trimethoprim    Most recent flowsheet    Most recent flowsheet Height: 157.5 cm (5' 2\")    No intake or output data in the 24 hours ending 19 1854    Tmax = Temp (24hrs), Av.2  F (37.3  C), Min:99.2  F (37.3  C), Max:99.2  F (37.3  C)      Recent Labs   Lab Test 19  1650   WBC 17.6*       Recent Labs   Lab Test 19  1650 19  0010 10/11/19  1453   BUN 10 11 12   CR 0.77 0.68 0.88       estimated creatinine clearance is 68 " mL/min (based on SCr of 0.77 mg/dL).    Cultures Pending: blood x 2    Culture Results: pending    Plan:  Patient received a load in the ER due to elevated lab values, will continue with Vancomycin 1000 mg Q12H.  Vancomycin will be stopped after 48 hours unless clinically indicated per MD.    Regimen Start Date: 12/2/19  Recommended Dose: 1000 mg, which provides 16.6 mg/kg/dose  Interval:Q12H  Goal Trough: 10-15 mg/L    Thank You for the consult. Will continue to follow.    Vinita Breaux RPH ....................  12/2/2019   6:54 PM

## 2019-12-03 NOTE — PROGRESS NOTES
"[id number]  Ember Tavares 59 year old female   Admission date:12/2/2019   Residence: [address]  Code status: Full Code   Isolation:No active isolations   Principal Problem:<principal problem not specified> facial pain from infection post operation     [facility]  Mobility Status: SBA  Discharge timeline & plan: unsure of discharge date and/or discharge needs at this time.  Vital signs:  Temp: 98.3  F (36.8  C) Temp src: Tympanic BP: 118/61 Pulse: 93 Heart Rate: 88 Resp: 16 SpO2: 91 % O2 Device: None (Room air)   Height: 157.5 cm (5' 2\") Weight: 60.2 kg (132 lb 12.8 oz)  Estimated body mass index is 24.29 kg/m  as calculated from the following:    Height as of this encounter: 1.575 m (5' 2\").    Weight as of this encounter: 60.2 kg (132 lb 12.8 oz).  Abnormal Physical Assessment:    Become light headed when changing position to ambulate. Facial pain and swelling on right side from abscess.         Last Pain/PRN Medication given: toradol at 420  Finger stick POCT blood sugars:  Labs: see labs. CBC, BMP CRP  Telemetry: No  Pending tests/procedures planned: Dr. Redmond and dentist may open abscess in AM   Comments:      SAFETY CHECKLIST  Arm Bands/Risk clasps correct and in place (DNR, Fall risk, Allergy, Latex, Limb):  Yes  IVF and rate ordered correct: Yes  Physical assessment verification(IV site, wounds, dressing intact, incisions, LDA's, neuro, CIWA...): Yes  Environmental assessment (bed/chair alarm on, call light, side rails, restraints, sitter....): Yes  Whiteboard updated by oncoming RN:Yes    Maurisio Chi RN on 12/3/2019 at 4:33 AM      Nellie Sandy RN on 12/3/2019 at 7:15 AM  Bedside Handoff complete, safety checks verified by writer and are correct.          "

## 2019-12-03 NOTE — PHARMACY-ADMISSION MEDICATION HISTORY
Pharmacy -- Admission Medication Reconciliation    Prior to admission (PTA) medications were reviewed and the patient's PTA medication list was updated.    Sources Consulted: Patient Interview, Sure Scripts, Recent ER/[facility] notes, [facility] drug refill history (pending)    The reliability of this Medication Reconciliation is: Reliability: Reliable    The following significant changes were made:    Removed Tylenol #3--patient took one tablet and did not like it, wanted it removed from her list    Updated metoprolol to 12.5 mg at bedtime--patient has found this dose to be effective    Updated singulair to 5 mg at bedtime     Updated Spiriva to evening    Added metronidazole three times daily    Added IBU--patient has been taking this three times daily scheduled    Added APAP--patient was taking this alternating with IBU until she got an rx for Norco, has not taken it since    **Current pain relief at home was IBU three times daily alternating with Norco, prior to rx for Norco, patient was alternating  mg with extra strength APAP    **Patient was [facility] in [facility] and started on clindamycin 11/27, then received a prescription from Dg Foster for metronidazole on 11/30.      In addition, the patient's allergies were reviewed with the patient and updated as follows:   Allergies: Atorvastatin; Rosuvastatin; Sucralfate; Levofloxacin; Morphine; Penicillins; and Sulfamethoxazole w/trimethoprim    The pharmacist has reviewed with the patient that all personal medications should be removed from the building or locked in the belongings safe.  Patient shall only take medications ordered by the physician and administered by the nursing staff.       Medication barriers identified: yes, antibiotics prescribed not treating infection, patient in pain   Medication adherence concerns: none   Understanding of emergency medications: yes, keeps nitroglycerin on hand, understands nebulizers    Vinita Breaux RPH, 12/3/2019,   9:16 AM

## 2019-12-03 NOTE — PROGRESS NOTES
[facility]    Medicine Progress Note - Hospitalist Service       Date of Admission:  12/2/2019  Assessment & Plan   Ember Tavares is a 59 year old female admitted on 12/2/2019. She presented to the emergency department with worsening right upper jaw pain and purulent drainage into her mouth.    Patient underwent multiple tooth extraction on 11/14/2019 in [place].  1 week later she started to have swelling and pain in the area requiring her to move her temporary dentures.  She presented to the rapid clinic on 1127 and was started on clindamycin.  On 11/29 presented to the emergency department due to worsening pain.  CT scan revealed dental abscess.  She was continued on clindamycin with plan to follow-up with her oral surgeon as outpatient, but instead presented back to emergency department.    Due to penicillin allergy she was started on meropenem and vancomycin in the emergency department.  She has had improvement in pain overnight.  Has had more robust drainage from the site.  She has been afebrile.  Her infectious markers have improved.  We will continue antibiotics and try to involve dental specialists for definitive management of the infection later today or tomorrow morning.      Primary problem    Dental abscess    Assessment: Improvement on vancomycin/meropenem.  Currently draining.    Plan: Continue meropenem and vancomycin.      Active Problems:    Alpha-1-antitrypsin deficiency carrier    Assessment: Currently denies any exacerbation of her severe lung disease.    Plan: Continue chronic breathing treatments.    COPD, severe (H)    Assessment: No hypoxia or expiratory wheeze.    Plan: Continue Spiriva, Advair and albuterol as needed.    GERD (gastroesophageal reflux disease)    Assessment: Denies any breakthrough acid reflux    Plan: Continue PPI    History of tobacco abuse    Assessment: Patient has quit smoking.    Plan: Congratulated her on cessation.    Psoriatic arthritis (H)     Assessment: Chronic.    Plan: Suggest ambulation    Schatzki's ring of distal esophagus    Assessment: Denies any issues swallowing.  Status post dilation    Plan: Monitor.    PAD (peripheral artery disease) (H)    Assessment: Patient currently denies any symptoms of vascular disease    Plan: Monitor.         Diet: Mechanical/Dental Soft Diet    DVT Prophylaxis: Pneumatic Compression Devices  Peters Catheter: not present  Code Status: Full Code      Disposition Plan   Expected discharge: Today or tomorrow, recommended to prior living arrangement once adequate pain management/ tolerating PO medications and antibiotic plan established.  Entered: Rigoberto Redmond MD 12/03/2019, 8:00 AM       The patient's care was discussed with the Patient and dental specialist    Rigoberto Redmond MD  Hospitalist Service  [facility]    ______________________________________________________________________    Interval History   Patient reports improvement in pain overnight.  She is now able to open her mouth about 5 cm.  Has not done much in terms of oral hydration yet.  No issues swallowing however.  No fevers overnight.  Pain in neck now resolved.  No new issues.    Data reviewed today: I reviewed all medications, new labs and imaging results over the last 24 hours. I personally reviewed no images or EKG's today.    Physical Exam   Vital Signs: Temp: 98.3  F (36.8  C) Temp src: Tympanic BP: 118/61 Pulse: 93 Heart Rate: 88 Resp: 16 SpO2: 91 % O2 Device: None (Room air)    Weight: 132 lbs 12.8 oz  Constitutional: awake, alert, cooperative, no apparent distress, and appears stated age  ENT: Able to open mouth 5 cm today which is about twice as much as last night.  The swollen area appears smaller.  Appears to be draining.  No external erythema.  Facial edema improved and essentially resolved.  Respiratory: Clear to auscultation bilaterally  Cardiovascular: Regular rate and rhythm  Skin: No overlying erythema or  warmth.    Data   Recent Labs   Lab 12/03/19  0538 12/02/19  1650 11/29/19  0010   WBC 15.0* 17.6* 17.3*   HGB 9.7* 11.5* 12.1   MCV 92 92 91   * 706* 540*    138 138   POTASSIUM 3.3* 3.6 3.8   CHLORIDE 103 98 103   CO2 26 31 24   BUN 8 10 11   CR 0.69 0.77 0.68   ANIONGAP 7 9 11   COLEMAN 7.7* 9.1 8.5*   GLC 95 94 106*

## 2019-12-03 NOTE — PROGRESS NOTES
"0331/0331-01  Ember Tavares 59 year old female   Admission date:12/2/2019   Residence: Home with Spouse  Code status: Full Code   Isolation:No active isolations   Principal Problem:<principal problem not specified>   Diet: mechanical soft  Mobility Status: Indep  Discharge timeline & plan: unsure of discharge date and/or discharge needs at this time.  Vital signs:  Temp: 99.1  F (37.3  C) Temp src: Tympanic BP: 112/66 Pulse: 93 Heart Rate: 77 Resp: 16 SpO2: 95 % O2 Device: None (Room air)   Height: 157.5 cm (5' 2\") Weight: 60.2 kg (132 lb 12.8 oz)  Estimated body mass index is 24.29 kg/m  as calculated from the following:    Height as of this encounter: 1.575 m (5' 2\").    Weight as of this encounter: 60.2 kg (132 lb 12.8 oz).  Abnormal Physical Assessment:Right side facial swelling and tenderness, no redness noted.    Last Pain/PRN Medication given:toradol @ 1532,  Norco @ 1038  Finger stick POCT blood sugars:NA  Labs: See labs. K= 3.3, replaced.   Telemetry: No  Pending tests/procedures planned:K+ lab @ 2000  Comments: Pt took a long time to drink K+, redraw at 2000.      SAFETY CHECKLIST  Arm Bands/Risk clasps correct and in place (DNR, Fall risk, Allergy, Latex, Limb):  Yes  IVF and rate ordered correct: Yes  Physical assessment verification(IV site, wounds, dressing intact, incisions, LDA's, neuro, CIWA...): Yes  Environmental assessment (bed/chair alarm on, call light, side rails, restraints, sitter....): Yes  Whiteboard updated by oncoming RN:Yes    Nellie Sandy RN on 12/3/2019 at 4:42 PM  Bedside Handoff complete, safety checks verified by writer and are correct.    Lianna Ramirez RN on 12/3/2019 at 7:30 PM          "

## 2019-12-03 NOTE — PROGRESS NOTES
Pt rates pain 3/10, did not want more pain medication at this time. Resting comfortably. IV infusing. LS clear. BS active. Abd non-tender, rounded.

## 2019-12-04 VITALS
HEIGHT: 62 IN | OXYGEN SATURATION: 91 % | RESPIRATION RATE: 18 BRPM | WEIGHT: 132.8 LBS | HEART RATE: 93 BPM | BODY MASS INDEX: 24.44 KG/M2 | SYSTOLIC BLOOD PRESSURE: 117 MMHG | DIASTOLIC BLOOD PRESSURE: 63 MMHG | TEMPERATURE: 99.8 F

## 2019-12-04 LAB
ANION GAP SERPL CALCULATED.3IONS-SCNC: 8 MMOL/L (ref 3–14)
BUN SERPL-MCNC: 10 MG/DL (ref 7–25)
CALCIUM SERPL-MCNC: 7.8 MG/DL (ref 8.6–10.3)
CHLORIDE SERPL-SCNC: 106 MMOL/L (ref 98–107)
CO2 SERPL-SCNC: 24 MMOL/L (ref 21–31)
CREAT SERPL-MCNC: 0.81 MG/DL (ref 0.6–1.2)
CRP SERPL-MCNC: 13.3 MG/L
ERYTHROCYTE [DISTWIDTH] IN BLOOD BY AUTOMATED COUNT: 13.6 % (ref 10–15)
ERYTHROCYTE [SEDIMENTATION RATE] IN BLOOD BY WESTERGREN METHOD: 95 MM/H (ref 1–15)
GFR SERPL CREATININE-BSD FRML MDRD: 73 ML/MIN/{1.73_M2}
GLUCOSE SERPL-MCNC: 136 MG/DL (ref 70–105)
HCT VFR BLD AUTO: 30 % (ref 35–47)
HGB BLD-MCNC: 9.6 G/DL (ref 11.7–15.7)
MCH RBC QN AUTO: 29.6 PG (ref 26.5–33)
MCHC RBC AUTO-ENTMCNC: 32 G/DL (ref 31.5–36.5)
MCV RBC AUTO: 93 FL (ref 78–100)
PLATELET # BLD AUTO: 643 10E9/L (ref 150–450)
POTASSIUM SERPL-SCNC: 3.7 MMOL/L (ref 3.5–5.1)
PROCALCITONIN SERPL-MCNC: 0.51 NG/ML
RBC # BLD AUTO: 3.24 10E12/L (ref 3.8–5.2)
SODIUM SERPL-SCNC: 138 MMOL/L (ref 134–144)
WBC # BLD AUTO: 11.5 10E9/L (ref 4–11)

## 2019-12-04 PROCEDURE — 25000132 ZZH RX MED GY IP 250 OP 250 PS 637: Performed by: FAMILY MEDICINE

## 2019-12-04 PROCEDURE — 94640 AIRWAY INHALATION TREATMENT: CPT | Mod: 76

## 2019-12-04 PROCEDURE — 84145 PROCALCITONIN (PCT): CPT | Performed by: FAMILY MEDICINE

## 2019-12-04 PROCEDURE — 40000275 ZZH STATISTIC RCP TIME EA 10 MIN

## 2019-12-04 PROCEDURE — 25800030 ZZH RX IP 258 OP 636: Performed by: FAMILY MEDICINE

## 2019-12-04 PROCEDURE — 36415 COLL VENOUS BLD VENIPUNCTURE: CPT | Performed by: FAMILY MEDICINE

## 2019-12-04 PROCEDURE — 86140 C-REACTIVE PROTEIN: CPT | Performed by: FAMILY MEDICINE

## 2019-12-04 PROCEDURE — C9113 INJ PANTOPRAZOLE SODIUM, VIA: HCPCS | Performed by: FAMILY MEDICINE

## 2019-12-04 PROCEDURE — 80048 BASIC METABOLIC PNL TOTAL CA: CPT | Performed by: FAMILY MEDICINE

## 2019-12-04 PROCEDURE — 85027 COMPLETE CBC AUTOMATED: CPT | Performed by: FAMILY MEDICINE

## 2019-12-04 PROCEDURE — 25000128 H RX IP 250 OP 636: Performed by: FAMILY MEDICINE

## 2019-12-04 PROCEDURE — 85652 RBC SED RATE AUTOMATED: CPT | Performed by: FAMILY MEDICINE

## 2019-12-04 PROCEDURE — 99239 HOSP IP/OBS DSCHRG MGMT >30: CPT | Performed by: FAMILY MEDICINE

## 2019-12-04 RX ORDER — METRONIDAZOLE 500 MG/1
500 TABLET ORAL 3 TIMES DAILY
Qty: 21 TABLET | Refills: 0 | Status: SHIPPED | OUTPATIENT
Start: 2019-12-05 | End: 2019-12-12

## 2019-12-04 RX ORDER — SODIUM CHLORIDE 9 MG/ML
1000 INJECTION, SOLUTION INTRAVENOUS CONTINUOUS
Status: DISCONTINUED | OUTPATIENT
Start: 2019-12-04 | End: 2019-12-04 | Stop reason: HOSPADM

## 2019-12-04 RX ORDER — ONDANSETRON 4 MG/1
4 TABLET, ORALLY DISINTEGRATING ORAL EVERY 8 HOURS PRN
Qty: 10 TABLET | Refills: 0 | Status: SHIPPED | OUTPATIENT
Start: 2019-12-04 | End: 2020-10-22

## 2019-12-04 RX ORDER — LEVOFLOXACIN 500 MG/1
500 TABLET, FILM COATED ORAL DAILY
Qty: 7 TABLET | Refills: 0 | Status: SHIPPED | OUTPATIENT
Start: 2019-12-05 | End: 2019-12-12

## 2019-12-04 RX ORDER — MONTELUKAST SODIUM 5 MG/1
5 TABLET, CHEWABLE ORAL AT BEDTIME
Status: DISCONTINUED | OUTPATIENT
Start: 2019-12-04 | End: 2019-12-04 | Stop reason: HOSPADM

## 2019-12-04 RX ORDER — HYDROCODONE BITARTRATE AND ACETAMINOPHEN 7.5; 325 MG/1; MG/1
1 TABLET ORAL EVERY 6 HOURS PRN
Qty: 12 TABLET | Refills: 0 | Status: SHIPPED | OUTPATIENT
Start: 2019-12-04 | End: 2019-12-12

## 2019-12-04 RX ADMIN — HYDROCODONE BITARTRATE AND ACETAMINOPHEN 1 TABLET: 7.5; 325 TABLET ORAL at 09:52

## 2019-12-04 RX ADMIN — HYDROCODONE BITARTRATE AND ACETAMINOPHEN 1 TABLET: 7.5; 325 TABLET ORAL at 02:50

## 2019-12-04 RX ADMIN — FOLIC ACID 1 MG: 1 TABLET ORAL at 09:41

## 2019-12-04 RX ADMIN — FLUTICASONE PROPIONATE AND SALMETEROL 1 PUFF: 50; 500 POWDER RESPIRATORY (INHALATION) at 09:24

## 2019-12-04 RX ADMIN — PANTOPRAZOLE SODIUM 40 MG: 40 INJECTION, POWDER, LYOPHILIZED, FOR SOLUTION INTRAVENOUS at 09:41

## 2019-12-04 RX ADMIN — KETOROLAC TROMETHAMINE 30 MG: 30 INJECTION, SOLUTION INTRAMUSCULAR; INTRAVENOUS at 19:36

## 2019-12-04 RX ADMIN — KETOROLAC TROMETHAMINE 30 MG: 30 INJECTION, SOLUTION INTRAMUSCULAR; INTRAVENOUS at 10:48

## 2019-12-04 RX ADMIN — MEROPENEM 1 G: 1 INJECTION, POWDER, FOR SOLUTION INTRAVENOUS at 19:36

## 2019-12-04 RX ADMIN — VANCOMYCIN HYDROCHLORIDE 1250 MG: 1 INJECTION, POWDER, LYOPHILIZED, FOR SOLUTION INTRAVENOUS at 17:09

## 2019-12-04 RX ADMIN — VANCOMYCIN HYDROCHLORIDE 1250 MG: 1 INJECTION, POWDER, LYOPHILIZED, FOR SOLUTION INTRAVENOUS at 09:41

## 2019-12-04 RX ADMIN — TIOTROPIUM BROMIDE 18 MCG: 18 CAPSULE ORAL; RESPIRATORY (INHALATION) at 09:24

## 2019-12-04 RX ADMIN — VENLAFAXINE HYDROCHLORIDE 37.5 MG: 37.5 CAPSULE, EXTENDED RELEASE ORAL at 09:41

## 2019-12-04 RX ADMIN — HYDROCODONE BITARTRATE AND ACETAMINOPHEN 1 TABLET: 7.5; 325 TABLET ORAL at 17:09

## 2019-12-04 RX ADMIN — KETOROLAC TROMETHAMINE 30 MG: 30 INJECTION, SOLUTION INTRAMUSCULAR; INTRAVENOUS at 04:00

## 2019-12-04 RX ADMIN — FLUTICASONE PROPIONATE AND SALMETEROL 1 PUFF: 50; 500 POWDER RESPIRATORY (INHALATION) at 18:38

## 2019-12-04 RX ADMIN — MEROPENEM 1 G: 1 INJECTION, POWDER, FOR SOLUTION INTRAVENOUS at 05:00

## 2019-12-04 RX ADMIN — METOPROLOL 12.5 MG: 25 TABLET ORAL at 09:41

## 2019-12-04 RX ADMIN — MEROPENEM 1 G: 1 INJECTION, POWDER, FOR SOLUTION INTRAVENOUS at 13:23

## 2019-12-04 ASSESSMENT — ACTIVITIES OF DAILY LIVING (ADL)
ADLS_ACUITY_SCORE: 11

## 2019-12-04 NOTE — PHARMACY - DISCHARGE MEDICATION RECONCILIATION AND EDUCATION
[facility]  [facility] of [facility]  [address] [place] [address]    December 4, 2019    Dear Pharmacist,    Your customer, Ember Tavares, born on 1960, was recently discharged from [facility].  We have updated her medication list and want to alert you to the following:       Review of your medicines      START taking      Dose / Directions   HYDROcodone-acetaminophen 7.5-325 MG per tablet  Commonly known as:  NORCO  Replaces:  HYDROcodone-acetaminophen 5-325 MG tablet      Dose:  1 tablet  Take 1 tablet by mouth every 6 hours as needed for moderate to severe pain  Quantity:  12 tablet  Refills:  0     levofloxacin 500 MG tablet  Commonly known as:  LEVAQUIN      Dose:  500 mg  Start taking on:  December 5, 2019  Take 1 tablet (500 mg) by mouth daily for 7 days  Quantity:  7 tablet  Refills:  0     ondansetron 4 MG ODT tab  Commonly known as:  ZOFRAN-ODT      Dose:  4 mg  Take 1 tablet (4 mg) by mouth every 8 hours as needed for nausea  Quantity:  10 tablet  Refills:  0        CONTINUE these medicines which may have CHANGED, or have new prescriptions. If we are uncertain of the size of tablets/capsules you have at home, strength may be listed as something that might have changed.      Dose / Directions   * metroNIDAZOLE 500 MG tablet  Commonly known as:  FLAGYL  This may have changed:  You were already taking a medication with the same name, and this prescription was added. Make sure you understand how and when to take each.      Dose:  500 mg  Start taking on:  December 5, 2019  Take 1 tablet (500 mg) by mouth 3 times daily for 7 days  Quantity:  21 tablet  Refills:  0     * metroNIDAZOLE 500 MG tablet  Commonly known as:  FLAGYL  This may have changed:  Another medication with the same name was added. Make sure you understand how and when to take each.      Dose:  500 mg  Start taking on:  December 5, 2019  Take 1 tablet (500 mg) by mouth 3 times  daily for 7 days  Quantity:  21 tablet  Refills:  0         * This list has 2 medication(s) that are the same as other medications prescribed for you. Read the directions carefully, and ask your doctor or other care provider to review them with you.            CONTINUE these medicines which have NOT CHANGED      Dose / Directions   acetaminophen 500 MG tablet  Commonly known as:  TYLENOL      Dose:  500 mg  Take 500 mg by mouth every 4 hours as needed for mild pain  Refills:  0     * albuterol (2.5 MG/3ML) 0.083% neb solution  Commonly known as:  PROVENTIL  Used for:  COPD, severe (H)      Dose:  2.5 mg  Take 1 vial (2.5 mg) by nebulization every 6 hours as needed for shortness of breath / dyspnea or wheezing  Quantity:  2 Box  Refills:  11     * albuterol 108 (90 Base) MCG/ACT inhaler  Commonly known as:  PROAIR HFA/PROVENTIL HFA/VENTOLIN HFA  Used for:  COPD, severe (H), Wheezing      Dose:  3 puff  Inhale 3 puffs into the lungs 3 times daily as needed for shortness of breath / dyspnea or wheezing  Quantity:  25.5 g  Refills:  3     CULTURELLE DIGESTIVE HEALTH Caps  Used for:  Dental infection      Dose:  1 capsule  Take 1 capsule by mouth daily for 14 days  Quantity:  14 capsule  Refills:  0     D 5000 125 MCG (5000 UT) Tabs  Generic drug:  Cholecalciferol      Dose:  5,000 Units  Take 5,000 Units by mouth every 7 days  Refills:  0     fluticasone-salmeterol 500-50 MCG/DOSE inhaler  Commonly known as:  ADVAIR DISKUS  Used for:  COPD, severe (H), Panlobular emphysema (H)      Dose:  1 puff  Inhale 1 puff into the lungs every 12 hours Rinse mouth well after use to prevent Thrush  Quantity:  1 Inhaler  Refills:  11     folic acid 1 MG tablet  Commonly known as:  FOLVITE      Dose:  1 mg  Take 1 mg by mouth daily  Refills:  0     ibuprofen 800 MG tablet  Commonly known as:  ADVIL/MOTRIN      Dose:  800 mg  Take 800 mg by mouth every 8 hours  Refills:  0     metoprolol tartrate 25 MG tablet  Commonly known as:   LOPRESSOR      Dose:  12.5 mg  Take 12.5 mg by mouth every evening  Refills:  0     montelukast 10 MG tablet  Commonly known as:  SINGULAIR      Dose:  5 mg  Take 5 mg by mouth At Bedtime  Refills:  0     nitroGLYcerin 0.4 MG sublingual tablet  Commonly known as:  NITROSTAT  Used for:  Chest pain, unspecified type      For chest pain place 1 tablet under the tongue every 5 minutes for 3 doses. If symptoms persist 5 minutes after 1st dose call 911.  Quantity:  10 tablet  Refills:  3     tiotropium 2.5 MCG/ACT inhaler  Commonly known as:  SPIRIVA RESPIMAT      Dose:  2 puff  Inhale 2 puffs into the lungs every evening  Refills:  0     venlafaxine 37.5 MG 24 hr capsule  Commonly known as:  EFFEXOR-XR  Used for:  Menopausal syndrome (hot flashes)      Dose:  37.5 mg  Take 1 capsule (37.5 mg) by mouth daily with food  Quantity:  90 capsule  Refills:  3     vitamin B-12 2500 MCG sublingual tablet  Commonly known as:  CYANOCOBALAMIN  Used for:  Vitamin B12 deficiency      Dose:  2,500 mcg  Take 1 tablet (2,500 mcg) by mouth daily  Quantity:  90 tablet  Refills:  3         * This list has 2 medication(s) that are the same as other medications prescribed for you. Read the directions carefully, and ask your doctor or other care provider to review them with you.            STOP taking    clindamycin 150 MG capsule  Commonly known as:  CLEOCIN        HYDROcodone-acetaminophen 5-325 MG tablet  Commonly known as:  NORCO  Replaced by:  HYDROcodone-acetaminophen 7.5-325 MG per tablet              Where to get your medicines      These medications were sent to [id number] - [place], [place] - [address] [facility] [address]    Phone:  [phone]     HYDROcodone-acetaminophen 7.5-325 MG per tablet    levofloxacin 500 MG tablet    metroNIDAZOLE 500 MG tablet    metroNIDAZOLE 500 MG tablet    ondansetron 4 MG ODT tab         We also reviewed Ember Tavares's allergy  list and updated it as needed:  Allergies: Atorvastatin; Rosuvastatin; Sucralfate; Levofloxacin; Morphine; Penicillins; and Sulfamethoxazole w/trimethoprim    Thank you for continuing to care for Ember Tavares.  We look forward to working together with you in the future.    Sincerely,  [place], [facility]

## 2019-12-04 NOTE — PROGRESS NOTES
"0331/0331-01  Ember Tavares 59 year old female   Admission date:12/2/2019   Residence: home with spouse  Code status: Full Code   Isolation:No active isolations   Principal Problem:Dental abcess  Diet: mechanical soft  Mobility Status: Ind  Discharge timeline & plan: unsure of discharge date and/or discharge needs at this time.  Vital signs:  Temp: 98.6  F (37  C) Temp src: Tympanic BP: 123/73   Heart Rate: 83 Resp: 18 SpO2: 92 % O2 Device: None (Room air)   Height: 157.5 cm (5' 2\") Weight: 60.2 kg (132 lb 12.8 oz)  Estimated body mass index is 24.29 kg/m  as calculated from the following:    Height as of this encounter: 1.575 m (5' 2\").    Weight as of this encounter: 60.2 kg (132 lb 12.8 oz).  Abnormal Physical Assessment:Right sided swelling and tenderness of the face rating pain 5/10. Lung sounds diminished in the bases. New IV in right hand. Urine cloudy icetric yellow, 300 mls out this shift.  Last Pain/PRN Medication given:Dilaudid 2115, Norco 0250, toradol 0400  Finger stick POCT blood sugars:100, 89  Labs: pending- potassium protocol  Telemetry: No  Pending tests/procedures planned:morning labs  Comments:      SAFETY CHECKLIST  Arm Bands/Risk clasps correct and in place (DNR, Fall risk, Allergy, Latex, Limb):  Yes  IVF and rate ordered correct: Yes  Physical assessment verification(IV site, wounds, dressing intact, incisions, LDA's, neuro, CIWA...): Yes  Environmental assessment (bed/chair alarm on, call light, side rails, restraints, sitter....): Yes  Whiteboard updated by oncoming RN:Yes  Lianna Ramirez RN on 12/4/2019 at 5:58 AM    Nellie Sandy RN on 12/4/2019 at 7:21 AM    "

## 2019-12-04 NOTE — PHARMACY - DISCHARGE MEDICATION RECONCILIATION AND EDUCATION
Pharmacy:  Discharge Counseling and Medication Reconciliation    Ember Tavares  [address]   [place] [id number] [phone] (home)   59 year old female  PCP: Tim Boyd    Allergies: Atorvastatin; Rosuvastatin; Sucralfate; Levofloxacin; Morphine; Penicillins; and Sulfamethoxazole w/trimethoprim    Discharge Counseling:    Pharmacist met with patient and family/friends today to review the medication portion of the After Visit Summary (with an emphasis on NEW and STOPPED medications) and to address patient's questions/concerns.    Summary of Education: Reviewed hydrocodone/acetaminophen, levofloxacin, and ondansetron verbally and in print  Reviewed stopping clindamycin  Reviewed continuing metronidazole    Materials Provided:  MedCounselor sheets printed from Clinical Pharmacology on: hydrocodone/acetaminophen, levofloxacin, and ondansetron ODT    Discharge Medication Reconciliation:    It has been determined that the patient has an adequate supply of medications available or which can be obtained from the patient's preferred pharmacy, which she has confirmed as: [facility]. An updated medication list will be faxed to the patient's pharmacy.    Thank you for the consult.    [place] [facility]........December 4, 2019 2:21 PM

## 2019-12-04 NOTE — PHARMACY-VANCOMYCIN DOSING SERVICE
"Pharmacy Consult- Vancomycin Assessment    Ember Tavares is a 59 year old female admitted on 2019.    Vancomycin has been ordered per MD, for the indication of: abscess, dental infection, possible facial and/or bone in face from jaw    Current Antibiotic Regimen Includes: meropenem and vancomycin    Patient Active Problem List   Diagnosis     Chronic abdominal pain     Alpha-1-antitrypsin deficiency carrier     Arthritis of knee, right     Atherosclerosis of abdominal aorta (H)     Chronic radicular cervical pain     Constipation, chronic     Contact dermatitis     COPD, severe (H)     Decreased diffusion capacity of lung     Dyshidrotic hand dermatitis     GERD (gastroesophageal reflux disease)     Hiatal hernia     Status post balloon dilatation of esophageal stricture     History of tobacco abuse     Irritable bowel syndrome     Myalgia     Occipital neuralgia of left side     Osteopenia     Prediabetes     Psoriasis     Psoriatic arthritis (H)     Schatzki's ring of distal esophagus     Sinusitis, chronic     Vitamin D deficiency     S/P Nissen fundoplication (without gastrostomy tube) procedure     Mixed hyperlipidemia     PAD (peripheral artery disease) (H)     Vitamin B12 deficiency     Menopausal syndrome (hot flashes)     Ulcer of right cornea     Chest pain     Seasonal allergic rhinitis due to pollen     Nodule of right lung     Difficulty swallowing solids     Dental abscess       Allergies (and reaction): Atorvastatin; Rosuvastatin; Sucralfate; Levofloxacin; Morphine; Penicillins; and Sulfamethoxazole w/trimethoprim    Most recent flowsheet Weight: 60.2 kg (132 lb 12.8 oz)  Most recent flowsheet Height: 157.5 cm (5' 2\")      Intake/Output Summary (Last 24 hours) at 2019 1037  Last data filed at 2019 0957  Gross per 24 hour   Intake 2580 ml   Output 500 ml   Net 2080 ml       Tmax = Temp (24hrs), Av.2  F (37.3  C), Min:98.4  F (36.9  C), Max:100.1  F (37.8  C)      Recent Labs   Lab " Test 12/04/19  0610   WBC 11.5*       Recent Labs   Lab Test 12/04/19  0610 12/03/19  0538 12/02/19  1650   BUN 10 8 10   CR 0.81 0.69 0.77       estimated creatinine clearance is 64.7 mL/min (based on SCr of 0.81 mg/dL).    Cultures Pending: blood    Culture Results: no growth x 2 days    Plan: Give one more dose of vancomycin 1250 mg (~20 mg/kg/dose) IVPB x1. Give a little early due to plans for discharge. Follow up tomorrow 12/5/19 in AM in [place] for follow up.    Goal Trough: 15 to 20 mg/L    Thank You for the consult. Will continue to follow.    [place] [facility] ....................  12/4/2019   10:37 AM

## 2019-12-04 NOTE — PLAN OF CARE
Pt A & O x 4, low grade temp 100.1, cool rag applied, temp now 98.6, all other vitals stable. Lung sounds diminished in the bases, O2 92% on RA. Pt reports constipation, declines prune juice and senna, water and ambulation encouraged. Swelling and tenderness noted on right side of face, pt report pain 5/10 PRN Dilaudid, Toradol and Norco given, see MAR. Pt urine cloudy, icteric yellow, 300 mls out his shift, will continue to monitor. Lianna Ramirez RN on 12/4/2019 at 5:57 AM

## 2019-12-05 ENCOUNTER — TELEPHONE (OUTPATIENT)
Dept: INTERNAL MEDICINE | Facility: OTHER | Age: 59
End: 2019-12-05

## 2019-12-05 NOTE — DISCHARGE SUMMARY
[facility]  Hospitalist Discharge Summary       Date of Admission:  12/2/2019  Date of Discharge:  12/4/2019  Discharging Provider: Rigoberto Redmond MD      Discharge Diagnoses   Dental abscess with failed outpatient treatment with clindamycin    History of psoriatic arthritis  History of severe COPD with alpha-1 antitrypsin deficiency carrier status  History of tobacco use   History of peripheral artery disease    Follow-ups Needed After Discharge   Follow-up Appointments     Follow-up and recommended labs and tests       Follow up with primary care provider, Tim Boyd, within 7 days for   hospital follow- up.    Follow-up with oral surgeon tomorrow morning at 8 AM as scheduled             Unresulted Labs Ordered in the Past 30 Days of this Admission     Date and Time Order Name Status Description    12/2/2019 1648 Blood culture Preliminary     12/2/2019 1648 Blood culture Preliminary       These results will be followed up by primary physician or ordering physician    Discharge Disposition   Discharged to home in anticipation of evaluation by her oral surgeon in [place] tomorrow morning  Condition at discharge: Improving    Hospital Course   Ember Tavares is a 59 year old female admitted on 12/2/2019. She presented to the emergency department with worsening right upper jaw pain and purulent drainage into her mouth.    Patient underwent multiple tooth extraction on 11/14/2019 in [place].  1 week later she started to have swelling and pain in the area requiring her to move her temporary dentures.  She presented to the rapid clinic on 1127 and was started on clindamycin.  On 11/29 presented to the emergency department due to worsening pain.  CT scan revealed dental abscess.  She was continued on clindamycin with plan to follow-up with her oral surgeon as outpatient, but instead presented back to emergency department.    Due to penicillin allergy she was started on meropenem and vancomycin in  the emergency department.  She had significant improvement in pain and near resolution of reduced range of motion of the jaw.  She did not have any fevers.  Her leukocytosis improved from 17.6 down to 11.5.  CRP also with dramatic improvement 27.3 down to 13.3.  Procalcitonin also improved from 4.25 down to 0.51.  ESR remained elevated and patient did report diffuse joint pain that is chronic.  She may benefit from follow-up with rheumatology for psoriatic arthritis.    Patient received a total of 5 doses of vancomycin and 7 doses of meropenem.  She did have significant improvement.  She was able to get into see her oral surgeon tomorrow morning at 8 AM which I think is excellent to determine if she needs any additional intervention to allow for complete drainage or if anything needs to change regarding her existing hardware.    She was discharged on Levaquin and Flagyl due to her severe penicillin allergy and failed outpatient treatment with clindamycin.  It should be noted that she is not covered for MRSA with this regimen but I think this is less likely than her having had clindamycin resistant streptococcal or anaerobe species.    Her respiratory status remained stable throughout hospitalization.  She did not have any gastrointestinal or cardiac symptoms or issues either.          Consultations This Hospital Stay   PHARMACY TO DOSE VANCO  PHARMACY TO DOSE VANCO  PHARMACY TO DOSE VANCO    Code Status   Full Code    Time Spent on this Encounter   I, Rigoberto Redmond MD, personally saw the patient today and spent greater than 30 minutes discharging this patient.       Rigoberto Redmond MD  [facility]  ______________________________________________________________________    Physical Exam   Vital Signs: Temp: 99.8  F (37.7  C) Temp src: Tympanic BP: 117/63   Heart Rate: 95 Resp: 18 SpO2: 91 % O2 Device: None (Room air)    Weight: 132 lbs 12.8 oz  Constitutional: awake, alert, cooperative, no  apparent distress, and appears stated age  ENT: Patient now able to open her mouth but appears to be fully.  There is still some fullness over the buccal mucosa of lower right jaw but no longer obvious abscess or ongoing drainage.  Respiratory: Clear toascultation bilaterally.  No increased respiratory effort.   Cardiovascular: Regularrate and rhythm.  No murmurs rubs or gallops heard.   Skin: No erythema.  Resolved edema on right side of face.  Neuropsychiatric: General: calm and normal eye contact  Orientation: oriented to self, place, time and situation  Memory and insight: memory for past and recent events intact and thought process normal       Primary Care Physician   Tim Boyd    Discharge Orders      Reason for your hospital stay    You had developed an odontologic infection with evidence a couple of days ago of abscess formation.  By the time you came in it was draining.  We transitioned you to intravenous antibiotics on vancomycin and cefepime you had significant improvement in your symptoms.  I presume that the causative organism may be resistant to clindamycin although we are not able to obtain a direct culture and did not have any growth on blood cultures.    Given your severe penicillin allergy and failure on clindamycin I would like you to start Levaquin and Flagyl which should provide good coverage although does not provide coverage for MRSA.  I think this is unlikely but I think it is very important that your oral surgeon weigh in both in terms of need for possible surgical intervention but also in regards to antibiotic choices.     Follow-up and recommended labs and tests     Follow up with primary care provider, Tim Boyd, within 7 days for hospital follow- up.    Follow-up with oral surgeon tomorrow morning at 8 AM as scheduled     Activity    Your activity upon discharge: activity as tolerated     When to contact your care team    If your mouth and jaw pain does not completely resolve  over the next day or 2 or if you have recurrent swelling or develop new fevers/chills.  Given your close follow-up with oral surgery they will likely have more information for you after that meeting tomorrow.     Full Code     Diet    Follow this diet upon discharge: Orders Placed This Encounter      Mechanical/Dental Soft Diet.  NPO at midnight.       Significant Results and Procedures   Most Recent 3 CBC's:  Recent Labs   Lab Test 12/04/19  0610 12/03/19  0538 12/02/19  1650   WBC 11.5* 15.0* 17.6*   HGB 9.6* 9.7* 11.5*   MCV 93 92 92   * 635* 706*     Most Recent 6 Bacteria Isolates From Any Culture (See EPIC Reports for Culture Details):  Recent Labs   Lab Test 12/02/19  1717   CULT No growth after 2 days  No growth after 2 days   ,   Results for orders placed or performed during the hospital encounter of 11/29/19   CT Soft Tissue Neck w Contrast    Narrative    PROCEDURE INFORMATION:   Exam: CT Neck With Contrast   Exam date and time: 11/30/2019 12:19 AM   Age: 59 years old   Clinical history: Abscess, pharyngeal; Prior surgery; Surgery date: <1 month;   Surgery type: She had oral surgery on 11/14/19 for total mouth reconstruction.   She has been wearing her lower plate during the day and taking it out at night   time. She noted it has been too painful to wear her lower plate the past few   days. ; Additional info: Abscess of pharynx     TECHNIQUE:   Imaging protocol: Computed tomography images of the neck with intravenous   contrast.   Radiation optimization: All CT scans at this facility use at least one of these   dose optimization techniques: automated exposure control; mA and/or kV   adjustment per patient size (includes targeted exams where dose is matched to   clinical indication); or iterative reconstruction.   Contrast material: OMNIPAQUE 350; Contrast volume: 100 ml; Contrast route: IV;      COMPARISON:   CT SOFT TISSUE NECK W CONTRAST 5/14/2019 8:18 AM     FINDINGS:  Soft tissues: Abnormal  soft tissue swelling next to the body of the mandible on   the right side. Periosteal fluid collection which may represent abscess. This   measures approximately 3.8 cm in length with a maximum width of about 5 mm.   Associated soft tissue swelling. Subcutaneous inflammation.    Orbits: The globes are intact. The extraocular muscles and intraconal   structures are normal. No bony orbital wall anomaly.  Nasopharynx: Not remarkable.   Oral Cavity: The extrinsic tongue musculature is normal in appearance.   Oropharynx: Not remarkable.   Hypopharynx: Unremarkable.   Larynx: Unremarkable.   Retropharyngeal space:Unremarkable.   Submandibular/Parotid glands: Normal. Glands are normal in size.   Thyroid: Normal. No enlarged or calcified nodules.    Lymph nodes: Prominent lymph node in region 1B on the right.   Trachea: Unremarkable.   Lungs: Unremarkable as visualized.   Vasculature: Not remarkable.   Bones/joints: Metallic pillar is located within the mandible. No significant   bony lucency adjacent to these bony pillar's.       Impression    IMPRESSION:   Soft tissue swelling adjacent to the body of the mandible on the right side.   Fluid collection which is closely applied to the outer bony cortex of the   mandible consistent with this subperiosteal fluid collection/abscess.   Associated inflammation in the subcutaneous soft tissues. Prominent in the   lymph node on the right side.    THIS DOCUMENT HAS BEEN ELECTRONICALLY SIGNED BY ANNY MCCARTY MD       Discharge Medications   Current Discharge Medication List      START taking these medications    Details   HYDROcodone-acetaminophen (NORCO) 7.5-325 MG per tablet Take 1 tablet by mouth every 6 hours as needed for moderate to severe pain  Qty: 12 tablet, Refills: 0    Associated Diagnoses: Dental abscess      levofloxacin (LEVAQUIN) 500 MG tablet Take 1 tablet (500 mg) by mouth daily for 7 days  Qty: 7 tablet, Refills: 0    Associated Diagnoses: Dental abscess       ondansetron (ZOFRAN-ODT) 4 MG ODT tab Take 1 tablet (4 mg) by mouth every 8 hours as needed for nausea  Qty: 10 tablet, Refills: 0    Associated Diagnoses: Dental abscess         CONTINUE these medications which have CHANGED    Details   !! metroNIDAZOLE (FLAGYL) 500 MG tablet Take 1 tablet (500 mg) by mouth 3 times daily for 7 days  Qty: 21 tablet, Refills: 0    Associated Diagnoses: Dental abscess      !! metroNIDAZOLE (FLAGYL) 500 MG tablet Take 1 tablet (500 mg) by mouth 3 times daily for 7 days  Qty: 21 tablet, Refills: 0    Associated Diagnoses: Dental abscess       !! - Potential duplicate medications found. Please discuss with provider.      CONTINUE these medications which have NOT CHANGED    Details   acetaminophen (TYLENOL) 500 MG tablet Take 500 mg by mouth every 4 hours as needed for mild pain      ibuprofen (ADVIL/MOTRIN) 800 MG tablet Take 800 mg by mouth every 8 hours      metoprolol tartrate (LOPRESSOR) 25 MG tablet Take 12.5 mg by mouth every evening      montelukast (SINGULAIR) 10 MG tablet Take 5 mg by mouth At Bedtime      tiotropium (SPIRIVA RESPIMAT) 2.5 MCG/ACT inhaler Inhale 2 puffs into the lungs every evening      albuterol (PROAIR HFA/PROVENTIL HFA/VENTOLIN HFA) 108 (90 Base) MCG/ACT inhaler Inhale 3 puffs into the lungs 3 times daily as needed for shortness of breath / dyspnea or wheezing  Qty: 25.5 g, Refills: 3    Comments: Pharmacy may dispense brand covered by insurance (Proair, or proventil or ventolin or generic albuterol inhaler)  Associated Diagnoses: COPD, severe (H); Wheezing      albuterol (PROVENTIL) (2.5 MG/3ML) 0.083% neb solution Take 1 vial (2.5 mg) by nebulization every 6 hours as needed for shortness of breath / dyspnea or wheezing  Qty: 2 Box, Refills: 11    Associated Diagnoses: COPD, severe (H)      Cholecalciferol (D 5000) 5000 UNITS TABS Take 5,000 Units by mouth every 7 days      fluticasone-salmeterol (ADVAIR DISKUS) 500-50 MCG/DOSE inhaler Inhale 1 puff into  the lungs every 12 hours Rinse mouth well after use to prevent Thrush  Qty: 1 Inhaler, Refills: 11    Associated Diagnoses: COPD, severe (H); Panlobular emphysema (H)      folic acid (FOLVITE) 1 MG tablet Take 1 mg by mouth daily   Refills: 0      Lactobacillus-Inulin ([facility] DIGESTIVE [facility]) CAPS Take 1 capsule by mouth daily for 14 days  Qty: 14 capsule, Refills: 0    Associated Diagnoses: Dental infection      nitroGLYcerin (NITROSTAT) 0.4 MG sublingual tablet For chest pain place 1 tablet under the tongue every 5 minutes for 3 doses. If symptoms persist 5 minutes after 1st dose call 911.  Qty: 10 tablet, Refills: 3    Associated Diagnoses: Chest pain, unspecified type      venlafaxine (EFFEXOR-XR) 37.5 MG 24 hr capsule Take 1 capsule (37.5 mg) by mouth daily with food  Qty: 90 capsule, Refills: 3    Associated Diagnoses: Menopausal syndrome (hot flashes)      vitamin B-12 (CYANOCOBALAMIN) 2500 MCG sublingual tablet Take 1 tablet (2,500 mcg) by mouth daily  Qty: 90 tablet, Refills: 3    Associated Diagnoses: Vitamin B12 deficiency         STOP taking these medications       clindamycin (CLEOCIN) 150 MG capsule Comments:   Reason for Stopping:         HYDROcodone-acetaminophen (NORCO) 5-325 MG tablet Comments:   Reason for Stopping:             Allergies   Allergies   Allergen Reactions     Atorvastatin Muscle Pain (Myalgia)     Rosuvastatin Nausea and Vomiting     Sucralfate Nausea and Vomiting     Levofloxacin Nausea and Vomiting     Morphine      Other reaction(s): Chest Pain     Penicillins      Other reaction(s): Respiratory Arrest     Sulfamethoxazole W/Trimethoprim Nausea and Vomiting     Yeast infection

## 2019-12-05 NOTE — PROGRESS NOTES
[facility] DISCHARGE NOTE    Patient discharged to home at 8:56 PM via wheel chair. Accompanied by spouse and staff. Discharge instructions reviewed with patient and spouse, opportunity offered to ask questions. Prescriptions filled and sent with patient upon discharge. All belongings sent with patient.    Lianna Ramirez RN

## 2019-12-05 NOTE — TELEPHONE ENCOUNTER
[facility] Management phone call attempted at 11:13 AM 12/5/2019. Tejal Loredo RN  ....................  12/5/2019   11:23 AM    Transitional Care Management Phone Call    Summary of hospitalization:  [facility] discharge summary reviewed    DISCHARGE DIAGNOSIS: Odontologic Infection    DATE OF DISCHARGE: 12/4/2019    Diagnostic Tests/Treatments reviewed.  Follow up needed: Follow up with Dr. Boyd within 7 days of hospital follow up. Follow up with oral surgeon tomorrow morning at 8am as scheduled.    Post Discharge Medication Reconciliation: discharge medications reconciled, continue medications without change    Medications reviewed by: myself with patient    Problems taking medications regularly:  None    Problems adhering to non-medication therapy:  None    Other Healthcare Providers Involved in Patient s Care:         None    Update since discharge: improved.     Plan of care communicated with: patient    Just a friendly reminder that you appointment is:  Next 5 appointments (look out 90 days)    Dec 12, 2019 10:40 AM CST  Office Visit with Tim Boyd MD  [facility] ([facility]) [address]  [place] [id number] [phone]          We encourage you to keep this appointment.  Please remember to bring all of your pills in their bottles (including any vitamins or over the counter pills) with you to your appointment.   The patient indicates understanding of these issues and agrees with the plan of care.   Yes, plans to follow plan of care and keep the scheduled appointment.    Was the patient contacted within the 2 business days or other approved timeframe?  Yes    Was the Medication reconciliation and management done since the patient was discharged? Yes    Tejal Loredo RN  12/5/2019 10:11 AM

## 2019-12-05 NOTE — PLAN OF CARE
"Right face swelling and tenderness. Pain has been rated 5-6/10, PRN meds given and hot pack placed on right face, has helped decreased pain. Pt eating about 25%-50% of her meals. /59   Pulse 93   Temp 100.2  F (37.9  C) (Tympanic)   Resp 18   Ht 1.575 m (5' 2\")   Wt 60.2 kg (132 lb 12.8 oz)   SpO2 93%   BMI 24.29 kg/m     "

## 2019-12-05 NOTE — PROGRESS NOTES
"[id number]/[id number]  Ember Tavares 59 year old female   Admission date:12/2/2019   Residence: Home with spouse  Code status: Full Code   Isolation:No active isolations   Principal Problem:<principal problem not specified>   Diet: regular  mechanical soft  Mobility Status: Indep  Discharge timeline & plan: Tonight around 2000. Needs last dose of Antibiotics before leaving. After last dose of antibiotic,  will drive pt down to the [facility] for her AM Appointment with her dentist.   Vital signs:  Temp: 100.2  F (37.9  C) Temp src: Tympanic BP: 121/59   Heart Rate: 80 Resp: 18 SpO2: 93 % O2 Device: None (Room air)   Height: 157.5 cm (5' 2\") Weight: 60.2 kg (132 lb 12.8 oz)  Estimated body mass index is 24.29 kg/m  as calculated from the following:    Height as of this encounter: 1.575 m (5' 2\").    Weight as of this encounter: 60.2 kg (132 lb 12.8 oz).  Abnormal Physical Assessment:Right side facial swelling and tenderness.    Last Pain/PRN Medication given: Norco @ 1709, Toradol @ 1048  Finger stick POCT blood sugars:NA  Labs: See lab results  Telemetry: No  Pending tests/procedures planned:NA  Comments: Going home after last dose of Antibiotics.     SAFETY CHECKLIST  Arm Bands/Risk clasps correct and in place (DNR, Fall risk, Allergy, Latex, Limb):  Yes  IVF and rate ordered correct: Yes  Physical assessment verification(IV site, wounds, dressing intact, incisions, LDA's, neuro, CIWA...): Yes  Environmental assessment (bed/chair alarm on, call light, side rails, restraints, sitter....): Yes  Whiteboard updated by oncoming RN:Yes    Nellie Sandy RN on 12/4/2019 at 6:31 PM  Bedside Handoff complete, safety checks verified by writer and are correct.    Lianna Ramirez RN on 12/4/2019 at 7:30 PM            "

## 2019-12-08 LAB
BACTERIA SPEC CULT: NORMAL
BACTERIA SPEC CULT: NORMAL
SPECIMEN SOURCE: NORMAL
SPECIMEN SOURCE: NORMAL

## 2019-12-11 NOTE — PROGRESS NOTES
Nursing Notes:   Uma Draper LPN  12/12/2019 10:53 AM  Signed  Chief Complaint   Patient presents with     Hospital F/U     dental abcess   Patient presents to the clinic today for a hospital follow up for a dental abcess    Medication Reconciliation: completed   Uma Draper LPN  12/12/2019 10:44 AM   Nursing note reviewed with patient.  Accuracy and completeness verified.   Ms. Tavares is a 59 year old female who:  Patient presents with:  Hospital F/U: dental abcess      ICD-10-CM    1. Hospital discharge follow-up Z09    2. History of mandibular surgery Z98.890    3. Open wound of gum S01.502A    4. Dental abscess K04.7 CBC W PLT No Diff     CRP inflammation     Sedimentation Rate (ESR)     levofloxacin (LEVAQUIN) 500 MG tablet     HYDROcodone-acetaminophen (NORCO) 7.5-325 MG per tablet     Sedimentation Rate (ESR)     CRP inflammation     CBC W PLT No Diff   5. SIRS (systemic inflammatory response syndrome) (H) R65.10 CBC W PLT No Diff     CRP inflammation     Sedimentation Rate (ESR)     Sedimentation Rate (ESR)     CRP inflammation     CBC W PLT No Diff   6. Elevated C-reactive protein (CRP) R79.82 CBC W PLT No Diff     CRP inflammation     Sedimentation Rate (ESR)     Sedimentation Rate (ESR)     CRP inflammation     CBC W PLT No Diff   7. Leukocytosis, unspecified type D72.829 CBC W PLT No Diff     CRP inflammation     Sedimentation Rate (ESR)     Sedimentation Rate (ESR)     CRP inflammation     CBC W PLT No Diff   8. Anemia, unspecified type D64.9 CBC W PLT No Diff     CBC W PLT No Diff   9. Elevated procalcitonin R79.89 CBC W PLT No Diff     CRP inflammation     Sedimentation Rate (ESR)     Sedimentation Rate (ESR)     CRP inflammation     CBC W PLT No Diff     HPI  Patient was initially hospitalized at [facility] and subsequently discharged with next-day follow-up with her regimen.  She ended up having additional surgery and jaw reconstruction.    Patient was severely ill.  She met SIRS criteria,  with additional labs showing elevated procalcitonin, suggesting significant risk for systemic infection.    CRP was very high.  Her white blood cell count was elevated.  Hemoglobin was low.   --> She is still having chills/fevers.  Not yet fully recovered. Mouth is very painful. Has open sores in the mouth.   - Recheck lab work today.    She seems to be slowly improving. Having low-grade fevers on/off since surgery.   - Last antibiotics yesterday morning.     + has healing gingival sores on left and right.   Has implant bases now placed.  Wearing temporary lower dentures to protect the healing sores.     Wants to take additional 5 days of levaquin and get labs rechecked.     Was doing some Listerine.  Finds salt water mouth gargling the most effective.  She has been doing this 3 or 4 times daily.    Advised to call if there is any new or worsening symptoms.  20 tablets of Echola sent to pharmacy electronically.    Functional Capacity: normally > 4 METS.   Review of Systems   Constitutional: Positive for chills, fatigue and fever.   HENT: Positive for dental problem. Negative for congestion and hearing loss.         Open gingival sores.. left and right. Left measures about 1x1 cm.   Right facial glands are very tender.    Eyes: Negative for visual disturbance.   Respiratory: Negative for cough, shortness of breath and wheezing.    Cardiovascular: Negative for chest pain and palpitations.   Gastrointestinal: Negative for abdominal pain, diarrhea, nausea and vomiting.   Endocrine: Negative for cold intolerance and heat intolerance.   Genitourinary: Negative for dysuria and hematuria.   Musculoskeletal: Negative for arthralgias and myalgias.   Skin: Negative for rash and wound.   Allergic/Immunologic: Negative for immunocompromised state.   Neurological: Negative for dizziness and light-headedness.   Hematological: Does not bruise/bleed easily.   Psychiatric/Behavioral: Negative for agitation and confusion.     "    Problem List/PMH: reviewed in EMR, and made relevant updates today.  Medications: reviewed in EMR, and made relevant updates today.  Allergies: reviewed in EMR, and made relevant updates today.  I reviewed family and social history and made relevant updates today.  Social History     Tobacco Use     Smoking status: Former Smoker     Packs/day: 0.50     Years: 32.00     Pack years: 16.00     Types: Cigarettes     Last attempt to quit: 2012     Years since quittin.4     Smokeless tobacco: Never Used     Tobacco comment: Quit smoking: quit date of 2012   Substance Use Topics     Alcohol use: Yes     Alcohol/week: 2.0 standard drinks     Comment: Alcoholic Drinks/day: 1-2 drinks every 1-2 weeks     Drug use: No      Family History   Problem Relation Age of Onset     Arthritis Father         Arthritis     Emphysema Father 51        Alpha-1-AT deficiency     Peripheral Vascular Disease Mother         Peripheral vascular disease     Diabetes Mother         Diabetes     Arthritis Mother         Arthritis     Breast Cancer Sister         Premenopausal breast cancer     Eczema Brother         Eczema     Narcolepsy Brother      Other - See Comments Daughter         Narcolepsy     Narcolepsy Daughter      Brain Tumor Daughter      Seizure Disorder Daughter      Depression Daughter      Graves' disease Daughter        EXAM:   Vitals:    19 1046   BP: 112/62   BP Location: Right arm   Patient Position: Sitting   Cuff Size: Adult Regular   Pulse: 90   Resp: 16   Temp: 97.5  F (36.4  C)   TempSrc: Tympanic   SpO2: 95%   Weight: 58.3 kg (128 lb 9.6 oz)   Height: 1.575 m (5' 2\")       Current Pain Score: Mild Pain (2)     BP Readings from Last 3 Encounters:   19 112/62   19 117/63   19 128/67      Wt Readings from Last 3 Encounters:   19 58.3 kg (128 lb 9.6 oz)   19 60.2 kg (132 lb 12.8 oz)   19 60.2 kg (132 lb 12.8 oz)      Estimated body mass index is 23.52 kg/m  as " "calculated from the following:    Height as of this encounter: 1.575 m (5' 2\").    Weight as of this encounter: 58.3 kg (128 lb 9.6 oz).     Physical Exam  HENT:      Mouth/Throat:      Pharynx: No oropharyngeal exudate.      Comments: Healing lower gingival sores right > left side.   Has dental post implants.   Right facial glandular tenderness to palpation.  Eyes:      General: No scleral icterus.     Conjunctiva/sclera: Conjunctivae normal.   Cardiovascular:      Rate and Rhythm: Normal rate.   Pulmonary:      Effort: Pulmonary effort is normal.   Lymphadenopathy:      Cervical: No cervical adenopathy.   Neurological:      Mental Status: She is alert. Mental status is at baseline.   Psychiatric:         Mood and Affect: Mood normal.          Procedures   INVESTIGATIONS:  Results for orders placed or performed in visit on 12/12/19   Sedimentation Rate (ESR)     Status: Abnormal   Result Value Ref Range    Sed Rate 82 (H) 1 - 15 mm/h   CRP inflammation     Status: Abnormal   Result Value Ref Range    CRP Inflammation 4.1 (H) <0.5 mg/L   CBC W PLT No Diff     Status: Abnormal   Result Value Ref Range    WBC 10.8 4.0 - 11.0 10e9/L    RBC Count 4.22 3.8 - 5.2 10e12/L    Hemoglobin 12.4 11.7 - 15.7 g/dL    Hematocrit 38.6 35.0 - 47.0 %    MCV 92 78 - 100 fl    MCH 29.4 26.5 - 33.0 pg    MCHC 32.1 31.5 - 36.5 g/dL    RDW 13.5 10.0 - 15.0 %    Platelet Count 682 (H) 150 - 450 10e9/L       ASSESSMENT AND PLAN:  Problem List Items Addressed This Visit        Infectious/Inflammatory    Dental abscess    Relevant Medications    HYDROcodone-acetaminophen (NORCO) 7.5-325 MG per tablet    Other Relevant Orders    CBC W PLT No Diff (Completed)    CRP inflammation (Completed)    Sedimentation Rate (ESR) (Completed)       Other    History of mandibular surgery      Other Visit Diagnoses     Hospital discharge follow-up    -  Primary    Open wound of gum        SIRS (systemic inflammatory response syndrome) (H)        Relevant " Orders    CBC W PLT No Diff (Completed)    CRP inflammation (Completed)    Sedimentation Rate (ESR) (Completed)    Elevated C-reactive protein (CRP)        Relevant Orders    CBC W PLT No Diff (Completed)    CRP inflammation (Completed)    Sedimentation Rate (ESR) (Completed)    Leukocytosis, unspecified type        Relevant Orders    CBC W PLT No Diff (Completed)    CRP inflammation (Completed)    Sedimentation Rate (ESR) (Completed)    Anemia, unspecified type        Relevant Orders    CBC W PLT No Diff (Completed)    Elevated procalcitonin        Relevant Orders    CBC W PLT No Diff (Completed)    CRP inflammation (Completed)    Sedimentation Rate (ESR) (Completed)        reviewed diet, exercise and weight control  -- Expected clinical course discussed    -- Medications and their side effects discussed    The 10-year ASCVD risk score (Javiercomfort TEAGUE Jr., et al., 2013) is: 2.8%    Values used to calculate the score:      Age: 59 years      Sex: Female      Is Non- : No      Diabetic: No      Tobacco smoker: No      Systolic Blood Pressure: 112 mmHg      Is BP treated: No      HDL Cholesterol: 38 mg/dL      Total Cholesterol: 206 mg/dL    Patient Instructions   Glad you are starting to feel better....     Hemoglobin was down, due to your acute inflammation / infection.     -- recheck labs.     Take levaquin for 5 more days.     Mouth sores are not infected at this time.     Use Norco at bedtime as needed for pain / sleep.     Return as needed for follow-up with Dr. Boyd.    Clinic : [phone]  Appointment line: [phone]     Tim Boyd MD  Internal Medicine  [facility]     Portions of this note were dictated using speech recognition software. The note has been proofread but errors in the text may have been overlooked. Please contact me if there are any concerns regarding the accuracy of the dictation.

## 2019-12-12 ENCOUNTER — OFFICE VISIT (OUTPATIENT)
Dept: INTERNAL MEDICINE | Facility: OTHER | Age: 59
End: 2019-12-12
Attending: INTERNAL MEDICINE
Payer: COMMERCIAL

## 2019-12-12 VITALS
OXYGEN SATURATION: 95 % | BODY MASS INDEX: 23.66 KG/M2 | DIASTOLIC BLOOD PRESSURE: 62 MMHG | SYSTOLIC BLOOD PRESSURE: 112 MMHG | HEART RATE: 90 BPM | TEMPERATURE: 97.5 F | HEIGHT: 62 IN | WEIGHT: 128.6 LBS | RESPIRATION RATE: 16 BRPM

## 2019-12-12 DIAGNOSIS — D72.829 LEUKOCYTOSIS, UNSPECIFIED TYPE: ICD-10-CM

## 2019-12-12 DIAGNOSIS — R79.89 ELEVATED PROCALCITONIN: ICD-10-CM

## 2019-12-12 DIAGNOSIS — Z98.890 HISTORY OF MANDIBULAR SURGERY: ICD-10-CM

## 2019-12-12 DIAGNOSIS — R65.10 SIRS (SYSTEMIC INFLAMMATORY RESPONSE SYNDROME) (H): ICD-10-CM

## 2019-12-12 DIAGNOSIS — Z09 HOSPITAL DISCHARGE FOLLOW-UP: Primary | ICD-10-CM

## 2019-12-12 DIAGNOSIS — D64.9 ANEMIA, UNSPECIFIED TYPE: ICD-10-CM

## 2019-12-12 DIAGNOSIS — S01.502A: ICD-10-CM

## 2019-12-12 DIAGNOSIS — K04.7 DENTAL ABSCESS: ICD-10-CM

## 2019-12-12 DIAGNOSIS — R79.82 ELEVATED C-REACTIVE PROTEIN (CRP): ICD-10-CM

## 2019-12-12 LAB
CRP SERPL-MCNC: 4.1 MG/L
ERYTHROCYTE [DISTWIDTH] IN BLOOD BY AUTOMATED COUNT: 13.5 % (ref 10–15)
ERYTHROCYTE [SEDIMENTATION RATE] IN BLOOD BY WESTERGREN METHOD: 82 MM/H (ref 1–15)
HCT VFR BLD AUTO: 38.6 % (ref 35–47)
HGB BLD-MCNC: 12.4 G/DL (ref 11.7–15.7)
MCH RBC QN AUTO: 29.4 PG (ref 26.5–33)
MCHC RBC AUTO-ENTMCNC: 32.1 G/DL (ref 31.5–36.5)
MCV RBC AUTO: 92 FL (ref 78–100)
PLATELET # BLD AUTO: 682 10E9/L (ref 150–450)
RBC # BLD AUTO: 4.22 10E12/L (ref 3.8–5.2)
WBC # BLD AUTO: 10.8 10E9/L (ref 4–11)

## 2019-12-12 PROCEDURE — 99214 OFFICE O/P EST MOD 30 MIN: CPT | Performed by: INTERNAL MEDICINE

## 2019-12-12 PROCEDURE — 85027 COMPLETE CBC AUTOMATED: CPT | Mod: ZL | Performed by: INTERNAL MEDICINE

## 2019-12-12 PROCEDURE — 85652 RBC SED RATE AUTOMATED: CPT | Mod: ZL | Performed by: INTERNAL MEDICINE

## 2019-12-12 PROCEDURE — 36415 COLL VENOUS BLD VENIPUNCTURE: CPT | Mod: ZL | Performed by: INTERNAL MEDICINE

## 2019-12-12 PROCEDURE — 86140 C-REACTIVE PROTEIN: CPT | Mod: ZL | Performed by: INTERNAL MEDICINE

## 2019-12-12 RX ORDER — LEVOFLOXACIN 500 MG/1
500 TABLET, FILM COATED ORAL DAILY
Qty: 5 TABLET | Refills: 0 | Status: SHIPPED | OUTPATIENT
Start: 2019-12-12 | End: 2019-12-17

## 2019-12-12 RX ORDER — HYDROCODONE BITARTRATE AND ACETAMINOPHEN 7.5; 325 MG/1; MG/1
1 TABLET ORAL EVERY 6 HOURS PRN
Qty: 20 TABLET | Refills: 0 | Status: SHIPPED | OUTPATIENT
Start: 2019-12-12 | End: 2020-10-22

## 2019-12-12 ASSESSMENT — ENCOUNTER SYMPTOMS
CONFUSION: 0
CHILLS: 1
WHEEZING: 0
ABDOMINAL PAIN: 0
MYALGIAS: 0
SHORTNESS OF BREATH: 0
LIGHT-HEADEDNESS: 0
BRUISES/BLEEDS EASILY: 0
DYSURIA: 0
FEVER: 1
NAUSEA: 0
ARTHRALGIAS: 0
PALPITATIONS: 0
VOMITING: 0
AGITATION: 0
FATIGUE: 1
DIZZINESS: 0
WOUND: 0
HEMATURIA: 0
COUGH: 0
DIARRHEA: 0

## 2019-12-12 ASSESSMENT — PAIN SCALES - GENERAL: PAINLEVEL: MILD PAIN (2)

## 2019-12-12 ASSESSMENT — MIFFLIN-ST. JEOR: SCORE: 1116.58

## 2019-12-12 NOTE — LETTER
December 12, 2019      Ember Tavares  [address] [place] [address]        Dear ,    We are writing to inform you of your test results.    CRP and Sed rate are better, but still high.   Start levaquin as discussed in clinic.    Hemoglobin has improved.     If you develop more infection related symptoms, please let me know and we can get you a second antibiotic.    Resulted Orders   Sedimentation Rate (ESR)   Result Value Ref Range    Sed Rate 82 (H) 1 - 15 mm/h   CRP inflammation   Result Value Ref Range    CRP Inflammation 4.1 (H) <0.5 mg/L   CBC W PLT No Diff   Result Value Ref Range    WBC 10.8 4.0 - 11.0 10e9/L    RBC Count 4.22 3.8 - 5.2 10e12/L    Hemoglobin 12.4 11.7 - 15.7 g/dL    Hematocrit 38.6 35.0 - 47.0 %    MCV 92 78 - 100 fl    MCH 29.4 26.5 - 33.0 pg    MCHC 32.1 31.5 - 36.5 g/dL    RDW 13.5 10.0 - 15.0 %    Platelet Count 682 (H) 150 - 450 10e9/L       If you have any questions or concerns, please call the clinic at the number listed above.       Sincerely,        Tim Boyd MD

## 2019-12-12 NOTE — PATIENT INSTRUCTIONS
Glad you are starting to feel better....     Hemoglobin was down, due to your acute inflammation / infection.     -- recheck labs.     Take levaquin for 5 more days.     Mouth sores are not infected at this time.     Use Norco at bedtime as needed for pain / sleep.     Return as needed for follow-up with Dr. Boyd.    Clinic : [phone]  Appointment line: [phone]

## 2019-12-12 NOTE — NURSING NOTE
Chief Complaint   Patient presents with     Hospital F/U     dental abcess   Patient presents to the clinic today for a hospital follow up for a dental abcess    Medication Reconciliation: completed   Uma Draper LPN  12/12/2019 10:44 AM

## 2019-12-30 ENCOUNTER — MYC REFILL (OUTPATIENT)
Dept: INTERNAL MEDICINE | Facility: OTHER | Age: 59
End: 2019-12-30

## 2019-12-30 DIAGNOSIS — J44.9 COPD, SEVERE (H): ICD-10-CM

## 2019-12-30 DIAGNOSIS — R06.2 WHEEZING: Primary | ICD-10-CM

## 2019-12-30 DIAGNOSIS — R06.2 WHEEZING: ICD-10-CM

## 2019-12-31 RX ORDER — ALBUTEROL SULFATE 90 UG/1
3 AEROSOL, METERED RESPIRATORY (INHALATION) 3 TIMES DAILY PRN
Qty: 25.5 G | Refills: 3 | Status: SHIPPED | OUTPATIENT
Start: 2019-12-31 | End: 2020-09-02

## 2019-12-31 RX ORDER — ALBUTEROL SULFATE 90 UG/1
AEROSOL, METERED RESPIRATORY (INHALATION)
Qty: 25.5 G | Refills: 3 | OUTPATIENT
Start: 2019-12-31

## 2019-12-31 NOTE — TELEPHONE ENCOUNTER
Last office visit 12-12-19, Hospital follow up-no future office visits.      Refill pending.      Melissa Li LPN 12/31/2019 11:07 AM

## 2019-12-31 NOTE — TELEPHONE ENCOUNTER
"Requested Prescriptions   Pending Prescriptions Disp Refills     albuterol (PROAIR HFA/PROVENTIL HFA/VENTOLIN HFA) 108 (90 Base) MCG/ACT inhaler [Pharmacy Med Name: Albuterol Sulfate  (90 Base) MCG/ACT Inhalation Aerosol Solution (ProAir HFA)] 25.5 g 3     Sig: Inhale 3 Puffs into the lungs three times a day as needed.       Asthma Maintenance Inhalers - Anticholinergics Passed - 12/30/2019 11:55 AM        Passed - Patient is age 12 years or older        Passed - Recent (12 mo) or future (30 days) visit within the authorizing provider's specialty     Patient has had an office visit with the authorizing provider or a provider within the authorizing providers department within the previous 12 mos or has a future within next 30 days. See \"Patient Info\" tab in inbasket, or \"Choose Columns\" in Meds & Orders section of the refill encounter.              Passed - Medication is active on med list        Talked with Ruthie at [facility] this request was sent in error.  Patient picked up yesterday and has 1 refill remaining.  Nava Joiner RN on 12/31/2019 at 8:58 AM    "

## 2020-01-15 ENCOUNTER — MYC REFILL (OUTPATIENT)
Dept: INTERNAL MEDICINE | Facility: OTHER | Age: 60
End: 2020-01-15

## 2020-01-15 DIAGNOSIS — R06.2 WHEEZING: ICD-10-CM

## 2020-01-15 DIAGNOSIS — J44.9 COPD, SEVERE (H): ICD-10-CM

## 2020-01-15 RX ORDER — ALBUTEROL SULFATE 90 UG/1
3 AEROSOL, METERED RESPIRATORY (INHALATION) 3 TIMES DAILY PRN
Qty: 25.5 G | Refills: 3 | Status: CANCELLED | OUTPATIENT
Start: 2020-01-15

## 2020-03-06 ENCOUNTER — ALLIED HEALTH/NURSE VISIT (OUTPATIENT)
Dept: FAMILY MEDICINE | Facility: OTHER | Age: 60
End: 2020-03-06
Attending: INTERNAL MEDICINE
Payer: COMMERCIAL

## 2020-03-06 DIAGNOSIS — Z11.1 SCREENING EXAMINATION FOR PULMONARY TUBERCULOSIS: Primary | ICD-10-CM

## 2020-03-06 PROCEDURE — 86580 TB INTRADERMAL TEST: CPT

## 2020-03-06 NOTE — PROGRESS NOTES
Verified patient's first and last name, and . Patient stated reason for visit today is to receive mantoux for employment. The patient is asked the following questions today and these are her answers:    -Have you had a mantoux administered in the past 30 days?    No  -Have you had a previous positive Mantoux.  No  -Have you received BCG in the past.  No  -Have you had a live vaccine  (MMR, Varicella, OPV, Yellow Fever) in the last 6 weeks.  No  -Have you had and active  viral or bacterial infection in the past 6 weeks.  No  -Have you received corticosteroids or immunosuppressive agents in the past 6 weeks.  No  -Have you been diagnosed with HIV?  No  -Do you have a malignancy?  No    Mantoux Questionnaire: answers were all negative.    Mantoux given. Documented in EHR and on mantoux report form. Patient to return on 2020 for reading.       Jazmine OTTN, RN on 3/6/2020 at 8:47 AM

## 2020-03-09 ENCOUNTER — ALLIED HEALTH/NURSE VISIT (OUTPATIENT)
Dept: FAMILY MEDICINE | Facility: OTHER | Age: 60
End: 2020-03-09
Payer: COMMERCIAL

## 2020-03-09 DIAGNOSIS — Z11.1 SCREENING EXAMINATION FOR PULMONARY TUBERCULOSIS: Primary | ICD-10-CM

## 2020-03-09 LAB
PPDINDURATION: 0 MM (ref 0–5)
PPDREDNESS: 0 MM

## 2020-03-09 PROCEDURE — 99207 ZZC NO CHARGE NURSE ONLY: CPT

## 2020-03-09 NOTE — PROGRESS NOTES
Verified patient's first and last name, and . Patient stated reason for visit today is to complete mantoux reading. Mantoux result:  Lab Results   Component Value Date    PPDREDNESS 0 2020    PPDINDURATIO 0 2020     Is induration greater than 5mm?  No     Mantoux report form completed. Original given to patient for documentation purposes. Copy sent to HIM to be scanned into EHR.       Jazmine OTTN, RN on 3/9/2020 at 8:37 AM

## 2020-03-11 ENCOUNTER — HEALTH MAINTENANCE LETTER (OUTPATIENT)
Age: 60
End: 2020-03-11

## 2020-03-23 ENCOUNTER — NURSE TRIAGE (OUTPATIENT)
Dept: INTERNAL MEDICINE | Facility: OTHER | Age: 60
End: 2020-03-23

## 2020-03-23 DIAGNOSIS — J01.90 ACUTE SINUSITIS, RECURRENCE NOT SPECIFIED, UNSPECIFIED LOCATION: Primary | ICD-10-CM

## 2020-03-23 NOTE — TELEPHONE ENCOUNTER
S-(situation): patient calling and states that she has been having sinus issues for about the last couple months    B-(background): patient states has history of sinus infections    A-(assessment): patient states that sinus drainage has changed from clear to yellow.  States has a little cough due to sinus drainage.  Some sinus pain across bridge of nose and between eyes.  States has been using netipot and started Singulair  Denies fever, shortness of breath    R-(recommendations): patient would like Rx for sinus infections sent into [facility].    Informed patient that Dr. Boyd working in ER and will route to him to see if he will be able to address.    Lizet Spence RN on 3/23/2020 at 11:50 AM

## 2020-03-25 RX ORDER — AZITHROMYCIN 250 MG/1
TABLET, FILM COATED ORAL
Qty: 6 TABLET | Refills: 0 | Status: SHIPPED | OUTPATIENT
Start: 2020-03-25 | End: 2020-03-30

## 2020-03-26 NOTE — TELEPHONE ENCOUNTER
Called and informed patient of Dr. Gordon's response and patient verbalized understanding.    Lizet Spence RN on 3/26/2020 at 8:09 AM

## 2020-09-02 ENCOUNTER — MYC REFILL (OUTPATIENT)
Dept: INTERNAL MEDICINE | Facility: OTHER | Age: 60
End: 2020-09-02

## 2020-09-02 DIAGNOSIS — R06.2 WHEEZING: ICD-10-CM

## 2020-09-02 DIAGNOSIS — J44.9 COPD, SEVERE (H): ICD-10-CM

## 2020-09-02 NOTE — LETTER
September 3, 2020      Ember Tavares  [address] [place] [address]        Dear Ember,     A refill of albuterol (PROAIR HFA/PROVENTIL HFA/VENTOLIN HFA) 108 (90 Base) has been requested by your pharmacy.  We noticed that it has been greater than 12 months since your last comprehensive visit.  A limited 30 day supply has been sent to your pharmacy at this time.    Additional refills require a medication management appointment.  Your health is very important to us.  Please call the clinic at [phone] to schedule your appointment.    Thank you,    The Refill Nurse  [facility]

## 2020-09-03 RX ORDER — ALBUTEROL SULFATE 90 UG/1
3 AEROSOL, METERED RESPIRATORY (INHALATION) 3 TIMES DAILY PRN
Qty: 8.5 G | Refills: 0 | Status: SHIPPED | OUTPATIENT
Start: 2020-09-03 | End: 2020-09-14

## 2020-09-03 NOTE — TELEPHONE ENCOUNTER
Robin TOBIN sent Rx request for the following: albuterol (PROAIR HFA/PROVENTIL HFA/VENTOLIN HFA) 108 (90 Base)  Sig Inhale 3 puffs into the lungs 3 times daily as needed for shortness of breath / dyspnea or wheezing    Last Prescription Date:   12/31/19  Last Fill Qty/Refills:         25.5g, R-3    Last Office Visit:              9/27/19   Future Office visit:           None    Pt is due for medication management appt.  Will send appt reminder letter, add note to Rx, will give limited 30 day supply.     Prescription refilled per RN Medication Refill Policy.................... Jewell Aguilera RN ....................  9/3/2020   3:27 PM

## 2020-09-13 DIAGNOSIS — J44.9 COPD, SEVERE (H): ICD-10-CM

## 2020-09-13 DIAGNOSIS — R06.2 WHEEZING: ICD-10-CM

## 2020-09-14 RX ORDER — ALBUTEROL SULFATE 90 UG/1
AEROSOL, METERED RESPIRATORY (INHALATION)
Qty: 25.5 G | Refills: 0 | Status: SHIPPED | OUTPATIENT
Start: 2020-09-14 | End: 2020-10-22

## 2020-09-14 NOTE — TELEPHONE ENCOUNTER
"[facility] sent Rx request for the following:   PROAIR  (90 Base) MCG/ACT inhaler  Sig: INHALE 3 PUFFS INTO THE LUNGS THREE TIMES DAILY AS NEEDED FOR SHORTNESS OF BREATH OR WHEEZING    Last Prescription Date:   09/03/2020  Last Fill Qty/Refills:         8.5g, R-0    Last Office Visit:              12/12/2019 (Deb)  Future Office visit:           None noted   Asthma Maintenance Inhalers - Anticholinergics Passed -        Passed - Patient is age 12 years or older        Passed - Recent (12 mo) or future (30 days) visit within the authorizing provider's specialty     Patient has had an office visit with the authorizing provider or a provider within the authorizing providers department within the previous 12 mos or has a future within next 30 days. See \"Patient Info\" tab in inbasket, or \"Choose Columns\" in Meds & Orders section of the refill encounter.              Passed - Medication is active on med list       Short-Acting Beta Agonist Inhalers Protocol  Passed - 9/13/2020  4:13 AM        Passed - Patient is age 12 or older        Passed - Recent (12 mo) or future (30 days) visit within the authorizing provider's specialty     Patient has had an office visit with the authorizing provider or a provider within the authorizing providers department within the previous 12 mos or has a future within next 30 days. See \"Patient Info\" tab in inbasket, or \"Choose Columns\" in Meds & Orders section of the refill encounter.              Passed - Medication is active on med list           Prescription approved per [facility] Refill Protocol.  Will give 90 day refill to reach previous office visit due date.  Amanda Hou RN ....................  9/14/2020   2:30 PM        "

## 2020-10-22 ENCOUNTER — OFFICE VISIT (OUTPATIENT)
Dept: INTERNAL MEDICINE | Facility: OTHER | Age: 60
End: 2020-10-22
Attending: INTERNAL MEDICINE
Payer: COMMERCIAL

## 2020-10-22 VITALS
DIASTOLIC BLOOD PRESSURE: 70 MMHG | HEART RATE: 99 BPM | HEIGHT: 62 IN | TEMPERATURE: 97.3 F | RESPIRATION RATE: 16 BRPM | BODY MASS INDEX: 21.46 KG/M2 | WEIGHT: 116.6 LBS | SYSTOLIC BLOOD PRESSURE: 110 MMHG | OXYGEN SATURATION: 99 %

## 2020-10-22 DIAGNOSIS — E55.9 VITAMIN D DEFICIENCY: ICD-10-CM

## 2020-10-22 DIAGNOSIS — Z23 NEED FOR IMMUNIZATION AGAINST INFLUENZA: ICD-10-CM

## 2020-10-22 DIAGNOSIS — J44.9 COPD, SEVERE (H): ICD-10-CM

## 2020-10-22 DIAGNOSIS — Z00.00 ANNUAL PHYSICAL EXAM: Primary | ICD-10-CM

## 2020-10-22 DIAGNOSIS — L40.50 PSORIATIC ARTHRITIS (H): ICD-10-CM

## 2020-10-22 DIAGNOSIS — E53.8 VITAMIN B12 DEFICIENCY: ICD-10-CM

## 2020-10-22 DIAGNOSIS — J43.1 PANLOBULAR EMPHYSEMA (H): ICD-10-CM

## 2020-10-22 DIAGNOSIS — K59.09 INTERMITTENT CONSTIPATION: ICD-10-CM

## 2020-10-22 DIAGNOSIS — Z98.890 S/P NISSEN FUNDOPLICATION (WITHOUT GASTROSTOMY TUBE) PROCEDURE: ICD-10-CM

## 2020-10-22 DIAGNOSIS — I70.0 ATHEROSCLEROSIS OF ABDOMINAL AORTA (H): ICD-10-CM

## 2020-10-22 DIAGNOSIS — E78.2 MIXED HYPERLIPIDEMIA: ICD-10-CM

## 2020-10-22 DIAGNOSIS — Z14.8 ALPHA-1-ANTITRYPSIN DEFICIENCY CARRIER: ICD-10-CM

## 2020-10-22 LAB
ALBUMIN SERPL-MCNC: 4.5 G/DL (ref 3.5–5.7)
ALP SERPL-CCNC: 61 U/L (ref 34–104)
ALT SERPL W P-5'-P-CCNC: 21 U/L (ref 7–52)
ANION GAP SERPL CALCULATED.3IONS-SCNC: 6 MMOL/L (ref 3–14)
AST SERPL W P-5'-P-CCNC: 16 U/L (ref 13–39)
BASOPHILS # BLD AUTO: 0.1 10E9/L (ref 0–0.2)
BASOPHILS NFR BLD AUTO: 0.8 %
BILIRUB SERPL-MCNC: 0.5 MG/DL (ref 0.3–1)
BUN SERPL-MCNC: 10 MG/DL (ref 7–25)
CALCIUM SERPL-MCNC: 9.6 MG/DL (ref 8.6–10.3)
CHLORIDE SERPL-SCNC: 101 MMOL/L (ref 98–107)
CHOLEST SERPL-MCNC: 219 MG/DL
CO2 SERPL-SCNC: 32 MMOL/L (ref 21–31)
CREAT SERPL-MCNC: 0.81 MG/DL (ref 0.6–1.2)
DEPRECATED CALCIDIOL+CALCIFEROL SERPL-MC: 34.2 NG/ML
DIFFERENTIAL METHOD BLD: ABNORMAL
EOSINOPHIL # BLD AUTO: 0.2 10E9/L (ref 0–0.7)
EOSINOPHIL NFR BLD AUTO: 1.5 %
ERYTHROCYTE [DISTWIDTH] IN BLOOD BY AUTOMATED COUNT: 13.1 % (ref 10–15)
GFR SERPL CREATININE-BSD FRML MDRD: 73 ML/MIN/{1.73_M2}
GLUCOSE SERPL-MCNC: 104 MG/DL (ref 70–105)
HCT VFR BLD AUTO: 47.7 % (ref 35–47)
HDLC SERPL-MCNC: 46 MG/DL (ref 23–92)
HGB BLD-MCNC: 15.5 G/DL (ref 11.7–15.7)
IMM GRANULOCYTES # BLD: 0 10E9/L (ref 0–0.4)
IMM GRANULOCYTES NFR BLD: 0.3 %
LDLC SERPL CALC-MCNC: 142 MG/DL
LYMPHOCYTES # BLD AUTO: 2.7 10E9/L (ref 0.8–5.3)
LYMPHOCYTES NFR BLD AUTO: 24 %
MCH RBC QN AUTO: 29.5 PG (ref 26.5–33)
MCHC RBC AUTO-ENTMCNC: 32.5 G/DL (ref 31.5–36.5)
MCV RBC AUTO: 91 FL (ref 78–100)
MONOCYTES # BLD AUTO: 0.8 10E9/L (ref 0–1.3)
MONOCYTES NFR BLD AUTO: 6.6 %
NEUTROPHILS # BLD AUTO: 7.6 10E9/L (ref 1.6–8.3)
NEUTROPHILS NFR BLD AUTO: 66.8 %
NONHDLC SERPL-MCNC: 173 MG/DL
PLATELET # BLD AUTO: 332 10E9/L (ref 150–450)
POTASSIUM SERPL-SCNC: 4.5 MMOL/L (ref 3.5–5.1)
PROT SERPL-MCNC: 7.1 G/DL (ref 6.4–8.9)
RBC # BLD AUTO: 5.25 10E12/L (ref 3.8–5.2)
SODIUM SERPL-SCNC: 139 MMOL/L (ref 134–144)
TRIGL SERPL-MCNC: 153 MG/DL
TSH SERPL DL<=0.05 MIU/L-ACNC: 1.73 IU/ML (ref 0.34–5.6)
VIT B12 SERPL-MCNC: 273 PG/ML (ref 180–914)
WBC # BLD AUTO: 11.4 10E9/L (ref 4–11)

## 2020-10-22 PROCEDURE — 80061 LIPID PANEL: CPT | Mod: ZL | Performed by: INTERNAL MEDICINE

## 2020-10-22 PROCEDURE — 36415 COLL VENOUS BLD VENIPUNCTURE: CPT | Mod: ZL | Performed by: INTERNAL MEDICINE

## 2020-10-22 PROCEDURE — 80053 COMPREHEN METABOLIC PANEL: CPT | Mod: ZL | Performed by: INTERNAL MEDICINE

## 2020-10-22 PROCEDURE — 84443 ASSAY THYROID STIM HORMONE: CPT | Mod: ZL | Performed by: INTERNAL MEDICINE

## 2020-10-22 PROCEDURE — 85025 COMPLETE CBC W/AUTO DIFF WBC: CPT | Mod: ZL | Performed by: INTERNAL MEDICINE

## 2020-10-22 PROCEDURE — 90471 IMMUNIZATION ADMIN: CPT | Performed by: INTERNAL MEDICINE

## 2020-10-22 PROCEDURE — 82607 VITAMIN B-12: CPT | Mod: ZL | Performed by: INTERNAL MEDICINE

## 2020-10-22 PROCEDURE — 99396 PREV VISIT EST AGE 40-64: CPT | Mod: 25 | Performed by: INTERNAL MEDICINE

## 2020-10-22 PROCEDURE — 90686 IIV4 VACC NO PRSV 0.5 ML IM: CPT | Performed by: INTERNAL MEDICINE

## 2020-10-22 PROCEDURE — 82306 VITAMIN D 25 HYDROXY: CPT | Mod: ZL | Performed by: INTERNAL MEDICINE

## 2020-10-22 RX ORDER — ALBUTEROL SULFATE 0.83 MG/ML
2.5 SOLUTION RESPIRATORY (INHALATION) EVERY 6 HOURS PRN
Qty: 2 BOX | Refills: 11 | Status: SHIPPED | OUTPATIENT
Start: 2020-10-22 | End: 2022-10-07

## 2020-10-22 RX ORDER — ASPIRIN 81 MG
200 TABLET, DELAYED RELEASE (ENTERIC COATED) ORAL 2 TIMES DAILY
Qty: 120 TABLET | Refills: 11 | Status: SHIPPED | OUTPATIENT
Start: 2020-10-22 | End: 2022-10-07

## 2020-10-22 RX ORDER — ALBUTEROL SULFATE 90 UG/1
1-2 AEROSOL, METERED RESPIRATORY (INHALATION) EVERY 4 HOURS PRN
Qty: 3 INHALER | Refills: 4 | Status: SHIPPED | OUTPATIENT
Start: 2020-10-22 | End: 2021-07-01

## 2020-10-22 ASSESSMENT — PAIN SCALES - GENERAL: PAINLEVEL: NO PAIN (0)

## 2020-10-22 ASSESSMENT — ENCOUNTER SYMPTOMS
PALPITATIONS: 0
BRUISES/BLEEDS EASILY: 0
CHILLS: 0
HEMATURIA: 0
ABDOMINAL PAIN: 0
VOMITING: 0
NAUSEA: 0
MYALGIAS: 0
AGITATION: 0
WOUND: 0
DIZZINESS: 0
DIARRHEA: 0
ARTHRALGIAS: 1
COUGH: 0
FEVER: 0
LIGHT-HEADEDNESS: 0
DYSURIA: 0
CONFUSION: 0
SHORTNESS OF BREATH: 1
WHEEZING: 0

## 2020-10-22 ASSESSMENT — MIFFLIN-ST. JEOR: SCORE: 1062.14

## 2020-10-22 NOTE — PATIENT INSTRUCTIONS
Labs today.   Blood pressure is well controlled.     Glad your mouth / teeth have healed up.     Medications refilled / updated.     Start low-dose aspirin 1-2 x weekly.     Return in approximately 1 year, or sooner as needed for follow-up with Dr. Boyd.  - Annual Follow-up / Physical    Clinic : [phone]  Appointment line: [phone]

## 2020-10-22 NOTE — LETTER
October 23, 2020      Ember Tavares  [address] [place] [address]        Dear MsKathleen,    We are writing to inform you of your test results.    Thyroid levels are normal.    LDL cholesterol level is high.    -- Consider a statin medication to help manage cholesterol and reduce heart attack risk.    Any of the statins would be acceptable, such as Lipitor or Crestor, vs others.     Vitamin B12 level is very low.    -- Start vitamin B12 2500 IU every other day.  Alternate every other day with a B complex with B12 in it.  Vitamin B12 is important to help with energy, mood, balance, nerve health, blood and bone health.    Vitamin D is a little low.    -- Consider adding 2000 IU of vitamin D3 daily.     Labs are otherwise stable.  Continue current medications.    Tim Boyd MD     Resulted Orders   TSH Reflex GH   Result Value Ref Range    TSH Reflex 1.73 0.34 - 5.60 IU/mL   Lipid Panel   Result Value Ref Range    Cholesterol 219 (H) <200 mg/dL    Triglycerides 153 (H) <150 mg/dL      Comment:      Borderline high:  150-199 mg/dl  High:             200-499 mg/dl  Very high:       >499 mg/dl      HDL Cholesterol 46 23 - 92 mg/dL    LDL Cholesterol Calculated 142 (H) <100 mg/dL      Comment:      Above desirable:  100-129 mg/dl  Borderline High:  130-159 mg/dL  High:             160-189 mg/dL  Very high:       >189 mg/dl      Non HDL Cholesterol 173 (H) <130 mg/dL      Comment:      Above Desirable:  130-159 mg/dl  Borderline high:  160-189 mg/dl  High:             190-219 mg/dl  Very high:       >219 mg/dl     Vitamin B12   Result Value Ref Range    Vitamin B12 273 180 - 914 pg/mL   Vitamin D Total   Result Value Ref Range    Vitamin D Total 34.2 ng/mL      Comment:      Comments:  Deficiency:             <10 ng/mL  Insufficiency:        10-29 ng/mL  Sufficiency:          ng/mL  Possible Toxicity:     >100 ng/mL     Comprehensive Metabolic Panel   Result Value Ref Range    Sodium 139 134 -  144 mmol/L    Potassium 4.5 3.5 - 5.1 mmol/L    Chloride 101 98 - 107 mmol/L    Carbon Dioxide 32 (H) 21 - 31 mmol/L    Anion Gap 6 3 - 14 mmol/L    Glucose 104 70 - 105 mg/dL    Urea Nitrogen 10 7 - 25 mg/dL    Creatinine 0.81 0.60 - 1.20 mg/dL    GFR Estimate 73 >60 mL/min/[1.73_m2]    GFR Estimate If Black 88 >60 mL/min/[1.73_m2]    Calcium 9.6 8.6 - 10.3 mg/dL    Bilirubin Total 0.5 0.3 - 1.0 mg/dL    Albumin 4.5 3.5 - 5.7 g/dL    Protein Total 7.1 6.4 - 8.9 g/dL    Alkaline Phosphatase 61 34 - 104 U/L    ALT 21 7 - 52 U/L    AST 16 13 - 39 U/L   CBC and Differential   Result Value Ref Range    WBC 11.4 (H) 4.0 - 11.0 10e9/L    RBC Count 5.25 (H) 3.8 - 5.2 10e12/L    Hemoglobin 15.5 11.7 - 15.7 g/dL    Hematocrit 47.7 (H) 35.0 - 47.0 %    MCV 91 78 - 100 fl    MCH 29.5 26.5 - 33.0 pg    MCHC 32.5 31.5 - 36.5 g/dL    RDW 13.1 10.0 - 15.0 %    Platelet Count 332 150 - 450 10e9/L    Diff Method Automated Method     % Neutrophils 66.8 %    % Lymphocytes 24.0 %    % Monocytes 6.6 %    % Eosinophils 1.5 %    % Basophils 0.8 %    % Immature Granulocytes 0.3 %    Absolute Neutrophil 7.6 1.6 - 8.3 10e9/L    Absolute Lymphocytes 2.7 0.8 - 5.3 10e9/L    Absolute Monocytes 0.8 0.0 - 1.3 10e9/L    Absolute Eosinophils 0.2 0.0 - 0.7 10e9/L    Absolute Basophils 0.1 0.0 - 0.2 10e9/L    Abs Immature Granulocytes 0.0 0 - 0.4 10e9/L       If you have any questions or concerns, please call the clinic at the number listed above.       Sincerely,        Tim Boyd MD

## 2020-10-22 NOTE — PROGRESS NOTES
Nursing Notes:   Uma Draper LPN  10/22/2020 10:21 AM  Signed  Chief Complaint   Patient presents with     Physical   Patient presents today for a Yearly physical.    Medication Reconciliation: completed   Uma Draper LPN  10/22/2020 10:20 AM     Nursing note reviewed with patient.  Accuracy and completeness verified.   Ms. Tavares is a 59 year old female who:  Patient presents with:  Physical      ICD-10-CM    1. Annual physical exam  Z00.00    2. Need for immunization against influenza  Z23 GH-IMM- FLU VAC PRESRV FREE QUAD SPLIT VIR > 6 MONTHS IM   3. Atherosclerosis of abdominal aorta (H)  I70.0 aspirin (ASA) 81 MG EC tablet     DISCONTINUED: ASPIRIN NOT PRESCRIBED (INTENTIONAL)   4. COPD, severe (H)  J44.9 albuterol (PROVENTIL) (2.5 MG/3ML) 0.083% neb solution     tiotropium (SPIRIVA RESPIMAT) 2.5 MCG/ACT inhaler     fluticasone-salmeterol (ADVAIR DISKUS) 500-50 MCG/DOSE inhaler     albuterol (PROAIR HFA/PROVENTIL HFA/VENTOLIN HFA) 108 (90 Base) MCG/ACT inhaler   5. Panlobular emphysema (H)  J43.1 tiotropium (SPIRIVA RESPIMAT) 2.5 MCG/ACT inhaler     fluticasone-salmeterol (ADVAIR DISKUS) 500-50 MCG/DOSE inhaler     albuterol (PROAIR HFA/PROVENTIL HFA/VENTOLIN HFA) 108 (90 Base) MCG/ACT inhaler   6. Alpha-1-antitrypsin deficiency carrier  Z14.8 tiotropium (SPIRIVA RESPIMAT) 2.5 MCG/ACT inhaler     albuterol (PROAIR HFA/PROVENTIL HFA/VENTOLIN HFA) 108 (90 Base) MCG/ACT inhaler   7. Intermittent constipation  K59.09 docusate sodium (COLACE) 100 MG tablet     TSH Reflex GH     TSH Reflex GH   8. Psoriatic arthritis (H)  L40.50 CBC and Differential     Comprehensive Metabolic Panel     Comprehensive Metabolic Panel     CBC and Differential   9. S/P Nissen fundoplication (without gastrostomy tube) procedure  Z98.890    10. Vitamin B12 deficiency  E53.8 Vitamin B12     Vitamin B12   11. Vitamin D deficiency  E55.9 Vitamin D Total     Vitamin D Total   12. Mixed hyperlipidemia  E78.2 Lipid Panel     Lipid Panel      HPI  Patient presents for annual physical.  Flu shot due today, orders placed.    Abdominal aortic atherosclerosis, has side effects with statin therapy including myalgias and nausea and vomiting.  No longer taking low-dose aspirin.  Recommend restarting.  This was added back to her medication list today.  Denies exertional chest pain or heaviness.  No cardiovascular symptoms at this time.    Severe COPD/panlobular emphysema.  Chronic.  Continues with Advair, albuterol, albuterol nebulizer.  Ran out of her Spiriva.  Has been having increased cough and phlegm lately.  Advised that Spiriva can certainly help with her phlegm production.  She would like to restart.  Prescription sent to pharmacy.    Patient is a alpha-1 antitrypsin deficiency carrier.  She quit smoking a few years back.    Intermittent constipation, ongoing.  Occasionally will need to use a OTC stool product.  Check thyroid levels.  Start Colace on a scheduled basis.    Psoriatic arthritis, chronic.  She has a few more joints with pain including her hands and fingers.    History of Nissen fundoplication surgery.  She has some malabsorption issues because of this.  Recheck B12 and vitamin D levels.    Mixed hyperlipidemia, currently diet controlled, she has statin intolerance.  Recheck labs.    Functional Capacity: > or about 4 METS.   Review of Systems   Constitutional: Negative for chills and fever.   HENT: Negative for congestion and hearing loss.    Eyes: Negative for visual disturbance.   Respiratory: Positive for shortness of breath (chronic, stable). Negative for cough and wheezing.    Cardiovascular: Negative for chest pain and palpitations.   Gastrointestinal: Negative for abdominal pain, diarrhea, nausea and vomiting.   Endocrine: Negative for cold intolerance and heat intolerance.   Genitourinary: Negative for dysuria and hematuria.   Musculoskeletal: Positive for arthralgias. Negative for myalgias.   Skin: Negative for rash and wound.  "  Allergic/Immunologic: Negative for immunocompromised state.   Neurological: Negative for dizziness and light-headedness.   Hematological: Does not bruise/bleed easily.   Psychiatric/Behavioral: Negative for agitation and confusion.        Reviewed and updated as needed this visit by Provider   Tobacco  Allergies  Meds  Problems  Med Hx  Surg Hx  Fam Hx          EXAM:   Vitals:    10/22/20 1030   BP: 110/70   BP Location: Right arm   Patient Position: Sitting   Cuff Size: Adult Regular   Pulse: 99   Resp: 16   Temp: 97.3  F (36.3  C)   TempSrc: Tympanic   SpO2: 99%   Weight: 52.9 kg (116 lb 9.6 oz)   Height: 1.575 m (5' 2\")       Current Pain Score: No Pain (0)     BP Readings from Last 3 Encounters:   10/22/20 110/70   12/12/19 112/62   12/04/19 117/63      Wt Readings from Last 3 Encounters:   10/22/20 52.9 kg (116 lb 9.6 oz)   12/12/19 58.3 kg (128 lb 9.6 oz)   12/03/19 60.2 kg (132 lb 12.8 oz)      Estimated body mass index is 21.33 kg/m  as calculated from the following:    Height as of this encounter: 1.575 m (5' 2\").    Weight as of this encounter: 52.9 kg (116 lb 9.6 oz).     Physical Exam  Constitutional:       General: She is not in acute distress.     Appearance: She is well-developed. She is not diaphoretic.   HENT:      Head: Normocephalic and atraumatic.      Mouth/Throat:      Comments: Now with full dental implants  Eyes:      General: No scleral icterus.     Conjunctiva/sclera: Conjunctivae normal.   Neck:      Musculoskeletal: Neck supple.      Vascular: No carotid bruit.   Cardiovascular:      Rate and Rhythm: Normal rate and regular rhythm.      Pulses: Normal pulses.   Pulmonary:      Effort: Pulmonary effort is normal.      Breath sounds: Normal breath sounds.      Comments: Prolonged expiratory phase  Abdominal:      Palpations: Abdomen is soft.      Tenderness: There is no abdominal tenderness.   Musculoskeletal:         General: Tenderness and deformity (Arthritic changes of numerous " finger joints.) present.      Right lower leg: No edema.      Left lower leg: No edema.   Lymphadenopathy:      Cervical: No cervical adenopathy.   Skin:     General: Skin is warm and dry.      Findings: No rash.   Neurological:      Mental Status: She is alert and oriented to person, place, and time. Mental status is at baseline.   Psychiatric:         Mood and Affect: Mood normal.         Behavior: Behavior normal.          Procedures   INVESTIGATIONS:  - Labs reviewed in Epic     ASSESSMENT AND PLAN:  Problem List Items Addressed This Visit        Respiratory    COPD, severe (H)    Relevant Medications    albuterol (PROVENTIL) (2.5 MG/3ML) 0.083% neb solution    tiotropium (SPIRIVA RESPIMAT) 2.5 MCG/ACT inhaler    fluticasone-salmeterol (ADVAIR DISKUS) 500-50 MCG/DOSE inhaler    albuterol (PROAIR HFA/PROVENTIL HFA/VENTOLIN HFA) 108 (90 Base) MCG/ACT inhaler    Panlobular emphysema (H)    Relevant Medications    albuterol (PROVENTIL) (2.5 MG/3ML) 0.083% neb solution    tiotropium (SPIRIVA RESPIMAT) 2.5 MCG/ACT inhaler    fluticasone-salmeterol (ADVAIR DISKUS) 500-50 MCG/DOSE inhaler    albuterol (PROAIR HFA/PROVENTIL HFA/VENTOLIN HFA) 108 (90 Base) MCG/ACT inhaler       Digestive    Vitamin D deficiency    Relevant Orders    Vitamin D Total (Completed)    Vitamin B12 deficiency    Relevant Orders    Vitamin B12 (Completed)       Endocrine    Mixed hyperlipidemia    Relevant Orders    Lipid Panel (Completed)       Circulatory    Atherosclerosis of abdominal aorta (H)    Relevant Medications    aspirin (ASA) 81 MG EC tablet       Immune    Psoriatic arthritis (H)    Relevant Medications    albuterol (PROVENTIL) (2.5 MG/3ML) 0.083% neb solution    tiotropium (SPIRIVA RESPIMAT) 2.5 MCG/ACT inhaler    fluticasone-salmeterol (ADVAIR DISKUS) 500-50 MCG/DOSE inhaler    albuterol (PROAIR HFA/PROVENTIL HFA/VENTOLIN HFA) 108 (90 Base) MCG/ACT inhaler    aspirin (ASA) 81 MG EC tablet    Other Relevant Orders    CBC and  Differential (Completed)    Comprehensive Metabolic Panel (Completed)       Other    Alpha-1-antitrypsin deficiency carrier    Relevant Medications    tiotropium (SPIRIVA RESPIMAT) 2.5 MCG/ACT inhaler    albuterol (PROAIR HFA/PROVENTIL HFA/VENTOLIN HFA) 108 (90 Base) MCG/ACT inhaler    S/P Nissen fundoplication (without gastrostomy tube) procedure      Other Visit Diagnoses     Annual physical exam    -  Primary    Need for immunization against influenza        Relevant Orders    GH-IMM- FLU VAC PRESRV FREE QUAD SPLIT VIR > 6 MONTHS IM (Completed)    Intermittent constipation        Relevant Medications    docusate sodium (COLACE) 100 MG tablet    Other Relevant Orders    TSH Reflex GH (Completed)        reviewed diet, exercise and weight control, recommended sodium restriction, cardiovascular risk and specific lipid/LDL goals reviewed  -- Expected clinical course discussed    -- Medications and their side effects discussed    Patient Instructions   Labs today.   Blood pressure is well controlled.     Glad your mouth / teeth have healed up.     Medications refilled / updated.     Start low-dose aspirin 1-2 x weekly.     Return in approximately 1 year, or sooner as needed for follow-up with Dr. Boyd.  - Annual Follow-up / Physical    Clinic : [phone]  Appointment line: [phone]     Tim Boyd MD   Internal Medicine  [facility]     Portions of this note were dictated using speech recognition software. The note has been proofread but errors in the text may have been overlooked. Please contact me if there are any concerns regarding the accuracy of the dictation.

## 2020-10-22 NOTE — NURSING NOTE
Chief Complaint   Patient presents with     Physical   Patient presents today for a Yearly physical.    Medication Reconciliation: completed   Uma Draper LPN  10/22/2020 10:20 AM

## 2021-02-23 ENCOUNTER — HOSPITAL ENCOUNTER (EMERGENCY)
Facility: OTHER | Age: 61
Discharge: HOME | End: 2021-02-23
Attending: FAMILY MEDICINE | Admitting: FAMILY MEDICINE
Payer: COMMERCIAL

## 2021-02-23 ENCOUNTER — APPOINTMENT (OUTPATIENT)
Dept: CT IMAGING | Facility: OTHER | Age: 61
End: 2021-02-23
Attending: FAMILY MEDICINE
Payer: COMMERCIAL

## 2021-02-23 VITALS
SYSTOLIC BLOOD PRESSURE: 132 MMHG | HEART RATE: 84 BPM | HEIGHT: 62 IN | BODY MASS INDEX: 22.48 KG/M2 | OXYGEN SATURATION: 94 % | DIASTOLIC BLOOD PRESSURE: 84 MMHG | RESPIRATION RATE: 23 BRPM | WEIGHT: 122.14 LBS | TEMPERATURE: 97.5 F

## 2021-02-23 DIAGNOSIS — J44.1 COPD EXACERBATION (H): ICD-10-CM

## 2021-02-23 DIAGNOSIS — R07.89 CHEST TIGHTNESS: ICD-10-CM

## 2021-02-23 PROBLEM — R91.1 NODULE OF LOWER LOBE OF RIGHT LUNG: Status: ACTIVE | Noted: 2019-04-18

## 2021-02-23 LAB
ALBUMIN SERPL-MCNC: 4.1 G/DL (ref 3.5–5.7)
ALP SERPL-CCNC: 62 U/L (ref 34–104)
ALT SERPL W P-5'-P-CCNC: 20 U/L (ref 7–52)
ANION GAP SERPL CALCULATED.3IONS-SCNC: 8 MMOL/L (ref 3–14)
AST SERPL W P-5'-P-CCNC: 19 U/L (ref 13–39)
BASOPHILS # BLD AUTO: 0.1 10E9/L (ref 0–0.2)
BASOPHILS NFR BLD AUTO: 1.3 %
BILIRUB SERPL-MCNC: 0.4 MG/DL (ref 0.3–1)
BUN SERPL-MCNC: 14 MG/DL (ref 7–25)
CALCIUM SERPL-MCNC: 9.9 MG/DL (ref 8.6–10.3)
CHLORIDE SERPL-SCNC: 103 MMOL/L (ref 98–107)
CO2 SERPL-SCNC: 26 MMOL/L (ref 21–31)
CREAT SERPL-MCNC: 0.89 MG/DL (ref 0.6–1.2)
DIFFERENTIAL METHOD BLD: NORMAL
EOSINOPHIL # BLD AUTO: 0.4 10E9/L (ref 0–0.7)
EOSINOPHIL NFR BLD AUTO: 5 %
ERYTHROCYTE [DISTWIDTH] IN BLOOD BY AUTOMATED COUNT: 12.4 % (ref 10–15)
GFR SERPL CREATININE-BSD FRML MDRD: 65 ML/MIN/{1.73_M2}
GLUCOSE SERPL-MCNC: 114 MG/DL (ref 70–105)
HCT VFR BLD AUTO: 43.3 % (ref 35–47)
HGB BLD-MCNC: 14.4 G/DL (ref 11.7–15.7)
IMM GRANULOCYTES # BLD: 0 10E9/L (ref 0–0.4)
IMM GRANULOCYTES NFR BLD: 0.3 %
INTERPRETATION ECG - MUSE: NORMAL
LIPASE SERPL-CCNC: 14 U/L (ref 11–82)
LYMPHOCYTES # BLD AUTO: 2.4 10E9/L (ref 0.8–5.3)
LYMPHOCYTES NFR BLD AUTO: 27.2 %
MCH RBC QN AUTO: 29.8 PG (ref 26.5–33)
MCHC RBC AUTO-ENTMCNC: 33.3 G/DL (ref 31.5–36.5)
MCV RBC AUTO: 90 FL (ref 78–100)
MONOCYTES # BLD AUTO: 0.8 10E9/L (ref 0–1.3)
MONOCYTES NFR BLD AUTO: 9.1 %
NEUTROPHILS # BLD AUTO: 5 10E9/L (ref 1.6–8.3)
NEUTROPHILS NFR BLD AUTO: 57.1 %
PLATELET # BLD AUTO: 308 10E9/L (ref 150–450)
POTASSIUM SERPL-SCNC: 4.3 MMOL/L (ref 3.5–5.1)
PROT SERPL-MCNC: 6.6 G/DL (ref 6.4–8.9)
RBC # BLD AUTO: 4.83 10E12/L (ref 3.8–5.2)
SODIUM SERPL-SCNC: 137 MMOL/L (ref 134–144)
TROPONIN I SERPL-MCNC: <2.3 PG/ML
TROPONIN I SERPL-MCNC: <2.3 PG/ML
WBC # BLD AUTO: 8.7 10E9/L (ref 4–11)

## 2021-02-23 PROCEDURE — 255N000002 HC RX 255 OP 636: Performed by: FAMILY MEDICINE

## 2021-02-23 PROCEDURE — 94640 AIRWAY INHALATION TREATMENT: CPT

## 2021-02-23 PROCEDURE — 93005 ELECTROCARDIOGRAM TRACING: CPT | Performed by: FAMILY MEDICINE

## 2021-02-23 PROCEDURE — 999N000157 HC STATISTIC RCP TIME EA 10 MIN

## 2021-02-23 PROCEDURE — 99285 EMERGENCY DEPT VISIT HI MDM: CPT | Mod: 25 | Performed by: FAMILY MEDICINE

## 2021-02-23 PROCEDURE — 93010 ELECTROCARDIOGRAM REPORT: CPT | Performed by: INTERNAL MEDICINE

## 2021-02-23 PROCEDURE — 85025 COMPLETE CBC W/AUTO DIFF WBC: CPT | Performed by: FAMILY MEDICINE

## 2021-02-23 PROCEDURE — 250N000012 HC RX MED GY IP 250 OP 636 PS 637: Performed by: FAMILY MEDICINE

## 2021-02-23 PROCEDURE — 71275 CT ANGIOGRAPHY CHEST: CPT | Mod: TC

## 2021-02-23 PROCEDURE — 83690 ASSAY OF LIPASE: CPT | Performed by: FAMILY MEDICINE

## 2021-02-23 PROCEDURE — 84484 ASSAY OF TROPONIN QUANT: CPT | Mod: 91 | Performed by: FAMILY MEDICINE

## 2021-02-23 PROCEDURE — 99284 EMERGENCY DEPT VISIT MOD MDM: CPT | Performed by: FAMILY MEDICINE

## 2021-02-23 PROCEDURE — 80053 COMPREHEN METABOLIC PANEL: CPT | Performed by: FAMILY MEDICINE

## 2021-02-23 PROCEDURE — 36415 COLL VENOUS BLD VENIPUNCTURE: CPT | Performed by: FAMILY MEDICINE

## 2021-02-23 PROCEDURE — 250N000013 HC RX MED GY IP 250 OP 250 PS 637: Performed by: FAMILY MEDICINE

## 2021-02-23 PROCEDURE — 250N000009 HC RX 250: Performed by: FAMILY MEDICINE

## 2021-02-23 RX ORDER — PREDNISONE 20 MG/1
20 TABLET ORAL ONCE
Status: COMPLETED | OUTPATIENT
Start: 2021-02-23 | End: 2021-02-23

## 2021-02-23 RX ORDER — AZITHROMYCIN 250 MG/1
TABLET, FILM COATED ORAL
Qty: 6 TABLET | Refills: 0 | Status: SHIPPED | OUTPATIENT
Start: 2021-02-23 | End: 2021-02-28

## 2021-02-23 RX ORDER — IODIXANOL 320 MG/ML
100 INJECTION, SOLUTION INTRAVASCULAR ONCE
Status: COMPLETED | OUTPATIENT
Start: 2021-02-23 | End: 2021-02-23

## 2021-02-23 RX ORDER — IPRATROPIUM BROMIDE AND ALBUTEROL SULFATE 2.5; .5 MG/3ML; MG/3ML
3 SOLUTION RESPIRATORY (INHALATION) ONCE
Status: COMPLETED | OUTPATIENT
Start: 2021-02-23 | End: 2021-02-23

## 2021-02-23 RX ORDER — ASPIRIN 81 MG/1
324 TABLET, CHEWABLE ORAL ONCE
Status: COMPLETED | OUTPATIENT
Start: 2021-02-23 | End: 2021-02-23

## 2021-02-23 RX ORDER — PREDNISONE 20 MG/1
TABLET ORAL
Qty: 10 TABLET | Refills: 0 | Status: SHIPPED | OUTPATIENT
Start: 2021-02-23 | End: 2021-03-22

## 2021-02-23 RX ADMIN — IPRATROPIUM BROMIDE AND ALBUTEROL SULFATE 3 ML: .5; 3 SOLUTION RESPIRATORY (INHALATION) at 07:14

## 2021-02-23 RX ADMIN — PREDNISONE 20 MG: 20 TABLET ORAL at 06:56

## 2021-02-23 RX ADMIN — ASPIRIN 324 MG: 81 TABLET, CHEWABLE ORAL at 04:48

## 2021-02-23 RX ADMIN — IODIXANOL 100 ML: 320 INJECTION, SOLUTION INTRAVASCULAR at 05:27

## 2021-02-23 ASSESSMENT — MIFFLIN-ST. JEOR: SCORE: 1082.25

## 2021-02-23 ASSESSMENT — ENCOUNTER SYMPTOMS
ABDOMINAL PAIN: 0
CHEST TIGHTNESS: 1
HEADACHES: 0
COLOR CHANGE: 0
ARTHRALGIAS: 0
NECK STIFFNESS: 0
FEVER: 0
CONFUSION: 0
EYE REDNESS: 0
PALPITATIONS: 0
DIFFICULTY URINATING: 0
SHORTNESS OF BREATH: 1

## 2021-02-23 NOTE — ED PROVIDER NOTES
"  History     Chief Complaint   Patient presents with     Chest Pain     HPI  Ember Tavares is a 60 year old female with history of COPD, hyperlipidemia, GERD who presents the emergency department with tightness in her chest and upper abdomen.  She points to her epigastric area when describing the pain.  She states it does occasionally radiate up into her shoulders and into her back.  Increased shortness of breath, neb at home did not really help today.  No recent fevers or chills.  No nausea vomiting or diarrhea.  Reviewed nurses notes below, similar history is related to me.      Patient has been having chest pain that started a couple days ago and got worse today. It is in both sides of her chest and radiates up her neck and to both of her shoulders. Earlier today she was feeling short of breath and did a neb at home for her copd.      She states she has been getting short of breath at night and has been having \"tingly feelings\" in her hands/feet. Reports sats 84% after waking up from sleep  Allergies:  Allergies   Allergen Reactions     Atorvastatin Muscle Pain (Myalgia)     Rosuvastatin Nausea and Vomiting     Sucralfate Nausea and Vomiting     Levofloxacin Nausea and Vomiting     Morphine      Other reaction(s): Chest Pain     Penicillins      Other reaction(s): Respiratory Arrest     Sulfamethoxazole W/Trimethoprim Nausea and Vomiting     Yeast infection       Problem List:    Patient Active Problem List    Diagnosis Date Noted     Panlobular emphysema (H) 10/22/2020     Priority: Medium     History of mandibular surgery 12/12/2019     Priority: Medium     Dental abscess 12/02/2019     Priority: Medium     Difficulty swallowing solids 05/08/2019     Priority: Medium     Nodule of right lung 04/18/2019     Priority: Medium     Seasonal allergic rhinitis due to pollen 04/08/2019     Priority: Medium     Chest pain 03/28/2019     Priority: Medium     Ulcer of right cornea 08/18/2018     Priority: Medium     " Vitamin B12 deficiency 04/17/2018     Priority: Medium     Menopausal syndrome (hot flashes) 04/17/2018     Priority: Medium     S/P Nissen fundoplication (without gastrostomy tube) procedure 03/26/2018     Priority: Medium     GERD (gastroesophageal reflux disease) 11/29/2017     Priority: Medium     Chronic radicular cervical pain 11/20/2017     Priority: Medium     Psoriatic arthritis (H) 11/20/2017     Priority: Medium     Occipital neuralgia of left side 09/15/2017     Priority: Medium     Arthritis of knee, right 08/24/2017     Priority: Medium     Atherosclerosis of abdominal aorta (H) 08/24/2017     Priority: Medium     [facility] Studies:  2/19/2013 -- CTA ABDOMEN AND PELVIS WITH BILATERAL LOWER EXTREMITY RUNOFF  CLINICAL HISTORY: Iliac and mesenteric ischemia.  IMPRESSION:  1. Mild aortoiliac atherosclerotic disease. No aneurysm. There is narrowing of the distal aorta just proximal to the bifurcation.  2. There is moderate narrowing of the celiac artery which could  be due to noncalcified plaque. No SMA or MARIAH compromise definitely seen.  3. Probable tiny Bochdalek hernia at the right costophrenic angle. A hamartoma would be a differential consideration though is less likely given the central fat density.  4. Possible constipation.  5. No evidence of vascular compromise in the lower extremities.    Examination Interpreted at: [facility], [place], [place]       COPD, severe (H) 08/24/2017     Priority: Medium     Overview:   8/4/2014 -- PULMONARY FUNCTION    SITE:  [facility]  ORDERED BY:  VANNESA LUNA    CLINICAL SUMMARY: 53-year-old female with a history of severe COPD.     TECHNICIAN NOTE: Good effort.     DATA: FVC 1.85 (61%). FEV1 1.04 (45%). FEV1/FVC ratio 56%. DLCO 13.5 to 62%.     Flow volume curve is consistent with airway obstruction.     IMPRESSION:  1. Severe obstructive pulmonary disease.   2. Mild decrease in diffusing capacity.       Decreased  diffusion capacity of lung 08/24/2017     Priority: Medium     Dyshidrotic hand dermatitis 08/24/2017     Priority: Medium     Hiatal hernia 08/24/2017     Priority: Medium     Status post balloon dilatation of esophageal stricture 08/24/2017     Priority: Medium     Prediabetes 08/24/2017     Priority: Medium     Psoriasis 08/24/2017     Priority: Medium     Schatzki's ring of distal esophagus 08/24/2017     Priority: Medium     Vitamin D deficiency 08/24/2017     Priority: Medium     Alpha-1-antitrypsin deficiency carrier 08/23/2017     Priority: Medium     Irritable bowel syndrome 08/23/2017     Priority: Medium     Osteopenia 08/23/2017     Priority: Medium     PAD (peripheral artery disease) (H) 10/13/2015     Priority: Medium     Mixed hyperlipidemia 08/01/2012     Priority: Medium     Contact dermatitis 07/01/2010     Priority: Medium     Myalgia 07/01/2010     Priority: Medium     Chronic abdominal pain 03/10/2010     Priority: Medium     Overview:   16 year duration       Constipation, chronic 03/10/2010     Priority: Medium     History of tobacco abuse 03/10/2010     Priority: Medium     Sinusitis, chronic 03/10/2010     Priority: Medium        Past Medical History:    Past Medical History:   Diagnosis Date     Alpha-1-antitrypsin deficiency carrier 8/23/2017     Atherosclerosis of abdominal aorta (H) 8/24/2017     Chronic abdominal pain 3/10/2010     Chronic sinusitis      Closed fracture of skull (H)      COPD, severe (H) 8/24/2017     GERD (gastroesophageal reflux disease) 11/29/2017     Hiatal hernia 8/24/2017     Other and unspecified hyperlipidemia 8/1/2012     PAD (peripheral artery disease) (H) 10/13/2015     Psoriasis 8/24/2017     Psoriatic arthritis (H) 11/20/2017     Schatzki's ring of distal esophagus 8/24/2017     Ulcer of right cornea 8/18/2018     Vitamin B12 deficiency 4/17/2018     Vitamin D deficiency 8/24/2017       Past Surgical History:    Past Surgical History:   Procedure Laterality  Date     AS UGI ENDOSCOPY W ESOPHAGEAL DILATION BALLOON <30MM  10/30/2015    ESOPHAGEAL DILATION,Dr. Rainer Allen, [facility]     BIOPSY BREAST Bilateral     , , BIOPSY BREAST,benign     CHOLECYSTECTOMY      Laparoscopic     COLONOSCOPY  2016    x's 3 negative     CT CORONARY ANGIOGRAM      CT CORONARY ANGIOGRAM (IA),negative     ESOPHAGOSCOPY, GASTROSCOPY, DUODENOSCOPY (EGD), COMBINED  2009    dilated, Dr. Allen     ESOPHAGOSCOPY, GASTROSCOPY, DUODENOSCOPY (EGD), COMBINED N/A 2019    Pickering's esophagus, 3 year follow up     HYSTERECTOMY VAGINAL      ovaries remain     LAPAROSCOPIC NISSEN FUNDOPLICATION N/A 3/26/2018    Procedure: LAPAROSCOPIC NISSEN FUNDOPLICATION;  Laparoscopic Nissen Fundoplication;  Surgeon: Jagdish Ruiz MD;  Location: [facility] OR     LAPAROTOMY EXPLORATORY      lysis of adhesions/endometriosis     RECTOCELE REPAIR  2009       Family History:    Family History   Problem Relation Age of Onset     Arthritis Father         Arthritis     Emphysema Father 51        Alpha-1-AT deficiency     Peripheral Vascular Disease Mother         Peripheral vascular disease     Diabetes Mother         Diabetes     Arthritis Mother         Arthritis     Breast Cancer Sister         Premenopausal breast cancer     Eczema Brother         Eczema     Narcolepsy Brother      Other - See Comments Daughter         Narcolepsy     Narcolepsy Daughter      Brain Tumor Daughter      Seizure Disorder Daughter      Depression Daughter      Graves' disease Daughter        Social History:  Marital Status:   [2]  Social History     Tobacco Use     Smoking status: Current Some Day Smoker     Packs/day: 0.50     Years: 32.00     Pack years: 16.00     Types: Cigarettes, Cigars     Last attempt to quit: 2012     Years since quittin.5     Smokeless tobacco: Never Used   Substance Use Topics     Alcohol use: Yes     Alcohol/week: 2.0 standard drinks     Comment:  "Alcoholic Drinks/day: 1-2 drinks every 1-2 weeks     Drug use: No        Medications:    albuterol (PROAIR HFA/PROVENTIL HFA/VENTOLIN HFA) 108 (90 Base) MCG/ACT inhaler  albuterol (PROVENTIL) (2.5 MG/3ML) 0.083% neb solution  azithromycin (ZITHROMAX) 250 MG tablet  predniSONE (DELTASONE) 20 MG tablet  aspirin (ASA) 81 MG EC tablet  docusate sodium (COLACE) 100 MG tablet  fluticasone-salmeterol (ADVAIR DISKUS) 500-50 MCG/DOSE inhaler  tiotropium (SPIRIVA RESPIMAT) 2.5 MCG/ACT inhaler          Review of Systems   Constitutional: Negative for fever.   HENT: Negative for congestion.    Eyes: Negative for redness.   Respiratory: Positive for chest tightness and shortness of breath.    Cardiovascular: Negative for palpitations and leg swelling.   Gastrointestinal: Negative for abdominal pain.   Genitourinary: Negative for difficulty urinating.   Musculoskeletal: Negative for arthralgias and neck stiffness.   Skin: Negative for color change.   Neurological: Negative for headaches.   Psychiatric/Behavioral: Negative for confusion.       Physical Exam   BP: 139/87  Pulse: 95  Temp: 97.5  F (36.4  C)  Resp: 18  Height: 157.5 cm (5' 2\")  Weight: 55.4 kg (122 lb 2.2 oz)  SpO2: 93 %      Physical Exam  Vitals signs and nursing note reviewed.   Neck:      Musculoskeletal: Normal range of motion.   Cardiovascular:      Heart sounds: Heart sounds are distant.   Pulmonary:      Effort: Accessory muscle usage present.   Abdominal:      Palpations: Abdomen is soft.   Musculoskeletal: Normal range of motion.   Skin:     General: Skin is warm.         ED Course        Procedures  EKG: Nl sinus rhythm, rate 97, left axis otherwise normal ST segments    Results for orders placed or performed during the hospital encounter of 02/23/21 (from the past 24 hour(s))   CBC with platelets differential   Result Value Ref Range    WBC 8.7 4.0 - 11.0 10e9/L    RBC Count 4.83 3.8 - 5.2 10e12/L    Hemoglobin 14.4 11.7 - 15.7 g/dL    Hematocrit 43.3 " 35.0 - 47.0 %    MCV 90 78 - 100 fl    MCH 29.8 26.5 - 33.0 pg    MCHC 33.3 31.5 - 36.5 g/dL    RDW 12.4 10.0 - 15.0 %    Platelet Count 308 150 - 450 10e9/L    Diff Method Automated Method     % Neutrophils 57.1 %    % Lymphocytes 27.2 %    % Monocytes 9.1 %    % Eosinophils 5.0 %    % Basophils 1.3 %    % Immature Granulocytes 0.3 %    Absolute Neutrophil 5.0 1.6 - 8.3 10e9/L    Absolute Lymphocytes 2.4 0.8 - 5.3 10e9/L    Absolute Monocytes 0.8 0.0 - 1.3 10e9/L    Absolute Eosinophils 0.4 0.0 - 0.7 10e9/L    Absolute Basophils 0.1 0.0 - 0.2 10e9/L    Abs Immature Granulocytes 0.0 0 - 0.4 10e9/L   Comprehensive metabolic panel   Result Value Ref Range    Sodium 137 134 - 144 mmol/L    Potassium 4.3 3.5 - 5.1 mmol/L    Chloride 103 98 - 107 mmol/L    Carbon Dioxide 26 21 - 31 mmol/L    Anion Gap 8 3 - 14 mmol/L    Glucose 114 (H) 70 - 105 mg/dL    Urea Nitrogen 14 7 - 25 mg/dL    Creatinine 0.89 0.60 - 1.20 mg/dL    GFR Estimate 65 >60 mL/min/[1.73_m2]    GFR Estimate If Black 79 >60 mL/min/[1.73_m2]    Calcium 9.9 8.6 - 10.3 mg/dL    Bilirubin Total 0.4 0.3 - 1.0 mg/dL    Albumin 4.1 3.5 - 5.7 g/dL    Protein Total 6.6 6.4 - 8.9 g/dL    Alkaline Phosphatase 62 34 - 104 U/L    ALT 20 7 - 52 U/L    AST 19 13 - 39 U/L   Troponin [facility]   Result Value Ref Range    Troponin <2.3 <34.0 pg/mL   Lipase   Result Value Ref Range    Lipase 14 11 - 82 U/L   CT Chest Pulmonary Embolism w Contrast    Narrative    PROCEDURE INFORMATION:   Exam: CT Angiography Chest With Contrast   Exam date and time: 2/23/2021 5:06 AM   Age: 60 years old   Clinical indication: Shortness of breath; Patient HX: Patient has been having   chest pain that started a couple days ago and got worse today. It is in both   sides of her chest and radiates up her neck and to both of her shoulders.   Earlier today she was feeling short of breath and did a neb at home for her   copd.     TECHNIQUE:   Imaging protocol: Computed tomographic angiography of the chest  with contrast.   3D rendering (Not supervised by radiologist): MIP and/or 3D reconstructed   images were created by the technologist.   Radiation optimization: All CT scans at this facility use at least one of these   dose optimization techniques: automated exposure control; mA and/or kV   adjustment per patient size (includes targeted exams where dose is matched to   clinical indication); or iterative reconstruction.   Contrast material: VISIPAQUE 320; Contrast volume: 100 ml; Contrast route:   INTRAVENOUS (IV);      COMPARISON:   CT CHEST W CONTRAST 4/23/2019 4:49 PM     FINDINGS:   Pulmonary arteries: No pulmonary embolism.   Aorta: The aorta and branch vessels demonstrate moderate atherosclerotic   plaque.     Lungs: Diffuse centrilobular emphysema. Right upper lobe nodule (series 4,   image 52) measuring 8 x 6 mm, unchanged. Right middle lobe nodule (series 4,   image 142) measuring 7 x 5 mm, likely unchanged. Right lower lobe nodule   (series 4, image 166) measures 17 x 12 mm, previously 14 by 11 mm. This   contains macroscopic fat. Multiple other small pulmonary nodules are stable   compared to prior. Scattered mucus plugging.   Pleural spaces: Unremarkable. No pneumothorax. No pleural effusion.   Heart: Unremarkable. No cardiomegaly. No pericardial effusion.   Lymph nodes: Unremarkable. No enlarged lymph nodes.     Gallbladder and bile ducts: Status post cholecystectomy.   Bones/joints: Unremarkable. No acute fracture.   Soft tissues: Unremarkable.       Impression    IMPRESSION:   1. No pulmonary embolism.   2. Right lower lobe nodule contains macroscopic fat, favored to be benign.    However, given interval change in size and high risk status, recommend   follow-up examination in 1 year.     THIS DOCUMENT HAS BEEN ELECTRONICALLY SIGNED BY ESTER COWAN MD   Troponin [facility] (now)   Result Value Ref Range    Troponin <2.3 <34.0 pg/mL       Medications   ipratropium - albuterol 0.5 mg/2.5 mg/3 mL (DUONEB) neb  solution 3 mL (has no administration in time range)   predniSONE (DELTASONE) tablet 20 mg (has no administration in time range)   aspirin (ASA) chewable tablet 324 mg (324 mg Oral Given 2/23/21 9167)   iodixanol (VISIPAQUE 320) injection 100 mL (100 mLs Intravenous Given 2/23/21 3275)       Assessments & Plan (with Medical Decision Making)     I have reviewed the nursing notes.    I have reviewed the findings, diagnosis, plan and need for follow up with the patient.  Differential diagnosis includes but not limited to    New Prescriptions    AZITHROMYCIN (ZITHROMAX) 250 MG TABLET    Take 2 tablets (500 mg) by mouth daily for 1 day, THEN 1 tablet (250 mg) daily for 4 days.    PREDNISONE (DELTASONE) 20 MG TABLET    Take two tablets (= 40mg) each day for 5 (five) days     Chest tightness with COPD: Differential diagnosis includes COPD flare, PE, ACS, pleurisy, pneumonia.  Reassuring work-up here in the ED with improvement with conservative management.  Clinically favor primarily worsening COPD will treat her with prednisone burst and azithromycin for 5 days to see if that helps.  I did place a referral in for cardiology as well to facilitate stress testing if deemed to be clinically indicated although I do not feel that her current chest pain is related to cardiac etiology but more likely pulmonary in nature.  Patient should return to the emergency department for worsening symptoms such as fever, worsening chest pain, localizing chest pain or worsening shortness of breath.  Patient verbalized understanding of plan is agreement she left the ED in improving condition.  Final diagnoses:   Chest tightness   COPD exacerbation (H)       2/23/2021   [facility] AND Salomón Perry MD  02/23/21 9050

## 2021-02-23 NOTE — ED TRIAGE NOTES
"Patient has been having chest pain that started a couple days ago and got worse today. It is in both sides of her chest and radiates up her neck and to both of her shoulders. Earlier today she was feeling short of breath and did a neb at home for her copd.     She states she has been getting short of breath at night and has been having \"tingly feelings\" in her hands/feet. Reports sats 84% after waking up from sleep.   "

## 2021-03-22 ENCOUNTER — OFFICE VISIT (OUTPATIENT)
Dept: CARDIOLOGY | Facility: OTHER | Age: 61
End: 2021-03-22
Attending: FAMILY MEDICINE
Payer: COMMERCIAL

## 2021-03-22 ENCOUNTER — MYC MEDICAL ADVICE (OUTPATIENT)
Dept: INTERNAL MEDICINE | Facility: OTHER | Age: 61
End: 2021-03-22

## 2021-03-22 VITALS
BODY MASS INDEX: 23 KG/M2 | HEART RATE: 104 BPM | OXYGEN SATURATION: 96 % | DIASTOLIC BLOOD PRESSURE: 68 MMHG | HEIGHT: 62 IN | TEMPERATURE: 97.4 F | RESPIRATION RATE: 16 BRPM | SYSTOLIC BLOOD PRESSURE: 118 MMHG | WEIGHT: 125 LBS

## 2021-03-22 DIAGNOSIS — R10.9 NONSPECIFIC ABDOMINAL PAIN: Primary | ICD-10-CM

## 2021-03-22 DIAGNOSIS — I70.0 ABDOMINAL AORTIC ATHEROSCLEROSIS (H): ICD-10-CM

## 2021-03-22 DIAGNOSIS — R91.8 PULMONARY NODULES: ICD-10-CM

## 2021-03-22 DIAGNOSIS — R07.9 CHEST PAIN, UNSPECIFIED TYPE: Primary | ICD-10-CM

## 2021-03-22 DIAGNOSIS — J43.1 PANLOBULAR EMPHYSEMA (H): ICD-10-CM

## 2021-03-22 DIAGNOSIS — E78.2 MIXED HYPERLIPIDEMIA: ICD-10-CM

## 2021-03-22 DIAGNOSIS — Z78.9 STATIN INTOLERANCE: ICD-10-CM

## 2021-03-22 DIAGNOSIS — R06.09 DOE (DYSPNEA ON EXERTION): ICD-10-CM

## 2021-03-22 DIAGNOSIS — E78.5 HYPERLIPIDEMIA LDL GOAL <100: ICD-10-CM

## 2021-03-22 DIAGNOSIS — I73.9 INTERMITTENT CLAUDICATION (H): ICD-10-CM

## 2021-03-22 PROCEDURE — 99215 OFFICE O/P EST HI 40 MIN: CPT | Performed by: NURSE PRACTITIONER

## 2021-03-22 PROCEDURE — 93000 ELECTROCARDIOGRAM COMPLETE: CPT | Performed by: INTERNAL MEDICINE

## 2021-03-22 RX ORDER — IPRATROPIUM BROMIDE AND ALBUTEROL SULFATE 2.5; .5 MG/3ML; MG/3ML
1 SOLUTION RESPIRATORY (INHALATION) EVERY 6 HOURS PRN
Qty: 3 ML | Refills: 3 | Status: SHIPPED | OUTPATIENT
Start: 2021-03-22 | End: 2022-10-07

## 2021-03-22 RX ORDER — AMPICILLIN TRIHYDRATE 250 MG
600 CAPSULE ORAL DAILY
Qty: 90 CAPSULE | Refills: 1 | Status: SHIPPED | OUTPATIENT
Start: 2021-03-22 | End: 2022-10-07

## 2021-03-22 ASSESSMENT — PAIN SCALES - GENERAL: PAINLEVEL: NO PAIN (0)

## 2021-03-22 ASSESSMENT — MIFFLIN-ST. JEOR: SCORE: 1095.25

## 2021-03-22 NOTE — PATIENT INSTRUCTIONS
You were seen by  АЛЕКСАНДР Rodas CNP       1. Ultrasound of Abdomen has been ordered. You will be called to schedule this.  You will receive instructions for testing at that time.  You will be contacted with results.       2. A ABDULLAHI's has been ordered. You will be called to schedule this. You will receive instructions for testing at that time. You will be contacted with results.      3. Start red yeast rice 600 MG CAPS Take 1 capsule (600 mg) by mouth daily    4. ipratropium - albuterol 0.5 mg/2.5 mg/3 mL (DUONEB) 0.5-2.5 (3) MG/3ML neb solution Take 1 vial (3 mLs) by nebulization every 6 hours as needed for shortness of breath / dyspnea or wheezing - Nebulization    5. Laboratory blood work has been ordered for in 6 months fasting Lipid panel.  You will be notified by phone call or [employer] message when the results are available.      6. Medications sent to your pharmacy.      You will follow up with [facility] as needed, sooner if needed.       Please call the cardiology office with problems, questions, or concerns at [phone].    If you experience chest pain, chest pressure, chest tightness, shortness of breath, fainting, lightheadedness, nausea, vomiting, or other concerning symptoms, please report to the Emergency Department or call 911. These symptoms may be emergent, and best treated in the Emergency Department.     Cardiology Nurses  TASHA Onofre, ROGERS SNYDER, ROGERS  [facility] ([address])  [phone]

## 2021-03-22 NOTE — PROGRESS NOTES
[facility] HEART CARE   CARDIOLOGY CONSULT     Ember Tavares   [address] [place] [address]    Tim Boyd     Chief Complaint   Patient presents with     Consult     chest tightness        HPI:   Mrs. Tavares is a 60 year old female who presents for cardiology evaluation with reports of chest pain and dyspnea resulting in recent ED visit. Patient has a PMH significant for HLD, PAD, ASCVD, panlobular emphysema, B12 deficiency, right cornea ulcer, history of Nissen fundoplication, hiatal hernia with GERD, chronic radicular cervical pain, psoriatic arthritis, left occipital neuralgia, osteoarthritis, prediabetes, Schatzki's ring of the distal esophagus, IBS, osteopenia and history of tobacco abuse (quit in 2012 after smoking 35 year at 1ppd).    Patient has history of HLD, has been on rosuvastatin with reported nausea and vomiting, she has also tried atorvastatin and Simvastatin with myalgias. She has not been on any other lipid lowering medication. Consideration for Niacin or red yeast rice. She has been on ASA 81 mg daily.     CT Angio of the coronary arteries in 2019 with a total coronary calcium score of 0.2 which places patient at low risk for developing any significant CAD.  There was no significant coronary artery stenosis identified.  Incidentally, identified 10 mm lung nodule at the right posterior costophrenic sulcus.    Echocardiogram in 2019 with normal biventricular function, LVEF 55 to 60%.  Normal LV diastolic function, no regional wall motion abnormalities.  Both atria appeared normal.  Trace mitral insufficiency.  Pulmonary artery systolic pressure 28 mmHg, no pulmonary hypertension.  No pericardial effusion.  Prominent epicardial fat pad was noted.    MRA of the carotids in 2019 revealing the common carotid and internal carotid arteries were widely patent bilaterally.  The vertebral arteries are patent.    Coronary Angiography on 4/20/09:  LEFT MAIN: The left main bifurcates into  the LAD and left circumflex. It is angiographically free of significant disease.  LAD AND (branches): The LAD system is angiographically free of significant disease.   CIRCUMFLEX: The circumflex system is angiographically free of significant disease.   RIGHT CORONARY ARTERY AND (branches): The right coronary artery is angiographically free of significant disease.   DESCENDING AORTOGRAPHY: The renal arteries are unremarkable. The patient has a tight left iliac artery lesion just beyond the bifurcation. The abdominal aorta just prior to the iliac also has some diffuse moderate disease. The   right iliac has some moderate disease in the mid section.  LEFT VENTRICULOGRAPHY: EF 55-60%. There was some mild anterolateral   hypokinesis and mild inferior wall hypokinesis.    Patient was previously evaluated by cardiology in 2013 at [facility], Dr. Hoskins.  She was evaluated with shortness of breath and claudication symptoms. As a result of this visit she went on to have stress echocardiogram with no evidence of ischemia. CT angio of the LE's with no critical stenosis. It was reported in result note that she did not appear to have PAD.     Most recently patient was evaluated in the ED on 2/23/2021 with chest tightness radiating into her shoulders and back, increased exertional dyspnea.  No elevation in troponin.  EKG with no significant ST-T wave changes.  CT of the chest without evidence of pulmonary embolism.  She was noted to have a right lower lobe nodule containing macroscopic fat, favored to be benign.  However, given interval change in size and high risk status, radiologist recommended follow-up exam in 1 year.  It was suspected that her symptoms were due to COPD exacerbation, she was treated with prednisone burst and azithromycin.  It was felt that her current symptoms are more fitting with pulmonary etiology versus cardiac.    Today patient described feeling much better following steroid burst and antibiotics  with COPD exacerbation. No recurrence of chest pain or pressure. No increased dyspnea. She did suspect that her symptoms prior where related to breathing as her chest felt tight like she could not breath with drop in her oxygen saturation at home to 88%. She does not report any pain in the arm, jaw, neck or back today. No palpitations or lightheadedness. No edema or weight gain. She does have chronic abdominal pains. She does also report calf pain bilaterally with walking and climbing stairs which improves at rest.       PAST MEDICAL HISTORY:   Past Medical History:   Diagnosis Date     Alpha-1-antitrypsin deficiency carrier 8/23/2017     Atherosclerosis of abdominal aorta (H) 8/24/2017    Overview:  [facility] Studies: 2/19/2013 -- CTA ABDOMEN AND PELVIS WITH BILATERAL LOWER EXTREMITY RUNOFF  CLINICAL HISTORY: Iliac and mesenteric ischemia.  TECHNIQUE: 125 mL Omnipaque 300 IV contrast was administered. 3-D arterial reformations were performed.  FINDINGS: There is a well-defined ovoid density at the medial right costophrenic angle. This measures 2.3 x 0.9 x 0.6 cm. It demonstra     Chronic abdominal pain 3/10/2010    Overview:  16 year duration     Chronic sinusitis     No Comments Provided     Closed fracture of skull (H)     1972     COPD, severe (H) 8/24/2017    Overview:  8/4/2014 -- PULMONARY FUNCTION  SITE:  [facility] ORDERED BY:  VANNESA LUNA  CLINICAL SUMMARY: 53-year-old female with a history of severe COPD.   TECHNICIAN NOTE: Good effort.   DATA: FVC 1.85 (61%). FEV1 1.04 (45%). FEV1/FVC ratio 56%. DLCO 13.5 to 62%.   Flow volume curve is consistent with airway obstruction.   IMPRESSION: 1. Severe obstructive pulmon     GERD (gastroesophageal reflux disease) 11/29/2017     Hiatal hernia 8/24/2017     Other and unspecified hyperlipidemia 8/1/2012     PAD (peripheral artery disease) (H) 10/13/2015     Psoriasis 8/24/2017     Psoriatic arthritis (H) 11/20/2017      Schatzki's ring of distal esophagus 8/24/2017     Ulcer of right cornea 8/18/2018     Vitamin B12 deficiency 4/17/2018     Vitamin D deficiency 8/24/2017          FAMILY HISTORY:   Family History   Problem Relation Age of Onset     Arthritis Father         Arthritis     Emphysema Father 51        Alpha-1-AT deficiency     Peripheral Vascular Disease Mother         Peripheral vascular disease     Diabetes Mother         Diabetes     Arthritis Mother         Arthritis     Breast Cancer Sister         Premenopausal breast cancer     Eczema Brother         Eczema     Narcolepsy Brother      Other - See Comments Daughter         Narcolepsy     Narcolepsy Daughter      Brain Tumor Daughter      Seizure Disorder Daughter      Depression Daughter      Graves' disease Daughter           PAST SURGICAL HISTORY:   Past Surgical History:   Procedure Laterality Date     AS UGI ENDOSCOPY W ESOPHAGEAL DILATION BALLOON <30MM  10/30/2015    ESOPHAGEAL DILATION,Dr. Rainer Allen, [facility]     BIOPSY BREAST Bilateral     1999, 2001, BIOPSY BREAST,benign     CHOLECYSTECTOMY  2004    Laparoscopic     COLONOSCOPY  06/01/2016    x's 3 negative     CT CORONARY ANGIOGRAM  2009    CT CORONARY ANGIOGRAM (IA),negative     ESOPHAGOSCOPY, GASTROSCOPY, DUODENOSCOPY (EGD), COMBINED  04/07/2009    dilated, Dr. Allen     ESOPHAGOSCOPY, GASTROSCOPY, DUODENOSCOPY (EGD), COMBINED N/A 5/30/2019    Pickering's esophagus, 3 year follow up     HYSTERECTOMY VAGINAL  1994    ovaries remain     LAPAROSCOPIC NISSEN FUNDOPLICATION N/A 3/26/2018    Procedure: LAPAROSCOPIC NISSEN FUNDOPLICATION;  Laparoscopic Nissen Fundoplication;  Surgeon: Jagdish Ruiz MD;  Location: [facility] OR     LAPAROTOMY EXPLORATORY  1990    lysis of adhesions/endometriosis     RECTOCELE REPAIR  07/29/2009          SOCIAL HISTORY:   Social History     Socioeconomic History     Marital status:      Spouse name: Ervin     Number of children: None     Years of education: None      Highest education level: None   Occupational History     None   Social Needs     Financial resource strain: None     Food insecurity     Worry: None     Inability: None     Transportation needs     Medical: None     Non-medical: None   Tobacco Use     Smoking status: Former Smoker     Packs/day: 0.50     Years: 32.00     Pack years: 16.00     Types: Cigarettes, Cigars     Quit date: 2012     Years since quittin.5     Smokeless tobacco: Never Used   Substance and Sexual Activity     Alcohol use: Yes     Alcohol/week: 2.0 standard drinks     Comment: Alcoholic Drinks/day: 1-2 drinks every 1-2 weeks     Drug use: No     Sexual activity: Yes     Partners: Male   Lifestyle     Physical activity     Days per week: None     Minutes per session: None     Stress: None   Relationships     Social connections     Talks on phone: None     Gets together: None     Attends Sabianist service: None     Active member of club or organization: None     Attends meetings of clubs or organizations: None     Relationship status: None     Intimate partner violence     Fear of current or ex partner: None     Emotionally abused: None     Physically abused: None     Forced sexual activity: None   Other Topics Concern     Parent/sibling w/ CABG, MI or angioplasty before 65F 55M? 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  Lives in [place].  Patient previously smoked.    Alcohol Use - yes  Drug Use - no  Regular Exercise - yes   - Johan  3 biologic children, 1 adopted, 2 stepchildren.    Works at [employer]          CURRENT MEDICATIONS:   Prior to Admission medications    Medication Sig Start Date End Date Taking? Authorizing Provider   albuterol (PROAIR HFA/PROVENTIL HFA/VENTOLIN HFA) 108 (90 Base) MCG/ACT inhaler Inhale 1-2 puffs into the lungs every 4 hours as needed for shortness of breath / dyspnea or wheezing 10/22/20  Yes Tim Boyd MD    albuterol (PROVENTIL) (2.5 MG/3ML) 0.083% neb solution Take 1 vial (2.5 mg) by nebulization every 6 hours as needed for shortness of breath / dyspnea or wheezing 10/22/20  Yes Tim Boyd MD   aspirin (ASA) 81 MG EC tablet Take 1 tablet (81 mg) by mouth once a week Or 2 x weekly 10/22/20  Yes Tim Boyd MD   docusate sodium (COLACE) 100 MG tablet Take 2 tablets (200 mg) by mouth 2 times daily -Adjust dose as needed to maintain soft stools 10/22/20  Yes Tim Boyd MD   fluticasone-salmeterol (ADVAIR DISKUS) 500-50 MCG/DOSE inhaler Inhale 1 puff into the lungs every 12 hours Rinse mouth well after use to prevent Thrush 10/22/20  Yes Tim Boyd MD   tiotropium (SPIRIVA RESPIMAT) 2.5 MCG/ACT inhaler Inhale 2 puffs into the lungs every evening 10/22/20  Yes Tim Boyd MD          ALLERGIES:   Allergies   Allergen Reactions     Atorvastatin Muscle Pain (Myalgia)     Rosuvastatin Nausea and Vomiting     Sucralfate Nausea and Vomiting     Levofloxacin Nausea and Vomiting     Morphine      Other reaction(s): Chest Pain     Penicillins      Other reaction(s): Respiratory Arrest     Sulfamethoxazole W/Trimethoprim Nausea and Vomiting     Yeast infection          ROS:   CONSTITUTIONAL: No reported fever or chills. No changes in weight.  ENT: No visual disturbance, ear ache, epistaxis or sore throat.   CARDIOVASCULAR: No recurrence of chest pain, chest pressure or chest discomfort. No palpitations or lower extremity edema.   RESPIRATORY: Chronic dyspnea. No new cough, wheezing or hemoptysis. No orthopnea or PND.  GI: Positive for chronic abdominal pain. No nausea, vomiting or diarrhea. No melena or hematochezia.  : No reported hematuria or dysuria.   NEUROLOGICAL: No lightheadedness, dizziness, syncope, ataxia, paresthesias or weakness.   HEMATOLOGIC: No history of anemia. No bleeding or excessive bruising. No history of blood clots.   MUSCULOSKELETAL: No joint pain or swelling. Positive for leg pain  "with walking and climbing stairs, suggestive of intermittent claudication.   ENDOCRINOLOGIC: No temperature intolerance. No hair or skin changes.  SKIN: No abnormal rashes or sores, no unusual itching.  PSYCHIATRIC: No history of depression or anxiety. No changes in mood, feeling down or anxious. No changes in sleep.      PHYSICAL EXAM:   /68 (BP Location: Right arm, Patient Position: Sitting, Cuff Size: Adult Regular)   Pulse 104   Temp 97.4  F (36.3  C) (Tympanic)   Resp 16   Ht 1.575 m (5' 2\")   Wt 56.7 kg (125 lb)   SpO2 96%   BMI 22.86 kg/m    GENERAL: The patient is a well-developed, well-nourished, in no apparent distress.  HEENT: Head is normocephalic and atraumatic. Eyes are symmetrical with normal visual tracking. No icterus, no xanthelasmas. Nares appeared normal without nasal drainage. Mucous membranes are moist, no cyanosis.  NECK: Supple. No cervical bruits, JVP not visible.   CHEST/ LUNGS: Lungs clear to auscultation, no rales, rhonchi or wheezes, no use of accessory muscles, no retractions, respirations unlabored and normal respiratory rate.   CARDIO: Regular rate and rhythm normal with S1 and S2, no S3 or S4 and no murmur, click or rub.  ABD: Abdomen is nondistended.   EXTREMITIES: No LE edema present. Peripheral pulses normal and equal bilaterally.  MUSCULOSKELETAL: No visible joint swelling.   NEUROLOGIC: Alert and oriented X3. Normal speech, gait and affect. No focal neurologic deficits.   SKIN: No jaundice. No rashes or visible skin lesions present. No ecchymosis.     EKG:    NSR, rate 85 bpm, left axis, possible anterior infarct of undetermined age.     LAB RESULTS:   No visits with results within 3 Month(s) from this visit.   Latest known visit with results is:   Office Visit on 10/22/2020   Component Date Value Ref Range Status     TSH Reflex 10/22/2020 1.73  0.34 - 5.60 IU/mL Final     Cholesterol 10/22/2020 219* <200 mg/dL Final     Triglycerides 10/22/2020 153* <150 mg/dL " Final     HDL Cholesterol 10/22/2020 46  23 - 92 mg/dL Final     LDL Cholesterol Calculated 10/22/2020 142* <100 mg/dL Final     Non HDL Cholesterol 10/22/2020 173* <130 mg/dL Final     Vitamin B12 10/22/2020 273  180 - 914 pg/mL Final     Vitamin D Total 10/22/2020 34.2  ng/mL Final     Sodium 10/22/2020 139  134 - 144 mmol/L Final     Potassium 10/22/2020 4.5  3.5 - 5.1 mmol/L Final     Chloride 10/22/2020 101  98 - 107 mmol/L Final     Carbon Dioxide 10/22/2020 32* 21 - 31 mmol/L Final     Anion Gap 10/22/2020 6  3 - 14 mmol/L Final     Glucose 10/22/2020 104  70 - 105 mg/dL Final     Urea Nitrogen 10/22/2020 10  7 - 25 mg/dL Final     Creatinine 10/22/2020 0.81  0.60 - 1.20 mg/dL Final     GFR Estimate 10/22/2020 73  >60 mL/min/[1.73_m2] Final     GFR Estimate If Black 10/22/2020 88  >60 mL/min/[1.73_m2] Final     Calcium 10/22/2020 9.6  8.6 - 10.3 mg/dL Final     Bilirubin Total 10/22/2020 0.5  0.3 - 1.0 mg/dL Final     Albumin 10/22/2020 4.5  3.5 - 5.7 g/dL Final     Protein Total 10/22/2020 7.1  6.4 - 8.9 g/dL Final     Alkaline Phosphatase 10/22/2020 61  34 - 104 U/L Final     ALT 10/22/2020 21  7 - 52 U/L Final     AST 10/22/2020 16  13 - 39 U/L Final     WBC 10/22/2020 11.4* 4.0 - 11.0 10e9/L Final     RBC Count 10/22/2020 5.25* 3.8 - 5.2 10e12/L Final     Hemoglobin 10/22/2020 15.5  11.7 - 15.7 g/dL Final     Hematocrit 10/22/2020 47.7* 35.0 - 47.0 % Final     MCV 10/22/2020 91  78 - 100 fl Final     MCH 10/22/2020 29.5  26.5 - 33.0 pg Final     MCHC 10/22/2020 32.5  31.5 - 36.5 g/dL Final     RDW 10/22/2020 13.1  10.0 - 15.0 % Final     Platelet Count 10/22/2020 332  150 - 450 10e9/L Final     Diff Method 10/22/2020 Automated Method   Final     % Neutrophils 10/22/2020 66.8  % Final     % Lymphocytes 10/22/2020 24.0  % Final     % Monocytes 10/22/2020 6.6  % Final     % Eosinophils 10/22/2020 1.5  % Final     % Basophils 10/22/2020 0.8  % Final     % Immature Granulocytes 10/22/2020 0.3  % Final      Absolute Neutrophil 10/22/2020 7.6  1.6 - 8.3 10e9/L Final     Absolute Lymphocytes 10/22/2020 2.7  0.8 - 5.3 10e9/L Final     Absolute Monocytes 10/22/2020 0.8  0.0 - 1.3 10e9/L Final     Absolute Eosinophils 10/22/2020 0.2  0.0 - 0.7 10e9/L Final     Absolute Basophils 10/22/2020 0.1  0.0 - 0.2 10e9/L Final     Abs Immature Granulocytes 10/22/2020 0.0  0 - 0.4 10e9/L Final          ASSESSMENT:   Ember Tavares presents for cardiology evaluation with reports of chest pain and dyspnea resulting in recent ED visit. Patient has a PMH significant for HLD, PAD, ASCVD, panlobular emphysema, B12 deficiency, right cornea ulcer, history of Nissen fundoplication, hiatal hernia with GERD, chronic radicular cervical pain, psoriatic arthritis, left occipital neuralgia, osteoarthritis, prediabetes, Schatzki's ring of the distal esophagus, IBS, osteopenia and history of tobacco abuse (quit in 2012 after smoking 35 year at 1ppd).  Today patient described feeling much better following steroid burst and antibiotics with COPD exacerbation. No recurrence of chest pain or pressure. No increased dyspnea. She did suspect that her symptoms prior where related to breathing as her chest felt tight like she could not breath with drop in her oxygen saturation at home to 88%. She does not report any pain in the arm, jaw, neck or back today. No palpitations or lightheadedness. No edema or weight gain. She does have chronic abdominal pains. She does also report calf pain bilaterally with walking and climbing stairs which improves at rest.     1. Chest pain, unspecified type  2. Hyperlipidemia LDL goal <100  3. Intermittent claudication (H)  4. Abdominal aortic atherosclerosis (H)  5. Mixed hyperlipidemia  6. Panlobular emphysema (H)  7. PEÑA (dyspnea on exertion)  8. Pulmonary nodules- right, repeat CT 2/2022    PLAN:   1. Recently in ED with chest tightness upon breathing, drop in O2 saturation. Symptoms correlating with COPD exacerbation.  Resolved without recurrence following steroid taper and Azithromycin.   2. Reviewed history of prior testing:  -Normal coronaries on coronary angiography in 2009  -Stress echo without evidence of ischemia in 2013  -Coronary CTA in 2019 with low risk coronary calcium score and widely patent coronaries  -Echocardiogram in 2018 with normal LVEF and no RWMA's  No anginal symptoms reported today, EKC stable with significant ST-T changes. No indication for stress testing at this time.   3. She does report history of abdominal aortic atherosclerotic disease. US AAA ordered.   4. She describes symptoms of intermittent claudication in LE's, US ABDULLAHI's ordered.   5. BP controlled, not requiring medication.   6. Reviewed lipids, has not tolerated statin medications. , HDL 46 and total cholesterol 219 (12/22/2020), recommended starting red yeast rice. Recheck fasting lipids again in 6 months.  7. Follow-up with PCP in regards to management of emphysema, I have ordered PRN duoneb treatments as this was very effective for her in ED.   8. CT [place] study on 2/23/21 without PE. She was noted to have right lower lobe nodule containing macroscopic fat, favored to be benign.  However, given interval change in size and high risk status, radiologist recommended follow-up exam in 1 year (approx 2/2022).      Thank you for allowing me to participate in the care of your patient. Please do not hesitate to contact me if you have any questions.     Total time 80 min spent on day of encounter reviewing records, face to face time, counseling on previous imaging and counseling on above diagnoses with recommended plan of care.     Gaby Kilpatrick, APRN CNP [facility]

## 2021-03-22 NOTE — NURSING NOTE
"Chief Complaint   Patient presents with     Consult     chest tightness       Initial /68 (BP Location: Right arm, Patient Position: Sitting, Cuff Size: Adult Regular)   Pulse 104   Temp 97.4  F (36.3  C) (Tympanic)   Resp 16   Ht 1.575 m (5' 2\")   Wt 56.7 kg (125 lb)   SpO2 96%   BMI 22.86 kg/m   Estimated body mass index is 22.86 kg/m  as calculated from the following:    Height as of this encounter: 1.575 m (5' 2\").    Weight as of this encounter: 56.7 kg (125 lb).  Meds Reconciled: complete  Pt is on Aspirin  Pt is not on a Statin  PHQ and/or KARL reviewed. Pt referred to PCP/MH Provider as appropriate.    Melissa Amezcua LPN      "

## 2021-03-23 ENCOUNTER — HOSPITAL ENCOUNTER (OUTPATIENT)
Dept: CT IMAGING | Facility: OTHER | Age: 61
Discharge: HOME | End: 2021-03-23
Attending: INTERNAL MEDICINE | Admitting: INTERNAL MEDICINE
Payer: COMMERCIAL

## 2021-03-23 DIAGNOSIS — R10.9 NONSPECIFIC ABDOMINAL PAIN: ICD-10-CM

## 2021-03-23 LAB — INTERPRETATION ECG - MUSE: NORMAL

## 2021-03-23 PROCEDURE — 74177 CT ABD & PELVIS W/CONTRAST: CPT

## 2021-03-23 PROCEDURE — 255N000002 HC RX 255 OP 636: Performed by: INTERNAL MEDICINE

## 2021-03-23 RX ORDER — IODIXANOL 320 MG/ML
100 INJECTION, SOLUTION INTRAVASCULAR ONCE
Status: COMPLETED | OUTPATIENT
Start: 2021-03-23 | End: 2021-03-23

## 2021-03-23 RX ADMIN — IODIXANOL 100 ML: 320 INJECTION, SOLUTION INTRAVASCULAR at 16:36

## 2021-03-23 NOTE — TELEPHONE ENCOUNTER
1. Nonspecific abdominal pain    CT ordered  - they will call with date/time of appointment.    - CT Abdomen Pelvis w Contrast; Future    Tim Boyd MD

## 2021-04-01 DIAGNOSIS — Z12.11 ENCOUNTER FOR SCREENING COLONOSCOPY: Primary | ICD-10-CM

## 2021-04-01 RX ORDER — BISACODYL 5 MG/1
TABLET, DELAYED RELEASE ORAL
Qty: 2 TABLET | Refills: 0 | Status: ON HOLD | OUTPATIENT
Start: 2021-04-01 | End: 2021-04-29

## 2021-04-01 RX ORDER — POLYETHYLENE GLYCOL 3350 17 G/17G
POWDER, FOR SOLUTION ORAL
Qty: 510 G | Refills: 0 | Status: ON HOLD | OUTPATIENT
Start: 2021-04-01 | End: 2021-04-29

## 2021-04-01 NOTE — TELEPHONE ENCOUNTER
Screening Questions for the Scheduling of Screening Colonoscopies   (If Colonoscopy is diagnostic, Provider should review the chart before scheduling.)  Are you younger than 50 or older than 80?   NO   Do you take aspirin or fish oil?  YES - ASPIRIN  (if yes, tell patient to stop 1 week prior to Colonoscopy)  Do you take warfarin (Coumadin), clopidogrel (Plavix), apixaban (Eliquis), dabigatram (Pradaxa), rivaroxaban (Xarelto) or any blood thinner? NO   Do you use oxygen at home?  N O  Do you have kidney disease? NO   Are you on dialysis? NO   Have you had a stroke or heart attack in the last year? NO   Have you had a stent in your heart or any blood vessel in the last year? NO   Have you had a transplant of any organ? NO   Have you had a colonoscopy or upper endoscopy (EGD) before? YES          When?  2014  Date of scheduled Colonoscopy. 04/29/2021  Provider CAIT   [facility]

## 2021-04-08 ENCOUNTER — HOSPITAL ENCOUNTER (OUTPATIENT)
Dept: MAMMOGRAPHY | Facility: OTHER | Age: 61
End: 2021-04-08
Attending: INTERNAL MEDICINE
Payer: COMMERCIAL

## 2021-04-08 ENCOUNTER — HOSPITAL ENCOUNTER (OUTPATIENT)
Dept: ULTRASOUND IMAGING | Facility: OTHER | Age: 61
End: 2021-04-08
Attending: NURSE PRACTITIONER
Payer: COMMERCIAL

## 2021-04-08 DIAGNOSIS — E78.2 MIXED HYPERLIPIDEMIA: ICD-10-CM

## 2021-04-08 DIAGNOSIS — I70.0 ABDOMINAL AORTIC ATHEROSCLEROSIS (H): ICD-10-CM

## 2021-04-08 DIAGNOSIS — Z12.31 VISIT FOR SCREENING MAMMOGRAM: ICD-10-CM

## 2021-04-08 DIAGNOSIS — E78.5 HYPERLIPIDEMIA LDL GOAL <100: ICD-10-CM

## 2021-04-08 DIAGNOSIS — I73.9 INTERMITTENT CLAUDICATION (H): ICD-10-CM

## 2021-04-08 PROCEDURE — 93922 UPR/L XTREMITY ART 2 LEVELS: CPT

## 2021-04-08 PROCEDURE — 77063 BREAST TOMOSYNTHESIS BI: CPT

## 2021-04-08 PROCEDURE — 76775 US EXAM ABDO BACK WALL LIM: CPT

## 2021-04-25 ENCOUNTER — ALLIED HEALTH/NURSE VISIT (OUTPATIENT)
Dept: FAMILY MEDICINE | Facility: OTHER | Age: 61
End: 2021-04-25
Attending: SURGERY
Payer: COMMERCIAL

## 2021-04-25 DIAGNOSIS — Z12.11 ENCOUNTER FOR SCREENING COLONOSCOPY: ICD-10-CM

## 2021-04-25 LAB
SARS-COV-2 RNA RESP QL NAA+PROBE: NORMAL
SPECIMEN SOURCE: NORMAL

## 2021-04-25 PROCEDURE — U0003 INFECTIOUS AGENT DETECTION BY NUCLEIC ACID (DNA OR RNA); SEVERE ACUTE RESPIRATORY SYNDROME CORONAVIRUS 2 (SARS-COV-2) (CORONAVIRUS DISEASE [COVID-19]), AMPLIFIED PROBE TECHNIQUE, MAKING USE OF HIGH THROUGHPUT TECHNOLOGIES AS DESCRIBED BY CMS-2020-01-R: HCPCS | Mod: ZL | Performed by: SURGERY

## 2021-04-25 PROCEDURE — U0005 INFEC AGEN DETEC AMPLI PROBE: HCPCS | Mod: ZL | Performed by: SURGERY

## 2021-04-25 PROCEDURE — C9803 HOPD COVID-19 SPEC COLLECT: HCPCS

## 2021-04-26 LAB
LABORATORY COMMENT REPORT: NORMAL
SARS-COV-2 RNA RESP QL NAA+PROBE: NEGATIVE
SPECIMEN SOURCE: NORMAL

## 2021-04-29 ENCOUNTER — HOSPITAL ENCOUNTER (OUTPATIENT)
Facility: OTHER | Age: 61
Discharge: HOME | End: 2021-04-29
Attending: SURGERY | Admitting: SURGERY
Payer: COMMERCIAL

## 2021-04-29 ENCOUNTER — ANESTHESIA EVENT (OUTPATIENT)
Dept: SURGERY | Facility: OTHER | Age: 61
End: 2021-04-29
Payer: COMMERCIAL

## 2021-04-29 ENCOUNTER — ANESTHESIA (OUTPATIENT)
Dept: SURGERY | Facility: OTHER | Age: 61
End: 2021-04-29
Payer: COMMERCIAL

## 2021-04-29 VITALS
TEMPERATURE: 97.8 F | OXYGEN SATURATION: 96 % | RESPIRATION RATE: 14 BRPM | SYSTOLIC BLOOD PRESSURE: 117 MMHG | HEART RATE: 78 BPM | WEIGHT: 125 LBS | BODY MASS INDEX: 22.86 KG/M2 | DIASTOLIC BLOOD PRESSURE: 95 MMHG

## 2021-04-29 PROCEDURE — 258N000003 HC RX IP 258 OP 636: Performed by: SURGERY

## 2021-04-29 PROCEDURE — 250N000009 HC RX 250: Performed by: NURSE ANESTHETIST, CERTIFIED REGISTERED

## 2021-04-29 PROCEDURE — 250N000011 HC RX IP 250 OP 636: Performed by: NURSE ANESTHETIST, CERTIFIED REGISTERED

## 2021-04-29 PROCEDURE — 88305 TISSUE EXAM BY PATHOLOGIST: CPT

## 2021-04-29 PROCEDURE — 45385 COLONOSCOPY W/LESION REMOVAL: CPT | Performed by: NURSE ANESTHETIST, CERTIFIED REGISTERED

## 2021-04-29 PROCEDURE — 45385 COLONOSCOPY W/LESION REMOVAL: CPT | Performed by: SURGERY

## 2021-04-29 PROCEDURE — 45385 COLONOSCOPY W/LESION REMOVAL: CPT

## 2021-04-29 PROCEDURE — 250N000009 HC RX 250: Performed by: SURGERY

## 2021-04-29 PROCEDURE — 45380 COLONOSCOPY AND BIOPSY: CPT | Mod: XU | Performed by: SURGERY

## 2021-04-29 PROCEDURE — 999N000010 HC STATISTIC ANES STAT CODE-CRNA PER MINUTE: Performed by: SURGERY

## 2021-04-29 RX ORDER — SODIUM CHLORIDE, SODIUM LACTATE, POTASSIUM CHLORIDE, CALCIUM CHLORIDE 600; 310; 30; 20 MG/100ML; MG/100ML; MG/100ML; MG/100ML
INJECTION, SOLUTION INTRAVENOUS CONTINUOUS
Status: DISCONTINUED | OUTPATIENT
Start: 2021-04-29 | End: 2021-04-29 | Stop reason: HOSPADM

## 2021-04-29 RX ORDER — NALOXONE HYDROCHLORIDE 0.4 MG/ML
0.4 INJECTION, SOLUTION INTRAMUSCULAR; INTRAVENOUS; SUBCUTANEOUS
Status: DISCONTINUED | OUTPATIENT
Start: 2021-04-29 | End: 2021-04-29 | Stop reason: HOSPADM

## 2021-04-29 RX ORDER — FLUMAZENIL 0.1 MG/ML
0.2 INJECTION, SOLUTION INTRAVENOUS
Status: DISCONTINUED | OUTPATIENT
Start: 2021-04-29 | End: 2021-04-29 | Stop reason: HOSPADM

## 2021-04-29 RX ORDER — PROPOFOL 10 MG/ML
INJECTION, EMULSION INTRAVENOUS CONTINUOUS PRN
Status: DISCONTINUED | OUTPATIENT
Start: 2021-04-29 | End: 2021-04-29

## 2021-04-29 RX ORDER — LIDOCAINE 40 MG/G
CREAM TOPICAL
Status: DISCONTINUED | OUTPATIENT
Start: 2021-04-29 | End: 2021-04-29 | Stop reason: HOSPADM

## 2021-04-29 RX ORDER — LIDOCAINE HYDROCHLORIDE 20 MG/ML
INJECTION, SOLUTION INFILTRATION; PERINEURAL PRN
Status: DISCONTINUED | OUTPATIENT
Start: 2021-04-29 | End: 2021-04-29

## 2021-04-29 RX ORDER — NALOXONE HYDROCHLORIDE 0.4 MG/ML
0.2 INJECTION, SOLUTION INTRAMUSCULAR; INTRAVENOUS; SUBCUTANEOUS
Status: DISCONTINUED | OUTPATIENT
Start: 2021-04-29 | End: 2021-04-29 | Stop reason: HOSPADM

## 2021-04-29 RX ORDER — ONDANSETRON 2 MG/ML
4 INJECTION INTRAMUSCULAR; INTRAVENOUS
Status: DISCONTINUED | OUTPATIENT
Start: 2021-04-29 | End: 2021-04-29 | Stop reason: HOSPADM

## 2021-04-29 RX ORDER — PROPOFOL 10 MG/ML
INJECTION, EMULSION INTRAVENOUS PRN
Status: DISCONTINUED | OUTPATIENT
Start: 2021-04-29 | End: 2021-04-29

## 2021-04-29 RX ADMIN — LIDOCAINE HYDROCHLORIDE 40 MG: 20 INJECTION, SOLUTION INFILTRATION; PERINEURAL at 09:08

## 2021-04-29 RX ADMIN — PROPOFOL 100 MCG/KG/MIN: 10 INJECTION, EMULSION INTRAVENOUS at 09:08

## 2021-04-29 RX ADMIN — SODIUM CHLORIDE, POTASSIUM CHLORIDE, SODIUM LACTATE AND CALCIUM CHLORIDE: 600; 310; 30; 20 INJECTION, SOLUTION INTRAVENOUS at 09:00

## 2021-04-29 RX ADMIN — PROPOFOL 50 MG: 10 INJECTION, EMULSION INTRAVENOUS at 09:08

## 2021-04-29 ASSESSMENT — COPD QUESTIONNAIRES: COPD: 1

## 2021-04-29 NOTE — ANESTHESIA POSTPROCEDURE EVALUATION
Patient: Ember Tavares    Procedure(s):  COLONOSCOPY, WITH POLYPECTOMY AND BIOPSY    Diagnosis:Abdominal pain [R10.9]  Diagnosis Additional Information: No value filed.    Anesthesia Type:  MAC    Note:  Disposition: Outpatient   Postop Pain Control: Uneventful            Sign Out: Well controlled pain   PONV: No   Neuro/Psych: Uneventful            Sign Out: Acceptable/Baseline neuro status   Airway/Respiratory: Uneventful            Sign Out: Acceptable/Baseline resp. status   CV/Hemodynamics: Uneventful            Sign Out: Acceptable CV status; No obvious hypovolemia; No obvious fluid overload   Other NRE: NONE   DID A NON-ROUTINE EVENT OCCUR? No           Last vitals:  Vitals:    04/29/21 0857 04/29/21 1000 04/29/21 1015   BP: 108/70 111/63 113/69   Pulse: 105 84 78   Resp: 16 14    Temp: 98.2  F (36.8  C) 97.8  F (36.6  C)    SpO2: 93% 93% 97%       Last vitals prior to Anesthesia Care Transfer:  CRNA VITALS  4/29/2021 0925 - 4/29/2021 1025      4/29/2021             Resp Rate (set):  10          Electronically Signed By: АЛЕКСАНДР COHEN CRNA  April 29, 2021  10:46 AM

## 2021-04-29 NOTE — H&P
PRE-PROCEDURE NOTE    CHIEFCOMPLAINT / REASON FOR PROCEDURE:  Abd pain  PERTINENT HISTORY   Patient is due for colonoscopy . Previous colonoscopy 2016. No  family history of colon polyps or colon cancer.    Past Medical History:   Diagnosis Date     Alpha-1-antitrypsin deficiency carrier 8/23/2017     Atherosclerosis of abdominal aorta (H) 8/24/2017    Overview:  [facility] Studies: 2/19/2013 -- CTA ABDOMEN AND PELVIS WITH BILATERAL LOWER EXTREMITY RUNOFF  CLINICAL HISTORY: Iliac and mesenteric ischemia.  TECHNIQUE: 125 mL Omnipaque 300 IV contrast was administered. 3-D arterial reformations were performed.  FINDINGS: There is a well-defined ovoid density at the medial right costophrenic angle. This measures 2.3 x 0.9 x 0.6 cm. It demonstra     Chronic abdominal pain 3/10/2010    Overview:  16 year duration     Chronic sinusitis     No Comments Provided     Closed fracture of skull (H)     1972     COPD, severe (H) 8/24/2017    Overview:  8/4/2014 -- PULMONARY FUNCTION  SITE:  [facility] ORDERED BY:  VANNESA LUNA  CLINICAL SUMMARY: 53-year-old female with a history of severe COPD.   TECHNICIAN NOTE: Good effort.   DATA: FVC 1.85 (61%). FEV1 1.04 (45%). FEV1/FVC ratio 56%. DLCO 13.5 to 62%.   Flow volume curve is consistent with airway obstruction.   IMPRESSION: 1. Severe obstructive pulmon     GERD (gastroesophageal reflux disease) 11/29/2017     Hiatal hernia 8/24/2017     Other and unspecified hyperlipidemia 8/1/2012     PAD (peripheral artery disease) (H) 10/13/2015     Psoriasis 8/24/2017     Psoriatic arthritis (H) 11/20/2017     Schatzki's ring of distal esophagus 8/24/2017     Ulcer of right cornea 8/18/2018     Vitamin B12 deficiency 4/17/2018     Vitamin D deficiency 8/24/2017       Past Surgical History:   Procedure Laterality Date     AS UGI ENDOSCOPY W ESOPHAGEAL DILATION BALLOON <30MM  10/30/2015    ESOPHAGEAL DILATION,Dr. Rainer Allen, [facility]     BIOPSY  BREAST Bilateral     1999, 2001, BIOPSY BREAST,benign     CHOLECYSTECTOMY  2004    Laparoscopic     COLONOSCOPY  06/01/2016    x's 3 negative     CT CORONARY ANGIOGRAM  2009    CT CORONARY ANGIOGRAM (IA),negative     ESOPHAGOSCOPY, GASTROSCOPY, DUODENOSCOPY (EGD), COMBINED  04/07/2009    leobardo, Dr. Allen     ESOPHAGOSCOPY, GASTROSCOPY, DUODENOSCOPY (EGD), COMBINED N/A 5/30/2019    Pickering's esophagus, 3 year follow up     HYSTERECTOMY VAGINAL  1994    ovaries remain     LAPAROSCOPIC NISSEN FUNDOPLICATION N/A 3/26/2018    Procedure: LAPAROSCOPIC NISSEN FUNDOPLICATION;  Laparoscopic Nissen Fundoplication;  Surgeon: Jagdish Ruiz MD;  Location: [facility] OR     LAPAROTOMY EXPLORATORY  1990    lysis of adhesions/endometriosis     RECTOCELE REPAIR  07/29/2009         Other:  None  Bleeding tendencies: No     Relevant Family History:  None     Relevant Social History:  None     10 point ROS of systems including Constitutional, Eyes, Respiratory, Cardiovascular, Gastroenterology, Genitourinary, Integumentary, Muscularskeletal, Psychiatric were all negative except for pertinent positives noted in my HPI.      ALLERGIES/SENSITIVITIES:   Allergies   Allergen Reactions     Atorvastatin Muscle Pain (Myalgia)     Rosuvastatin Nausea and Vomiting     Sucralfate Nausea and Vomiting     Levofloxacin Nausea and Vomiting     Morphine      Other reaction(s): Chest Pain     Penicillins      Other reaction(s): Respiratory Arrest     Sulfamethoxazole W/Trimethoprim Nausea and Vomiting     Yeast infection        CURRENT MEDICATIONS:    No current facility-administered medications on file prior to encounter.   albuterol (PROAIR HFA/PROVENTIL HFA/VENTOLIN HFA) 108 (90 Base) MCG/ACT inhaler, Inhale 1-2 puffs into the lungs every 4 hours as needed for shortness of breath / dyspnea or wheezing  albuterol (PROVENTIL) (2.5 MG/3ML) 0.083% neb solution, Take 1 vial (2.5 mg) by nebulization every 6 hours as needed for shortness of breath / dyspnea or  wheezing  docusate sodium (COLACE) 100 MG tablet, Take 2 tablets (200 mg) by mouth 2 times daily -Adjust dose as needed to maintain soft stools  fluticasone-salmeterol (ADVAIR DISKUS) 500-50 MCG/DOSE inhaler, Inhale 1 puff into the lungs every 12 hours Rinse mouth well after use to prevent Thrush  ipratropium - albuterol 0.5 mg/2.5 mg/3 mL (DUONEB) 0.5-2.5 (3) MG/3ML neb solution, Take 1 vial (3 mLs) by nebulization every 6 hours as needed for shortness of breath / dyspnea or wheezing  red yeast rice 600 MG CAPS, Take 1 capsule (600 mg) by mouth daily  tiotropium (SPIRIVA RESPIMAT) 2.5 MCG/ACT inhaler, Inhale 2 puffs into the lungs every evening  aspirin (ASA) 81 MG EC tablet, Take 1 tablet (81 mg) by mouth once a week Or 2 x weekly            PRE-SEDATION ASSESSMENT:    LUNGS:  CTA B/L, no wheezing or crackles.  Heart & CV:  RRR no murmur.  Intact distal pulses, good cap refill.    Comment(s):      IMPRESSION: 60 year old female in need of screening colonoscopy.    PLAN:  I discussed screening colonoscopy with the patient. Anesthesia coverage requested.    Jagdish Ruiz MD    4/29/2021 8:55 AM

## 2021-04-29 NOTE — OP NOTE
PROCEDURE NOTE    SURGEON:Jagdish Ruiz MD    PRE-OP DIAGNOSIS:  Abdominal Pain      POST-OP DIAGNOSIS: Abdominal pain    PROCEDURE:  Colonoscopy with biopsy and cold snare    SPECIMEN:      ID Type Source Tests Collected by Time Destination   A : illeum biopsies Tissue Small Intestine, Ileum SURGICAL PATHOLOGY EXAM Jagdish Ruiz MD 4/29/2021  9:33 AM    B : random colon bx's Tissue Colon SURGICAL PATHOLOGY EXAM Jagdish Riuz MD 4/29/2021  9:34 AM    C : sigmoid polyp Tissue Large Intestine, Sigmoid SURGICAL PATHOLOGY EXAM Jagdish Ruiz MD 4/29/2021  9:43 AM    D : rectal polyp Tissue Large Intestine, Rectum SURGICAL PATHOLOGY EXAM Jagdish Ruiz MD 4/29/2021  9:49 AM          ANESTHESIA:  Monitor Anesthesia Care CRNA Independent: Lupe Kirkland APRN CRNA   Coverage requested due to  Asa III    ESTIMATED BLOOD LOSS: none    COMPLICATIONS:  None    INDICATION FOR THE PROCEDURE: The patient is a 60 year old female. The patient presents with abdominal pain and change in bowel habits. I explained to the patient the risks, benefits and alternatives to screening colonoscopy for evaluating for cancer or polyps. We discussed the risks including bleeding, perforation, potential inability to reach the cecum and the risks of sedation. The patient's questions were answered and the patient wished to proceed. Informed consent paperwork was completed.    PROCEDURE: The patient was taken to the endoscopy suite. Appropriate monitors were attached. The patient was placed in the left lateral decubitus position. Timeout was performed confirming the patient's identity and procedure to be performed.  After appropriate sedation was confirmed, digital rectal exam was performed.  There was normal tone and no gross abnormality was noted.  The lubricated colonoscope was introduced into the anus the colon was insufflated with air. The prep quality was adequate. Under direct visualization the scope was advanced to the cecum.  The  ileum was inspected and biopsied. Random colon biopsies were taken from cecum to rectum. A 4 mm sigmoid polyp and two 3-4 mm rectal polyps were removed from the rectum with cold snare. The mucosa of the colon was inspected while withdrawing the scope. The scope was retroflexed in the rectum and the anorectal junction was inspected. No abnormalities were noted. The scope was returned to aneutral position and the colon was decompressed. The scope was removed. The patient tolerated the procedure with no immediately apparent complication. The patient was taken to recovery in stable condition.    FOLLOW UP: RECOMMEND high fiber diet, will call with pathology results. No specific sourvce of pain identified.    Jagdish Ruiz MD on 4/29/2021 at 10:00 AM

## 2021-04-29 NOTE — DISCHARGE INSTRUCTIONS
[facility]  Adult Discharge Orders & Instructions    ________________________________________________________________          For 12 hours after surgery  1. Get plenty of rest.  A responsible adult must stay with you for at least 12 hours after you leave the hospital.   2. You may feel lightheaded.  IF so, sit for a few minutes before standing.  Have someone help you get up.   3. You may have a slight fever. Call the doctor if your fever is over 101 F (38.3 C) (taken under the tongue) or lasts longer than 24 hours.  4. You may have a dry mouth, a sore throat, muscle aches or trouble sleeping.  These should go away after 24 hours.  5. Do not make important or legal decisions.  6.   Do not drive or use heavy equipment.  If you have weakness or tingling, don't drive or use heavy equipment until this feeling goes away.    To contact a doctor, call   [phone]_______________________

## 2021-04-29 NOTE — ANESTHESIA CARE TRANSFER NOTE
Patient: Ember MUNGUIA Anusha    Procedure(s):  COLONOSCOPY, WITH POLYPECTOMY AND BIOPSY    Diagnosis: Abdominal pain [R10.9]  Diagnosis Additional Information: No value filed.    Anesthesia Type:   MAC     Note:    Oropharynx: oropharynx clear of all foreign objects  Level of Consciousness: drowsy  Oxygen Supplementation: room air    Independent Airway: airway patency satisfactory and stable  Dentition: dentition unchanged  Vital Signs Stable: post-procedure vital signs reviewed and stable  Report to RN Given: handoff report given  Patient transferred to: Phase II    Handoff Report: Identifed the Patient, Identified the Reponsible Provider, Reviewed the pertinent medical history, Discussed the surgical course, Reviewed Intra-OP anesthesia mangement and issues during anesthesia, Set expectations for post-procedure period and Allowed opportunity for questions and acknowledgement of understanding      Vitals: (Last set prior to Anesthesia Care Transfer)  CRNA VITALS  4/29/2021 0925 - 4/29/2021 0956      4/29/2021             Resp Rate (set):  10        Electronically Signed By: АЛЕКСАНДР HUGO CRNA  April 29, 2021  9:56 AM

## 2021-04-29 NOTE — ANESTHESIA PREPROCEDURE EVALUATION
Anesthesia Pre-Procedure Evaluation    Patient: Ember Tavares   MRN: [id number] : 1960        Preoperative Diagnosis: Abdominal pain [R10.9]   Procedure : Procedure(s):  COLONOSCOPY     Past Medical History:   Diagnosis Date     Alpha-1-antitrypsin deficiency carrier 2017     Atherosclerosis of abdominal aorta (H) 2017    Overview:  [facility] Studies: 2013 -- CTA ABDOMEN AND PELVIS WITH BILATERAL LOWER EXTREMITY RUNOFF  CLINICAL HISTORY: Iliac and mesenteric ischemia.  TECHNIQUE: 125 mL Omnipaque 300 IV contrast was administered. 3-D arterial reformations were performed.  FINDINGS: There is a well-defined ovoid density at the medial right costophrenic angle. This measures 2.3 x 0.9 x 0.6 cm. It demonstra     Chronic abdominal pain 3/10/2010    Overview:  16 year duration     Chronic sinusitis     No Comments Provided     Closed fracture of skull (H)          COPD, severe (H) 2017    Overview:  2014 -- PULMONARY FUNCTION  SITE:  [facility] ORDERED BY:  VANNESA LUNA  CLINICAL SUMMARY: 53-year-old female with a history of severe COPD.   TECHNICIAN NOTE: Good effort.   DATA: FVC 1.85 (61%). FEV1 1.04 (45%). FEV1/FVC ratio 56%. DLCO 13.5 to 62%.   Flow volume curve is consistent with airway obstruction.   IMPRESSION: 1. Severe obstructive pulmon     GERD (gastroesophageal reflux disease) 2017     Hiatal hernia 2017     Other and unspecified hyperlipidemia 2012     PAD (peripheral artery disease) (H) 10/13/2015     Psoriasis 2017     Psoriatic arthritis (H) 2017     Schatzki's ring of distal esophagus 2017     Ulcer of right cornea 2018     Vitamin B12 deficiency 2018     Vitamin D deficiency 2017      Past Surgical History:   Procedure Laterality Date     AS UGI ENDOSCOPY W ESOPHAGEAL DILATION BALLOON <30MM  10/30/2015    ESOPHAGEAL DILATION,Dr. Rainer Allen, [facility]     BIOPSY BREAST  Bilateral     , , BIOPSY BREAST,benign     CHOLECYSTECTOMY      Laparoscopic     COLONOSCOPY  2016    x's 3 negative     CT CORONARY ANGIOGRAM      CT CORONARY ANGIOGRAM (IA),negative     ESOPHAGOSCOPY, GASTROSCOPY, DUODENOSCOPY (EGD), COMBINED  2009    Dr. Alejandro booth     ESOPHAGOSCOPY, GASTROSCOPY, DUODENOSCOPY (EGD), COMBINED N/A 2019    Pickering's esophagus, 3 year follow up     HYSTERECTOMY VAGINAL  1994    ovaries remain     LAPAROSCOPIC NISSEN FUNDOPLICATION N/A 3/26/2018    Procedure: LAPAROSCOPIC NISSEN FUNDOPLICATION;  Laparoscopic Nissen Fundoplication;  Surgeon: Jagdish Ruiz MD;  Location: [facility] OR     LAPAROTOMY EXPLORATORY      lysis of adhesions/endometriosis     RECTOCELE REPAIR  2009      Allergies   Allergen Reactions     Atorvastatin Muscle Pain (Myalgia)     Rosuvastatin Nausea and Vomiting     Sucralfate Nausea and Vomiting     Levofloxacin Nausea and Vomiting     Morphine      Other reaction(s): Chest Pain     Penicillins      Other reaction(s): Respiratory Arrest     Sulfamethoxazole W/Trimethoprim Nausea and Vomiting     Yeast infection      Social History     Tobacco Use     Smoking status: Former Smoker     Packs/day: 0.50     Years: 32.00     Pack years: 16.00     Types: Cigarettes, Cigars     Quit date: 2012     Years since quittin.7     Smokeless tobacco: Never Used   Substance Use Topics     Alcohol use: Yes     Alcohol/week: 2.0 standard drinks     Comment: Alcoholic Drinks/day: 1-2 drinks every 1-2 weeks      Wt Readings from Last 1 Encounters:   21 56.7 kg (125 lb)        Anesthesia Evaluation   Pt has had prior anesthetic.     No history of anesthetic complications       ROS/MED HX  ENT/Pulmonary:     (+) COPD,     Neurologic:  - neg neurologic ROS     Cardiovascular:       METS/Exercise Tolerance: >4 METS    Hematologic:  - neg hematologic  ROS     Musculoskeletal:  - neg musculoskeletal ROS     GI/Hepatic:     (+) GERD,  hiatal hernia,     Renal/Genitourinary:  - neg Renal ROS     Endo:  - neg endo ROS     Psychiatric/Substance Use:  - neg psychiatric ROS     Infectious Disease:  - neg infectious disease ROS     Malignancy:  - neg malignancy ROS     Other:  - neg other ROS          Physical Exam    Airway        Mallampati: I   TM distance: > 3 FB   Neck ROM: full   Mouth opening: > 3 cm    Respiratory Devices and Support         Dental       (+) upper dentures and lower dentures      Cardiovascular   cardiovascular exam normal       Rhythm and rate: regular and normal     Pulmonary   pulmonary exam normal        breath sounds clear to auscultation           OUTSIDE LABS:  CBC:   Lab Results   Component Value Date    WBC 8.7 02/23/2021    WBC 11.4 (H) 10/22/2020    HGB 14.4 02/23/2021    HGB 15.5 10/22/2020    HCT 43.3 02/23/2021    HCT 47.7 (H) 10/22/2020     02/23/2021     10/22/2020     BMP:   Lab Results   Component Value Date     02/23/2021     10/22/2020    POTASSIUM 4.3 02/23/2021    POTASSIUM 4.5 10/22/2020    CHLORIDE 103 02/23/2021    CHLORIDE 101 10/22/2020    CO2 26 02/23/2021    CO2 32 (H) 10/22/2020    BUN 14 02/23/2021    BUN 10 10/22/2020    CR 0.89 02/23/2021    CR 0.81 10/22/2020     (H) 02/23/2021     10/22/2020     COAGS:   Lab Results   Component Value Date    PTT 39 (H) 10/11/2019    INR 0.96 10/11/2019     POC: No results found for: BGM, HCG, HCGS  HEPATIC:   Lab Results   Component Value Date    ALBUMIN 4.1 02/23/2021    PROTTOTAL 6.6 02/23/2021    ALT 20 02/23/2021    AST 19 02/23/2021    ALKPHOS 62 02/23/2021    BILITOTAL 0.4 02/23/2021     OTHER:   Lab Results   Component Value Date    LACT 1.3 12/02/2019    COLEMAN 9.9 02/23/2021    MAG 2.2 03/28/2019    LIPASE 14 02/23/2021    CRP 4.1 (H) 12/12/2019    SED 82 (H) 12/12/2019       Anesthesia Plan    ASA Status:  3   NPO Status:  NPO Appropriate    Anesthesia Type: MAC.              Consents    Anesthesia Plan(s) and  associated risks, benefits, and realistic alternatives discussed. Questions answered and patient/representative(s) expressed understanding.     - Discussed with:  Patient         Postoperative Care            Comments:                АЛЕКСАНДР COHEN CRNA

## 2021-06-23 DIAGNOSIS — Z14.8 ALPHA-1-ANTITRYPSIN DEFICIENCY CARRIER: ICD-10-CM

## 2021-06-23 DIAGNOSIS — J43.1 PANLOBULAR EMPHYSEMA (H): ICD-10-CM

## 2021-06-23 DIAGNOSIS — J44.9 COPD, SEVERE (H): ICD-10-CM

## 2021-06-24 RX ORDER — ALBUTEROL SULFATE 90 UG/1
AEROSOL, METERED RESPIRATORY (INHALATION)
Qty: 20.1 G | OUTPATIENT
Start: 2021-06-24

## 2021-06-30 ENCOUNTER — PATIENT OUTREACH (OUTPATIENT)
Dept: INTERNAL MEDICINE | Facility: OTHER | Age: 61
End: 2021-06-30

## 2021-07-01 ENCOUNTER — OFFICE VISIT (OUTPATIENT)
Dept: INTERNAL MEDICINE | Facility: OTHER | Age: 61
End: 2021-07-01
Attending: INTERNAL MEDICINE
Payer: COMMERCIAL

## 2021-07-01 VITALS
OXYGEN SATURATION: 92 % | HEART RATE: 106 BPM | RESPIRATION RATE: 16 BRPM | BODY MASS INDEX: 21.25 KG/M2 | DIASTOLIC BLOOD PRESSURE: 72 MMHG | SYSTOLIC BLOOD PRESSURE: 132 MMHG | TEMPERATURE: 98 F | WEIGHT: 116.2 LBS

## 2021-07-01 DIAGNOSIS — Z14.8 ALPHA-1-ANTITRYPSIN DEFICIENCY CARRIER: ICD-10-CM

## 2021-07-01 DIAGNOSIS — J44.9 COPD, SEVERE (H): ICD-10-CM

## 2021-07-01 DIAGNOSIS — J44.1 COPD EXACERBATION (H): Primary | ICD-10-CM

## 2021-07-01 DIAGNOSIS — J43.1 PANLOBULAR EMPHYSEMA (H): ICD-10-CM

## 2021-07-01 PROCEDURE — 99214 OFFICE O/P EST MOD 30 MIN: CPT | Performed by: INTERNAL MEDICINE

## 2021-07-01 RX ORDER — AZITHROMYCIN 250 MG/1
TABLET, FILM COATED ORAL
Qty: 42 TABLET | Refills: 3 | Status: SHIPPED | OUTPATIENT
Start: 2021-07-01 | End: 2022-11-11

## 2021-07-01 RX ORDER — PREDNISONE 10 MG/1
TABLET ORAL
Qty: 60 TABLET | Refills: 0 | Status: SHIPPED | OUTPATIENT
Start: 2021-07-01 | End: 2022-10-07

## 2021-07-01 RX ORDER — ALBUTEROL SULFATE 90 UG/1
1-2 AEROSOL, METERED RESPIRATORY (INHALATION) EVERY 4 HOURS PRN
Qty: 54 G | Refills: 3 | Status: SHIPPED | OUTPATIENT
Start: 2021-07-01 | End: 2021-11-22

## 2021-07-01 ASSESSMENT — PAIN SCALES - GENERAL: PAINLEVEL: NO PAIN (0)

## 2021-07-01 ASSESSMENT — ENCOUNTER SYMPTOMS
MYALGIAS: 0
PALPITATIONS: 0
FEVER: 0
WOUND: 0
VOMITING: 0
LIGHT-HEADEDNESS: 0
COUGH: 1
ABDOMINAL PAIN: 0
DIARRHEA: 0
DYSURIA: 0
NAUSEA: 0
ARTHRALGIAS: 1
SHORTNESS OF BREATH: 1
BRUISES/BLEEDS EASILY: 0
AGITATION: 0
HEMATURIA: 0
CHILLS: 0
WHEEZING: 1
DIZZINESS: 0
CONFUSION: 0

## 2021-07-01 NOTE — PROGRESS NOTES
Medication Reconciliation: complete     FOOD SECURITY SCREENING QUESTIONS  Hunger Vital Signs:  Within the past 12 months we worried whether our food would run out before we got money to buy more. Never  Within the past 12 months the food we bought just didn't last and we didn't have money to get more. Never  Ella Rojas LPN 6/24/2021 10:34 AM

## 2021-07-01 NOTE — PATIENT INSTRUCTIONS
Forms completed.     1. COPD exacerbation (H)  START:   - azithromycin (ZITHROMAX) 250 MG tablet; Take 2 tablets (500 mg) by mouth daily for 1 day, THEN 1 tablet (250 mg) daily for 4 days,     Starting July 9th -- Start 1 tablet (250 mg) Every Mon, Wed, Fri Morning. - for COPD exacerbation Prevention.       Consider EGD if ongoing upper abdominal pains.     Return as needed for follow-up with Dr. Boyd.    Clinic : [phone]  Appointment line: [phone]

## 2021-07-01 NOTE — PROGRESS NOTES
Ms. Tvaares is a 60 year old female who presents to the clinic for evaluation of the following problems:      ICD-10-CM    1. COPD exacerbation (H)  J44.1 azithromycin (ZITHROMAX) 250 MG tablet     predniSONE (DELTASONE) 10 MG tablet   2. COPD, severe (H)  J44.9 albuterol (PROAIR HFA/PROVENTIL HFA/VENTOLIN HFA) 108 (90 Base) MCG/ACT inhaler     azithromycin (ZITHROMAX) 250 MG tablet   3. Panlobular emphysema (H)  J43.1 albuterol (PROAIR HFA/PROVENTIL HFA/VENTOLIN HFA) 108 (90 Base) MCG/ACT inhaler     azithromycin (ZITHROMAX) 250 MG tablet   4. Alpha-1-antitrypsin deficiency carrier  Z14.8 albuterol (PROAIR HFA/PROVENTIL HFA/VENTOLIN HFA) 108 (90 Base) MCG/ACT inhaler     azithromycin (ZITHROMAX) 250 MG tablet     HPI  Patient has been having some breathing difficulty for the past 2 weeks or so.  Worse in the last week.  Increased chest tightness with coughing, shortness of breath and phlegm.  COPD exacerbation identified.  She had similar symptoms back in the ER a few months back.  Seems to happen about every 2 to 3 months.  She has been having about 4 COPD exacerbations per year on average.    Start prednisone and Zithromax x5 days.  Prednisone taper.  After done with the Zithromax 5-day course, start Monday Wednesday Friday 250 mg azithromycin for COPD exacerbation prevention.    Needs albuterol refills.    She has pan lobar emphysema.  Albuterol does help when needed.  Also on Advair.  Uses duo nebs at home as well.  Also has Spiriva.    She has alpha-1 antitrypsin deficiency carrier.    Foster care forms were completed today.  One of her daughters is a 16-year-old daughter who is currently under their care through the foster system because the 16-year-old's mother is using drugs.  (Patient's daughter).  --Patient's daughter may end up going to shelter because of her drug use.  Granddaughter sounds like may end up staying at their house for quite some time, possibly until she is 18 if things  "continue.    Functional Capacity: about 4 METS.   Review of Systems   Constitutional: Negative for chills and fever.   HENT: Negative for congestion and hearing loss.    Eyes: Negative for visual disturbance.   Respiratory: Positive for cough, shortness of breath and wheezing.         + worsened breathing in past week   Cardiovascular: Negative for chest pain and palpitations.   Gastrointestinal: Negative for abdominal pain, diarrhea, nausea and vomiting.   Endocrine: Negative for cold intolerance and heat intolerance.   Genitourinary: Negative for dysuria and hematuria.   Musculoskeletal: Positive for arthralgias. Negative for myalgias.   Skin: Negative for rash and wound.   Allergic/Immunologic: Negative for immunocompromised state.   Neurological: Negative for dizziness and light-headedness.   Hematological: Does not bruise/bleed easily.   Psychiatric/Behavioral: Negative for agitation and confusion.      Reviewed and updated as needed this visit by Provider   Tobacco  Allergies  Meds  Problems  Med Hx  Surg Hx  Fam Hx          EXAM:   Vitals:    07/01/21 0848   BP: 132/72   BP Location: Right arm   Patient Position: Sitting   Cuff Size: Adult Regular   Pulse: 106   Resp: 16   Temp: 98  F (36.7  C)   TempSrc: Tympanic   SpO2: 92%   Weight: 52.7 kg (116 lb 3.2 oz)       Current Pain Score: No Pain (0)     BP Readings from Last 3 Encounters:   07/01/21 132/72   04/29/21 (!) 117/95   03/22/21 118/68      Wt Readings from Last 3 Encounters:   07/01/21 52.7 kg (116 lb 3.2 oz)   04/29/21 56.7 kg (125 lb)   03/22/21 56.7 kg (125 lb)      Estimated body mass index is 21.25 kg/m  as calculated from the following:    Height as of 3/22/21: 1.575 m (5' 2\").    Weight as of this encounter: 52.7 kg (116 lb 3.2 oz).     Physical Exam  Constitutional:       General: She is not in acute distress.     Appearance: She is well-developed. She is not diaphoretic.   HENT:      Head: Normocephalic and atraumatic.      " Mouth/Throat:      Comments: Now with full dental implants  Eyes:      General: No scleral icterus.     Conjunctiva/sclera: Conjunctivae normal.   Neck:      Musculoskeletal: Neck supple.      Vascular: No carotid bruit.   Cardiovascular:      Rate and Rhythm: Normal rate and regular rhythm.      Pulses: Normal pulses.   Pulmonary:      Effort: Pulmonary effort is normal.      Breath sounds: Wheezing present.      Comments: Prolonged expiratory phase  Abdominal:      Palpations: Abdomen is soft.      Tenderness: There is no abdominal tenderness.   Musculoskeletal:         General: Tenderness and deformity (Arthritic changes of numerous finger joints.) present.      Right lower leg: No edema.      Left lower leg: No edema.   Lymphadenopathy:      Cervical: No cervical adenopathy.   Skin:     General: Skin is warm and dry.      Findings: No rash.   Neurological:      Mental Status: She is alert and oriented to person, place, and time. Mental status is at baseline.   Psychiatric:         Mood and Affect: Mood normal.         Behavior: Behavior normal.        Procedures   INVESTIGATIONS:  - Labs reviewed in Psychiatric     ASSESSMENT AND PLAN:  Problem List Items Addressed This Visit        Respiratory    COPD, severe (H)    Relevant Medications    albuterol (PROAIR HFA/PROVENTIL HFA/VENTOLIN HFA) 108 (90 Base) MCG/ACT inhaler    azithromycin (ZITHROMAX) 250 MG tablet    predniSONE (DELTASONE) 10 MG tablet    Panlobular emphysema (H)    Relevant Medications    albuterol (PROAIR HFA/PROVENTIL HFA/VENTOLIN HFA) 108 (90 Base) MCG/ACT inhaler    azithromycin (ZITHROMAX) 250 MG tablet    predniSONE (DELTASONE) 10 MG tablet       Other    Alpha-1-antitrypsin deficiency carrier    Relevant Medications    albuterol (PROAIR HFA/PROVENTIL HFA/VENTOLIN HFA) 108 (90 Base) MCG/ACT inhaler    azithromycin (ZITHROMAX) 250 MG tablet      Other Visit Diagnoses     COPD exacerbation (H)    -  Primary    Relevant Medications    albuterol (PROAIR  HFA/PROVENTIL HFA/VENTOLIN HFA) 108 (90 Base) MCG/ACT inhaler    azithromycin (ZITHROMAX) 250 MG tablet    predniSONE (DELTASONE) 10 MG tablet        reviewed diet, exercise and weight control  -- Expected clinical course discussed    -- Medications and their side effects discussed    The 10-year ASCVD risk score (Javier TEAGUE Jr., et al., 2013) is: 3.9%    Values used to calculate the score:      Age: 60 years      Sex: Female      Is Non- : No      Diabetic: No      Tobacco smoker: No      Systolic Blood Pressure: 132 mmHg      Is BP treated: No      HDL Cholesterol: 46 mg/dL      Total Cholesterol: 219 mg/dL    Patient Instructions   Forms completed.     1. COPD exacerbation (H)  START:   - azithromycin (ZITHROMAX) 250 MG tablet; Take 2 tablets (500 mg) by mouth daily for 1 day, THEN 1 tablet (250 mg) daily for 4 days,     Starting July 9th -- Start 1 tablet (250 mg) Every Mon, Wed, Fri Morning. - for COPD exacerbation Prevention.       Consider EGD if ongoing upper abdominal pains.     Return as needed for follow-up with Dr. Boyd.    Clinic : [phone]  Appointment line: [phone]     Electronically signed by:  Tim Boyd MD on 7/1/2021  Internal Medicine  [facility]

## 2021-07-21 ENCOUNTER — ALLIED HEALTH/NURSE VISIT (OUTPATIENT)
Dept: FAMILY MEDICINE | Facility: OTHER | Age: 61
End: 2021-07-21
Attending: FAMILY MEDICINE
Payer: COMMERCIAL

## 2021-07-21 DIAGNOSIS — Z20.822 COVID-19 RULED OUT: Primary | ICD-10-CM

## 2021-07-21 LAB — SARS-COV-2 RNA RESP QL NAA+PROBE: NEGATIVE

## 2021-07-21 PROCEDURE — U0003 INFECTIOUS AGENT DETECTION BY NUCLEIC ACID (DNA OR RNA); SEVERE ACUTE RESPIRATORY SYNDROME CORONAVIRUS 2 (SARS-COV-2) (CORONAVIRUS DISEASE [COVID-19]), AMPLIFIED PROBE TECHNIQUE, MAKING USE OF HIGH THROUGHPUT TECHNOLOGIES AS DESCRIBED BY CMS-2020-01-R: HCPCS | Mod: ZL

## 2021-07-21 PROCEDURE — C9803 HOPD COVID-19 SPEC COLLECT: HCPCS

## 2021-09-23 DIAGNOSIS — J44.1 COPD EXACERBATION (H): ICD-10-CM

## 2021-09-23 RX ORDER — PREDNISONE 10 MG/1
TABLET ORAL
Qty: 60 TABLET | Refills: 0 | OUTPATIENT
Start: 2021-09-23

## 2021-09-23 NOTE — TELEPHONE ENCOUNTER
[facility] sent Rx request for the following:   predniSONE (DELTASONE) 10 MG tablet  Sig: TAKE 4 TABLETS ONCE A DAY WITH FOOD X5 DAYS; THEN 3 DAILY X5 DAYS; THEN 2 DAILY X5 DAYS; THEN 1 DAILY X5 DAYS; THEN 1/2 DAILY X5 DAYS    Last Prescription Date:   07/01/2021  Last Fill Qty/Refills:         60, R-0    Last Office Visit:              07/01/2021 (Deb)   Future Office visit:           None noted    Routing refill request to provider for review/approval because:  Drug not on the [facility], [facility] or [facility] refill protocol or controlled substance    Unable to complete prescription refill per RN Medication Refill Policy.................... Amanda Pollock RN ....................  9/23/2021   3:58 PM

## 2021-10-09 ENCOUNTER — HEALTH MAINTENANCE LETTER (OUTPATIENT)
Age: 61
End: 2021-10-09

## 2021-11-16 DIAGNOSIS — J43.1 PANLOBULAR EMPHYSEMA (H): ICD-10-CM

## 2021-11-16 DIAGNOSIS — Z14.8 ALPHA-1-ANTITRYPSIN DEFICIENCY CARRIER: ICD-10-CM

## 2021-11-16 DIAGNOSIS — J44.9 COPD, SEVERE (H): ICD-10-CM

## 2021-11-22 RX ORDER — ALBUTEROL SULFATE 90 UG/1
AEROSOL, METERED RESPIRATORY (INHALATION)
Qty: 72 G | Refills: 3 | Status: SHIPPED | OUTPATIENT
Start: 2021-11-22 | End: 2022-09-07

## 2021-11-22 NOTE — TELEPHONE ENCOUNTER
"[facility] sent Rx request for the following:   albuterol (PROAIR HFA/PROVENTIL HFA/VENTOLIN HFA) 108 (90 Base) MCG/ACT inhaler   Sig INHALE 1 TO 2 PUFFS INTO THE LUNGS EVERY 4 HOURS AS NEEDED FOR SHORTNESS OF BREATH OR DIFFICULT BREATHING OR WHEEZING    Last Prescription Date:   07/01/2021  Last Fill Qty/Refills:         54 g, R-3    Last Office Visit:              07/01/2021 (Deb)   Future Office visit:           None noted   Asthma Maintenance Inhalers - Anticholinergics Passed - 11/16/2021 10:30 AM        Passed - Patient is age 12 years or older        Passed - Recent (12 mo) or future (30 days) visit within the authorizing provider's specialty     Patient has had an office visit with the authorizing provider or a provider within the authorizing providers department within the previous 12 mos or has a future within next 30 days. See \"Patient Info\" tab in inbasket, or \"Choose Columns\" in Meds & Orders section of the refill encounter.              Passed - Medication is active on med list       Short-Acting Beta Agonist Inhalers Protocol  Passed - 11/16/2021 10:30 AM        Passed - Patient is age 12 or older        Passed - Recent (12 mo) or future (30 days) visit within the authorizing provider's specialty     Patient has had an office visit with the authorizing provider or a provider within the authorizing providers department within the previous 12 mos or has a future within next 30 days. See \"Patient Info\" tab in inbasket, or \"Choose Columns\" in Meds & Orders section of the refill encounter.              Passed - Medication is active on med list           Request for increase in qty. Unable to complete prescription refill per RN Medication Refill Policy.................... Amanda Pollock RN ....................  11/22/2021   9:51 AM          "

## 2021-12-04 ENCOUNTER — HEALTH MAINTENANCE LETTER (OUTPATIENT)
Age: 61
End: 2021-12-04

## 2022-01-31 ENCOUNTER — MYC MEDICAL ADVICE (OUTPATIENT)
Dept: INTERNAL MEDICINE | Facility: OTHER | Age: 62
End: 2022-01-31
Payer: COMMERCIAL

## 2022-04-22 ENCOUNTER — MYC MEDICAL ADVICE (OUTPATIENT)
Dept: INTERNAL MEDICINE | Facility: OTHER | Age: 62
End: 2022-04-22
Payer: COMMERCIAL

## 2022-04-22 DIAGNOSIS — J43.1 PANLOBULAR EMPHYSEMA (H): ICD-10-CM

## 2022-04-22 DIAGNOSIS — J44.9 COPD, SEVERE (H): ICD-10-CM

## 2022-04-22 DIAGNOSIS — Z14.8 ALPHA-1-ANTITRYPSIN DEFICIENCY CARRIER: ICD-10-CM

## 2022-04-22 NOTE — TELEPHONE ENCOUNTER
SPIRIVA RESPIMAT 2.5 MCG/ACT inhaler     Sig: INHALE 2 PUFFS INTO THE LUNGS EVERY EVENING           Last Written Prescription Date:  10/22/20  Last Fill Quantity: 3,   # refills: 4  Last Office Visit: 7/1/22  Future Office visit:       Routing refill request to provider for review/approval because:  Drug not on the [facility], [facility] or [facility] refill protocol or controlled substance Lynne Jauregui RN on 4/22/2022 at 12:11 PM

## 2022-04-22 NOTE — TELEPHONE ENCOUNTER
Contacted patient and she states she does not have any insurance at this time. She is hoping she can get the refill without being seen and schedule her physical once she gets insurance again.    Carey Aguilera on 4/22/2022 at 2:06 PM

## 2022-04-22 NOTE — TELEPHONE ENCOUNTER
Overdue for Annual visit / physical.     Please call patient and schedule.  Okay to use 4 PM -- 40 minute spot at the end of the day if needed.    We can send in a small Prescription refill if needed to get to appointment.     Tim Boyd MD

## 2022-04-24 RX ORDER — TIOTROPIUM BROMIDE INHALATION SPRAY 3.12 UG/1
SPRAY, METERED RESPIRATORY (INHALATION)
Qty: 12 G | Refills: 0 | Status: SHIPPED | OUTPATIENT
Start: 2022-04-24 | End: 2022-10-07

## 2022-06-29 ENCOUNTER — APPOINTMENT (OUTPATIENT)
Dept: FAMILY MEDICINE | Facility: OTHER | Age: 62
End: 2022-06-29
Attending: NURSE PRACTITIONER

## 2022-06-29 ENCOUNTER — HOSPITAL ENCOUNTER (OUTPATIENT)
Dept: GENERAL RADIOLOGY | Facility: OTHER | Age: 62
Discharge: HOME | End: 2022-06-29
Attending: NURSE PRACTITIONER

## 2022-06-29 DIAGNOSIS — M54.2 NECK PAIN: ICD-10-CM

## 2022-06-29 PROCEDURE — 72040 X-RAY EXAM NECK SPINE 2-3 VW: CPT

## 2022-06-29 PROCEDURE — 99499 UNLISTED E&M SERVICE: CPT | Performed by: NURSE PRACTITIONER

## 2022-06-29 PROCEDURE — 72040 X-RAY EXAM NECK SPINE 2-3 VW: CPT | Performed by: NURSE PRACTITIONER

## 2022-09-06 DIAGNOSIS — J43.1 PANLOBULAR EMPHYSEMA (H): ICD-10-CM

## 2022-09-06 DIAGNOSIS — J44.9 COPD, SEVERE (H): ICD-10-CM

## 2022-09-06 DIAGNOSIS — Z14.8 ALPHA-1-ANTITRYPSIN DEFICIENCY CARRIER: ICD-10-CM

## 2022-09-07 RX ORDER — ALBUTEROL SULFATE 90 UG/1
AEROSOL, METERED RESPIRATORY (INHALATION)
Qty: 72 G | Refills: 3 | Status: SHIPPED | OUTPATIENT
Start: 2022-09-07 | End: 2022-10-07

## 2022-09-07 NOTE — TELEPHONE ENCOUNTER
[facility] sent Rx request for the following:      ALBUTEROL HFA INH(200 PUFFS)18GM      Last Prescription Date:   11/22/2021  Last Fill Qty/Refills:         72g, R-3    Last Office Visit:              7/1/2021   Future Office visit:           10/7/2022    Irvign Bowling RN, BSN  ....................  9/7/2022   3:49 PM

## 2022-09-17 ENCOUNTER — HEALTH MAINTENANCE LETTER (OUTPATIENT)
Age: 62
End: 2022-09-17

## 2022-10-04 ENCOUNTER — HOSPITAL ENCOUNTER (OUTPATIENT)
Dept: RESPIRATORY THERAPY | Facility: HOSPITAL | Age: 62
Discharge: HOME | End: 2022-10-04
Attending: INTERNAL MEDICINE | Admitting: INTERNAL MEDICINE
Payer: COMMERCIAL

## 2022-10-04 PROCEDURE — 250N000009 HC RX 250: Performed by: INTERNAL MEDICINE

## 2022-10-04 PROCEDURE — 94060 EVALUATION OF WHEEZING: CPT | Mod: 26 | Performed by: INTERNAL MEDICINE

## 2022-10-04 PROCEDURE — 94060 EVALUATION OF WHEEZING: CPT

## 2022-10-04 RX ORDER — ALBUTEROL SULFATE 0.83 MG/ML
2.5 SOLUTION RESPIRATORY (INHALATION)
Status: COMPLETED | OUTPATIENT
Start: 2022-10-04 | End: 2022-10-04

## 2022-10-04 RX ADMIN — ALBUTEROL SULFATE 2.5 MG: 2.5 SOLUTION RESPIRATORY (INHALATION) at 09:54

## 2022-10-07 ENCOUNTER — OFFICE VISIT (OUTPATIENT)
Dept: INTERNAL MEDICINE | Facility: OTHER | Age: 62
End: 2022-10-07
Attending: INTERNAL MEDICINE

## 2022-10-07 VITALS
BODY MASS INDEX: 17.81 KG/M2 | OXYGEN SATURATION: 98 % | WEIGHT: 96.8 LBS | HEIGHT: 62 IN | SYSTOLIC BLOOD PRESSURE: 122 MMHG | HEART RATE: 77 BPM | DIASTOLIC BLOOD PRESSURE: 68 MMHG | TEMPERATURE: 97.2 F | RESPIRATION RATE: 20 BRPM

## 2022-10-07 DIAGNOSIS — J43.1 PANLOBULAR EMPHYSEMA (H): ICD-10-CM

## 2022-10-07 DIAGNOSIS — L40.50 PSORIATIC ARTHRITIS (H): ICD-10-CM

## 2022-10-07 DIAGNOSIS — Z98.890 S/P NISSEN FUNDOPLICATION (WITHOUT GASTROSTOMY TUBE) PROCEDURE: ICD-10-CM

## 2022-10-07 DIAGNOSIS — E53.8 VITAMIN B12 DEFICIENCY: ICD-10-CM

## 2022-10-07 DIAGNOSIS — R73.03 PREDIABETES: ICD-10-CM

## 2022-10-07 DIAGNOSIS — E78.5 HYPERLIPIDEMIA LDL GOAL <100: ICD-10-CM

## 2022-10-07 DIAGNOSIS — Z14.8 ALPHA-1-ANTITRYPSIN DEFICIENCY CARRIER: ICD-10-CM

## 2022-10-07 DIAGNOSIS — K59.09 INTERMITTENT CONSTIPATION: ICD-10-CM

## 2022-10-07 DIAGNOSIS — E78.2 MIXED HYPERLIPIDEMIA: ICD-10-CM

## 2022-10-07 DIAGNOSIS — E55.9 VITAMIN D DEFICIENCY: ICD-10-CM

## 2022-10-07 DIAGNOSIS — Z00.00 ANNUAL PHYSICAL EXAM: Primary | ICD-10-CM

## 2022-10-07 DIAGNOSIS — Z71.85 VACCINE COUNSELING: ICD-10-CM

## 2022-10-07 DIAGNOSIS — J44.9 COPD, SEVERE (H): ICD-10-CM

## 2022-10-07 PROBLEM — K04.7 DENTAL ABSCESS: Status: RESOLVED | Noted: 2019-12-02 | Resolved: 2022-10-07

## 2022-10-07 PROCEDURE — 99396 PREV VISIT EST AGE 40-64: CPT | Performed by: INTERNAL MEDICINE

## 2022-10-07 RX ORDER — IPRATROPIUM BROMIDE AND ALBUTEROL SULFATE 2.5; .5 MG/3ML; MG/3ML
1 SOLUTION RESPIRATORY (INHALATION) EVERY 6 HOURS PRN
Qty: 120 ML | Refills: 11 | Status: SHIPPED | OUTPATIENT
Start: 2022-10-07 | End: 2022-12-06

## 2022-10-07 RX ORDER — ALBUTEROL SULFATE 0.83 MG/ML
2.5 SOLUTION RESPIRATORY (INHALATION) EVERY 6 HOURS PRN
Qty: 120 ML | Refills: 11 | Status: SHIPPED | OUTPATIENT
Start: 2022-10-07 | End: 2023-10-10

## 2022-10-07 RX ORDER — TIOTROPIUM BROMIDE INHALATION SPRAY 3.12 UG/1
SPRAY, METERED RESPIRATORY (INHALATION)
Qty: 12 G | Refills: 4 | Status: SHIPPED | OUTPATIENT
Start: 2022-10-07 | End: 2023-10-10

## 2022-10-07 RX ORDER — FLUTICASONE PROPIONATE AND SALMETEROL 500; 50 UG/1; UG/1
1 POWDER RESPIRATORY (INHALATION) EVERY 12 HOURS
Qty: 180 EACH | Refills: 4 | Status: SHIPPED | OUTPATIENT
Start: 2022-10-07 | End: 2023-10-10

## 2022-10-07 RX ORDER — AMPICILLIN TRIHYDRATE 250 MG
600 CAPSULE ORAL DAILY
Qty: 90 CAPSULE | Refills: 4 | Status: SHIPPED | OUTPATIENT
Start: 2022-10-07

## 2022-10-07 RX ORDER — ALBUTEROL SULFATE 90 UG/1
AEROSOL, METERED RESPIRATORY (INHALATION)
Qty: 72 G | Refills: 3 | Status: SHIPPED | OUTPATIENT
Start: 2022-10-07 | End: 2023-07-12

## 2022-10-07 RX ORDER — ASPIRIN 81 MG
200 TABLET, DELAYED RELEASE (ENTERIC COATED) ORAL 2 TIMES DAILY
Qty: 120 TABLET | Refills: 11 | Status: SHIPPED | OUTPATIENT
Start: 2022-10-07

## 2022-10-07 ASSESSMENT — ENCOUNTER SYMPTOMS
CONFUSION: 0
SHORTNESS OF BREATH: 1
DYSURIA: 0
UNEXPECTED WEIGHT CHANGE: 1
CHILLS: 0
BRUISES/BLEEDS EASILY: 0
VOMITING: 0
HEMATURIA: 0
DIARRHEA: 0
DIZZINESS: 0
ABDOMINAL PAIN: 1
ARTHRALGIAS: 0
WHEEZING: 0
COUGH: 1
AGITATION: 0
WOUND: 0
ACTIVITY CHANGE: 1
LIGHT-HEADEDNESS: 0
FEVER: 0
MYALGIAS: 0
APPETITE CHANGE: 1
NAUSEA: 0

## 2022-10-07 ASSESSMENT — PAIN SCALES - GENERAL: PAINLEVEL: NO PAIN (0)

## 2022-10-07 NOTE — NURSING NOTE
"Chief Complaint   Patient presents with     Physical     Medication Refills /          Initial /68 (BP Location: Right arm, Patient Position: Sitting, Cuff Size: Adult Regular)   Pulse 77   Temp 97.2  F (36.2  C) (Temporal)   Resp 20   Ht 1.575 m (5' 2\")   Wt 43.9 kg (96 lb 12.8 oz)   SpO2 98%   Breastfeeding No   BMI 17.70 kg/m   Estimated body mass index is 17.7 kg/m  as calculated from the following:    Height as of this encounter: 1.575 m (5' 2\").    Weight as of this encounter: 43.9 kg (96 lb 12.8 oz).       FOOD SECURITY SCREENING QUESTIONS:    The next two questions are to help us understand your food security.  If you are feeling you need any assistance in this area, we have resources available to support you today.    Hunger Vital Signs:  Within the past 12 months we worried whether our food would run out before we got money to buy more. Never  Within the past 12 months the food we bought just didn't last and we didn't have money to get more. Never    Advance Care Directive on file? no      Medication reconciliation complete.      Lasha Can,on 10/7/2022 at 8:50 AM        "

## 2022-10-07 NOTE — PATIENT INSTRUCTIONS
Blood pressure is well controlled.     Glad you quit smoking.     Some potential online pharmacies that are reliable:    [facility]       Rxs printed.... check on Trelegy inhaler cost vs Advair/Spiriva combo.     Return as needed for follow-up with Dr. Boyd.    Clinic : [phone]  Appointment line: [phone]

## 2022-10-07 NOTE — PROGRESS NOTES
SUBJECTIVE:   CC: Ember is an 61 year old who presents for preventive health visit.     Patient has been advised of split billing requirements and indicates understanding: Yes  Healthy Habits:     Getting at least 3 servings of Calcium per day:  NO    Bi-annual eye exam:  Yes    Dental care twice a year:  Yes    Sleep apnea or symptoms of sleep apnea:  None    Diet:  Regular (no restrictions)    Frequency of exercise:  2-3 days/week    Duration of exercise:  15-30 minutes    Taking medications regularly:  Yes    Barriers to taking medications:  Cost of medication    Medication side effects:  Not applicable    PHQ-2 Total Score: 0    Additional concerns today:  No  History of Present Illness       COPD:  She presents for follow up of COPD.  Overall, COPD symptoms are slightly worse since last visit. She has same as usual fatigue or shortness of breath with exertion and same as usual shortness of breath at rest.  She often coughs and does not have change in sputum. No recent fever. She can walk less than 1 block without stopping to rest. She can walk 1 flights of stairs without resting.The patient has had no ED, urgent care, or hospital admissions because of COPD since the last visit.     She eats 0-1 servings of fruits and vegetables daily.She consumes 1 sweetened beverage(s) daily.She exercises with enough effort to increase her heart rate 20 to 29 minutes per day.  She exercises with enough effort to increase her heart rate 3 or less days per week.   She is not taking prescribed medications regularly due to Cost of medication.    Today's PHQ-2 Score:   PHQ-2 ( 1999 Pf[employer]) 10/7/2022   Q1: Little interest or pleasure in doing things 0   Q2: Feeling down, depressed or hopeless 0   PHQ-2 Score 0   PHQ-2 Total Score (12-17 Years)- Positive if 3 or more points; Administer PHQ-A if positive -   Q1: Little interest or pleasure in doing things Not at all   Q2: Feeling down, depressed or hopeless Not at all   PHQ-2 Score  0     Abuse: Current or Past (Physical, Sexual or Emotional) - No  Do you feel safe in your environment? Yes    Social History     Tobacco Use     Smoking status: Former     Packs/day: 0.50     Years: 32.00     Pack years: 16.00     Types: Cigarettes, Cigars     Quit date: 8/18/2012     Years since quitting: 10.1     Smokeless tobacco: Never   Substance Use Topics     Alcohol use: Yes     Alcohol/week: 2.0 standard drinks     Comment: Alcoholic Drinks/day: 1-2 drinks every 1-2 weeks       No flowsheet data found.  Reviewed orders with patient.  Reviewed health maintenance and updated orders accordingly - Yes    Breast Cancer Screening:    FHS-7: No flowsheet data found.    Mammogram Screening: Recommended mammography every 1-2 years with patient discussion and risk factor consideration  Pertinent mammograms are reviewed under the imaging tab.    History of abnormal Pap smear: NO - age 30-65 PAP every 5 years with negative HPV co-testing recommended     Reviewed and updated as needed this visit by clinical staff   Tobacco  Allergies  Meds  Problems  Med Hx  Surg Hx  Fam Hx  Soc   Hx        Reviewed and updated as needed this visit by Provider   Tobacco  Allergies  Meds  Problems  Med Hx  Surg Hx  Fam Hx         Review of Systems   Constitutional: Positive for activity change, appetite change and unexpected weight change. Negative for chills and fever.   HENT: Negative for congestion and hearing loss.    Eyes: Negative for visual disturbance.   Respiratory: Positive for cough and shortness of breath. Negative for wheezing.    Cardiovascular: Negative for chest pain.   Gastrointestinal: Positive for abdominal pain (occasional RUQ). Negative for diarrhea, nausea and vomiting.   Endocrine: Negative for cold intolerance and heat intolerance.   Genitourinary: Negative for dysuria and hematuria.   Musculoskeletal: Negative for arthralgias and myalgias.   Skin: Negative for rash and wound.  "  Allergic/Immunologic: Negative for immunocompromised state.   Neurological: Negative for dizziness and light-headedness.   Hematological: Does not bruise/bleed easily.   Psychiatric/Behavioral: Negative for agitation and confusion.      OBJECTIVE:   /68 (BP Location: Right arm, Patient Position: Sitting, Cuff Size: Adult Regular)   Pulse 77   Temp 97.2  F (36.2  C) (Temporal)   Resp 20   Ht 1.575 m (5' 2\")   Wt 43.9 kg (96 lb 12.8 oz)   SpO2 98%   Breastfeeding No   BMI 17.70 kg/m    Physical Exam  Constitutional:       General: She is not in acute distress.     Appearance: She is well-developed. She is not diaphoretic.   HENT:      Head: Normocephalic and atraumatic.      Mouth/Throat:      Comments: Now with full dental implants  Eyes:      General: No scleral icterus.     Conjunctiva/sclera: Conjunctivae normal.   Neck:      Vascular: No carotid bruit.   Cardiovascular:      Rate and Rhythm: Normal rate and regular rhythm.      Pulses: Normal pulses.   Pulmonary:      Effort: Pulmonary effort is normal.      Breath sounds: Wheezing present.      Comments: Prolonged expiratory phase  Abdominal:      Palpations: Abdomen is soft.      Tenderness: There is no abdominal tenderness.   Musculoskeletal:         General: Tenderness and deformity (Arthritic changes of numerous finger joints.) present.      Cervical back: Neck supple.      Right lower leg: No edema.      Left lower leg: No edema.   Lymphadenopathy:      Cervical: No cervical adenopathy.   Skin:     General: Skin is warm and dry.      Findings: No rash.   Neurological:      Mental Status: She is alert and oriented to person, place, and time. Mental status is at baseline.   Psychiatric:         Mood and Affect: Mood normal.         Behavior: Behavior normal.       Diagnostic Test Results:  Labs reviewed in Epic    ASSESSMENT/PLAN:       ICD-10-CM    1. Annual physical exam  Z00.00       2. COPD, severe (H)  J44.9 tiotropium (SPIRIVA RESPIMAT) " 2.5 MCG/ACT inhaler     albuterol (PROAIR HFA/PROVENTIL HFA/VENTOLIN HFA) 108 (90 Base) MCG/ACT inhaler     albuterol (PROVENTIL) (2.5 MG/3ML) 0.083% neb solution     ipratropium - albuterol 0.5 mg/2.5 mg/3 mL (DUONEB) 0.5-2.5 (3) MG/3ML neb solution     fluticasone-salmeterol (ADVAIR DISKUS) 500-50 MCG/ACT inhaler     Fluticasone-Umeclidin-Vilanterol (TRELEGY ELLIPTA) 200-62.5-25 MCG/INH oral inhaler      3. Panlobular emphysema (H)  J43.1 tiotropium (SPIRIVA RESPIMAT) 2.5 MCG/ACT inhaler     albuterol (PROAIR HFA/PROVENTIL HFA/VENTOLIN HFA) 108 (90 Base) MCG/ACT inhaler     ipratropium - albuterol 0.5 mg/2.5 mg/3 mL (DUONEB) 0.5-2.5 (3) MG/3ML neb solution     fluticasone-salmeterol (ADVAIR DISKUS) 500-50 MCG/ACT inhaler     Fluticasone-Umeclidin-Vilanterol (TRELEGY ELLIPTA) 200-62.5-25 MCG/INH oral inhaler      4. Hyperlipidemia LDL goal <100  E78.5 red yeast rice 600 MG CAPS      5. Alpha-1-antitrypsin deficiency carrier  Z14.8 tiotropium (SPIRIVA RESPIMAT) 2.5 MCG/ACT inhaler     albuterol (PROAIR HFA/PROVENTIL HFA/VENTOLIN HFA) 108 (90 Base) MCG/ACT inhaler      6. Intermittent constipation  K59.09 docusate sodium (COLACE) 100 MG tablet      7. Prediabetes  R73.03       8. Psoriatic arthritis (H)  L40.50       9. S/P Nissen fundoplication (without gastrostomy tube) procedure  Z98.890       10. Vitamin B12 deficiency  E53.8       11. Vitamin D deficiency  E55.9       12. Mixed hyperlipidemia  E78.2       13. Vaccine counseling  Z71.85       Patient presents for annual physical examination as well as follow-up multiple issues.  She is in the process of trying to get insurance.  There was some major insurance fraud/scam and that they ended up losing a bunch of money.  She is overdue for multiple medication refills.    Severe COPD with emphysema, has alpha-1 antitrypsin deficiency carrier.  Needs refills of her Spiriva, albuterol, nebulizers.  Advised her to check on cost and coverage of Advair versus Trelegy  "inhaler.  That way she could go with 1 co-pay rather than to.  Uncertain which is more cost effective.  She may end up looking online for inhaler prices.    Mixed hyperlipidemia with LDL goal of less than 100.  He has statin intolerance but seems to be able to take red rice yeast okay.  Needs refills.    Intermittent constipation, ongoing.  Continues with Colace as needed.  Needs refills    History of prediabetes.  Given her lack of health insurance at this time she would like to postpone lab work until later.    She has psoriatic arthritis and has been doing reasonably well from an arthritis standpoint.    History of Nissen fundoplication.  Continues with vitamin B12 and D deficiency.  Encourage oral replacement.    Vaccine counseling completed.  Encouraged consideration of COVID vaccination, shingles shot, pneumococcal vaccination, flu shot.    Patient has been advised of split billing requirements and indicates understanding: Yes    COUNSELING:  Reviewed preventive health counseling, as reflected in patient instructions  Special attention given to:        Regular exercise       Healthy diet/nutrition       Vision screening       Hearing screening       Immunizations    Estimated body mass index is 17.7 kg/m  as calculated from the following:    Height as of this encounter: 1.575 m (5' 2\").    Weight as of this encounter: 43.9 kg (96 lb 12.8 oz).    Weight management plan Needs to increase caloric intake - May need to consider protein supplements    She reports that she quit smoking about 10 years ago. Her smoking use included cigarettes and cigars. She has a 16.00 pack-year smoking history. She has never used smokeless tobacco.      Counseling Resources:  ATP IV Guidelines  Pooled Cohorts Equation Calculator  Breast Cancer Risk Calculator  BRCA-Related Cancer Risk Assessment: FHS-7 Tool  FRAX Risk Assessment  ICSI Preventive Guidelines  Dietary Guidelines for Americans, 2010  USDA's MyPlate  ASA Prophylaxis  Lung " CA Screening    Tim Boyd MD  [facility]

## 2022-11-11 ENCOUNTER — MYC MEDICAL ADVICE (OUTPATIENT)
Dept: INTERNAL MEDICINE | Facility: OTHER | Age: 62
End: 2022-11-11

## 2022-11-11 DIAGNOSIS — Z14.8 ALPHA-1-ANTITRYPSIN DEFICIENCY CARRIER: ICD-10-CM

## 2022-11-11 DIAGNOSIS — J44.1 COPD EXACERBATION (H): ICD-10-CM

## 2022-11-11 DIAGNOSIS — J43.1 PANLOBULAR EMPHYSEMA (H): ICD-10-CM

## 2022-11-11 DIAGNOSIS — J44.9 COPD, SEVERE (H): ICD-10-CM

## 2022-11-11 RX ORDER — AZITHROMYCIN 250 MG/1
250 TABLET, FILM COATED ORAL
Qty: 42 TABLET | Refills: 4 | Status: SHIPPED | OUTPATIENT
Start: 2022-11-11 | End: 2023-02-09

## 2022-11-27 ENCOUNTER — APPOINTMENT (OUTPATIENT)
Dept: GENERAL RADIOLOGY | Facility: OTHER | Age: 62
DRG: 193 | End: 2022-11-27
Attending: INTERNAL MEDICINE

## 2022-11-27 ENCOUNTER — HOSPITAL ENCOUNTER (INPATIENT)
Facility: OTHER | Age: 62
LOS: 3 days | Discharge: HOME | DRG: 193 | End: 2022-11-30
Attending: INTERNAL MEDICINE | Admitting: FAMILY MEDICINE

## 2022-11-27 ENCOUNTER — APPOINTMENT (OUTPATIENT)
Dept: CT IMAGING | Facility: OTHER | Age: 62
DRG: 193 | End: 2022-11-27
Attending: INTERNAL MEDICINE

## 2022-11-27 DIAGNOSIS — Z11.52 ENCOUNTER FOR SCREENING LABORATORY TESTING FOR COVID-19 VIRUS: ICD-10-CM

## 2022-11-27 DIAGNOSIS — J43.1 PANLOBULAR EMPHYSEMA (H): ICD-10-CM

## 2022-11-27 DIAGNOSIS — Z14.8 ALPHA-1-ANTITRYPSIN DEFICIENCY CARRIER: ICD-10-CM

## 2022-11-27 DIAGNOSIS — J10.1 INFLUENZA DUE TO INFLUENZA VIRUS, TYPE A, HUMAN: ICD-10-CM

## 2022-11-27 DIAGNOSIS — J96.01 ACUTE RESPIRATORY FAILURE WITH HYPOXIA (H): Primary | ICD-10-CM

## 2022-11-27 DIAGNOSIS — J44.1 COPD EXACERBATION (H): ICD-10-CM

## 2022-11-27 DIAGNOSIS — J44.1 OBSTRUCTIVE CHRONIC BRONCHITIS WITH EXACERBATION (H): ICD-10-CM

## 2022-11-27 LAB
ALBUMIN SERPL BCG-MCNC: 4.3 G/DL (ref 3.5–5.2)
ALP SERPL-CCNC: 87 U/L (ref 35–104)
ALT SERPL W P-5'-P-CCNC: 25 U/L (ref 10–35)
ANION GAP SERPL CALCULATED.3IONS-SCNC: 18 MMOL/L (ref 7–15)
AST SERPL W P-5'-P-CCNC: 37 U/L (ref 10–35)
BASOPHILS # BLD AUTO: 0.1 10E3/UL (ref 0–0.2)
BASOPHILS NFR BLD AUTO: 1 %
BILIRUB SERPL-MCNC: 0.2 MG/DL
BUN SERPL-MCNC: 13.4 MG/DL (ref 8–23)
CALCIUM SERPL-MCNC: 8.9 MG/DL (ref 8.8–10.2)
CHLORIDE SERPL-SCNC: 98 MMOL/L (ref 98–107)
CREAT SERPL-MCNC: 0.58 MG/DL (ref 0.51–0.95)
CRP SERPL-MCNC: 80.38 MG/L
D DIMER PPP FEU-MCNC: 0.62 UG/ML FEU (ref 0–0.5)
DEPRECATED HCO3 PLAS-SCNC: 20 MMOL/L (ref 22–29)
EOSINOPHIL # BLD AUTO: 0 10E3/UL (ref 0–0.7)
EOSINOPHIL NFR BLD AUTO: 0 %
ERYTHROCYTE [DISTWIDTH] IN BLOOD BY AUTOMATED COUNT: 12.7 % (ref 10–15)
FLUAV RNA SPEC QL NAA+PROBE: POSITIVE
FLUBV RNA RESP QL NAA+PROBE: NEGATIVE
GFR SERPL CREATININE-BSD FRML MDRD: >90 ML/MIN/1.73M2
GLUCOSE SERPL-MCNC: 80 MG/DL (ref 70–99)
HCT VFR BLD AUTO: 47.7 % (ref 35–47)
HGB BLD-MCNC: 15.9 G/DL (ref 11.7–15.7)
HOLD SPECIMEN: NORMAL
IMM GRANULOCYTES # BLD: 0 10E3/UL
IMM GRANULOCYTES NFR BLD: 0 %
LYMPHOCYTES # BLD AUTO: 0.9 10E3/UL (ref 0.8–5.3)
LYMPHOCYTES NFR BLD AUTO: 11 %
MCH RBC QN AUTO: 30.2 PG (ref 26.5–33)
MCHC RBC AUTO-ENTMCNC: 33.3 G/DL (ref 31.5–36.5)
MCV RBC AUTO: 91 FL (ref 78–100)
MONOCYTES # BLD AUTO: 0.9 10E3/UL (ref 0–1.3)
MONOCYTES NFR BLD AUTO: 12 %
NEUTROPHILS # BLD AUTO: 5.7 10E3/UL (ref 1.6–8.3)
NEUTROPHILS NFR BLD AUTO: 76 %
NRBC # BLD AUTO: 0 10E3/UL
NRBC BLD AUTO-RTO: 0 /100
NT-PROBNP SERPL-MCNC: 615 PG/ML (ref 0–900)
PLATELET # BLD AUTO: 242 10E3/UL (ref 150–450)
POTASSIUM SERPL-SCNC: 4.7 MMOL/L (ref 3.4–5.3)
PROCALCITONIN SERPL IA-MCNC: 0.08 NG/ML
PROT SERPL-MCNC: 7.2 G/DL (ref 6.4–8.3)
RBC # BLD AUTO: 5.26 10E6/UL (ref 3.8–5.2)
RSV RNA SPEC NAA+PROBE: NEGATIVE
SARS-COV-2 RNA RESP QL NAA+PROBE: NEGATIVE
SODIUM SERPL-SCNC: 136 MMOL/L (ref 136–145)
TROPONIN T SERPL HS-MCNC: 13 NG/L
WBC # BLD AUTO: 7.5 10E3/UL (ref 4–11)

## 2022-11-27 PROCEDURE — 94640 AIRWAY INHALATION TREATMENT: CPT

## 2022-11-27 PROCEDURE — 93010 ELECTROCARDIOGRAM REPORT: CPT | Performed by: INTERNAL MEDICINE

## 2022-11-27 PROCEDURE — 83880 ASSAY OF NATRIURETIC PEPTIDE: CPT | Performed by: INTERNAL MEDICINE

## 2022-11-27 PROCEDURE — 250N000011 HC RX IP 250 OP 636: Performed by: INTERNAL MEDICINE

## 2022-11-27 PROCEDURE — 94640 AIRWAY INHALATION TREATMENT: CPT | Mod: 76

## 2022-11-27 PROCEDURE — 86140 C-REACTIVE PROTEIN: CPT | Performed by: INTERNAL MEDICINE

## 2022-11-27 PROCEDURE — 96365 THER/PROPH/DIAG IV INF INIT: CPT | Mod: XU | Performed by: INTERNAL MEDICINE

## 2022-11-27 PROCEDURE — 999N000157 HC STATISTIC RCP TIME EA 10 MIN

## 2022-11-27 PROCEDURE — 85379 FIBRIN DEGRADATION QUANT: CPT | Performed by: INTERNAL MEDICINE

## 2022-11-27 PROCEDURE — 71275 CT ANGIOGRAPHY CHEST: CPT

## 2022-11-27 PROCEDURE — 99291 CRITICAL CARE FIRST HOUR: CPT | Mod: 25,CS | Performed by: INTERNAL MEDICINE

## 2022-11-27 PROCEDURE — 85025 COMPLETE CBC W/AUTO DIFF WBC: CPT | Performed by: INTERNAL MEDICINE

## 2022-11-27 PROCEDURE — 250N000013 HC RX MED GY IP 250 OP 250 PS 637: Performed by: INTERNAL MEDICINE

## 2022-11-27 PROCEDURE — 87637 SARSCOV2&INF A&B&RSV AMP PRB: CPT | Performed by: STUDENT IN AN ORGANIZED HEALTH CARE EDUCATION/TRAINING PROGRAM

## 2022-11-27 PROCEDURE — 250N000011 HC RX IP 250 OP 636: Performed by: FAMILY MEDICINE

## 2022-11-27 PROCEDURE — C9803 HOPD COVID-19 SPEC COLLECT: HCPCS | Performed by: INTERNAL MEDICINE

## 2022-11-27 PROCEDURE — 250N000009 HC RX 250: Performed by: FAMILY MEDICINE

## 2022-11-27 PROCEDURE — 99291 CRITICAL CARE FIRST HOUR: CPT | Mod: CS | Performed by: INTERNAL MEDICINE

## 2022-11-27 PROCEDURE — 96375 TX/PRO/DX INJ NEW DRUG ADDON: CPT | Mod: XU | Performed by: INTERNAL MEDICINE

## 2022-11-27 PROCEDURE — 93005 ELECTROCARDIOGRAM TRACING: CPT | Performed by: INTERNAL MEDICINE

## 2022-11-27 PROCEDURE — 120N000001 HC R&B MED SURG/OB

## 2022-11-27 PROCEDURE — 87040 BLOOD CULTURE FOR BACTERIA: CPT | Performed by: INTERNAL MEDICINE

## 2022-11-27 PROCEDURE — 250N000009 HC RX 250: Performed by: INTERNAL MEDICINE

## 2022-11-27 PROCEDURE — 80053 COMPREHEN METABOLIC PANEL: CPT | Performed by: INTERNAL MEDICINE

## 2022-11-27 PROCEDURE — 258N000003 HC RX IP 258 OP 636: Performed by: INTERNAL MEDICINE

## 2022-11-27 PROCEDURE — 99222 1ST HOSP IP/OBS MODERATE 55: CPT | Mod: AI | Performed by: FAMILY MEDICINE

## 2022-11-27 PROCEDURE — 84145 PROCALCITONIN (PCT): CPT | Performed by: INTERNAL MEDICINE

## 2022-11-27 PROCEDURE — 71045 X-RAY EXAM CHEST 1 VIEW: CPT

## 2022-11-27 PROCEDURE — 84484 ASSAY OF TROPONIN QUANT: CPT | Performed by: INTERNAL MEDICINE

## 2022-11-27 RX ORDER — ACETAMINOPHEN 325 MG/1
650 TABLET ORAL EVERY 6 HOURS PRN
Status: DISCONTINUED | OUTPATIENT
Start: 2022-11-27 | End: 2022-11-30 | Stop reason: HOSPADM

## 2022-11-27 RX ORDER — METHYLPREDNISOLONE SODIUM SUCCINATE 125 MG/2ML
60 INJECTION, POWDER, LYOPHILIZED, FOR SOLUTION INTRAMUSCULAR; INTRAVENOUS EVERY 8 HOURS
Status: DISCONTINUED | OUTPATIENT
Start: 2022-11-27 | End: 2022-11-29

## 2022-11-27 RX ORDER — OSELTAMIVIR PHOSPHATE 75 MG/1
75 CAPSULE ORAL 2 TIMES DAILY
Status: DISCONTINUED | OUTPATIENT
Start: 2022-11-27 | End: 2022-11-30 | Stop reason: HOSPADM

## 2022-11-27 RX ORDER — AMOXICILLIN 250 MG
1 CAPSULE ORAL 2 TIMES DAILY PRN
Status: DISCONTINUED | OUTPATIENT
Start: 2022-11-27 | End: 2022-11-30 | Stop reason: HOSPADM

## 2022-11-27 RX ORDER — POLYETHYLENE GLYCOL 3350 17 G/17G
17 POWDER, FOR SOLUTION ORAL DAILY PRN
Status: DISCONTINUED | OUTPATIENT
Start: 2022-11-27 | End: 2022-11-30 | Stop reason: HOSPADM

## 2022-11-27 RX ORDER — ACETAMINOPHEN 650 MG/1
650 SUPPOSITORY RECTAL EVERY 6 HOURS PRN
Status: DISCONTINUED | OUTPATIENT
Start: 2022-11-27 | End: 2022-11-30 | Stop reason: HOSPADM

## 2022-11-27 RX ORDER — IPRATROPIUM BROMIDE AND ALBUTEROL SULFATE 2.5; .5 MG/3ML; MG/3ML
3 SOLUTION RESPIRATORY (INHALATION) ONCE
Status: COMPLETED | OUTPATIENT
Start: 2022-11-27 | End: 2022-11-27

## 2022-11-27 RX ORDER — AMOXICILLIN 250 MG
2 CAPSULE ORAL 2 TIMES DAILY PRN
Status: DISCONTINUED | OUTPATIENT
Start: 2022-11-27 | End: 2022-11-30 | Stop reason: HOSPADM

## 2022-11-27 RX ORDER — ACETAMINOPHEN 500 MG
1000 TABLET ORAL ONCE
Status: COMPLETED | OUTPATIENT
Start: 2022-11-27 | End: 2022-11-27

## 2022-11-27 RX ORDER — METHYLPREDNISOLONE SODIUM SUCCINATE 125 MG/2ML
125 INJECTION, POWDER, LYOPHILIZED, FOR SOLUTION INTRAMUSCULAR; INTRAVENOUS ONCE
Status: COMPLETED | OUTPATIENT
Start: 2022-11-27 | End: 2022-11-27

## 2022-11-27 RX ORDER — IPRATROPIUM BROMIDE AND ALBUTEROL SULFATE 2.5; .5 MG/3ML; MG/3ML
3 SOLUTION RESPIRATORY (INHALATION)
Status: DISCONTINUED | OUTPATIENT
Start: 2022-11-27 | End: 2022-11-30 | Stop reason: HOSPADM

## 2022-11-27 RX ORDER — BISACODYL 10 MG
10 SUPPOSITORY, RECTAL RECTAL DAILY PRN
Status: DISCONTINUED | OUTPATIENT
Start: 2022-11-27 | End: 2022-11-30 | Stop reason: HOSPADM

## 2022-11-27 RX ORDER — ALBUTEROL SULFATE 0.83 MG/ML
2.5 SOLUTION RESPIRATORY (INHALATION)
Status: DISCONTINUED | OUTPATIENT
Start: 2022-11-27 | End: 2022-11-30 | Stop reason: HOSPADM

## 2022-11-27 RX ORDER — ONDANSETRON 2 MG/ML
4 INJECTION INTRAMUSCULAR; INTRAVENOUS ONCE
Status: COMPLETED | OUTPATIENT
Start: 2022-11-27 | End: 2022-11-27

## 2022-11-27 RX ORDER — IOPAMIDOL 755 MG/ML
75 INJECTION, SOLUTION INTRAVASCULAR ONCE
Status: COMPLETED | OUTPATIENT
Start: 2022-11-27 | End: 2022-11-27

## 2022-11-27 RX ORDER — IPRATROPIUM BROMIDE AND ALBUTEROL SULFATE 2.5; .5 MG/3ML; MG/3ML
3 SOLUTION RESPIRATORY (INHALATION)
Status: DISCONTINUED | OUTPATIENT
Start: 2022-11-27 | End: 2022-11-27 | Stop reason: DRUGHIGH

## 2022-11-27 RX ORDER — ASPIRIN 81 MG/1
81 TABLET ORAL WEEKLY
Status: DISCONTINUED | OUTPATIENT
Start: 2022-11-30 | End: 2022-11-30 | Stop reason: HOSPADM

## 2022-11-27 RX ORDER — ONDANSETRON 4 MG/1
4 TABLET, ORALLY DISINTEGRATING ORAL EVERY 6 HOURS PRN
Status: DISCONTINUED | OUTPATIENT
Start: 2022-11-27 | End: 2022-11-30 | Stop reason: HOSPADM

## 2022-11-27 RX ORDER — LIDOCAINE 40 MG/G
CREAM TOPICAL
Status: DISCONTINUED | OUTPATIENT
Start: 2022-11-27 | End: 2022-11-30 | Stop reason: HOSPADM

## 2022-11-27 RX ORDER — ONDANSETRON 2 MG/ML
4 INJECTION INTRAMUSCULAR; INTRAVENOUS EVERY 6 HOURS PRN
Status: DISCONTINUED | OUTPATIENT
Start: 2022-11-27 | End: 2022-11-30 | Stop reason: HOSPADM

## 2022-11-27 RX ORDER — SODIUM CHLORIDE 9 MG/ML
INJECTION, SOLUTION INTRAVENOUS CONTINUOUS
Status: DISCONTINUED | OUTPATIENT
Start: 2022-11-27 | End: 2022-11-29

## 2022-11-27 RX ADMIN — METHYLPREDNISOLONE SODIUM SUCCINATE 125 MG: 125 INJECTION, POWDER, FOR SOLUTION INTRAMUSCULAR; INTRAVENOUS at 11:57

## 2022-11-27 RX ADMIN — IPRATROPIUM BROMIDE AND ALBUTEROL SULFATE 3 ML: .5; 2.5 SOLUTION RESPIRATORY (INHALATION) at 12:36

## 2022-11-27 RX ADMIN — ACETAMINOPHEN 1000 MG: 500 TABLET ORAL at 15:45

## 2022-11-27 RX ADMIN — ONDANSETRON 4 MG: 2 INJECTION INTRAMUSCULAR; INTRAVENOUS at 14:30

## 2022-11-27 RX ADMIN — OSELTAMIVIR PHOSPHATE 75 MG: 75 CAPSULE ORAL at 13:03

## 2022-11-27 RX ADMIN — METHYLPREDNISOLONE SODIUM SUCCINATE 62.5 MG: 125 INJECTION, POWDER, FOR SOLUTION INTRAMUSCULAR; INTRAVENOUS at 22:31

## 2022-11-27 RX ADMIN — IOPAMIDOL 52 ML: 755 INJECTION, SOLUTION INTRAVENOUS at 13:06

## 2022-11-27 RX ADMIN — AZITHROMYCIN DIHYDRATE 250 MG: 500 INJECTION, POWDER, LYOPHILIZED, FOR SOLUTION INTRAVENOUS at 14:06

## 2022-11-27 RX ADMIN — OSELTAMIVIR PHOSPHATE 75 MG: 75 CAPSULE ORAL at 17:10

## 2022-11-27 RX ADMIN — IPRATROPIUM BROMIDE AND ALBUTEROL SULFATE 3 ML: 2.5; .5 SOLUTION RESPIRATORY (INHALATION) at 18:36

## 2022-11-27 RX ADMIN — SODIUM CHLORIDE 1000 ML: 9 INJECTION, SOLUTION INTRAVENOUS at 11:57

## 2022-11-27 ASSESSMENT — ACTIVITIES OF DAILY LIVING (ADL)
ADLS_ACUITY_SCORE: 26
ADLS_ACUITY_SCORE: 37
ADLS_ACUITY_SCORE: 29
ADLS_ACUITY_SCORE: 29

## 2022-11-27 NOTE — ASSESSMENT & PLAN NOTE
Presented with increased shortness of breath since yesterday  Known history of severe COPD using Advair, Trelegy and Spiriva at home  Diagnosis today of influenza A which is probably the exacerbating cause  Requiring supplemental oxygen at this time  Admit inpatient status, steroids given, treat influenza with Tamiflu, frequent scheduled nebs.  Started taking Zithromax and will continue  11/28/2022- better overnite, less short of air, oxygen needs down to 2 LPM. Continue current treatment

## 2022-11-27 NOTE — ED PROVIDER NOTES
Emergency Department Provider Note  : 1960 Age: 62 year old Sex: female MRN: [id number]    Chief Complaint   Patient presents with     Shortness of Breath       Medical Decision Making / Assessment / Plan   62 year old female presenting with influenza A, COPD exacerbation    ED Course as of 22 1404   Sun 2022   1217 Patient presents with hypoxia, hypotension, tachycardia, tachypnea.  Oxygen saturations 82% on room air.  Started on 2 L of oxygen, 92% on 2 L.  EKG obtained showing sinus tachycardia with rate of 103 bpm.  Left axis deviation.  Anterior infarction, age undetermined.  Prolonged QTC measuring 471 ms.  Abnormal EKG.  1 L IV fluid bolus ordered.  Check lab work, COVID swab.  CBC, blood cultures   1218 One-view portable chest x-ray - no obvious infiltrate.   White blood cell count returned within normal range at 7.5.  Has been taking azithromycin -500 mg on Friday and 250 mg on Saturday.  No antibiotics today.   1219 Blood cultures collected.   1219 --Hx of Penicillin allergy causing respiratory arrest.  125 mg IV Solu-Medrol ordered.    DuoNeb ordered.     1231 + Influenza A  Elevated D-dimer - will check CT chest for PE.  Start Tamiflu  86% on 2 LPM -> to 4 LPM   1234 CT is negative for PE.  Troponin and BNP normal.   1357 Given the potential for underlying COPD exacerbation on top of influenza A, has taken 2 doses of oral Zithromax at home.  We will continue this.  250 mg once daily ordered starting today.  Add on procalcitonin.  Discussed with hospitalist who accepted patient for admission        New Prescriptions    No medications on file       Final diagnoses:   Influenza due to influenza virus, type A, human   COPD exacerbation (H)       Tim Boyd MD  2022   Emergency Department    Rocco Pa is a 62 year old female who presents at 11:33 AM with URI symptoms, sinus symptoms starting up on Friday.  Started azithromycin.  Symptoms worsened, a lot more sinus  "drainage, having a lot of postnasal drip.  Symptoms became much worse last night with significant shortness of breath, hot and cold, fever and chills on and off.    She presented with hypoxia down to 82% on room air.  Blood pressure was low at 98/60.  Pulse 118.    She has underlying severe COPD.  Lungs are very tight, she is not moving much air.  Has had a couple soft blood pressures at one time down to 72 systolic.  1 L IV fluids administered.  Blood pressure is improved up into the 105-110 range.    No abdominal pain.  No rashes.    I have reviewed the Medications, Allergies, Past Medical and Surgical History, and Social History in the [employer] [facility] and with family.    Review of Systems:  Please see Subjective / HPI for pertinent positives and negatives. All other systems reviewed and found to be negative.      Objective     Patient Vitals for the past 24 hrs:   BP Temp Pulse Resp SpO2 Height Weight   11/27/22 1345 115/68 -- 84 -- 98 % -- --   11/27/22 1330 110/61 -- 103 -- 97 % -- --   11/27/22 1300 -- -- 104 -- 99 % -- --   11/27/22 1245 (!) 72/58 -- 107 -- 98 % -- --   11/27/22 1236 -- -- -- -- 97 % -- --   11/27/22 1230 138/81 -- 110 -- 91 % -- --   11/27/22 1215 111/71 -- 101 -- 97 % -- --   11/27/22 1200 105/74 -- 115 -- 95 % -- --   11/27/22 1154 -- -- -- -- -- 1.575 m (5' 2\") 40.8 kg (90 lb)   11/27/22 1132 98/60 98  F (36.7  C) 118 -- 92 % -- 40.8 kg (90 lb)   11/27/22 1126 -- -- (!) 121 26 (!) 82 % -- --       Physical Exam:   General: Awake, alert, ill-appearing.  Moderate respiratory distress.  Head: Normocephalic, atraumatic.  Eyes: Conjugate gaze.  ENT: Relatively dry oral mucous membranes, external ear appears normal.   Chest/Respiratory: Equal chest rise.  Very diminished breath sounds bilaterally.  Lungs are very tight.  Moving minimal air.  Speaking in broken sentences.  Tachypnea  Cardiovascular: Peripheral pulses present, tachycardic rate, regular rhythm.  Abdominal: Soft, non-distended, " non-tender.  Extremities: No obvious deformity.  Neurological: GCS 15, moving all extremities without gross deficit.  Skin: Warm, no rashes, lesions, or bruising.   Psychiatric: Ill-appearing.  Flat affect.    Procedures / Critical Care   Procedures    Aggregate Critical Care Time:  is 45 minutes.  This was the time seeing the patient at the bedside while the patient was critical.  My time did not include any pertinent procedures or activities that did not contribute to the patient's care while the patient was critical.      Orders Placed This Encounter   Procedures     XR Chest Port 1 View     CT Chest Pulmonary Embolism w Contrast     Symptomatic; Unknown Influenza A/B & SARS-CoV2 (COVID-19) Virus PCR Multiplex     Comprehensive metabolic panel     Troponin T, High Sensitivity     CRP inflammation     Symptomatic; Unknown Influenza A/B & SARS-CoV2 (COVID-19) Virus PCR Multiplex Nasopharyngeal     Nt probnp inpatient (BNP)     D dimer quantitative     CBC with platelets and differential     Extra Tube     Extra Serum Separator Tube (SST)     Procalcitonin     EKG 12-lead, tracing only     Peripheral IV catheter     Droplet Isolation     Oxygen: Nasal cannula     Admit to Inpatient     CBC with platelets differential       RESULTS:  Results for orders placed or performed during the hospital encounter of 11/27/22   XR Chest Port 1 View     Status: None    Narrative    Procedure:XR CHEST PORT 1 VIEW    Clinical history:Female, 62 years, Dyspnea    Technique: Single view was obtained.    Comparison: 3/28/2019    Findings: The cardiac silhouette is normal. The pulmonary vasculature  is normal.    The lungs are hyperinflated however appear to be clear. Bony  structures are unremarkable.      Impression    Impression:   No acute abnormality. Persistent hyperinflation of the lungs without  evidence of pneumonia, CHF or other acute abnormality.    MONICA MARCIAL MD         SYSTEM ID:  [id number]   CT Chest Pulmonary  Embolism w Contrast     Status: None    Narrative    CT CHEST PULMONARY EMBOLISM W CONTRAST  11/27/2022 1:16 PM    CLINICAL HISTORY: Female, age 62 years,  SOB, Elevated D-dimer +  Influenza A;    Comparison:  No relevant prior imaging.    TECHNIQUE:  CT was performed of the chest  with IV contrast.   Axial; sagittal and coronal MIP images were reviewed..     FINDINGS:  Chest CT:    CTA chest: Excellent quality examination demonstrates no evidence of  acute abnormality. Small volume of noncalcified plaque is seen within  the proximal aspects of the left subclavian artery. There is no  evidence of pulmonary embolus. No evidence of aortic dissection.  Scattered atherosclerotic calcifications are seen within the aortic  arch and great vessels.    The heart demonstrates no acute abnormality.    An 11 x 14 mm mildly enlarged subcarinal node is slightly larger when  compared to prior examination. Mediastinal and hilar nodes are  otherwise normal in size and number.    Mild emphysematous changes again seen throughout both lungs. Nodular  focus of scarring along the posterior medial aspect of the right upper  lobe is again seen. There is no evidence of well-defined  pneumonia/consolidation throughout both lungs. Diaphragmatic  eventration/hernias are again seen.    Thyroid gland is unremarkable. Esophagus demonstrates no acute  abnormality. Small hiatal hernia is similar in appearance. Visualized  portions of the upper abdomen demonstrate no acute normality.      Bony structures: No evidence of an acute rib fracture. Mild  degenerative changes of the thoracic spine.      Impression    IMPRESSION:   Good quality CTA demonstrates no evidence of acute vascular  abnormality.    Mildly enlarged subcarinal node is increased slightly in size when  compared to prior examination and may be reactive in nature. No  evidence of pneumonia or other acute abnormality.    Nonacute findings as described above.    This facility minimizes  radiation dose by adjusting the mA and/or kV  according to each patient size.      This CT scan was performed using one or more the following dose  reduction techniques:    -Automated exposure control,  -Adjustment of the mA and/or kV according to patient's size, and/or,  -Use of iterative reconstruction technique.    MONICA MARCIAL MD         SYSTEM ID:  [id number]   Symptomatic; Unknown Influenza A/B & SARS-CoV2 (COVID-19) Virus PCR Multiplex Nose     Status: Abnormal    Specimen: Nose; Swab   Result Value Ref Range    Influenza A PCR Positive (A) Negative    Influenza B PCR Negative Negative    RSV PCR Negative Negative    SARS CoV2 PCR Negative Negative    Narrative    Testing was performed using the Xpert Xpress CoV2/Flu/RSV Assay on the Cepheid GeneXpert Instrument. This test should be ordered for the detection of SARS-CoV-2 and influenza viruses in individuals who meet clinical and/or epidemiological criteria. Test performance is unknown in asymptomatic patients. This test is for in vitro diagnostic use under the FDA EUA for laboratories certified under CLIA to perform high or moderate complexity testing. This test has not been FDA cleared or approved. A negative result does not rule out the presence of PCR inhibitors in the specimen or target RNA in concentration below the limit of detection for the assay. If only one viral target is positive but coinfection with multiple targets is suspected, the sample should be re-tested with another FDA cleared, approved, or authorized test, if coinfection would change clinical management. This test was validated by the [facility]. These laboratories are certified under the Clinical Laboratory Improvement Amendments of 1988 (CLIA-88) as qualified to perform high complexity laboratory testing.   Comprehensive metabolic panel     Status: Abnormal   Result Value Ref Range    Sodium 136 136 - 145 mmol/L    Potassium 4.7 3.4 - 5.3 mmol/L    Chloride 98  98 - 107 mmol/L    Carbon Dioxide (CO2) 20 (L) 22 - 29 mmol/L    Anion Gap 18 (H) 7 - 15 mmol/L    Urea Nitrogen 13.4 8.0 - 23.0 mg/dL    Creatinine 0.58 0.51 - 0.95 mg/dL    Calcium 8.9 8.8 - 10.2 mg/dL    Glucose 80 70 - 99 mg/dL    Alkaline Phosphatase 87 35 - 104 U/L    AST 37 (H) 10 - 35 U/L    ALT 25 10 - 35 U/L    Protein Total 7.2 6.4 - 8.3 g/dL    Albumin 4.3 3.5 - 5.2 g/dL    Bilirubin Total 0.2 <=1.2 mg/dL    GFR Estimate >90 >60 mL/min/1.73m2   Troponin T, High Sensitivity     Status: Normal   Result Value Ref Range    Troponin T, High Sensitivity 13 <=14 ng/L   CRP inflammation     Status: Abnormal   Result Value Ref Range    CRP Inflammation 80.38 (H) <5.00 mg/L   Nt probnp inpatient (BNP)     Status: Normal   Result Value Ref Range    N terminal Pro BNP Inpatient 615 0 - 900 pg/mL   D dimer quantitative     Status: Abnormal   Result Value Ref Range    D-Dimer Quantitative 0.62 (H) 0.00 - 0.50 ug/mL FEU    Narrative    This D-dimer assay is intended for use in conjunction with a clinical pretest probability assessment model to exclude pulmonary embolism (PE) and deep venous thrombosis (DVT) in outpatients suspected of PE or DVT. The cut-off value is 0.50 ug/mL FEU.   CBC with platelets and differential     Status: Abnormal   Result Value Ref Range    WBC Count 7.5 4.0 - 11.0 10e3/uL    RBC Count 5.26 (H) 3.80 - 5.20 10e6/uL    Hemoglobin 15.9 (H) 11.7 - 15.7 g/dL    Hematocrit 47.7 (H) 35.0 - 47.0 %    MCV 91 78 - 100 fL    MCH 30.2 26.5 - 33.0 pg    MCHC 33.3 31.5 - 36.5 g/dL    RDW 12.7 10.0 - 15.0 %    Platelet Count 242 150 - 450 10e3/uL    % Neutrophils 76 %    % Lymphocytes 11 %    % Monocytes 12 %    % Eosinophils 0 %    % Basophils 1 %    % Immature Granulocytes 0 %    NRBCs per 100 WBC 0 <1 /100    Absolute Neutrophils 5.7 1.6 - 8.3 10e3/uL    Absolute Lymphocytes 0.9 0.8 - 5.3 10e3/uL    Absolute Monocytes 0.9 0.0 - 1.3 10e3/uL    Absolute Eosinophils 0.0 0.0 - 0.7 10e3/uL    Absolute  Basophils 0.1 0.0 - 0.2 10e3/uL    Absolute Immature Granulocytes 0.0 <=0.4 10e3/uL    Absolute NRBCs 0.0 10e3/uL   Extra Tube     Status: None    Narrative    The following orders were created for panel order Extra Tube.  Procedure                               Abnormality         Status                     ---------                               -----------         ------                     Extra Serum Separator Tu...[[id number]]                      Final result                 Please view results for these tests on the individual orders.   Extra Serum Separator Tube (SST)     Status: None   Result Value Ref Range    Hold Specimen [facility]    CBC with platelets differential     Status: Abnormal    Narrative    The following orders were created for panel order CBC with platelets differential.  Procedure                               Abnormality         Status                     ---------                               -----------         ------                     CBC with platelets and d...[[id number]]  Abnormal            Final result                 Please view results for these tests on the individual orders.           Medical/Surgical History:  Past Medical History:   Diagnosis Date     Alpha-1-antitrypsin deficiency carrier 8/23/2017     Atherosclerosis of abdominal aorta (H) 8/24/2017    Overview:  [facility] Studies: 2/19/2013 -- CTA ABDOMEN AND PELVIS WITH BILATERAL LOWER EXTREMITY RUNOFF  CLINICAL HISTORY: Iliac and mesenteric ischemia.  TECHNIQUE: 125 mL Omnipaque 300 IV contrast was administered. 3-D arterial reformations were performed.  FINDINGS: There is a well-defined ovoid density at the medial right costophrenic angle. This measures 2.3 x 0.9 x 0.6 cm. It demonstra     Chronic abdominal pain 3/10/2010    Overview:  16 year duration     Chronic sinusitis     No Comments Provided     Closed fracture of skull (H)     1972     COPD, severe (H) 8/24/2017    Overview:  8/4/2014 -- PULMONARY FUNCTION   SITE:  [facility] ORDERED BY:  VANNESA LUNA  CLINICAL SUMMARY: 53-year-old female with a history of severe COPD.   TECHNICIAN NOTE: Good effort.   DATA: FVC 1.85 (61%). FEV1 1.04 (45%). FEV1/FVC ratio 56%. DLCO 13.5 to 62%.   Flow volume curve is consistent with airway obstruction.   IMPRESSION: 1. Severe obstructive pulmon     Dental abscess 12/2/2019     GERD (gastroesophageal reflux disease) 11/29/2017     Hiatal hernia 8/24/2017     Other and unspecified hyperlipidemia 8/1/2012     PAD (peripheral artery disease) (H) 10/13/2015     Psoriasis 8/24/2017     Psoriatic arthritis (H) 11/20/2017     Schatzki's ring of distal esophagus 8/24/2017     Ulcer of right cornea 8/18/2018     Vitamin B12 deficiency 4/17/2018     Vitamin D deficiency 8/24/2017     Past Surgical History:   Procedure Laterality Date     AS UGI ENDOSCOPY W ESOPHAGEAL DILATION BALLOON <30MM  10/30/2015    ESOPHAGEAL DILATION,Dr. Rainer Allen,      BIOPSY BREAST Bilateral     1999, 2001, BIOPSY BREAST,benign     CHOLECYSTECTOMY  2004    Laparoscopic     COLONOSCOPY  06/01/2016    x's 3 negative     COLONOSCOPY N/A 04/29/2021    hyperplastic follow up 10 years, 4/29/2031     CT CORONARY ANGIOGRAM  2009    CT CORONARY ANGIOGRAM (IA),negative     ESOPHAGOSCOPY, GASTROSCOPY, DUODENOSCOPY (EGD), COMBINED  04/07/2009    dilated, Dr. Allen     ESOPHAGOSCOPY, GASTROSCOPY, DUODENOSCOPY (EGD), COMBINED N/A 05/30/2019    Pickering's esophagus, 3 year follow up     HYSTERECTOMY VAGINAL  1994    ovaries remain     LAPAROSCOPIC NISSEN FUNDOPLICATION N/A 03/26/2018    Procedure: LAPAROSCOPIC NISSEN FUNDOPLICATION;  Laparoscopic Nissen Fundoplication;  Surgeon: Jagdish Ruiz MD;  Location: [facility] OR     LAPAROTOMY EXPLORATORY  1990    lysis of adhesions/endometriosis     RECTOCELE REPAIR  07/29/2009       Medications:  Current Facility-Administered Medications   Medication     azithromycin (ZITHROMAX) 250 mg in sodium  chloride 0.9 % 250 mL intermittent infusion     oseltamivir (TAMIFLU) capsule 75 mg     Current Outpatient Medications   Medication     albuterol (PROAIR HFA/PROVENTIL HFA/VENTOLIN HFA) 108 (90 Base) MCG/ACT inhaler     albuterol (PROVENTIL) (2.5 MG/3ML) 0.083% neb solution     aspirin (ASA) 81 MG EC tablet     azithromycin (ZITHROMAX) 250 MG tablet     docusate sodium (COLACE) 100 MG tablet     fluticasone-salmeterol (ADVAIR DISKUS) 500-50 MCG/ACT inhaler     Fluticasone-Umeclidin-Vilanterol (TRELEGY ELLIPTA) 200-62.5-25 MCG/INH oral inhaler     ipratropium - albuterol 0.5 mg/2.5 mg/3 mL (DUONEB) 0.5-2.5 (3) MG/3ML neb solution     red yeast rice 600 MG CAPS     tiotropium (SPIRIVA RESPIMAT) 2.5 MCG/ACT inhaler       Allergies:  Atorvastatin, Rosuvastatin, Sucralfate, Levofloxacin, Morphine, Penicillins, and Sulfamethoxazole w/trimethoprim    Relevant labs, images, EKGs, Epic and outside hospital (if applicable) charts were reviewed. The findings, diagnosis, plan, and need for follow up were discussed with the patient/family. Nursing notes were reviewed.      Tim Boyd MD  11/27/22 3180

## 2022-11-27 NOTE — H&P
[facility]    History and Physical - Hospitalist Service       Date of Admission:  11/27/2022    Assessment & Plan      Ember Tavares is a 62 year old female admitted on 11/27/2022. She presents ER with increasing shortness of breath since yesterday.  Known history of severe COPD with sats at home in the 80s.  Arrival to the ED was in some respiratory distress, tachycardic with testing showing new diagnosis of influenza A.  Chest x-ray showing emphysematous changes but no clear infiltrate CT negative for pulmonary embolism.  Patient be admitted for treatment of COPD exacerbation with acute respiratory failure with hypoxemia due to influenza A.  We will treat with supplemental oxygen, steroids, frequent scheduled nebs and will treat for influenza with Tamiflu.  Expect she will need to be in hospital for at least several days    COPD exacerbation (H)  Presented with increased shortness of breath since yesterday  Known history of severe COPD using Advair, Trelegy and Spiriva at home  Diagnosis today of influenza A which is probably the exacerbating cause  Requiring supplemental oxygen at this time  Admit inpatient status, steroids given, treat influenza with Tamiflu, frequent scheduled nebs.  Started taking Zithromax and will continue    Acute respiratory failure with hypoxia (H)  Presenting with increased shortness of breath due to COPD exacerbation and influenza A  Requiring supplemental oxygen  Continue oxygen to maintain sats greater than 90 to 92%  Wean as able    Influenza due to influenza virus, type A, human  Diagnosed today, has not been vaccinated  Symptomatic cares, will start Tamiflu  Chest x-ray not showing obvious infection with CT of the chest showing emphysematous changes but no clear infectious etiology  I started Zithromax, will continue    History of tobacco abuse  Daily smoker  Wants to stop, declines nicotine replacement at this time         Diet:  Regular  DVT Prophylaxis:  "Pneumatic Compression Devices  Peters Catheter: Not present  Central Lines: None  Cardiac Monitoring: None  Code Status:  Full code    Clinically Significant Risk Factors Present on Admission                    # Acute Respiratory Failure: Documented O2 saturation < 91%.  Continue supplemental oxygen as needed    # Cachexia: Estimated body mass index is 16.46 kg/m  as calculated from the following:    Height as of this encounter: 1.575 m (5' 2\").    Weight as of this encounter: 40.8 kg (90 lb).           Disposition Plan      Expected Discharge Date: 11/29/2022                The patient's care was discussed with the Patient and Patient's Family.    Luisito Jimenez MD  Hospitalist Service  [facility]  Securely message with the Vocera Web Console (learn more here)  Text page via [employer] Paging/Directory         ______________________________________________________________________    Chief Complaint   62-year-old female presenting with increasing shortness of breath since yesterday    History is obtained from the patient, electronic health record, emergency department physician and patient's family    History of Present Illness   Ember Tavares is a 62 year old female who presents to the ER with increasing shortness of breath since yesterday.  Known history of severe COPD using Advair, Trelegy as well as Spiriva at home.  Started feeling ill yesterday with a slight cough and with increasing shortness of breath she started taking Zithromax which she had at home.  She denies fever but has felt chilled and just generally feels achy with sore throat, headache.  She has not been immunized against influenza.  She is a daily smoker.  Cough has been dry, denies any chest pain, denies diarrhea but has had nausea with a couple episodes of vomiting.    Review of Systems    All other systems reviewed and negative other than noted in the HPI or here.       Past Medical History    I have reviewed this " patient's medical history and updated it with pertinent information if needed.   Past Medical History:   Diagnosis Date     Alpha-1-antitrypsin deficiency carrier 8/23/2017     Atherosclerosis of abdominal aorta (H) 8/24/2017    Overview:  [facility] Studies: 2/19/2013 -- CTA ABDOMEN AND PELVIS WITH BILATERAL LOWER EXTREMITY RUNOFF  CLINICAL HISTORY: Iliac and mesenteric ischemia.  TECHNIQUE: 125 mL Omnipaque 300 IV contrast was administered. 3-D arterial reformations were performed.  FINDINGS: There is a well-defined ovoid density at the medial right costophrenic angle. This measures 2.3 x 0.9 x 0.6 cm. It demonstra     Chronic abdominal pain 3/10/2010    Overview:  16 year duration     Chronic sinusitis     No Comments Provided     Closed fracture of skull (H)     1972     COPD, severe (H) 8/24/2017    Overview:  8/4/2014 -- PULMONARY FUNCTION  SITE:  [facility] ORDERED BY:  VANNESA LUNA  CLINICAL SUMMARY: 53-year-old female with a history of severe COPD.   TECHNICIAN NOTE: Good effort.   DATA: FVC 1.85 (61%). FEV1 1.04 (45%). FEV1/FVC ratio 56%. DLCO 13.5 to 62%.   Flow volume curve is consistent with airway obstruction.   IMPRESSION: 1. 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  Past Surgical History:   Procedure Laterality Date     AS UGI ENDOSCOPY W ESOPHAGEAL DILATION BALLOON <30MM  10/30/2015    ESOPHAGEAL DILATION,Dr. Rainer Allen,  [facility]     BIOPSY BREAST Bilateral     1999, 2001, BIOPSY BREAST,benign     CHOLECYSTECTOMY  2004    Laparoscopic     COLONOSCOPY  06/01/2016    x's 3 negative     COLONOSCOPY N/A 04/29/2021    hyperplastic follow up 10 years, 4/29/2031     CT CORONARY ANGIOGRAM  2009    CT CORONARY ANGIOGRAM (IA),negative     ESOPHAGOSCOPY, GASTROSCOPY, DUODENOSCOPY (EGD), COMBINED  04/07/2009    dilated, Dr. Allen     ESOPHAGOSCOPY, GASTROSCOPY, DUODENOSCOPY (EGD), COMBINED N/A 05/30/2019    Pickering's esophagus, 3 year follow up     HYSTERECTOMY VAGINAL  1994    ovaries remain     LAPAROSCOPIC NISSEN FUNDOPLICATION N/A 03/26/2018    Procedure: LAPAROSCOPIC NISSEN FUNDOPLICATION;  Laparoscopic Nissen Fundoplication;  Surgeon: Jagdish Ruiz MD;  Location: [facility] OR     LAPAROTOMY EXPLORATORY  1990    lysis of adhesions/endometriosis     RECTOCELE REPAIR  07/29/2009       Social History   I have reviewed this patient's social history and updated it with pertinent information if needed.  Social History     Tobacco Use     Smoking status: Former     Packs/day: 0.50     Years: 32.00     Pack years: 16.00     Types: Cigarettes, Cigars     Quit date: 8/18/2012     Years since quitting: 10.2     Smokeless tobacco: Never   Vaping Use     Vaping Use: Never used   Substance Use Topics     Alcohol use: Yes     Alcohol/week: 2.0 standard drinks     Comment: Alcoholic Drinks/day: 1-2 drinks every 1-2 weeks     Drug use: No       Family History   I have reviewed this patient's family history and updated it with pertinent information if needed.  Family History   Problem Relation Age of Onset     Arthritis Father         Arthritis     Emphysema Father 51        Alpha-1-AT deficiency     Peripheral Vascular Disease Mother         Peripheral vascular disease     Diabetes Mother         Diabetes     Arthritis Mother         Arthritis     Breast Cancer Sister         Premenopausal breast cancer     Eczema Brother         Eczema     Narcolepsy  Brother      Other - See Comments Daughter         Narcolepsy     Narcolepsy Daughter      Brain Tumor Daughter      Seizure Disorder Daughter      Depression Daughter      Graves' disease Daughter        Prior to Admission Medications   Prior to Admission Medications   Prescriptions Last Dose Informant Patient Reported? Taking?   Fluticasone-Umeclidin-Vilanterol (TRELEGY ELLIPTA) 200-62.5-25 MCG/INH oral inhaler   No No   Sig: Inhale 1 puff into the lungs daily Rinse mouth well after use to prevent Thrush -- use instead of Spiriva and Advair   albuterol (PROAIR HFA/PROVENTIL HFA/VENTOLIN HFA) 108 (90 Base) MCG/ACT inhaler   No No   Sig: INHALE 1 TO 2 PUFFS INTO THE LUNGS EVERY 4 HOURS AS NEEDED FOR SHORTNESS OF BREATH OR DIFFICULT BREATHING OR WHEEZING   albuterol (PROVENTIL) (2.5 MG/3ML) 0.083% neb solution   No No   Sig: Take 1 vial (2.5 mg) by nebulization every 6 hours as needed for shortness of breath / dyspnea or wheezing   aspirin (ASA) 81 MG EC tablet   No No   Sig: Take 1 tablet (81 mg) by mouth once a week Or 2 x weekly   azithromycin (ZITHROMAX) 250 MG tablet   No No   Sig: Take 1 tablet (250 mg) by mouth Every Mon, Wed, Fri Morning for 90 days - for COPD   docusate sodium (COLACE) 100 MG tablet   No No   Sig: Take 2 tablets (200 mg) by mouth 2 times daily -Adjust dose as needed to maintain soft stools   fluticasone-salmeterol (ADVAIR DISKUS) 500-50 MCG/ACT inhaler   No No   Sig: Inhale 1 puff into the lungs every 12 hours Rinse mouth well after use to prevent Thrush   ipratropium - albuterol 0.5 mg/2.5 mg/3 mL (DUONEB) 0.5-2.5 (3) MG/3ML neb solution   No No   Sig: Take 1 vial (3 mLs) by nebulization every 6 hours as needed for shortness of breath / dyspnea or wheezing   red yeast rice 600 MG CAPS   No No   Sig: Take 1 capsule (600 mg) by mouth daily   tiotropium (SPIRIVA RESPIMAT) 2.5 MCG/ACT inhaler   No No   Sig: INHALE 2 PUFFS INTO THE LUNGS EVERY EVENING      Facility-Administered Medications: None      Allergies   Allergies   Allergen Reactions     Atorvastatin Muscle Pain (Myalgia)     Rosuvastatin Nausea and Vomiting     Sucralfate Nausea and Vomiting     Levofloxacin Nausea and Vomiting     Morphine      Other reaction(s): Chest Pain     Penicillins      Other reaction(s): Respiratory Arrest     Sulfamethoxazole W/Trimethoprim Nausea and Vomiting     Yeast infection       Physical Exam   Vital Signs: Temp: 98  F (36.7  C)   BP: 105/66 Pulse: 102   Resp: 26 SpO2: 93 % O2 Device: Nasal cannula Oxygen Delivery: 4 LPM  Weight: 90 lbs 0 oz    General Appearance: Older than stated woman, in some distress, having difficulty breathing, hard to talk  Eyes: Pupils equal, reactive  HEENT: Nose mouth throat unremarkable  Respiratory: Diminished breath sounds, tight in each side with crackles and wheezing  Cardiovascular: Regular rate and rhythm, no murmur heard  GI: Soft, nondistended, nontender  Lymph/Hematologic: Cervical nodes negative  Genitourinary: Not examined  Skin: No lesions rashes or bruising  Musculoskeletal: Moving arms and legs freely without discomfort  Neurologic: No focal deficits, cranial nerves normal  Psychiatric: Mood affect normal    Data   Data reviewed today: I reviewed all medications, new labs and imaging results over the last 24 hours. I personally reviewed the chest x-ray image(s) showing No obvious infiltrate, normal-sized heart.    Results for orders placed or performed during the hospital encounter of 11/27/22   XR Chest Port 1 View     Status: None    Narrative    Procedure:XR CHEST PORT 1 VIEW    Clinical history:Female, 62 years, Dyspnea    Technique: Single view was obtained.    Comparison: 3/28/2019    Findings: The cardiac silhouette is normal. The pulmonary vasculature  is normal.    The lungs are hyperinflated however appear to be clear. Bony  structures are unremarkable.      Impression    Impression:   No acute abnormality. Persistent hyperinflation of the lungs  without  evidence of pneumonia, CHF or other acute abnormality.    MONICA MARCIAL MD         SYSTEM ID:  [id number]   CT Chest Pulmonary Embolism w Contrast     Status: None    Narrative    CT CHEST PULMONARY EMBOLISM W CONTRAST  11/27/2022 1:16 PM    CLINICAL HISTORY: Female, age 62 years,  SOB, Elevated D-dimer +  Influenza A;    Comparison:  No relevant prior imaging.    TECHNIQUE:  CT was performed of the chest  with IV contrast.   Axial; sagittal and coronal MIP images were reviewed..     FINDINGS:  Chest CT:    CTA chest: Excellent quality examination demonstrates no evidence of  acute abnormality. Small volume of noncalcified plaque is seen within  the proximal aspects of the left subclavian artery. There is no  evidence of pulmonary embolus. No evidence of aortic dissection.  Scattered atherosclerotic calcifications are seen within the aortic  arch and great vessels.    The heart demonstrates no acute abnormality.    An 11 x 14 mm mildly enlarged subcarinal node is slightly larger when  compared to prior examination. Mediastinal and hilar nodes are  otherwise normal in size and number.    Mild emphysematous changes again seen throughout both lungs. Nodular  focus of scarring along the posterior medial aspect of the right upper  lobe is again seen. There is no evidence of well-defined  pneumonia/consolidation throughout both lungs. Diaphragmatic  eventration/hernias are again seen.    Thyroid gland is unremarkable. Esophagus demonstrates no acute  abnormality. Small hiatal hernia is similar in appearance. Visualized  portions of the upper abdomen demonstrate no acute normality.      Bony structures: No evidence of an acute rib fracture. Mild  degenerative changes of the thoracic spine.      Impression    IMPRESSION:   Good quality CTA demonstrates no evidence of acute vascular  abnormality.    Mildly enlarged subcarinal node is increased slightly in size when  compared to prior examination and may be  reactive in nature. No  evidence of pneumonia or other acute abnormality.    Nonacute findings as described above.    This facility minimizes radiation dose by adjusting the mA and/or kV  according to each patient size.      This CT scan was performed using one or more the following dose  reduction techniques:    -Automated exposure control,  -Adjustment of the mA and/or kV according to patient's size, and/or,  -Use of iterative reconstruction technique.    MONICA MARCIAL MD         SYSTEM ID:  [id number]   Symptomatic; Unknown Influenza A/B & SARS-CoV2 (COVID-19) Virus PCR Multiplex Nose     Status: Abnormal    Specimen: Nose; Swab   Result Value Ref Range    Influenza A PCR Positive (A) Negative    Influenza B PCR Negative Negative    RSV PCR Negative Negative    SARS CoV2 PCR Negative Negative    Narrative    Testing was performed using the Xpert Xpress CoV2/Flu/RSV Assay on the Cepheid GeneXpert Instrument. This test should be ordered for the detection of SARS-CoV-2 and influenza viruses in individuals who meet clinical and/or epidemiological criteria. Test performance is unknown in asymptomatic patients. This test is for in vitro diagnostic use under the FDA EUA for laboratories certified under CLIA to perform high or moderate complexity testing. This test has not been FDA cleared or approved. A negative result does not rule out the presence of PCR inhibitors in the specimen or target RNA in concentration below the limit of detection for the assay. If only one viral target is positive but coinfection with multiple targets is suspected, the sample should be re-tested with another FDA cleared, approved, or authorized test, if coinfection would change clinical management. This test was validated by the [facility]. These laboratories are certified under the Clinical Laboratory Improvement Amendments of 1988 (CLIA-88) as qualified to perform high complexity laboratory testing.   Comprehensive  metabolic panel     Status: Abnormal   Result Value Ref Range    Sodium 136 136 - 145 mmol/L    Potassium 4.7 3.4 - 5.3 mmol/L    Chloride 98 98 - 107 mmol/L    Carbon Dioxide (CO2) 20 (L) 22 - 29 mmol/L    Anion Gap 18 (H) 7 - 15 mmol/L    Urea Nitrogen 13.4 8.0 - 23.0 mg/dL    Creatinine 0.58 0.51 - 0.95 mg/dL    Calcium 8.9 8.8 - 10.2 mg/dL    Glucose 80 70 - 99 mg/dL    Alkaline Phosphatase 87 35 - 104 U/L    AST 37 (H) 10 - 35 U/L    ALT 25 10 - 35 U/L    Protein Total 7.2 6.4 - 8.3 g/dL    Albumin 4.3 3.5 - 5.2 g/dL    Bilirubin Total 0.2 <=1.2 mg/dL    GFR Estimate >90 >60 mL/min/1.73m2   Troponin T, High Sensitivity     Status: Normal   Result Value Ref Range    Troponin T, High Sensitivity 13 <=14 ng/L   CRP inflammation     Status: Abnormal   Result Value Ref Range    CRP Inflammation 80.38 (H) <5.00 mg/L   Nt probnp inpatient (BNP)     Status: Normal   Result Value Ref Range    N terminal Pro BNP Inpatient 615 0 - 900 pg/mL   D dimer quantitative     Status: Abnormal   Result Value Ref Range    D-Dimer Quantitative 0.62 (H) 0.00 - 0.50 ug/mL FEU    Narrative    This D-dimer assay is intended for use in conjunction with a clinical pretest probability assessment model to exclude pulmonary embolism (PE) and deep venous thrombosis (DVT) in outpatients suspected of PE or DVT. The cut-off value is 0.50 ug/mL FEU.   CBC with platelets and differential     Status: Abnormal   Result Value Ref Range    WBC Count 7.5 4.0 - 11.0 10e3/uL    RBC Count 5.26 (H) 3.80 - 5.20 10e6/uL    Hemoglobin 15.9 (H) 11.7 - 15.7 g/dL    Hematocrit 47.7 (H) 35.0 - 47.0 %    MCV 91 78 - 100 fL    MCH 30.2 26.5 - 33.0 pg    MCHC 33.3 31.5 - 36.5 g/dL    RDW 12.7 10.0 - 15.0 %    Platelet Count 242 150 - 450 10e3/uL    % Neutrophils 76 %    % Lymphocytes 11 %    % Monocytes 12 %    % Eosinophils 0 %    % Basophils 1 %    % Immature Granulocytes 0 %    NRBCs per 100 WBC 0 <1 /100    Absolute Neutrophils 5.7 1.6 - 8.3 10e3/uL    Absolute  Lymphocytes 0.9 0.8 - 5.3 10e3/uL    Absolute Monocytes 0.9 0.0 - 1.3 10e3/uL    Absolute Eosinophils 0.0 0.0 - 0.7 10e3/uL    Absolute Basophils 0.1 0.0 - 0.2 10e3/uL    Absolute Immature Granulocytes 0.0 <=0.4 10e3/uL    Absolute NRBCs 0.0 10e3/uL   Extra Tube     Status: None    Narrative    The following orders were created for panel order Extra Tube.  Procedure                               Abnormality         Status                     ---------                               -----------         ------                     Extra Serum Separator Tu...[[id number]]                      Final result                 Please view results for these tests on the individual orders.   Extra Serum Separator Tube (SST)     Status: None   Result Value Ref Range    Hold Specimen [facility]    Procalcitonin     Status: Abnormal   Result Value Ref Range    Procalcitonin 0.08 (H) <0.05 ng/mL   CBC with platelets differential     Status: Abnormal    Narrative    The following orders were created for panel order CBC with platelets differential.  Procedure                               Abnormality         Status                     ---------                               -----------         ------                     CBC with platelets and d...[[id number]]  Abnormal            Final result                 Please view results for these tests on the individual orders.

## 2022-11-27 NOTE — PROGRESS NOTES
"[facility] ADMISSION NOTE    Patient admitted to room 315 at approximately 1715 via bed from emergency room. Patient was accompanied by nurse.     Verbal SBAR report received from Elizabeth prior to patient arrival.     Patient ambulated to bed independently. Patient alert and oriented X 3. The patient is not having any pain.  . Admission vital signs: Blood pressure 96/58, pulse 98, temperature 98.5  F (36.9  C), temperature source Tympanic, resp. rate 22, height 1.575 m (5' 2\"), weight 41.3 kg (91 lb), SpO2 94 %, not currently breastfeeding. Patient was oriented to plan of care, call light, bed controls, tv, telephone, bathroom and visiting hours.     Risk Assessment    The following safety risks were identified during admission: none. Yellow risk band applied: NO.     Skin Initial Assessment    This writer admitted this patient and completed a full skin assessment and Omar score in the Adult PCS flowsheet. Appropriate interventions initiated as needed.     Education    Patient has a [place] to Observation order: No  Observation education completed and documented: No      Chandrika Kaplan RN    "

## 2022-11-27 NOTE — ASSESSMENT & PLAN NOTE
Presenting with increased shortness of breath due to COPD exacerbation and influenza A  Requiring supplemental oxygen  Continue oxygen to maintain sats greater than 90 to 92%  Wean as able

## 2022-11-27 NOTE — ASSESSMENT & PLAN NOTE
Diagnosed today, has not been vaccinated  Symptomatic cares, will start Tamiflu  Chest x-ray not showing obvious infection with CT of the chest showing emphysematous changes but no clear infectious etiology  I started Zithromax, will continue

## 2022-11-27 NOTE — ED TRIAGE NOTES
Pt arrives via private vehicle with c/o increased SOB that started yesterday. Pt appeared to have labored breathing in triage, put on 2L O2 via NC. Pt has history of COPD.      Triage Assessment     Row Name 11/27/22 1127       Triage Assessment (Adult)    Airway WDL WDL       Respiratory WDL    Respiratory WDL X;rhythm/pattern    Rhythm/Pattern, Respiratory shortness of breath

## 2022-11-28 ENCOUNTER — TELEPHONE (OUTPATIENT)
Dept: INTERNAL MEDICINE | Facility: OTHER | Age: 62
End: 2022-11-28

## 2022-11-28 LAB
ANION GAP SERPL CALCULATED.3IONS-SCNC: 12 MMOL/L (ref 7–15)
BUN SERPL-MCNC: 14.2 MG/DL (ref 8–23)
CALCIUM SERPL-MCNC: 8.4 MG/DL (ref 8.8–10.2)
CHLORIDE SERPL-SCNC: 105 MMOL/L (ref 98–107)
CREAT SERPL-MCNC: 0.59 MG/DL (ref 0.51–0.95)
DEPRECATED HCO3 PLAS-SCNC: 22 MMOL/L (ref 22–29)
ERYTHROCYTE [DISTWIDTH] IN BLOOD BY AUTOMATED COUNT: 12.8 % (ref 10–15)
GFR SERPL CREATININE-BSD FRML MDRD: >90 ML/MIN/1.73M2
GLUCOSE SERPL-MCNC: 115 MG/DL (ref 70–99)
HCT VFR BLD AUTO: 41.7 % (ref 35–47)
HGB BLD-MCNC: 13.8 G/DL (ref 11.7–15.7)
MCH RBC QN AUTO: 30.3 PG (ref 26.5–33)
MCHC RBC AUTO-ENTMCNC: 33.1 G/DL (ref 31.5–36.5)
MCV RBC AUTO: 92 FL (ref 78–100)
PLATELET # BLD AUTO: 209 10E3/UL (ref 150–450)
POTASSIUM SERPL-SCNC: 4.9 MMOL/L (ref 3.4–5.3)
RBC # BLD AUTO: 4.55 10E6/UL (ref 3.8–5.2)
SODIUM SERPL-SCNC: 139 MMOL/L (ref 136–145)
WBC # BLD AUTO: 5.7 10E3/UL (ref 4–11)

## 2022-11-28 PROCEDURE — 94640 AIRWAY INHALATION TREATMENT: CPT | Mod: 76

## 2022-11-28 PROCEDURE — 258N000003 HC RX IP 258 OP 636: Performed by: FAMILY MEDICINE

## 2022-11-28 PROCEDURE — 36415 COLL VENOUS BLD VENIPUNCTURE: CPT | Performed by: FAMILY MEDICINE

## 2022-11-28 PROCEDURE — 99232 SBSQ HOSP IP/OBS MODERATE 35: CPT | Performed by: FAMILY MEDICINE

## 2022-11-28 PROCEDURE — 250N000013 HC RX MED GY IP 250 OP 250 PS 637: Performed by: FAMILY MEDICINE

## 2022-11-28 PROCEDURE — 250N000011 HC RX IP 250 OP 636: Performed by: FAMILY MEDICINE

## 2022-11-28 PROCEDURE — 85027 COMPLETE CBC AUTOMATED: CPT | Performed by: FAMILY MEDICINE

## 2022-11-28 PROCEDURE — 999N000157 HC STATISTIC RCP TIME EA 10 MIN

## 2022-11-28 PROCEDURE — 94640 AIRWAY INHALATION TREATMENT: CPT

## 2022-11-28 PROCEDURE — 82310 ASSAY OF CALCIUM: CPT | Performed by: FAMILY MEDICINE

## 2022-11-28 PROCEDURE — 250N000009 HC RX 250: Performed by: FAMILY MEDICINE

## 2022-11-28 PROCEDURE — 120N000001 HC R&B MED SURG/OB

## 2022-11-28 RX ORDER — NAPROXEN SODIUM 220 MG
440 TABLET ORAL 2 TIMES DAILY WITH MEALS
COMMUNITY
End: 2023-10-10

## 2022-11-28 RX ORDER — ENOXAPARIN SODIUM 100 MG/ML
30 INJECTION SUBCUTANEOUS DAILY
Status: DISCONTINUED | OUTPATIENT
Start: 2022-11-28 | End: 2022-11-30 | Stop reason: HOSPADM

## 2022-11-28 RX ADMIN — METHYLPREDNISOLONE SODIUM SUCCINATE 62.5 MG: 125 INJECTION, POWDER, FOR SOLUTION INTRAMUSCULAR; INTRAVENOUS at 05:27

## 2022-11-28 RX ADMIN — METHYLPREDNISOLONE SODIUM SUCCINATE 62.5 MG: 125 INJECTION, POWDER, FOR SOLUTION INTRAMUSCULAR; INTRAVENOUS at 21:44

## 2022-11-28 RX ADMIN — IPRATROPIUM BROMIDE AND ALBUTEROL SULFATE 3 ML: 2.5; .5 SOLUTION RESPIRATORY (INHALATION) at 14:16

## 2022-11-28 RX ADMIN — ENOXAPARIN SODIUM 30 MG: 30 INJECTION SUBCUTANEOUS at 13:48

## 2022-11-28 RX ADMIN — AZITHROMYCIN DIHYDRATE 250 MG: 500 INJECTION, POWDER, LYOPHILIZED, FOR SOLUTION INTRAVENOUS at 13:47

## 2022-11-28 RX ADMIN — IPRATROPIUM BROMIDE AND ALBUTEROL SULFATE 3 ML: 2.5; .5 SOLUTION RESPIRATORY (INHALATION) at 10:22

## 2022-11-28 RX ADMIN — IPRATROPIUM BROMIDE AND ALBUTEROL SULFATE 3 ML: 2.5; .5 SOLUTION RESPIRATORY (INHALATION) at 06:06

## 2022-11-28 RX ADMIN — ACETAMINOPHEN 650 MG: 325 TABLET ORAL at 00:06

## 2022-11-28 RX ADMIN — IPRATROPIUM BROMIDE AND ALBUTEROL SULFATE 3 ML: 2.5; .5 SOLUTION RESPIRATORY (INHALATION) at 18:10

## 2022-11-28 RX ADMIN — METHYLPREDNISOLONE SODIUM SUCCINATE 62.5 MG: 125 INJECTION, POWDER, FOR SOLUTION INTRAMUSCULAR; INTRAVENOUS at 13:47

## 2022-11-28 RX ADMIN — ALBUTEROL SULFATE 2.5 MG: 2.5 SOLUTION RESPIRATORY (INHALATION) at 00:09

## 2022-11-28 RX ADMIN — ALBUTEROL SULFATE 2.5 MG: 2.5 SOLUTION RESPIRATORY (INHALATION) at 23:41

## 2022-11-28 RX ADMIN — OSELTAMIVIR PHOSPHATE 75 MG: 75 CAPSULE ORAL at 21:44

## 2022-11-28 RX ADMIN — ONDANSETRON 4 MG: 2 INJECTION INTRAMUSCULAR; INTRAVENOUS at 14:51

## 2022-11-28 RX ADMIN — SENNOSIDES AND DOCUSATE SODIUM 1 TABLET: 50; 8.6 TABLET ORAL at 21:44

## 2022-11-28 RX ADMIN — OSELTAMIVIR PHOSPHATE 75 MG: 75 CAPSULE ORAL at 10:28

## 2022-11-28 ASSESSMENT — ACTIVITIES OF DAILY LIVING (ADL)
ADLS_ACUITY_SCORE: 29

## 2022-11-28 NOTE — PLAN OF CARE
Goal Outcome Evaluation:      Plan of Care Reviewed With: patient    Overall Patient Progress: no changeOverall Patient Progress: no change    Patient maintaining Sp02 92% on 4L via NC.  Able to ambulate to bathroom with dyspnea with exertion, resolves with rest with no increase in oxygen. Lungs are clear and diminished.  Patient is alert and oriented.  Denies any pain at this time.  VSS and afebrile.

## 2022-11-28 NOTE — PROGRESS NOTES
[facility]    Medicine Progress Note - Hospitalist Service    Date of Admission:  11/27/2022    Assessment & Plan   COPD exacerbation (H)  Presented with increased shortness of breath since yesterday  Known history of severe COPD using Advair, Trelegy and Spiriva at home  Diagnosis today of influenza A which is probably the exacerbating cause  Requiring supplemental oxygen at this time  Admit inpatient status, steroids given, treat influenza with Tamiflu, frequent scheduled nebs.  Started taking Zithromax and will continue  11/28/2022- better overnite, less short of air, oxygen needs down to 2 LPM. Continue current treatment    Acute respiratory failure with hypoxia (H)  Presenting with increased shortness of breath due to COPD exacerbation and influenza A  Requiring supplemental oxygen  Continue oxygen to maintain sats greater than 90 to 92%  Wean as able    Influenza due to influenza virus, type A, human  Diagnosed today, has not been vaccinated  Symptomatic cares, will start Tamiflu  Chest x-ray not showing obvious infection with CT of the chest showing emphysematous changes but no clear infectious etiology  I started Zithromax, will continue    History of tobacco abuse  Daily smoker  Wants to stop, declines nicotine replacement at this time           Diet: Combination Diet Regular Diet Adult    DVT Prophylaxis: Pneumatic Compression Devices  Peters Catheter: Not present  Central Lines: None  Cardiac Monitoring: None  Code Status: Full Code      Disposition Plan           The patient's care was discussed with the Patient.    Luisito Jimenez MD  Hospitalist Service  [facility]  Securely message with the Vocera Web Console (learn more here)  Text page via [email or web address] Paging/Directory         Clinically Significant Risk Factors Present on Admission          # Hypocalcemia: Lowest Ca = 8.4 mg/dL (Ref range: 8.5 - 10.1 mg/dL) in last 2 days, will monitor and replace as  "appropriate           # Acute Respiratory Failure: Documented O2 saturation < 91%.  Continue supplemental oxygen as needed    # Cachexia: Estimated body mass index is 16.63 kg/m  as calculated from the following:    Height as of this encounter: 1.575 m (5' 2\").    Weight as of this encounter: 41.2 kg (90 lb 14.4 oz).           ______________________________________________________________________    Interval History   Feeling somewhat better, less short of air. Cough is dry, no fevers,bodyaches better    Data reviewed today: I reviewed all medications, new labs and imaging results over the last 24 hours. I personally reviewed no images or EKG's today.    Physical Exam   Vital Signs: Temp: 97  F (36.1  C) Temp src: Tympanic BP: 106/65 Pulse: 104   Resp: 20 SpO2: 91 % O2 Device: Nasal cannula Oxygen Delivery: 2 LPM  Weight: 90 lbs 14.4 oz  Constitutional: awake, alert, cooperative, no apparent distress, and appears stated age  Respiratory: tight, poor airflow, slight wheezing  Cardiovascular: regular rate and rhythm  GI: normal bowel sounds, soft and non-distended    Data   Recent Labs   Lab 11/28/22  0532 11/27/22  1156   WBC 5.7 7.5   HGB 13.8 15.9*   MCV 92 91    242    136   POTASSIUM 4.9 4.7   CHLORIDE 105 98   CO2 22 20*   BUN 14.2 13.4   CR 0.59 0.58   ANIONGAP 12 18*   COLEMAN 8.4* 8.9   * 80   ALBUMIN  --  4.3   PROTTOTAL  --  7.2   BILITOTAL  --  0.2   ALKPHOS  --  87   ALT  --  25   AST  --  37*     "

## 2022-11-28 NOTE — PHARMACY-ADMISSION MEDICATION HISTORY
Pharmacy -- Admission Medication Reconciliation    Prior to admission (PTA) medications were reviewed and the patient's PTA medication list was updated.    Sources Consulted: chart review, sure scripts, patient interview    The reliability of this Medication Reconciliation is: Reliability: Reliable    The following significant changes were made:    Added:    Naproxen     Vitamin D3    Removed:    Trelegy (was too expensive even through our Rx program so is using the advair and spiriva)    Note:    Patient usually takes azithromycin three times weekly, but at onset of feeling ill, took 500 mg on Friday and 250 mg on Saturday.     Patient has an albuterol inhaler, nebulizer and duoneb. Stated she uses duoneb when she feels worse.     In addition, the patient's allergies were reviewed with the patient and updated as follows:   Allergies: Penicillins, Atorvastatin, Morphine, Levofloxacin, Rosuvastatin, Sucralfate, and Sulfamethoxazole w/trimethoprim    The pharmacist has reviewed with the patient that all personal medications should be removed from the building or locked in the belongings safe.  Patient shall only take medications ordered by the physician and administered by the nursing staff.     Medication barriers identified: cost of medications   Medication adherence concerns: none noted    Understanding of emergency medications: increased use of albuterol and duoneb PTA    Tia Perry RPH, 11/28/2022,  9:30 AM

## 2022-11-28 NOTE — PROGRESS NOTES
11/27/22 1707   Initial Information   Patient Belongings remains with patient   Patient Belongings Remaining with Patient cell phone/electronics;clothing;dental appliance/dentures;glasses;ring   Did you bring any home meds/supplements to the hospital?  No     [facility] will make every effort per our policy to help keep your items safe while in the hospital.  If you choose to keep any items at the bedside, we cannot be held responsible for any items that are lost or broken.      List items sent to safe: none    I have reviewed my belongings list on admission and verify that it is correct.     Patient signature_______________________________  Date/Time_____________________    2nd Staff person if patient unable to sign __________________________  Date/Time ______________________      I have received all my belongings noted above at discharge.    Patient signature________________________________  Date/Time  __________________________

## 2022-11-28 NOTE — PROGRESS NOTES
Currently 94% on 2 LPM nasal cannula, she does not wear oxygen at home.  Breath sounds diminished throughout with inspiratory wheezes in the upper right.  Harsh, non-productive cough. Scheduled Duonebs given as order and 1 PRN Albuterol given this shift.  Increased aeration after nebulizer.  Patient feels she has improved since admission.

## 2022-11-28 NOTE — TELEPHONE ENCOUNTER
Reason for call: Patient wanting a work in appointment.    Is the appointment for a Hospital Follow up?  Yes     (If yes - Unable to find an appointment with any provider during the time frame needed. Nurse/Provider - Can this patient be worked into a schedule with PCP or team member?)    Patient is having the following symptoms:  Influenza    The patient is requesting an appointment with  DJS    Was an appointment offered for this call? Yes    If Yes, what is the date of the appointment?  12/12/22 with [facility]     Preferred method for responding to this message: Telephone Call    Phone number patient can be reached at? Other phone number:  [phone]    If we can't reach you directly, may we leave a detailed response at the number you provided? Yes    Can this message wait until your PCP/provider returns if unavailable today? Yes     Leesa Tinsley on 11/28/2022 at 9:28 AM

## 2022-11-28 NOTE — PLAN OF CARE
Alert and oriented. Tachycardic throughout the shift. Afebrile. Generalized not feeling well. Some stomach discomfort, patient states last BM was Sunday morning, declines offer of stool softener. Patient is unable to describe what kind of a discomfort it is. Heat packs applied with effective relief. Tylenol given to help sleep and comfort. Shortness of breath, breathlessness and PEÑA throughout the shift. Was able to wean to 2L of O2 but does desat to 80s with ambulation, does recover within a couple minutes. Patient increased to 4L with ambulation but then able to be weaned back to 2L once back in bed with sats in low 90s. Nonproductive, frequent cough. Lungs have slight expiratory wheeze throughout the shift and diminished. Bowel sounds active. Standby assist. Voiding adequately.    Nellie Rothman RN on 11/28/2022 at 12:35 AM

## 2022-11-28 NOTE — PROGRESS NOTES
"Clinical Nutrition / Initial Assessment     Reason for Assessment:  Screened at nutritional risk due to:  Recent weight loss without trying    Assessment:   Client History:  Pt with severe COPD, currently influenza +. No meal intakes recorded, on regular diet. Will add supplements and recommend supplements at home related to significant wt loss, low BMI.   Diet Order:  Regular  Oral Intake:  Monitor  Supplement Intake:  Will add Ensure TID to trays  Weight:   Wt Readings from Last 10 Encounters:   11/28/22 41.2 kg (90 lb 14.4 oz)   10/07/22 43.9 kg (96 lb 12.8 oz)   07/01/21 52.7 kg (116 lb 3.2 oz)   04/29/21 56.7 kg (125 lb)   03/22/21 56.7 kg (125 lb)   02/23/21 55.4 kg (122 lb 2.2 oz)   10/22/20 52.9 kg (116 lb 9.6 oz)   12/12/19 58.3 kg (128 lb 9.6 oz)   12/03/19 60.2 kg (132 lb 12.8 oz)   11/29/19 60.2 kg (132 lb 12.8 oz)   Height: 5' 2\"  BMI: Body mass index is 16.63 kg/m .    Estimated nutritional needs based on:  ideal body weight 50 kg / 110 lbs  Estimated energy needs:  9471-5583 kcal/day (30-35 kcal/kg)  Estimated protein needs:  60-75 gm/day (1.2-1.5 g/kg)    Malnutrition Criteria:  (Need to have 2 indicators to qualify recommendation)  Energy Intake:  Unable to determine-intakes not recorded yet, likely reduced with current illness and hx of wt loss  Interpretation of Weight Loss:  Acute on chronic Severe:   > 5% in 1 month (22% in past 15 months)  Physical Findings:  Chronic Body Fat Loss:  severe and Chronic Muscle Mass Loss:  severe  Reduced  Strength:  Not Measured but likely reduced with current illness and noted weakness     Recommended Nutrition Diagnosis:   Severe Malnutrition in the context of acute on chronic illness or injury - based on AND/ASPEN Clinical Characterstics of Malnutrition May 2012      Nutrition Education: Nutrition education will be provided as appropriate.    Nutrition Diagnosis: Weight:  weight loss related to inadequate energy intakes as evidenced by wt loss of 6# in " past month, ~26# in past year with BMI under 20.    Intervention:  Nutrition Prescription:     Nutrition Intervention(s):Recommended general, healthful diet  1. Meals and Snacks: encourage meal intakes  2. Medical Food Supplement: Ensure TID on trays     Nutrition Goal(s):  1. Pt will consume 50% or more of meals and supplements   2. Pt will not have unplanned wt loss during hospitalization   3. Pt will participate in menu selection for optimum oral intakes     Monitoring and Evaluation:   Food Intake, diet tolerance, weights     Discharge Recommendation:   Nutrition Discharge Planning  recommend supplements on discharge to maintain weight and nutrition status    RD will reassess in within 1-5 days or sooner.  Theresa Lopez RD on 11/28/2022 at 9:59 AM

## 2022-11-29 LAB
ANION GAP SERPL CALCULATED.3IONS-SCNC: 7 MMOL/L (ref 7–15)
ATRIAL RATE - MUSE: 103 BPM
BUN SERPL-MCNC: 15.1 MG/DL (ref 8–23)
CALCIUM SERPL-MCNC: 8.3 MG/DL (ref 8.8–10.2)
CHLORIDE SERPL-SCNC: 108 MMOL/L (ref 98–107)
CREAT SERPL-MCNC: 0.54 MG/DL (ref 0.51–0.95)
DEPRECATED HCO3 PLAS-SCNC: 26 MMOL/L (ref 22–29)
DIASTOLIC BLOOD PRESSURE - MUSE: NORMAL MMHG
ERYTHROCYTE [DISTWIDTH] IN BLOOD BY AUTOMATED COUNT: 13 % (ref 10–15)
GFR SERPL CREATININE-BSD FRML MDRD: >90 ML/MIN/1.73M2
GLUCOSE SERPL-MCNC: 137 MG/DL (ref 70–99)
HCT VFR BLD AUTO: 41.2 % (ref 35–47)
HGB BLD-MCNC: 13.4 G/DL (ref 11.7–15.7)
HOLD SPECIMEN: NORMAL
INTERPRETATION ECG - MUSE: NORMAL
MCH RBC QN AUTO: 30.1 PG (ref 26.5–33)
MCHC RBC AUTO-ENTMCNC: 32.5 G/DL (ref 31.5–36.5)
MCV RBC AUTO: 93 FL (ref 78–100)
P AXIS - MUSE: 81 DEGREES
PLATELET # BLD AUTO: 200 10E3/UL (ref 150–450)
POTASSIUM SERPL-SCNC: 4.5 MMOL/L (ref 3.4–5.3)
POTASSIUM SERPL-SCNC: 4.6 MMOL/L (ref 3.4–5.3)
PR INTERVAL - MUSE: 132 MS
QRS DURATION - MUSE: 84 MS
QT - MUSE: 360 MS
QTC - MUSE: 471 MS
R AXIS - MUSE: -67 DEGREES
RBC # BLD AUTO: 4.45 10E6/UL (ref 3.8–5.2)
SODIUM SERPL-SCNC: 141 MMOL/L (ref 136–145)
SYSTOLIC BLOOD PRESSURE - MUSE: NORMAL MMHG
T AXIS - MUSE: 77 DEGREES
VENTRICULAR RATE- MUSE: 103 BPM
WBC # BLD AUTO: 12.1 10E3/UL (ref 4–11)

## 2022-11-29 PROCEDURE — 94640 AIRWAY INHALATION TREATMENT: CPT

## 2022-11-29 PROCEDURE — 250N000011 HC RX IP 250 OP 636: Performed by: FAMILY MEDICINE

## 2022-11-29 PROCEDURE — 999N000157 HC STATISTIC RCP TIME EA 10 MIN

## 2022-11-29 PROCEDURE — 84132 ASSAY OF SERUM POTASSIUM: CPT | Performed by: FAMILY MEDICINE

## 2022-11-29 PROCEDURE — 82310 ASSAY OF CALCIUM: CPT | Performed by: FAMILY MEDICINE

## 2022-11-29 PROCEDURE — 250N000012 HC RX MED GY IP 250 OP 636 PS 637: Performed by: FAMILY MEDICINE

## 2022-11-29 PROCEDURE — 120N000001 HC R&B MED SURG/OB

## 2022-11-29 PROCEDURE — 258N000003 HC RX IP 258 OP 636: Performed by: FAMILY MEDICINE

## 2022-11-29 PROCEDURE — 36415 COLL VENOUS BLD VENIPUNCTURE: CPT | Performed by: FAMILY MEDICINE

## 2022-11-29 PROCEDURE — 250N000013 HC RX MED GY IP 250 OP 250 PS 637: Performed by: FAMILY MEDICINE

## 2022-11-29 PROCEDURE — 85027 COMPLETE CBC AUTOMATED: CPT | Performed by: FAMILY MEDICINE

## 2022-11-29 PROCEDURE — 99232 SBSQ HOSP IP/OBS MODERATE 35: CPT | Performed by: FAMILY MEDICINE

## 2022-11-29 PROCEDURE — 94640 AIRWAY INHALATION TREATMENT: CPT | Mod: 76

## 2022-11-29 PROCEDURE — 250N000009 HC RX 250: Performed by: FAMILY MEDICINE

## 2022-11-29 RX ORDER — PREDNISONE 20 MG/1
20 TABLET ORAL ONCE
Status: COMPLETED | OUTPATIENT
Start: 2022-11-29 | End: 2022-11-29

## 2022-11-29 RX ORDER — PREDNISONE 20 MG/1
40 TABLET ORAL DAILY
Status: DISCONTINUED | OUTPATIENT
Start: 2022-11-30 | End: 2022-11-30 | Stop reason: HOSPADM

## 2022-11-29 RX ADMIN — PREDNISONE 20 MG: 20 TABLET ORAL at 11:02

## 2022-11-29 RX ADMIN — METHYLPREDNISOLONE SODIUM SUCCINATE 62.5 MG: 125 INJECTION, POWDER, FOR SOLUTION INTRAMUSCULAR; INTRAVENOUS at 05:23

## 2022-11-29 RX ADMIN — OSELTAMIVIR PHOSPHATE 75 MG: 75 CAPSULE ORAL at 20:49

## 2022-11-29 RX ADMIN — IPRATROPIUM BROMIDE AND ALBUTEROL SULFATE 3 ML: 2.5; .5 SOLUTION RESPIRATORY (INHALATION) at 10:28

## 2022-11-29 RX ADMIN — AZITHROMYCIN DIHYDRATE 250 MG: 500 INJECTION, POWDER, LYOPHILIZED, FOR SOLUTION INTRAVENOUS at 13:48

## 2022-11-29 RX ADMIN — IPRATROPIUM BROMIDE AND ALBUTEROL SULFATE 3 ML: 2.5; .5 SOLUTION RESPIRATORY (INHALATION) at 18:26

## 2022-11-29 RX ADMIN — IPRATROPIUM BROMIDE AND ALBUTEROL SULFATE 3 ML: 2.5; .5 SOLUTION RESPIRATORY (INHALATION) at 06:04

## 2022-11-29 RX ADMIN — IPRATROPIUM BROMIDE AND ALBUTEROL SULFATE 3 ML: 2.5; .5 SOLUTION RESPIRATORY (INHALATION) at 14:36

## 2022-11-29 RX ADMIN — SODIUM CHLORIDE: 9 INJECTION, SOLUTION INTRAVENOUS at 02:38

## 2022-11-29 RX ADMIN — OSELTAMIVIR PHOSPHATE 75 MG: 75 CAPSULE ORAL at 09:15

## 2022-11-29 RX ADMIN — ENOXAPARIN SODIUM 30 MG: 30 INJECTION SUBCUTANEOUS at 09:15

## 2022-11-29 ASSESSMENT — ACTIVITIES OF DAILY LIVING (ADL)
ADLS_ACUITY_SCORE: 28
ADLS_ACUITY_SCORE: 28
ADLS_ACUITY_SCORE: 29
ADLS_ACUITY_SCORE: 28
ADLS_ACUITY_SCORE: 28
ADLS_ACUITY_SCORE: 29
ADLS_ACUITY_SCORE: 28
ADLS_ACUITY_SCORE: 29
ADLS_ACUITY_SCORE: 28
ADLS_ACUITY_SCORE: 28

## 2022-11-29 NOTE — PROGRESS NOTES
Up to BR on 1 liter/NC, initial SPO2 when back to bed was 89, recovered to 91 after about 2 minutes

## 2022-11-29 NOTE — PROGRESS NOTES
[facility]    Medicine Progress Note - Hospitalist Service    Date of Admission:  11/27/2022    Assessment & Plan       COPD exacerbation (H)  Patient initially presented with 1 day history of shortness of breath.  Has known COPD and now diagnosed with acute influenza A.  She was started on Tamiflu and also IV methylprednisolone.  Patient was previously treated with Zithromax and that was continued.    -Initially required 4 L and now has weaned down to 1 L.  -Still no exercise tolerance.  -Already received 62.5 mg methylprednisone.  We will add 20 mg prednisone oral now, 40 mg oral tomorrow.         Acute respiratory failure with hypoxia (H)  Presented with acute increased shortness of breath due to acute on chronic COPD and influenza A.  Continues to requiring supplemental oxygen    -Continue oxygen to maintain sats greater than 90%  -Wean as able     Influenza due to influenza virus, type A, human  Diagnosed at [facility], has not been vaccinated.  Initial chest x-ray not showing secondary bacterial pneumonia.  -Continue Tamiflu  -Reasonable to continue Zithromax at this time       History of tobacco abuse  Daily smoker  Wants to stop, declines nicotine replacement at this time          Diet: Combination Diet Regular Diet Adult  Snacks/Supplements Adult: Ensure Enlive; With Meals    DVT Prophylaxis: Enoxaparin (Lovenox) SQ  Peters Catheter: Not present  Central Lines: None  Cardiac Monitoring: None  Code Status: Full Code      Disposition Plan     Expected Discharge Date: 11/29/2022      Destination: home          The patient's care was discussed with the Patient.    Rigoberto Redmond MD  Hospitalist Service  [facility]  Securely message with the Vocera Web Console (learn more here)  Text page via [employer] Paging/[employer]         Clinically Significant Risk Factors                       # Cachexia: Estimated body mass index is 16.64 kg/m  as calculated from the following:     "Height as of this encounter: 1.575 m (5' 2\").    Weight as of this encounter: 41.3 kg (91 lb)., PRESENT ON ADMISSION  # Severe Malnutrition: based on nutrition assessment, PRESENT ON ADMISSION       ______________________________________________________________________    Interval History   Patient reports that she continues to feel poorly and short of breath.  Feels weak.  However does feel much better than she did on admit.  Her  is now sick at home with influenza A and she would like to get home soon as possible.  Denies any chest pain.  Still coughing but less prominent.  No fevers or chills.  Appetite poor.    Data reviewed today: I reviewed all medications, new labs and imaging results over the last 24 hours. I personally reviewed no images or EKG's today.    Physical Exam   Vital Signs: Temp: 97.2  F (36.2  C) Temp src: Tympanic BP: 115/66 Pulse: 79   Resp: 22 SpO2: 90 % O2 Device: Nasal cannula Oxygen Delivery: 1 LPM  Weight: 91 lbs 0 oz  Constitutional: awake, alert, cooperative, no apparent distress, and appears stated age  Respiratory: No increased work of breathing, good air exchange, clear to auscultation bilaterally, no crackles or wheezing  Cardiovascular: Regularrate and rhythm.  No murmurs rubs or gallops heard.   GI: Abdomen Soft, non-tender.  No masses, rebound or guarding.   Neuropsychiatric: General: normal, calm and normal eye contact  Orientation: oriented to self, place, time and situation  Memory and insight: memory for past and recent events intact and thought process normal    Data   Recent Labs   Lab 11/29/22  0543 11/28/22  0532 11/27/22  1156   WBC  --  5.7 7.5   HGB  --  13.8 15.9*   MCV  --  92 91   PLT  --  209 242   NA  --  139 136   POTASSIUM 4.5 4.9 4.7   CHLORIDE  --  105 98   CO2  --  22 20*   BUN  --  14.2 13.4   CR  --  0.59 0.58   ANIONGAP  --  12 18*   COLEMAN  --  8.4* 8.9   GLC  --  115* 80   ALBUMIN  --   --  4.3   PROTTOTAL  --   --  7.2   BILITOTAL  --   --  0.2 "   ALKPHOS  --   --  87   ALT  --   --  25   AST  --   --  37*     No results found for this or any previous visit (from the past 24 hour(s)).

## 2022-11-29 NOTE — PROGRESS NOTES
Currently 94% on 2 LPM, no home oxygen.  Scheduled nebs given, 1 PRN Albuterol given this shift.

## 2022-11-29 NOTE — PROGRESS NOTES
Shift Summary: Pt remains on 2L NC, not chronic. Scheduled nebs given. No further respiratory interventions done. Lynne Bliss RRT

## 2022-11-29 NOTE — PROGRESS NOTES
SAFETY CHECKLIST  ID Bands and Risk clasps correct and in place (DNR, Fall risk, Allergy, Latex, Limb):  Yes  All Lines Reconciled and labeled correctly: Yes  Whiteboard updated:Yes  Environmental interventions: Yes - bedrails x 2, calllight within reach  Verify Tele #:

## 2022-11-29 NOTE — PLAN OF CARE
Problem: COPD (Chronic Obstructive Pulmonary Disease)  Goal: Optimal Level of Functional [place]  Outcome: Progressing  Intervention: Optimize Functional Ability  Recent Flowsheet Documentation  Taken 11/29/2022 0814 by Marlyn Aponte, RN  Activity Management: activity adjusted per tolerance  Goal: Absence of Infection Signs and Symptoms  Outcome: Progressing  Goal: Improved Nutrition Intake  Outcome: Progressing  Goal: Effective Oxygenation and Ventilation  Outcome: Progressing  Intervention: Promote Airway Secretion Clearance  Recent Flowsheet Documentation  Taken 11/29/2022 0814 by Marlyn Aponte, RN  Cough And Deep Breathing: done independently per patient  Activity Management: activity adjusted per tolerance  Intervention: Optimize Oxygenation and Ventilation  Recent Flowsheet Documentation  Taken 11/29/2022 0814 by Marlyn Aponte, RN  Head of Bed (HOB) Positioning: HOB at 60 degrees   Goal Outcome Evaluation:

## 2022-11-29 NOTE — PLAN OF CARE
"Patient admitted with COPD exacerbation, Influenza A.  Remains on 2 LPM via NC.  SOB with exertion. Complaints of some abdominal discomfort/fullness, stated she may be constipated.  PRN Senna given at bedtime. As of this note, no relief.  IVF running at 75ml/hr.  Up independently, adequate urine output noted in flowsheets.  A&O x4.    BP 97/66 (BP Location: Left arm, Patient Position: Right side, Cuff Size: Adult Small)   Pulse 82   Temp (!) 96.6  F (35.9  C) (Tympanic)   Resp 20   Ht 1.575 m (5' 2\")   Wt 41.2 kg (90 lb 14.4 oz)   SpO2 95%   BMI 16.63 kg/m        Goal Outcome Evaluation:      Plan of Care Reviewed With: patient    Overall Patient Progress: improvingOverall Patient Progress: improving           "

## 2022-11-29 NOTE — PLAN OF CARE
"Patient admitted from home with COPD exacerbation with Influenza A.  She is alert, oriented, and up independently.  She is currently on 2L of acute O2.  She is very SOB with any activity.  She is receiving IV abx with Tamiflu.    Problem: Plan of Care - These are the overarching goals to be used throughout the patient stay.    Goal: Patient-Specific Goal (Individualized)  Description: You can add care plan individualizations to a care plan. Examples of Individualization might be:  \"Parent requests to be called daily at 9am for status\", \"I have a hard time hearing out of my right ear\", or \"Do not touch me to wake me up as it startles me\".  Flowsheets (Taken 11/28/2022 1821)  Individualized Care Needs: short of breath with activity  Goal: Absence of Hospital-Acquired Illness or Injury  Intervention: Identify and Manage Fall Risk  Recent Flowsheet Documentation  Taken 11/28/2022 1500 by Gretchen Meléndez RN  Safety Promotion/Fall Prevention: safety round/check completed  Taken 11/28/2022 0815 by Gretchen Meléndez RN  Safety Promotion/Fall Prevention: safety round/check completed     Problem: COPD (Chronic Obstructive Pulmonary Disease)  Goal: Effective Oxygenation and Ventilation  Intervention: Promote Airway Secretion Clearance  Recent Flowsheet Documentation  Taken 11/28/2022 1500 by Gretchen Meléndez RN  Cough And Deep Breathing: done independently per patient  Taken 11/28/2022 0815 by Gretchen Meléndez RN  Cough And Deep Breathing: done independently per patient   Goal Outcome Evaluation:                        "

## 2022-11-29 NOTE — TELEPHONE ENCOUNTER
Date of birth and last name verified by nursing staff.  Offered appointment for 12-.    Danielle states that is too soon..  Appointment needs to be after 12-.      Lasha Can .......  11/29/2022  4:49 PM

## 2022-11-30 VITALS
DIASTOLIC BLOOD PRESSURE: 59 MMHG | WEIGHT: 98.2 LBS | RESPIRATION RATE: 22 BRPM | HEIGHT: 62 IN | BODY MASS INDEX: 18.07 KG/M2 | SYSTOLIC BLOOD PRESSURE: 96 MMHG | TEMPERATURE: 96.8 F | OXYGEN SATURATION: 94 % | HEART RATE: 92 BPM

## 2022-11-30 LAB
ANION GAP SERPL CALCULATED.3IONS-SCNC: 6 MMOL/L (ref 7–15)
BUN SERPL-MCNC: 19.5 MG/DL (ref 8–23)
CALCIUM SERPL-MCNC: 8.6 MG/DL (ref 8.8–10.2)
CHLORIDE SERPL-SCNC: 103 MMOL/L (ref 98–107)
CREAT SERPL-MCNC: 0.58 MG/DL (ref 0.51–0.95)
DEPRECATED HCO3 PLAS-SCNC: 31 MMOL/L (ref 22–29)
ERYTHROCYTE [DISTWIDTH] IN BLOOD BY AUTOMATED COUNT: 13 % (ref 10–15)
GFR SERPL CREATININE-BSD FRML MDRD: >90 ML/MIN/1.73M2
GLUCOSE SERPL-MCNC: 91 MG/DL (ref 70–99)
HCT VFR BLD AUTO: 39.9 % (ref 35–47)
HGB BLD-MCNC: 13 G/DL (ref 11.7–15.7)
MCH RBC QN AUTO: 29.8 PG (ref 26.5–33)
MCHC RBC AUTO-ENTMCNC: 32.6 G/DL (ref 31.5–36.5)
MCV RBC AUTO: 92 FL (ref 78–100)
PLATELET # BLD AUTO: 174 10E3/UL (ref 150–450)
POTASSIUM SERPL-SCNC: 4 MMOL/L (ref 3.4–5.3)
RBC # BLD AUTO: 4.36 10E6/UL (ref 3.8–5.2)
SODIUM SERPL-SCNC: 140 MMOL/L (ref 136–145)
WBC # BLD AUTO: 9.5 10E3/UL (ref 4–11)

## 2022-11-30 PROCEDURE — 250N000013 HC RX MED GY IP 250 OP 250 PS 637: Performed by: FAMILY MEDICINE

## 2022-11-30 PROCEDURE — 94640 AIRWAY INHALATION TREATMENT: CPT | Mod: 76

## 2022-11-30 PROCEDURE — 99239 HOSP IP/OBS DSCHRG MGMT >30: CPT | Performed by: FAMILY MEDICINE

## 2022-11-30 PROCEDURE — 85014 HEMATOCRIT: CPT | Performed by: FAMILY MEDICINE

## 2022-11-30 PROCEDURE — 94640 AIRWAY INHALATION TREATMENT: CPT

## 2022-11-30 PROCEDURE — 999N000157 HC STATISTIC RCP TIME EA 10 MIN

## 2022-11-30 PROCEDURE — 250N000009 HC RX 250: Performed by: FAMILY MEDICINE

## 2022-11-30 PROCEDURE — 36415 COLL VENOUS BLD VENIPUNCTURE: CPT | Performed by: FAMILY MEDICINE

## 2022-11-30 PROCEDURE — 250N000011 HC RX IP 250 OP 636: Performed by: FAMILY MEDICINE

## 2022-11-30 PROCEDURE — 80048 BASIC METABOLIC PNL TOTAL CA: CPT | Performed by: FAMILY MEDICINE

## 2022-11-30 PROCEDURE — 250N000012 HC RX MED GY IP 250 OP 636 PS 637: Performed by: FAMILY MEDICINE

## 2022-11-30 RX ORDER — OSELTAMIVIR PHOSPHATE 75 MG/1
75 CAPSULE ORAL 2 TIMES DAILY
Qty: 3 CAPSULE | Refills: 0 | Status: SHIPPED | OUTPATIENT
Start: 2022-11-30 | End: 2022-12-06

## 2022-11-30 RX ORDER — PREDNISONE 20 MG/1
TABLET ORAL
Qty: 3 TABLET | Refills: 0 | Status: SHIPPED | OUTPATIENT
Start: 2022-12-01 | End: 2022-12-11

## 2022-11-30 RX ADMIN — ASPIRIN 81 MG: 81 TABLET, COATED ORAL at 10:15

## 2022-11-30 RX ADMIN — ALBUTEROL SULFATE 2.5 MG: 2.5 SOLUTION RESPIRATORY (INHALATION) at 03:07

## 2022-11-30 RX ADMIN — OSELTAMIVIR PHOSPHATE 75 MG: 75 CAPSULE ORAL at 10:15

## 2022-11-30 RX ADMIN — IPRATROPIUM BROMIDE AND ALBUTEROL SULFATE 3 ML: 2.5; .5 SOLUTION RESPIRATORY (INHALATION) at 06:19

## 2022-11-30 RX ADMIN — IPRATROPIUM BROMIDE AND ALBUTEROL SULFATE 3 ML: 2.5; .5 SOLUTION RESPIRATORY (INHALATION) at 10:53

## 2022-11-30 RX ADMIN — ENOXAPARIN SODIUM 30 MG: 30 INJECTION SUBCUTANEOUS at 10:15

## 2022-11-30 RX ADMIN — PREDNISONE 40 MG: 20 TABLET ORAL at 10:15

## 2022-11-30 ASSESSMENT — ACTIVITIES OF DAILY LIVING (ADL)
ADLS_ACUITY_SCORE: 28

## 2022-11-30 NOTE — PROGRESS NOTES
SAFETY CHECKLIST  ID Bands and Risk clasps correct and in place (DNR, Fall risk, Allergy, Latex, Limb):  Yes  All Lines Reconciled and labeled correctly: Yes  Whiteboard updated:Yes  Environmental interventions: Yes        Assumed pt care at 2300

## 2022-11-30 NOTE — PROGRESS NOTES
Patient remains on O2 at 1 liter, dyspnea with exertion, takes her about 2 minutes to recover, denies pain VSS

## 2022-11-30 NOTE — PLAN OF CARE
"Goal Outcome Evaluation:      Plan of Care Reviewed With: patient    Overall Patient Progress: improvingOverall Patient Progress: improving    Afebrile,vss. Lungs diminished with expiratory wheezing throughout, dyspnea upon exertion, O2 stable on acute 1 lpm nc. Prn neb given by RT. Pt reports cough improving, denies pain, will continue to monitor.    /65   Pulse 60   Temp (!) 96.5  F (35.8  C) (Tympanic)   Resp 22   Ht 1.575 m (5' 2\")   Wt 44.5 kg (98 lb 3.2 oz)   SpO2 95%   BMI 17.96 kg/m      Problem: Plan of Care - These are the overarching goals to be used throughout the patient stay.    Goal: Patient-Specific Goal (Individualized)  11/30/2022 0547 by Lianna Ramirez RN  Outcome: Progressing  Flowsheets (Taken 11/30/2022 0547)  Individualized Care Needs: shortness of breath with activity  Patient/Family-Specific Goals (Include Timeframe): monitor oxygen sats       Problem: Plan of Care - These are the overarching goals to be used throughout the patient stay.    Goal: Absence of Hospital-Acquired Illness or Injury  Intervention: Identify and Manage Fall Risk  Recent Flowsheet Documentation  Taken 11/30/2022 0259 by Lianna Ramirez RN  Safety Promotion/Fall Prevention: activity supervised     Problem: COPD (Chronic Obstructive Pulmonary Disease)  Goal: Effective Oxygenation and Ventilation  Intervention: Promote Airway Secretion Clearance  Recent Flowsheet Documentation  Taken 11/30/2022 0259 by Lianna Ramirez RN  Cough And Deep Breathing: done independently per patient  Activity Management:   ambulated in room   ambulated to bathroom       "

## 2022-11-30 NOTE — DISCHARGE SUMMARY
[facility]  Hospitalist Discharge Summary      Date of Admission:  11/27/2022  Date of Discharge:  11/30/2022  Discharging Provider: Rigoberto Redmond MD  Discharge Service: Hospitalist Service    Discharge Diagnoses   Acute respiratory failure due to influenza A  Acute on chronic COPD exacerbation  Severe protein calorie malnutrition    Follow-ups Needed After Discharge   Follow-up Appointments     Follow-up and recommended labs and tests       Follow up with primary care provider, Tim Boyd, within 7 days for   hospital follow- up.             Unresulted Labs Ordered in the Past 30 Days of this Admission     Date and Time Order Name Status Description    11/27/2022 11:42 AM Blood Culture Peripheral Blood Preliminary     11/27/2022 11:42 AM Blood Culture Peripheral Blood Preliminary       These results will be followed up by follow-up physician    Discharge Disposition   Discharged to home  Condition at discharge: Stable      Hospital Course      COPD exacerbation (H)  Patient initially presented with 1 day history of shortness of breath.  Has known COPD and now diagnosed with acute influenza A.  She was started on Tamiflu and also IV methylprednisolone.  Patient was previously treated with Zithromax and that was continued.  Patient initially required 4 L and has now weaned down to 1 L.  We tried to get her off oxygen and she desaturated to 86% with conversation and 83% with any activity.      On 1 L she remained comfortable at 95%.  Her exercise tolerance was reasonable on 1 L to where she would be able to do her ADLs at home.    Patient will continue with oral prednisone to complete a 10-day taper.  Continue home respiratory treatments           Acute respiratory failure with hypoxia (H)  Presented with acute increased shortness of breath due to acute on chronic COPD and influenza A.  Continues to requiring supplemental oxygen    Patient has been set up with home oxygen at 1 L  continuous.     Influenza due to influenza virus, type A, human  Diagnosed at [place], has not been vaccinated.  Initial chest x-ray not showing secondary bacterial pneumonia.    Patient will complete full 5-day course of Tamiflu.  No evidence of secondary bacterial pneumonia       History of tobacco abuse  Daily smoker  Strongly suggest tobacco cessation which she wants to do but at this time declines any nicotine replacement         Oxygen Documentation:   I certify that this patient, Ember Tavares has been under my care (or a nurse practitioner or physician's assistant working with me). This is the face-to-face encounter for oxygen medical necessity.      Ember Tavares is now in a chronic stable state and continues to require supplemental oxygen. Patient has continued oxygen desaturation due to Chronic Respiratory Failure with Hypoxia J96.11  COPD J44.9.    Alternative treatment(s) tried or considered and deemed clinically infective for treatment of Chronic Respiratory Failure with Hypoxia J96.11  COPD J44.9 include nebulizers, inhalers, and steroids.  If portability is ordered, is the patient mobile within the home? yes          Consultations This Hospital Stay   RESPIRATORY CARE IP CONSULT  SOCIAL WORK IP CONSULT  SMOKING CESSATION PROGRAM IP CONSULT    Code Status   Full Code    Time Spent on this Encounter   I, Rigoberto Redmond MD, personally saw the patient today and spent greater than 30 minutes discharging this patient.       Rigoberto Redmond MD  [facility] \[facility] [place] [facility]\""  [address]  [place] [address]  Phone: [phone]  Fax: [phone]  ______________________________________________________________________    Physical Exam   Vital Signs: Temp: 96.8  F (36  C) Temp src: Tympanic BP: 96/59 Pulse: 92   Resp: 22 SpO2: 94 % O2 Device: Nasal cannula Oxygen Delivery: 1 LPM  Weight: 98 lbs 3.2 oz  Constitutional: awake, alert, cooperative, no apparent distress, and appears  stated age  Respiratory: On 1 L patient had no increased work of breathing.  On room air with talking or activity she began breathing harder and having some pursed lip breathing with activity.  She did have good air entry and no rhonchi or rales.  No wheezing.  Cardiovascular: Regular rate and rhythm.  No murmurs rubs or gallops.  GI: Abdomen soft, nontender  Musculoskeletal: No synovitis.  Muscle strength age and body habitus appropriateas well as equal and even.   Neuropsychiatric: General: normal, calm and normal eye contact  Orientation: oriented to self, place, time and situation  Memory and insight: memory for past and recent events intact and thought process normal       Primary Care Physician   Tim Boyd    Discharge Orders      Reason for your hospital stay    You had acute respiratory failure due to influenza A.  You improved with time and treatment but still required oxygen at the time of discharge.     Follow-up and recommended labs and tests     Follow up with primary care provider, Tim Boyd, within 7 days for hospital follow- up.     Activity    Your activity upon discharge: activity as tolerated     Oxygen Adult/Peds    Oxygen Documentation:   I certify that this patient, Ember Tavares has been under my care (or a nurse practitioner or physician's assistant working with me). This is the face-to-face encounter for oxygen medical necessity.      Ember Tavares is now in a chronic stable state and continues to require supplemental oxygen. Patient has continued oxygen desaturation due to Chronic Respiratory Failure with Hypoxia J96.11  COPD J44.9.    Alternative treatment(s) tried or considered and deemed clinically infective for treatment of Chronic Respiratory Failure with Hypoxia J96.11  COPD J44.9 include nebulizers, inhalers, and steroids.  If portability is ordered, is the patient mobile within the home? yes    **Patients who qualify for home O2 coverage under the CMS guidelines  require ABG tests or O2 sat readings obtained closest to, but no earlier than 2 days prior to the discharge, as evidence of the need for home oxygen therapy. Testing must be performed while patient is in the chronic stable state. See notes for O2 sats.**     Diet    Follow this diet upon discharge: Orders Placed This Encounter      Snacks/Supplements Adult: Ensure Enlive; With Meals      Combination Diet Regular Diet Adult       Significant Results and Procedures   Most Recent 3 CBC's:Recent Labs   Lab Test 11/30/22  0615 11/29/22  0543 11/28/22  0532   WBC 9.5 12.1* 5.7   HGB 13.0 13.4 13.8   MCV 92 93 92    200 209     Most Recent 3 BMP's:Recent Labs   Lab Test 11/30/22 0615 11/29/22  0543 11/28/22  0532    141 139   POTASSIUM 4.0 4.6  4.5 4.9   CHLORIDE 103 108* 105   CO2 31* 26 22   BUN 19.5 15.1 14.2   CR 0.58 0.54 0.59   ANIONGAP 6* 7 12   COLEMAN 8.6* 8.3* 8.4*   GLC 91 137* 115*   ,   Results for orders placed or performed during the hospital encounter of 11/27/22   XR Chest Port 1 View    Narrative    Procedure:XR CHEST PORT 1 VIEW    Clinical history:Female, 62 years, Dyspnea    Technique: Single view was obtained.    Comparison: 3/28/2019    Findings: The cardiac silhouette is normal. The pulmonary vasculature  is normal.    The lungs are hyperinflated however appear to be clear. Bony  structures are unremarkable.      Impression    Impression:   No acute abnormality. Persistent hyperinflation of the lungs without  evidence of pneumonia, CHF or other acute abnormality.    MONICA MARCIAL MD         SYSTEM ID:  [id number]   CT Chest Pulmonary Embolism w Contrast    Narrative    CT CHEST PULMONARY EMBOLISM W CONTRAST  11/27/2022 1:16 PM    CLINICAL HISTORY: Female, age 62 years,  SOB, Elevated D-dimer +  Influenza A;    Comparison:  No relevant prior imaging.    TECHNIQUE:  CT was performed of the chest  with IV contrast.   Axial; sagittal and coronal MIP images were reviewed..      FINDINGS:  Chest CT:    CTA chest: Excellent quality examination demonstrates no evidence of  acute abnormality. Small volume of noncalcified plaque is seen within  the proximal aspects of the left subclavian artery. There is no  evidence of pulmonary embolus. No evidence of aortic dissection.  Scattered atherosclerotic calcifications are seen within the aortic  arch and great vessels.    The heart demonstrates no acute abnormality.    An 11 x 14 mm mildly enlarged subcarinal node is slightly larger when  compared to prior examination. Mediastinal and hilar nodes are  otherwise normal in size and number.    Mild emphysematous changes again seen throughout both lungs. Nodular  focus of scarring along the posterior medial aspect of the right upper  lobe is again seen. There is no evidence of well-defined  pneumonia/consolidation throughout both lungs. Diaphragmatic  eventration/hernias are again seen.    Thyroid gland is unremarkable. Esophagus demonstrates no acute  abnormality. Small hiatal hernia is similar in appearance. Visualized  portions of the upper abdomen demonstrate no acute normality.      Bony structures: No evidence of an acute rib fracture. Mild  degenerative changes of the thoracic spine.      Impression    IMPRESSION:   Good quality CTA demonstrates no evidence of acute vascular  abnormality.    Mildly enlarged subcarinal node is increased slightly in size when  compared to prior examination and may be reactive in nature. No  evidence of pneumonia or other acute abnormality.    Nonacute findings as described above.    This facility minimizes radiation dose by adjusting the mA and/or kV  according to each patient size.      This CT scan was performed using one or more the following dose  reduction techniques:    -Automated exposure control,  -Adjustment of the mA and/or kV according to patient's size, and/or,  -Use of iterative reconstruction technique.    MONICA MARCIAL MD         SYSTEM ID:   [id number]       Discharge Medications   Current Discharge Medication List      START taking these medications    Details   oseltamivir (TAMIFLU) 75 MG capsule Take 1 capsule (75 mg) by mouth 2 times daily  Qty: 3 capsule, Refills: 0    Associated Diagnoses: Influenza due to influenza virus, type A, human; Acute respiratory failure with hypoxia (H); Panlobular emphysema (H)      predniSONE (DELTASONE) 20 MG tablet Take 1 tablet (20 mg) by mouth daily for 5 days, THEN 0.5 tablets (10 mg) daily for 5 days.  Qty: 3 tablet, Refills: 0    Associated Diagnoses: Influenza due to influenza virus, type A, human; Acute respiratory failure with hypoxia (H); Panlobular emphysema (H)         CONTINUE these medications which have NOT CHANGED    Details   albuterol (PROAIR HFA/PROVENTIL HFA/VENTOLIN HFA) 108 (90 Base) MCG/ACT inhaler INHALE 1 TO 2 PUFFS INTO THE LUNGS EVERY 4 HOURS AS NEEDED FOR SHORTNESS OF BREATH OR DIFFICULT BREATHING OR WHEEZING  Qty: 72 g, Refills: 3    Comments: Pharmacy may dispense brand covered by insurance (Proair, or proventil or ventolin or generic albuterol inhaler)  Associated Diagnoses: COPD, severe (H); Panlobular emphysema (H); Alpha-1-antitrypsin deficiency carrier      albuterol (PROVENTIL) (2.5 MG/3ML) 0.083% neb solution Take 1 vial (2.5 mg) by nebulization every 6 hours as needed for shortness of breath / dyspnea or wheezing  Qty: 120 mL, Refills: 11    Associated Diagnoses: COPD, severe (H)      docusate sodium (COLACE) 100 MG tablet Take 2 tablets (200 mg) by mouth 2 times daily -Adjust dose as needed to maintain soft stools  Qty: 120 tablet, Refills: 11    Associated Diagnoses: Intermittent constipation      fluticasone-salmeterol (ADVAIR DISKUS) 500-50 MCG/ACT inhaler Inhale 1 puff into the lungs every 12 hours Rinse mouth well after use to prevent Thrush  Qty: 180 each, Refills: 4    Associated Diagnoses: COPD, severe (H); Panlobular emphysema (H)      ipratropium - albuterol 0.5 mg/2.5  mg/3 mL (DUONEB) 0.5-2.5 (3) MG/3ML neb solution Take 1 vial (3 mLs) by nebulization every 6 hours as needed for shortness of breath / dyspnea or wheezing  Qty: 120 mL, Refills: 11    Associated Diagnoses: COPD, severe (H); Panlobular emphysema (H)      naproxen sodium (ANAPROX) 220 MG tablet Take 440 mg by mouth 2 times daily (with meals)      red yeast rice 600 MG CAPS Take 1 capsule (600 mg) by mouth daily  Qty: 90 capsule, Refills: 4    Associated Diagnoses: Hyperlipidemia LDL goal <100      tiotropium (SPIRIVA RESPIMAT) 2.5 MCG/ACT inhaler INHALE 2 PUFFS INTO THE LUNGS EVERY EVENING  Qty: 12 g, Refills: 4    Associated Diagnoses: COPD, severe (H); Panlobular emphysema (H); Alpha-1-antitrypsin deficiency carrier      vitamin D3 (CHOLECALCIFEROL) 250 mcg (59172 units) capsule Take 1 capsule by mouth daily      aspirin (ASA) 81 MG EC tablet Take 1 tablet (81 mg) by mouth once a week Or 2 x weekly    Associated Diagnoses: Atherosclerosis of abdominal aorta (H)      azithromycin (ZITHROMAX) 250 MG tablet Take 1 tablet (250 mg) by mouth Every Mon, Wed, Fri Morning for 90 days - for COPD  Qty: 42 tablet, Refills: 4    Associated Diagnoses: COPD exacerbation (H); COPD, severe (H); Panlobular emphysema (H); Alpha-1-antitrypsin deficiency carrier           Allergies   Allergies   Allergen Reactions     Penicillins Other (See Comments)     Other reaction(s): Respiratory Arrest     Atorvastatin Muscle Pain (Myalgia)     Morphine Other (See Comments)     Other reaction(s): Chest Pain     Levofloxacin Nausea and Vomiting     Rosuvastatin Nausea and Vomiting     Sucralfate Nausea and Vomiting     Sulfamethoxazole W/Trimethoprim Nausea and Vomiting     Yeast infection

## 2022-11-30 NOTE — PROGRESS NOTES
[facility] DISCHARGE NOTE    Patient discharged to home at 1:49 PM via wheel chair. Accompanied by spouse and staff. Discharge instructions reviewed with patient, opportunity offered to ask questions. Prescriptions sent to patients preferred pharmacy. All belongings sent with patient. Home oxygen sent with patient.     Dulce Singh RN   Ambulatory

## 2022-11-30 NOTE — PROGRESS NOTES
SAFETY CHECKLIST  ID Bands and Risk clasps correct and in place (DNR, Fall risk, Allergy, Latex, Limb):  Yes  All Lines Reconciled and labeled correctly: Yes  Whiteboard updated:Yes  Environmental interventions: Yes  Verify Tele #: KAITLIN Singh RN on 11/30/2022 at 7:27 AM

## 2022-11-30 NOTE — PROGRESS NOTES
:     It was mentioned that patient will need O2 when she goes home. Patient currently does not have any insurance. Called and let , Regina, know and asked her to reach out to patient.     NASEEM Hollingsworth on 11/30/2022 at 9:45 AM    Spoke with EDWARD Tapia, and she states that she has been working with patient and family to get them insurance for a couple of months. Will come down and talk with patient in her room.     NASEEM Hollingsworth on 11/30/2022 at 10:52 AM

## 2022-11-30 NOTE — PROGRESS NOTES
Patient has been assessed for Home Oxygen needs. Oxygen readings:    *Pulse oximetry (SpO2) = 85% on room air at rest while awake.    *SpO2 improved to 95% on 1liters/minute at rest.

## 2022-11-30 NOTE — PROGRESS NOTES
Pt receiving scheduled nebs with 1 PRN in the night. Pt remains on 1L NC. Pt states she is self ambulating in room. States she is feeling much better and ready to go home once she maintains on RA.

## 2022-12-01 ENCOUNTER — PATIENT OUTREACH (OUTPATIENT)
Dept: FAMILY MEDICINE | Facility: OTHER | Age: 62
End: 2022-12-01

## 2022-12-01 NOTE — TELEPHONE ENCOUNTER
Attempted to call the patient and left a detailed mom in regards to the previous message  Transitional Care Management Phone Call    Summary of hospitalization:  [facility] discharge summary reviewed    DISCHARGE DIAGNOSIS:   Acute respiratory failure due to influenza A    Acute on chronic COPD exacerbation    DATE OF DISCHARGE: 11/30/2022    Diagnostic Tests/Treatments reviewed.  Follow up needed:   Follow-up Appointments:   Follow up with primary care provider, Tim Boyd, within 7 days for   hospital follow- up.     Post Discharge Medication Reconciliation: discharge medications reconciled, continue medications without change.  Medications reviewed by: by myself    Problems taking medications regularly:  None  Problems adhering to non-medication therapy:  None    Other Healthcare Providers Involved in Patient s Care:         None    Update since discharge: stable.     Plan of care communicated with patient    Just a friendly reminder that you appointment is   Next 5 appointments (look out 90 days)    Dec 06, 2022  3:20 PM  Office Visit with Tim Boyd MD  [facility] ([facility] ) [address]   [place] [facility] [address]  [phone]      .  We encourage you to keep this appointment.  Please remember to bring all of your pills in their bottles (including any vitamins or over the counter pills) with you to your appointment.   The patient indicates understanding of these issues and agrees with the plan of care.   Yes    Was the patient contacted within the 2 business days or other approved timeframe?  Yes  Was the Medication reconciliation and management done since the patient was discharged? Yes    Gera Fitzgerald RN  ....................  12/1/2022   9:58 AM

## 2022-12-02 LAB
BACTERIA BLD CULT: NO GROWTH
BACTERIA BLD CULT: NO GROWTH

## 2022-12-06 ENCOUNTER — OFFICE VISIT (OUTPATIENT)
Dept: INTERNAL MEDICINE | Facility: OTHER | Age: 62
End: 2022-12-06
Attending: INTERNAL MEDICINE

## 2022-12-06 VITALS
TEMPERATURE: 98.3 F | SYSTOLIC BLOOD PRESSURE: 122 MMHG | BODY MASS INDEX: 17.08 KG/M2 | RESPIRATION RATE: 22 BRPM | HEART RATE: 80 BPM | WEIGHT: 92.8 LBS | OXYGEN SATURATION: 97 % | HEIGHT: 62 IN | DIASTOLIC BLOOD PRESSURE: 76 MMHG

## 2022-12-06 DIAGNOSIS — Z14.8 ALPHA-1-ANTITRYPSIN DEFICIENCY CARRIER: ICD-10-CM

## 2022-12-06 DIAGNOSIS — J96.01 ACUTE RESPIRATORY FAILURE WITH HYPOXIA (H): ICD-10-CM

## 2022-12-06 DIAGNOSIS — Z09 HOSPITAL DISCHARGE FOLLOW-UP: Primary | ICD-10-CM

## 2022-12-06 DIAGNOSIS — Z87.09 HISTORY OF INFLUENZA: ICD-10-CM

## 2022-12-06 DIAGNOSIS — E44.0 MODERATE PROTEIN-CALORIE MALNUTRITION (H): ICD-10-CM

## 2022-12-06 DIAGNOSIS — J43.1 PANLOBULAR EMPHYSEMA (H): ICD-10-CM

## 2022-12-06 DIAGNOSIS — J44.9 COPD, SEVERE (H): ICD-10-CM

## 2022-12-06 PROBLEM — E46 PROTEIN-CALORIE MALNUTRITION (H): Status: ACTIVE | Noted: 2022-12-06

## 2022-12-06 PROCEDURE — 99496 TRANSJ CARE MGMT HIGH F2F 7D: CPT | Performed by: INTERNAL MEDICINE

## 2022-12-06 RX ORDER — IPRATROPIUM BROMIDE AND ALBUTEROL SULFATE 2.5; .5 MG/3ML; MG/3ML
SOLUTION RESPIRATORY (INHALATION)
Qty: 120 ML | Refills: 11 | Status: SHIPPED | OUTPATIENT
Start: 2022-12-06 | End: 2022-12-06

## 2022-12-06 RX ORDER — IPRATROPIUM BROMIDE AND ALBUTEROL SULFATE 2.5; .5 MG/3ML; MG/3ML
SOLUTION RESPIRATORY (INHALATION)
Qty: 120 ML | Refills: 11 | Status: SHIPPED | OUTPATIENT
Start: 2022-12-06 | End: 2023-10-10

## 2022-12-06 ASSESSMENT — ENCOUNTER SYMPTOMS
ACTIVITY CHANGE: 1
CONFUSION: 0
DIARRHEA: 0
FATIGUE: 1
NAUSEA: 0
WOUND: 0
BRUISES/BLEEDS EASILY: 0
VOMITING: 0
AGITATION: 0
CHILLS: 0
FEVER: 0
LIGHT-HEADEDNESS: 0
ARTHRALGIAS: 0
COUGH: 1
UNEXPECTED WEIGHT CHANGE: 1
HEMATURIA: 0
NECK PAIN: 1
WHEEZING: 0
SHORTNESS OF BREATH: 1
DIZZINESS: 0
APPETITE CHANGE: 1
ABDOMINAL PAIN: 1
MYALGIAS: 0
DYSURIA: 0

## 2022-12-06 ASSESSMENT — ASTHMA QUESTIONNAIRES
QUESTION_5 LAST FOUR WEEKS HOW WOULD YOU RATE YOUR ASTHMA CONTROL: WELL CONTROLLED
QUESTION_3 LAST FOUR WEEKS HOW OFTEN DID YOUR ASTHMA SYMPTOMS (WHEEZING, COUGHING, SHORTNESS OF BREATH, CHEST TIGHTNESS OR PAIN) WAKE YOU UP AT NIGHT OR EARLIER THAN USUAL IN THE MORNING: TWO OR THREE NIGHTS A WEEK
QUESTION_4 LAST FOUR WEEKS HOW OFTEN HAVE YOU USED YOUR RESCUE INHALER OR NEBULIZER MEDICATION (SUCH AS ALBUTEROL): THREE OR MORE TIMES PER DAY
QUESTION_2 LAST FOUR WEEKS HOW OFTEN HAVE YOU HAD SHORTNESS OF BREATH: MORE THAN ONCE A DAY
QUESTION_1 LAST FOUR WEEKS HOW MUCH OF THE TIME DID YOUR ASTHMA KEEP YOU FROM GETTING AS MUCH DONE AT WORK, SCHOOL OR AT HOME: NONE OF THE TIME
ACT_TOTALSCORE: 13
ACT_TOTALSCORE: 13

## 2022-12-06 NOTE — TELEPHONE ENCOUNTER
"[id number] - [place], [place] - [address]     Requested Prescriptions   Pending Prescriptions Disp Refills     ipratropium - albuterol 0.5 mg/2.5 mg/3 mL (DUONEB) 0.5-2.5 (3) MG/3ML neb solution [Pharmacy Med Name: IPRATROPI/ALB 0.5/3MG INH SL 30X3ML] 90 mL      Sig: INHALE CONTENTS OF 1 VIAL BY NEBULIZATION EVERY 6 HOURS AS NEEDED FOR SHORTNESS OF BREATH, DYSPNEA OR WHEEZING       Short-Acting Beta Agonist Inhalers Protocol  Passed - 12/6/2022 10:58 AM        Passed - Patient is age 12 or older        Passed - Recent (12 mo) or future (30 days) visit within the authorizing provider's specialty     Patient has had an office visit with the authorizing provider or a provider within the authorizing providers department within the previous 12 mos or has a future within next 30 days. See \"Patient Info\" tab in inbasket, or \"Choose Columns\" in Meds & Orders section of the refill encounter.            Passed - Medication is active on med list       Asthma Nebs Protocol Passed - 12/6/2022 10:58 AM        Passed - Patient is age 4 years or older        Passed - Recent (12 mo) or future (30 days) visit within the authorizing provider's specialty     Patient has had an office visit with the authorizing provider or a provider within the authorizing providers department within the previous 12 mos or has a future within next 30 days. See \"Patient Info\" tab in inbasket, or \"Choose Columns\" in Meds & Orders section of the refill encounter.            Passed - Medication is active on med list             Last Prescription Date:   10/7/22  Last Fill Qty/Refills:         120, R-11    Last Office Visit:              3/22/21   Future Office visit:          None    Patient hasn't had an appointment since 3/22/21    Uma Thornton RN on 12/6/2022 at 12:07 PM        "

## 2022-12-06 NOTE — PROGRESS NOTES
Assessment & Plan   Ember Tavares is a 62 year old, presenting for the following health issues:      ICD-10-CM    1. Hospital discharge follow-up  Z09       2. Acute respiratory failure with hypoxia (H)  J96.01 Adult Pulmonary Medicine Referral      3. History of influenza  Z87.09       4. COPD, severe (H)  J44.9 ipratropium - albuterol 0.5 mg/2.5 mg/3 mL (DUONEB) 0.5-2.5 (3) MG/3ML neb solution      5. Alpha-1-antitrypsin deficiency carrier  Z14.8 Adult Pulmonary Medicine Referral      6. Panlobular emphysema (H)  J43.1 Adult Pulmonary Medicine Referral     ipratropium - albuterol 0.5 mg/2.5 mg/3 mL (DUONEB) 0.5-2.5 (3) MG/3ML neb solution      7. Moderate protein-calorie malnutrition (H)  E44.0         Patient presents for hospital discharge follow-up appointment.  Recently hospitalized with acute respiratory failure with hypoxia due to influenza A.  She has underlying severe COPD, alpha-1 antitrypsin deficiency carrier and panlobular emphysema.  She needs refills of her DuoNeb solution.  Recommend pulmonary medicine consultation.  She does not have a pulmonologist currently.  She does have oxygen at home.  She needed to use for a few days after her hospitalization but actually has not needed any oxygen in the last couple days.  Referral placed to pulmonary medicine.  DuoNeb refill sent to pharmacy.    Patient has persistent malnutrition, moderate severity.  Has been trying to work on increasing her protein calorie intake and has been using boost/Ensure supplements.    Encouraged weight Gain and regular exercise.     Return for Return as needed for follow-up with Dr. Boyd., Appointments: [phone].    Tim Boyd MD  [facility] AND \[facility] [place] [facility]\""     Subjective   F/U acute respitory failure ans influenza A      History of Present Illness       COPD:  She presents for follow up of COPD.  Overall, COPD symptoms are better since last visit. She has more than usual fatigue or shortness of breath with  exertion and same as usual shortness of breath at rest.  She often coughs and does not have change in sputum. No recent fever. She can walk the length of 3-5 rooms without stopping to rest. She can walk 1 flights of stairs without resting.The patient has had an ED, urgent care, or hospital admission because of COPD since the last visit.     She eats 2-3 servings of fruits and vegetables daily.She consumes 2 sweetened beverage(s) daily.She exercises with enough effort to increase her heart rate 9 or less minutes per day.  She exercises with enough effort to increase her heart rate 3 or less days per week.   She is taking medications regularly.         Hospital Follow-up Visit:    Hospital/Nursing Home/IP Rehab Facility: [facility]  Date of Admission: 01/27/2022  Date of Discharge: 01/30/2022  Reason(s) for Admission: Acute respiratory failure with hypoxia and Influenza A    Was your hospitalization related to COVID-19? No   Problems taking medications regularly:  None  Medication changes since discharge: None  Problems adhering to non-medication therapy:  None    Summary of hospitalization:  [facility] discharge summary reviewed  Diagnostic Tests/Treatments reviewed.  Follow up needed: none  Other Healthcare Providers Involved in Patient s Care:         Should have pulmonary provider as part of her health care - referral placed  Update since discharge: improved.     Plan of care communicated with patient     Review of Systems   Constitutional: Positive for activity change, appetite change, fatigue and unexpected weight change (Losing weight, trying to gain weight). Negative for chills and fever.   HENT: Negative for congestion and hearing loss.    Eyes: Negative for visual disturbance.   Respiratory: Positive for cough and shortness of breath (slowly improving since sick). Negative for wheezing.    Cardiovascular: Negative for chest pain.   Gastrointestinal: Positive for abdominal  "pain (occasional RUQ). Negative for diarrhea, nausea and vomiting.   Endocrine: Negative for cold intolerance and heat intolerance.   Genitourinary: Negative for dysuria and hematuria.   Musculoskeletal: Positive for neck pain. Negative for arthralgias and myalgias.   Skin: Negative for rash and wound.   Allergic/Immunologic: Negative for immunocompromised state.   Neurological: Negative for dizziness and light-headedness.   Hematological: Does not bruise/bleed easily.   Psychiatric/Behavioral: Negative for agitation and confusion.          Objective    /76 (BP Location: Right arm, Patient Position: Sitting, Cuff Size: Adult Regular)   Pulse 80   Temp 98.3  F (36.8  C) (Temporal)   Resp 22   Ht 1.575 m (5' 2\")   Wt 42.1 kg (92 lb 12.8 oz)   SpO2 97%   BMI 16.97 kg/m    Body mass index is 16.97 kg/m .  Physical Exam  Constitutional:       General: She is not in acute distress.     Appearance: She is well-developed. She is not diaphoretic.   HENT:      Head: Normocephalic and atraumatic.      Mouth/Throat:      Comments: Now with full dental implants  Eyes:      General: No scleral icterus.     Conjunctiva/sclera: Conjunctivae normal.   Neck:      Vascular: No carotid bruit.   Cardiovascular:      Rate and Rhythm: Normal rate and regular rhythm.      Pulses: Normal pulses.   Pulmonary:      Effort: Pulmonary effort is normal.      Breath sounds: Wheezing and rhonchi present.      Comments: Prolonged expiratory phase  Abdominal:      Palpations: Abdomen is soft.      Tenderness: There is no abdominal tenderness.   Musculoskeletal:         General: Tenderness and deformity (Arthritic changes of numerous finger joints.) present.      Cervical back: Neck supple.      Right lower leg: No edema.      Left lower leg: No edema.   Lymphadenopathy:      Cervical: No cervical adenopathy.   Skin:     General: Skin is warm and dry.      Findings: No rash.   Neurological:      Mental Status: She is alert and oriented " to person, place, and time. Mental status is at baseline.   Psychiatric:         Mood and Affect: Mood normal.         Behavior: Behavior normal.        Recent Labs   Lab Test 11/30/22  0615 11/29/22  0543 11/28/22  0532 11/27/22  1156 02/23/21  0508 10/22/20  1101 05/31/18  1451 08/24/17  1158   LDL  --   --   --   --   --  142*  --  136*   HDL  --   --   --   --   --  46  --  38   TRIG  --   --   --   --   --  153*  --  162*   ALT  --   --   --  25 20 21   < >  --    CR 0.58 0.54   < > 0.58 0.89 0.81   < >  --    GFRESTIMATED >90 >90   < > >90 65 73   < >  --    POTASSIUM 4.0 4.6  4.5   < > 4.7 4.3 4.5   < >  --    WBC 9.5 12.1*   < > 7.5 8.7 11.4*   < >  --    HGB 13.0 13.4   < > 15.9* 14.4 15.5   < >  --     200   < > 242 308 332   < >  --    ALBUMIN  --   --   --  4.3 4.1 4.5   < >  --     < > = values in this interval not displayed.     Creatinine is at baseline.  Potassium normal.  CBC normal.

## 2022-12-06 NOTE — NURSING NOTE
"Chief Complaint   Patient presents with     F/U acute respitory failure ans influenza A         Initial /76 (BP Location: Right arm, Patient Position: Sitting, Cuff Size: Adult Regular)   Pulse 80   Temp 98.3  F (36.8  C) (Temporal)   Resp 22   Ht 1.575 m (5' 2\")   Wt 42.1 kg (92 lb 12.8 oz)   SpO2 97%   BMI 16.97 kg/m   Estimated body mass index is 16.97 kg/m  as calculated from the following:    Height as of this encounter: 1.575 m (5' 2\").    Weight as of this encounter: 42.1 kg (92 lb 12.8 oz).       FOOD SECURITY SCREENING QUESTIONS:    The next two questions are to help us understand your food security.  If you are feeling you need any assistance in this area, we have resources available to support you today.    Hunger Vital Signs:  Within the past 12 months we worried whether our food would run out before we got money to buy more. Never  Within the past 12 months the food we bought just didn't last and we didn't have money to get more. Never    Advance Care Directive on file? no      Medication reconciliation complete.      Lasha Can,on 12/6/2022 at 3:35 PM        "

## 2022-12-06 NOTE — PATIENT INSTRUCTIONS
Glad you are getting better!    Pulmonary referral placed.     Medications refilled.     Consider lidocaine gel or Voltaren gel -- for the neck pain.

## 2023-01-06 ENCOUNTER — TELEPHONE (OUTPATIENT)
Dept: INTERNAL MEDICINE | Facility: OTHER | Age: 63
End: 2023-01-06

## 2023-04-25 ENCOUNTER — TELEPHONE (OUTPATIENT)
Dept: INTERNAL MEDICINE | Facility: OTHER | Age: 63
End: 2023-04-25
Payer: COMMERCIAL

## 2023-04-28 ENCOUNTER — TELEPHONE (OUTPATIENT)
Dept: INTERNAL MEDICINE | Facility: OTHER | Age: 63
End: 2023-04-28
Payer: COMMERCIAL

## 2023-04-28 DIAGNOSIS — J43.1 PANLOBULAR EMPHYSEMA (H): ICD-10-CM

## 2023-04-28 DIAGNOSIS — J44.9 COPD, SEVERE (H): ICD-10-CM

## 2023-04-28 NOTE — TELEPHONE ENCOUNTER
Please call patient and verify.    Would she be willing to use [facility] pharmacy.    They can substitute whichever inhalers are cheaper.    Otherwise does she want to use mail order pharmacy?  [facility]?    Electronically signed by:  Tim Boyd MD  on April 28, 2023 12:08 PM

## 2023-04-28 NOTE — TELEPHONE ENCOUNTER
"Called and spoke to Patient after verifying last name and date of birth. Pt states, \"I called [facility]. They needed a formulary prescription exception. I took care of it. I just had to tell them who I doctor with, who prescribes the medication and why I need it, to get a reduction in cost. I am waiting on a call from another person, but don't need anything further from my doctor.\" Writer will close encounter at this time. Cristina Coulter RN .............. 4/28/2023  2:25 PM  "

## 2023-04-28 NOTE — TELEPHONE ENCOUNTER
Patient called and she verified her last name and . She states her new insurance will not cover the following, without a formulary exception:    fluticasone-salmeterol (ADVAIR DISKUS) 500-50 MCG/ACT inhaler 180 each 4 10/7/2022  --   Sig - Route: Inhale 1 puff into the lungs every 12 hours Rinse mouth well after use to prevent Thrush - Inhalation   Class: Local Print     tiotropium (SPIRIVA RESPIMAT) 2.5 MCG/ACT inhaler 12 g 4 10/7/2022  No   Sig: INHALE 2 PUFFS INTO THE LUNGS EVERY EVENING   Class: Local Print     [facility] - [place], [place] - [address]    Please call [facility] at [phone]. They told her this should result in the Advair costing $94/month (30ds) and Spiriva costing $125/month (30 ds). Ok to wait for Dr. Boyd to return to clinic next Tuesday. Cristina Coulter RN .............. 2023  11:11 AM

## 2023-04-28 NOTE — TELEPHONE ENCOUNTER
"Called and spoke to Patient after verifying last name and date of birth. Pt states, \"Why don't we leave things the way they are for now. I will call [facility] to see what they say, and then call you (writer) back later today, or Monday.\"    Cristina Coulter RN .............. 4/28/2023  1:34 PM  "

## 2023-04-28 NOTE — TELEPHONE ENCOUNTER
Prescriptions are currently at [facility].     Cristina Coulter RN .............. 4/28/2023  12:13 PM

## 2023-04-28 NOTE — TELEPHONE ENCOUNTER
Does she want to use mail order pharmacy?  [facility]?    Please prep the Rx and pharmacy if desired to change pharmacies.     Electronically signed by:  Tim Boyd MD  on April 28, 2023 12:19 PM

## 2023-07-12 DIAGNOSIS — J43.1 PANLOBULAR EMPHYSEMA (H): ICD-10-CM

## 2023-07-12 DIAGNOSIS — Z14.8 ALPHA-1-ANTITRYPSIN DEFICIENCY CARRIER: ICD-10-CM

## 2023-07-12 DIAGNOSIS — J44.9 COPD, SEVERE (H): ICD-10-CM

## 2023-07-12 RX ORDER — ALBUTEROL SULFATE 90 UG/1
AEROSOL, METERED RESPIRATORY (INHALATION)
Qty: 72 G | Refills: 3 | Status: SHIPPED | OUTPATIENT
Start: 2023-07-12 | End: 2023-10-10

## 2023-07-24 ENCOUNTER — OFFICE VISIT (OUTPATIENT)
Dept: FAMILY MEDICINE | Facility: OTHER | Age: 63
End: 2023-07-24
Payer: COMMERCIAL

## 2023-07-24 VITALS
OXYGEN SATURATION: 100 % | TEMPERATURE: 98.3 F | SYSTOLIC BLOOD PRESSURE: 100 MMHG | BODY MASS INDEX: 16.69 KG/M2 | HEART RATE: 82 BPM | HEIGHT: 62 IN | WEIGHT: 90.7 LBS | DIASTOLIC BLOOD PRESSURE: 72 MMHG | RESPIRATION RATE: 24 BRPM

## 2023-07-24 DIAGNOSIS — N30.01 ACUTE CYSTITIS WITH HEMATURIA: Primary | ICD-10-CM

## 2023-07-24 DIAGNOSIS — J44.1 COPD EXACERBATION (H): ICD-10-CM

## 2023-07-24 LAB
ALBUMIN UR-MCNC: NEGATIVE MG/DL
APPEARANCE UR: CLEAR
BILIRUB UR QL STRIP: NEGATIVE
COLOR UR AUTO: ABNORMAL
GLUCOSE UR STRIP-MCNC: NEGATIVE MG/DL
HGB UR QL STRIP: NEGATIVE
KETONES UR STRIP-MCNC: NEGATIVE MG/DL
LEUKOCYTE ESTERASE UR QL STRIP: ABNORMAL
MUCOUS THREADS #/AREA URNS LPF: PRESENT /LPF
NITRATE UR QL: NEGATIVE
PH UR STRIP: 7.5 [PH] (ref 5–9)
RBC URINE: 3 /HPF
SP GR UR STRIP: 1.02 (ref 1–1.03)
SQUAMOUS EPITHELIAL: 8 /HPF
UROBILINOGEN UR STRIP-MCNC: NORMAL MG/DL
WBC URINE: 4 /HPF

## 2023-07-24 PROCEDURE — 87086 URINE CULTURE/COLONY COUNT: CPT | Mod: ZL

## 2023-07-24 PROCEDURE — 81003 URINALYSIS AUTO W/O SCOPE: CPT | Mod: ZL

## 2023-07-24 PROCEDURE — 99214 OFFICE O/P EST MOD 30 MIN: CPT

## 2023-07-24 RX ORDER — AZITHROMYCIN 250 MG/1
250 TABLET, FILM COATED ORAL EVERY OTHER DAY
COMMUNITY
Start: 2023-07-17 | End: 2023-10-10

## 2023-07-24 RX ORDER — NITROFURANTOIN 25; 75 MG/1; MG/1
100 CAPSULE ORAL 2 TIMES DAILY
Qty: 14 CAPSULE | Refills: 0 | Status: SHIPPED | OUTPATIENT
Start: 2023-07-24 | End: 2023-07-31

## 2023-07-24 RX ORDER — PREDNISONE 20 MG/1
40 TABLET ORAL DAILY
Qty: 10 TABLET | Refills: 0 | Status: SHIPPED | OUTPATIENT
Start: 2023-07-24 | End: 2023-12-26

## 2023-07-24 ASSESSMENT — PAIN SCALES - GENERAL: PAINLEVEL: MILD PAIN (2)

## 2023-07-24 NOTE — PROGRESS NOTES
"ASSESSMENT/PLAN:    (N30.01) Acute cystitis with hematuria  (primary encounter diagnosis)  Comment: Patient presents with a 4-day history of suprapubic pain and pressure with history of hematuria.  She denies any fevers or chills.  On exam she is afebrile and does not have any CVA tenderness.  She does have a large amount of leukocyte Estrace on urinalysis.  Urine culture is pending.  We will start her on empiric therapy at this time.  She has significant reaction to penicillins, she also is allergic to fluoroquinolones and sulfa.  As this does not appear to be concerning for pyelonephritis and she is under 65 we will treat with Macrobid.  Plan: UA Macroscopic with reflex to Microscopic and         Culture, Urine Culture, nitroFURantoin         macrocrystal-monohydrate (MACROBID) 100 MG         capsule  Vitals stable. PE available for review below. UA results: Below. Based off UA results, patient does meet criteria for antibiotic therapy. I did discuss with patient that if a urine culture was performed, that we will inform patient if a change to their treatment plan needs to occur based off culture results - with urine cultures typically returning in 1-3 days. In the meantime, recommend, alternating tylenol and ibuprofen every 4-6 hours as needed, warm heating pad, pushing fluids/hydration, cranberry juice/pills, urinating when urge arises. May also try over the counter remedies such as Azo (as long as pyridium not already prescribed). Patient aware that if they do take Azo, that this medication can change color of urine to an \"orange\" color. If fevers, chills, flank pain/back pain, inability to urinate/struggle to urinate, signs of dehydration or other worrisome signs occur, patient agreeable to follow up for reevaluation. Patient is in agreement and understanding of the above treatment plan. All questions and concerns were addressed and answered to patient's satisfaction. AVS reviewed with patient.       (J44.1) " COPD exacerbation (H)  Comment: Patient with concerns about COPD exacerbation.   She is having increased sputum production, sputum purulence. She is using her albuterol quite a bit. She is on Advair, Spiriva and albuterol.  On exam vital signs are stable. Bilateral breath sounds with wheezing throughout lung fields.  No crackles or rales appreciated.  She is currently completing a Z-Ciaran for sinusitis.  I recommend we add prednisone to his course for a COPD exacerbation.  Plan: predniSONE (DELTASONE) 20 MG tablet  For your COPD exacerbation take prednisone daily x 5 days. Take with food. Take earlier in the day.     Follow up if symptoms persist or worsen or concerns    I have reviewed the nursing notes.  I have reviewed the findings, diagnosis, plan and need for follow up with the patient.    I explained my diagnostic considerations and recommendations to the patient, who voiced understanding and agreement with the treatment plan. All questions were answered. We discussed potential side effects of any prescribed or recommended therapies, as well as expectations for response to treatments.    АЛЕКСАНДР BUENO CNP  7/24/2023  12:42 PM    HPI:    Ember Tavares is a 62 year old female  who presents to [facility] today for concerns of UTI.    possible UTI, x 4 days    Symptoms: suprapubic pain and pressure  YES: she has had  hematuria/blood in urine  Last Urination: Today  No not able to completely void/empty  No fevers, chills, nausea, vomiting  No change in bowel habits (diarrhea, constipation, etc.)  YES: small amount of discharge  vaginal/penile symptoms (discharge, pain, itching, sores, etc.)  No exposures to STIs/STDs    YES: she has a  history of UTIs years ago      PCP: Deb    Patient with concerns about COPD exacerbation.   She is having increased sputum production, sputum purulence. She is using her albuterol quite a bit. She is on Advair, Spiriva and albuterol.      Past Medical History:    Diagnosis Date    Alpha-1-antitrypsin deficiency carrier 8/23/2017    Atherosclerosis of abdominal aorta (H) 8/24/2017    Overview:  [facility] Studies: 2/19/2013 -- CTA ABDOMEN AND PELVIS WITH BILATERAL LOWER EXTREMITY RUNOFF  CLINICAL HISTORY: Iliac and mesenteric ischemia.  TECHNIQUE: 125 mL Omnipaque 300 IV contrast was administered. 3-D arterial reformations were performed.  FINDINGS: There is a well-defined ovoid density at the medial right costophrenic angle. This measures 2.3 x 0.9 x 0.6 cm. It demonstra    Chronic abdominal pain 3/10/2010    Overview:  16 year duration    Chronic sinusitis     No Comments Provided    Closed fracture of skull (H)     1972    COPD, severe (H) 8/24/2017    Overview:  8/4/2014 -- PULMONARY FUNCTION  SITE:  [facility] ORDERED BY:  VANNESA LUNA  CLINICAL SUMMARY: 53-year-old female with a history of severe COPD.   TECHNICIAN NOTE: Good effort.   DATA: FVC 1.85 (61%). FEV1 1.04 (45%). FEV1/FVC ratio 56%. DLCO 13.5 to 62%.   Flow volume curve is consistent with airway obstruction.   IMPRESSION: 1. 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 Rainer Allen, [facility]    BIOPSY BREAST Bilateral     1999, 2001, BIOPSY BREAST,benign    CHOLECYSTECTOMY  2004    Laparoscopic    COLONOSCOPY  06/01/2016    x's 3 negative    COLONOSCOPY N/A 04/29/2021    hyperplastic follow up 10 years, 4/29/2031    CT  CORONARY ANGIOGRAM  2009    CT CORONARY ANGIOGRAM (IA),negative    ESOPHAGOSCOPY, GASTROSCOPY, DUODENOSCOPY (EGD), COMBINED  04/07/2009    Dr. Alejandro booth    ESOPHAGOSCOPY, GASTROSCOPY, DUODENOSCOPY (EGD), COMBINED N/A 05/30/2019    Pickering's esophagus, 3 year follow up    HYSTERECTOMY VAGINAL  1994    ovaries remain    LAPAROSCOPIC NISSEN FUNDOPLICATION N/A 03/26/2018    Procedure: LAPAROSCOPIC NISSEN FUNDOPLICATION;  Laparoscopic Nissen Fundoplication;  Surgeon: Jagdish Ruiz MD;  Location: [facility] OR    LAPAROTOMY EXPLORATORY  1990    lysis of adhesions/endometriosis    RECTOCELE REPAIR  07/29/2009     Social History     Tobacco Use    Smoking status: Former     Packs/day: 0.50     Years: 32.00     Pack years: 16.00     Types: Cigarettes, Cigars     Quit date: 8/18/2012     Years since quitting: 10.9    Smokeless tobacco: Never   Substance Use Topics    Alcohol use: Yes     Alcohol/week: 2.0 standard drinks of alcohol     Comment: Alcoholic Drinks/day: 1-2 drinks every 1-2 weeks     Current Outpatient Medications   Medication Sig Dispense Refill    albuterol (PROAIR HFA/PROVENTIL HFA/VENTOLIN HFA) 108 (90 Base) MCG/ACT inhaler INHALE 1 TO 2 PUFFS INTO THE LUNGS EVERY 4 HOURS AS NEEDED FOR SHORTNESS OF BREATH OR DIFFICULTY BREATHING 72 g 3    albuterol (PROVENTIL) (2.5 MG/3ML) 0.083% neb solution Take 1 vial (2.5 mg) by nebulization every 6 hours as needed for shortness of breath / dyspnea or wheezing 120 mL 11    aspirin (ASA) 81 MG EC tablet Take 1 tablet (81 mg) by mouth once a week Or 2 x weekly      azithromycin (ZITHROMAX) 250 MG tablet Take 250 mg by mouth daily      docusate sodium (COLACE) 100 MG tablet Take 2 tablets (200 mg) by mouth 2 times daily -Adjust dose as needed to maintain soft stools 120 tablet 11    fluticasone-salmeterol (ADVAIR DISKUS) 500-50 MCG/ACT inhaler Inhale 1 puff into the lungs every 12 hours Rinse mouth well after use to prevent Thrush 180 each 4    ipratropium - albuterol 0.5  "mg/2.5 mg/3 mL (DUONEB) 0.5-2.5 (3) MG/3ML neb solution INHALE CONTENTS OF 1 VIAL BY NEBULIZATION EVERY 6 HOURS AS NEEDED FOR SHORTNESS OF BREATH, DYSPNEA OR WHEEZING Strength: 0.5-2.5 (3) MG/3ML 120 mL 11    red yeast rice 600 MG CAPS Take 1 capsule (600 mg) by mouth daily 90 capsule 4    tiotropium (SPIRIVA RESPIMAT) 2.5 MCG/ACT inhaler INHALE 2 PUFFS INTO THE LUNGS EVERY EVENING 12 g 4    vitamin D3 (CHOLECALCIFEROL) 250 mcg (24578 units) capsule Take 1 capsule by mouth daily      naproxen sodium (ANAPROX) 220 MG tablet Take 440 mg by mouth 2 times daily (with meals) (Patient not taking: Reported on 7/24/2023)       Allergies   Allergen Reactions    Penicillins Other (See Comments)     Other reaction(s): Respiratory Arrest    Atorvastatin Muscle Pain (Myalgia)    Morphine Other (See Comments)     Other reaction(s): Chest Pain    Levofloxacin Nausea and Vomiting    Rosuvastatin Nausea and Vomiting    Sucralfate Nausea and Vomiting    Sulfamethoxazole-Trimethoprim Nausea and Vomiting     Yeast infection     Past medical history, past surgical history, current medications and allergies reviewed and accurate to the best of my knowledge.      ROS:  Refer to HPI    /72 (BP Location: Right arm, Patient Position: Sitting, Cuff Size: Child)   Pulse 82   Temp 98.3  F (36.8  C) (Tympanic)   Resp 24   Ht 1.575 m (5' 2\")   Wt 41.1 kg (90 lb 11.2 oz)   SpO2 100%   BMI 16.59 kg/m      EXAM:  General Appearance: Well appearing 62 year old female, appropriate appearance for age. No acute distress   Ears: Left TM intact, translucent with bony landmarks appreciated, no erythema, no effusion, no bulging, no purulence.  Right TM intact, translucent with bony landmarks appreciated, no erythema, no effusion, no bulging, no purulence.  Left auditory canal clear.  Right auditory canal clear.  Normal external ears, non tender.  Eyes: conjunctivae normal without erythema or irritation, corneas clear, no drainage or crusting, " no eyelid swelling, pupils equal   Oropharynx: moist mucous membranes, posterior pharynx without erythema, no exudates or petechiae, no post nasal drip seen, no trismus, voice clear.    Sinuses:  No sinus tenderness upon palpation of the frontal or maxillary sinuses  Nose:  Bilateral nares: no erythema, no edema, no drainage or congestion   Neck: supple without adenopathy  Respiratory: normal chest wall and respirations.  Normal effort.  Bilateral breath sounds with wheezing throughout lung fields.  No crackles or rales appreciated.  No increased work of breathing.  Cough appreciated.  Cardiac: RRR with no murmurs  Abdomen: soft, nontender, no rigidity, no rebound tenderness or guarding, normal bowel sounds present  :  Mild suprapubic tenderness to palpation.  Absent CVA tenderness to palpation.    Musculoskeletal:  Equal movement of bilateral upper extremities.  Equal movement of bilateral lower extremities.  Normal gait.    Dermatological: no rashes noted of exposed skin  Neuro: Alert and oriented to person, place, and time.  Cranial nerves II-XII grossly intact with no focal or lateralizing deficits.  Muscle tone normal.  Gait normal. No tremor.   Psychological: normal affect, alert, oriented, and pleasant.     Labs:  Results for orders placed or performed in visit on 07/24/23   UA Macroscopic with reflex to Microscopic and Culture     Status: Abnormal    Specimen: Urine, Clean Catch   Result Value Ref Range    Color Urine Light Yellow Colorless, Straw, Light Yellow, Yellow    Appearance Urine Clear Clear    Glucose Urine Negative Negative mg/dL    Bilirubin Urine Negative Negative    Ketones Urine Negative Negative mg/dL    Specific Gravity Urine 1.016 1.000 - 1.030    Blood Urine Negative Negative    pH Urine 7.5 5.0 - 9.0    Protein Albumin Urine Negative Negative mg/dL    Urobilinogen Urine Normal Normal, 2.0 mg/dL    Nitrite Urine Negative Negative    Leukocyte Esterase Urine Large (A) Negative    Mucus  Urine Present (A) None Seen /LPF    RBC Urine 3 (H) <=2 /HPF    WBC Urine 4 <=5 /HPF    Squamous Epithelials Urine 8 (H) <=1 /HPF    Narrative    Urine Culture ordered based on laboratory criteria

## 2023-07-24 NOTE — PATIENT INSTRUCTIONS
For your COPD exacerbation take prednisone daily x 5 days. Take with food. Take earlier in the day.     For UTI Take antibiotic as ordered. Take with food. Take daily probiotic while on this medication. If you develop a fever, chills, or worsening symptoms follow up is recommended.

## 2023-07-24 NOTE — NURSING NOTE
"Pt presents to  for UTI x2 wks. Pt states she is going more frequently, low abd and back pain, pressure in urethra, and blood in urine. Pt has had fever on/off since start of symptoms.    Chief Complaint   Patient presents with    UTI       FOOD SECURITY SCREENING QUESTIONS  Hunger Vital Signs:  Within the past 12 months we worried whether our food would run out before we got money to buy more. Never  Within the past 12 months the food we bought just didn't last and we didn't have money to get more. Never  Cristina Dearmon 7/24/2023 12:09 PM      Initial /72 (BP Location: Right arm, Patient Position: Sitting, Cuff Size: Child)   Pulse 82   Temp 98.3  F (36.8  C) (Tympanic)   Resp 24   Ht 1.575 m (5' 2\")   Wt 41.1 kg (90 lb 11.2 oz)   SpO2 100%   BMI 16.59 kg/m   Estimated body mass index is 16.59 kg/m  as calculated from the following:    Height as of this encounter: 1.575 m (5' 2\").    Weight as of this encounter: 41.1 kg (90 lb 11.2 oz).  Medication Reconciliation: complete    Cristina Dearmon  "

## 2023-07-26 LAB — BACTERIA UR CULT: NORMAL

## 2023-07-30 ENCOUNTER — HEALTH MAINTENANCE LETTER (OUTPATIENT)
Age: 63
End: 2023-07-30

## 2023-09-21 NOTE — ANESTHESIA POSTPROCEDURE EVALUATION
Patient: Ember MUNGUIA Anusha    Procedure(s):  Laparoscopic Nissen Fundoplication - Wound Class: II-Clean Contaminated    Diagnosis:GERD  Diagnosis Additional Information: No value filed.    Anesthesia Type:  General    Note:  Anesthesia Post Evaluation    Patient location during evaluation: PACU  Patient participation: Able to fully participate in evaluation  Level of consciousness: sleepy but conscious  Pain management: adequate  Airway patency: patent  Cardiovascular status: acceptable  Respiratory status: acceptable  Hydration status: acceptable  PONV: none             Last vitals:  Vitals:    03/26/18 1345 03/26/18 1350 03/26/18 1355   BP: 109/75 114/85 116/79   Resp: 16 20    Temp: 97  F (36.1  C)  97  F (36.1  C)   SpO2: 90% 96% 97%         Electronically Signed By: АЛЕКСАНДР COHEN CRNA  March 26, 2018  2:06 PM  
Her/She

## 2023-10-10 ENCOUNTER — OFFICE VISIT (OUTPATIENT)
Dept: INTERNAL MEDICINE | Facility: OTHER | Age: 63
End: 2023-10-10
Attending: INTERNAL MEDICINE
Payer: COMMERCIAL

## 2023-10-10 VITALS
SYSTOLIC BLOOD PRESSURE: 102 MMHG | TEMPERATURE: 98.2 F | HEART RATE: 106 BPM | RESPIRATION RATE: 16 BRPM | OXYGEN SATURATION: 96 % | DIASTOLIC BLOOD PRESSURE: 58 MMHG | WEIGHT: 92 LBS | HEIGHT: 63 IN | BODY MASS INDEX: 16.3 KG/M2

## 2023-10-10 DIAGNOSIS — Z14.8 ALPHA-1-ANTITRYPSIN DEFICIENCY CARRIER: ICD-10-CM

## 2023-10-10 DIAGNOSIS — Z23 NEED FOR 23-POLYVALENT PNEUMOCOCCAL POLYSACCHARIDE VACCINE: ICD-10-CM

## 2023-10-10 DIAGNOSIS — M54.81 OCCIPITAL NEURALGIA OF LEFT SIDE: ICD-10-CM

## 2023-10-10 DIAGNOSIS — L40.50 PSORIATIC ARTHRITIS (H): ICD-10-CM

## 2023-10-10 DIAGNOSIS — Z98.890 STATUS POST BALLOON DILATATION OF ESOPHAGEAL STRICTURE: ICD-10-CM

## 2023-10-10 DIAGNOSIS — E53.8 VITAMIN B12 DEFICIENCY: ICD-10-CM

## 2023-10-10 DIAGNOSIS — E55.9 VITAMIN D DEFICIENCY: ICD-10-CM

## 2023-10-10 DIAGNOSIS — E78.2 MIXED HYPERLIPIDEMIA: ICD-10-CM

## 2023-10-10 DIAGNOSIS — J43.1 PANLOBULAR EMPHYSEMA (H): ICD-10-CM

## 2023-10-10 DIAGNOSIS — Z98.890 S/P NISSEN FUNDOPLICATION (WITHOUT GASTROSTOMY TUBE) PROCEDURE: ICD-10-CM

## 2023-10-10 DIAGNOSIS — R73.03 PREDIABETES: ICD-10-CM

## 2023-10-10 DIAGNOSIS — Z71.85 VACCINE COUNSELING: ICD-10-CM

## 2023-10-10 DIAGNOSIS — R13.10 DIFFICULTY SWALLOWING SOLIDS: ICD-10-CM

## 2023-10-10 DIAGNOSIS — Z00.00 ANNUAL PHYSICAL EXAM: ICD-10-CM

## 2023-10-10 DIAGNOSIS — R26.89 DECREASED FUNCTIONAL MOBILITY: ICD-10-CM

## 2023-10-10 DIAGNOSIS — J44.9 COPD, SEVERE (H): Primary | ICD-10-CM

## 2023-10-10 DIAGNOSIS — Z12.31 VISIT FOR SCREENING MAMMOGRAM: ICD-10-CM

## 2023-10-10 DIAGNOSIS — J44.9 COPD, SEVERE (H): ICD-10-CM

## 2023-10-10 PROBLEM — J10.1 INFLUENZA DUE TO INFLUENZA VIRUS, TYPE A, HUMAN: Status: RESOLVED | Noted: 2022-11-27 | Resolved: 2023-10-10

## 2023-10-10 PROCEDURE — 90732 PPSV23 VACC 2 YRS+ SUBQ/IM: CPT | Performed by: INTERNAL MEDICINE

## 2023-10-10 PROCEDURE — 90471 IMMUNIZATION ADMIN: CPT | Performed by: INTERNAL MEDICINE

## 2023-10-10 PROCEDURE — 99396 PREV VISIT EST AGE 40-64: CPT | Mod: 25 | Performed by: INTERNAL MEDICINE

## 2023-10-10 PROCEDURE — 99214 OFFICE O/P EST MOD 30 MIN: CPT | Mod: 25 | Performed by: INTERNAL MEDICINE

## 2023-10-10 RX ORDER — AZITHROMYCIN 250 MG/1
250 TABLET, FILM COATED ORAL DAILY
Qty: 90 TABLET | Refills: 4 | Status: SHIPPED | OUTPATIENT
Start: 2023-10-10 | End: 2023-11-21

## 2023-10-10 RX ORDER — TIOTROPIUM BROMIDE INHALATION SPRAY 3.12 UG/1
SPRAY, METERED RESPIRATORY (INHALATION)
Qty: 12 G | Refills: 4 | Status: SHIPPED | OUTPATIENT
Start: 2023-10-10

## 2023-10-10 RX ORDER — MOMETASONE FUROATE AND FORMOTEROL FUMARATE DIHYDRATE 200; 5 UG/1; UG/1
2 AEROSOL RESPIRATORY (INHALATION) 2 TIMES DAILY
Qty: 39 G | Refills: 4 | Status: SHIPPED | OUTPATIENT
Start: 2023-10-10

## 2023-10-10 RX ORDER — IPRATROPIUM BROMIDE AND ALBUTEROL SULFATE 2.5; .5 MG/3ML; MG/3ML
SOLUTION RESPIRATORY (INHALATION)
Qty: 300 ML | Refills: 11 | Status: SHIPPED | OUTPATIENT
Start: 2023-10-10

## 2023-10-10 RX ORDER — INHALER, ASSIST DEVICES
SPACER (EA) MISCELLANEOUS
Qty: 1 EACH | Refills: 1 | Status: SHIPPED | OUTPATIENT
Start: 2023-10-10

## 2023-10-10 RX ORDER — ALBUTEROL SULFATE 0.83 MG/ML
2.5 SOLUTION RESPIRATORY (INHALATION) EVERY 6 HOURS PRN
Qty: 300 ML | Refills: 11 | Status: SHIPPED | OUTPATIENT
Start: 2023-10-10

## 2023-10-10 RX ORDER — ALBUTEROL SULFATE 90 UG/1
AEROSOL, METERED RESPIRATORY (INHALATION)
Qty: 72 G | Refills: 3 | Status: SHIPPED | OUTPATIENT
Start: 2023-10-10 | End: 2024-05-03

## 2023-10-10 ASSESSMENT — ENCOUNTER SYMPTOMS
HEARTBURN: 0
NERVOUS/ANXIOUS: 0
HEMATOCHEZIA: 0
PALPITATIONS: 0
PARESTHESIAS: 0
CONSTIPATION: 0
FEVER: 0
EYE PAIN: 0
HEADACHES: 1
NAUSEA: 0
SHORTNESS OF BREATH: 1
MYALGIAS: 0
ABDOMINAL PAIN: 0
DYSURIA: 0
HEMATURIA: 0
ARTHRALGIAS: 1
CHILLS: 0
DIZZINESS: 0
FREQUENCY: 1
BRUISES/BLEEDS EASILY: 0
DIARRHEA: 0
JOINT SWELLING: 0
COUGH: 0
WEAKNESS: 0
SORE THROAT: 0

## 2023-10-10 ASSESSMENT — PAIN SCALES - GENERAL: PAINLEVEL: MODERATE PAIN (4)

## 2023-10-10 NOTE — NURSING NOTE
"Chief Complaint   Patient presents with    Physical         Initial /58 (BP Location: Right arm, Patient Position: Sitting, Cuff Size: Adult Regular)   Pulse 106   Temp 98.2  F (36.8  C) (Temporal)   Resp 16   Ht 1.594 m (5' 2.75\")   Wt 41.7 kg (92 lb)   LMP 01/01/1996 (Approximate)   SpO2 96%   Breastfeeding No   BMI 16.43 kg/m   Estimated body mass index is 16.43 kg/m  as calculated from the following:    Height as of this encounter: 1.594 m (5' 2.75\").    Weight as of this encounter: 41.7 kg (92 lb).       FOOD SECURITY SCREENING QUESTIONS:    The next two questions are to help us understand your food security.  If you are feeling you need any assistance in this area, we have resources available to support you today.    Hunger Vital Signs:  Within the past 12 months we worried whether our food would run out before we got money to buy more. Never  Within the past 12 months the food we bought just didn't last and we didn't have money to get more. Never    Advance Care Directive on file? yes      Medication reconciliation complete.      Lasha Mckenna,on 10/10/2023 at 3:13 PM          "

## 2023-10-10 NOTE — PROGRESS NOTES
Assessment & Plan     ICD-10-CM    1. COPD, severe (H)  J44.9 ipratropium - albuterol 0.5 mg/2.5 mg/3 mL (DUONEB) 0.5-2.5 (3) MG/3ML neb solution     albuterol (PROAIR HFA/PROVENTIL HFA/VENTOLIN HFA) 108 (90 Base) MCG/ACT inhaler     albuterol (PROVENTIL) (2.5 MG/3ML) 0.083% neb solution     azithromycin (ZITHROMAX) 250 MG tablet     tiotropium (SPIRIVA RESPIMAT) 2.5 MCG/ACT inhaler     spacer (OPTICHAMBER SANJUANA) holding chamber     mometasone-formoterol (DULERA) 200-5 MCG/ACT inhaler      2. Alpha-1-antitrypsin deficiency carrier  Z14.8 albuterol (PROAIR HFA/PROVENTIL HFA/VENTOLIN HFA) 108 (90 Base) MCG/ACT inhaler     azithromycin (ZITHROMAX) 250 MG tablet     tiotropium (SPIRIVA RESPIMAT) 2.5 MCG/ACT inhaler     spacer (OPTICHAMBER SANJUANA) holding chamber     mometasone-formoterol (DULERA) 200-5 MCG/ACT inhaler      3. Panlobular emphysema (H)  J43.1 ipratropium - albuterol 0.5 mg/2.5 mg/3 mL (DUONEB) 0.5-2.5 (3) MG/3ML neb solution     albuterol (PROAIR HFA/PROVENTIL HFA/VENTOLIN HFA) 108 (90 Base) MCG/ACT inhaler     azithromycin (ZITHROMAX) 250 MG tablet     tiotropium (SPIRIVA RESPIMAT) 2.5 MCG/ACT inhaler     spacer (OPTICHAMBER SANJUANA) holding chamber     mometasone-formoterol (DULERA) 200-5 MCG/ACT inhaler      4. Mixed hyperlipidemia  E78.2       5. Prediabetes  R73.03       6. Occipital neuralgia of left side  M54.81       7. Psoriatic arthritis (H)  L40.50       8. S/P Nissen fundoplication (without gastrostomy tube) procedure  Z98.890 Adult GI  Referral - Procedure Only      9. Vitamin B12 deficiency  E53.8       10. Vitamin D deficiency  E55.9       11. Status post balloon dilatation of esophageal stricture  Z98.890 Adult GI  Referral - Procedure Only      12. Difficulty swallowing solids  R13.10 Adult GI  Referral - Procedure Only      13. Decreased functional mobility  R26.89       14. Vaccine counseling  Z71.85       15. Annual physical exam  Z00.00       16. Visit  for screening mammogram  Z12.31 MA Screening Bilateral w/ [facility]      17. Need for 23-polyvalent pneumococcal polysaccharide vaccine  Z23 GH IMM-  PNEUMOCOCCAL VACCINE,ADULT,SQ OR IM        Patient presents for annual physical as well as follow-up multiple issues.    Patient has history of severe COPD with alpha-1 antitrypsin deficiency carrier, panlobular emphysema.  At times has a problem trying to inhale on her Advair purple Diskus.  She would like to switch to HFA inhaler.  Upon review of options, patient switched over to Dulera with use of a spacer.  Was previously using a nebulizer then taking her Advair and then waiting a while then using her Spiriva in the evening.  Hopefully this change will help.  See below.    Elevated glucose/prediabetes, noted with previous lab work.  She continues to be underweight.  She works hard to eat extra calories and try to maintain body weight.    Left-sided occipital neuralgia, chronic.  Recurrent.  At times gets rather severe pain that starts at the base of her skull and goes up the back of her head.  States that this happens more frequently, recently.    Psoriatic arthritis, periodically will feel quite bad for a day or 2 and then noticed that she has new psoriatic lesions breakout on her skin.  States that she is having some degenerative changes of the fingers and joints which have been more noticeable recently and more painful.  She has osteoporosis and is not taking prednisone.  May need to consider consultation with rheumatology again in the future.    History of balloon dilatation of esophageal stricture.  Noticing difficulty swallowing solids once again.  History of Nissen fundoplication surgery in the past.  EGD referral placed for reevaluation of her esophagus.  Has been having more progressive symptoms again recently.    Decreased functional mobility.  States that her exercise tolerance is decreasing.  She has severe COPD.    Mammogram is due.  Orders  placed.    Pneumococcal vaccination 23 today.     MIXED HYPERLIPIDEMIA.  Ongoing. LDL is at goal: No. Triglycerides are at goal: No.  Hopefully lifestyle modifications will improve cholesterol levels, otherwise we will need to consider additional medication dose adjustments or medication changes.  Medication side effects reported: None.   Continue current medications for now - Red Rice Yeast. Medication list reviewed/updated. Refills completed as needed.  Recent Labs   Lab Test 10/22/20  1101 08/24/17  1158   CHOL 219*  --    HDL 46 38   * 136*   TRIG 153* 162*        COPD - Patient has a longstanding history of COPD: Severe = FeV1 < 30%-49%. Patient has been doing reasonably well overall noting PEÑA, COUGH, WHEEZING, and phlegm and continues on Inhaled steroids, beta-agonist, anti-cholinergic inhaler medication regimen without adverse reactions or side effects.      Vaccine counseling completed.  Encouraged routine / annual vaccinations.     Encouraged regular exercise.     Return in about 1 year (around 10/10/2024) for Annual Physical Exam.    Tim Boyd MD  [facility]      SUBJECTIVE:   CC: Ember is an 62 year old who presents for preventive health visit.       10/10/2023     3:05 PM   Additional Questions   Roomed by Lasha WILKS / ROGERS   Accompanied by n/a       Healthy Habits:     Getting at least 3 servings of Calcium per day:  Yes    Bi-annual eye exam:  Yes    Dental care twice a year:  NO    Sleep apnea or symptoms of sleep apnea:  None    Diet:  Regular (no restrictions)    Frequency of exercise:  4-5 days/week    Duration of exercise:  30-45 minutes    Taking medications regularly:  Yes    Barriers to taking medications:  Not applicable    Medication side effects:  Not applicable    Additional concerns today:  No      Today's PHQ-2 Score:       10/10/2023     3:13 PM   PHQ-2 ( 1999 Pfizer)   Q1: Little interest or pleasure in doing things 1   Q2: Feeling down, depressed or  hopeless 0   PHQ-2 Score 1   Q1: Little interest or pleasure in doing things Several days   Q2: Feeling down, depressed or hopeless Not at all   PHQ-2 Score 1       Have you ever done Advance Care Planning? (For example, a Health Directive, POLST, or a discussion with a medical provider or your loved ones about your wishes): Yes, advance care planning is on file.    Social History     Tobacco Use    Smoking status: Former     Packs/day: 0.50     Years: 32.00     Pack years: 16.00     Types: Cigarettes, Cigars     Quit date: 2012     Years since quittin.1    Smokeless tobacco: Never   Substance Use Topics    Alcohol use: Yes     Alcohol/week: 2.0 standard drinks of alcohol     Comment: Alcoholic Drinks/day: 1-2 drinks every 1-2 weeks             10/10/2023     3:12 PM   Alcohol Use   Prescreen: >3 drinks/day or >7 drinks/week? No     Reviewed orders with patient.  Reviewed health maintenance and updated orders accordingly - Yes    Breast Cancer Screening:  Any new diagnosis of family breast, ovarian, or bowel cancer? No    FHS-7:        No data to display                Mammogram Screening: Recommended mammography every 1-2 years with patient discussion and risk factor consideration  Pertinent mammograms are reviewed under the imaging tab.    History of abnormal Pap smear: Status post benign hysterectomy. Health Maintenance and Surgical History updated.     Reviewed and updated as needed this visit by clinical staff   Tobacco  Allergies  Meds  Problems  Med Hx  Surg Hx  Fam Hx  Soc   Hx        Reviewed and updated as needed this visit by Provider   Tobacco  Allergies  Meds  Problems  Med Hx  Surg Hx  Fam Hx           Review of Systems   Constitutional:  Negative for chills and fever.   HENT:  Positive for congestion. Negative for ear pain, hearing loss and sore throat.    Eyes:  Negative for pain and visual disturbance.   Respiratory:  Positive for shortness of breath. Negative for cough.   "  Cardiovascular:  Negative for chest pain, palpitations and peripheral edema.   Gastrointestinal:  Negative for abdominal pain, constipation, diarrhea, heartburn, hematochezia and nausea.   Genitourinary:  Positive for frequency. Negative for dysuria, genital sores, hematuria and urgency.   Musculoskeletal:  Positive for arthralgias. Negative for joint swelling and myalgias.   Skin:  Negative for rash.   Allergic/Immunologic: Negative for immunocompromised state.   Neurological:  Positive for headaches. Negative for dizziness, weakness and paresthesias.   Hematological:  Does not bruise/bleed easily.   Psychiatric/Behavioral:  Negative for mood changes. The patient is not nervous/anxious.       OBJECTIVE:   /58 (BP Location: Right arm, Patient Position: Sitting, Cuff Size: Adult Regular)   Pulse 106   Temp 98.2  F (36.8  C) (Temporal)   Resp 16   Ht 1.594 m (5' 2.75\")   Wt 41.7 kg (92 lb)   LMP 01/01/1996 (Approximate)   SpO2 96%   Breastfeeding No   BMI 16.43 kg/m    Physical Exam  Constitutional:       General: She is not in acute distress.     Appearance: She is well-developed. She is not diaphoretic.   HENT:      Head: Normocephalic and atraumatic.      Mouth/Throat:      Comments: Now with full dental implants  Eyes:      General: No scleral icterus.     Conjunctiva/sclera: Conjunctivae normal.   Neck:      Vascular: No carotid bruit.   Cardiovascular:      Rate and Rhythm: Normal rate and regular rhythm.      Pulses: Normal pulses.   Pulmonary:      Effort: Pulmonary effort is normal.      Breath sounds: Wheezing and rhonchi present.      Comments: Prolonged expiratory phase  Abdominal:      Palpations: Abdomen is soft.      Tenderness: There is no abdominal tenderness.   Musculoskeletal:         General: Tenderness and deformity (Arthritic changes of numerous finger joints.) present.      Cervical back: Neck supple.      Right lower leg: No edema.      Left lower leg: No edema. "   Lymphadenopathy:      Cervical: No cervical adenopathy.   Skin:     General: Skin is warm and dry.      Findings: No rash.   Neurological:      Mental Status: She is alert and oriented to person, place, and time. Mental status is at baseline.   Psychiatric:         Mood and Affect: Mood normal.         Behavior: Behavior normal.           Patient has been advised of split billing requirements and indicates understanding: Yes      COUNSELING:  Reviewed preventive health counseling, as reflected in patient instructions  Special attention given to:        Regular exercise       Healthy diet/nutrition       Vision screening       Hearing screening       Pneumococcal Vaccine       Immunizations    She reports that she quit smoking about 11 years ago. Her smoking use included cigarettes and cigars. She has a 16.00 pack-year smoking history. She has never used smokeless tobacco.          Tim Boyd MD  [facility]

## 2023-10-10 NOTE — H&P (VIEW-ONLY)
Assessment & Plan     ICD-10-CM    1. COPD, severe (H)  J44.9 ipratropium - albuterol 0.5 mg/2.5 mg/3 mL (DUONEB) 0.5-2.5 (3) MG/3ML neb solution     albuterol (PROAIR HFA/PROVENTIL HFA/VENTOLIN HFA) 108 (90 Base) MCG/ACT inhaler     albuterol (PROVENTIL) (2.5 MG/3ML) 0.083% neb solution     azithromycin (ZITHROMAX) 250 MG tablet     tiotropium (SPIRIVA RESPIMAT) 2.5 MCG/ACT inhaler     spacer (OPTICHAMBER SANJUANA) holding chamber     mometasone-formoterol (DULERA) 200-5 MCG/ACT inhaler      2. Alpha-1-antitrypsin deficiency carrier  Z14.8 albuterol (PROAIR HFA/PROVENTIL HFA/VENTOLIN HFA) 108 (90 Base) MCG/ACT inhaler     azithromycin (ZITHROMAX) 250 MG tablet     tiotropium (SPIRIVA RESPIMAT) 2.5 MCG/ACT inhaler     spacer (OPTICHAMBER SANJUANA) holding chamber     mometasone-formoterol (DULERA) 200-5 MCG/ACT inhaler      3. Panlobular emphysema (H)  J43.1 ipratropium - albuterol 0.5 mg/2.5 mg/3 mL (DUONEB) 0.5-2.5 (3) MG/3ML neb solution     albuterol (PROAIR HFA/PROVENTIL HFA/VENTOLIN HFA) 108 (90 Base) MCG/ACT inhaler     azithromycin (ZITHROMAX) 250 MG tablet     tiotropium (SPIRIVA RESPIMAT) 2.5 MCG/ACT inhaler     spacer (OPTICHAMBER SANJUANA) holding chamber     mometasone-formoterol (DULERA) 200-5 MCG/ACT inhaler      4. Mixed hyperlipidemia  E78.2       5. Prediabetes  R73.03       6. Occipital neuralgia of left side  M54.81       7. Psoriatic arthritis (H)  L40.50       8. S/P Nissen fundoplication (without gastrostomy tube) procedure  Z98.890 Adult GI  Referral - Procedure Only      9. Vitamin B12 deficiency  E53.8       10. Vitamin D deficiency  E55.9       11. Status post balloon dilatation of esophageal stricture  Z98.890 Adult GI  Referral - Procedure Only      12. Difficulty swallowing solids  R13.10 Adult GI  Referral - Procedure Only      13. Decreased functional mobility  R26.89       14. Vaccine counseling  Z71.85       15. Annual physical exam  Z00.00       16. Visit  for screening mammogram  Z12.31 MA Screening Bilateral w/ [facility]      17. Need for 23-polyvalent pneumococcal polysaccharide vaccine  Z23 GH IMM-  PNEUMOCOCCAL VACCINE,ADULT,SQ OR IM        Patient presents for annual physical as well as follow-up multiple issues.    Patient has history of severe COPD with alpha-1 antitrypsin deficiency carrier, panlobular emphysema.  At times has a problem trying to inhale on her Advair purple Diskus.  She would like to switch to HFA inhaler.  Upon review of options, patient switched over to Dulera with use of a spacer.  Was previously using a nebulizer then taking her Advair and then waiting a while then using her Spiriva in the evening.  Hopefully this change will help.  See below.    Elevated glucose/prediabetes, noted with previous lab work.  She continues to be underweight.  She works hard to eat extra calories and try to maintain body weight.    Left-sided occipital neuralgia, chronic.  Recurrent.  At times gets rather severe pain that starts at the base of her skull and goes up the back of her head.  States that this happens more frequently, recently.    Psoriatic arthritis, periodically will feel quite bad for a day or 2 and then noticed that she has new psoriatic lesions breakout on her skin.  States that she is having some degenerative changes of the fingers and joints which have been more noticeable recently and more painful.  She has osteoporosis and is not taking prednisone.  May need to consider consultation with rheumatology again in the future.    History of balloon dilatation of esophageal stricture.  Noticing difficulty swallowing solids once again.  History of Nissen fundoplication surgery in the past.  EGD referral placed for reevaluation of her esophagus.  Has been having more progressive symptoms again recently.    Decreased functional mobility.  States that her exercise tolerance is decreasing.  She has severe COPD.    Mammogram is due.  Orders  placed.    Pneumococcal vaccination 23 today.     MIXED HYPERLIPIDEMIA.  Ongoing. LDL is at goal: No. Triglycerides are at goal: No.  Hopefully lifestyle modifications will improve cholesterol levels, otherwise we will need to consider additional medication dose adjustments or medication changes.  Medication side effects reported: None.   Continue current medications for now - Red Rice Yeast. Medication list reviewed/updated. Refills completed as needed.  Recent Labs   Lab Test 10/22/20  1101 08/24/17  1158   CHOL 219*  --    HDL 46 38   * 136*   TRIG 153* 162*        COPD - Patient has a longstanding history of COPD: Severe = FeV1 < 30%-49%. Patient has been doing reasonably well overall noting PEÑA, COUGH, WHEEZING, and phlegm and continues on Inhaled steroids, beta-agonist, anti-cholinergic inhaler medication regimen without adverse reactions or side effects.      Vaccine counseling completed.  Encouraged routine / annual vaccinations.     Encouraged regular exercise.     Return in about 1 year (around 10/10/2024) for Annual Physical Exam.    Tim Boyd MD  [facility]      SUBJECTIVE:   CC: Ember is an 62 year old who presents for preventive health visit.       10/10/2023     3:05 PM   Additional Questions   Roomed by Lasha WILKS / ROGERS   Accompanied by n/a       Healthy Habits:     Getting at least 3 servings of Calcium per day:  Yes    Bi-annual eye exam:  Yes    Dental care twice a year:  NO    Sleep apnea or symptoms of sleep apnea:  None    Diet:  Regular (no restrictions)    Frequency of exercise:  4-5 days/week    Duration of exercise:  30-45 minutes    Taking medications regularly:  Yes    Barriers to taking medications:  Not applicable    Medication side effects:  Not applicable    Additional concerns today:  No      Today's PHQ-2 Score:       10/10/2023     3:13 PM   PHQ-2 ( 1999 Pfizer)   Q1: Little interest or pleasure in doing things 1   Q2: Feeling down, depressed or  hopeless 0   PHQ-2 Score 1   Q1: Little interest or pleasure in doing things Several days   Q2: Feeling down, depressed or hopeless Not at all   PHQ-2 Score 1       Have you ever done Advance Care Planning? (For example, a Health Directive, POLST, or a discussion with a medical provider or your loved ones about your wishes): Yes, advance care planning is on file.    Social History     Tobacco Use    Smoking status: Former     Packs/day: 0.50     Years: 32.00     Pack years: 16.00     Types: Cigarettes, Cigars     Quit date: 2012     Years since quittin.1    Smokeless tobacco: Never   Substance Use Topics    Alcohol use: Yes     Alcohol/week: 2.0 standard drinks of alcohol     Comment: Alcoholic Drinks/day: 1-2 drinks every 1-2 weeks             10/10/2023     3:12 PM   Alcohol Use   Prescreen: >3 drinks/day or >7 drinks/week? No     Reviewed orders with patient.  Reviewed health maintenance and updated orders accordingly - Yes    Breast Cancer Screening:  Any new diagnosis of family breast, ovarian, or bowel cancer? No    FHS-7:        No data to display                Mammogram Screening: Recommended mammography every 1-2 years with patient discussion and risk factor consideration  Pertinent mammograms are reviewed under the imaging tab.    History of abnormal Pap smear: Status post benign hysterectomy. Health Maintenance and Surgical History updated.     Reviewed and updated as needed this visit by clinical staff   Tobacco  Allergies  Meds  Problems  Med Hx  Surg Hx  Fam Hx  Soc   Hx        Reviewed and updated as needed this visit by Provider   Tobacco  Allergies  Meds  Problems  Med Hx  Surg Hx  Fam Hx           Review of Systems   Constitutional:  Negative for chills and fever.   HENT:  Positive for congestion. Negative for ear pain, hearing loss and sore throat.    Eyes:  Negative for pain and visual disturbance.   Respiratory:  Positive for shortness of breath. Negative for cough.   "  Cardiovascular:  Negative for chest pain, palpitations and peripheral edema.   Gastrointestinal:  Negative for abdominal pain, constipation, diarrhea, heartburn, hematochezia and nausea.   Genitourinary:  Positive for frequency. Negative for dysuria, genital sores, hematuria and urgency.   Musculoskeletal:  Positive for arthralgias. Negative for joint swelling and myalgias.   Skin:  Negative for rash.   Allergic/Immunologic: Negative for immunocompromised state.   Neurological:  Positive for headaches. Negative for dizziness, weakness and paresthesias.   Hematological:  Does not bruise/bleed easily.   Psychiatric/Behavioral:  Negative for mood changes. The patient is not nervous/anxious.       OBJECTIVE:   /58 (BP Location: Right arm, Patient Position: Sitting, Cuff Size: Adult Regular)   Pulse 106   Temp 98.2  F (36.8  C) (Temporal)   Resp 16   Ht 1.594 m (5' 2.75\")   Wt 41.7 kg (92 lb)   LMP 01/01/1996 (Approximate)   SpO2 96%   Breastfeeding No   BMI 16.43 kg/m    Physical Exam  Constitutional:       General: She is not in acute distress.     Appearance: She is well-developed. She is not diaphoretic.   HENT:      Head: Normocephalic and atraumatic.      Mouth/Throat:      Comments: Now with full dental implants  Eyes:      General: No scleral icterus.     Conjunctiva/sclera: Conjunctivae normal.   Neck:      Vascular: No carotid bruit.   Cardiovascular:      Rate and Rhythm: Normal rate and regular rhythm.      Pulses: Normal pulses.   Pulmonary:      Effort: Pulmonary effort is normal.      Breath sounds: Wheezing and rhonchi present.      Comments: Prolonged expiratory phase  Abdominal:      Palpations: Abdomen is soft.      Tenderness: There is no abdominal tenderness.   Musculoskeletal:         General: Tenderness and deformity (Arthritic changes of numerous finger joints.) present.      Cervical back: Neck supple.      Right lower leg: No edema.      Left lower leg: No edema. "   Lymphadenopathy:      Cervical: No cervical adenopathy.   Skin:     General: Skin is warm and dry.      Findings: No rash.   Neurological:      Mental Status: She is alert and oriented to person, place, and time. Mental status is at baseline.   Psychiatric:         Mood and Affect: Mood normal.         Behavior: Behavior normal.           Patient has been advised of split billing requirements and indicates understanding: Yes      COUNSELING:  Reviewed preventive health counseling, as reflected in patient instructions  Special attention given to:        Regular exercise       Healthy diet/nutrition       Vision screening       Hearing screening       Pneumococcal Vaccine       Immunizations    She reports that she quit smoking about 11 years ago. Her smoking use included cigarettes and cigars. She has a 16.00 pack-year smoking history. She has never used smokeless tobacco.          Tim Boyd MD  [facility]

## 2023-10-10 NOTE — PATIENT INSTRUCTIONS
Blood pressure is well controlled.     Medications refilled.      -- Change to Dulera, instead of ADVAIR.   -- Increase Zithromax to 250 mg daily.     Last labs were stable.     Immunization History   Administered Date(s) Administered    Hepatitis B, Adult 05/19/2009, 06/23/2009    Influenza Vaccine >6 months (Alfuria,Fluzone) 10/06/2014, 10/16/2017, 12/03/2019, 10/22/2020    Influenza Vaccine, 6+MO IM (QUADRIVALENT W/PRESERVATIVES) 09/28/2015, 10/18/2018    Mantoux Tuberculin Skin Test 03/06/2020    Pneumococcal 23 valent 11/11/2008, 10/14/2014    TDAP (Adacel,Boostrix) 04/25/2007    Tdap (Adult) Unspecified Formulation 05/02/2017        Consider:  -- Yearly - COVID-19 Vaccination shot   -- Annual Flu / Influenza vaccination - Every Fall (Starting about October 1st)    -- Pneumococcal PCV 23 shot today.     -- Get the new shingles shot (2 in series) (Shingrix) - from one of the local pharmacies, at your convenience -- Check cost/coverage.       Return in approximately 1 year, or sooner as needed for follow-up with Dr. Boyd.  - Annual Follow-up / Physical - Medicare Annual Wellness Visit     Clinic : [phone]  Appointment line: [phone]

## 2023-10-11 ENCOUNTER — TELEPHONE (OUTPATIENT)
Dept: SURGERY | Facility: OTHER | Age: 63
End: 2023-10-11
Payer: COMMERCIAL

## 2023-10-11 NOTE — TELEPHONE ENCOUNTER
Screening Questions for the Scheduling of Screening Colonoscopies   (If Colonoscopy is diagnostic, Provider should review the chart before scheduling.)  Are you younger than 50 or older than 80?  NO  Do you take aspirin or fish oil?  ASPIRIN (if yes, tell patient to stop 1 week prior to Colonoscopy)  Do you take warfarin (Coumadin), clopidogrel (Plavix), apixaban (Eliquis), dabigatram (Pradaxa), rivaroxaban (Xarelto) or any blood thinner? NO  Do you take Ozempic? NO  Do you use oxygen at home?  NO  Do you have kidney disease? NO  Are you on dialysis? NO  Have you had a stroke or heart attack in the last year? NO  Have you had a stent in your heart or any blood vessel in the last year? NO  Have you had a transplant of any organ? NO  Have you had a colonoscopy or upper endoscopy (EGD) before? YES         When?  2017  Date of scheduled EGD. 10/23/2023  Provider CAIT Mckeon [facility]

## 2023-10-11 NOTE — TELEPHONE ENCOUNTER
[facility] Diagnostic Referral    Patient has a referral for an EGD with a diagnosis of S/P Nissen fundoplication (without gastrostomy tube) procedure, Status post balloon dilatation of esophageal stricture, Difficulty swallowing solids.    Please advise.    Thank you,  Zuri Bush on 10/11/2023 at 10:56 AM

## 2023-10-17 RX ORDER — IPRATROPIUM BROMIDE AND ALBUTEROL SULFATE 2.5; .5 MG/3ML; MG/3ML
SOLUTION RESPIRATORY (INHALATION)
OUTPATIENT
Start: 2023-10-17

## 2023-10-18 NOTE — NURSING NOTE
CHEMO SCHOOL- NUTRITION    Cecy Esteban is a 60 y.o. female with pancreatic cancer. Pt will be receiving folfirinox. I met with Mrs. Esteban and her  for chemo school today. Pt reports good appetite though some days she does have to force herself to eat and finds foods unappealing. She has slight abdominal pain after eating if she eats a large meal. She is working on increasing her water intake but is worried about cold sensitivity. She reports she does have lose BMs partially due to starting Trulicity and metformin for her T2DM which has also contributed to her weight loss over the last few months. She had several questions regarding foods to eat during treatment and nutritional supplements.    CW: 124#.     Food Insecurity:  Patient is worried whether their food would run out before they have more money to buy more: Never  The food they bought just didn't last and they didn't have money to buy more: Never      Plan:   Reviewed chemo school packet & provided copy to pt.   Discussed importance of maintaining wt & staying hydrated.   Reviewed food safety guidelines recommended during treatment.   Recommended small, frequent meals and snacks q 2-3hrs  Discussed examples of high protein/calorie low fat food choices to prevent weight loss  Discussed Ensure and Glucerna  Discussed alternate sources of hydration  Provided RD contact info & encouraged pt to call with any questions/concerns.   Will f/u as needed.       Electronically signed by: Chayo Boss MBA, RDN, LDN     Patient Information     Patient Name MRN Sex Ember Lanza [id number] Female 1960      Nursing Note by Griselda Jimenez at 2017  2:45 PM     Author:  Griselda Jimenez Service:  (none) Author Type:  NURS- Student Practical Nurse     Filed:  2017  3:38 PM Encounter Date:  2017 Status:  Signed     :  Griselda Jimenez (NURS- Student Practical Nurse)            Patient presents to the clinic for sore throat that started on . C/o pain on right side of neck. C/o no appetite.   Griselda Jimenez LPN............................ 2017 3:09 PM

## 2023-10-23 ENCOUNTER — HOSPITAL ENCOUNTER (OUTPATIENT)
Facility: OTHER | Age: 63
Discharge: HOME | End: 2023-10-23
Attending: SURGERY | Admitting: SURGERY
Payer: COMMERCIAL

## 2023-10-23 ENCOUNTER — ANESTHESIA EVENT (OUTPATIENT)
Dept: SURGERY | Facility: OTHER | Age: 63
End: 2023-10-23
Payer: COMMERCIAL

## 2023-10-23 ENCOUNTER — ANESTHESIA (OUTPATIENT)
Dept: SURGERY | Facility: OTHER | Age: 63
End: 2023-10-23
Payer: COMMERCIAL

## 2023-10-23 VITALS
SYSTOLIC BLOOD PRESSURE: 123 MMHG | DIASTOLIC BLOOD PRESSURE: 76 MMHG | OXYGEN SATURATION: 96 % | RESPIRATION RATE: 16 BRPM | BODY MASS INDEX: 16.93 KG/M2 | HEIGHT: 62 IN | HEART RATE: 72 BPM | WEIGHT: 92 LBS | TEMPERATURE: 97.6 F

## 2023-10-23 DIAGNOSIS — K22.2 SCHATZKI'S RING OF DISTAL ESOPHAGUS: Primary | ICD-10-CM

## 2023-10-23 PROCEDURE — 43239 EGD BIOPSY SINGLE/MULTIPLE: CPT | Performed by: NURSE ANESTHETIST, CERTIFIED REGISTERED

## 2023-10-23 PROCEDURE — 250N000009 HC RX 250: Performed by: NURSE ANESTHETIST, CERTIFIED REGISTERED

## 2023-10-23 PROCEDURE — 250N000011 HC RX IP 250 OP 636: Performed by: NURSE ANESTHETIST, CERTIFIED REGISTERED

## 2023-10-23 PROCEDURE — 43239 EGD BIOPSY SINGLE/MULTIPLE: CPT | Performed by: SURGERY

## 2023-10-23 PROCEDURE — 258N000003 HC RX IP 258 OP 636: Performed by: SURGERY

## 2023-10-23 PROCEDURE — 999N000010 HC STATISTIC ANES STAT CODE-CRNA PER MINUTE: Performed by: SURGERY

## 2023-10-23 PROCEDURE — 88305 TISSUE EXAM BY PATHOLOGIST: CPT

## 2023-10-23 RX ORDER — NALOXONE HYDROCHLORIDE 0.4 MG/ML
0.4 INJECTION, SOLUTION INTRAMUSCULAR; INTRAVENOUS; SUBCUTANEOUS
Status: CANCELLED | OUTPATIENT
Start: 2023-10-23

## 2023-10-23 RX ORDER — LIDOCAINE 40 MG/G
CREAM TOPICAL
Status: DISCONTINUED | OUTPATIENT
Start: 2023-10-23 | End: 2023-10-23 | Stop reason: HOSPADM

## 2023-10-23 RX ORDER — NALOXONE HYDROCHLORIDE 0.4 MG/ML
0.2 INJECTION, SOLUTION INTRAMUSCULAR; INTRAVENOUS; SUBCUTANEOUS
Status: CANCELLED | OUTPATIENT
Start: 2023-10-23

## 2023-10-23 RX ORDER — ALUMINA, MAGNESIA, AND SIMETHICONE 2400; 2400; 240 MG/30ML; MG/30ML; MG/30ML
30 SUSPENSION ORAL EVERY 4 HOURS PRN
COMMUNITY
Start: 2023-10-23

## 2023-10-23 RX ORDER — PROPOFOL 10 MG/ML
INJECTION, EMULSION INTRAVENOUS CONTINUOUS PRN
Status: DISCONTINUED | OUTPATIENT
Start: 2023-10-23 | End: 2023-10-23

## 2023-10-23 RX ORDER — FLUMAZENIL 0.1 MG/ML
0.2 INJECTION, SOLUTION INTRAVENOUS
Status: CANCELLED | OUTPATIENT
Start: 2023-10-23 | End: 2023-10-23

## 2023-10-23 RX ORDER — PROPOFOL 10 MG/ML
INJECTION, EMULSION INTRAVENOUS PRN
Status: DISCONTINUED | OUTPATIENT
Start: 2023-10-23 | End: 2023-10-23

## 2023-10-23 RX ORDER — OMEPRAZOLE 40 MG/1
40 CAPSULE, DELAYED RELEASE ORAL DAILY
Qty: 90 CAPSULE | Refills: 4 | Status: SHIPPED | OUTPATIENT
Start: 2023-10-23

## 2023-10-23 RX ORDER — LIDOCAINE HYDROCHLORIDE 20 MG/ML
INJECTION, SOLUTION INFILTRATION; PERINEURAL PRN
Status: DISCONTINUED | OUTPATIENT
Start: 2023-10-23 | End: 2023-10-23

## 2023-10-23 RX ORDER — SODIUM CHLORIDE, SODIUM LACTATE, POTASSIUM CHLORIDE, CALCIUM CHLORIDE 600; 310; 30; 20 MG/100ML; MG/100ML; MG/100ML; MG/100ML
INJECTION, SOLUTION INTRAVENOUS CONTINUOUS
Status: DISCONTINUED | OUTPATIENT
Start: 2023-10-23 | End: 2023-10-23 | Stop reason: HOSPADM

## 2023-10-23 RX ADMIN — PROPOFOL 160 MCG/KG/MIN: 10 INJECTION, EMULSION INTRAVENOUS at 09:23

## 2023-10-23 RX ADMIN — SODIUM CHLORIDE, POTASSIUM CHLORIDE, SODIUM LACTATE AND CALCIUM CHLORIDE: 600; 310; 30; 20 INJECTION, SOLUTION INTRAVENOUS at 08:35

## 2023-10-23 RX ADMIN — LIDOCAINE HYDROCHLORIDE 60 MG: 20 INJECTION, SOLUTION INFILTRATION; PERINEURAL at 09:22

## 2023-10-23 RX ADMIN — PROPOFOL 100 MG: 10 INJECTION, EMULSION INTRAVENOUS at 09:22

## 2023-10-23 ASSESSMENT — COPD QUESTIONNAIRES
COPD: 1
CAT_SEVERITY: MODERATE

## 2023-10-23 ASSESSMENT — ACTIVITIES OF DAILY LIVING (ADL): ADLS_ACUITY_SCORE: 37

## 2023-10-23 NOTE — OR NURSING
Patient has been discharged to home at 1041 via w/c accompanied by Denise CORDOVA    Written discharge instructions were provided to patient.  Prescriptions were sent to [facility].  Patient states their pain is none, and denies any nausea or dizziness upon discharge.    Patient and adult caring for them verbalize understanding of discharge instructions including no driving until tomorrow and no longer taking narcotic pain medications - no operating mechanical equipment and no making any important decisions.They understand reason for discharge, and necessary follow-up appointments.

## 2023-10-23 NOTE — OP NOTE
PROCEDURE NOTE    SURGEON:Jagdish Ruiz MD    PRE-OP DIAGNOSIS:   Dysphagia dysphagia antral gastritis with ulceration,      POST-OP DIAGNOSIS: Slippage and hiatal hernia recurrence.    PROCEDURE PLANNED:   Diagnostic EGD, flexible        PROCEDURE PERFORMED:  EGD with biopsy, flexible    SPECIMEN:      ID Type Source Tests Collected by Time Destination   1 : duodenum biopsy Biopsy Small Intestine, Duodenum SURGICAL PATHOLOGY EXAM Jagdish Ruiz MD 10/23/2023  9:24 AM    2 : ANTRUM BIOPSY Biopsy Stomach, Antrum SURGICAL PATHOLOGY EXAM Jagdish Ruiz MD 10/23/2023  9:26 AM    3 : DISTAL ESOPHAGUS Biopsy Esophagus, Distal SURGICAL PATHOLOGY EXAM Jagdish Ruiz MD 10/23/2023  9:27 AM            ANESTHESIA: MAC  Coverage requested due to:ASA III  CRNA Independent: Jose Martin Chan APRN CRNA      ESTIMATED BLOOD LOSS: None    COMPLICATIONS:  None    INDICATION FOR THE PROCEDURE:The patient is a 62 year oldfemale. The patient presents with history of recurrent Schatzki's ring and dysphagia. I explained to the patient the risks, benefits and alternatives to diagnostic EGD for evaluating stricture/dysphagia. We specifically discussed the risks of bleeding, infection, perforation and the risks of sedation. The patient's questions were answered and the patient wished to proceed. Informed consent paperwork was completed.    PROCEDURE: The patient was taken to the endoscopy suite. Appropriate monitors were attached. The patient was placed in the left lateral decubitus position. Bite block was positioned. Timeout was performed confirming the patient's identity and procedure to be performed. After appropriate sedation was confirmed, the flexible endoscope was advanced into the oropharynx. The posterior oropharynx appeared grossly normal. The scope was advanced into the proximal esophagus. The esophagus was insufflated with air. The scope was advanced under direct visualization. No acute abnormalities of the esophagus  were noted. The scope was advanced into the stomach. The scope was advanced through the pylorus into the duodenal bulb. The bulb and distal duodenum appeared grossly normal. The scope was withdrawn back into the stomach. Antral biopsy was obtained and sent to pathology.  There was antral gastritis with a small ulceration.  The scope was retroflexed and the GE junction inspected.  A Nissen type anatomy was noted.  The scope was returned to a neutral position and the stomach was decompressed.  The wrap was noted at 36 cm.  The scope was withdrawn to the GE junction and biopsy obtained. The Z-line was noted at 33 cm.  There was a lower esophageal ring which was serially biopsied and broken.  We dilated to 20 mm and dilated both the wrap and the distal esophagus.  The mucosa of the esophagus was inspected while withdrawing the scope. No abnormalities were noted. The scope was withdrawn from the patient. The bite block was removed. The patient tolerated the procedure with no immediately apparent complication. The patient was taken to recovery instable condition.    FOLLOW UP:  RECOMMENDATIONS follow-up pathology.    Jagdish Ruiz MD on 10/23/2023 at 9:34 AM

## 2023-10-23 NOTE — ANESTHESIA POSTPROCEDURE EVALUATION
Patient: Ember Tavares    Procedure: Procedure(s):  Esophagoscopy, gastroscopy, duodenoscopy (EGD)with biospy and Dilation.       Anesthesia Type:  MAC    Note:  Disposition: Outpatient   Postop Pain Control: Uneventful            Sign Out: Well controlled pain   PONV: No   Neuro/Psych: Uneventful            Sign Out: Acceptable/Baseline neuro status   Airway/Respiratory: Uneventful            Sign Out: Acceptable/Baseline resp. status   CV/Hemodynamics: Uneventful            Sign Out: Acceptable CV status; No obvious hypovolemia; No obvious fluid overload   Other NRE: NONE   DID A NON-ROUTINE EVENT OCCUR? No           Last vitals:  Vitals Value Taken Time   /76 10/23/23 1015   Temp 97.6  F (36.4  C) 10/23/23 1036   Pulse 72 10/23/23 1015   Resp 16 10/23/23 0939   SpO2 96 % 10/23/23 1025   Vitals shown include unfiled device data.    Electronically Signed By: АЛЕКСАНДР Morocho CRNA  October 23, 2023  11:04 AM

## 2023-10-23 NOTE — INTERVAL H&P NOTE
"I have reviewed the surgical (or preoperative) H&P that is linked to this encounter, and examined the patient. There are no significant changes    Clinical Conditions Present on Arrival:  Clinically Significant Risk Factors Present on Admission                 # Drug Induced Platelet Defect: home medication list includes an antiplatelet medication  # Cachexia: Estimated body mass index is 16.43 kg/m  as calculated from the following:    Height as of 10/10/23: 1.594 m (5' 2.75\").    Weight as of 10/10/23: 41.7 kg (92 lb).       "

## 2023-10-23 NOTE — ANESTHESIA CARE TRANSFER NOTE
Patient: Ember Tavares    Procedure: Procedure(s):  Esophagoscopy, gastroscopy, duodenoscopy (EGD)with biospy and Dilation.       Diagnosis: Difficulty swallowing solids [R13.10]  Diagnosis Additional Information: No value filed.    Anesthesia Type:   MAC     Note:    Oropharynx: oropharynx clear of all foreign objects and spontaneously breathing  Level of Consciousness: drowsy  Oxygen Supplementation: nasal cannula  Level of Supplemental Oxygen (L/min / FiO2): 3  Independent Airway: airway patency satisfactory and stable  Dentition: dentition unchanged  Vital Signs Stable: post-procedure vital signs reviewed and stable  Report to RN Given: handoff report given  Patient transferred to: Phase II    Handoff Report: Identifed the Patient, Identified the Reponsible Provider, Reviewed the pertinent medical history, Discussed the surgical course, Reviewed Intra-OP anesthesia mangement and issues during anesthesia, Set expectations for post-procedure period and Allowed opportunity for questions and acknowledgement of understanding      Vitals:  Vitals Value Taken Time   BP     Temp     Pulse     Resp     SpO2 99 % 10/23/23 0939   Vitals shown include unfiled device data.    Electronically Signed By: АЛЕКСАНДР KLINE CRNA  October 23, 2023  9:40 AM

## 2023-10-23 NOTE — DISCHARGE INSTRUCTIONS
[facility]  Adult Discharge Orders & Instructions      For 24 hours after surgery:  Get plenty of rest.  A responsible adult must stay with you for at least 24 hours after you leave the hospital.   You may feel lightheaded.  IF so, sit for a few minutes before standing.  Have someone help you get up.   You may have a slight fever. Call the doctor if your fever is over 101 F (38.3 C) (taken under the tongue) or lasts longer than 24 hours.  You may have a dry mouth, a sore throat, muscle aches or trouble sleeping.  These should go away after 24 hours.  Do not make important or legal decisions.  6.   Do not drive or use heavy equipment.  If you have weakness or tingling, don't drive or use heavy equipment until this feeling goes away.                                                                                                                                                                         To contact a doctor, call    [phone]______________

## 2023-10-23 NOTE — ANESTHESIA PREPROCEDURE EVALUATION
Anesthesia Pre-Procedure Evaluation    Patient: Ember Tavares   MRN: [id number] : 1960        Procedure : Procedure(s):  Esophagoscopy, gastroscopy, duodenoscopy (EGD), combined with possible Dilation.          Past Medical History:   Diagnosis Date    Alpha-1-antitrypsin deficiency carrier 2017    Atherosclerosis of abdominal aorta (H24) 2017    Overview:  [facility] Studies: 2013 -- CTA ABDOMEN AND PELVIS WITH BILATERAL LOWER EXTREMITY RUNOFF  CLINICAL HISTORY: Iliac and mesenteric ischemia.  TECHNIQUE: 125 mL Omnipaque 300 IV contrast was administered. 3-D arterial reformations were performed.  FINDINGS: There is a well-defined ovoid density at the medial right costophrenic angle. This measures 2.3 x 0.9 x 0.6 cm. It demonstra    Chronic abdominal pain 03/10/2010    Overview:  16 year duration    Chronic sinusitis     No Comments Provided    Closed fracture of skull (H)     1972    COPD, severe (H) 2017    Overview:  2014 -- PULMONARY FUNCTION  SITE:  [facility] ORDERED BY:  VANNESA LUNA  CLINICAL SUMMARY: 53-year-old female with a history of severe COPD.   TECHNICIAN NOTE: Good effort.   DATA: FVC 1.85 (61%). FEV1 1.04 (45%). FEV1/FVC ratio 56%. DLCO 13.5 to 62%.   Flow volume curve is consistent with airway obstruction.   IMPRESSION: 1. Severe obstructive pulmon    Dental abscess 2019    GERD (gastroesophageal reflux disease) 2017    Hiatal hernia 2017    Influenza due to influenza virus, type A, human 2022    Other and unspecified hyperlipidemia 2012    PAD (peripheral artery disease) (H24) 10/13/2015    Psoriasis 2017    Psoriatic arthritis (H) 2017    Schatzki's ring of distal esophagus 2017    Ulcer of right cornea 2018    Vitamin B12 deficiency 2018    Vitamin D deficiency 2017      Past Surgical History:   Procedure Laterality Date    AS UGI ENDOSCOPY W ESOPHAGEAL  DILATION BALLOON <30MM  10/30/2015    ESOPHAGEAL DILATION,Dr. Rainer Allen,     BIOPSY BREAST Bilateral     , , BIOPSY BREAST,benign    CHOLECYSTECTOMY      Laparoscopic    COLONOSCOPY  2016    x's 3 negative    COLONOSCOPY N/A 2021    hyperplastic follow up 10 years, 2031    CT CORONARY ANGIOGRAM      CT CORONARY ANGIOGRAM (IA),negative    ESOPHAGOSCOPY, GASTROSCOPY, DUODENOSCOPY (EGD), COMBINED  2009    dilated, Dr. Allen    ESOPHAGOSCOPY, GASTROSCOPY, DUODENOSCOPY (EGD), COMBINED N/A 2019    Pickering's esophagus, 3 year follow up    HYSTERECTOMY VAGINAL  1994    ovaries remain    LAPAROSCOPIC NISSEN FUNDOPLICATION N/A 2018    Procedure: LAPAROSCOPIC NISSEN FUNDOPLICATION;  Laparoscopic Nissen Fundoplication;  Surgeon: Jagdish Ruiz MD;  Location: [facility] OR    LAPAROTOMY EXPLORATORY      lysis of adhesions/endometriosis    RECTOCELE REPAIR  2009      Allergies   Allergen Reactions    Penicillins Other (See Comments)     Other reaction(s): Respiratory Arrest    Atorvastatin Muscle Pain (Myalgia)    Morphine Other (See Comments)     Other reaction(s): Chest Pain    Levofloxacin Nausea and Vomiting    Rosuvastatin Nausea and Vomiting    Sucralfate Nausea and Vomiting    Sulfamethoxazole-Trimethoprim Nausea and Vomiting     Yeast infection      Social History     Tobacco Use    Smoking status: Former     Packs/day: 0.50     Years: 32.00     Additional pack years: 0.00     Total pack years: 16.00     Types: Cigarettes, Cigars     Quit date: 2012     Years since quittin.1    Smokeless tobacco: Never   Substance Use Topics    Alcohol use: Yes     Alcohol/week: 2.0 standard drinks of alcohol     Comment: Alcoholic Drinks/day: 1-2 drinks every 1-2 weeks      Wt Readings from Last 1 Encounters:   10/23/23 41.7 kg (92 lb)        Anesthesia Evaluation   Pt has had prior anesthetic. Type: MAC and General.    No history of anesthetic  complications       ROS/MED HX  ENT/Pulmonary: Comment: Alpha-1 antitrypsin deficiency.    (+)                        moderate,  COPD,              Neurologic:  - neg neurologic ROS     Cardiovascular:     (+)  - -  CAD -  - -                                 Previous cardiac testing   Echo: Date: Results:    Stress Test:  Date: Results:    ECG Reviewed:  Date: Results:    Cath:  Date: Results:      METS/Exercise Tolerance: >4 METS    Hematologic:  - neg hematologic  ROS     Musculoskeletal:  - neg musculoskeletal ROS     GI/Hepatic:     (+) GERD, Asymptomatic on medication,    hiatal hernia,              Renal/Genitourinary:  - neg Renal ROS     Endo:  - neg endo ROS     Psychiatric/Substance Use:  - neg psychiatric ROS     Infectious Disease:  - neg infectious disease ROS     Malignancy:  - neg malignancy ROS     Other:  - neg other ROS   (-) Any chance pregnant       Physical Exam    Airway        Mallampati: I   TM distance: > 3 FB   Neck ROM: full   Mouth opening: > 3 cm    Respiratory Devices and Support         Dental     Comment: Upper and lower dentures. Patient with denture securement wires implants in gums.        Cardiovascular   cardiovascular exam normal       Rhythm and rate: regular and normal     Pulmonary   pulmonary exam normal        breath sounds clear to auscultation           OUTSIDE LABS:  CBC:   Lab Results   Component Value Date    WBC 9.5 11/30/2022    WBC 12.1 (H) 11/29/2022    HGB 13.0 11/30/2022    HGB 13.4 11/29/2022    HCT 39.9 11/30/2022    HCT 41.2 11/29/2022     11/30/2022     11/29/2022     BMP:   Lab Results   Component Value Date     11/30/2022     11/29/2022    POTASSIUM 4.0 11/30/2022    POTASSIUM 4.5 11/29/2022    POTASSIUM 4.6 11/29/2022    CHLORIDE 103 11/30/2022    CHLORIDE 108 (H) 11/29/2022    CO2 31 (H) 11/30/2022    CO2 26 11/29/2022    BUN 19.5 11/30/2022    BUN 15.1 11/29/2022    CR 0.58 11/30/2022    CR 0.54 11/29/2022    GLC 91  "11/30/2022     (H) 11/29/2022     COAGS:   Lab Results   Component Value Date    PTT 39 (H) 10/11/2019    INR 0.96 10/11/2019     POC: No results found for: \"BGM\", \"HCG\", \"HCGS\"  HEPATIC:   Lab Results   Component Value Date    ALBUMIN 4.3 11/27/2022    PROTTOTAL 7.2 11/27/2022    ALT 25 11/27/2022    AST 37 (H) 11/27/2022    ALKPHOS 87 11/27/2022    BILITOTAL 0.2 11/27/2022     OTHER:   Lab Results   Component Value Date    LACT 1.3 12/02/2019    COLEMAN 8.6 (L) 11/30/2022    MAG 2.2 03/28/2019    LIPASE 14 02/23/2021    CRP 4.1 (H) 12/12/2019    SED 82 (H) 12/12/2019       Anesthesia Plan    ASA Status:  3    NPO Status:  NPO Appropriate    Anesthesia Type: MAC.     - Reason for MAC: straight local not clinically adequate   Induction: Intravenous, Propofol.   Maintenance: TIVA.        Consents    Anesthesia Plan(s) and associated risks, benefits, and realistic alternatives discussed. Questions answered and patient/representative(s) expressed understanding.     - Discussed: Risks, Benefits and Alternatives for BOTH SEDATION and the PROCEDURE were discussed     - Discussed with:       - Extended Intubation/Ventilatory Support Discussed: No.      - Patient is DNR/DNI Status: No     Use of blood products discussed: No .     Postoperative Care            Comments:                АЛЕКСАНДР Morocho CRNA  "

## 2023-10-26 LAB
PATH REPORT.COMMENTS IMP SPEC: NORMAL
PATH REPORT.FINAL DX SPEC: NORMAL
PATH REPORT.RELEVANT HX SPEC: NORMAL
PHOTO IMAGE: NORMAL

## 2023-11-16 ENCOUNTER — HOSPITAL ENCOUNTER (OUTPATIENT)
Dept: MAMMOGRAPHY | Facility: OTHER | Age: 63
Discharge: HOME | End: 2023-11-16
Attending: INTERNAL MEDICINE | Admitting: INTERNAL MEDICINE
Payer: COMMERCIAL

## 2023-11-16 DIAGNOSIS — Z12.31 VISIT FOR SCREENING MAMMOGRAM: ICD-10-CM

## 2023-11-16 PROCEDURE — 77067 SCR MAMMO BI INCL CAD: CPT

## 2023-12-24 ENCOUNTER — MYC MEDICAL ADVICE (OUTPATIENT)
Dept: INTERNAL MEDICINE | Facility: OTHER | Age: 63
End: 2023-12-24
Payer: COMMERCIAL

## 2023-12-24 DIAGNOSIS — J44.1 COPD EXACERBATION (H): ICD-10-CM

## 2023-12-27 RX ORDER — PREDNISONE 20 MG/1
40 TABLET ORAL DAILY
Qty: 10 TABLET | Refills: 0 | Status: ON HOLD | OUTPATIENT
Start: 2023-12-27 | End: 2024-09-26

## 2024-02-18 ENCOUNTER — HOSPITAL ENCOUNTER (OUTPATIENT)
Dept: GENERAL RADIOLOGY | Facility: OTHER | Age: 64
Discharge: HOME | End: 2024-02-18
Payer: COMMERCIAL

## 2024-02-18 ENCOUNTER — OFFICE VISIT (OUTPATIENT)
Dept: FAMILY MEDICINE | Facility: OTHER | Age: 64
End: 2024-02-18
Payer: COMMERCIAL

## 2024-02-18 VITALS
HEIGHT: 62 IN | SYSTOLIC BLOOD PRESSURE: 120 MMHG | BODY MASS INDEX: 16.71 KG/M2 | OXYGEN SATURATION: 96 % | WEIGHT: 90.8 LBS | HEART RATE: 96 BPM | TEMPERATURE: 97.2 F | RESPIRATION RATE: 18 BRPM | DIASTOLIC BLOOD PRESSURE: 76 MMHG

## 2024-02-18 DIAGNOSIS — R07.81 RIB PAIN ON LEFT SIDE: ICD-10-CM

## 2024-02-18 DIAGNOSIS — S22.32XA CLOSED FRACTURE OF ONE RIB OF LEFT SIDE, INITIAL ENCOUNTER: Primary | ICD-10-CM

## 2024-02-18 PROCEDURE — 99213 OFFICE O/P EST LOW 20 MIN: CPT

## 2024-02-18 PROCEDURE — 71101 X-RAY EXAM UNILAT RIBS/CHEST: CPT | Mod: LT

## 2024-02-18 ASSESSMENT — PAIN SCALES - GENERAL: PAINLEVEL: MILD PAIN (2)

## 2024-02-18 NOTE — NURSING NOTE
"Chief Complaint   Patient presents with    Chest Pain     Left rib pain     Patient here for left sided rib pain x3 days. She was cleaning a bathtub and slipped and hit ribs on edge of bathtub. Deep breaths and reaching are painful.    Initial /76   Pulse 96   Temp 97.2  F (36.2  C) (Tympanic)   Resp 18   Ht 1.575 m (5' 2\")   Wt 41.2 kg (90 lb 12.8 oz)   LMP 01/01/1996 (Approximate)   SpO2 96%   BMI 16.61 kg/m   Estimated body mass index is 16.61 kg/m  as calculated from the following:    Height as of this encounter: 1.575 m (5' 2\").    Weight as of this encounter: 41.2 kg (90 lb 12.8 oz).  Medication Review: complete    The next two questions are to help us understand your food security.  If you are feeling you need any assistance in this area, we have resources available to support you today.          10/10/2023   SDOH- Food Insecurity   Within the past 12 months, did you worry that your food would run out before you got money to buy more? N   Within the past 12 months, did the food you bought just not last and you didn t have money to get more? N         Health Care Directive:  Patient has a Health Care Directive on file      Christal Allen LPN      "

## 2024-02-18 NOTE — PROGRESS NOTES
ASSESSMENT/PLAN:    I have reviewed the nursing notes.  I have reviewed the findings, diagnosis, plan and need for follow up with the patient.    1. Closed fracture of one rib of left side, initial encounter  2. Rib pain on left side  - XR Ribs & Chest Lt 3v    Patient presents with left-sided rib pain after hitting her ribs on her bathtub while she was cleaning it 3 days ago.  Left rib and chest x-ray was positive for a nondisplaced fracture of her left ninth rib.  No concerning abnormalities noted with her chest.  Discussed results with patient in clinic.  Patient will continue to manage her pain with Tylenol and ibuprofen as needed.  She will also alternate between ice and heat.  Advised patient that she should brace her ribs when she coughs.  Advised patient that she should continue to do deep breathing exercises and should avoid restricting her breathing.    Discussed warning signs/symptoms indicative of need to f/u    Follow up if symptoms persist or worsen or concerns    I explained my diagnostic considerations and recommendations to the patient, who voiced understanding and agreement with the treatment plan. All questions were answered. We discussed potential side effects of any prescribed or recommended therapies, as well as expectations for response to treatments.    Irving Bowling, АЛЕКСАНДР CNP  2/18/2024  4:17 PM    HPI:    Ember Tavares is a 63 year old female  who presents to [facility] today for concerns of rib pain    Three days ago patient was scrubbing her tub and slipped and hit her left ribs on the tub. She thought she felt something pop. She states she has left rib pain when taking a deep breath, coughing, bending over, and reaching with her left arm. She has been taking tylenol for the pain. She denies any bruising or redness. She states it is tender to the touch.  Patient has a history of COPD and a chronic cough.    Past Medical History:   Diagnosis Date    Alpha-1-antitrypsin deficiency  carrier 08/23/2017    Atherosclerosis of abdominal aorta (H24) 08/24/2017    Overview:  [facility] Studies: 2/19/2013 -- CTA ABDOMEN AND PELVIS WITH BILATERAL LOWER EXTREMITY RUNOFF  CLINICAL HISTORY: Iliac and mesenteric ischemia.  TECHNIQUE: 125 mL Omnipaque 300 IV contrast was administered. 3-D arterial reformations were performed.  FINDINGS: There is a well-defined ovoid density at the medial right costophrenic angle. This measures 2.3 x 0.9 x 0.6 cm. It demonstra    Chronic abdominal pain 03/10/2010    Overview:  16 year duration    Chronic sinusitis     No Comments Provided    Closed fracture of skull (H)     1972    COPD, severe (H) 08/24/2017    Overview:  8/4/2014 -- PULMONARY FUNCTION  SITE:  [facility] ORDERED BY:  VANNESA LUNA  CLINICAL SUMMARY: 53-year-old female with a history of severe COPD.   TECHNICIAN NOTE: Good effort.   DATA: FVC 1.85 (61%). FEV1 1.04 (45%). FEV1/FVC ratio 56%. DLCO 13.5 to 62%.   Flow volume curve is consistent with airway obstruction.   IMPRESSION: 1. Severe obstructive pulmon    Dental abscess 12/02/2019    GERD (gastroesophageal reflux disease) 11/29/2017    Hiatal hernia 08/24/2017    Influenza due to influenza virus, type A, human 11/27/2022    Other and unspecified hyperlipidemia 08/01/2012    PAD (peripheral artery disease) (H24) 10/13/2015    Psoriasis 08/24/2017    Psoriatic arthritis (H) 11/20/2017    Schatzki's ring of distal esophagus 08/24/2017    Ulcer of right cornea 08/18/2018    Vitamin B12 deficiency 04/17/2018    Vitamin D deficiency 08/24/2017     Past Surgical History:   Procedure Laterality Date    AS UGI ENDOSCOPY W ESOPHAGEAL DILATION BALLOON <30MM  10/30/2015    ESOPHAGEAL DILATION,Dr. Rainer Allen, [facility]    BIOPSY BREAST Bilateral     1999, 2001, BIOPSY BREAST,benign    CHOLECYSTECTOMY  2004    Laparoscopic    COLONOSCOPY  06/01/2016    x's 3 negative    COLONOSCOPY N/A 04/29/2021    hyperplastic follow up  10 years, 2031    CT CORONARY ANGIOGRAM      CT CORONARY ANGIOGRAM (IA),negative    ESOPHAGOSCOPY, GASTROSCOPY, DUODENOSCOPY (EGD), COMBINED  2009    leobardo, Dr. Allen    ESOPHAGOSCOPY, GASTROSCOPY, DUODENOSCOPY (EGD), COMBINED N/A 2019    Pickering's esophagus, 3 year follow up    ESOPHAGOSCOPY, GASTROSCOPY, DUODENOSCOPY (EGD), COMBINED N/A 10/23/2023    Procedure: Esophagoscopy, gastroscopy, duodenoscopy (EGD)with biospy and Dilation.;  Surgeon: Jagdish Ruiz MD;  Location: [facility] OR    HYSTERECTOMY VAGINAL  1994    ovaries remain    LAPAROSCOPIC NISSEN FUNDOPLICATION N/A 2018    Procedure: LAPAROSCOPIC NISSEN FUNDOPLICATION;  Laparoscopic Nissen Fundoplication;  Surgeon: Jagdish Ruiz MD;  Location: [facility] OR    LAPAROTOMY EXPLORATORY  1990    lysis of adhesions/endometriosis    RECTOCELE REPAIR  2009     Social History     Tobacco Use    Smoking status: Former     Packs/day: 0.50     Years: 32.00     Additional pack years: 0.00     Total pack years: 16.00     Types: Cigarettes, Cigars     Quit date: 2012     Years since quittin.5    Smokeless tobacco: Never   Substance Use Topics    Alcohol use: Yes     Alcohol/week: 2.0 standard drinks of alcohol     Comment: Alcoholic Drinks/day: 1-2 drinks every 1-2 weeks     Current Outpatient Medications   Medication Sig Dispense Refill    albuterol (PROAIR HFA/PROVENTIL HFA/VENTOLIN HFA) 108 (90 Base) MCG/ACT inhaler INHALE 1 TO 2 PUFFS INTO THE LUNGS EVERY 4 HOURS AS NEEDED FOR SHORTNESS OF BREATH OR DIFFICULTY BREATHING 72 g 3    albuterol (PROVENTIL) (2.5 MG/3ML) 0.083% neb solution Take 1 vial (2.5 mg) by nebulization every 6 hours as needed for shortness of breath or wheezing 300 mL 11    aspirin (ASA) 81 MG EC tablet Take 1 tablet (81 mg) by mouth once a week Or 2 x weekly      azithromycin (ZITHROMAX) 250 MG tablet TAKE 1 TABLET BY MOUTH EVERY MONDAY, WEDNESDAY, FRIDAY MORNING FOR 90 DAYS 42 tablet 1    docusate sodium  (COLACE) 100 MG tablet Take 2 tablets (200 mg) by mouth 2 times daily -Adjust dose as needed to maintain soft stools 120 tablet 11    ipratropium - albuterol 0.5 mg/2.5 mg/3 mL (DUONEB) 0.5-2.5 (3) MG/3ML neb solution INHALE CONTENTS OF 1 VIAL BY NEBULIZATION EVERY 6 HOURS AS NEEDED FOR SHORTNESS OF BREATH, DYSPNEA OR WHEEZING Strength: 0.5-2.5 (3) MG/3ML 300 mL 11    mometasone-formoterol (DULERA) 200-5 MCG/ACT inhaler Inhale 2 puffs into the lungs 2 times daily - use with Spacer - 39 g 4    omeprazole (PRILOSEC) 40 MG DR capsule Take 1 capsule (40 mg) by mouth daily 90 capsule 4    red yeast rice 600 MG CAPS Take 1 capsule (600 mg) by mouth daily 90 capsule 4    spacer (OPTICHAMBER SANJUANA) holding chamber -- Use with inhaler - please instruct on proper use -- (okay to sub for similar product) 1 each 1    tiotropium (SPIRIVA RESPIMAT) 2.5 MCG/ACT inhaler INHALE 2 PUFFS INTO THE LUNGS EVERY EVENING 12 g 4    vitamin D3 (CHOLECALCIFEROL) 250 mcg (70655 units) capsule Take 1 capsule by mouth daily      alum & mag hydroxide-simethicone (MAALOX MULTI SYMPTOM MAX ST) 400-400-40 MG/5ML SUSP suspension Take 30 mLs by mouth every 4 hours as needed for indigestion      predniSONE (DELTASONE) 20 MG tablet Take 2 tablets (40 mg) by mouth daily (Patient not taking: Reported on 2/18/2024) 10 tablet 0     Allergies   Allergen Reactions    Penicillins Other (See Comments)     Other reaction(s): Respiratory Arrest    Atorvastatin Muscle Pain (Myalgia)    Morphine Other (See Comments)     Other reaction(s): Chest Pain    Levofloxacin Nausea and Vomiting    Rosuvastatin Nausea and Vomiting    Sucralfate Nausea and Vomiting    Sulfamethoxazole-Trimethoprim Nausea and Vomiting     Yeast infection     Past medical history, past surgical history, current medications and allergies reviewed and accurate to the best of my knowledge.      ROS:  Refer to HPI    /76   Pulse 96   Temp 97.2  F (36.2  C) (Tympanic)   Resp 18   Ht 1.575  "m (5' 2\")   Wt 41.2 kg (90 lb 12.8 oz)   LMP 01/01/1996 (Approximate)   SpO2 96%   BMI 16.61 kg/m      EXAM:  General Appearance: Well appearing 63 year old female, appropriate appearance for age. No acute distress   Respiratory: normal chest wall and respirations.  Normal effort.  Expiratory wheezing throughout all lung fields, no crackles or rhonchi.  No increased work of breathing.  Mild cough appreciated.  Cardiac: RRR with no murmurs  Musculoskeletal:  Equal movement of bilateral upper extremities.  Equal movement of bilateral lower extremities.  Normal gait.  Tenderness with palpation of left lower anterior ribs.  Skin is intact, no signs of edema, ecchymosis, or erythema.  Dermatological: no rashes noted of exposed skin  Neuro: Alert and oriented to person, place, and time.    Psychological: normal affect, alert, oriented, and pleasant.     Xray:  Results for orders placed or performed in visit on 02/18/24   XR Ribs & Chest Lt 3v     Status: None    Narrative    PROCEDURE:  XR RIBS AND CHEST LEFT 3 VIEWS    HISTORY: left lower rib pain from injury; Rib pain on left side    COMPARISON:  None chest radiographs 11/27/2022    FINDINGS: PA chest and 3 view left rib radiographs    Cardiomediastinal silhouette is within normal limits.  No focal consolidation, effusion or pneumothorax. Unchanged  hyperinflation of the lungs.  No suspicious osseous lesion or subdiaphragmatic free air.  Cortical irregularity along the anterolateral left ninth rib  suspicious for fracture.      Impression    IMPRESSION:    No acute cardiopulmonary process.  Nondisplaced left 9th rib fracture.    OH HALEY MD         SYSTEM ID:  [id number]       "

## 2024-04-26 DIAGNOSIS — Z14.8 ALPHA-1-ANTITRYPSIN DEFICIENCY CARRIER: ICD-10-CM

## 2024-04-26 DIAGNOSIS — J43.1 PANLOBULAR EMPHYSEMA (H): ICD-10-CM

## 2024-04-26 DIAGNOSIS — J44.9 COPD, SEVERE (H): ICD-10-CM

## 2024-05-03 RX ORDER — ALBUTEROL SULFATE 90 UG/1
AEROSOL, METERED RESPIRATORY (INHALATION)
Qty: 72 G | Refills: 2 | Status: SHIPPED | OUTPATIENT
Start: 2024-05-03

## 2024-05-03 RX ORDER — AZITHROMYCIN 250 MG/1
250 TABLET, FILM COATED ORAL
Qty: 42 TABLET | Refills: 1 | Status: SHIPPED | OUTPATIENT
Start: 2024-05-03 | End: 2024-08-30

## 2024-05-03 NOTE — TELEPHONE ENCOUNTER
[facility] sent Rx request for the following:      Requested Prescriptions   Pending Prescriptions Disp Refills    albuterol (PROAIR HFA/PROVENTIL HFA/VENTOLIN HFA) 108 (90 Base) MCG/ACT inhaler [Pharmacy Med Name: albuterol sulfate HFA 90 mcg/actuation aerosol inhaler] 72 g 2     Sig: INHALE 1 TO 2 PUFF(S) INTO THE LUNGS EVERY 4 HOURS AS NEEDED FOR SHORTNESS OF BREATH OR DIFFICULTY BREATHING     Last Prescription Date:   10/10/23  Last Fill Qty/Refills:         72g, R-3         azithromycin (ZITHROMAX) 250 MG tablet 42 tablet 1     Sig: TAKE 1 TABLET BY MOUTH EVERY MONDAY, WEDNESDAY, FRIDAY MORNING FOR 90 DAYS       There is no refill protocol information for this order          Last Prescription Date:   11/21/23  Last Fill Qty/Refills:         42, R-1    Last Office Visit:              10/10/23   Future Office visit:           10/11/24    Routing to PCP for refill consideration.  Tianna Klein RN on 5/3/2024 at 8:04 AM

## 2024-06-03 ENCOUNTER — MYC MEDICAL ADVICE (OUTPATIENT)
Dept: INTERNAL MEDICINE | Facility: OTHER | Age: 64
End: 2024-06-03
Payer: COMMERCIAL

## 2024-06-03 DIAGNOSIS — J44.9 COPD, SEVERE (H): ICD-10-CM

## 2024-06-03 DIAGNOSIS — Z14.8 ALPHA-1-ANTITRYPSIN DEFICIENCY CARRIER: ICD-10-CM

## 2024-06-03 DIAGNOSIS — J43.1 PANLOBULAR EMPHYSEMA (H): ICD-10-CM

## 2024-06-03 RX ORDER — AZITHROMYCIN 250 MG/1
250 TABLET, FILM COATED ORAL DAILY
Qty: 90 TABLET | Refills: 4 | Status: SHIPPED | OUTPATIENT
Start: 2024-06-03

## 2024-06-03 NOTE — TELEPHONE ENCOUNTER
Requested Prescriptions   Pending Prescriptions Disp Refills    azithromycin (ZITHROMAX) 250 MG tablet 90 tablet 4     Sig: Take 1 tablet (250 mg) by mouth daily       There is no refill protocol information for this order          Last Prescription Date:   5/3/24  (wrong dose/sig)    Last ordered with this sig 10/10/23 by Deb  Last Fill Qty/Refills:         90, R-4    Last Office Visit:              10/10/2023   Future Office visit:            10/11/2024    Monserrat Howard RN on 6/3/2024 at 11:37 AM

## 2024-06-24 ENCOUNTER — MYC MEDICAL ADVICE (OUTPATIENT)
Dept: INTERNAL MEDICINE | Facility: OTHER | Age: 64
End: 2024-06-24
Payer: COMMERCIAL

## 2024-08-29 ASSESSMENT — PAIN SCALES - PAIN ENJOYMENT GENERAL ACTIVITY SCALE (PEG)
AVG_PAIN_PASTWEEK: 2
PEG_TOTALSCORE: 2
PEG_TOTALSCORE: 2
INTERFERED_ENJOYMENT_LIFE: 2
INTERFERED_GENERAL_ACTIVITY: 2

## 2024-08-30 ENCOUNTER — HOSPITAL ENCOUNTER (OUTPATIENT)
Dept: GENERAL RADIOLOGY | Facility: OTHER | Age: 64
Discharge: HOME | End: 2024-08-30
Attending: INTERNAL MEDICINE
Payer: COMMERCIAL

## 2024-08-30 ENCOUNTER — OFFICE VISIT (OUTPATIENT)
Dept: INTERNAL MEDICINE | Facility: OTHER | Age: 64
End: 2024-08-30
Attending: INTERNAL MEDICINE
Payer: COMMERCIAL

## 2024-08-30 VITALS
DIASTOLIC BLOOD PRESSURE: 78 MMHG | HEART RATE: 88 BPM | SYSTOLIC BLOOD PRESSURE: 118 MMHG | WEIGHT: 89 LBS | RESPIRATION RATE: 16 BRPM | BODY MASS INDEX: 16.38 KG/M2 | OXYGEN SATURATION: 94 % | HEIGHT: 62 IN

## 2024-08-30 DIAGNOSIS — E78.2 MIXED HYPERLIPIDEMIA: ICD-10-CM

## 2024-08-30 DIAGNOSIS — R19.5 PENCILLING OF STOOLS: ICD-10-CM

## 2024-08-30 DIAGNOSIS — R10.9 CHRONIC ABDOMINAL PAIN: ICD-10-CM

## 2024-08-30 DIAGNOSIS — K59.09 CONSTIPATION, CHRONIC: ICD-10-CM

## 2024-08-30 DIAGNOSIS — Z14.8 ALPHA-1-ANTITRYPSIN DEFICIENCY CARRIER: ICD-10-CM

## 2024-08-30 DIAGNOSIS — J44.9 COPD, SEVERE (H): ICD-10-CM

## 2024-08-30 DIAGNOSIS — G89.29 CHRONIC ABDOMINAL PAIN: ICD-10-CM

## 2024-08-30 DIAGNOSIS — K21.00 GASTROESOPHAGEAL REFLUX DISEASE WITH ESOPHAGITIS WITHOUT HEMORRHAGE: ICD-10-CM

## 2024-08-30 DIAGNOSIS — R19.4 ENCOUNTER FOR DIAGNOSTIC COLONOSCOPY DUE TO CHANGE IN BOWEL HABITS: ICD-10-CM

## 2024-08-30 DIAGNOSIS — Z87.891 PERSONAL HISTORY OF TOBACCO USE: ICD-10-CM

## 2024-08-30 DIAGNOSIS — Z71.85 VACCINE COUNSELING: ICD-10-CM

## 2024-08-30 DIAGNOSIS — K22.2 SCHATZKI'S RING OF DISTAL ESOPHAGUS: ICD-10-CM

## 2024-08-30 DIAGNOSIS — J44.1 COPD EXACERBATION (H): Primary | ICD-10-CM

## 2024-08-30 DIAGNOSIS — Z98.890 S/P NISSEN FUNDOPLICATION (WITHOUT GASTROSTOMY TUBE) PROCEDURE: ICD-10-CM

## 2024-08-30 DIAGNOSIS — K43.9 VENTRAL HERNIA WITHOUT OBSTRUCTION OR GANGRENE: ICD-10-CM

## 2024-08-30 DIAGNOSIS — K44.9 HIATAL HERNIA: ICD-10-CM

## 2024-08-30 LAB
ALBUMIN SERPL BCG-MCNC: 4.7 G/DL (ref 3.5–5.2)
ALP SERPL-CCNC: 81 U/L (ref 40–150)
ALT SERPL W P-5'-P-CCNC: 25 U/L (ref 0–50)
ANION GAP SERPL CALCULATED.3IONS-SCNC: 9 MMOL/L (ref 7–15)
AST SERPL W P-5'-P-CCNC: 26 U/L (ref 0–45)
BASOPHILS # BLD AUTO: 0.1 10E3/UL (ref 0–0.2)
BASOPHILS NFR BLD AUTO: 2 %
BILIRUB SERPL-MCNC: 0.4 MG/DL
BUN SERPL-MCNC: 12.9 MG/DL (ref 8–23)
CALCIUM SERPL-MCNC: 9.8 MG/DL (ref 8.8–10.4)
CHLORIDE SERPL-SCNC: 103 MMOL/L (ref 98–107)
CHOLEST SERPL-MCNC: 191 MG/DL
CREAT SERPL-MCNC: 0.76 MG/DL (ref 0.51–0.95)
EGFRCR SERPLBLD CKD-EPI 2021: 88 ML/MIN/1.73M2
EOSINOPHIL # BLD AUTO: 0.2 10E3/UL (ref 0–0.7)
EOSINOPHIL NFR BLD AUTO: 2 %
ERYTHROCYTE [DISTWIDTH] IN BLOOD BY AUTOMATED COUNT: 13 % (ref 10–15)
FASTING STATUS PATIENT QL REPORTED: YES
FASTING STATUS PATIENT QL REPORTED: YES
GLUCOSE SERPL-MCNC: 102 MG/DL (ref 70–99)
HCO3 SERPL-SCNC: 30 MMOL/L (ref 22–29)
HCT VFR BLD AUTO: 45.3 % (ref 35–47)
HDLC SERPL-MCNC: 58 MG/DL
HGB BLD-MCNC: 14.9 G/DL (ref 11.7–15.7)
IMM GRANULOCYTES # BLD: 0 10E3/UL
IMM GRANULOCYTES NFR BLD: 0 %
LDLC SERPL CALC-MCNC: 116 MG/DL
LIPASE SERPL-CCNC: 21 U/L (ref 13–60)
LYMPHOCYTES # BLD AUTO: 2.1 10E3/UL (ref 0.8–5.3)
LYMPHOCYTES NFR BLD AUTO: 26 %
MCH RBC QN AUTO: 30.3 PG (ref 26.5–33)
MCHC RBC AUTO-ENTMCNC: 32.9 G/DL (ref 31.5–36.5)
MCV RBC AUTO: 92 FL (ref 78–100)
MONOCYTES # BLD AUTO: 0.7 10E3/UL (ref 0–1.3)
MONOCYTES NFR BLD AUTO: 9 %
NEUTROPHILS # BLD AUTO: 5 10E3/UL (ref 1.6–8.3)
NEUTROPHILS NFR BLD AUTO: 62 %
NONHDLC SERPL-MCNC: 133 MG/DL
NRBC # BLD AUTO: 0 10E3/UL
NRBC BLD AUTO-RTO: 0 /100
PLATELET # BLD AUTO: 311 10E3/UL (ref 150–450)
POTASSIUM SERPL-SCNC: 4.6 MMOL/L (ref 3.4–5.3)
PROT SERPL-MCNC: 7.5 G/DL (ref 6.4–8.3)
RBC # BLD AUTO: 4.92 10E6/UL (ref 3.8–5.2)
SODIUM SERPL-SCNC: 142 MMOL/L (ref 135–145)
TRIGL SERPL-MCNC: 83 MG/DL
WBC # BLD AUTO: 8.1 10E3/UL (ref 4–11)

## 2024-08-30 PROCEDURE — 83690 ASSAY OF LIPASE: CPT | Mod: ZL | Performed by: INTERNAL MEDICINE

## 2024-08-30 PROCEDURE — 99214 OFFICE O/P EST MOD 30 MIN: CPT | Performed by: INTERNAL MEDICINE

## 2024-08-30 PROCEDURE — 36415 COLL VENOUS BLD VENIPUNCTURE: CPT | Mod: ZL | Performed by: INTERNAL MEDICINE

## 2024-08-30 PROCEDURE — 71046 X-RAY EXAM CHEST 2 VIEWS: CPT

## 2024-08-30 PROCEDURE — G0296 VISIT TO DETERM LDCT ELIG: HCPCS | Performed by: INTERNAL MEDICINE

## 2024-08-30 PROCEDURE — 74019 RADEX ABDOMEN 2 VIEWS: CPT

## 2024-08-30 PROCEDURE — G0463 HOSPITAL OUTPT CLINIC VISIT: HCPCS | Mod: 25

## 2024-08-30 PROCEDURE — G2211 COMPLEX E/M VISIT ADD ON: HCPCS | Performed by: INTERNAL MEDICINE

## 2024-08-30 PROCEDURE — 80061 LIPID PANEL: CPT | Mod: ZL | Performed by: INTERNAL MEDICINE

## 2024-08-30 PROCEDURE — 82040 ASSAY OF SERUM ALBUMIN: CPT | Mod: ZL | Performed by: INTERNAL MEDICINE

## 2024-08-30 PROCEDURE — 85004 AUTOMATED DIFF WBC COUNT: CPT | Mod: ZL | Performed by: INTERNAL MEDICINE

## 2024-08-30 RX ORDER — PREDNISONE 10 MG/1
TABLET ORAL
Qty: 30 TABLET | Refills: 0 | Status: ON HOLD | OUTPATIENT
Start: 2024-08-30 | End: 2024-09-26

## 2024-08-30 RX ORDER — DOXYCYCLINE 100 MG/1
100 CAPSULE ORAL 2 TIMES DAILY
Qty: 28 CAPSULE | Refills: 0 | Status: SHIPPED | OUTPATIENT
Start: 2024-08-30 | End: 2024-09-13

## 2024-08-30 ASSESSMENT — ENCOUNTER SYMPTOMS
COUGH: 1
SORE THROAT: 0
SHORTNESS OF BREATH: 1
NERVOUS/ANXIOUS: 0
PARESTHESIAS: 0
JOINT SWELLING: 0
DYSURIA: 0
HEADACHES: 1
DIARRHEA: 0
FREQUENCY: 1
ARTHRALGIAS: 1
HEMATURIA: 0
CHEST TIGHTNESS: 0
BRUISES/BLEEDS EASILY: 0
FEVER: 0
MYALGIAS: 0
WEAKNESS: 0
NAUSEA: 0
CHILLS: 0
CONSTIPATION: 0
DIZZINESS: 0
ABDOMINAL PAIN: 0
UNEXPECTED WEIGHT CHANGE: 1
EYE PAIN: 0
PALPITATIONS: 0
WHEEZING: 1
HEARTBURN: 0
HEMATOCHEZIA: 0

## 2024-08-30 ASSESSMENT — PAIN SCALES - GENERAL: PAINLEVEL: MILD PAIN (2)

## 2024-08-30 NOTE — H&P (VIEW-ONLY)
Assessment & Plan     ICD-10-CM    1. COPD exacerbation (H)  J44.1 doxycycline hyclate (VIBRAMYCIN) 100 MG capsule     predniSONE (DELTASONE) 10 MG tablet      2. Chronic abdominal pain  R10.9 XR Abdomen 2 Views    G89.29 CBC with Platelets & Differential     Comprehensive metabolic panel     Lipase     CBC with Platelets & Differential     Comprehensive metabolic panel     Lipase     CT Abdomen Pelvis w/o & w Contrast      3. Constipation, chronic  K59.09 CT Abdomen Pelvis w/o & w Contrast      4. COPD, severe (H)  J44.9 XR Chest 2 Views     Prof fee: Shared Decision Making for Lung Cancer Screening     CT Chest Lung Cancer Scrn Low Dose wo      5. Alpha-1-antitrypsin deficiency carrier  Z14.8       6. Hiatal hernia  K44.9       7. S/P Nissen fundoplication (without gastrostomy tube) procedure  Z98.890       8. Schatzki's ring of distal esophagus  K22.2       9. Gastroesophageal reflux disease with esophagitis without hemorrhage  K21.00 CT Abdomen Pelvis w/o & w Contrast      10. Pencilling of stools  R19.5 CT Abdomen Pelvis w/o & w Contrast     Adult GI  Referral - Procedure Only     Adult Gen Surg  Referral      11. Mixed hyperlipidemia  E78.2 Lipid Profile     Lipid Profile      12. Encounter for diagnostic colonoscopy due to change in bowel habits  R19.4 Adult GI  Referral - Procedure Only      13. Personal history of tobacco use  Z87.891 Prof fee: Shared Decision Making for Lung Cancer Screening     CT Chest Lung Cancer Scrn Low Dose wo      14. Vaccine counseling  Z71.85       15. Ventral hernia without obstruction or gangrene  K43.9 Adult Gen Surg  Referral         Patient presents for evaluation and treatment of breathing issues.  Has been having increased cough, phlegm, shortness of breath for the last 2+ weeks.  Symptoms are slowly worsening.  Concern for possible pneumonia.  Has also been having some abdominal pain, has chronic constipation, reflux, heartburn, history  of Nissen fundoplication but is having issues since surgery.  Gradually worsening symptoms since surgery for the last year or 2.  Labs and x-ray today.  No signs of pneumonia on chest x-ray.  Abdominal x-ray shows some gas and stool within the colon.  Gas-filled nondilated sm      Start doxycycline for COPD exacerbation with prednisone.  Hold Zithromax for now.  Currently taking 150 mg daily.    Given her abdominal pain and constipation.  Lipase was checked which was normal.  CT scan abdomen/pelvis ordered.    Patient has severe COPD, alpha-1 antitrypsin deficiency carrier.  Patient qualifies for lung cancer screening CT due to her history tobacco use.  See separate documentation below.    Having palpable reproducible pain involving her ventral hernia.  Suspect this is the cause of her more recent epigastric pain.  Quite tender on evaluation today.  Area is soft.  No obvious incarceration.  Surgical consult requested.  CT scan abdomen/pelvis ordered.    Patient is due for colonoscopy.  Orders placed.    HYPERTENSION - Ongoing. Blood pressure is currently well controlled.  Medication side effects: None. Denies syncope or presyncope.  Continue current medications.   Medication list reviewed/updated. Refills completed as needed.      MIXED HYPERLIPIDEMIA.  Ongoing. LDL is at goal: No. Triglycerides are at goal: No.    Medication side effects reported: None.   Continue current medications for now. Medication list reviewed/updated. Refills completed as needed.  Recent Labs   Lab Test 10/22/20  1101 08/24/17  1158   CHOL 219*  --    HDL 46 38   * 136*   TRIG 153* 162*        COPD - Patient has a longstanding history of COPD: Severe = FeV1 < 30%-49%. Patient has been doing reasonably well overall noting PEÑA, COUGH, WHEEZING, WEIGHT LOSS, and Phlegm and continues on Inhaler medication regimen without adverse reactions or side effects.       Vaccine counseling completed.  Encourage routine / annual  vaccinations.    The longitudinal plan of care for the diagnosis(es)/condition(s) as documented were addressed during this visit. Due to the added complexity in care, I will continue to support Ember in the subsequent management and with ongoing continuity of care.        Results for orders placed or performed during the hospital encounter of 08/30/24   XR Chest 2 Views     Status: None    Narrative    PROCEDURE: XR CHEST 2 VIEWS 8/30/2024 9:49 AM    HISTORY: COPD, severe (H)    COMPARISONS: 2/18/2024.    TECHNIQUE: 2 views.    FINDINGS: Heart is unenlarged. Lungs are clear and no pleural effusion  is seen. Lungs are hyperinflated with flattening of the diaphragm.    Surgical clips are seen in the upper abdomen.         Impression    IMPRESSION: COPD without acute abnormality.    ALIYA BERG MD         SYSTEM ID:  [id number]   Results for orders placed or performed during the hospital encounter of 08/30/24   XR Abdomen 2 Views     Status: None    Narrative    PROCEDURE: XR ABDOMEN 2 VIEWS 8/30/2024 9:48 AM    HISTORY: Chronic abdominal pain; Chronic abdominal pain    COMPARISONS: None.    TECHNIQUE: Supine and upright views.    FINDINGS: There is no free air. Surgical clips are seen in the upper  abdomen. Gas and stool is seen within the colon. There are gas-filled  nondilated small bowel loops. No mass is seen and there is no  suspicious calcification.         Impression    IMPRESSION: No significant bowel distention.    ALIYA BERG MD         SYSTEM ID:  [id number]   Results for orders placed or performed in visit on 08/30/24   Comprehensive metabolic panel     Status: Abnormal   Result Value Ref Range    Sodium 142 135 - 145 mmol/L    Potassium 4.6 3.4 - 5.3 mmol/L    Carbon Dioxide (CO2) 30 (H) 22 - 29 mmol/L    Anion Gap 9 7 - 15 mmol/L    Urea Nitrogen 12.9 8.0 - 23.0 mg/dL    Creatinine 0.76 0.51 - 0.95 mg/dL    GFR Estimate 88 >60 mL/min/1.73m2    Calcium 9.8 8.8 - 10.4 mg/dL    Chloride 103 98 - 107  mmol/L    Glucose 102 (H) 70 - 99 mg/dL    Alkaline Phosphatase 81 40 - 150 U/L    AST 26 0 - 45 U/L    ALT 25 0 - 50 U/L    Protein Total 7.5 6.4 - 8.3 g/dL    Albumin 4.7 3.5 - 5.2 g/dL    Bilirubin Total 0.4 <=1.2 mg/dL    Patient Fasting > 8hrs? Yes    Lipid Profile     Status: Abnormal   Result Value Ref Range    Cholesterol 191 <200 mg/dL    Triglycerides 83 <150 mg/dL    Direct Measure HDL 58 >=50 mg/dL    LDL Cholesterol Calculated 116 (H) <=100 mg/dL    Non HDL Cholesterol 133 (H) <130 mg/dL    Patient Fasting > 8hrs? Yes     Narrative    Cholesterol  Desirable:  <200 mg/dL    Triglycerides  Normal:  Less than 150 mg/dL  Borderline High:  150-199 mg/dL  High:  200-499 mg/dL  Very High:  Greater than or equal to 500 mg/dL    Direct Measure HDL  Female:  Greater than or equal to 50 mg/dL   Male:  Greater than or equal to 40 mg/dL    LDL Cholesterol  Desirable:  <100mg/dL  Above Desirable:  100-129 mg/dL   Borderline High:  130-159 mg/dL   High:  160-189 mg/dL   Very High:  >= 190 mg/dL    Non HDL Cholesterol  Desirable:  130 mg/dL  Above Desirable:  130-159 mg/dL  Borderline High:  160-189 mg/dL  High:  190-219 mg/dL  Very High:  Greater than or equal to 220 mg/dL   Lipase     Status: Normal   Result Value Ref Range    Lipase 21 13 - 60 U/L   CBC with platelets and differential     Status: None   Result Value Ref Range    WBC Count 8.1 4.0 - 11.0 10e3/uL    RBC Count 4.92 3.80 - 5.20 10e6/uL    Hemoglobin 14.9 11.7 - 15.7 g/dL    Hematocrit 45.3 35.0 - 47.0 %    MCV 92 78 - 100 fL    MCH 30.3 26.5 - 33.0 pg    MCHC 32.9 31.5 - 36.5 g/dL    RDW 13.0 10.0 - 15.0 %    Platelet Count 311 150 - 450 10e3/uL    % Neutrophils 62 %    % Lymphocytes 26 %    % Monocytes 9 %    % Eosinophils 2 %    % Basophils 2 %    % Immature Granulocytes 0 %    NRBCs per 100 WBC 0 <1 /100    Absolute Neutrophils 5.0 1.6 - 8.3 10e3/uL    Absolute Lymphocytes 2.1 0.8 - 5.3 10e3/uL    Absolute Monocytes 0.7 0.0 - 1.3 10e3/uL    Absolute  Eosinophils 0.2 0.0 - 0.7 10e3/uL    Absolute Basophils 0.1 0.0 - 0.2 10e3/uL    Absolute Immature Granulocytes 0.0 <=0.4 10e3/uL    Absolute NRBCs 0.0 10e3/uL   CBC with Platelets & Differential     Status: None    Narrative    The following orders were created for panel order CBC with Platelets & Differential.  Procedure                               Abnormality         Status                     ---------                               -----------         ------                     CBC with platelets and d...[[id number]]                      Final result                 Please view results for these tests on the individual orders.      Lipase is normal.  CBC is normal.  LDL cholesterol is high and not at goal.  Glucose minimally elevated.  Carbon oxide is slightly elevated.  Liver enzymes are normal.               Return in about 6 weeks (around 10/11/2024) for Annual Medicare Wellness Visit.      Tim Boyd MD  [facility]    Review of Systems   Constitutional:  Positive for unexpected weight change. Negative for chills and fever.   HENT:  Positive for congestion. Negative for ear pain, hearing loss and sore throat.    Eyes:  Negative for pain and visual disturbance.   Respiratory:  Positive for cough, shortness of breath and wheezing. Negative for chest tightness.    Cardiovascular:  Negative for chest pain, palpitations and peripheral edema.   Gastrointestinal:  Negative for abdominal pain, constipation, diarrhea, heartburn, hematochezia and nausea.   Genitourinary:  Positive for frequency. Negative for dysuria, genital sores, hematuria and urgency.   Musculoskeletal:  Positive for arthralgias. Negative for joint swelling and myalgias.   Skin:  Negative for rash.   Allergic/Immunologic: Negative for immunocompromised state.   Neurological:  Positive for headaches. Negative for dizziness, weakness and paresthesias.   Hematological:  Does not bruise/bleed easily.   Psychiatric/Behavioral:   "Negative for mood changes. The patient is not nervous/anxious.        Rocco Pa is a 63 year old, presenting for the following health issues:  Abdominal Pain and COPD        8/30/2024     8:59 AM   Additional Questions   Roomed by Sergo High LPN     History of Present Illness       COPD:  She presents for follow up of COPD.   Overall, COPD symptoms are slightly worse since last visit. She has more than usual fatigue or shortness of breath with exertion and less than usual shortness of breath at rest.  She often coughs and does have change in sputum. No recent fever. She can walk the length of 3-5 rooms without stopping to rest. She can walk 1 flights of stairs without resting. The patient has had no ED, urgent care, or hospital admissions because of COPD since the last visit.     Reason for visit:  Abdominal issues,    She eats 2-3 servings of fruits and vegetables daily.She consumes 2 sweetened beverage(s) daily.She exercises with enough effort to increase her heart rate 9 or less minutes per day.  She exercises with enough effort to increase her heart rate 3 or less days per week. She is missing 1 dose(s) of medications per week.  She is not taking prescribed medications regularly due to remembering to take.                     Objective    /78   Pulse 88   Resp 16   Ht 1.575 m (5' 2\")   Wt 40.4 kg (89 lb)   LMP 01/01/1996 (Approximate)   SpO2 94%   Breastfeeding No   BMI 16.28 kg/m    Body mass index is 16.28 kg/m .  Physical Exam  Constitutional:       General: She is not in acute distress.     Appearance: She is ill-appearing. She is not toxic-appearing.      Comments: + Thin body habitus   Eyes:      General: No scleral icterus.     Conjunctiva/sclera: Conjunctivae normal.   Cardiovascular:      Rate and Rhythm: Normal rate and regular rhythm.      Pulses: Normal pulses.   Pulmonary:      Comments: + intermittent coughing, dry.  Prolonged expiratory phase  Abdominal:      Palpations: " Abdomen is soft.      Tenderness: There is no abdominal tenderness. There is no guarding or rebound.      Hernia: A hernia (ventral hernia, somewhat soft, but tender to palpation) is present.   Musculoskeletal:      Right lower leg: No edema.      Left lower leg: No edema.   Skin:     General: Skin is warm and dry.      Findings: No rash.   Neurological:      Mental Status: She is alert. Mental status is at baseline.   Psychiatric:         Mood and Affect: Mood normal.         Behavior: Behavior normal.                    Signed Electronically by: Tim Boyd MD  Lung Cancer Screening Shared Decision Making Visit     Ember Tavares, a 63 year old female, is eligible for lung cancer screening    History   Smoking Status    Former    Types: Cigarettes, Cigars   Smokeless Tobacco    Never       I have discussed with patient the risks and benefits of screening for lung cancer with low-dose CT.     The risks include:    radiation exposure: one low dose chest CT has as much ionizing radiation as about 15 chest x-rays, or 6 months of background radiation living in [place]      false positives: most findings/nodules are NOT cancer, but some might still require additional diagnostic evaluation, including biopsy    over-diagnosis: some slow growing cancers that might never have been clinically significant will be detected and treated unnecessarily     The benefit of early detection of lung cancer is contingent upon adherence to annual screening or more frequent follow up if indicated.     Furthermore, to benefit from screening, Ember must be willing and able to undergo diagnostic procedures, if indicated. Although no specific guide is available for determining severity of comorbidities, it is reasonable to withhold screening in patients who have greater mortality risk from other diseases.     We did discuss that the best way to prevent lung cancer is to not smoke.    Some patients may value a numeric estimation of lung  cancer risk when evaluating if lung cancer screening is right for them, here is one calculator:    ShouldIScreen

## 2024-08-30 NOTE — PATIENT INSTRUCTIONS
Blood pressure is well controlled.     Medications refilled.   Labs and xrays today.     COPD exacerbation (H)    START:   - doxycycline hyclate (VIBRAMYCIN) 100 MG capsule; Take 1 capsule (100 mg) by mouth 2 times daily for 14 days. -- use cheap, generic doxycycline --     -- Skip Zithromax while taking Doxy --    START:   - predniSONE (DELTASONE) 10 MG tablet; 4 tab daily with food x3 day, then 3 tab daily x3 days, then 2 tab daily x3 days, then 1 tab daily      Surgery consult requested  - they will call with date/time of appointment.    -- ventral abdominal hernia.      Chronic abdominal pain    Labs and imaging today....     - XR Abdomen 2 Views; Future  - CBC with Platelets & Differential; Future  - Comprehensive metabolic panel; Future  - Lipase; Future  - CBC with Platelets & Differential  - Comprehensive metabolic panel  - Lipase      - CT Abdomen Pelvis w/o & w Contrast; Future   - they will call with date/time of appointment.    - CT Chest Lung Cancer Scrn Low Dose wo; Future  - they will call with date/time of appointment.          Pencilling of stools  Due to change in stool caliber --- Colonoscopy ordered  - they will call with date/time of appointment.        Return on 10/11 -- for wellness visit -- or sooner as needed for follow-up with Dr. Boyd.    Clinic : [phone]  Appointment line: [phone]       Results for orders placed or performed during the hospital encounter of 08/30/24   XR Chest 2 Views     Status: None    Narrative    PROCEDURE: XR CHEST 2 VIEWS 8/30/2024 9:49 AM    HISTORY: COPD, severe (H)    COMPARISONS: 2/18/2024.    TECHNIQUE: 2 views.    FINDINGS: Heart is unenlarged. Lungs are clear and no pleural effusion  is seen. Lungs are hyperinflated with flattening of the diaphragm.    Surgical clips are seen in the upper abdomen.         Impression    IMPRESSION: COPD without acute abnormality.    ALIYA BERG MD         SYSTEM ID:  [id number]   Results for orders  placed or performed during the hospital encounter of 08/30/24   XR Abdomen 2 Views     Status: None    Narrative    PROCEDURE: XR ABDOMEN 2 VIEWS 8/30/2024 9:48 AM    HISTORY: Chronic abdominal pain; Chronic abdominal pain    COMPARISONS: None.    TECHNIQUE: Supine and upright views.    FINDINGS: There is no free air. Surgical clips are seen in the upper  abdomen. Gas and stool is seen within the colon. There are gas-filled  nondilated small bowel loops. No mass is seen and there is no  suspicious calcification.         Impression    IMPRESSION: No significant bowel distention.    ALIYA BERG MD         SYSTEM ID:  [id number]   Results for orders placed or performed in visit on 08/30/24   CBC with platelets and differential     Status: None   Result Value Ref Range    WBC Count 8.1 4.0 - 11.0 10e3/uL    RBC Count 4.92 3.80 - 5.20 10e6/uL    Hemoglobin 14.9 11.7 - 15.7 g/dL    Hematocrit 45.3 35.0 - 47.0 %    MCV 92 78 - 100 fL    MCH 30.3 26.5 - 33.0 pg    MCHC 32.9 31.5 - 36.5 g/dL    RDW 13.0 10.0 - 15.0 %    Platelet Count 311 150 - 450 10e3/uL    % Neutrophils 62 %    % Lymphocytes 26 %    % Monocytes 9 %    % Eosinophils 2 %    % Basophils 2 %    % Immature Granulocytes 0 %    NRBCs per 100 WBC 0 <1 /100    Absolute Neutrophils 5.0 1.6 - 8.3 10e3/uL    Absolute Lymphocytes 2.1 0.8 - 5.3 10e3/uL    Absolute Monocytes 0.7 0.0 - 1.3 10e3/uL    Absolute Eosinophils 0.2 0.0 - 0.7 10e3/uL    Absolute Basophils 0.1 0.0 - 0.2 10e3/uL    Absolute Immature Granulocytes 0.0 <=0.4 10e3/uL    Absolute NRBCs 0.0 10e3/uL   CBC with Platelets & Differential     Status: None    Narrative    The following orders were created for panel order CBC with Platelets & Differential.  Procedure                               Abnormality         Status                     ---------                               -----------         ------                     CBC with platelets and d...[[id number]]                      Final result                  Please view results for these tests on the individual orders.            Lung Cancer Screening   Frequently Asked Questions  If you are at high-risk for lung cancer, getting screened with low-dose computed tomography (LDCT) every year can help save your life. This handout offers answers to some of the most common questions about lung cancer screening. If you have other questions, please call [phone] ([phone]).     What is it?  Lung cancer screening uses special X-ray technology to create an image of your lung tissue. The exam is quick and easy and takes less than 10 seconds. We don t give you any medicine or use any needles. You can eat before and after the exam. You don t need to change your clothes as long as the clothing on your chest doesn t contain metal. But, you do need to be able to hold your breath for at least 6 seconds during the exam.    What is the goal of lung cancer screening?  The goal of lung cancer screening is to save lives. Many times, lung cancer is not found until a person starts having physical symptoms. Lung cancer screening can help detect lung cancer in the earliest stages when it may be easier to treat.    Who should be screened for lung cancer?  We suggest lung cancer screening for anyone who is at high-risk for lung cancer. You are in the high-risk group if you:     are between the ages of 55 and 79, and   have smoked at least 1 pack of cigarettes a day for 20 or more years, and   still smoke or have quit within the past 15 years.    However, if you have a new cough or shortness of breath, you should talk to your doctor before being screened.    Why does it matter if I have symptoms?  Certain symptoms can be a sign that you have a condition in your lungs that should be checked and treated by your doctor. These symptoms include fever, chest pain, a new or changing cough, shortness of breath that you have never felt before, coughing up blood or unexplained weight  loss. Having any of these symptoms can greatly affect the results of lung cancer screening.       Should all smokers get an LDCT lung cancer screening exam?  It depends. Lung cancer screening is for a very specific group of men and women who have a history of heavy smoking over a long period of time (see  Who should be screened for lung cancer  above).  I am in the high-risk group, but have been diagnosed with cancer in the past. Is LDCT lung cancer screening right for me?  In some cases, you should not have LDCT lung screening, such as when your doctor is already following your cancer with CT scan studies. Your doctor will help you decide if LDCT lung screening is right for you.  Do I need to have a screening exam every year?  Yes. If you are in the high-risk group described earlier, you should get an LDCT lung cancer screening exam every year until you are 79, or are no longer willing or able to undergo screening and possible procedures to diagnose and treat lung cancer.  How effective is LDCT at preventing death from lung cancer?  Studies have shown that LDCT lung cancer screening can lower the risk of death from lung cancer by 20 percent in people who are at high-risk.  What are the risks?  There are some risks and limitations of LDCT lung cancer screening. We want to make sure you understand the risks and benefits, so please let us know if you have any questions. Your doctor may want to talk with you more about these risks.   Radiation exposure: As with any exam that uses radiation, there is a very small increased risk of cancer. The amount of radiation in LDCT is small--about the same amount a person would get from a mammogram. Your doctor orders the exam when he or she feels the potential benefits outweigh the risks.   False negatives: No test is perfect, including LDCT. It is possible that you may have a medical condition, including lung cancer, that is not found during your exam. This is called a false  negative result.   False positives and more testing: LDCT very often finds something in the lung that could be cancer, but in fact is not. This is called a false positive result. False positive tests often cause anxiety. To make sure these findings are not cancer, you may need to have more tests. These tests will be done only if you give us permission. Sometimes patients need a treatment that can have side effects, such as a biopsy. For more information on false positives, see  What can I expect from the results?    Findings not related to lung cancer: Your LDCT exam also takes pictures of areas of your body next to your lungs. In a very small number of cases, the CT scan will show an abnormal finding in one of these areas, such as your kidneys, adrenal glands, liver or thyroid. This finding may not be serious, but you may need more tests. Your doctor can help you decide what other tests you may need, if any.  What can I expect from the results?  About 1 out of 4 LDCT exams will find something that may need more tests. Most of the time, these findings are lung nodules. Lung nodules are very small collections of tissue in the lung. These nodules are very common, and the vast majority--more than 97 percent--are not cancer (benign). Most are normal lymph nodes or small areas of scarring from past infections.  But, if a small lung nodule is found to be cancer, the cancer can be cured more than 90 percent of the time. To know if the nodule is cancer, we may need to get more images before your next yearly screening exam. If the nodule has suspicious features (for example, it is large, has an odd shape or grows over time), we will refer you to a specialist for further testing.  Will my doctor also get the results?  Yes. Your doctor will get a copy of your results.  Is it okay to keep smoking now that there s a cancer screening exam?  No. Tobacco is one of the strongest cancer-causing agents. It causes not only lung cancer,  but other cancers and cardiovascular (heart) diseases as well. The damage caused by smoking builds over time. This means that the longer you smoke, the higher your risk of disease. While it is never too late to quit, the sooner you quit, the better.  Where can I find help to quit smoking?  The best way to prevent lung cancer is to stop smoking. If you have already quit smoking, congratulations and keep it up! For help on quitting smoking, please call [place] at 4-091-QUITNOW ([phone]) or the [employer] at 1-274.921.5964 to find local resources near you.  One-on-one health coaching:  If you d prefer to work individually with a health care provider on tobacco cessation, we offer:     Medication Therapy Management:  Our specially trained pharmacists work closely with you and your doctor to help you quit smoking.  Call [phone] or [phone] (toll free).

## 2024-08-30 NOTE — NURSING NOTE
"Chief Complaint   Patient presents with    Abdominal Pain       Initial /78   Pulse 88   Resp 16   Ht 1.575 m (5' 2\")   Wt 40.4 kg (89 lb)   LMP 01/01/1996 (Approximate)   SpO2 94%   Breastfeeding No   BMI 16.28 kg/m   Estimated body mass index is 16.28 kg/m  as calculated from the following:    Height as of this encounter: 1.575 m (5' 2\").    Weight as of this encounter: 40.4 kg (89 lb).  Medication Review: complete    The next two questions are to help us understand your food security.  If you are feeling you need any assistance in this area, we have resources available to support you today.          10/10/2023   SDOH- Food Insecurity   Within the past 12 months, did you worry that your food would run out before you got money to buy more? N   Within the past 12 months, did the food you bought just not last and you didn t have money to get more? N            Health Care Directive:  Patient has a Health Care Directive on file      Nirali High LPN      "

## 2024-08-30 NOTE — PROGRESS NOTES
Assessment & Plan     ICD-10-CM    1. COPD exacerbation (H)  J44.1 doxycycline hyclate (VIBRAMYCIN) 100 MG capsule     predniSONE (DELTASONE) 10 MG tablet      2. Chronic abdominal pain  R10.9 XR Abdomen 2 Views    G89.29 CBC with Platelets & Differential     Comprehensive metabolic panel     Lipase     CBC with Platelets & Differential     Comprehensive metabolic panel     Lipase     CT Abdomen Pelvis w/o & w Contrast      3. Constipation, chronic  K59.09 CT Abdomen Pelvis w/o & w Contrast      4. COPD, severe (H)  J44.9 XR Chest 2 Views     Prof fee: Shared Decision Making for Lung Cancer Screening     CT Chest Lung Cancer Scrn Low Dose wo      5. Alpha-1-antitrypsin deficiency carrier  Z14.8       6. Hiatal hernia  K44.9       7. S/P Nissen fundoplication (without gastrostomy tube) procedure  Z98.890       8. Schatzki's ring of distal esophagus  K22.2       9. Gastroesophageal reflux disease with esophagitis without hemorrhage  K21.00 CT Abdomen Pelvis w/o & w Contrast      10. Pencilling of stools  R19.5 CT Abdomen Pelvis w/o & w Contrast     Adult GI  Referral - Procedure Only     Adult Gen Surg  Referral      11. Mixed hyperlipidemia  E78.2 Lipid Profile     Lipid Profile      12. Encounter for diagnostic colonoscopy due to change in bowel habits  R19.4 Adult GI  Referral - Procedure Only      13. Personal history of tobacco use  Z87.891 Prof fee: Shared Decision Making for Lung Cancer Screening     CT Chest Lung Cancer Scrn Low Dose wo      14. Vaccine counseling  Z71.85       15. Ventral hernia without obstruction or gangrene  K43.9 Adult Gen Surg  Referral         Patient presents for evaluation and treatment of breathing issues.  Has been having increased cough, phlegm, shortness of breath for the last 2+ weeks.  Symptoms are slowly worsening.  Concern for possible pneumonia.  Has also been having some abdominal pain, has chronic constipation, reflux, heartburn, history  of Nissen fundoplication but is having issues since surgery.  Gradually worsening symptoms since surgery for the last year or 2.  Labs and x-ray today.  No signs of pneumonia on chest x-ray.  Abdominal x-ray shows some gas and stool within the colon.  Gas-filled nondilated sm      Start doxycycline for COPD exacerbation with prednisone.  Hold Zithromax for now.  Currently taking 150 mg daily.    Given her abdominal pain and constipation.  Lipase was checked which was normal.  CT scan abdomen/pelvis ordered.    Patient has severe COPD, alpha-1 antitrypsin deficiency carrier.  Patient qualifies for lung cancer screening CT due to her history tobacco use.  See separate documentation below.    Having palpable reproducible pain involving her ventral hernia.  Suspect this is the cause of her more recent epigastric pain.  Quite tender on evaluation today.  Area is soft.  No obvious incarceration.  Surgical consult requested.  CT scan abdomen/pelvis ordered.    Patient is due for colonoscopy.  Orders placed.    HYPERTENSION - Ongoing. Blood pressure is currently well controlled.  Medication side effects: None. Denies syncope or presyncope.  Continue current medications.   Medication list reviewed/updated. Refills completed as needed.      MIXED HYPERLIPIDEMIA.  Ongoing. LDL is at goal: No. Triglycerides are at goal: No.    Medication side effects reported: None.   Continue current medications for now. Medication list reviewed/updated. Refills completed as needed.  Recent Labs   Lab Test 10/22/20  1101 08/24/17  1158   CHOL 219*  --    HDL 46 38   * 136*   TRIG 153* 162*        COPD - Patient has a longstanding history of COPD: Severe = FeV1 < 30%-49%. Patient has been doing reasonably well overall noting PEÑA, COUGH, WHEEZING, WEIGHT LOSS, and Phlegm and continues on Inhaler medication regimen without adverse reactions or side effects.       Vaccine counseling completed.  Encourage routine / annual  vaccinations.    The longitudinal plan of care for the diagnosis(es)/condition(s) as documented were addressed during this visit. Due to the added complexity in care, I will continue to support Ember in the subsequent management and with ongoing continuity of care.        Results for orders placed or performed during the hospital encounter of 08/30/24   XR Chest 2 Views     Status: None    Narrative    PROCEDURE: XR CHEST 2 VIEWS 8/30/2024 9:49 AM    HISTORY: COPD, severe (H)    COMPARISONS: 2/18/2024.    TECHNIQUE: 2 views.    FINDINGS: Heart is unenlarged. Lungs are clear and no pleural effusion  is seen. Lungs are hyperinflated with flattening of the diaphragm.    Surgical clips are seen in the upper abdomen.         Impression    IMPRESSION: COPD without acute abnormality.    ALIYA BERG MD         SYSTEM ID:  [id number]   Results for orders placed or performed during the hospital encounter of 08/30/24   XR Abdomen 2 Views     Status: None    Narrative    PROCEDURE: XR ABDOMEN 2 VIEWS 8/30/2024 9:48 AM    HISTORY: Chronic abdominal pain; Chronic abdominal pain    COMPARISONS: None.    TECHNIQUE: Supine and upright views.    FINDINGS: There is no free air. Surgical clips are seen in the upper  abdomen. Gas and stool is seen within the colon. There are gas-filled  nondilated small bowel loops. No mass is seen and there is no  suspicious calcification.         Impression    IMPRESSION: No significant bowel distention.    ALIYA BERG MD         SYSTEM ID:  [id number]   Results for orders placed or performed in visit on 08/30/24   Comprehensive metabolic panel     Status: Abnormal   Result Value Ref Range    Sodium 142 135 - 145 mmol/L    Potassium 4.6 3.4 - 5.3 mmol/L    Carbon Dioxide (CO2) 30 (H) 22 - 29 mmol/L    Anion Gap 9 7 - 15 mmol/L    Urea Nitrogen 12.9 8.0 - 23.0 mg/dL    Creatinine 0.76 0.51 - 0.95 mg/dL    GFR Estimate 88 >60 mL/min/1.73m2    Calcium 9.8 8.8 - 10.4 mg/dL    Chloride 103 98 - 107  mmol/L    Glucose 102 (H) 70 - 99 mg/dL    Alkaline Phosphatase 81 40 - 150 U/L    AST 26 0 - 45 U/L    ALT 25 0 - 50 U/L    Protein Total 7.5 6.4 - 8.3 g/dL    Albumin 4.7 3.5 - 5.2 g/dL    Bilirubin Total 0.4 <=1.2 mg/dL    Patient Fasting > 8hrs? Yes    Lipid Profile     Status: Abnormal   Result Value Ref Range    Cholesterol 191 <200 mg/dL    Triglycerides 83 <150 mg/dL    Direct Measure HDL 58 >=50 mg/dL    LDL Cholesterol Calculated 116 (H) <=100 mg/dL    Non HDL Cholesterol 133 (H) <130 mg/dL    Patient Fasting > 8hrs? Yes     Narrative    Cholesterol  Desirable:  <200 mg/dL    Triglycerides  Normal:  Less than 150 mg/dL  Borderline High:  150-199 mg/dL  High:  200-499 mg/dL  Very High:  Greater than or equal to 500 mg/dL    Direct Measure HDL  Female:  Greater than or equal to 50 mg/dL   Male:  Greater than or equal to 40 mg/dL    LDL Cholesterol  Desirable:  <100mg/dL  Above Desirable:  100-129 mg/dL   Borderline High:  130-159 mg/dL   High:  160-189 mg/dL   Very High:  >= 190 mg/dL    Non HDL Cholesterol  Desirable:  130 mg/dL  Above Desirable:  130-159 mg/dL  Borderline High:  160-189 mg/dL  High:  190-219 mg/dL  Very High:  Greater than or equal to 220 mg/dL   Lipase     Status: Normal   Result Value Ref Range    Lipase 21 13 - 60 U/L   CBC with platelets and differential     Status: None   Result Value Ref Range    WBC Count 8.1 4.0 - 11.0 10e3/uL    RBC Count 4.92 3.80 - 5.20 10e6/uL    Hemoglobin 14.9 11.7 - 15.7 g/dL    Hematocrit 45.3 35.0 - 47.0 %    MCV 92 78 - 100 fL    MCH 30.3 26.5 - 33.0 pg    MCHC 32.9 31.5 - 36.5 g/dL    RDW 13.0 10.0 - 15.0 %    Platelet Count 311 150 - 450 10e3/uL    % Neutrophils 62 %    % Lymphocytes 26 %    % Monocytes 9 %    % Eosinophils 2 %    % Basophils 2 %    % Immature Granulocytes 0 %    NRBCs per 100 WBC 0 <1 /100    Absolute Neutrophils 5.0 1.6 - 8.3 10e3/uL    Absolute Lymphocytes 2.1 0.8 - 5.3 10e3/uL    Absolute Monocytes 0.7 0.0 - 1.3 10e3/uL    Absolute  Eosinophils 0.2 0.0 - 0.7 10e3/uL    Absolute Basophils 0.1 0.0 - 0.2 10e3/uL    Absolute Immature Granulocytes 0.0 <=0.4 10e3/uL    Absolute NRBCs 0.0 10e3/uL   CBC with Platelets & Differential     Status: None    Narrative    The following orders were created for panel order CBC with Platelets & Differential.  Procedure                               Abnormality         Status                     ---------                               -----------         ------                     CBC with platelets and d...[[id number]]                      Final result                 Please view results for these tests on the individual orders.      Lipase is normal.  CBC is normal.  LDL cholesterol is high and not at goal.  Glucose minimally elevated.  Carbon oxide is slightly elevated.  Liver enzymes are normal.               Return in about 6 weeks (around 10/11/2024) for Annual Medicare Wellness Visit.      Tim Boyd MD  [facility]    Review of Systems   Constitutional:  Positive for unexpected weight change. Negative for chills and fever.   HENT:  Positive for congestion. Negative for ear pain, hearing loss and sore throat.    Eyes:  Negative for pain and visual disturbance.   Respiratory:  Positive for cough, shortness of breath and wheezing. Negative for chest tightness.    Cardiovascular:  Negative for chest pain, palpitations and peripheral edema.   Gastrointestinal:  Negative for abdominal pain, constipation, diarrhea, heartburn, hematochezia and nausea.   Genitourinary:  Positive for frequency. Negative for dysuria, genital sores, hematuria and urgency.   Musculoskeletal:  Positive for arthralgias. Negative for joint swelling and myalgias.   Skin:  Negative for rash.   Allergic/Immunologic: Negative for immunocompromised state.   Neurological:  Positive for headaches. Negative for dizziness, weakness and paresthesias.   Hematological:  Does not bruise/bleed easily.   Psychiatric/Behavioral:   "Negative for mood changes. The patient is not nervous/anxious.        Rocco Pa is a 63 year old, presenting for the following health issues:  Abdominal Pain and COPD        8/30/2024     8:59 AM   Additional Questions   Roomed by Sergo High LPN     History of Present Illness       COPD:  She presents for follow up of COPD.   Overall, COPD symptoms are slightly worse since last visit. She has more than usual fatigue or shortness of breath with exertion and less than usual shortness of breath at rest.  She often coughs and does have change in sputum. No recent fever. She can walk the length of 3-5 rooms without stopping to rest. She can walk 1 flights of stairs without resting. The patient has had no ED, urgent care, or hospital admissions because of COPD since the last visit.     Reason for visit:  Abdominal issues,    She eats 2-3 servings of fruits and vegetables daily.She consumes 2 sweetened beverage(s) daily.She exercises with enough effort to increase her heart rate 9 or less minutes per day.  She exercises with enough effort to increase her heart rate 3 or less days per week. She is missing 1 dose(s) of medications per week.  She is not taking prescribed medications regularly due to remembering to take.                     Objective    /78   Pulse 88   Resp 16   Ht 1.575 m (5' 2\")   Wt 40.4 kg (89 lb)   LMP 01/01/1996 (Approximate)   SpO2 94%   Breastfeeding No   BMI 16.28 kg/m    Body mass index is 16.28 kg/m .  Physical Exam  Constitutional:       General: She is not in acute distress.     Appearance: She is ill-appearing. She is not toxic-appearing.      Comments: + Thin body habitus   Eyes:      General: No scleral icterus.     Conjunctiva/sclera: Conjunctivae normal.   Cardiovascular:      Rate and Rhythm: Normal rate and regular rhythm.      Pulses: Normal pulses.   Pulmonary:      Comments: + intermittent coughing, dry.  Prolonged expiratory phase  Abdominal:      Palpations: " Abdomen is soft.      Tenderness: There is no abdominal tenderness. There is no guarding or rebound.      Hernia: A hernia (ventral hernia, somewhat soft, but tender to palpation) is present.   Musculoskeletal:      Right lower leg: No edema.      Left lower leg: No edema.   Skin:     General: Skin is warm and dry.      Findings: No rash.   Neurological:      Mental Status: She is alert. Mental status is at baseline.   Psychiatric:         Mood and Affect: Mood normal.         Behavior: Behavior normal.                    Signed Electronically by: Tim Boyd MD  Lung Cancer Screening Shared Decision Making Visit     Ember Tavares, a 63 year old female, is eligible for lung cancer screening    History   Smoking Status    Former    Types: Cigarettes, Cigars   Smokeless Tobacco    Never       I have discussed with patient the risks and benefits of screening for lung cancer with low-dose CT.     The risks include:    radiation exposure: one low dose chest CT has as much ionizing radiation as about 15 chest x-rays, or 6 months of background radiation living in [place]      false positives: most findings/nodules are NOT cancer, but some might still require additional diagnostic evaluation, including biopsy    over-diagnosis: some slow growing cancers that might never have been clinically significant will be detected and treated unnecessarily     The benefit of early detection of lung cancer is contingent upon adherence to annual screening or more frequent follow up if indicated.     Furthermore, to benefit from screening, Ember must be willing and able to undergo diagnostic procedures, if indicated. Although no specific guide is available for determining severity of comorbidities, it is reasonable to withhold screening in patients who have greater mortality risk from other diseases.     We did discuss that the best way to prevent lung cancer is to not smoke.    Some patients may value a numeric estimation of lung  cancer risk when evaluating if lung cancer screening is right for them, here is one calculator:    ShouldIScreen

## 2024-09-04 ENCOUNTER — TELEPHONE (OUTPATIENT)
Dept: SURGERY | Facility: OTHER | Age: 64
End: 2024-09-04
Payer: COMMERCIAL

## 2024-09-04 NOTE — TELEPHONE ENCOUNTER
[facility] Diagnostic Referral    Patient has a referral for a C-Scope with a diagnosis of Pencilling of stools  Encounter for diagnostic colonoscopy due to change in bowel habits.    Please advise.    Thank you,  Zuri Bush on 9/4/2024 at 9:41 AM

## 2024-09-09 NOTE — TELEPHONE ENCOUNTER
Patient scheduled to see Dr. Ruiz on 9/17/2024. Tejal Loredo RN  ....................  9/9/2024   3:14 PM

## 2024-09-10 ENCOUNTER — HOSPITAL ENCOUNTER (OUTPATIENT)
Dept: CT IMAGING | Facility: OTHER | Age: 64
Discharge: HOME | End: 2024-09-10
Attending: INTERNAL MEDICINE | Admitting: INTERNAL MEDICINE
Payer: COMMERCIAL

## 2024-09-10 DIAGNOSIS — R10.9 CHRONIC ABDOMINAL PAIN: ICD-10-CM

## 2024-09-10 DIAGNOSIS — K59.09 CONSTIPATION, CHRONIC: ICD-10-CM

## 2024-09-10 DIAGNOSIS — R19.5 PENCILLING OF STOOLS: ICD-10-CM

## 2024-09-10 DIAGNOSIS — G89.29 CHRONIC ABDOMINAL PAIN: ICD-10-CM

## 2024-09-10 DIAGNOSIS — K21.00 GASTROESOPHAGEAL REFLUX DISEASE WITH ESOPHAGITIS WITHOUT HEMORRHAGE: ICD-10-CM

## 2024-09-10 DIAGNOSIS — Z12.11 ENCOUNTER FOR SCREENING COLONOSCOPY: Primary | ICD-10-CM

## 2024-09-10 PROCEDURE — 250N000011 HC RX IP 250 OP 636: Performed by: INTERNAL MEDICINE

## 2024-09-10 PROCEDURE — 250N000009 HC RX 250: Performed by: INTERNAL MEDICINE

## 2024-09-10 PROCEDURE — 74177 CT ABD & PELVIS W/CONTRAST: CPT

## 2024-09-10 RX ORDER — IOPAMIDOL 755 MG/ML
51 INJECTION, SOLUTION INTRAVASCULAR ONCE
Status: COMPLETED | OUTPATIENT
Start: 2024-09-10 | End: 2024-09-10

## 2024-09-10 RX ADMIN — IOPAMIDOL 51 ML: 755 INJECTION, SOLUTION INTRAVENOUS at 16:38

## 2024-09-10 RX ADMIN — SODIUM CHLORIDE 60 ML: 9 INJECTION, SOLUTION INTRAVENOUS at 16:38

## 2024-09-10 NOTE — TELEPHONE ENCOUNTER
Screening Questions for the Scheduling of Screening Colonoscopies   (If Colonoscopy is diagnostic, Provider should review the chart before scheduling.)  Are you younger than 45 or older than 80?  NO  Do you take aspirin or fish oil?  NO (if yes, tell patient to stop 1 week prior to Colonoscopy)  Do you take warfarin (Coumadin), clopidogrel (Plavix), apixaban (Eliquis), dabigatram (Pradaxa), rivaroxaban (Xarelto) or any blood thinner? NO  Do you take semaglutide (Ozempic or Wegovy), tirzepatide (Mounjaro or Zepbound), liraglutide (Victoza), or dulaglutide (Trulicity)? NO  Do you use oxygen or a CPAP at home?  NO, BUT DOES HAVE COPD AND USES A NEBULIZER  Do you have kidney disease? NO  Are you on dialysis? NO  Have you had a stroke or heart attack in the last year? NO  Have you had a stent in your heart or any blood vessel in the last year? NO  Have you had a transplant of any organ? NO  Have you had a colonoscopy or upper endoscopy (EGD) before? YES         When?  7 YEARS  Date of scheduled Colonoscopy. 09/26/2024  Provider [facility]  Pharmacy [facility]

## 2024-09-11 RX ORDER — BISACODYL 5 MG/1
TABLET, DELAYED RELEASE ORAL
Qty: 2 TABLET | Refills: 0 | Status: ON HOLD | OUTPATIENT
Start: 2024-09-19 | End: 2024-09-26

## 2024-09-11 RX ORDER — POLYETHYLENE GLYCOL 3350, SODIUM CHLORIDE, SODIUM BICARBONATE, POTASSIUM CHLORIDE 420; 11.2; 5.72; 1.48 G/4L; G/4L; G/4L; G/4L
4000 POWDER, FOR SOLUTION ORAL ONCE
Qty: 4000 ML | Refills: 0 | Status: SHIPPED | OUTPATIENT
Start: 2024-09-19 | End: 2024-09-19

## 2024-09-26 ENCOUNTER — ANESTHESIA EVENT (OUTPATIENT)
Dept: SURGERY | Facility: OTHER | Age: 64
End: 2024-09-26
Payer: COMMERCIAL

## 2024-09-26 ENCOUNTER — ANESTHESIA (OUTPATIENT)
Dept: SURGERY | Facility: OTHER | Age: 64
End: 2024-09-26
Payer: COMMERCIAL

## 2024-09-26 ENCOUNTER — HOSPITAL ENCOUNTER (OUTPATIENT)
Facility: OTHER | Age: 64
Discharge: HOME | End: 2024-09-26
Attending: SURGERY | Admitting: SURGERY
Payer: COMMERCIAL

## 2024-09-26 VITALS
HEART RATE: 89 BPM | DIASTOLIC BLOOD PRESSURE: 67 MMHG | OXYGEN SATURATION: 96 % | WEIGHT: 89 LBS | RESPIRATION RATE: 14 BRPM | SYSTOLIC BLOOD PRESSURE: 110 MMHG | BODY MASS INDEX: 16.38 KG/M2 | TEMPERATURE: 97.8 F | HEIGHT: 62 IN

## 2024-09-26 DIAGNOSIS — Z12.11 ENCOUNTER FOR SCREENING COLONOSCOPY: Primary | ICD-10-CM

## 2024-09-26 PROCEDURE — 258N000003 HC RX IP 258 OP 636: Performed by: SURGERY

## 2024-09-26 PROCEDURE — 250N000011 HC RX IP 250 OP 636

## 2024-09-26 PROCEDURE — 999N000010 HC STATISTIC ANES STAT CODE-CRNA PER MINUTE: Performed by: SURGERY

## 2024-09-26 PROCEDURE — 250N000009 HC RX 250

## 2024-09-26 PROCEDURE — 45378 DIAGNOSTIC COLONOSCOPY: CPT | Performed by: SURGERY

## 2024-09-26 PROCEDURE — 45378 DIAGNOSTIC COLONOSCOPY: CPT

## 2024-09-26 RX ORDER — NALOXONE HYDROCHLORIDE 0.4 MG/ML
0.4 INJECTION, SOLUTION INTRAMUSCULAR; INTRAVENOUS; SUBCUTANEOUS
Status: DISCONTINUED | OUTPATIENT
Start: 2024-09-26 | End: 2024-09-26 | Stop reason: HOSPADM

## 2024-09-26 RX ORDER — PROPOFOL 10 MG/ML
INJECTION, EMULSION INTRAVENOUS CONTINUOUS PRN
Status: DISCONTINUED | OUTPATIENT
Start: 2024-09-26 | End: 2024-09-26

## 2024-09-26 RX ORDER — ONDANSETRON 2 MG/ML
4 INJECTION INTRAMUSCULAR; INTRAVENOUS EVERY 6 HOURS PRN
Status: DISCONTINUED | OUTPATIENT
Start: 2024-09-26 | End: 2024-09-26 | Stop reason: HOSPADM

## 2024-09-26 RX ORDER — LIDOCAINE 40 MG/G
CREAM TOPICAL
Status: DISCONTINUED | OUTPATIENT
Start: 2024-09-26 | End: 2024-09-26 | Stop reason: HOSPADM

## 2024-09-26 RX ORDER — SODIUM CHLORIDE, SODIUM LACTATE, POTASSIUM CHLORIDE, CALCIUM CHLORIDE 600; 310; 30; 20 MG/100ML; MG/100ML; MG/100ML; MG/100ML
INJECTION, SOLUTION INTRAVENOUS CONTINUOUS
Status: DISCONTINUED | OUTPATIENT
Start: 2024-09-26 | End: 2024-09-26 | Stop reason: HOSPADM

## 2024-09-26 RX ORDER — PROPOFOL 10 MG/ML
INJECTION, EMULSION INTRAVENOUS PRN
Status: DISCONTINUED | OUTPATIENT
Start: 2024-09-26 | End: 2024-09-26

## 2024-09-26 RX ORDER — NALOXONE HYDROCHLORIDE 0.4 MG/ML
0.2 INJECTION, SOLUTION INTRAMUSCULAR; INTRAVENOUS; SUBCUTANEOUS
Status: DISCONTINUED | OUTPATIENT
Start: 2024-09-26 | End: 2024-09-26 | Stop reason: HOSPADM

## 2024-09-26 RX ORDER — ONDANSETRON 4 MG/1
4 TABLET, ORALLY DISINTEGRATING ORAL EVERY 6 HOURS PRN
Status: DISCONTINUED | OUTPATIENT
Start: 2024-09-26 | End: 2024-09-26 | Stop reason: HOSPADM

## 2024-09-26 RX ORDER — LIDOCAINE HYDROCHLORIDE 20 MG/ML
INJECTION, SOLUTION INFILTRATION; PERINEURAL PRN
Status: DISCONTINUED | OUTPATIENT
Start: 2024-09-26 | End: 2024-09-26

## 2024-09-26 RX ORDER — FLUMAZENIL 0.1 MG/ML
0.2 INJECTION, SOLUTION INTRAVENOUS
Status: DISCONTINUED | OUTPATIENT
Start: 2024-09-26 | End: 2024-09-26 | Stop reason: HOSPADM

## 2024-09-26 RX ADMIN — LIDOCAINE HYDROCHLORIDE 40 MG: 20 INJECTION, SOLUTION INFILTRATION; PERINEURAL at 09:10

## 2024-09-26 RX ADMIN — PROPOFOL 50 MG: 10 INJECTION, EMULSION INTRAVENOUS at 09:15

## 2024-09-26 RX ADMIN — PROPOFOL 150 MCG/KG/MIN: 10 INJECTION, EMULSION INTRAVENOUS at 09:10

## 2024-09-26 RX ADMIN — PROPOFOL 50 MG: 10 INJECTION, EMULSION INTRAVENOUS at 09:10

## 2024-09-26 RX ADMIN — SODIUM CHLORIDE, POTASSIUM CHLORIDE, SODIUM LACTATE AND CALCIUM CHLORIDE: 600; 310; 30; 20 INJECTION, SOLUTION INTRAVENOUS at 08:16

## 2024-09-26 ASSESSMENT — LIFESTYLE VARIABLES: TOBACCO_USE: 1

## 2024-09-26 ASSESSMENT — COPD QUESTIONNAIRES: COPD: 1

## 2024-09-26 ASSESSMENT — ACTIVITIES OF DAILY LIVING (ADL)
ADLS_ACUITY_SCORE: 37

## 2024-09-26 NOTE — INTERVAL H&P NOTE
"I have reviewed the surgical (or preoperative) H&P that is linked to this encounter, and examined the patient. There are no significant changes    Clinical Conditions Present on Arrival:  Clinically Significant Risk Factors Present on Admission                 # Drug Induced Platelet Defect: home medication list includes an antiplatelet medication      # Cachexia: Estimated body mass index is 16.28 kg/m  as calculated from the following:    Height as of 8/30/24: 1.575 m (5' 2\").    Weight as of 8/30/24: 40.4 kg (89 lb).       "

## 2024-09-26 NOTE — ANESTHESIA CARE TRANSFER NOTE
Patient: Ember Tavares    Procedure: Procedure(s):  Colonoscopy       Diagnosis: Pencilling of stools [R19.5]  Change in bowel habits [R19.4]  Diagnosis Additional Information: No value filed.    Anesthesia Type:   MAC     Note:    Oropharynx: spontaneously breathing and oropharynx clear of all foreign objects  Level of Consciousness: awake  Oxygen Supplementation: room air    Independent Airway: airway patency satisfactory and stable  Dentition: dentition unchanged  Vital Signs Stable: post-procedure vital signs reviewed and stable  Report to RN Given: handoff report given  Patient transferred to: Phase II    Handoff Report: Identifed the Patient, Identified the Reponsible Provider, Reviewed the pertinent medical history, Discussed the surgical course, Reviewed Intra-OP anesthesia mangement and issues during anesthesia, Set expectations for post-procedure period and Allowed opportunity for questions and acknowledgement of understanding      Vitals:  Vitals Value Taken Time   BP     Temp     Pulse     Resp     SpO2         Electronically Signed By: АЛЕКСАНДР Morocho CRNA  September 26, 2024  9:52 AM

## 2024-09-26 NOTE — OR NURSING
Discharge instructions given to patient and patient's sister Stacy. No questions. Ambulated out of unit. Denies pain, nausea or dizziness

## 2024-09-26 NOTE — ANESTHESIA POSTPROCEDURE EVALUATION
Patient: Ember Tavares    Procedure: Procedure(s):  Colonoscopy       Anesthesia Type:  MAC    Note:  Disposition: Outpatient   Postop Pain Control: Uneventful            Sign Out: Well controlled pain   PONV: No   Neuro/Psych: Uneventful            Sign Out: Acceptable/Baseline neuro status   Airway/Respiratory: Uneventful            Sign Out: Acceptable/Baseline resp. status   CV/Hemodynamics: Uneventful            Sign Out: Acceptable CV status; No obvious hypovolemia; No obvious fluid overload   Other NRE: NONE   DID A NON-ROUTINE EVENT OCCUR? No           Last vitals:  Vitals Value Taken Time   /67 09/26/24 1015   Temp     Pulse 89 09/26/24 1015   Resp 14 09/26/24 1015   SpO2 95 % 09/26/24 1026   Vitals shown include unfiled device data.    Electronically Signed By: АЛЕКСАНДР KLINE CRNA  September 26, 2024  11:26 AM

## 2024-09-26 NOTE — ANESTHESIA PREPROCEDURE EVALUATION
Anesthesia Pre-Procedure Evaluation    Patient: Ember Tavares   MRN: [id number] : 1960        Procedure : Procedure(s):  Colonoscopy          Past Medical History:   Diagnosis Date    Alpha-1-antitrypsin deficiency carrier 2017    Atherosclerosis of abdominal aorta (H24) 2017    Overview:  [facility] Studies: 2013 -- CTA ABDOMEN AND PELVIS WITH BILATERAL LOWER EXTREMITY RUNOFF  CLINICAL HISTORY: Iliac and mesenteric ischemia.  TECHNIQUE: 125 mL Omnipaque 300 IV contrast was administered. 3-D arterial reformations were performed.  FINDINGS: There is a well-defined ovoid density at the medial right costophrenic angle. This measures 2.3 x 0.9 x 0.6 cm. It demonstra    Chronic abdominal pain 03/10/2010    Overview:  16 year duration    Chronic sinusitis     No Comments Provided    Closed fracture of skull (H)         COPD, severe (H) 2017    Overview:  2014 -- PULMONARY FUNCTION  SITE:  [facility] ORDERED BY:  VANNESA LUNA  CLINICAL SUMMARY: 53-year-old female with a history of severe COPD.   TECHNICIAN NOTE: Good effort.   DATA: FVC 1.85 (61%). FEV1 1.04 (45%). FEV1/FVC ratio 56%. DLCO 13.5 to 62%.   Flow volume curve is consistent with airway obstruction.   IMPRESSION: 1. Severe obstructive pulmon    Dental abscess 2019    GERD (gastroesophageal reflux disease) 2017    Hiatal hernia 2017    Influenza due to influenza virus, type A, human 2022    Other and unspecified hyperlipidemia 2012    PAD (peripheral artery disease) (H24) 10/13/2015    Psoriasis 2017    Psoriatic arthritis (H) 2017    Schatzki's ring of distal esophagus 2017    Ulcer of right cornea 2018    Vitamin B12 deficiency 2018    Vitamin D deficiency 2017      Past Surgical History:   Procedure Laterality Date    AS UGI ENDOSCOPY W ESOPHAGEAL DILATION BALLOON <30MM  10/30/2015    ESOPHAGEAL DILATION,Dr. Spear  [place], [facility]    BIOPSY BREAST Bilateral     , , BIOPSY BREAST,benign    CHOLECYSTECTOMY      Laparoscopic    COLONOSCOPY  2016    x's 3 negative    COLONOSCOPY N/A 2021    hyperplastic follow up 10 years, 2031    CT CORONARY ANGIOGRAM      CT CORONARY ANGIOGRAM (IA),negative    ESOPHAGOSCOPY, GASTROSCOPY, DUODENOSCOPY (EGD), COMBINED  2009    dilated, Dr. Allen    ESOPHAGOSCOPY, GASTROSCOPY, DUODENOSCOPY (EGD), COMBINED N/A 2019    Pickering's esophagus, 3 year follow up    ESOPHAGOSCOPY, GASTROSCOPY, DUODENOSCOPY (EGD), COMBINED N/A 10/23/2023    Procedure: Esophagoscopy, gastroscopy, duodenoscopy (EGD)with biospy and Dilation.;  Surgeon: Jagdish Ruiz MD;  Location:  OR    HYSTERECTOMY VAGINAL      ovaries remain    LAPAROSCOPIC NISSEN FUNDOPLICATION N/A 2018    Procedure: LAPAROSCOPIC NISSEN FUNDOPLICATION;  Laparoscopic Nissen Fundoplication;  Surgeon: Jagdish Ruiz MD;  Location:  OR    LAPAROTOMY EXPLORATORY      lysis of adhesions/endometriosis    RECTOCELE REPAIR  2009      Allergies   Allergen Reactions    Penicillins Other (See Comments)     Other reaction(s): Respiratory Arrest    Atorvastatin Muscle Pain (Myalgia)    Morphine Other (See Comments)     Other reaction(s): Chest Pain    Levofloxacin Nausea and Vomiting    Rosuvastatin Nausea and Vomiting    Sucralfate Nausea and Vomiting    Sulfamethoxazole-Trimethoprim Nausea and Vomiting     Yeast infection      Social History     Tobacco Use    Smoking status: Some Days     Current packs/day: 0.00     Average packs/day: 0.7 packs/day for 32.0 years (22.4 ttl pk-yrs)     Types: Cigarettes, Cigars     Start date: 1980     Last attempt to quit: 2012     Years since quittin.1    Smokeless tobacco: Never   Substance Use Topics    Alcohol use: Yes     Alcohol/week: 2.0 standard drinks of alcohol     Comment: Alcoholic Drinks/day: 1-2 drinks every 1-2 weeks      Wt  Readings from Last 1 Encounters:   09/26/24 40.4 kg (89 lb)        Anesthesia Evaluation   Pt has had prior anesthetic.     No history of anesthetic complications       ROS/MED HX  ENT/Pulmonary: Comment: Alpha-1-antitrypsin deficiency.  Smoking cessation information given to patient.     (+)                tobacco use, Current use,         COPD,              Neurologic:  - neg neurologic ROS     Cardiovascular:     (+)  - -   -  - -                                 Previous cardiac testing   Echo: Date: Results:    Stress Test:  Date: Results:    ECG Reviewed:  Date: 11/27/22 Results:  Sinus tachycardia   Left axis deviation   Anterior infarct (cited on or before 22-MAR-2021)   Abnormal ECG   When compared with ECG of 22-MAR-2021 09:45,   No significant change was found   Confirmed by MD MAGDY, [facility] ([id number]) on 11/29/2022 8:22:25 AM     Cath:  Date: Results:      METS/Exercise Tolerance: >4 METS    Hematologic:  - neg hematologic  ROS     Musculoskeletal:  - neg musculoskeletal ROS     GI/Hepatic:     (+)        bowel prep,            Renal/Genitourinary:  - neg Renal ROS     Endo: Comment: Recently finished a steroid taper. - neg endo ROS     Psychiatric/Substance Use:  - neg psychiatric ROS     Infectious Disease:  - neg infectious disease ROS     Malignancy:       Other:  - neg other ROS          Physical Exam    Airway        Mallampati: I   TM distance: > 3 FB   Neck ROM: full   Mouth opening: > 3 cm    Respiratory Devices and Support         Dental       (+) Edentulous      Cardiovascular   cardiovascular exam normal       Rhythm and rate: regular and normal     Pulmonary   pulmonary exam normal        breath sounds clear to auscultation           OUTSIDE LABS:  CBC:   Lab Results   Component Value Date    WBC 8.1 08/30/2024    WBC 9.5 11/30/2022    HGB 14.9 08/30/2024    HGB 13.0 11/30/2022    HCT 45.3 08/30/2024    HCT 39.9 11/30/2022     08/30/2024     11/30/2022     BMP:   Lab Results  "  Component Value Date     08/30/2024     11/30/2022    POTASSIUM 4.6 08/30/2024    POTASSIUM 4.0 11/30/2022    CHLORIDE 103 08/30/2024    CHLORIDE 103 11/30/2022    CO2 30 (H) 08/30/2024    CO2 31 (H) 11/30/2022    BUN 12.9 08/30/2024    BUN 19.5 11/30/2022    CR 0.76 08/30/2024    CR 0.58 11/30/2022     (H) 08/30/2024    GLC 91 11/30/2022     COAGS:   Lab Results   Component Value Date    PTT 39 (H) 10/11/2019    INR 0.96 10/11/2019     POC: No results found for: \"BGM\", \"HCG\", \"HCGS\"  HEPATIC:   Lab Results   Component Value Date    ALBUMIN 4.7 08/30/2024    PROTTOTAL 7.5 08/30/2024    ALT 25 08/30/2024    AST 26 08/30/2024    ALKPHOS 81 08/30/2024    BILITOTAL 0.4 08/30/2024     OTHER:   Lab Results   Component Value Date    LACT 1.3 12/02/2019    COLEMAN 9.8 08/30/2024    MAG 2.2 03/28/2019    LIPASE 21 08/30/2024    CRP 4.1 (H) 12/12/2019    SED 82 (H) 12/12/2019       Anesthesia Plan    ASA Status:  3    NPO Status:  NPO Appropriate    Anesthesia Type: MAC.     - Reason for MAC: straight local not clinically adequate   Induction: Intravenous.           Consents    Anesthesia Plan(s) and associated risks, benefits, and realistic alternatives discussed. Questions answered and patient/representative(s) expressed understanding.     - Discussed: Risks, Benefits and Alternatives for BOTH SEDATION and the PROCEDURE were discussed     - Discussed with:  Patient            Postoperative Care            Comments:               АЛЕКСАНДР KLINE CRNA    I have reviewed the pertinent notes and labs in the chart from the past 30 days and (re)examined the patient.  Any updates or changes from those notes are reflected in this note.             # Drug Induced Platelet Defect: home medication list includes an antiplatelet medication  # Cachexia: Estimated body mass index is 16.28 kg/m  as calculated from the following:    Height as of this encounter: 1.575 m (5' 2\").    Weight as of this encounter: 40.4 kg " (89 lb).

## 2024-09-26 NOTE — OP NOTE
PROCEDURE NOTE    SURGEON:Jagdish Ruiz MD    PRE-OP DIAGNOSIS:  Change in bowel habits      POST-OP DIAGNOSIS: Change in bowel habits.  Diverticulosis and relatively tortuous sigmoid    PROCEDURE: Colonoscopy    SPECIMEN:      * No specimens in log *    ANESTHESIA:  MAC CRNA Independent: Joni Blackmon APRN CRNA   Coverage requested due to ASA 3    ESTIMATED BLOOD LOSS: none    COMPLICATIONS:  None    INDICATION FOR THE PROCEDURE: The patient is a 63 year old female. The patient presents with history of change in bowel habits.  Narrow stools.. I explained to the patient the risks, benefits and alternatives to screening colonoscopy for evaluating for cancer or polyps. We discussed the risks including bleeding, perforation, potential inability to reach the cecum and the risks of sedation. The patient's questions were answered and the patient wished to proceed. Informed consent paperwork was completed.    PROCEDURE: The patient was taken to the endoscopy suite. Appropriate monitors were attached. The patient was placed in the left lateral decubitus position. Timeout was performed confirming the patient's identity and procedure to be performed.  After appropriate sedation was confirmed, digital rectal exam was performed.  There was normal tone and no gross abnormality was noted.  The lubricated colonoscope was introduced into the anus the colon was insufflated with air. The prep quality was adequate. Under direct visualization the scope was advanced to the cecum. The mucosa of the colon was inspected while withdrawing the scope.  There was diverticulosis.  There was no mass or stricture to account for penciling stools in the sigmoid or rectum.  The scope was retroflexed in the rectum and the anorectal junction was inspected. No abnormalities were noted. The scope was returned to a neutral position and the colon was decompressed. The scope was removed. The patient tolerated the procedure with no immediately apparent  complication. The patient was taken to recovery in stable condition.    FOLLOW UP: RECOMMEND high fiber diet, will call with pathology results.  Follow-up in 10 years if health allows.    Jagdish Ruiz MD on 9/26/2024 at 9:47 AM

## 2024-09-26 NOTE — DISCHARGE INSTRUCTIONS
[facility]  Adult Discharge Orders & Instructions    ________________________________________________________________          For 12 hours after surgery  Get plenty of rest.  A responsible adult must stay with you for at least 12 hours after you leave the hospital.   You may feel lightheaded.  IF so, sit for a few minutes before standing.  Have someone help you get up.   You may have a slight fever. Call the doctor if your fever is over 101 F (38.3 C) (taken under the tongue) or lasts longer than 24 hours.  You may have a dry mouth, a sore throat, muscle aches or trouble sleeping.  These should go away after 24 hours.  Do not make important or legal decisions.  6.   Do not drive or use heavy equipment.  If you have weakness or tingling, don't drive or use heavy equipment until this feeling goes away.    To contact a doctor, call   [phone]_______________________      Your doctor recommends that you eat 25 to 30 Grams of fiber daily. The following are some examples of fiber amounts in different foods.    Fruits: Apple (with skin) 1 medium = 4.4 Grams   Banana      1 medium = 3.1 Grams   Oranges     1 orange = 3.1 Grams   Prunes     1 cup, pitted = 12.4 Grams    Juices: Apple, unsweetened w/ added ascorbic acid  1 cup = 0.5 Grams    Grapefruit, white, canned,sweetened  1 cup = 0.2 Grams    Grape, unsweetened w/ added ascorbic acid  1 cup = 0.5 Grams    Orange     1 cup = 0.7 Grams    Vegetables:   Cooked: Green Beans   1 cup = 4.0 Grams       Carrots   1/2 cup sliced = 2.3 Grams       Peas       1 cup = 8.8 Grams       Potato (baked, with skin)  1 medium = 3.8 Grams    Raw: Cucumber (with peel)  1 cucumber = 1.5 Grams            Lettuce     1 cup shredded = 0.5 Grams            Tomato   1 medium tomato = 1.5 Grams            Spinach  1 cup = 0.7 Grams    Legumes: Baked beans, canned, no salt added  1 cup = 13.9 Grams         Kidney Beans, canned  1 cup = 13.6 Grams         Veliz Beans, canned     1  cup = 11.6 Grams         Lentils, boiled   1 cup = 15.6 Grams    Breads, Pastas, Flours: Bran muffins   1 medium muffin = 5.2 Grams           Oatmeal, cooked  1 cup = 4.0 Grams           White Bread   1 slice = 0.6 Grams           Whole- wheat bread = 1.9 Grams    Pasta and rice, cooked: Macaroni  1 cup = 2.5 Grams           Rice, Brown  1 cup = 3.5 Grams           Rice, white   1 cup = 0.6 Grams           Spaghetti (regular) 1 cup = 2.5 Grams    Nuts: Almonds   1 cup = 17.4 Grams            Peanuts    1 cup = 12.4 Grams

## 2024-10-02 NOTE — NURSING NOTE
"Patient presents to the clinic for thyroid check due to swelling and discomfort of the right side of the neck on and off over the past several months.  Patient also reports increase in hair loss, changes in temperature and fluctuation in weight over the past 4 months.    Chief Complaint   Patient presents with     Thyroid Problem       Initial /74 (BP Location: Right arm, Patient Position: Sitting, Cuff Size: Adult Regular)   Pulse 68   Temp 96.5  F (35.8  C) (Tympanic)   Resp 20   Wt 61.5 kg (135 lb 8 oz)   BMI 24.78 kg/m   Estimated body mass index is 24.78 kg/m  as calculated from the following:    Height as of 4/17/19: 1.575 m (5' 2\").    Weight as of this encounter: 61.5 kg (135 lb 8 oz).  Medication Reconciliation: complete    Melissa Li, ROGERS    " Massage Therapy Progress Note      Visit type: Office Visit    Visit Sequence: Follow Up     Oncology Table Massage Therapy and Cranio Sacral Therapy      Length of treatment: 60-minute    Authorization: Patient request    Reviewed contraindications/current health status with Patient, Epic chart:   Patient Active Problem List   Diagnosis    Delayed surgical wound healing    History of rectal cancer    Type 2 diabetes mellitus without complication, with long-term current use of insulin (CMD)    Late effect of radiation    Bladder stone    Surgical wound, non healing    Accessory skin tags    Type II or unspecified type diabetes mellitus with neurological manifestations, uncontrolled    Diabetic nephropathy associated with type 2 diabetes mellitus (CMD)    Chronic combined systolic and diastolic congestive heart failure (CMD)    Benign prostatic hyperplasia with urinary obstruction    Coronary artery disease involving native coronary artery of native heart without angina pectoris    Mixed dyslipidemia    History of anterior wall myocardial infarction    AICD present, double chamber    Presence of drug coated stent in LAD coronary artery    Presence of drug coated stent in left circumflex coronary artery    Type II or unspecified type diabetes mellitus with renal manifestations, uncontrolled(250.42)  (CMD)    Kidney stone    HTN (hypertension), benign    Chronic systolic congestive heart failure, NYHA class 2  (CMD)    Ischemic dilated cardiomyopathy  (CMD)    Dual ICD (implantable cardioverter-defibrillator) in place    Unstable angina pectoris  (CMD)    Right foot drop    SVT (supraventricular tachycardia) (CMD)    Hypotension due to drugs    Colostomy in place  (CMD)    Stage 3 chronic kidney disease  (CMD)    High risk medication use    LV dysfunction    Lumbosacral radiculopathy at L5    Foot drop, bilateral    Diabetic polyneuropathy associated with diabetes mellitus due to underlying condition  (CMD)     WBC  (K/mcL)   Date Value   06/07/2024 6.4     RBC (mil/mcL)   Date Value   06/07/2024 3.96 (L)     HCT (%)   Date Value   06/07/2024 35.7 (L)     HGB (g/dL)   Date Value   06/07/2024 11.9 (L)     PLT (K/mcL)   Date Value   06/07/2024 177       Oncology, table massage therapy - precautions followed, no contraindications for CST     Subjective:  Patient complains of:  patient states that he was golfing yesterday, experiencing increased pain level today, mostly in his L low back/hip, gluts/leg area;     Pain report prior to treatment:  6/10    Type of treatment requested: Comfort massage and Wellness massage, Therapeuptic massage, oncology massage     Specific requests:  Oncology, table massage therapy - precautions followed, no contraindications for CST     Objective Observations:  Hypertonicity noted in:  sacrum and area, Iliac crest area, Lumbar paraspinals Bilateral, Thoracic area and more on the L side, ITB bilaterally and more on the L side, neck, gluts and more on the L, shoulders , R leg muscles    Hypotonicity/Ischemia noted in: low back, gluts, feet     Decreased ROM noted in low back and legs/feet     Additional observations: enjoyed, relaxed and benefited from the treatment; walking with the stick, stable posture , relaxed     Assessment:  Patient received Swedish techniques, Gentle comfort massage techniques and myofascial release:  focused on low back, hips, gluts/ more on the L side and more focus on legs/feet/ITBs, R foot, and Craniosacral therapy to affected tissues:temporal and occipital release, gentle cervical traction, still point at the end of the treatment       Additional treatment provided: None at this time    Response to treatment: Patient's breathing slowed, Patient gave positive verbal feedback, Patient fell asleep during treatment, Patient relaxed easily, and pain level decreased significantly, better posture and more sensation in feet when walking out of the clinic after the treatment today,  appreciated     Pain report after treatment:1/10    Education/Resources: Coached on stretching following PT instructions     Outcomes:Integrative Therapy Outcomes    Treatment Provided  Questions Answered By: Patient    Integrative Therapy Provided: Oncology Table Massage  (Select the primary treatment provided)    Setting: Oncology Table Massage  (Outpatient includes: all hospital and clinic-based outpatient)    Primary Complaint/Reason for Treatment: Pain    Secondary Complaint/Reason for Treatment:: Neuropathy      Pre-Treatment Assessment  Pain: 6  (Please rate your current pain on a scale of 0-10 (0 is no pain, 10 is the worst pain))    Neuropathy: 4  (Please rate your current neuropathy (numbness/tingling) on a scale of 0-10 (0 is no numbness/tingling, 10 is worst numbness/tingling))    Nausea: 0  (Please rate your current nausea on a scale 0-10 (0 is no nausea, 10 is worst nausea))    Stress: 0  (Please rate your current stress on a scale of 0-10 (0 is no stress, 10 is the worst stress))      Post-Treatment Assessment  Pain: 1  (What is your pain level after your treatment? Please rate it on a scale of 0-10 (0 is no pain, 10 is the worst pain))    Neuropathy: 2  (What is your numbness or tingling level after your treatment?  Please rate it on a scale of 0-10 (0 is no numbness/tingling, 10 is the worst numbness/tingling))    Nausea: 0  (What is your nausea level after your treatment?  Please rate it on a scale of 0-10 (0 is no nausea, 10 is the worst nausea))    Stress: 0  What is your stress level after your treatment? Please rate it on a scale of 0-10 (0 is no stress, 10 is the worst stress))      Plan/Recommendations:  Massage Therapy and CST once a week if appropriate to reduce pain and neuropathy level, improve blood circulation, to relax, to balance Cranio Sacral System and for overall wellbeing   Session concluded

## 2024-11-15 DIAGNOSIS — J44.9 COPD, SEVERE (H): ICD-10-CM

## 2024-11-15 DIAGNOSIS — Z14.8 ALPHA-1-ANTITRYPSIN DEFICIENCY CARRIER: ICD-10-CM

## 2024-11-15 DIAGNOSIS — J43.1 PANLOBULAR EMPHYSEMA (H): ICD-10-CM

## 2024-11-18 RX ORDER — TIOTROPIUM BROMIDE INHALATION SPRAY 3.12 UG/1
SPRAY, METERED RESPIRATORY (INHALATION)
Qty: 12 G | Refills: 0 | Status: SHIPPED | OUTPATIENT
Start: 2024-11-18

## 2024-11-18 RX ORDER — MOMETASONE FUROATE AND FORMOTEROL FUMARATE DIHYDRATE 200; 5 UG/1; UG/1
2 AEROSOL RESPIRATORY (INHALATION) 2 TIMES DAILY
Qty: 169 G | Refills: 0 | Status: SHIPPED | OUTPATIENT
Start: 2024-11-18

## 2024-11-18 NOTE — TELEPHONE ENCOUNTER
[facility] sent Rx request for the following:      Requested Prescriptions   Pending Prescriptions Disp Refills    SPIRIVA RESPIMAT 2.5 MCG/ACT inhaler [Pharmacy Med Name: Spiriva Respimat 2.5 mcg/actuation solution for inhalation] 12 g 4     Sig: INHALE 2 PUFFS INTO THE LUNGS EVERY EVENING       Long-Acting Muscarinic Agonists (LAMA) (including combination products) Passed - 11/18/2024  9:21 AM        Passed - Patient is 5 years of age or older        Passed - Medication is active on med list        Passed - Recent (6 mo) or future (90 days) visit within the authorizing provider's specialty     The patient must have completed an in-person or virtual visit within the past 6 months or has a future visit scheduled within the next 90 days with the authorizing provider s specialty.  Urgent care and e-visits do not quality as an office visit for this protocol.          Passed - Medication indicated for associated diagnosis     Medication is associated with one or more of the following diagnoses:     Asthma   COPD       Last Prescription Date:   10/10/23  Last Fill Qty/Refills:         12 g, R-4             DULERA 200-5 MCG/ACT inhaler [Pharmacy Med Name: Dulera 200 mcg-5 mcg/actuation HFA aerosol inhaler] 169 g 4     Sig: INHALE 2 PUFFS INTO THE LUNGS 2 TIMES DAILY       Inhaled Steroids Protocol Passed - 11/18/2024  9:21 AM        Passed - Patient is age 12 or older        Passed - Medication is active on med list        Passed - Recent (12 mo) or future (90 days) visit within the authorizing provider's specialty     The patient must have completed an in-person or virtual visit within the past 12 months or has a future visit scheduled within the next 90 days with the authorizing provider s specialty.  Urgent care and e-visits do not quality as an office visit for this protocol.          Passed - Medication indicated for associated diagnosis     Medication is associated with one or more of the following diagnoses:     Allergies   Asthma   COPD   Nasal Congestion   Nasal Polyps   Rhinitis               Last Prescription Date:   10/10/23  Last Fill Qty/Refills:         39 g, R-4    Last Office Visit:              8/30/24   Future Office visit:             Next 5 appointments (look out 90 days)      Dec 31, 2024 2:20 PM  (Arrive by 2:05 PM)  Adult Preventative Visit with Tim oByd MD  [facility] ([facility] ) [address]  [place] [address]  [phone]           Prescription approved per [facility] Refill Protocol.  Sindy Burnham RN on 11/18/2024 at 9:24 AM

## 2024-11-22 ENCOUNTER — HOSPITAL ENCOUNTER (OUTPATIENT)
Dept: GENERAL RADIOLOGY | Facility: OTHER | Age: 64
Discharge: HOME | End: 2024-11-22
Payer: COMMERCIAL

## 2024-11-22 PROCEDURE — 71046 X-RAY EXAM CHEST 2 VIEWS: CPT

## 2024-11-25 DIAGNOSIS — J44.9 COPD, SEVERE (H): ICD-10-CM

## 2024-11-25 DIAGNOSIS — J43.1 PANLOBULAR EMPHYSEMA (H): ICD-10-CM

## 2024-11-27 RX ORDER — IPRATROPIUM BROMIDE AND ALBUTEROL SULFATE 2.5; .5 MG/3ML; MG/3ML
SOLUTION RESPIRATORY (INHALATION)
Qty: 360 ML | Refills: 0 | Status: SHIPPED | OUTPATIENT
Start: 2024-11-27

## 2024-11-27 NOTE — TELEPHONE ENCOUNTER
[facility] sent Rx request for the following:      Requested Prescriptions   Pending Prescriptions Disp Refills    ipratropium - albuterol 0.5 mg/2.5 mg/3 mL (DUONEB) 0.5-2.5 (3) MG/3ML neb solution [Pharmacy Med Name: IPRATROPI/ALB 0.5/3MG INH SOLUTION] 360 mL      Sig: INHALE CONTENTS OF 1 VIAL BY NEBULIZATION EVERY 6 HOURS AS NEEDED FOR SHORTNESS OF BREATH, DYSPNEA OR WHEEZING       Short-Acting Beta Agonist Inhalers Protocol  Passed - 11/27/2024  2:30 PM        Passed - Patient is age 12 or older        Passed - Recent (12 mo) or future (30 days) visit within the authorizing provider's specialty     The patient must have completed an in-person or virtual visit within the past 12 months or has a future visit scheduled within the next 90 days with the authorizing provider s specialty.  Urgent care and e-visits do not qualify as an office visit for this protocol.          Passed - Medication is active on med list       Asthma Nebs Protocol Passed - 11/27/2024  2:30 PM        Passed - Patient is age 4 years or older        Passed - Recent (12 mo) or future (30 days) visit within the authorizing provider's specialty     The patient must have completed an in-person or virtual visit within the past 12 months or has a future visit scheduled within the next 90 days with the authorizing provider s specialty.  Urgent care and e-visits do not qualify as an office visit for this protocol.          Passed - Medication is active on med list             Last Prescription Date:   10/10/23  Last Fill Qty/Refills:         300 ml, R-11    Last Office Visit:              8/30/24   Future Office visit:           12/31/24    Prescription approved per [facility] Refill Protocol.  Sindy Burnham RN on 11/27/2024 at 2:31 PM

## 2024-12-26 SDOH — HEALTH STABILITY: PHYSICAL HEALTH: ON AVERAGE, HOW MANY MINUTES DO YOU ENGAGE IN EXERCISE AT THIS LEVEL?: 10 MIN

## 2024-12-26 SDOH — HEALTH STABILITY: PHYSICAL HEALTH: ON AVERAGE, HOW MANY DAYS PER WEEK DO YOU ENGAGE IN MODERATE TO STRENUOUS EXERCISE (LIKE A BRISK WALK)?: 2 DAYS

## 2024-12-26 ASSESSMENT — SOCIAL DETERMINANTS OF HEALTH (SDOH): HOW OFTEN DO YOU GET TOGETHER WITH FRIENDS OR RELATIVES?: MORE THAN THREE TIMES A WEEK

## 2024-12-31 ENCOUNTER — OFFICE VISIT (OUTPATIENT)
Dept: INTERNAL MEDICINE | Facility: OTHER | Age: 64
End: 2024-12-31
Attending: INTERNAL MEDICINE
Payer: COMMERCIAL

## 2024-12-31 VITALS
OXYGEN SATURATION: 93 % | WEIGHT: 88.2 LBS | SYSTOLIC BLOOD PRESSURE: 102 MMHG | HEIGHT: 62 IN | HEART RATE: 94 BPM | RESPIRATION RATE: 16 BRPM | BODY MASS INDEX: 16.23 KG/M2 | DIASTOLIC BLOOD PRESSURE: 64 MMHG

## 2024-12-31 DIAGNOSIS — J43.1 PANLOBULAR EMPHYSEMA (H): ICD-10-CM

## 2024-12-31 DIAGNOSIS — L40.50 PSORIATIC ARTHRITIS (H): ICD-10-CM

## 2024-12-31 DIAGNOSIS — I70.0 ABDOMINAL AORTIC ATHEROSCLEROSIS (H): ICD-10-CM

## 2024-12-31 DIAGNOSIS — E55.9 VITAMIN D DEFICIENCY: ICD-10-CM

## 2024-12-31 DIAGNOSIS — G47.62 NOCTURNAL LEG CRAMPS: ICD-10-CM

## 2024-12-31 DIAGNOSIS — E78.2 MIXED HYPERLIPIDEMIA: ICD-10-CM

## 2024-12-31 DIAGNOSIS — R64 PULMONARY CACHEXIA DUE TO CHRONIC OBSTRUCTIVE PULMONARY DISEASE (H): ICD-10-CM

## 2024-12-31 DIAGNOSIS — Z71.85 VACCINE COUNSELING: ICD-10-CM

## 2024-12-31 DIAGNOSIS — J44.9 COPD, SEVERE (H): ICD-10-CM

## 2024-12-31 DIAGNOSIS — Z00.00 MEDICARE ANNUAL WELLNESS VISIT, INITIAL: Primary | ICD-10-CM

## 2024-12-31 DIAGNOSIS — J44.9 PULMONARY CACHEXIA DUE TO CHRONIC OBSTRUCTIVE PULMONARY DISEASE (H): ICD-10-CM

## 2024-12-31 DIAGNOSIS — K22.2 SCHATZKI'S RING OF DISTAL ESOPHAGUS: ICD-10-CM

## 2024-12-31 DIAGNOSIS — L40.9 PSORIASIS: ICD-10-CM

## 2024-12-31 DIAGNOSIS — E53.8 VITAMIN B12 DEFICIENCY: ICD-10-CM

## 2024-12-31 DIAGNOSIS — Z14.8 ALPHA-1-ANTITRYPSIN DEFICIENCY CARRIER: ICD-10-CM

## 2024-12-31 PROBLEM — E43 SEVERE PROTEIN-CALORIE MALNUTRITION (H): Status: ACTIVE | Noted: 2022-12-06

## 2024-12-31 LAB
ALBUMIN SERPL BCG-MCNC: 4.5 G/DL (ref 3.5–5.2)
ALP SERPL-CCNC: 75 U/L (ref 40–150)
ALT SERPL W P-5'-P-CCNC: 19 U/L (ref 0–50)
ANION GAP SERPL CALCULATED.3IONS-SCNC: 12 MMOL/L (ref 7–15)
AST SERPL W P-5'-P-CCNC: 21 U/L (ref 0–45)
BASOPHILS # BLD AUTO: 0.1 10E3/UL (ref 0–0.2)
BASOPHILS NFR BLD AUTO: 2 %
BILIRUB SERPL-MCNC: 0.4 MG/DL
BUN SERPL-MCNC: 9.9 MG/DL (ref 8–23)
CALCIUM SERPL-MCNC: 9.4 MG/DL (ref 8.8–10.4)
CHLORIDE SERPL-SCNC: 103 MMOL/L (ref 98–107)
CREAT SERPL-MCNC: 0.7 MG/DL (ref 0.51–0.95)
EGFRCR SERPLBLD CKD-EPI 2021: >90 ML/MIN/1.73M2
EOSINOPHIL # BLD AUTO: 0.2 10E3/UL (ref 0–0.7)
EOSINOPHIL NFR BLD AUTO: 2 %
ERYTHROCYTE [DISTWIDTH] IN BLOOD BY AUTOMATED COUNT: 12.8 % (ref 10–15)
GLUCOSE SERPL-MCNC: 95 MG/DL (ref 70–99)
HCO3 SERPL-SCNC: 27 MMOL/L (ref 22–29)
HCT VFR BLD AUTO: 45 % (ref 35–47)
HGB BLD-MCNC: 15.1 G/DL (ref 11.7–15.7)
IMM GRANULOCYTES # BLD: 0 10E3/UL
IMM GRANULOCYTES NFR BLD: 0 %
LYMPHOCYTES # BLD AUTO: 2.2 10E3/UL (ref 0.8–5.3)
LYMPHOCYTES NFR BLD AUTO: 30 %
MAGNESIUM SERPL-MCNC: 2.3 MG/DL (ref 1.7–2.3)
MCH RBC QN AUTO: 30.3 PG (ref 26.5–33)
MCHC RBC AUTO-ENTMCNC: 33.6 G/DL (ref 31.5–36.5)
MCV RBC AUTO: 90 FL (ref 78–100)
MONOCYTES # BLD AUTO: 0.6 10E3/UL (ref 0–1.3)
MONOCYTES NFR BLD AUTO: 9 %
NEUTROPHILS # BLD AUTO: 4.1 10E3/UL (ref 1.6–8.3)
NEUTROPHILS NFR BLD AUTO: 57 %
NRBC # BLD AUTO: 0 10E3/UL
NRBC BLD AUTO-RTO: 0 /100
PLATELET # BLD AUTO: 351 10E3/UL (ref 150–450)
POTASSIUM SERPL-SCNC: 4.5 MMOL/L (ref 3.4–5.3)
PROT SERPL-MCNC: 7 G/DL (ref 6.4–8.3)
RBC # BLD AUTO: 4.98 10E6/UL (ref 3.8–5.2)
SODIUM SERPL-SCNC: 142 MMOL/L (ref 135–145)
VIT B12 SERPL-MCNC: 1625 PG/ML (ref 232–1245)
VIT D+METAB SERPL-MCNC: 22 NG/ML (ref 20–50)
WBC # BLD AUTO: 7.2 10E3/UL (ref 4–11)

## 2024-12-31 PROCEDURE — 82310 ASSAY OF CALCIUM: CPT | Mod: ZL | Performed by: INTERNAL MEDICINE

## 2024-12-31 PROCEDURE — 36415 COLL VENOUS BLD VENIPUNCTURE: CPT | Mod: ZL | Performed by: INTERNAL MEDICINE

## 2024-12-31 PROCEDURE — 82565 ASSAY OF CREATININE: CPT | Mod: ZL | Performed by: INTERNAL MEDICINE

## 2024-12-31 PROCEDURE — 82306 VITAMIN D 25 HYDROXY: CPT | Mod: ZL | Performed by: INTERNAL MEDICINE

## 2024-12-31 PROCEDURE — 82607 VITAMIN B-12: CPT | Mod: ZL | Performed by: INTERNAL MEDICINE

## 2024-12-31 PROCEDURE — 85004 AUTOMATED DIFF WBC COUNT: CPT | Mod: ZL | Performed by: INTERNAL MEDICINE

## 2024-12-31 PROCEDURE — G0463 HOSPITAL OUTPT CLINIC VISIT: HCPCS

## 2024-12-31 PROCEDURE — 83735 ASSAY OF MAGNESIUM: CPT | Mod: ZL | Performed by: INTERNAL MEDICINE

## 2024-12-31 RX ORDER — TIOTROPIUM BROMIDE INHALATION SPRAY 3.12 UG/1
SPRAY, METERED RESPIRATORY (INHALATION)
Qty: 12 G | Refills: 4 | Status: SHIPPED | OUTPATIENT
Start: 2024-12-31

## 2024-12-31 RX ORDER — OMEPRAZOLE 40 MG/1
40 CAPSULE, DELAYED RELEASE ORAL DAILY
Qty: 90 CAPSULE | Refills: 4 | Status: SHIPPED | OUTPATIENT
Start: 2024-12-31

## 2024-12-31 RX ORDER — IPRATROPIUM BROMIDE AND ALBUTEROL SULFATE 2.5; .5 MG/3ML; MG/3ML
SOLUTION RESPIRATORY (INHALATION)
Qty: 360 ML | Refills: 4 | Status: SHIPPED | OUTPATIENT
Start: 2024-12-31

## 2024-12-31 RX ORDER — PREDNISONE 10 MG/1
TABLET ORAL
Qty: 100 TABLET | Refills: 4 | Status: SHIPPED | OUTPATIENT
Start: 2024-12-31 | End: 2026-01-05

## 2024-12-31 RX ORDER — ALBUTEROL SULFATE 0.83 MG/ML
2.5 SOLUTION RESPIRATORY (INHALATION) EVERY 6 HOURS PRN
Qty: 300 ML | Refills: 11 | Status: SHIPPED | OUTPATIENT
Start: 2024-12-31

## 2024-12-31 RX ORDER — MOMETASONE FUROATE AND FORMOTEROL FUMARATE DIHYDRATE 200; 5 UG/1; UG/1
2 AEROSOL RESPIRATORY (INHALATION) 2 TIMES DAILY
Qty: 169 G | Refills: 4 | Status: SHIPPED | OUTPATIENT
Start: 2024-12-31

## 2024-12-31 RX ORDER — AZITHROMYCIN 250 MG/1
250 TABLET, FILM COATED ORAL DAILY
Qty: 90 TABLET | Refills: 4 | Status: SHIPPED | OUTPATIENT
Start: 2024-12-31

## 2024-12-31 ASSESSMENT — ENCOUNTER SYMPTOMS
FREQUENCY: 1
HEARTBURN: 0
NERVOUS/ANXIOUS: 0
HEADACHES: 1
HEMATURIA: 0
PARESTHESIAS: 0
PALPITATIONS: 0
UNEXPECTED WEIGHT CHANGE: 1
WEAKNESS: 0
JOINT SWELLING: 0
NAUSEA: 0
SORE THROAT: 0
HEMATOCHEZIA: 0
MYALGIAS: 1
DIZZINESS: 0
DYSURIA: 0
CHEST TIGHTNESS: 0
CHILLS: 0
FEVER: 0
COUGH: 1
BRUISES/BLEEDS EASILY: 0
EYE PAIN: 0
ABDOMINAL PAIN: 0
ARTHRALGIAS: 1
WHEEZING: 1
DIARRHEA: 0
SHORTNESS OF BREATH: 1
CONSTIPATION: 0

## 2024-12-31 ASSESSMENT — PAIN SCALES - GENERAL: PAINLEVEL_OUTOF10: MILD PAIN (2)

## 2024-12-31 NOTE — NURSING NOTE
"Chief Complaint   Patient presents with    Annual Visit       Initial /64   Pulse 94   Resp 16   Ht 1.575 m (5' 2\")   Wt 40 kg (88 lb 3.2 oz)   LMP 01/01/1996 (Approximate)   SpO2 93%   Breastfeeding No   BMI 16.13 kg/m   Estimated body mass index is 16.13 kg/m  as calculated from the following:    Height as of this encounter: 1.575 m (5' 2\").    Weight as of this encounter: 40 kg (88 lb 3.2 oz).  Medication Review: complete    The next two questions are to help us understand your food security.  If you are feeling you need any assistance in this area, we have resources available to support you today.          12/26/2024   SDOH- Food Insecurity   Within the past 12 months, did you worry that your food would run out before you got money to buy more? N   Within the past 12 months, did the food you bought just not last and you didn t have money to get more? N         Health Care Directive:  Patient has a Health Care Directive on file      Nirali High LPN      "

## 2024-12-31 NOTE — PROGRESS NOTES
Assessment & Plan     ICD-10-CM    1. Medicare annual wellness visit, initial  Z00.00       2. Vaccine counseling  Z71.85       3. COPD, severe (H)  J44.9 albuterol (PROVENTIL) (2.5 MG/3ML) 0.083% neb solution     azithromycin (ZITHROMAX) 250 MG tablet     ipratropium - albuterol 0.5 mg/2.5 mg/3 mL (DUONEB) 0.5-2.5 (3) MG/3ML neb solution     mometasone-formoterol (DULERA) 200-5 MCG/ACT inhaler     tiotropium (SPIRIVA RESPIMAT) 2.5 MCG/ACT inhaler     predniSONE (DELTASONE) 10 MG tablet      4. Panlobular emphysema (H)  J43.1 azithromycin (ZITHROMAX) 250 MG tablet     ipratropium - albuterol 0.5 mg/2.5 mg/3 mL (DUONEB) 0.5-2.5 (3) MG/3ML neb solution     mometasone-formoterol (DULERA) 200-5 MCG/ACT inhaler     tiotropium (SPIRIVA RESPIMAT) 2.5 MCG/ACT inhaler     predniSONE (DELTASONE) 10 MG tablet      5. 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 Mixed hyperlipidemia  E78.2       7. Psoriatic arthritis (H)  L40.50 predniSONE (DELTASONE) 10 MG tablet      8. Psoriasis  L40.9 predniSONE (DELTASONE) 10 MG tablet      9. Alpha-1-antitrypsin deficiency carrier  Z14.8 azithromycin (ZITHROMAX) 250 MG tablet     mometasone-formoterol (DULERA) 200-5 MCG/ACT inhaler     tiotropium (SPIRIVA RESPIMAT) 2.5 MCG/ACT inhaler      10. Schatzki's ring of distal esophagus  K22.2 omeprazole (PRILOSEC) 40 MG DR capsule      11. Vitamin D deficiency  E55.9 Vitamin D Deficiency     Vitamin D Deficiency      12. Vitamin B12 deficiency  E53.8 Vitamin B12     Vitamin B12      13. Nocturnal leg cramps  G47.62 Magnesium     CBC with Platelets & Differential     Comprehensive metabolic panel     Magnesium     CBC with Platelets & Differential     Comprehensive metabolic panel      14. Abdominal aortic atherosclerosis (H)  I70.0          Patient presents for Medicare annual wellness visit as well as follow-up multiple issues    She has been struggling with  pulmonary cachexia.  Weight is down quite a bit again.  BMI is now 16.1.  She was feeling better, having more appetite, some weight gain when taking steroids.  She was recently on steroid taper from 40 mg down to 5 mg.  She would like to go back on low-dose steroid daily.  Her breathing is doing better, her psoriasis was doing better.  Psoriatic arthritis was better.  Advised that this is a good and bad medication.  She expressed understanding.  Start prednisone 20 mg daily for 5 days then drop down to 10 mg daily.  If able by reducing down to 5 mg daily.    Psoriatic arthritis, start prednisone as noted.    Alpha-1 antitrypsin deficiency carrier, continues with daily Zithromax, inhalers, does have a nebulizer at home as well.    Heartburn/reflux, history of Schatzki's ring.  Doing well with omeprazole.  Takes regularly.  Does get heartburn if she misses her medication or is not watching her diet closely.    Vitamin D and B12 deficiency.  Taking oral replacement.  Check labs.    Nocturnal leg cramps, check magnesium levels labs electrolyte levels.    Abdominal aortic atherosclerosis, smoked for many years.   Recently quit again.  MIXED HYPERLIPIDEMIA.  Ongoing. LDL is at goal: No. Triglycerides are at goal: Yes.  Hopefully lifestyle modifications will improve cholesterol levels, otherwise will consider additional medication dose adjustments or medication changes.  Medication side effects reported: None.   Continue current medications for now. Medication list reviewed/updated. Refills completed as needed.  Recent Labs   Lab Test 08/30/24  0946 10/22/20  1101   CHOL 191 219*   HDL 58 46   * 142*   TRIG 83 153*        COPD - Patient has a longstanding history of COPD: Severe = FeV1 < 30%-49%. Patient has been doing reasonably well overall noting PEÑA, COUGH, WHEEZING, and frequent phlegm and continues on Inhalers / nebs medication regimen without adverse reactions or side effects.       Vaccine counseling  completed.  Encourage routine / annual vaccinations.     Results for orders placed or performed in visit on 12/31/24   Vitamin B12     Status: Abnormal   Result Value Ref Range    Vitamin B12 1,625 (H) 232 - 1,245 pg/mL   Magnesium     Status: Normal   Result Value Ref Range    Magnesium 2.3 1.7 - 2.3 mg/dL   Comprehensive metabolic panel     Status: Normal   Result Value Ref Range    Sodium 142 135 - 145 mmol/L    Potassium 4.5 3.4 - 5.3 mmol/L    Carbon Dioxide (CO2) 27 22 - 29 mmol/L    Anion Gap 12 7 - 15 mmol/L    Urea Nitrogen 9.9 8.0 - 23.0 mg/dL    Creatinine 0.70 0.51 - 0.95 mg/dL    GFR Estimate >90 >60 mL/min/1.73m2    Calcium 9.4 8.8 - 10.4 mg/dL    Chloride 103 98 - 107 mmol/L    Glucose 95 70 - 99 mg/dL    Alkaline Phosphatase 75 40 - 150 U/L    AST 21 0 - 45 U/L    ALT 19 0 - 50 U/L    Protein Total 7.0 6.4 - 8.3 g/dL    Albumin 4.5 3.5 - 5.2 g/dL    Bilirubin Total 0.4 <=1.2 mg/dL   CBC with platelets and differential     Status: None   Result Value Ref Range    WBC Count 7.2 4.0 - 11.0 10e3/uL    RBC Count 4.98 3.80 - 5.20 10e6/uL    Hemoglobin 15.1 11.7 - 15.7 g/dL    Hematocrit 45.0 35.0 - 47.0 %    MCV 90 78 - 100 fL    MCH 30.3 26.5 - 33.0 pg    MCHC 33.6 31.5 - 36.5 g/dL    RDW 12.8 10.0 - 15.0 %    Platelet Count 351 150 - 450 10e3/uL    % Neutrophils 57 %    % Lymphocytes 30 %    % Monocytes 9 %    % Eosinophils 2 %    % Basophils 2 %    % Immature Granulocytes 0 %    NRBCs per 100 WBC 0 <1 /100    Absolute Neutrophils 4.1 1.6 - 8.3 10e3/uL    Absolute Lymphocytes 2.2 0.8 - 5.3 10e3/uL    Absolute Monocytes 0.6 0.0 - 1.3 10e3/uL    Absolute Eosinophils 0.2 0.0 - 0.7 10e3/uL    Absolute Basophils 0.1 0.0 - 0.2 10e3/uL    Absolute Immature Granulocytes 0.0 <=0.4 10e3/uL    Absolute NRBCs 0.0 10e3/uL   CBC with Platelets & Differential     Status: None    Narrative    The following orders were created for panel order CBC with Platelets & Differential.  Procedure                                Abnormality         Status                     ---------                               -----------         ------                     CBC with platelets and d...[[id number]]                      Final result                 Please view results for these tests on the individual orders.      CBC is normal.  Chemistry panel is normal.  Magnesium is normal.  Vitamin B12 levels are adequate.               Return in about 1 year (around 12/31/2025) for Annual Medicare Wellness Visit.      Review of Systems   Constitutional:  Positive for unexpected weight change (Maintaining weight, but still with significant overall weight loss). Negative for chills and fever.   HENT:  Positive for congestion. Negative for ear pain, hearing loss and sore throat.    Eyes:  Negative for pain and visual disturbance.   Respiratory:  Positive for cough, shortness of breath and wheezing. Negative for chest tightness.    Cardiovascular:  Negative for chest pain, palpitations and peripheral edema.   Gastrointestinal:  Negative for abdominal pain, constipation, diarrhea, heartburn, hematochezia and nausea.   Genitourinary:  Positive for frequency. Negative for dysuria, genital sores, hematuria and urgency.   Musculoskeletal:  Positive for arthralgias (Getting some increased pain with Psoriatic Arthritis - most significant in hands, neck and center of back between shoulders) and myalgias (+ Leg cramps at night). Negative for joint swelling.   Skin:  Positive for rash (+ Psoriasis).   Allergic/Immunologic: Negative for immunocompromised state.   Neurological:  Positive for headaches. Negative for dizziness, weakness and paresthesias.   Hematological:  Does not bruise/bleed easily.   Psychiatric/Behavioral:  Negative for mood changes. The patient is not nervous/anxious.          Preventive Care Visit  [facility] [place]  Tim Boyd MD, Internal Medicine  Dec 31, 2024        Rocco Pa is a 64 year old, presenting for the  following:  Annual Visit        12/31/2024     8:49 AM   Additional Questions   Roomed by ROGERS Trotter          Health Care Directive  Patient has a Health Care Directive on file  Advance care planning document is on file and is current.      12/26/2024   General Health   How would you rate your overall physical health? (!) FAIR   Feel stress (tense, anxious, or unable to sleep) Only a little   (!) STRESS CONCERN      12/26/2024   Nutrition   Diet: Regular (no restrictions)         12/26/2024   Exercise   Days per week of moderate/strenous exercise 2 days   Average minutes spent exercising at this level 10 min   (!) EXERCISE CONCERN      12/26/2024   Social Factors   Frequency of gathering with friends or relatives More than three times a week   Worry food won't last until get money to buy more No   Food not last or not have enough money for food? No   Do you have housing? (Housing is defined as stable permanent housing and does not include staying ouside in a car, in a tent, in an abandoned building, in an overnight shelter, or couch-surfing.) No   Are you worried about losing your housing? No   Lack of transportation? No   Unable to get utilities (heat,electricity)? No   Want help with housing or utility concern? No   (!) HOUSING CONCERN PRESENT      12/26/2024   Fall Risk   Fallen 2 or more times in the past year? No   Trouble with walking or balance? No          12/26/2024   Activities of Daily Living- Home Safety   Needs help with the following daily activites None of the above   Safety concerns in the home None of the above         12/26/2024   Dental   Dentist two times every year? (!) NO         12/26/2024   Hearing Screening   Hearing concerns? None of the above         12/26/2024   Driving Risk Screening   Patient/family members have concerns about driving No         12/26/2024   General Alertness/Fatigue Screening   Have you been more tired than usual lately? (!) YES         12/26/2024    Urinary Incontinence Screening   Bothered by leaking urine in past 6 months No         2024   TB Screening   Were you born outside of the [place]? No           Today's PHQ-2 Score:       2024     3:17 PM   PHQ-2 (  [employer])   Q1: Little interest or pleasure in doing things 0    Q2: Feeling down, depressed or hopeless 0    PHQ-2 Score 0    Q1: Little interest or pleasure in doing things Not at all   Q2: Feeling down, depressed or hopeless Not at all   PHQ-2 Score 0       Proxy-reported         2024   Substance Use   If I could quit smoking, I would Completely agree   I want to quit somking, worry about health affects Completely agree   Willing to make a plan to quit smoking Completely agree   Willing to cut down before quitting Completely agree   Alcohol more than 3/day or more than 7/wk No   Do you have a current opioid prescription? No   How severe/bad is pain from 1 to 10? 3/10   Do you use any other substances recreationally? No     Social History     Tobacco Use    Smoking status: Former     Current packs/day: 0.00     Average packs/day: 0.6 packs/day for 44.0 years (28.4 ttl pk-yrs)     Types: Cigarettes, Cigars     Start date: 1980     Quit date: 2024     Years since quittin.3    Smokeless tobacco: Never   Vaping Use    Vaping status: Never Used   Substance Use Topics    Alcohol use: Yes     Alcohol/week: 2.0 standard drinks of alcohol     Comment: Alcoholic Drinks/day: 1-2 drinks every 1-2 weeks    Drug use: No           2023   LAST FHS-7 RESULTS   1st degree relative breast or ovarian cancer Yes   Any relative bilateral breast cancer No   Any male have breast cancer No   Any ONE woman have BOTH breast AND ovarian cancer No   Any woman with breast cancer before 50yrs Yes   2 or more relatives with breast AND/OR ovarian cancer No   2 or more relatives with breast AND/OR bowel cancer No        Mammogram Screening - Mammogram every 1-2 years updated in Health Maintenance  based on mutual decision making      History of abnormal Pap smear: Status post hysterectomy with removal of cervix and no history of CIN2 or greater or cervical cancer. Health Maintenance and Surgical History updated.       ASCVD Risk   The 10-year ASCVD risk score (Gemma ALTAMIRANO, et al., 2019) is: 2.8%    Values used to calculate the score:      Age: 64 years      Sex: Female      Is Non- : No      Diabetic: No      Tobacco smoker: No      Systolic Blood Pressure: 102 mmHg      Is BP treated: No      HDL Cholesterol: 58 mg/dL      Total Cholesterol: 191 mg/dL            Reviewed and updated as needed this visit by Provider   Tobacco  Allergies  Meds  Problems  Med Hx  Surg Hx  Fam Hx     Sexual Activity            Current providers sharing in care for this patient include:  Patient Care Team:  Tim Boyd MD as PCP - General (Internal Medicine)  Tim Boyd MD as Assigned PCP    The following health maintenance items are reviewed in Epic and correct as of today:  Health Maintenance   Topic Date Due    ZOSTER IMMUNIZATION (1 of 2) Never done    RSV VACCINE (1 - Risk 60-74 years 1-dose series) Never done    LUNG CANCER SCREENING  11/27/2023    Pneumococcal Vaccine: 50+ Years (2 of 2 - PCV) 10/10/2024    URINE DRUG SCREEN  12/31/2030 (Originally 4/8/2020)    LIPID  08/30/2025    MAMMO SCREENING  11/16/2025    MEDICARE ANNUAL WELLNESS VISIT  12/31/2025    DTAP/TDAP/TD IMMUNIZATION (3 - Td or Tdap) 05/02/2027    GLUCOSE  12/31/2027    ADVANCE CARE PLANNING  12/31/2029    COLORECTAL CANCER SCREENING  09/26/2034    SPIROMETRY  Completed    COPD ACTION PLAN  Completed    PHQ-2 (once per calendar year)  Completed    HEPATITIS C SCREENING  Addressed    HIV SCREENING  Addressed    HPV IMMUNIZATION  Aged Out    MENINGITIS IMMUNIZATION  Aged Out    RSV MONOCLONAL ANTIBODY  Aged Out    PAP  Discontinued    INFLUENZA VACCINE  Discontinued    COVID-19 Vaccine  Discontinued           "  Objective    Exam  /64   Pulse 94   Resp 16   Ht 1.575 m (5' 2\")   Wt 40 kg (88 lb 3.2 oz)   LMP 01/01/1996 (Approximate)   SpO2 93%   Breastfeeding No   BMI 16.13 kg/m     Estimated body mass index is 16.13 kg/m  as calculated from the following:    Height as of this encounter: 1.575 m (5' 2\").    Weight as of this encounter: 40 kg (88 lb 3.2 oz).    Physical Exam  Constitutional:       General: She is not in acute distress.     Appearance: She is ill-appearing. She is not toxic-appearing.      Comments: + Thin body habitus   Eyes:      General: No scleral icterus.     Conjunctiva/sclera: Conjunctivae normal.   Neck:      Vascular: No carotid bruit.   Cardiovascular:      Rate and Rhythm: Normal rate and regular rhythm.      Pulses: Normal pulses.   Pulmonary:      Effort: Pulmonary effort is normal. No respiratory distress.      Breath sounds: Rhonchi present. No wheezing.      Comments: + intermittent coughing, dry.  Prolonged expiratory phase  Abdominal:      Palpations: Abdomen is soft.      Tenderness: There is no abdominal tenderness. There is no guarding or rebound.      Hernia: A hernia (ventral hernia, somewhat soft, but tender to palpation) is present.   Musculoskeletal:      Right lower leg: No edema.      Left lower leg: No edema.   Skin:     General: Skin is warm and dry.      Findings: No rash.   Neurological:      Mental Status: She is alert. Mental status is at baseline.   Psychiatric:         Mood and Affect: Mood normal.         Behavior: Behavior normal.               12/31/2024   Mini Cog   Clock Draw Score 2 Normal   3 Item Recall 3 objects recalled   Mini Cog Total Score 5         Vision Screen  Reason Vision Screen Not Completed: Screening Recommend: Patient/Guardian Declined      Signed Electronically by: Tim Boyd MD    "

## 2024-12-31 NOTE — PATIENT INSTRUCTIONS
Blood pressure is well controlled.     Medications refilled.     Labs are pending.     Keep eating more calories..... Need to get your weight up. Continue regular intake of your protein shakes.       START:   - predniSONE (DELTASONE) 10 MG tablet; Take 2 tablets (20 mg) by mouth daily for 5 days, THEN 0.5-1 tablets (5-10 mg) daily. - Adjust dose as tolerated for breathing / lungs and Psoriasis.    Panlobular emphysema (H)    Continue:   - azithromycin (ZITHROMAX) 250 MG tablet; Take 1 tablet (250 mg) by mouth daily.  - ipratropium - albuterol 0.5 mg/2.5 mg/3 mL (DUONEB) 0.5-2.5 (3) MG/3ML neb solution; INHALE CONTENTS OF 1 VIAL BY NEBULIZATION EVERY 6 HOURS AS NEEDED FOR SHORTNESS OF BREATH, DYSPNEA OR WHEEZING  - mometasone-formoterol (DULERA) 200-5 MCG/ACT inhaler; Inhale 2 puffs into the lungs 2 times daily.  - tiotropium (SPIRIVA RESPIMAT) 2.5 MCG/ACT inhaler; INHALE 2 PUFFS INTO THE LUNGS EVERY EVENING      Return in approximately 1 year, or sooner as needed for follow-up with Dr. Boyd.  - Annual Follow-up / Physical - Medicare Annual Wellness Visit     Clinic : [phone]  Appointment line: [phone]

## 2025-01-09 ENCOUNTER — MYC MEDICAL ADVICE (OUTPATIENT)
Dept: INTERNAL MEDICINE | Facility: OTHER | Age: 65
End: 2025-01-09
Payer: COMMERCIAL

## 2025-01-09 DIAGNOSIS — R10.9 CHRONIC ABDOMINAL PAIN: ICD-10-CM

## 2025-01-09 DIAGNOSIS — K44.9 HIATAL HERNIA: Primary | ICD-10-CM

## 2025-01-09 DIAGNOSIS — G89.29 CHRONIC ABDOMINAL PAIN: ICD-10-CM

## 2025-01-09 NOTE — TELEPHONE ENCOUNTER
Wants to see General surgery about hernia due to pain and pressure in abd. Wondering about checking for Malabsorption.     Aden'd up referral for General Surgery.     Routing to provider to review and respond.  Robles Rojas RN on 1/9/2025 at 12:33 PM

## 2025-01-14 ENCOUNTER — TELEPHONE (OUTPATIENT)
Dept: INTERNAL MEDICINE | Facility: OTHER | Age: 65
End: 2025-01-14
Payer: COMMERCIAL

## 2025-01-14 DIAGNOSIS — Z79.899 USES NEBULIZER AND INHALER AT HOME: ICD-10-CM

## 2025-01-14 DIAGNOSIS — R64 PULMONARY CACHEXIA DUE TO CHRONIC OBSTRUCTIVE PULMONARY DISEASE (H): Primary | ICD-10-CM

## 2025-01-14 DIAGNOSIS — J44.9 PULMONARY CACHEXIA DUE TO CHRONIC OBSTRUCTIVE PULMONARY DISEASE (H): Primary | ICD-10-CM

## 2025-01-14 NOTE — TELEPHONE ENCOUNTER
Patient sent Rx request for the following:    Neb supplies         Last Prescription Date:   Not available  Last Office Visit:              12/31/24   Future Office visit:            12/11/25    Unable to complete prescription refill per RN Medication Refill Policy.   Routing to PCP for approval

## 2025-01-14 NOTE — TELEPHONE ENCOUNTER
Patient would like an order put in to [facility] pharmacy for a new hose and mouth piece for her nebulizer. Please call patient with any questions.    Denise Renner on 1/14/2025 at 11:13 AM

## 2025-01-27 ASSESSMENT — PATIENT HEALTH QUESTIONNAIRE - PHQ9
SUM OF ALL RESPONSES TO PHQ QUESTIONS 1-9: 3
10. IF YOU CHECKED OFF ANY PROBLEMS, HOW DIFFICULT HAVE THESE PROBLEMS MADE IT FOR YOU TO DO YOUR WORK, TAKE CARE OF THINGS AT HOME, OR GET ALONG WITH OTHER PEOPLE: SOMEWHAT DIFFICULT
SUM OF ALL RESPONSES TO PHQ QUESTIONS 1-9: 3

## 2025-01-28 ENCOUNTER — OFFICE VISIT (OUTPATIENT)
Dept: SURGERY | Facility: OTHER | Age: 65
End: 2025-01-28
Attending: SURGERY
Payer: COMMERCIAL

## 2025-01-28 VITALS
WEIGHT: 90.8 LBS | DIASTOLIC BLOOD PRESSURE: 65 MMHG | HEIGHT: 62 IN | RESPIRATION RATE: 16 BRPM | BODY MASS INDEX: 16.71 KG/M2 | TEMPERATURE: 98.2 F | OXYGEN SATURATION: 95 % | SYSTOLIC BLOOD PRESSURE: 100 MMHG

## 2025-01-28 DIAGNOSIS — K44.9 HIATAL HERNIA: ICD-10-CM

## 2025-01-28 DIAGNOSIS — G89.29 CHRONIC ABDOMINAL PAIN: ICD-10-CM

## 2025-01-28 DIAGNOSIS — R10.9 CHRONIC ABDOMINAL PAIN: ICD-10-CM

## 2025-01-28 PROCEDURE — G0463 HOSPITAL OUTPT CLINIC VISIT: HCPCS

## 2025-01-28 ASSESSMENT — PAIN SCALES - GENERAL: PAINLEVEL_OUTOF10: MILD PAIN (2)

## 2025-01-28 NOTE — PROGRESS NOTES
GENERAL SURGERY CONSULTATION NOTE    Ember Tavares   [address] [place] [address]  64 year old  female  Admission Date/Time: No admission date for patient encounter.  Primary Care Provider:  Tim Boyd      HPI: Allen presents for follow-up.  She was under the impression she had a hernia somewhere in her abdominal wall.  I reviewed her CT scan.  I see no CT findings suggestive of that.  I cannot find any defect on her examination.  Her CT scan does mention of her hiatal hernia.  I had discussed this with her at the time of her endoscopy last year.  She has improved from a dysphagia standpoint.  She can now swallow bread.  Her current complaints include chest pain which is intermittent and substernal as well as inability to tolerate much of a meal.  She will have frequent burping as well as excessive flatulence.    Her COPD continues to advance.  She is currently on a maintenance prednisone type schedule after attempted weaning on a taper.    She mentions a possibility of malabsorption causing weight loss.  Her current BMI is 16.  She has been a small is 85 pounds and is currently at 90 pounds.    REVIEW OF SYSTEMS:    GENERAL: No fevers or chills. Denies fatigue, recent weight loss.  HEENT: No sinus drainage. No changes with vision or hearing. No difficulty swallowing.   LYMPHATICS:  No swollen nodes in axilla, neck or groin.  CARDIOVASCULAR: Denies chest pain, palpitations and dyspnea on exertion.  PULMONARY: No shortness of breath or cough. No increase in sputum production.  GI: Denies melena, bright red blood in stools. No hematemesis. No constipation or diarrhea.  : No dysuria or hematuria.  SKIN: No recent rashes or ulcers.   HEMATOLOGY:  No history of easy bruising or bleeding.  ENDOCRINE:  Nohistory of diabetes or thyroid problems.  NEUROLOGY:  No history of seizures or headaches. No motor or sensory changes.    Patient Active Problem List   Diagnosis    Chronic abdominal pain     Alpha-1-antitrypsin deficiency carrier    Arthritis of knee, right    Atherosclerosis of abdominal aorta    Chronic radicular cervical pain    Constipation, chronic    Contact dermatitis    COPD, severe (H)    Decreased diffusion capacity of lung    Dyshidrotic hand dermatitis    Gastroesophageal reflux disease with esophagitis without hemorrhage    Hiatal hernia    Status post balloon dilatation of esophageal stricture    History of tobacco abuse    Irritable bowel syndrome    Myalgia    Occipital neuralgia of left side    Osteopenia    Prediabetes    Psoriasis    Psoriatic arthritis (H)    Schatzki's ring of distal esophagus    Sinusitis, chronic    Vitamin D deficiency    S/P Nissen fundoplication (without gastrostomy tube) procedure    Mixed hyperlipidemia    PAD (peripheral artery disease)    Vitamin B12 deficiency    Menopausal syndrome (hot flashes)    Ulcer of right cornea    Seasonal allergic rhinitis due to pollen    Pulmonary nodules    Difficulty swallowing solids    History of mandibular surgery    Panlobular emphysema (H)    Severe protein-calorie malnutrition    Decreased functional mobility    Ventral hernia without obstruction or gangrene    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  TECHNIQUE: 125 mL Omnipaque 300 IV contrast was administered. 3-D arterial reformations were performed.  FINDINGS: There is a well-defined ovoid density at the medial right costophrenic angle. This measures 2.3 x 0.9 x 0.6 cm. It demonstra    Chronic sinusitis     No Comments Provided    Closed fracture of skull (H)     1972    COPD, severe (H) 08/24/2017    Overview:  8/4/2014 --  PULMONARY FUNCTION  SITE:  [facility] ORDERED BY:  VANNESA LUNA  CLINICAL SUMMARY: 53-year-old female with a history of severe COPD.   TECHNICIAN NOTE: Good effort.   DATA: FVC 1.85 (61%). FEV1 1.04 (45%). FEV1/FVC ratio 56%. DLCO 13.5 to 62%.   Flow volume curve is consistent with airway obstruction.   IMPRESSION: 1. Severe obstructive pulmon    Dental abscess 12/02/2019    GERD (gastroesophageal reflux disease) 11/29/2017    Hiatal hernia 08/24/2017    PAD (peripheral artery disease) 10/13/2015    Psoriasis 08/24/2017    Psoriatic arthritis (H) 11/20/2017    Schatzki's ring of distal esophagus 08/24/2017    Ulcer of right cornea 08/18/2018    Vitamin B12 deficiency 04/17/2018    Vitamin D deficiency 08/24/2017       Past Surgical History:   Procedure Laterality Date    AS UGI ENDOSCOPY W ESOPHAGEAL DILATION BALLOON <30MM  10/30/2015    ESOPHAGEAL DILATION,Dr. Rainer Allen, [facility]    BIOPSY BREAST Bilateral     1999, 2001, BIOPSY BREAST,benign    CHOLECYSTECTOMY  2004    Laparoscopic    COLONOSCOPY  06/01/2016    x's 3 negative    COLONOSCOPY N/A 04/29/2021    hyperplastic follow up 10 years, 4/29/2031    COLONOSCOPY N/A 9/26/2024    Procedure: Colonoscopy;  Surgeon: Jagdish Ruiz MD;  Location:  OR    CT CORONARY ANGIOGRAM  2009    CT CORONARY ANGIOGRAM (IA),negative    ESOPHAGOSCOPY, GASTROSCOPY, DUODENOSCOPY (EGD), COMBINED  04/07/2009    dilated, Dr. Allen    ESOPHAGOSCOPY, GASTROSCOPY, DUODENOSCOPY (EGD), COMBINED N/A 05/30/2019    Pickering's esophagus, 3 year follow up    ESOPHAGOSCOPY, GASTROSCOPY, DUODENOSCOPY (EGD), COMBINED N/A 10/23/2023    Procedure: Esophagoscopy, gastroscopy, duodenoscopy (EGD)with biospy and Dilation.;  Surgeon: Jagdish Ruiz MD;  Location: [facility] OR    HYSTERECTOMY VAGINAL  1994    ovaries remain    LAPAROSCOPIC NISSEN FUNDOPLICATION N/A 03/26/2018    Procedure: LAPAROSCOPIC NISSEN FUNDOPLICATION;  Laparoscopic Nissen Fundoplication;   Surgeon: Jagdish Ruiz MD;  Location: [facility] OR    LAPAROTOMY EXPLORATORY      lysis of adhesions/endometriosis    RECTOCELE REPAIR  2009        Family History   Problem Relation Age of Onset    Arthritis Father         Arthritis    Emphysema Father 51        Alpha-1-AT deficiency    Peripheral Vascular Disease Mother         Peripheral vascular disease    Diabetes Mother         Diabetes    Arthritis Mother         Arthritis    Breast Cancer Sister         Premenopausal breast cancer    Eczema Brother         Eczema    Narcolepsy Brother     Other - See Comments Daughter         Narcolepsy    Narcolepsy Daughter     Brain Tumor Daughter     Seizure Disorder Daughter     Depression Daughter     Graves' disease Daughter         Social History     Socioeconomic History    Marital status:      Spouse name: Ervin    Number of children: Not on file    Years of education: Not on file    Highest education level: Not on file   Occupational History    Not on file   Tobacco Use    Smoking status: Former     Current packs/day: 0.00     Average packs/day: 0.6 packs/day for 44.0 years (28.4 ttl pk-yrs)     Types: Cigarettes, Cigars     Start date: 1980     Quit date: 2024     Years since quittin.4    Smokeless tobacco: Never   Vaping Use    Vaping status: Never Used   Substance and Sexual Activity    Alcohol use: Yes     Alcohol/week: 2.0 standard drinks of alcohol     Comment: Alcoholic Drinks/day: 1-2 drinks every 1-2 weeks    Drug use: No    Sexual activity: Yes     Partners: Male   Other Topics Concern    Parent/sibling w/ CABG, MI or angioplasty before 65F 55M? 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  Lives in [place].  Patient previously smoked.    Alcohol Use - yes  Drug Use - no  Regular Exercise - yes   - Johan  3 biologic children, 1 adopted, 2 stepchildren.    Works at [employer]     Social Drivers of  Health     Financial Resource Strain: Low Risk  (12/26/2024)    Financial Resource Strain     Within the past 12 months, have you or your family members you live with been unable to get utilities (heat, electricity) when it was really needed?: No   Food Insecurity: Low Risk  (12/26/2024)    Food Insecurity     Within the past 12 months, did you worry that your food would run out before you got money to buy more?: No     Within the past 12 months, did the food you bought just not last and you didn t have money to get more?: No   Transportation Needs: Low Risk  (12/26/2024)    Transportation Needs     Within the past 12 months, has lack of transportation kept you from medical appointments, getting your medicines, non-medical meetings or appointments, work, or from getting things that you need?: No   Physical Activity: Insufficiently Active (12/26/2024)    Exercise Vital Sign     Days of Exercise per Week: 2 days     Minutes of Exercise per Session: 10 min   Stress: No Stress Concern Present (12/26/2024)    [employer] [place] [employer] - Occupational Stress Questionnaire     Feeling of Stress : Only a little   Social Connections: Unknown (12/26/2024)    Social Connection and Isolation Panel [NHANES]     Frequency of Communication with Friends and Family: Not on file     Frequency of Social Gatherings with Friends and Family: More than three times a week     Attends [employer] Services: Not on file     Active Member of Clubs or Organizations: Not on file     Attends Club or Organization Meetings: Not on file     Marital Status: Not on file   Interpersonal Safety: Low Risk  (1/28/2025)    Interpersonal Safety     Do you feel physically and emotionally safe where you currently live?: Yes     Within the past 12 months, have you been hit, slapped, kicked or otherwise physically hurt by someone?: No     Within the past 12 months, have you been humiliated or emotionally abused in other ways by your partner or  ex-partner?: No   Housing Stability: High Risk (12/26/2024)    Housing Stability     Do you have housing? : No     Are you worried about losing your housing?: No         Current Outpatient Medications   Medication Sig Dispense Refill    albuterol (PROAIR HFA/PROVENTIL HFA/VENTOLIN HFA) 108 (90 Base) MCG/ACT inhaler INHALE 1 TO 2 PUFF(S) INTO THE LUNGS EVERY 4 HOURS AS NEEDED FOR SHORTNESS OF BREATH OR DIFFICULTY BREATHING 72 g 2    albuterol (PROVENTIL) (2.5 MG/3ML) 0.083% neb solution Take 1 vial (2.5 mg) by nebulization every 6 hours as needed for shortness of breath or wheezing. 300 mL 11    alum & mag hydroxide-simethicone (MAALOX MULTI SYMPTOM MAX ST) 400-400-40 MG/5ML SUSP suspension Take 30 mLs by mouth every 4 hours as needed for indigestion      aspirin (ASA) 81 MG EC tablet Take 1 tablet (81 mg) by mouth once a week Or 2 x weekly      azithromycin (ZITHROMAX) 250 MG tablet Take 1 tablet (250 mg) by mouth daily. 90 tablet 4    docusate sodium (COLACE) 100 MG tablet Take 2 tablets (200 mg) by mouth 2 times daily -Adjust dose as needed to maintain soft stools 120 tablet 11    ipratropium - albuterol 0.5 mg/2.5 mg/3 mL (DUONEB) 0.5-2.5 (3) MG/3ML neb solution INHALE CONTENTS OF 1 VIAL BY NEBULIZATION EVERY 6 HOURS AS NEEDED FOR SHORTNESS OF BREATH, DYSPNEA OR WHEEZING 360 mL 4    mometasone-formoterol (DULERA) 200-5 MCG/ACT inhaler Inhale 2 puffs into the lungs 2 times daily. 169 g 4    omeprazole (PRILOSEC) 40 MG DR capsule Take 1 capsule (40 mg) by mouth daily. 90 capsule 4    predniSONE (DELTASONE) 10 MG tablet Take 2 tablets (20 mg) by mouth daily for 5 days, THEN 0.5-1 tablets (5-10 mg) daily. - Adjust dose as tolerated for breathing / lungs and Psoriasis. 100 tablet 4    red yeast rice 600 MG CAPS Take 1 capsule (600 mg) by mouth daily 90 capsule 4    spacer (OPTICHAMBER SANJUANA) holding chamber -- Use with inhaler - please instruct on proper use -- (okay to sub for similar product) 1 each 1     "tiotropium (SPIRIVA RESPIMAT) 2.5 MCG/ACT inhaler INHALE 2 PUFFS INTO THE LUNGS EVERY EVENING 12 g 4    vitamin D3 (CHOLECALCIFEROL) 250 mcg (56175 units) capsule Take 1 capsule by mouth daily       No current facility-administered medications for this visit.         ALLERGIES/SENSITIVITIES:   Allergies   Allergen Reactions    Penicillins Other (See Comments)     Other reaction(s): Respiratory Arrest    Atorvastatin Muscle Pain (Myalgia)    Morphine Other (See Comments)     Other reaction(s): Chest Pain    Levofloxacin Nausea and Vomiting    Rosuvastatin Nausea and Vomiting    Sucralfate Nausea and Vomiting    Sulfamethoxazole-Trimethoprim Nausea and Vomiting     Yeast infection       PHYSICAL EXAM:     /65 (BP Location: Right arm, Patient Position: Sitting, Cuff Size: Adult Large)   Temp 98.2  F (36.8  C) (Tympanic)   Resp 16   Ht 1.575 m (5' 2\")   Wt 41.2 kg (90 lb 12.8 oz)   LMP 01/01/1996 (Approximate)   SpO2 95%   BMI 16.61 kg/m      General Appearance:   Appears slender but otherwise healthy  Heart & CV:  RRR no murmur.  Intact distal pulses, good cap refill.  LUNGS:  CTA B/L, no wheezing or crackles.  Abd: Scaphoid mild muscle wasting noticed to  Ext: Mild muscle wasting noted likely symptomatic      ADDITIONAL COMMENTS:      CONSULTATION ASSESSMENT AND PLAN:    64 year old female with recurrent hiatal hernia.  Repair of a recurrence here and revision of her Nissen may be technically difficult.  Her declining medical status makes reoperation more hazardous to in terms of prolonged ventilation.  Tolerance to infection, need for possible feeding tube.    Plan for EGD with repeat dilation and Bravo.  If she is having GERD in her esophagus and this correlates with her symptoms of chest pain we may need to continue to pursue revision.  Discussed referral to [facility] in the [facility] versus attempting repair.    Will also workup for pancreatic insufficiency.    We did step through the mortality morbidity " for both a laparoscopic or a open revision of her hiatal hernia.  We used this [facility] data.    Jagdish Ruiz MD on 1/28/2025 at 1:47 PM      Answers submitted by the patient for this visit:  Patient Health Questionnaire (Submitted on 1/27/2025)  If you checked off any problems, how difficult have these problems made it for you to do your work, take care of things at home, or get along with other people?: Somewhat difficult  PHQ9 TOTAL SCORE: 3

## 2025-01-28 NOTE — NURSING NOTE
"Chief Complaint   Patient presents with    Consult    Hernia       Initial /65 (BP Location: Right arm, Patient Position: Sitting, Cuff Size: Adult Large)   Temp 98.2  F (36.8  C) (Tympanic)   Resp 16   Ht 1.575 m (5' 2\")   Wt 41.2 kg (90 lb 12.8 oz)   LMP 01/01/1996 (Approximate)   SpO2 95%   BMI 16.61 kg/m   Estimated body mass index is 16.61 kg/m  as calculated from the following:    Height as of this encounter: 1.575 m (5' 2\").    Weight as of this encounter: 41.2 kg (90 lb 12.8 oz).  Meds Reconciled: complete      FOOD SECURITY SCREENING QUESTIONS  Hunger Vital Signs:  Within the past 12 months we worried whether our food would run out before we got money to buy more. Never  Within the past 12 months the food we bought just didn't last and we didn't have money to get more. Never  Tejal Loredo RN 1/28/2025 1:12 PM        Tejal Loredo RN     "

## 2025-01-29 ENCOUNTER — APPOINTMENT (OUTPATIENT)
Dept: LAB | Facility: OTHER | Age: 65
End: 2025-01-29
Attending: SURGERY
Payer: COMMERCIAL

## 2025-01-29 ENCOUNTER — HOSPITAL ENCOUNTER (OUTPATIENT)
Facility: OTHER | Age: 65
End: 2025-01-29
Attending: SURGERY | Admitting: SURGERY
Payer: COMMERCIAL

## 2025-01-29 LAB — LACTOFERRIN STL QL IA: NEGATIVE

## 2025-01-29 PROCEDURE — 82653 EL-1 FECAL QUANTITATIVE: CPT | Mod: ZL | Performed by: SURGERY

## 2025-01-29 PROCEDURE — 82705 FATS/LIPIDS FECES QUAL: CPT | Mod: ZL | Performed by: SURGERY

## 2025-01-29 PROCEDURE — 83630 LACTOFERRIN FECAL (QUAL): CPT | Mod: ZL | Performed by: SURGERY

## 2025-02-02 ENCOUNTER — MYC MEDICAL ADVICE (OUTPATIENT)
Dept: INTERNAL MEDICINE | Facility: OTHER | Age: 65
End: 2025-02-02
Payer: COMMERCIAL

## 2025-02-03 NOTE — TELEPHONE ENCOUNTER
"2008 diagnosed with Atherosclerosis of abdominal aorta.      She is wondering if this could be causing colon/abdominal issues.      \"Online it says \"yes\" but I don't believe that's good enough medical advice\".     If yes- what would be the next steps for work-up?     ___________________________________________________________________    1/28/25  Seen by Dr. DENIS Ruiz for chronic abdominal pain.   12/31/24  LOV with Skaudis for MWV    Monserrat Howard RN on 2/3/2025 at 2:28 PM    "

## 2025-02-04 NOTE — TELEPHONE ENCOUNTER
I think it would be unlikely of this causing her pain. Goals for this diagnosis are making sure blood pressure and cholesterol levels are well controled. Follow up in clinic to discuss further.

## 2025-02-07 ENCOUNTER — HOSPITAL ENCOUNTER (OUTPATIENT)
Dept: GENERAL RADIOLOGY | Facility: OTHER | Age: 65
Discharge: HOME | End: 2025-02-07
Attending: SURGERY
Payer: COMMERCIAL

## 2025-02-07 DIAGNOSIS — K44.9 HIATAL HERNIA: ICD-10-CM

## 2025-02-07 DIAGNOSIS — R10.9 CHRONIC ABDOMINAL PAIN: ICD-10-CM

## 2025-02-07 DIAGNOSIS — G89.29 CHRONIC ABDOMINAL PAIN: ICD-10-CM

## 2025-02-07 PROCEDURE — 74220 X-RAY XM ESOPHAGUS 1CNTRST: CPT

## 2025-02-08 PROCEDURE — 87338 HPYLORI STOOL AG IA: CPT | Mod: ZL | Performed by: FAMILY MEDICINE

## 2025-02-14 ENCOUNTER — HOSPITAL ENCOUNTER (OUTPATIENT)
Dept: NUCLEAR MEDICINE | Facility: OTHER | Age: 65
Setting detail: NUCLEAR MEDICINE
Discharge: HOME | End: 2025-02-14
Attending: FAMILY MEDICINE
Payer: COMMERCIAL

## 2025-02-14 DIAGNOSIS — R63.4 WEIGHT LOSS: ICD-10-CM

## 2025-02-14 DIAGNOSIS — R10.9 CHRONIC ABDOMINAL PAIN: ICD-10-CM

## 2025-02-14 DIAGNOSIS — G89.29 CHRONIC ABDOMINAL PAIN: ICD-10-CM

## 2025-02-14 PROCEDURE — A9541 TC99M SULFUR COLLOID: HCPCS | Performed by: FAMILY MEDICINE

## 2025-02-14 PROCEDURE — 343N000001 HC RX 343 MED OP 636: Performed by: FAMILY MEDICINE

## 2025-02-14 PROCEDURE — 78264 GASTRIC EMPTYING IMG STUDY: CPT

## 2025-02-14 RX ADMIN — Medication 1.03 MILLICURIE: at 09:15

## 2025-02-14 NOTE — RESULT ENCOUNTER NOTE
Please call patient and tell her that her gastric emptying time is normal.  Does not have gastroparesis

## 2025-02-27 ENCOUNTER — APPOINTMENT (OUTPATIENT)
Dept: GENERAL RADIOLOGY | Facility: OTHER | Age: 65
DRG: 208 | End: 2025-02-27
Attending: STUDENT IN AN ORGANIZED HEALTH CARE EDUCATION/TRAINING PROGRAM
Payer: COMMERCIAL

## 2025-02-27 ENCOUNTER — HOSPITAL ENCOUNTER (INPATIENT)
Facility: OTHER | Age: 65
End: 2025-02-27
Attending: STUDENT IN AN ORGANIZED HEALTH CARE EDUCATION/TRAINING PROGRAM | Admitting: INTERNAL MEDICINE
Payer: COMMERCIAL

## 2025-02-27 VITALS
OXYGEN SATURATION: 93 % | HEART RATE: 106 BPM | SYSTOLIC BLOOD PRESSURE: 104 MMHG | WEIGHT: 83.5 LBS | RESPIRATION RATE: 22 BRPM | TEMPERATURE: 97.7 F | BODY MASS INDEX: 15.36 KG/M2 | HEIGHT: 62 IN | DIASTOLIC BLOOD PRESSURE: 62 MMHG

## 2025-02-27 DIAGNOSIS — J96.01 ACUTE RESPIRATORY FAILURE WITH HYPOXIA AND HYPERCAPNIA (H): ICD-10-CM

## 2025-02-27 DIAGNOSIS — R13.10 DIFFICULTY SWALLOWING SOLIDS: ICD-10-CM

## 2025-02-27 DIAGNOSIS — Z20.822 LAB TEST NEGATIVE FOR COVID-19 VIRUS: Primary | ICD-10-CM

## 2025-02-27 DIAGNOSIS — R64 PULMONARY CACHEXIA DUE TO CHRONIC OBSTRUCTIVE PULMONARY DISEASE (H): ICD-10-CM

## 2025-02-27 DIAGNOSIS — R26.89 DECREASED FUNCTIONAL MOBILITY: ICD-10-CM

## 2025-02-27 DIAGNOSIS — K43.9 VENTRAL HERNIA WITHOUT OBSTRUCTION OR GANGRENE: ICD-10-CM

## 2025-02-27 DIAGNOSIS — J96.21 ACUTE AND CHRONIC RESPIRATORY FAILURE WITH HYPOXIA (H): ICD-10-CM

## 2025-02-27 DIAGNOSIS — J44.1 COPD WITH ACUTE EXACERBATION (H): ICD-10-CM

## 2025-02-27 DIAGNOSIS — J44.9 PULMONARY CACHEXIA DUE TO CHRONIC OBSTRUCTIVE PULMONARY DISEASE (H): ICD-10-CM

## 2025-02-27 DIAGNOSIS — R49.0 DYSPHONIA: ICD-10-CM

## 2025-02-27 DIAGNOSIS — R13.10 DYSPHAGIA, UNSPECIFIED TYPE: ICD-10-CM

## 2025-02-27 DIAGNOSIS — J44.9 COPD, SEVERE (H): ICD-10-CM

## 2025-02-27 DIAGNOSIS — J43.1 PANLOBULAR EMPHYSEMA (H): ICD-10-CM

## 2025-02-27 DIAGNOSIS — J96.02 ACUTE RESPIRATORY FAILURE WITH HYPOXIA AND HYPERCAPNIA (H): ICD-10-CM

## 2025-02-27 LAB
ANION GAP SERPL CALCULATED.3IONS-SCNC: 11 MMOL/L (ref 7–15)
BASE EXCESS BLDV CALC-SCNC: 2.6 MMOL/L (ref -3–3)
BASOPHILS # BLD AUTO: 0.1 10E3/UL (ref 0–0.2)
BASOPHILS NFR BLD AUTO: 1 %
BUN SERPL-MCNC: 13.1 MG/DL (ref 8–23)
CALCIUM SERPL-MCNC: 9.3 MG/DL (ref 8.8–10.4)
CHLORIDE SERPL-SCNC: 102 MMOL/L (ref 98–107)
CREAT SERPL-MCNC: 0.66 MG/DL (ref 0.51–0.95)
D DIMER PPP FEU-MCNC: 0.33 UG/ML FEU (ref 0–0.5)
EGFRCR SERPLBLD CKD-EPI 2021: >90 ML/MIN/1.73M2
EOSINOPHIL # BLD AUTO: 0.1 10E3/UL (ref 0–0.7)
EOSINOPHIL NFR BLD AUTO: 1 %
ERYTHROCYTE [DISTWIDTH] IN BLOOD BY AUTOMATED COUNT: 13 % (ref 10–15)
FLUAV RNA SPEC QL NAA+PROBE: NEGATIVE
FLUBV RNA RESP QL NAA+PROBE: NEGATIVE
GLUCOSE SERPL-MCNC: 107 MG/DL (ref 70–99)
HCO3 BLDV-SCNC: 29 MMOL/L (ref 21–28)
HCO3 SERPL-SCNC: 25 MMOL/L (ref 22–29)
HCT VFR BLD AUTO: 42.8 % (ref 35–47)
HGB BLD-MCNC: 14.5 G/DL (ref 11.7–15.7)
HOLD SPECIMEN: NORMAL
IMM GRANULOCYTES # BLD: 0 10E3/UL
IMM GRANULOCYTES NFR BLD: 0 %
LYMPHOCYTES # BLD AUTO: 1.3 10E3/UL (ref 0.8–5.3)
LYMPHOCYTES NFR BLD AUTO: 8 %
MCH RBC QN AUTO: 30.5 PG (ref 26.5–33)
MCHC RBC AUTO-ENTMCNC: 33.9 G/DL (ref 31.5–36.5)
MCV RBC AUTO: 90 FL (ref 78–100)
MONOCYTES # BLD AUTO: 1 10E3/UL (ref 0–1.3)
MONOCYTES NFR BLD AUTO: 6 %
NEUTROPHILS # BLD AUTO: 13.8 10E3/UL (ref 1.6–8.3)
NEUTROPHILS NFR BLD AUTO: 85 %
NRBC # BLD AUTO: 0 10E3/UL
NRBC BLD AUTO-RTO: 0 /100
O2/TOTAL GAS SETTING VFR VENT: 30 %
OXYHGB MFR BLDV: 88 % (ref 70–75)
PCO2 BLDV: 50 MM HG (ref 40–50)
PH BLDV: 7.37 [PH] (ref 7.32–7.43)
PLATELET # BLD AUTO: 278 10E3/UL (ref 150–450)
PO2 BLDV: 60 MM HG (ref 25–47)
POTASSIUM SERPL-SCNC: 4.4 MMOL/L (ref 3.4–5.3)
RBC # BLD AUTO: 4.75 10E6/UL (ref 3.8–5.2)
RSV RNA SPEC NAA+PROBE: NEGATIVE
SAO2 % BLDV: 91.5 % (ref 70–75)
SARS-COV-2 RNA RESP QL NAA+PROBE: NEGATIVE
SODIUM SERPL-SCNC: 138 MMOL/L (ref 135–145)
TROPONIN T SERPL HS-MCNC: 11 NG/L
TROPONIN T SERPL HS-MCNC: 12 NG/L
WBC # BLD AUTO: 16.4 10E3/UL (ref 4–11)

## 2025-02-27 PROCEDURE — 99291 CRITICAL CARE FIRST HOUR: CPT | Performed by: STUDENT IN AN ORGANIZED HEALTH CARE EDUCATION/TRAINING PROGRAM

## 2025-02-27 PROCEDURE — 82565 ASSAY OF CREATININE: CPT | Performed by: STUDENT IN AN ORGANIZED HEALTH CARE EDUCATION/TRAINING PROGRAM

## 2025-02-27 PROCEDURE — 250N000013 HC RX MED GY IP 250 OP 250 PS 637: Performed by: INTERNAL MEDICINE

## 2025-02-27 PROCEDURE — 250N000012 HC RX MED GY IP 250 OP 636 PS 637: Performed by: INTERNAL MEDICINE

## 2025-02-27 PROCEDURE — 82805 BLOOD GASES W/O2 SATURATION: CPT | Performed by: STUDENT IN AN ORGANIZED HEALTH CARE EDUCATION/TRAINING PROGRAM

## 2025-02-27 PROCEDURE — 250N000011 HC RX IP 250 OP 636: Mod: JZ | Performed by: STUDENT IN AN ORGANIZED HEALTH CARE EDUCATION/TRAINING PROGRAM

## 2025-02-27 PROCEDURE — 120N000001 HC R&B MED SURG/OB

## 2025-02-27 PROCEDURE — 250N000013 HC RX MED GY IP 250 OP 250 PS 637: Performed by: STUDENT IN AN ORGANIZED HEALTH CARE EDUCATION/TRAINING PROGRAM

## 2025-02-27 PROCEDURE — 999N000157 HC STATISTIC RCP TIME EA 10 MIN

## 2025-02-27 PROCEDURE — 250N000009 HC RX 250: Performed by: INTERNAL MEDICINE

## 2025-02-27 PROCEDURE — 71045 X-RAY EXAM CHEST 1 VIEW: CPT

## 2025-02-27 PROCEDURE — 87637 SARSCOV2&INF A&B&RSV AMP PRB: CPT | Performed by: STUDENT IN AN ORGANIZED HEALTH CARE EDUCATION/TRAINING PROGRAM

## 2025-02-27 PROCEDURE — 94640 AIRWAY INHALATION TREATMENT: CPT | Mod: 76

## 2025-02-27 PROCEDURE — 85379 FIBRIN DEGRADATION QUANT: CPT | Performed by: STUDENT IN AN ORGANIZED HEALTH CARE EDUCATION/TRAINING PROGRAM

## 2025-02-27 PROCEDURE — 85025 COMPLETE CBC W/AUTO DIFF WBC: CPT | Performed by: STUDENT IN AN ORGANIZED HEALTH CARE EDUCATION/TRAINING PROGRAM

## 2025-02-27 PROCEDURE — 93005 ELECTROCARDIOGRAM TRACING: CPT | Performed by: STUDENT IN AN ORGANIZED HEALTH CARE EDUCATION/TRAINING PROGRAM

## 2025-02-27 PROCEDURE — 36415 COLL VENOUS BLD VENIPUNCTURE: CPT | Performed by: STUDENT IN AN ORGANIZED HEALTH CARE EDUCATION/TRAINING PROGRAM

## 2025-02-27 PROCEDURE — 84484 ASSAY OF TROPONIN QUANT: CPT | Performed by: STUDENT IN AN ORGANIZED HEALTH CARE EDUCATION/TRAINING PROGRAM

## 2025-02-27 PROCEDURE — 93010 ELECTROCARDIOGRAM REPORT: CPT | Performed by: INTERNAL MEDICINE

## 2025-02-27 PROCEDURE — 94640 AIRWAY INHALATION TREATMENT: CPT

## 2025-02-27 PROCEDURE — 82310 ASSAY OF CALCIUM: CPT | Performed by: STUDENT IN AN ORGANIZED HEALTH CARE EDUCATION/TRAINING PROGRAM

## 2025-02-27 PROCEDURE — 99222 1ST HOSP IP/OBS MODERATE 55: CPT | Mod: 95 | Performed by: INTERNAL MEDICINE

## 2025-02-27 PROCEDURE — 96374 THER/PROPH/DIAG INJ IV PUSH: CPT | Performed by: STUDENT IN AN ORGANIZED HEALTH CARE EDUCATION/TRAINING PROGRAM

## 2025-02-27 PROCEDURE — 99291 CRITICAL CARE FIRST HOUR: CPT | Mod: 25 | Performed by: STUDENT IN AN ORGANIZED HEALTH CARE EDUCATION/TRAINING PROGRAM

## 2025-02-27 PROCEDURE — 80048 BASIC METABOLIC PNL TOTAL CA: CPT | Performed by: STUDENT IN AN ORGANIZED HEALTH CARE EDUCATION/TRAINING PROGRAM

## 2025-02-27 PROCEDURE — G0378 HOSPITAL OBSERVATION PER HR: HCPCS

## 2025-02-27 PROCEDURE — 250N000009 HC RX 250: Performed by: STUDENT IN AN ORGANIZED HEALTH CARE EDUCATION/TRAINING PROGRAM

## 2025-02-27 RX ORDER — IPRATROPIUM BROMIDE AND ALBUTEROL SULFATE 2.5; .5 MG/3ML; MG/3ML
3 SOLUTION RESPIRATORY (INHALATION) EVERY 4 HOURS PRN
Status: DISCONTINUED | OUTPATIENT
Start: 2025-02-27 | End: 2025-02-27

## 2025-02-27 RX ORDER — PREDNISONE 20 MG/1
40 TABLET ORAL DAILY
Status: DISCONTINUED | OUTPATIENT
Start: 2025-02-27 | End: 2025-02-28

## 2025-02-27 RX ORDER — AZITHROMYCIN 250 MG/1
500 TABLET, FILM COATED ORAL ONCE
Status: COMPLETED | OUTPATIENT
Start: 2025-02-27 | End: 2025-02-27

## 2025-02-27 RX ORDER — ACETAMINOPHEN 325 MG/1
650 TABLET ORAL EVERY 4 HOURS PRN
Status: DISCONTINUED | OUTPATIENT
Start: 2025-02-27 | End: 2025-03-12 | Stop reason: HOSPADM

## 2025-02-27 RX ORDER — AMOXICILLIN 250 MG
2 CAPSULE ORAL 2 TIMES DAILY PRN
Status: DISCONTINUED | OUTPATIENT
Start: 2025-02-27 | End: 2025-03-07

## 2025-02-27 RX ORDER — METHYLPREDNISOLONE SODIUM SUCCINATE 125 MG/2ML
125 INJECTION INTRAMUSCULAR; INTRAVENOUS ONCE
Status: COMPLETED | OUTPATIENT
Start: 2025-02-27 | End: 2025-02-27

## 2025-02-27 RX ORDER — AZITHROMYCIN 250 MG/1
250 TABLET, FILM COATED ORAL DAILY
Status: DISCONTINUED | OUTPATIENT
Start: 2025-02-28 | End: 2025-02-27

## 2025-02-27 RX ORDER — AMOXICILLIN 250 MG
1 CAPSULE ORAL 2 TIMES DAILY PRN
Status: DISCONTINUED | OUTPATIENT
Start: 2025-02-27 | End: 2025-03-07

## 2025-02-27 RX ORDER — IPRATROPIUM BROMIDE AND ALBUTEROL SULFATE 2.5; .5 MG/3ML; MG/3ML
3 SOLUTION RESPIRATORY (INHALATION)
Status: DISCONTINUED | OUTPATIENT
Start: 2025-02-27 | End: 2025-03-03

## 2025-02-27 RX ORDER — ONDANSETRON 2 MG/ML
4 INJECTION INTRAMUSCULAR; INTRAVENOUS EVERY 6 HOURS PRN
Status: DISCONTINUED | OUTPATIENT
Start: 2025-02-27 | End: 2025-03-12 | Stop reason: HOSPADM

## 2025-02-27 RX ORDER — PANTOPRAZOLE SODIUM 40 MG/1
40 TABLET, DELAYED RELEASE ORAL 2 TIMES DAILY
Status: DISCONTINUED | OUTPATIENT
Start: 2025-02-27 | End: 2025-02-27

## 2025-02-27 RX ORDER — ONDANSETRON 4 MG/1
4 TABLET, ORALLY DISINTEGRATING ORAL EVERY 6 HOURS PRN
Status: DISCONTINUED | OUTPATIENT
Start: 2025-02-27 | End: 2025-03-12 | Stop reason: HOSPADM

## 2025-02-27 RX ORDER — FLUTICASONE FUROATE AND VILANTEROL 200; 25 UG/1; UG/1
1 POWDER RESPIRATORY (INHALATION) DAILY
Status: DISCONTINUED | OUTPATIENT
Start: 2025-02-27 | End: 2025-03-02

## 2025-02-27 RX ORDER — ACETAMINOPHEN 650 MG/1
650 SUPPOSITORY RECTAL EVERY 4 HOURS PRN
Status: DISCONTINUED | OUTPATIENT
Start: 2025-02-27 | End: 2025-03-12 | Stop reason: HOSPADM

## 2025-02-27 RX ORDER — PANTOPRAZOLE SODIUM 40 MG/1
40 TABLET, DELAYED RELEASE ORAL
Status: DISCONTINUED | OUTPATIENT
Start: 2025-02-27 | End: 2025-03-01

## 2025-02-27 RX ORDER — AZITHROMYCIN 250 MG/1
250 TABLET, FILM COATED ORAL DAILY
Status: DISCONTINUED | OUTPATIENT
Start: 2025-02-28 | End: 2025-02-28

## 2025-02-27 RX ORDER — IPRATROPIUM BROMIDE AND ALBUTEROL SULFATE 2.5; .5 MG/3ML; MG/3ML
3 SOLUTION RESPIRATORY (INHALATION)
Status: COMPLETED | OUTPATIENT
Start: 2025-02-27 | End: 2025-02-27

## 2025-02-27 RX ORDER — PROCHLORPERAZINE MALEATE 10 MG
10 TABLET ORAL EVERY 6 HOURS PRN
Status: DISCONTINUED | OUTPATIENT
Start: 2025-02-27 | End: 2025-03-12 | Stop reason: HOSPADM

## 2025-02-27 RX ORDER — AZITHROMYCIN 250 MG/1
500 TABLET, FILM COATED ORAL ONCE
Status: DISCONTINUED | OUTPATIENT
Start: 2025-02-27 | End: 2025-02-27

## 2025-02-27 RX ORDER — CALCIUM CARBONATE 500 MG/1
1 TABLET, CHEWABLE ORAL EVERY 6 HOURS PRN
COMMUNITY

## 2025-02-27 RX ADMIN — IPRATROPIUM BROMIDE AND ALBUTEROL SULFATE 3 ML: .5; 3 SOLUTION RESPIRATORY (INHALATION) at 06:53

## 2025-02-27 RX ADMIN — PREDNISONE 40 MG: 20 TABLET ORAL at 10:37

## 2025-02-27 RX ADMIN — IPRATROPIUM BROMIDE AND ALBUTEROL SULFATE 3 ML: .5; 3 SOLUTION RESPIRATORY (INHALATION) at 02:58

## 2025-02-27 RX ADMIN — SENNOSIDES AND DOCUSATE SODIUM 2 TABLET: 50; 8.6 TABLET ORAL at 16:48

## 2025-02-27 RX ADMIN — IPRATROPIUM BROMIDE AND ALBUTEROL SULFATE 3 ML: .5; 3 SOLUTION RESPIRATORY (INHALATION) at 02:52

## 2025-02-27 RX ADMIN — AZITHROMYCIN DIHYDRATE 500 MG: 250 TABLET, FILM COATED ORAL at 02:56

## 2025-02-27 RX ADMIN — IPRATROPIUM BROMIDE AND ALBUTEROL SULFATE 3 ML: .5; 3 SOLUTION RESPIRATORY (INHALATION) at 10:11

## 2025-02-27 RX ADMIN — IPRATROPIUM BROMIDE AND ALBUTEROL SULFATE 3 ML: .5; 3 SOLUTION RESPIRATORY (INHALATION) at 18:05

## 2025-02-27 RX ADMIN — IPRATROPIUM BROMIDE AND ALBUTEROL SULFATE 3 ML: .5; 3 SOLUTION RESPIRATORY (INHALATION) at 14:05

## 2025-02-27 RX ADMIN — METHYLPREDNISOLONE SODIUM SUCCINATE 125 MG: 125 INJECTION, POWDER, FOR SOLUTION INTRAMUSCULAR; INTRAVENOUS at 02:55

## 2025-02-27 RX ADMIN — IPRATROPIUM BROMIDE AND ALBUTEROL SULFATE 3 ML: .5; 3 SOLUTION RESPIRATORY (INHALATION) at 03:21

## 2025-02-27 RX ADMIN — PANTOPRAZOLE SODIUM 40 MG: 40 TABLET, DELAYED RELEASE ORAL at 08:56

## 2025-02-27 ASSESSMENT — ACTIVITIES OF DAILY LIVING (ADL)
ADLS_ACUITY_SCORE: 39
ADLS_ACUITY_SCORE: 50
ADLS_ACUITY_SCORE: 39
ADLS_ACUITY_SCORE: 50
ADLS_ACUITY_SCORE: 39
ADLS_ACUITY_SCORE: 50
ADLS_ACUITY_SCORE: 39

## 2025-02-27 NOTE — PROGRESS NOTES
Pt stated she does not want to be a full code. Compressions are or but not intubation. MD notified. Lynne Bliss, RRT

## 2025-02-27 NOTE — PLAN OF CARE
Problem: Gas Exchange Impaired  Goal: Optimal Gas Exchange  Outcome: Progressing  Intervention: Optimize Oxygenation and Ventilation  Recent Flowsheet Documentation  Taken 2/27/2025 0519 by Jack Klein RN  Head of Bed (HOB) Positioning: HOB at 60 degrees   Goal Outcome Evaluation:    Patient admitted on observation following a COPD exacerbation. Patient on 2L NC, satting 93 - 94 %. Patient satting 89% on room air paired with significant anxiety. Patient gets SOB with exertion and requires a minute to recover after walking to the bathroom. Dry cough present. Expiratory wheezes.

## 2025-02-27 NOTE — ED PROVIDER NOTES
History     Chief Complaint   Patient presents with    Shortness of Breath       Ember Tavares is a 64 year old female presenting for dyspnea. Onset 2 days ago today with acute worsening a couple hours ago associated with back pain this is midthoracic with radiation to her neck.  Endorses associated cough.  No chest pain, leg swelling.  No significant sputum production.  Smoking history.  Tried nebulizer treatment at home without improvement.  Not on home oxygen.  Took home dose of prednisone prior to arrival.  Exposure to construction dust the other day which she wonders if this was trigger.  Profound hypoxia at home into 70s SpO2.    Allergies   Allergen Reactions    Penicillins Other (See Comments)     Other reaction(s): Respiratory Arrest    Atorvastatin Muscle Pain (Myalgia)    Morphine Other (See Comments)     Other reaction(s): Chest Pain    Levofloxacin Nausea and Vomiting    Rosuvastatin Nausea and Vomiting    Sucralfate Nausea and Vomiting    Sulfamethoxazole-Trimethoprim Nausea and Vomiting     Yeast infection       Patient Active Problem List    Diagnosis Date Noted    COPD exacerbation (H) 02/27/2025     Priority: Medium    COPD with acute exacerbation (H) 02/27/2025     Priority: Medium    Acute respiratory failure with hypoxia and hypercapnia (H) 02/27/2025     Priority: Medium    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) 12/31/2024     Priority: Medium    Ventral hernia without obstruction or gangrene 08/30/2024     Priority: Medium    Decreased functional mobility 10/10/2023     Priority: Medium    Severe protein-calorie malnutrition 12/06/2022     Priority: Medium    Panlobular emphysema (H) 10/22/2020     Priority: Medium    History of mandibular surgery 12/12/2019     Priority: Medium    Difficulty swallowing solids 05/08/2019     Priority: Medium    Pulmonary nodules 04/18/2019     Priority: Medium    Seasonal allergic rhinitis due to pollen 04/08/2019      Priority: Medium    Ulcer of right cornea 08/18/2018     Priority: Medium    Vitamin B12 deficiency 04/17/2018     Priority: Medium    Menopausal syndrome (hot flashes) 04/17/2018     Priority: Medium    S/P Nissen fundoplication (without gastrostomy tube) procedure 03/26/2018     Priority: Medium    Gastroesophageal reflux disease with esophagitis without hemorrhage 11/29/2017     Priority: Medium    Chronic radicular cervical pain 11/20/2017     Priority: Medium    Psoriatic arthritis (H) 11/20/2017     Priority: Medium    Occipital neuralgia of left side 09/15/2017     Priority: Medium    Arthritis of knee, right 08/24/2017     Priority: Medium    Atherosclerosis of abdominal aorta 08/24/2017     Priority: Medium     [facility] Studies:  2/19/2013 -- CTA ABDOMEN AND PELVIS WITH BILATERAL LOWER EXTREMITY RUNOFF  CLINICAL HISTORY: Iliac and mesenteric ischemia.  IMPRESSION:  1. Mild aortoiliac atherosclerotic disease. No aneurysm. There is narrowing of the distal aorta just proximal to the bifurcation.  2. There is moderate narrowing of the celiac artery which could  be due to noncalcified plaque. No SMA or MARIAH compromise definitely seen.  3. Probable tiny Bochdalek hernia at the right costophrenic angle. A hamartoma would be a differential consideration though is less likely given the central fat density.  4. Possible constipation.  5. No evidence of vascular compromise in the lower extremities.    Examination Interpreted at: [facility], [place], [place]      COPD, severe (H) 08/24/2017     Priority: Medium     Overview:   8/4/2014 -- PULMONARY FUNCTION    SITE:  [facility]  ORDERED BY:  VANNESA LUNA    CLINICAL SUMMARY: 53-year-old female with a history of severe COPD.     TECHNICIAN NOTE: Good effort.     DATA: FVC 1.85 (61%). FEV1 1.04 (45%). FEV1/FVC ratio 56%. DLCO 13.5 to 62%.     Flow volume curve is consistent with airway obstruction.     IMPRESSION:  1. Severe  obstructive pulmonary disease.   2. Mild decrease in diffusing capacity.      Decreased diffusion capacity of lung 08/24/2017     Priority: Medium    Dyshidrotic hand dermatitis 08/24/2017     Priority: Medium    Hiatal hernia 08/24/2017     Priority: Medium    Status post balloon dilatation of esophageal stricture 08/24/2017     Priority: Medium    Prediabetes 08/24/2017     Priority: Medium    Psoriasis 08/24/2017     Priority: Medium    Schatzki's ring of distal esophagus 08/24/2017     Priority: Medium    Vitamin D deficiency 08/24/2017     Priority: Medium    Alpha-1-antitrypsin deficiency carrier 08/23/2017     Priority: Medium    Irritable bowel syndrome 08/23/2017     Priority: Medium    Osteopenia 08/23/2017     Priority: Medium    PAD (peripheral artery disease) 10/13/2015     Priority: Medium    Mixed hyperlipidemia 08/01/2012     Priority: Medium    Contact dermatitis 07/01/2010     Priority: Medium    Myalgia 07/01/2010     Priority: Medium    Chronic abdominal pain 03/10/2010     Priority: Medium     Overview:   16 year duration      Constipation, chronic 03/10/2010     Priority: Medium    History of tobacco abuse 03/10/2010     Priority: Medium    Sinusitis, chronic 03/10/2010     Priority: Medium       Past Medical History:   Diagnosis Date    Alpha-1-antitrypsin deficiency carrier 08/23/2017    Atherosclerosis of abdominal aorta 08/24/2017    Chronic sinusitis     Closed fracture of skull (H)     COPD, severe (H) 08/24/2017    Dental abscess 12/02/2019    GERD (gastroesophageal reflux disease) 11/29/2017    Hiatal hernia 08/24/2017    PAD (peripheral artery disease) 10/13/2015    Psoriasis 08/24/2017    Psoriatic arthritis (H) 11/20/2017    Schatzki's ring of distal esophagus 08/24/2017    Ulcer of right cornea 08/18/2018    Vitamin B12 deficiency 04/17/2018    Vitamin D deficiency 08/24/2017       Past Surgical History:   Procedure Laterality Date    AS UGI ENDOSCOPY W ESOPHAGEAL DILATION BALLOON  <30MM  10/30/2015    ESOPHAGEAL DILATION,Dr. Rainer Allen, [facility]    BIOPSY BREAST Bilateral     1999, 2001, BIOPSY BREAST,benign    CHOLECYSTECTOMY  2004    Laparoscopic    COLONOSCOPY  06/01/2016    x's 3 negative    COLONOSCOPY N/A 04/29/2021    hyperplastic follow up 10 years, 4/29/2031    COLONOSCOPY N/A 9/26/2024    Procedure: Colonoscopy;  Surgeon: Jagdish Ruiz MD;  Location: [facility] OR    CT CORONARY ANGIOGRAM  2009    CT CORONARY ANGIOGRAM (IA),negative    ESOPHAGOSCOPY, GASTROSCOPY, DUODENOSCOPY (EGD), COMBINED  04/07/2009    dilated, Dr. Allen    ESOPHAGOSCOPY, GASTROSCOPY, DUODENOSCOPY (EGD), COMBINED N/A 05/30/2019    Pickering's esophagus, 3 year follow up    ESOPHAGOSCOPY, GASTROSCOPY, DUODENOSCOPY (EGD), COMBINED N/A 10/23/2023    Procedure: Esophagoscopy, gastroscopy, duodenoscopy (EGD)with biospy and Dilation.;  Surgeon: Jagdish Ruiz MD;  Location: [facility] OR    HYSTERECTOMY VAGINAL  1994    ovaries remain    LAPAROSCOPIC NISSEN FUNDOPLICATION N/A 03/26/2018    Procedure: LAPAROSCOPIC NISSEN FUNDOPLICATION;  Laparoscopic Nissen Fundoplication;  Surgeon: Jagdish Ruiz MD;  Location: [facility] OR    LAPAROTOMY EXPLORATORY  1990    lysis of adhesions/endometriosis    RECTOCELE REPAIR  07/29/2009       Family History   Problem Relation Age of Onset    Arthritis Father         Arthritis    Emphysema Father 51        Alpha-1-AT deficiency    Peripheral Vascular Disease Mother         Peripheral vascular disease    Diabetes Mother         Diabetes    Arthritis Mother         Arthritis    Breast Cancer Sister         Premenopausal breast cancer    Eczema Brother         Eczema    Narcolepsy Brother     Other - See Comments Daughter         Narcolepsy    Narcolepsy Daughter     Brain Tumor Daughter     Seizure Disorder Daughter     Depression Daughter     Graves' disease Daughter        Social History     Tobacco Use    Smoking status: Former     Current packs/day: 0.00     Average packs/day: 0.6 packs/day  "for 44.0 years (28.4 ttl pk-yrs)     Types: Cigarettes, Cigars     Start date: 1980     Quit date: 2024     Years since quittin.5    Smokeless tobacco: Never   Vaping Use    Vaping status: Never Used   Substance Use Topics    Alcohol use: Yes     Alcohol/week: 2.0 standard drinks of alcohol     Comment: Alcoholic Drinks/day: 1-2 drinks every 1-2 weeks    Drug use: No       Medications:    albuterol (PROAIR HFA/PROVENTIL HFA/VENTOLIN HFA) 108 (90 Base) MCG/ACT inhaler  albuterol (PROVENTIL) (2.5 MG/3ML) 0.083% neb solution  alum & mag hydroxide-simethicone (MAALOX MULTI SYMPTOM MAX ST) 400-400-40 MG/5ML SUSP suspension  azithromycin (ZITHROMAX) 250 MG tablet  ipratropium - albuterol 0.5 mg/2.5 mg/3 mL (DUONEB) 0.5-2.5 (3) MG/3ML neb solution  mometasone-formoterol (DULERA) 200-5 MCG/ACT inhaler  omeprazole (PRILOSEC) 40 MG DR capsule  predniSONE (DELTASONE) 10 MG tablet  red yeast rice 600 MG CAPS  spacer (OPTICHAMBER SANJUANA) holding chamber  tiotropium (SPIRIVA RESPIMAT) 2.5 MCG/ACT inhaler  vitamin D3 (CHOLECALCIFEROL) 250 mcg (01058 units) capsule        Review of Systems: See HPI for pertinent negatives and positives. All other systems reviewed and found to be negative.    Physical Exam   /66   Pulse 105   Temp 96.9  F (36.1  C) (Tympanic)   Resp 16   Ht 1.575 m (5' 2\")   Wt 39.9 kg (88 lb)   LMP 1996 (Approximate)   SpO2 92%   BMI 16.10 kg/m       General: awake, comfortable  HEENT: atraumatic, 2 L NC present  Respiratory: Increased work of breathing, tachypneic, decreased air movement throughout, diffuse expiratory wheeze  Cardiovascular: regular rate and rhythm, no murmurs, extremities appear well perfused  Abdomen: soft, nontender, nondistended  Extremities: no deformities, tenderness, edema  Skin: warm, dry, no rashes  Neuro: alert, no focal deficits    ED Course      Results for orders placed or performed during the hospital encounter of 25 (from the past 24 hours) "   CBC with platelets differential    Narrative    The following orders were created for panel order CBC with platelets differential.  Procedure                               Abnormality         Status                     ---------                               -----------         ------                     CBC with platelets and d...[[id number]]  Abnormal            Final result                 Please view results for these tests on the individual orders.   Basic metabolic panel   Result Value Ref Range    Sodium 138 135 - 145 mmol/L    Potassium 4.4 3.4 - 5.3 mmol/L    Chloride 102 98 - 107 mmol/L    Carbon Dioxide (CO2) 25 22 - 29 mmol/L    Anion Gap 11 7 - 15 mmol/L    Urea Nitrogen 13.1 8.0 - 23.0 mg/dL    Creatinine 0.66 0.51 - 0.95 mg/dL    GFR Estimate >90 >60 mL/min/1.73m2    Calcium 9.3 8.8 - 10.4 mg/dL    Glucose 107 (H) 70 - 99 mg/dL   D dimer quantitative   Result Value Ref Range    D-Dimer Quantitative 0.33 0.00 - 0.50 ug/mL FEU    Narrative    This D-dimer assay is intended for use in conjunction with a clinical pretest probability assessment model to exclude pulmonary embolism (PE) and deep venous thrombosis (DVT) in outpatients suspected of PE or DVT. The cut-off value is 0.50 ug/mL FEU.    For patients 50 years of age or older, the application of age-adjusted cut-off values for D-Dimer may increase the specificity without significant effect on sensitivity. The literature suggested calculation age adjusted cut-off in ug/L = age in years x 10 ug/L. The results in this laboratory are reported as ug/mL rather than ug/L. The calculation for age adjusted cut off in ug/mL= age in years x 0.01 ug/mL. For example, the cut off for a 76 year old male is 76 x 0.01 ug/mL = 0.76 ug/mL (760 ug/L).    M Erick et al. Age adjusted D-dimer cut-off levels to rule out pulmonary embolism: The ADJUST-PE Study. MARCELLO 2014;311:4868-1058.; HJ Tomás et al. Diagnostic accuracy of conventional or age adjusted D-dimer cutoff  values in older patients with suspected venous thromboembolism. Systemic review and meta-analysis. BMJ 2013:346:f2492.   Troponin T, High Sensitivity   Result Value Ref Range    Troponin T, High Sensitivity 11 <=14 ng/L   Blood gas venous   Result Value Ref Range    pH Venous 7.37 7.32 - 7.43    pCO2 Venous 50 40 - 50 mm Hg    pO2 Venous 60 (H) 25 - 47 mm Hg    Bicarbonate Venous 29 (H) 21 - 28 mmol/L    Base Excess/Deficit Venous 2.6 -3.0 - 3.0 mmol/L    FIO2 30     Oxyhemoglobin Venous 88 (H) 70 - 75 %    O2 Sat, Venous 91.5 (H) 70.0 - 75.0 %    Narrative    In healthy individuals, oxyhemoglobin (O2Hb) and oxygen saturation (SO2) are approximately equal. In the presence of dyshemoglobins, oxyhemoglobin can be considerably lower than oxygen saturation.   [facility] Draw    Narrative    The following orders were created for panel order [place] Draw.  Procedure                               Abnormality         Status                     ---------                               -----------         ------                     Extra Red Top Tube[[id number]]                               Final result                 Please view results for these tests on the individual orders.   CBC with platelets and differential   Result Value Ref Range    WBC Count 16.4 (H) 4.0 - 11.0 10e3/uL    RBC Count 4.75 3.80 - 5.20 10e6/uL    Hemoglobin 14.5 11.7 - 15.7 g/dL    Hematocrit 42.8 35.0 - 47.0 %    MCV 90 78 - 100 fL    MCH 30.5 26.5 - 33.0 pg    MCHC 33.9 31.5 - 36.5 g/dL    RDW 13.0 10.0 - 15.0 %    Platelet Count 278 150 - 450 10e3/uL    % Neutrophils 85 %    % Lymphocytes 8 %    % Monocytes 6 %    % Eosinophils 1 %    % Basophils 1 %    % Immature Granulocytes 0 %    NRBCs per 100 WBC 0 <1 /100    Absolute Neutrophils 13.8 (H) 1.6 - 8.3 10e3/uL    Absolute Lymphocytes 1.3 0.8 - 5.3 10e3/uL    Absolute Monocytes 1.0 0.0 - 1.3 10e3/uL    Absolute Eosinophils 0.1 0.0 - 0.7 10e3/uL    Absolute Basophils 0.1 0.0 - 0.2 10e3/uL    Absolute  Immature Granulocytes 0.0 <=0.4 10e3/uL    Absolute NRBCs 0.0 10e3/uL   Extra Red Top Tube   Result Value Ref Range    Hold Specimen JIC    Influenza A/B, RSV and SARS-CoV2 PCR (COVID-19) Nose    Specimen: Nose; Swab   Result Value Ref Range    Influenza A PCR Negative Negative    Influenza B PCR Negative Negative    RSV PCR Negative Negative    SARS CoV2 PCR Negative Negative    Narrative    Testing was performed using the Xpert Xpress CoV2/Flu/RSV Assay on the [employer] GeneXpert Instrument. This test should be ordered for the detection of SARS-CoV2, influenza, and RSV viruses in individuals with signs and symptoms of respiratory tract infection. This test is for in vitro diagnostic use under the [place] FDA for laboratories certified under CLIA to perform high or moderate complexity testing. This test has been [place] FDA cleared. A negative result does not rule out the presence of PCR inhibitors in the specimen or target RNA in concentration below the limit of detection for the assay. If only one viral target is positive but coinfection with multiple targets is suspected, the sample should be re-tested with another FDA cleared, approved, or authorized test, if coninfection would change clinical management. This test was validated by the [facility] [employer]. These laboratories are certified under the Clinical Laboratory Improvement Amendments of 1988 (CLIA-88) as qualified to perfom high complexity laboratory testing.   XR Chest Port 1 View    Narrative    EXAM: XR CHEST PORT 1 VIEW  LOCATION: [facility]  DATE: 2/27/2025    INDICATION: Shortness of breath  COMPARISON: Two-view chest radiograph 11/22/2024      Impression    IMPRESSION: Hyperinflation of the lungs with increased interstitial markings and flattening of the hemidiaphragms, findings compatible with underlying emphysematous changes. Small pleural effusions versus pleural thickening at the lung bases. Heart size   is normal without  pulmonary vascular congestion. No focal pulmonary consolidation otherwise. No pneumothorax. Atherosclerotic calcifications of the aortic arch.   Troponin T, High Sensitivity   Result Value Ref Range    Troponin T, High Sensitivity 12 <=14 ng/L       Medications   ipratropium - albuterol 0.5 mg/2.5 mg/3 mL (DUONEB) neb solution 3 mL (3 mLs Nebulization $Given 2/27/25 0321)   methylPREDNISolone Na Suc (solu-MEDROL) injection 125 mg (125 mg Intravenous $Given 2/27/25 0255)   azithromycin (ZITHROMAX) tablet 500 mg (500 mg Oral $Given 2/27/25 0256)       Assessments & Plan (with Medical Decision Making)     I have reviewed the nursing notes.    ED Course as of 02/27/25 0445   Thu Feb 27, 2025   0329 Work of breathing and symptomatic improvement after first 2 nebs.  Third neb is a process.   0331 EKG personally interpreted as: sinus tachycardia 106, nonischemic with no ST abnormalities, LBBB, I/II/V3-6 TWI. RBBB present.     0358 Trace wheeze with normal work of breathing currently.  Turned off oxygen and will reassess shortly to check oxygen saturation.   0434 Back pain/tightness has resolved.       64 year old female evaluated for dyspnea and thoracic back pain with radiation to her neck with SpO2 down to 70s at home.  Exam remarkable for diffuse expiratory wheeze and increased work of breathing and impaired air movement.  EKG nonischemic.  Troponin and D-dimer unremarkable.  Treated with stacked DuoNebs x 3, steroids, azithromycin with significant improvement.  Unable to fully titrate off oxygen, still requiring 2 L.  Off oxygen drops down to 89%.  Despite this being the threshold for discharge, there is concern that she will decompensate did not work well for her prior to arrival, and therefore feel that observation admission is warranted to follow closely to make sure that she improves and remains stable on room air. Dr Brown accepts observation admission.    I have reviewed the findings, diagnosis, and plan with  patient.    Critical care time:  35 minutes of critical care time was spent with this patient, exclusive of all other separately billable services, including EKG/labs/imaging review, managing acute condition, and communications with specialists/hospitalists.     New Prescriptions    No medications on file       Final diagnoses:   COPD with acute exacerbation (H)   Acute respiratory failure with hypoxia and hypercapnia (H)       2/27/2025   [facility] AND [facility]       Anupam Churchill MD  02/27/25 2719

## 2025-02-27 NOTE — PROGRESS NOTES
Pt remains on 1L NC, not chronic. Nebs given as scheduled. No further respiratory interventions. Lynne Bliss, RRT

## 2025-02-27 NOTE — ED TRIAGE NOTES
Pt presents with increased sob beginning approximately 0100 while sleeping.  O2 in the 70/s at home.  Arrived in triage at 82% on RA, tachypneic with purse lipped breathing     Triage Assessment (Adult)       Row Name 02/27/25 0248          Triage Assessment    Airway WDL all;WDL     Additional Documentation Breath Sounds (Group)        Respiratory WDL    Respiratory WDL X        Breath Sounds    Breath Sounds All Fields     All Lung Fields Breath Sounds wheezes, expiratory;wheezes, inspiratory        Skin Circulation/Temperature WDL    Skin Circulation/Temperature WDL WDL        Cardiac WDL    Cardiac WDL X        Peripheral/Neurovascular WDL    Peripheral Neurovascular WDL WDL        Cognitive/Neuro/Behavioral WDL    Cognitive/Neuro/Behavioral WDL WDL

## 2025-02-27 NOTE — PROGRESS NOTES
Patient admitted to med/surg floor for observation following an acute COPD exacerbation from ER on wheelchair.  Patient on 2L NC for patient comfort. Significant dyspnea with activity. Expiratory wheeze present in all lung fields.

## 2025-02-27 NOTE — PROGRESS NOTES
Pt arrived at ED with Sats 82% and in Respiratory distress. BS I/E wheezes. Pt placed on 2L NC to maintains sats >94%.  Pt received 3 Duo nebs Q15 MIN with increased aeration and improvement in breath sounds.  Pt continues to expiratory wheezes.  Respiratory will continue to provide support as needed.    Zayra Lucero, RT

## 2025-02-27 NOTE — PROGRESS NOTES
Clinical Nutrition / Initial Assessment     Reason for Assessment:  Dietitian Discretion:  low BMI     Assessment:   Client History:  pt admitted with COPD exacerbation, acute respiratory failure. Her BMI is also at 15 but has been around this for some time now. Her weight is down from her usual ~90#, currently at 83#. She eats very little at 1 time. Encourage small frequent meals and snacks. Supplements between meals.   Diet Order: low fat, low sodium  Oral Intake:  monitor  Supplement Intake:  will add Ensure TID to trays  Weight:   Wt Readings from Last 10 Encounters:   02/27/25 37.9 kg (83 lb 8 oz)   02/07/25 42.6 kg (94 lb)   01/28/25 41.2 kg (90 lb 12.8 oz)   12/31/24 40 kg (88 lb 3.2 oz)   11/22/24 38.1 kg (84 lb)   09/26/24 40.4 kg (89 lb)   08/30/24 40.4 kg (89 lb)   02/18/24 41.2 kg (90 lb 12.8 oz)   10/23/23 41.7 kg (92 lb)   10/10/23 41.7 kg (92 lb)    Body mass index is 15.27 kg/m .    Estimated nutritional needs based on:  ideal body weight 50 kg / 110 lbs   Estimated energy needs:  0152-2247 kcal/day (25-30 kcal/kg)  Estimated protein needs:  60-90 gm/day (1.2-1.5 g/kg)    Malnutrition Criteria:  (Need to have 2 indicators to qualify recommendation)  Energy Intake:  Chronic Moderate: < 75% of estimated energy requirement for >/= 1 month  Interpretation of Weight Loss:  Acute Severe:   > 5% in 1 month  Physical Findings:  Chronic Body Fat Loss:  severe and Chronic Muscle Mass Loss:  severe  Reduced  Strength:  Not Measured  Recommended Diagnosis:  Recommended Nutrition Diagnosis:   Severe Malnutrition in the context of chronic illness - based on AND/ASPEN Clinical Characterstics of Malnutrition May 2012  Malnutrition:    - Level of malnutrition: Severe       Nutrition Education: Nutrition education will be provided as appropriate.    Nutrition Diagnosis: Weight:  weight loss/underweight related to inadequate energy intakes vs expenditure as evidenced by BMI at 15, ~7# wt loss in past  month.    Intervention:  Nutrition Prescription:     Nutrition Intervention(s):Recommended general, healthful diet  1. Meals and Snacks: small frequent meals/snacks   2. Medical Food Supplement: Ensure TID on trays    Nutrition Goal(s):  1. Pt will consume 50% or more of meals and supplements   2. Pt will not have unplanned wt loss during hospitalization  3. Pt will tolerate diet as ordered    Monitoring and Evaluation:   Food Intake, diet tolerance, weights     Discharge Recommendation:   Nutrition Discharge Planning  Recommend supplements, high protein, at home to maintain, promote nutrition status    RD will reassess in within 1-5 days or sooner.  Thereas Lopez RD on 2/27/2025 at 8:20 AM

## 2025-02-27 NOTE — PHARMACY-ADMISSION MEDICATION HISTORY
Pharmacist Admission Medication History    Admission medication history is complete. The information provided in this note is only as accurate as the sources available at the time of the update.    Information Source(s): Patient and CareEverywhere/SureScripts via in-person    Pertinent Information:  -In prednisone range (one-HALF to one tablet) patient is currently taking 1 tablet. She has attempted to wean to one-HALF tablet previously, but environmental issues flared up her asthma and she increased back to 1 tablet.  -Both Dulera and Spiriva are both very expensive for patient (she states she is currently not picking up her spiriva due to cost), even after she reaches her deductible. Briefly discussed other options with patient about potentially changing inhalers on discharge and passed off to managing pharmacist to discuss cost options on discharge.     Changes made to PTA medication list:  Added: Tums PRN  Deleted: Prilosec  Changed: None    Allergies reviewed with patient and updates made in EHR: yes    Medication History Completed By: Tory Pabon RPH 2/27/2025 9:28 AM    PTA Med List   Medication Sig Last Dose/Taking    albuterol (PROAIR HFA/PROVENTIL HFA/VENTOLIN HFA) 108 (90 Base) MCG/ACT inhaler INHALE 1 TO 2 PUFF(S) INTO THE LUNGS EVERY 4 HOURS AS NEEDED FOR SHORTNESS OF BREATH OR DIFFICULTY BREATHING 2/27/2025 at  2:30 AM    albuterol (PROVENTIL) (2.5 MG/3ML) 0.083% neb solution Take 1 vial (2.5 mg) by nebulization every 6 hours as needed for shortness of breath or wheezing. 2/27/2025 at  2:30 AM    alum & mag hydroxide-simethicone (MAALOX MULTI SYMPTOM MAX ST) 400-400-40 MG/5ML SUSP suspension Take 30 mLs by mouth every 4 hours as needed for indigestion Past Week    azithromycin (ZITHROMAX) 250 MG tablet Take 1 tablet (250 mg) by mouth daily. 2/26/2025 Morning    ipratropium - albuterol 0.5 mg/2.5 mg/3 mL (DUONEB) 0.5-2.5 (3) MG/3ML neb solution INHALE CONTENTS OF 1 VIAL BY NEBULIZATION EVERY 6  HOURS AS NEEDED FOR SHORTNESS OF BREATH, DYSPNEA OR WHEEZING 2/26/2025 Evening    mometasone-formoterol (DULERA) 200-5 MCG/ACT inhaler Inhale 2 puffs into the lungs 2 times daily. 2/26/2025 Morning    omeprazole (PRILOSEC) 40 MG DR capsule Take 1 capsule (40 mg) by mouth daily. More than a month    predniSONE (DELTASONE) 10 MG tablet Take 2 tablets (20 mg) by mouth daily for 5 days, THEN 0.5-1 tablets (5-10 mg) daily. - Adjust dose as tolerated for breathing / lungs and Psoriasis. 2/26/2025 Morning    red yeast rice 600 MG CAPS Take 1 capsule (600 mg) by mouth daily 2/26/2025 Morning    spacer (OPTICHAMBER SANJUANA) holding chamber -- Use with inhaler - please instruct on proper use -- (okay to sub for similar product) 2/26/2025 Morning    tiotropium (SPIRIVA RESPIMAT) 2.5 MCG/ACT inhaler INHALE 2 PUFFS INTO THE LUNGS EVERY EVENING Past Week Evening    UNABLE TO FIND MEDICATION NAME: Vitamin D3-Vitamin K- Omega 3  Take 1 dropperful (5000 units of D3) daily 2/26/2025 Morning

## 2025-02-27 NOTE — H&P
"[facility]    History and Physical - Hospitalist Service       Date of Admission:  2/27/2025    Assessment & Plan      Ember Tavares is a 64 year old female admitted on 2/27/2025. She presented to the ED with a COPD exacerbation.     COPD exacerbation  Acute respiratory failure with hypoxia  She presented to the ED with SOB and cough with wheezing. She was noted to be hypoxic and in distress initially but improved rapidly. She is now 89% on room air, but is at risk of decompensating again, so she was brought in for observation. She is on long-term azithromycin.  - observation status  - c/w prednisone  - c/w duonebs  - c/w azithromycin  - c/w supplemental oxygen to maintain O2sat >88%  - c/w tiotropium and dulera         Diet: Combination Diet Low Saturated Fat Na <2400mg Diet, No Caffeine Diet    DVT Prophylaxis: Pneumatic Compression Devices  Peters Catheter: Not present  Lines: None     Code Status: Full Code      Clinically Significant Risk Factors Present on Admission                             # Cachexia: Estimated body mass index is 16.1 kg/m  as calculated from the following:    Height as of this encounter: 1.575 m (5' 2\").    Weight as of this encounter: 39.9 kg (88 lb).       # COPD: noted on problem list        Disposition Plan      Expected Discharge Date: 02/28/2025                The patient's care was discussed with the Bedside Nurse and Patient.        Nba Brown MD  [facility]  Securely message with the Vocera Web Console (learn more here)  Text page via [facility] Paging/Directory      Visit/Communication Style   Virtual (Video) communication was used to evaluate Ember.  Ember consented to the use of video communication: yes  Video START time: 0530, 2/27/2025  Video STOP time: 0545, 2/27/2025   Patient's location: [facility]   Provider's location during the visit: [facility] Tele-medicine site  "       ______________________________________________________________________    Chief Complaint   Shortness of breath    History is obtained from the patient    History of Present Illness   Ember Tavares is a 64 year old female who presented to the ED with shortness of breath and coughing. It started yesterday and she states that she had a wet cough but nothing came out. She felt like it improved in the evening, but overnight it worsened and in the morning she started to have a lot more wheezing and her O2sat was 73%, so she decided to come to the ED.     In the ED, she was noted to be in some respiratory distress. She was given methylprednisolone and duonebs along with supplemental oxygen and gradually improved.     Review of Systems    General: negative for fever, chills, sweats, weakness  Eyes: negative for blurred vision, loss of vision  Ear Nose and Throat: negative for pharyngitis, speech or swallowing difficulties  Respiratory:  negative for sputum production, PEÑA, pleuritic pain  Cardiology:  negative for chest pain, palpitations, orthopnea, PND, edema, syncope   Gastrointestinal: negative for abdominal pain, nausea, vomiting, diarrhea, constipation, hematemesis, melena or hematochezia  Genitourinary: negative for frequency, urgency, dysuria, hematuria   Neurological: negative for focal weakness, paresthesia    Past Medical History    I have reviewed this patient's medical history and updated it with pertinent information if needed.   Past Medical History:   Diagnosis Date    Alpha-1-antitrypsin deficiency carrier 08/23/2017    Atherosclerosis of abdominal aorta 08/24/2017    Overview:  [facility]: 2/19/2013 -- CTA ABDOMEN AND PELVIS WITH BILATERAL LOWER EXTREMITY RUNOFF  CLINICAL HISTORY: Iliac and mesenteric ischemia.  TECHNIQUE: 125 mL Omnipaque 300 IV contrast was administered. 3-D arterial reformations were performed.  FINDINGS: There is a well-defined ovoid density at the medial right  costophrenic angle. This measures 2.3 x 0.9 x 0.6 cm. It demonstra    Chronic sinusitis     No Comments Provided    Closed fracture of skull (H)     1972    COPD, severe (H) 08/24/2017    Overview:  8/4/2014 -- PULMONARY FUNCTION  SITE:  [facility] ORDERED BY:  VANNESA LUNA  CLINICAL SUMMARY: 53-year-old female with a history of severe COPD.   TECHNICIAN NOTE: Good effort.   DATA: FVC 1.85 (61%). FEV1 1.04 (45%). FEV1/FVC ratio 56%. DLCO 13.5 to 62%.   Flow volume curve is consistent with airway obstruction.   IMPRESSION: 1. Severe obstructive pulmon    Dental abscess 12/02/2019    GERD (gastroesophageal reflux disease) 11/29/2017    Hiatal hernia 08/24/2017    PAD (peripheral artery disease) 10/13/2015    Psoriasis 08/24/2017    Psoriatic arthritis (H) 11/20/2017    Schatzki's ring of distal esophagus 08/24/2017    Ulcer of right cornea 08/18/2018    Vitamin B12 deficiency 04/17/2018    Vitamin D deficiency 08/24/2017       Past Surgical History   I have reviewed this patient's surgical history and updated it with pertinent information if needed.  Past Surgical History:   Procedure Laterality Date    AS UGI ENDOSCOPY W ESOPHAGEAL DILATION BALLOON <30MM  10/30/2015    ESOPHAGEAL DILATION,Dr. Rainer Allen, [facility]    BIOPSY BREAST Bilateral     1999, 2001, BIOPSY BREAST,benign    CHOLECYSTECTOMY  2004    Laparoscopic    COLONOSCOPY  06/01/2016    x's 3 negative    COLONOSCOPY N/A 04/29/2021    hyperplastic follow up 10 years, 4/29/2031    COLONOSCOPY N/A 9/26/2024    Procedure: Colonoscopy;  Surgeon: Jagdish Ruiz MD;  Location: [facility] OR    CT CORONARY ANGIOGRAM  2009    CT CORONARY ANGIOGRAM (IA),negative    ESOPHAGOSCOPY, GASTROSCOPY, DUODENOSCOPY (EGD), COMBINED  04/07/2009    dilated, Dr. Allen    ESOPHAGOSCOPY, GASTROSCOPY, DUODENOSCOPY (EGD), COMBINED N/A 05/30/2019    Pickering's esophagus, 3 year follow up    ESOPHAGOSCOPY, GASTROSCOPY, DUODENOSCOPY (EGD), COMBINED N/A  10/23/2023    Procedure: Esophagoscopy, gastroscopy, duodenoscopy (EGD)with biospy and Dilation.;  Surgeon: Jagdish Ruiz MD;  Location: [facility] OR    HYSTERECTOMY VAGINAL      ovaries remain    LAPAROSCOPIC NISSEN FUNDOPLICATION N/A 2018    Procedure: LAPAROSCOPIC NISSEN FUNDOPLICATION;  Laparoscopic Nissen Fundoplication;  Surgeon: Jagdish Ruiz MD;  Location: [facility] OR    LAPAROTOMY EXPLORATORY      lysis of adhesions/endometriosis    RECTOCELE REPAIR  2009       Social History   I have reviewed this patient's social history and updated it with pertinent information if needed.  Social History     Tobacco Use    Smoking status: Former     Current packs/day: 0.00     Average packs/day: 0.6 packs/day for 44.0 years (28.4 ttl pk-yrs)     Types: Cigarettes, Cigars     Start date: 1980     Quit date: 2024     Years since quittin.5    Smokeless tobacco: Never   Vaping Use    Vaping status: Never Used   Substance Use Topics    Alcohol use: Yes     Alcohol/week: 2.0 standard drinks of alcohol     Comment: Alcoholic Drinks/day: 1-2 drinks every 1-2 weeks    Drug use: No       Family History   I have reviewed this patient's family history and updated it with pertinent information if needed.  Family History   Problem Relation Age of Onset    Arthritis Father         Arthritis    Emphysema Father 51        Alpha-1-AT deficiency    Peripheral Vascular Disease Mother         Peripheral vascular disease    Diabetes Mother         Diabetes    Arthritis Mother         Arthritis    Breast Cancer Sister         Premenopausal breast cancer    Eczema Brother         Eczema    Narcolepsy Brother     Other - See Comments Daughter         Narcolepsy    Narcolepsy Daughter     Brain Tumor Daughter     Seizure Disorder Daughter     Depression Daughter     Graves' disease Daughter        Prior to Admission Medications   Prior to Admission Medications   Prescriptions Last Dose Informant Patient Reported?  Taking?   albuterol (PROAIR HFA/PROVENTIL HFA/VENTOLIN HFA) 108 (90 Base) MCG/ACT inhaler   No No   Sig: INHALE 1 TO 2 PUFF(S) INTO THE LUNGS EVERY 4 HOURS AS NEEDED FOR SHORTNESS OF BREATH OR DIFFICULTY BREATHING   albuterol (PROVENTIL) (2.5 MG/3ML) 0.083% neb solution   No No   Sig: Take 1 vial (2.5 mg) by nebulization every 6 hours as needed for shortness of breath or wheezing.   alum & mag hydroxide-simethicone (MAALOX MULTI SYMPTOM MAX ST) 400-400-40 MG/5ML SUSP suspension   No No   Sig: Take 30 mLs by mouth every 4 hours as needed for indigestion   azithromycin (ZITHROMAX) 250 MG tablet   No No   Sig: Take 1 tablet (250 mg) by mouth daily.   ipratropium - albuterol 0.5 mg/2.5 mg/3 mL (DUONEB) 0.5-2.5 (3) MG/3ML neb solution   No No   Sig: INHALE CONTENTS OF 1 VIAL BY NEBULIZATION EVERY 6 HOURS AS NEEDED FOR SHORTNESS OF BREATH, DYSPNEA OR WHEEZING   mometasone-formoterol (DULERA) 200-5 MCG/ACT inhaler   No No   Sig: Inhale 2 puffs into the lungs 2 times daily.   omeprazole (PRILOSEC) 40 MG DR capsule   No No   Sig: Take 1 capsule (40 mg) by mouth daily.   predniSONE (DELTASONE) 10 MG tablet   No No   Sig: Take 2 tablets (20 mg) by mouth daily for 5 days, THEN 0.5-1 tablets (5-10 mg) daily. - Adjust dose as tolerated for breathing / lungs and Psoriasis.   red yeast rice 600 MG CAPS   No No   Sig: Take 1 capsule (600 mg) by mouth daily   spacer (OPTICHAMBER SANJUANA) holding chamber   No No   Sig: -- Use with inhaler - please instruct on proper use -- (okay to sub for similar product)   tiotropium (SPIRIVA RESPIMAT) 2.5 MCG/ACT inhaler   No No   Sig: INHALE 2 PUFFS INTO THE LUNGS EVERY EVENING   vitamin D3 (CHOLECALCIFEROL) 250 mcg (00759 units) capsule   Yes No   Sig: Take 1 capsule by mouth daily      Facility-Administered Medications: None     Allergies   Allergies   Allergen Reactions    Penicillins Other (See Comments)     Other reaction(s): Respiratory Arrest    Atorvastatin Muscle Pain (Myalgia)     Morphine Other (See Comments)     Other reaction(s): Chest Pain    Levofloxacin Nausea and Vomiting    Rosuvastatin Nausea and Vomiting    Sucralfate Nausea and Vomiting    Sulfamethoxazole-Trimethoprim Nausea and Vomiting     Yeast infection       Physical Exam   Vital Signs: Temp: 98.2  F (36.8  C) Temp src: Tympanic BP: 112/65 Pulse: 99   Resp: 26 SpO2: 97 % O2 Device: Nasal cannula Oxygen Delivery: 2 LPM  Weight: 88 lbs 0 oz    Gen:  Well-developed, well-nourished, in no acute distress, lying semi-supine in hospital stretcher  HEENT:  Anicteric sclera, PER, hearing intact to voice  Resp:  No accessory muscle use, diffuse expiratory wheezing, no rales no rhonchi  Card:  Regular rhythm and tachycardia, no murmur, normal S1, S2, no JVD, no LE edema  Abd:  Soft per RN exam, no TTP, non-distended, normoactive bowel sounds are present  Musc:  Normal strength and movement of the major muscle groups without obvious deformity  Skin: Intact, no rashes noted  Psych:  Good insight, oriented to person, place and time, not anxious, not agitated  Neuro: CN 2-12 intact, no focal deficits or sensory deficits  Data     Recent Labs   Lab 02/27/25  0256   WBC 16.4*   HGB 14.5   MCV 90         POTASSIUM 4.4   CHLORIDE 102   CO2 25   BUN 13.1   CR 0.66   ANIONGAP 11   COLEMAN 9.3   *         Recent Results (from the past 24 hours)   XR Chest Port 1 View    Narrative    EXAM: XR CHEST PORT 1 VIEW  LOCATION: [facility]  DATE: 2/27/2025    INDICATION: Shortness of breath  COMPARISON: Two-view chest radiograph 11/22/2024      Impression    IMPRESSION: Hyperinflation of the lungs with increased interstitial markings and flattening of the hemidiaphragms, findings compatible with underlying emphysematous changes. Small pleural effusions versus pleural thickening at the lung bases. Heart size   is normal without pulmonary vascular congestion. No focal pulmonary consolidation otherwise. No pneumothorax.  Atherosclerotic calcifications of the aortic arch.     s

## 2025-02-27 NOTE — PROVIDER NOTIFICATION
02/27/25 0516   Valuables   Patient Belongings remains with patient   Patient Belongings Remaining with Patient clothing;cell phone/electronics;vision aids;jewelry;medication(s)  (Cell phone, two rings, albuterol inhaler)   Did you bring any home meds/supplements to the hospital?  Yes   Disposition of meds  Returned to patient     A               Admission:  I am responsible for any personal items that are not sent to the safe or pharmacy.  [facility] is not responsible for loss, theft or damage of any property in my possession.    Signature:  _________________________________ Date: _______  Time: _____                                              Staff Signature:  ____________________________ Date: ________  Time: _____      2nd Staff person, if patient is unable/unwilling to sign:    Signature: ________________________________ Date: ________  Time: _____     Discharge:  [facility] has returned all of my personal belongings:    Signature: _________________________________ Date: ________  Time: _____                                          Staff Signature:  ____________________________ Date: ________  Time: _____

## 2025-02-27 NOTE — PROGRESS NOTES
OUTPATIENT/OBSERVATION GOALS TO BE MET BEFORE DISCHARGE:  ADLs back to baseline: No    Activity and level of assistance: Up with standby assistance.    Pain status: Improved with use of alternative comfort measures i.e.: heat    Return to near baseline physical activity: No     Discharge Planner Nurse   Safe discharge environment identified: Yes  Barriers to discharge: No       Entered by: Jack Klein RN 02/27/2025 6:26 AM     Please review provider order for any additional goals.   Nurse to notify provider when observation goals have been met and patient is ready for discharge.

## 2025-02-27 NOTE — PROGRESS NOTES
In chart screening pt for admit. Chacha Linder RN, [facility] 2/27/2025 4:27 AM

## 2025-02-27 NOTE — PLAN OF CARE
"Goal Outcome Evaluation: Patient is A&Ox4. VSS with exception of soft BP. Patient now on 1L oxygen and sats above 90%. Patient gets SOB with exertion and sats drop into upper 80s. Recovers after a few minutes. Expiratory wheezes continue.    Patient on regular diet. Has history of difficulty swallowing pills and solid food. States that she has a procedure coming up to dilate her esophagus. When patient is taking bigger pills, patient asks for milk. The pills go down better with less chance of sticking in her throat when she uses milk.    Patient has lost a lot of weight recently. States that at home, she drinks 2 Boost drinks a day. Has refused Ensures as she prefers Boost.    Up in room with SBA. Call light appropriate.    BP 97/62 (BP Location: Left arm, Patient Position: Sitting, Cuff Size: Adult Small)   Pulse 98   Temp 98.2  F (36.8  C) (Tympanic)   Resp 18   Ht 1.575 m (5' 2\")   Wt 37.9 kg (83 lb 8 oz)   LMP 01/01/1996 (Approximate)   SpO2 93%   BMI 15.27 kg/m        Plan of Care Reviewed With: patient    Overall Patient Progress: improving    Outcome Evaluation: Patient is on 1L oxygen with sats above 90%.      "

## 2025-02-27 NOTE — PROGRESS NOTES
"[facility]    Medicine Progress Note - Hospitalist Service    Date of Admission:  2/27/2025    Assessment & Plan   Principal Problem:    Acute respiratory failure with hypoxia and hypercapnia (H)    COPD with acute exacerbation (H)    Assessment: requiring supplemental oxygen to maintain sats above 90%. History of severe COPD. No infiltrate on chest xray. EKG and troponin negative. D dimer negative. Leukocytosis likely home prednisone related.    Plan: Admit   Duonebs QID   Pred 40 mg daily   Albuterol nebs Q2 hrs as needed   Continue spiriva, breo sub for dulera.    Wean oxygen as able    Active Problems:    Severe malnutrition    Assessment: chronic    Plan: nutritional supplements            Diet: Regular Diet Adult  Snacks/Supplements Adult: Ensure Enlive; With Meals    DVT Prophylaxis: Pneumatic Compression Devices  Peters Catheter: Not present  Lines: None     Cardiac Monitoring: None  Code Status: Full Code      Clinically Significant Risk Factors Present on Admission                             # Cachexia: Estimated body mass index is 15.27 kg/m  as calculated from the following:    Height as of this encounter: 1.575 m (5' 2\").    Weight as of this encounter: 37.9 kg (83 lb 8 oz).    # Severe Malnutrition: based on nutrition assessment     # COPD: noted on problem list        Social Drivers of Health    Depression: At risk (1/28/2025)    PHQ-2     PHQ-2 Score: 3   Housing Stability: High Risk (2/27/2025)    Housing Stability     Do you have housing? : No     Are you worried about losing your housing?: No   Tobacco Use: High Risk (2/27/2025)    Patient History     Smoking Tobacco Use: Every Day     Smokeless Tobacco Use: Never   Physical Activity: Insufficiently Active (12/26/2024)    Exercise Vital Sign     Days of Exercise per Week: 2 days     Minutes of Exercise per Session: 10 min   Social Connections: Unknown (12/26/2024)    Social Connection and Isolation Panel [NHANES]     " Frequency of Social Gatherings with Friends and Family: More than three times a week          Disposition Plan     Medically Ready for Discharge: Anticipated in 2-4 Days             Jose De Jesus Hannah MD  Hospitalist Service  [facility]  Securely message with Click[employer] (more info)  Text page via [facility] Paging/Directory   ______________________________________________________________________    Interval History   Still dyspnea on exertion. No nausea, vomiting. No fevers, chills. No chest pain.     Physical Exam   Vital Signs: Temp: 98.2  F (36.8  C) Temp src: Tympanic BP: 112/65 Pulse: 95   Resp: 24 SpO2: 95 % O2 Device: Nasal cannula Oxygen Delivery: 2 LPM  Weight: 83 lbs 8 oz    GENERAL: Comfortable, no apparent distress.  CARDIOVASCULAR: regular rate and rhythm, no murmur. No lower extremity edema   RESPIRATORY: Poor air movement. Exp wheezes.  GI: non-tender, non-distended, normal bowel sounds.   SKIN: warm periphery, no rashes      Medical Decision Making       35 MINUTES SPENT BY ME on the date of service doing chart review, history, exam, documentation & further activities per the note.      Data     I have personally reviewed the following data over the past 24 hrs:    16.4 (H)  \   14.5   / 278     138 102 13.1 /  107 (H)   4.4 25 0.66 \     Trop: 12 BNP: N/A     INR:  N/A PTT:  N/A   D-dimer:  0.33 Fibrinogen:  N/A       Imaging results reviewed over the past 24 hrs:   Recent Results (from the past 24 hours)   XR Chest Port 1 View    Narrative    EXAM: XR CHEST PORT 1 VIEW  LOCATION: [facility]  DATE: 2/27/2025    INDICATION: Shortness of breath  COMPARISON: Two-view chest radiograph 11/22/2024      Impression    IMPRESSION: Hyperinflation of the lungs with increased interstitial markings and flattening of the hemidiaphragms, findings compatible with underlying emphysematous changes. Small pleural effusions versus pleural thickening at the lung bases. Heart size   is  normal without pulmonary vascular congestion. No focal pulmonary consolidation otherwise. No pneumothorax. Atherosclerotic calcifications of the aortic arch.

## 2025-02-27 NOTE — PROGRESS NOTES
Incentive Spirometry education completed.  Pt goal 1250 mls.  Pt achieved 750 mls.  Pt instructed to perform 10/hr while awake with at least one deep breath and cough per hour until able to perform baseline activity.  How to use an Incentive Spirometer written instructions provided to patient. RT will follow and re-assess as need.      Lynne Bliss, RRT on 2/27/2025 at 10:27 AM

## 2025-02-27 NOTE — PROGRESS NOTES
Interdisciplinary Discharge Planning Note    Anticipated Discharge Date:2-3 days    Anticipated Discharge Location: Home    Clinical Needs Before Discharge:  stable oxygen requirement    Treatment Needs After Discharge:  None identified    Potential Barriers to Discharge: None Identified     LAURA King  2/27/2025,  12:54 PM

## 2025-02-28 ENCOUNTER — APPOINTMENT (OUTPATIENT)
Dept: GENERAL RADIOLOGY | Facility: OTHER | Age: 65
DRG: 208 | End: 2025-02-28
Attending: INTERNAL MEDICINE
Payer: COMMERCIAL

## 2025-02-28 ENCOUNTER — APPOINTMENT (OUTPATIENT)
Dept: CT IMAGING | Facility: OTHER | Age: 65
DRG: 208 | End: 2025-02-28
Attending: INTERNAL MEDICINE
Payer: COMMERCIAL

## 2025-02-28 LAB
ALLEN'S TEST: NO
ALLEN'S TEST: YES
ANION GAP SERPL CALCULATED.3IONS-SCNC: 10 MMOL/L (ref 7–15)
BASE EXCESS BLDA CALC-SCNC: 1.9 MMOL/L (ref -3–3)
BASE EXCESS BLDA CALC-SCNC: 2.1 MMOL/L (ref -3–3)
BASE EXCESS BLDA CALC-SCNC: 2.5 MMOL/L (ref -3–3)
BASE EXCESS BLDA CALC-SCNC: 2.7 MMOL/L (ref -3–3)
BASE EXCESS BLDA CALC-SCNC: 3.4 MMOL/L (ref -3–3)
BASE EXCESS BLDA CALC-SCNC: 3.5 MMOL/L (ref -3–3)
BASE EXCESS BLDV CALC-SCNC: 4.8 MMOL/L (ref -3–3)
BUN SERPL-MCNC: 17.5 MG/DL (ref 8–23)
CALCIUM SERPL-MCNC: 9.7 MG/DL (ref 8.8–10.4)
CHLORIDE SERPL-SCNC: 100 MMOL/L (ref 98–107)
CREAT SERPL-MCNC: 0.64 MG/DL (ref 0.51–0.95)
EGFRCR SERPLBLD CKD-EPI 2021: >90 ML/MIN/1.73M2
ERYTHROCYTE [DISTWIDTH] IN BLOOD BY AUTOMATED COUNT: 12.9 % (ref 10–15)
GLUCOSE SERPL-MCNC: 112 MG/DL (ref 70–99)
HCO3 BLD-SCNC: 30 MMOL/L (ref 21–28)
HCO3 BLD-SCNC: 31 MMOL/L (ref 21–28)
HCO3 BLD-SCNC: 31 MMOL/L (ref 21–28)
HCO3 BLD-SCNC: 32 MMOL/L (ref 21–28)
HCO3 BLD-SCNC: 33 MMOL/L (ref 21–28)
HCO3 BLD-SCNC: 33 MMOL/L (ref 21–28)
HCO3 BLDV-SCNC: 33 MMOL/L (ref 21–28)
HCO3 SERPL-SCNC: 29 MMOL/L (ref 22–29)
HCT VFR BLD AUTO: 42.9 % (ref 35–47)
HGB BLD-MCNC: 14.4 G/DL (ref 11.7–15.7)
MCH RBC QN AUTO: 30.5 PG (ref 26.5–33)
MCHC RBC AUTO-ENTMCNC: 33.6 G/DL (ref 31.5–36.5)
MCV RBC AUTO: 91 FL (ref 78–100)
O2/TOTAL GAS SETTING VFR VENT: 28 %
O2/TOTAL GAS SETTING VFR VENT: 30 %
O2/TOTAL GAS SETTING VFR VENT: 30 %
O2/TOTAL GAS SETTING VFR VENT: 35 %
O2/TOTAL GAS SETTING VFR VENT: 35 %
O2/TOTAL GAS SETTING VFR VENT: 40 %
O2/TOTAL GAS SETTING VFR VENT: 50 %
OXYHGB MFR BLDA: 91 % (ref 92–100)
OXYHGB MFR BLDA: 94 % (ref 92–100)
OXYHGB MFR BLDA: 95 % (ref 92–100)
OXYHGB MFR BLDA: 96 % (ref 92–100)
OXYHGB MFR BLDA: 96 % (ref 92–100)
OXYHGB MFR BLDA: 97 % (ref 92–100)
OXYHGB MFR BLDV: 87 % (ref 70–75)
PCO2 BLD: 59 MM HG (ref 35–45)
PCO2 BLD: 63 MM HG (ref 35–45)
PCO2 BLD: 63 MM HG (ref 35–45)
PCO2 BLD: 69 MM HG (ref 35–45)
PCO2 BLD: 73 MM HG (ref 35–45)
PCO2 BLD: 81 MM HG (ref 35–45)
PCO2 BLDV: 60 MM HG (ref 40–50)
PEEP: 0 CM H2O
PEEP: 10 CM H2O
PH BLD: 7.22 [PH] (ref 7.35–7.45)
PH BLD: 7.26 [PH] (ref 7.35–7.45)
PH BLD: 7.27 [PH] (ref 7.35–7.45)
PH BLD: 7.29 [PH] (ref 7.35–7.45)
PH BLD: 7.3 [PH] (ref 7.35–7.45)
PH BLD: 7.33 [PH] (ref 7.35–7.45)
PH BLDV: 7.34 [PH] (ref 7.32–7.43)
PLATELET # BLD AUTO: 274 10E3/UL (ref 150–450)
PO2 BLD: 126 MM HG (ref 80–105)
PO2 BLD: 69 MM HG (ref 80–105)
PO2 BLD: 76 MM HG (ref 80–105)
PO2 BLD: 83 MM HG (ref 80–105)
PO2 BLD: 88 MM HG (ref 80–105)
PO2 BLD: 94 MM HG (ref 80–105)
PO2 BLDV: 56 MM HG (ref 25–47)
POTASSIUM SERPL-SCNC: 4.4 MMOL/L (ref 3.4–5.3)
RBC # BLD AUTO: 4.72 10E6/UL (ref 3.8–5.2)
SAO2 % BLDA: 92.6 % (ref 96–97)
SAO2 % BLDA: 95.9 % (ref 96–97)
SAO2 % BLDA: 96.1 % (ref 96–97)
SAO2 % BLDA: 97.1 % (ref 96–97)
SAO2 % BLDA: 97.9 % (ref 96–97)
SAO2 % BLDA: 98.7 % (ref 96–97)
SAO2 % BLDV: 87.9 % (ref 70–75)
SODIUM SERPL-SCNC: 139 MMOL/L (ref 135–145)
WBC # BLD AUTO: 14.2 10E3/UL (ref 4–11)

## 2025-02-28 PROCEDURE — 5A09457 ASSISTANCE WITH RESPIRATORY VENTILATION, 24-96 CONSECUTIVE HOURS, CONTINUOUS POSITIVE AIRWAY PRESSURE: ICD-10-PCS | Performed by: INTERNAL MEDICINE

## 2025-02-28 PROCEDURE — 94640 AIRWAY INHALATION TREATMENT: CPT

## 2025-02-28 PROCEDURE — 94660 CPAP INITIATION&MGMT: CPT

## 2025-02-28 PROCEDURE — 200N000001 HC R&B ICU

## 2025-02-28 PROCEDURE — 93005 ELECTROCARDIOGRAM TRACING: CPT

## 2025-02-28 PROCEDURE — 99233 SBSQ HOSP IP/OBS HIGH 50: CPT | Performed by: INTERNAL MEDICINE

## 2025-02-28 PROCEDURE — 36415 COLL VENOUS BLD VENIPUNCTURE: CPT | Performed by: INTERNAL MEDICINE

## 2025-02-28 PROCEDURE — 82805 BLOOD GASES W/O2 SATURATION: CPT | Performed by: INTERNAL MEDICINE

## 2025-02-28 PROCEDURE — 36600 WITHDRAWAL OF ARTERIAL BLOOD: CPT | Performed by: INTERNAL MEDICINE

## 2025-02-28 PROCEDURE — 85027 COMPLETE CBC AUTOMATED: CPT | Performed by: INTERNAL MEDICINE

## 2025-02-28 PROCEDURE — 94640 AIRWAY INHALATION TREATMENT: CPT | Mod: 76

## 2025-02-28 PROCEDURE — 71275 CT ANGIOGRAPHY CHEST: CPT

## 2025-02-28 PROCEDURE — 250N000011 HC RX IP 250 OP 636: Performed by: INTERNAL MEDICINE

## 2025-02-28 PROCEDURE — 93010 ELECTROCARDIOGRAM REPORT: CPT | Performed by: INTERNAL MEDICINE

## 2025-02-28 PROCEDURE — 250N000009 HC RX 250: Performed by: INTERNAL MEDICINE

## 2025-02-28 PROCEDURE — 999N000157 HC STATISTIC RCP TIME EA 10 MIN

## 2025-02-28 PROCEDURE — 258N000003 HC RX IP 258 OP 636: Performed by: INTERNAL MEDICINE

## 2025-02-28 PROCEDURE — 80048 BASIC METABOLIC PNL TOTAL CA: CPT | Performed by: INTERNAL MEDICINE

## 2025-02-28 PROCEDURE — 71045 X-RAY EXAM CHEST 1 VIEW: CPT

## 2025-02-28 RX ORDER — LORAZEPAM 2 MG/ML
0.5 INJECTION INTRAMUSCULAR ONCE
Status: COMPLETED | OUTPATIENT
Start: 2025-02-28 | End: 2025-02-28

## 2025-02-28 RX ORDER — LORAZEPAM 2 MG/ML
.5-1 INJECTION INTRAMUSCULAR EVERY 4 HOURS PRN
Status: DISCONTINUED | OUTPATIENT
Start: 2025-02-28 | End: 2025-03-01

## 2025-02-28 RX ORDER — GLIPIZIDE 10 MG/1
1 TABLET ORAL
Status: DISCONTINUED | OUTPATIENT
Start: 2025-02-28 | End: 2025-03-12 | Stop reason: HOSPADM

## 2025-02-28 RX ORDER — METHYLPREDNISOLONE SODIUM SUCCINATE 125 MG/2ML
60 INJECTION INTRAMUSCULAR; INTRAVENOUS EVERY 8 HOURS
Status: DISCONTINUED | OUTPATIENT
Start: 2025-02-28 | End: 2025-03-01

## 2025-02-28 RX ORDER — IPRATROPIUM BROMIDE AND ALBUTEROL SULFATE 2.5; .5 MG/3ML; MG/3ML
3 SOLUTION RESPIRATORY (INHALATION) EVERY 4 HOURS PRN
Status: DISCONTINUED | OUTPATIENT
Start: 2025-02-28 | End: 2025-03-01

## 2025-02-28 RX ORDER — DEXMEDETOMIDINE HYDROCHLORIDE 4 UG/ML
.1-1.2 INJECTION, SOLUTION INTRAVENOUS CONTINUOUS
Status: DISCONTINUED | OUTPATIENT
Start: 2025-02-28 | End: 2025-03-03

## 2025-02-28 RX ORDER — IOPAMIDOL 755 MG/ML
51 INJECTION, SOLUTION INTRAVASCULAR ONCE
Status: COMPLETED | OUTPATIENT
Start: 2025-02-28 | End: 2025-02-28

## 2025-02-28 RX ORDER — SODIUM CHLORIDE 9 MG/ML
INJECTION, SOLUTION INTRAVENOUS CONTINUOUS
Status: DISCONTINUED | OUTPATIENT
Start: 2025-02-28 | End: 2025-03-03

## 2025-02-28 RX ORDER — ENOXAPARIN SODIUM 100 MG/ML
30 INJECTION SUBCUTANEOUS EVERY 24 HOURS
Status: DISCONTINUED | OUTPATIENT
Start: 2025-02-28 | End: 2025-03-12 | Stop reason: HOSPADM

## 2025-02-28 RX ADMIN — METHYLPREDNISOLONE SODIUM SUCCINATE 62.5 MG: 125 INJECTION, POWDER, FOR SOLUTION INTRAMUSCULAR; INTRAVENOUS at 08:25

## 2025-02-28 RX ADMIN — IPRATROPIUM BROMIDE AND ALBUTEROL SULFATE 3 ML: .5; 3 SOLUTION RESPIRATORY (INHALATION) at 06:23

## 2025-02-28 RX ADMIN — SODIUM CHLORIDE 80 ML: 9 INJECTION, SOLUTION INTRAVENOUS at 13:17

## 2025-02-28 RX ADMIN — IPRATROPIUM BROMIDE AND ALBUTEROL SULFATE 3 ML: .5; 3 SOLUTION RESPIRATORY (INHALATION) at 07:19

## 2025-02-28 RX ADMIN — IPRATROPIUM BROMIDE AND ALBUTEROL SULFATE 3 ML: .5; 3 SOLUTION RESPIRATORY (INHALATION) at 21:07

## 2025-02-28 RX ADMIN — DEXMEDETOMIDINE HYDROCHLORIDE 0.8 MCG/KG/HR: 4 INJECTION, SOLUTION INTRAVENOUS at 16:44

## 2025-02-28 RX ADMIN — METHYLPREDNISOLONE SODIUM SUCCINATE 62.5 MG: 125 INJECTION, POWDER, FOR SOLUTION INTRAMUSCULAR; INTRAVENOUS at 15:57

## 2025-02-28 RX ADMIN — LORAZEPAM 0.5 MG: 2 INJECTION INTRAMUSCULAR; INTRAVENOUS at 02:50

## 2025-02-28 RX ADMIN — LORAZEPAM 0.5 MG: 2 INJECTION INTRAMUSCULAR; INTRAVENOUS at 06:11

## 2025-02-28 RX ADMIN — AZITHROMYCIN MONOHYDRATE 250 MG: 500 INJECTION, POWDER, LYOPHILIZED, FOR SOLUTION INTRAVENOUS at 10:07

## 2025-02-28 RX ADMIN — SODIUM CHLORIDE: 0.9 INJECTION, SOLUTION INTRAVENOUS at 16:59

## 2025-02-28 RX ADMIN — IPRATROPIUM BROMIDE AND ALBUTEROL SULFATE 3 ML: .5; 3 SOLUTION RESPIRATORY (INHALATION) at 14:02

## 2025-02-28 RX ADMIN — ENOXAPARIN SODIUM 30 MG: 30 INJECTION SUBCUTANEOUS at 10:07

## 2025-02-28 RX ADMIN — IPRATROPIUM BROMIDE AND ALBUTEROL SULFATE 3 ML: .5; 3 SOLUTION RESPIRATORY (INHALATION) at 23:22

## 2025-02-28 RX ADMIN — LORAZEPAM 1 MG: 2 INJECTION INTRAMUSCULAR; INTRAVENOUS at 17:57

## 2025-02-28 RX ADMIN — IOPAMIDOL 51 ML: 755 INJECTION, SOLUTION INTRAVENOUS at 13:16

## 2025-02-28 RX ADMIN — METHYLPREDNISOLONE SODIUM SUCCINATE 62.5 MG: 125 INJECTION, POWDER, FOR SOLUTION INTRAMUSCULAR; INTRAVENOUS at 23:37

## 2025-02-28 RX ADMIN — DEXMEDETOMIDINE HYDROCHLORIDE 0.2 MCG/KG/HR: 4 INJECTION, SOLUTION INTRAVENOUS at 07:24

## 2025-02-28 RX ADMIN — IPRATROPIUM BROMIDE AND ALBUTEROL SULFATE 3 ML: .5; 3 SOLUTION RESPIRATORY (INHALATION) at 02:27

## 2025-02-28 RX ADMIN — LORAZEPAM 1 MG: 2 INJECTION INTRAMUSCULAR; INTRAVENOUS at 13:00

## 2025-02-28 RX ADMIN — IPRATROPIUM BROMIDE AND ALBUTEROL SULFATE 3 ML: .5; 3 SOLUTION RESPIRATORY (INHALATION) at 18:08

## 2025-02-28 RX ADMIN — LORAZEPAM 1 MG: 2 INJECTION INTRAMUSCULAR; INTRAVENOUS at 21:23

## 2025-02-28 ASSESSMENT — ACTIVITIES OF DAILY LIVING (ADL)
ADLS_ACUITY_SCORE: 39
ADLS_ACUITY_SCORE: 59
ADLS_ACUITY_SCORE: 59
ADLS_ACUITY_SCORE: 39
ADLS_ACUITY_SCORE: 39
ADLS_ACUITY_SCORE: 59
ADLS_ACUITY_SCORE: 39
ADLS_ACUITY_SCORE: 39
ADLS_ACUITY_SCORE: 59
ADLS_ACUITY_SCORE: 59
ADLS_ACUITY_SCORE: 39
ADLS_ACUITY_SCORE: 59
ADLS_ACUITY_SCORE: 39
ADLS_ACUITY_SCORE: 59
ADLS_ACUITY_SCORE: 59

## 2025-02-28 NOTE — PLAN OF CARE
Goal Outcome Evaluation:       Patient remains on BiPAP with precedex gtt for comfort. ABGs improving.                   Patient Transferred to:   Handoff Report Given to: Kacie

## 2025-02-28 NOTE — PROGRESS NOTES
Pt received on 1L NC. 0230 Pt requested PRN Neb due to increased shortness of breath.  Pt has expiratory wheezes and prolonged expiratory phase.  Post treatment PT complained on pain below sternum and increased shortness of breath.  RN and MD made aware.  Pt placed on oxymask up to 8L for comfort.  EKG ordered and performed.  Pt resting at this time. Respiratory will continue to monitor and provide support.     Zayra Lucero, RT

## 2025-02-28 NOTE — PLAN OF CARE
Problem: Gas Exchange Impaired  Goal: Optimal Gas Exchange  Outcome: Not Progressing  Intervention: Optimize Oxygenation and Ventilation  Recent Flowsheet Documentation  Taken 2/27/2025 1947 by Jack Klein RN  Head of Bed (HOB) Positioning: HOB at 60 degrees   Goal Outcome Evaluation:    Patient able to maintain SpO2 at 90% or greater with 1L NC. At roughly 0200 Patient requested PRN neb treatment, provider notified and neb ordered and given. Patient continued to have dyspnea, tachypnea, shallow breathing, and was complaining of not being able to breath. Patient provided with oxymask and turned up to 8L and then titrated down to 4L for patient comfort. Provider notified, VBG and 0.5 ativan given. Patient continuing to have dyspnea and appearing highly anxious, 12 lead and chest Xray taken, no acute findings. Patient able to fall asleep 75 minutes after ativan administration.          Anxiety returned later in shift and patient requesting additional medication for anxiety, provider ordered one time order, see MAR for more details. Patient able to ambulate to bathroom, SOB with exertion. Sats down to 80s. Returned to 90s once in bed immediately.

## 2025-02-28 NOTE — PROGRESS NOTES
Patient expressing that she feels she is unable to breath after her neb treatment. Patient SpO2 90 - 92%. RR 25 - 35. Lung sounds mild expiratory wheeze. Patient appearing to have severe anxiety. Provider contacted. PRN 0.5 ativan ordered along with VBG.

## 2025-02-28 NOTE — PROGRESS NOTES
Patient continuing to express difficulty breathing. RR lower 30s, Satting 96% on 8L oxymask, patient previously satting 88 - 92% on 2 L NC, requested more air for comfort. Reporting pain 4/10 in abdomen that feels like pressure. Reporting 2/10 pain to neck although has chronic neck pain. HR 100s, SBPs 130s - 140s. Provider notified. EKG and Chest Xray ordered and taken.

## 2025-02-28 NOTE — PROGRESS NOTES
Interdisciplinary Discharge Planning Note    Anticipated Discharge Date: Days    Anticipated Discharge Location: Home    Clinical Needs Before Discharge:  stable oxygen requirement    Treatment Needs After Discharge:  None identified at this time     Potential Barriers to Discharge: None Identified     LAURA King  2/28/2025,  12:05 PM

## 2025-02-28 NOTE — PROGRESS NOTES
Pt transferred to ICU room 916 from [facility] at this time.     Admission/Transfer from: [facility]  2 RN skin assessment completed. Yes   Significant findings include: None  WOC Nurse Consult Ordered? No

## 2025-02-28 NOTE — PROGRESS NOTES
"[facility]    Medicine Progress Note - Hospitalist Service    Date of Admission:  2/27/2025    Assessment & Plan   Principal Problem:    Acute respiratory failure with hypoxia and hypercapnia (H)    COPD with acute exacerbation (H)    Assessment: acute decompensation this AM. VBG shows PCO2 60, hypoxic and anxious. Became more lethargic and needing 15 liters oxygen to maintain sats in 80's. On admit, normal troponin x2 and negative D dimer, and repeat chest xray shows no infiltrate, effusion or pneumothorax. She is DNI, confirmed with patient. She will start Bipap. I spoke with  Johan by phone this AM to update status change.    Plan: Transfer to ICU   Bipap    Duonebs QID   Pred 40 mg daily - will switch to solumedrol   Albuterol nebs Q2 hrs as needed   Continue spiriva, breo sub for dulera.    Precedex drip, as needed ativan    Active Problems:    Severe malnutrition    Assessment: chronic    Plan: nutritional supplements            Diet: Snacks/Supplements Adult: Ensure Enlive; With Meals  NPO for Medical/Clinical Reasons Except for: Meds, Ice Chips    DVT Prophylaxis: Enoxaparin (Lovenox) SQ  Peters Catheter: Not present  Lines: None     Cardiac Monitoring: None  Code Status:  Do not intubate    Clinically Significant Risk Factors Present on Admission                       # Acute Hypoxic Respiratory Failure: Documented O2 saturation < 90%. Continue supplemental oxygen as needed        # Cachexia: Estimated body mass index is 15.27 kg/m  as calculated from the following:    Height as of this encounter: 1.575 m (5' 2\").    Weight as of this encounter: 37.9 kg (83 lb 8 oz).    # Severe Malnutrition: based on nutrition assessment     # COPD: noted on problem list        Social Drivers of Health    Depression: At risk (1/28/2025)    PHQ-2     PHQ-2 Score: 3   Housing Stability: High Risk (2/27/2025)    Housing Stability     Do you have housing? : No     Are you worried about losing your " housing?: No   Tobacco Use: High Risk (2/27/2025)    Patient History     Smoking Tobacco Use: Every Day     Smokeless Tobacco Use: Never   Physical Activity: Insufficiently Active (12/26/2024)    Exercise Vital Sign     Days of Exercise per Week: 2 days     Minutes of Exercise per Session: 10 min   Social Connections: Unknown (12/26/2024)    Social Connection and Isolation Panel [NHANES]     Frequency of Social Gatherings with Friends and Family: More than three times a week          Disposition Plan     Medically Ready for Discharge: Anticipated in 2-4 Days             Jose De Jesus Hannah MD  Hospitalist Service  [facility]  Securely message with [employer] (more info)  Text page via [facility] Paging/Directory   ______________________________________________________________________    Interval History   Respiratory distress, anxious. Worsened overnight    Physical Exam   Vital Signs: Temp: (!) 96.7  F (35.9  C) Temp src: Tympanic BP: (!) 149/87 Pulse: 102   Resp: 28 SpO2: 98 % O2 Device: Nasal cannula Oxygen Delivery: 2 LPM  Weight: 83 lbs 8 oz    GENERAL: Comfortable, talkative, in no apparent distress.  HEENT: Anicteric, non-injected sclera, mouth moist.   NECK: No JVD.  CARDIOVASCULAR: regular rate and rhythm, no murmur. No lower extremity edema   RESPIRATORY: poor air movement bilaterally  GI: Non-distended, normal bowel sounds, soft, non-tender.  SKIN: No rashes, sores.   NEUROLOGY: Alert and oriented x3, follows commands, speech and language normal.       Medical Decision Making       60 MINUTES SPENT BY ME in Critical care managing the patient on the date of service doing chart review, history, exam, documentation & further activities per the note.      Data     I have personally reviewed the following data over the past 24 hrs:    14.2 (H)  \   14.4   / 274     139 100 17.5 /  112 (H)   4.4 29 0.64 \       Imaging results reviewed over the past 24 hrs:   Recent Results (from the past 24 hours)    XR Chest Port 1 View    Narrative    EXAM: XR CHEST PORT 1 VIEW  LOCATION: [facility]  DATE: 2/28/2025    INDICATION: Dyspnea  COMPARISON: Chest x-ray February 27, 2025 and November 22, 2024.      Impression    IMPRESSION: Pulmonary hyperexpansion similar as on the previous exams. No acute cardiopulmonary abnormality is identified.

## 2025-02-28 NOTE — PROGRESS NOTES
TELE ICU Progress Note     This is a 64 year old female with history of COPD not on home O2 who was admitted on 2/27 with 2 days of shortness of breath along with mid-thoracic back pain radiating to the neck, EKG with RBBB pattern but no acute ischemic changes and troponins and Ddimer were negative. Back pain this is midthoracic with radiation to her neck. Thinks that this exacerbation was triggered to exposure to construction dust the other day. Patient was admitted to the ICU for respiratory failure and BIPAP, patient is DNI.      # Acute hypoxic and hypercapnic respiratory failure  # Chest pain  - Continue methylpred 60 q8, duonebs, azithromycin  - BIPAP support as needed  - Given acute worsening of respiratory status would consider CTA chest to r/o PE and to also look for subtle pneumonia     Plan d/w Dr. Hannah (Hospitalist)     John Zendejas MD

## 2025-02-28 NOTE — PHARMACY
Pharmacy - Transfer Medication Reconciliation     The patient's transfer medication orders have been compared to the medication administration record and to the Prior to Admissions Medications list - any noted discrepancies were resolved with the MD.     Thank you. Pharmacy will continue to monitor.     [place] [facility] ....................  2/28/2025   1:32 PM

## 2025-02-28 NOTE — PHARMACY
Pharmacy- Lovenox Dose Adjustment    Patient Active Problem List   Diagnosis    Chronic abdominal pain    Alpha-1-antitrypsin deficiency carrier    Arthritis of knee, right    Atherosclerosis of abdominal aorta    Chronic radicular cervical pain    Constipation, chronic    Contact dermatitis    COPD, severe (H)    Decreased diffusion capacity of lung    Dyshidrotic hand dermatitis    Gastroesophageal reflux disease with esophagitis without hemorrhage    Hiatal hernia    Status post balloon dilatation of esophageal stricture    History of tobacco abuse    Irritable bowel syndrome    Myalgia    Occipital neuralgia of left side    Osteopenia    Prediabetes    Psoriasis    Psoriatic arthritis (H)    Schatzki's ring of distal esophagus    Sinusitis, chronic    Vitamin D deficiency    S/P Nissen fundoplication (without gastrostomy tube) procedure    Mixed hyperlipidemia    PAD (peripheral artery disease)    Vitamin B12 deficiency    Menopausal syndrome (hot flashes)    Ulcer of right cornea    Seasonal allergic rhinitis due to pollen    Pulmonary nodules    Difficulty swallowing solids    History of mandibular surgery    Panlobular emphysema (H)    Severe protein-calorie malnutrition    Decreased functional mobility    Ventral hernia without obstruction or gangrene    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)    COPD exacerbation (H)    COPD with acute exacerbation (H)    Acute respiratory failure with hypoxia and hypercapnia (H)        Relevant Labs:  Recent Labs   Lab Test 02/28/25  0302 02/27/25  0256   WBC 14.2* 16.4*   HGB 14.4 14.5    278      BMI: 15.27 kg/m^2  CrCl: 53.8 mL/min      Intake/Output Summary (Last 24 hours) at 2/28/2025 0734  Last data filed at 2/27/2025 1006  Gross per 24 hour   Intake --   Output 400 ml   Net -400 ml          Per Lovenox Dose Adjustment Protocol, will adjust:  Lovenox 40 mg every 24 hours to Lovenox 30 mg every 24 hours      Will continue to follow  and make adjustments accordingly. Thank You.    [place], [facility] ....................  2/28/2025   7:34 AM

## 2025-03-01 ENCOUNTER — APPOINTMENT (OUTPATIENT)
Dept: GENERAL RADIOLOGY | Facility: OTHER | Age: 65
DRG: 208 | End: 2025-03-01
Attending: INTERNAL MEDICINE
Payer: COMMERCIAL

## 2025-03-01 ENCOUNTER — ANESTHESIA EVENT (OUTPATIENT)
Dept: INTENSIVE CARE | Facility: OTHER | Age: 65
End: 2025-03-01
Payer: COMMERCIAL

## 2025-03-01 ENCOUNTER — ANESTHESIA (OUTPATIENT)
Dept: INTENSIVE CARE | Facility: OTHER | Age: 65
End: 2025-03-01
Payer: COMMERCIAL

## 2025-03-01 LAB
ALLEN'S TEST: NO
ANION GAP SERPL CALCULATED.3IONS-SCNC: 11 MMOL/L (ref 7–15)
BASE EXCESS BLDA CALC-SCNC: -2.1 MMOL/L (ref -3–3)
BASE EXCESS BLDA CALC-SCNC: -2.4 MMOL/L (ref -3–3)
BASE EXCESS BLDA CALC-SCNC: -2.4 MMOL/L (ref -3–3)
BASE EXCESS BLDA CALC-SCNC: -2.5 MMOL/L (ref -3–3)
BASE EXCESS BLDA CALC-SCNC: -2.8 MMOL/L (ref -3–3)
BASE EXCESS BLDA CALC-SCNC: -2.9 MMOL/L (ref -3–3)
BASE EXCESS BLDA CALC-SCNC: 1.4 MMOL/L (ref -3–3)
BASE EXCESS BLDA CALC-SCNC: 2.6 MMOL/L (ref -3–3)
BASE EXCESS BLDA CALC-SCNC: 2.7 MMOL/L (ref -3–3)
BUN SERPL-MCNC: 24.7 MG/DL (ref 8–23)
CALCIUM SERPL-MCNC: 8.7 MG/DL (ref 8.8–10.4)
CHLORIDE SERPL-SCNC: 104 MMOL/L (ref 98–107)
CREAT SERPL-MCNC: 0.53 MG/DL (ref 0.51–0.95)
EGFRCR SERPLBLD CKD-EPI 2021: >90 ML/MIN/1.73M2
ERYTHROCYTE [DISTWIDTH] IN BLOOD BY AUTOMATED COUNT: 12.9 % (ref 10–15)
GLUCOSE BLDC GLUCOMTR-MCNC: 139 MG/DL (ref 70–99)
GLUCOSE SERPL-MCNC: 128 MG/DL (ref 70–99)
HCO3 BLD-SCNC: 27 MMOL/L (ref 21–28)
HCO3 BLD-SCNC: 28 MMOL/L (ref 21–28)
HCO3 BLD-SCNC: 29 MMOL/L (ref 21–28)
HCO3 BLD-SCNC: 30 MMOL/L (ref 21–28)
HCO3 BLD-SCNC: 30 MMOL/L (ref 21–28)
HCO3 BLD-SCNC: 31 MMOL/L (ref 21–28)
HCO3 BLD-SCNC: 31 MMOL/L (ref 21–28)
HCO3 SERPL-SCNC: 26 MMOL/L (ref 22–29)
HCT VFR BLD AUTO: 39.9 % (ref 35–47)
HGB BLD-MCNC: 12.8 G/DL (ref 11.7–15.7)
MCH RBC QN AUTO: 29.9 PG (ref 26.5–33)
MCHC RBC AUTO-ENTMCNC: 32.1 G/DL (ref 31.5–36.5)
MCV RBC AUTO: 93 FL (ref 78–100)
O2/TOTAL GAS SETTING VFR VENT: 30 %
O2/TOTAL GAS SETTING VFR VENT: 36 %
O2/TOTAL GAS SETTING VFR VENT: 40 %
OXYHGB MFR BLDA: 89 % (ref 92–100)
OXYHGB MFR BLDA: 91 % (ref 92–100)
OXYHGB MFR BLDA: 91 % (ref 92–100)
OXYHGB MFR BLDA: 93 % (ref 92–100)
OXYHGB MFR BLDA: 93 % (ref 92–100)
OXYHGB MFR BLDA: 94 % (ref 92–100)
OXYHGB MFR BLDA: 95 % (ref 92–100)
OXYHGB MFR BLDA: 95 % (ref 92–100)
OXYHGB MFR BLDA: 96 % (ref 92–100)
PCO2 BLD: 105 MM HG (ref 35–45)
PCO2 BLD: 60 MM HG (ref 35–45)
PCO2 BLD: 61 MM HG (ref 35–45)
PCO2 BLD: 64 MM HG (ref 35–45)
PCO2 BLD: 68 MM HG (ref 35–45)
PCO2 BLD: 70 MM HG (ref 35–45)
PCO2 BLD: 70 MM HG (ref 35–45)
PCO2 BLD: 74 MM HG (ref 35–45)
PCO2 BLD: 86 MM HG (ref 35–45)
PEEP: 5 CM H2O
PEEP: 5 CM H2O
PEEP: 7 CM H2O
PH BLD: 7.07 [PH] (ref 7.35–7.45)
PH BLD: 7.13 [PH] (ref 7.35–7.45)
PH BLD: 7.18 [PH] (ref 7.35–7.45)
PH BLD: 7.2 [PH] (ref 7.35–7.45)
PH BLD: 7.21 [PH] (ref 7.35–7.45)
PH BLD: 7.21 [PH] (ref 7.35–7.45)
PH BLD: 7.29 [PH] (ref 7.35–7.45)
PH BLD: 7.29 [PH] (ref 7.35–7.45)
PH BLD: 7.32 [PH] (ref 7.35–7.45)
PLATELET # BLD AUTO: 226 10E3/UL (ref 150–450)
PO2 BLD: 67 MM HG (ref 80–105)
PO2 BLD: 71 MM HG (ref 80–105)
PO2 BLD: 76 MM HG (ref 80–105)
PO2 BLD: 77 MM HG (ref 80–105)
PO2 BLD: 80 MM HG (ref 80–105)
PO2 BLD: 82 MM HG (ref 80–105)
PO2 BLD: 82 MM HG (ref 80–105)
PO2 BLD: 85 MM HG (ref 80–105)
PO2 BLD: 87 MM HG (ref 80–105)
POTASSIUM SERPL-SCNC: 4.5 MMOL/L (ref 3.4–5.3)
RBC # BLD AUTO: 4.28 10E6/UL (ref 3.8–5.2)
SAO2 % BLDA: 90.5 % (ref 96–97)
SAO2 % BLDA: 92.1 % (ref 96–97)
SAO2 % BLDA: 92.2 % (ref 96–97)
SAO2 % BLDA: 94.1 % (ref 96–97)
SAO2 % BLDA: 94.5 % (ref 96–97)
SAO2 % BLDA: 95.3 % (ref 96–97)
SAO2 % BLDA: 95.8 % (ref 96–97)
SAO2 % BLDA: 96.7 % (ref 96–97)
SAO2 % BLDA: 97.3 % (ref 96–97)
SODIUM SERPL-SCNC: 141 MMOL/L (ref 135–145)
WBC # BLD AUTO: 6.4 10E3/UL (ref 4–11)

## 2025-03-01 PROCEDURE — 94640 AIRWAY INHALATION TREATMENT: CPT

## 2025-03-01 PROCEDURE — 999N000065 XR CHEST 1 VIEW

## 2025-03-01 PROCEDURE — 3E043XZ INTRODUCTION OF VASOPRESSOR INTO CENTRAL VEIN, PERCUTANEOUS APPROACH: ICD-10-PCS | Performed by: INTERNAL MEDICINE

## 2025-03-01 PROCEDURE — 94660 CPAP INITIATION&MGMT: CPT

## 2025-03-01 PROCEDURE — 999N000157 HC STATISTIC RCP TIME EA 10 MIN

## 2025-03-01 PROCEDURE — 02HV33Z INSERTION OF INFUSION DEVICE INTO SUPERIOR VENA CAVA, PERCUTANEOUS APPROACH: ICD-10-PCS | Performed by: INTERNAL MEDICINE

## 2025-03-01 PROCEDURE — 94640 AIRWAY INHALATION TREATMENT: CPT | Mod: 76

## 2025-03-01 PROCEDURE — 82805 BLOOD GASES W/O2 SATURATION: CPT | Performed by: STUDENT IN AN ORGANIZED HEALTH CARE EDUCATION/TRAINING PROGRAM

## 2025-03-01 PROCEDURE — 250N000011 HC RX IP 250 OP 636: Performed by: STUDENT IN AN ORGANIZED HEALTH CARE EDUCATION/TRAINING PROGRAM

## 2025-03-01 PROCEDURE — 71045 X-RAY EXAM CHEST 1 VIEW: CPT

## 2025-03-01 PROCEDURE — 250N000009 HC RX 250

## 2025-03-01 PROCEDURE — 99233 SBSQ HOSP IP/OBS HIGH 50: CPT | Mod: 25 | Performed by: INTERNAL MEDICINE

## 2025-03-01 PROCEDURE — 82805 BLOOD GASES W/O2 SATURATION: CPT | Performed by: INTERNAL MEDICINE

## 2025-03-01 PROCEDURE — 36600 WITHDRAWAL OF ARTERIAL BLOOD: CPT | Performed by: STUDENT IN AN ORGANIZED HEALTH CARE EDUCATION/TRAINING PROGRAM

## 2025-03-01 PROCEDURE — 99291 CRITICAL CARE FIRST HOUR: CPT | Performed by: STUDENT IN AN ORGANIZED HEALTH CARE EDUCATION/TRAINING PROGRAM

## 2025-03-01 PROCEDURE — 94002 VENT MGMT INPAT INIT DAY: CPT

## 2025-03-01 PROCEDURE — 03HY32Z INSERTION OF MONITORING DEVICE INTO UPPER ARTERY, PERCUTANEOUS APPROACH: ICD-10-PCS | Performed by: INTERNAL MEDICINE

## 2025-03-01 PROCEDURE — 0BH17EZ INSERTION OF ENDOTRACHEAL AIRWAY INTO TRACHEA, VIA NATURAL OR ARTIFICIAL OPENING: ICD-10-PCS | Performed by: INTERNAL MEDICINE

## 2025-03-01 PROCEDURE — 36556 INSERT NON-TUNNEL CV CATH: CPT | Performed by: INTERNAL MEDICINE

## 2025-03-01 PROCEDURE — 85027 COMPLETE CBC AUTOMATED: CPT | Performed by: INTERNAL MEDICINE

## 2025-03-01 PROCEDURE — 250N000011 HC RX IP 250 OP 636

## 2025-03-01 PROCEDURE — 250N000011 HC RX IP 250 OP 636: Mod: JZ | Performed by: SURGERY

## 2025-03-01 PROCEDURE — 31500 INSERT EMERGENCY AIRWAY: CPT

## 2025-03-01 PROCEDURE — 80048 BASIC METABOLIC PNL TOTAL CA: CPT | Performed by: INTERNAL MEDICINE

## 2025-03-01 PROCEDURE — 250N000009 HC RX 250: Performed by: INTERNAL MEDICINE

## 2025-03-01 PROCEDURE — 250N000011 HC RX IP 250 OP 636: Performed by: INTERNAL MEDICINE

## 2025-03-01 PROCEDURE — 999N000065 XR CHEST PORT 1 VIEW

## 2025-03-01 PROCEDURE — 258N000003 HC RX IP 258 OP 636: Performed by: INTERNAL MEDICINE

## 2025-03-01 PROCEDURE — 5A1945Z RESPIRATORY VENTILATION, 24-96 CONSECUTIVE HOURS: ICD-10-PCS | Performed by: INTERNAL MEDICINE

## 2025-03-01 PROCEDURE — 36415 COLL VENOUS BLD VENIPUNCTURE: CPT | Performed by: INTERNAL MEDICINE

## 2025-03-01 PROCEDURE — 258N000003 HC RX IP 258 OP 636: Performed by: STUDENT IN AN ORGANIZED HEALTH CARE EDUCATION/TRAINING PROGRAM

## 2025-03-01 PROCEDURE — 36600 WITHDRAWAL OF ARTERIAL BLOOD: CPT | Performed by: INTERNAL MEDICINE

## 2025-03-01 PROCEDURE — 200N000001 HC R&B ICU

## 2025-03-01 PROCEDURE — 36620 INSERTION CATHETER ARTERY: CPT

## 2025-03-01 RX ORDER — NALOXONE HYDROCHLORIDE 0.4 MG/ML
0.2 INJECTION, SOLUTION INTRAMUSCULAR; INTRAVENOUS; SUBCUTANEOUS
Status: DISCONTINUED | OUTPATIENT
Start: 2025-03-01 | End: 2025-03-12 | Stop reason: HOSPADM

## 2025-03-01 RX ORDER — NALOXONE HYDROCHLORIDE 0.4 MG/ML
0.4 INJECTION, SOLUTION INTRAMUSCULAR; INTRAVENOUS; SUBCUTANEOUS
Status: DISCONTINUED | OUTPATIENT
Start: 2025-03-01 | End: 2025-03-12 | Stop reason: HOSPADM

## 2025-03-01 RX ORDER — PROPOFOL 10 MG/ML
5-75 INJECTION, EMULSION INTRAVENOUS CONTINUOUS
Status: DISCONTINUED | OUTPATIENT
Start: 2025-03-01 | End: 2025-03-03

## 2025-03-01 RX ORDER — NOREPINEPHRINE BITARTRATE 0.02 MG/ML
.01-.6 INJECTION, SOLUTION INTRAVENOUS CONTINUOUS
Status: DISCONTINUED | OUTPATIENT
Start: 2025-03-01 | End: 2025-03-07

## 2025-03-01 RX ORDER — FENTANYL CITRATE 50 UG/ML
25 INJECTION, SOLUTION INTRAMUSCULAR; INTRAVENOUS
Status: DISCONTINUED | OUTPATIENT
Start: 2025-03-01 | End: 2025-03-01

## 2025-03-01 RX ORDER — MIDAZOLAM HCL IN 0.9 % NACL/PF 1 MG/ML
1-8 PLASTIC BAG, INJECTION (ML) INTRAVENOUS CONTINUOUS
Status: DISCONTINUED | OUTPATIENT
Start: 2025-03-01 | End: 2025-03-04

## 2025-03-01 RX ORDER — KETAMINE HYDROCHLORIDE 10 MG/ML
INJECTION INTRAMUSCULAR; INTRAVENOUS PRN
Status: DISCONTINUED | OUTPATIENT
Start: 2025-03-01 | End: 2025-03-01

## 2025-03-01 RX ORDER — ALBUTEROL SULFATE 0.83 MG/ML
2.5 SOLUTION RESPIRATORY (INHALATION)
Status: DISCONTINUED | OUTPATIENT
Start: 2025-03-01 | End: 2025-03-12 | Stop reason: HOSPADM

## 2025-03-01 RX ORDER — METHYLPREDNISOLONE SODIUM SUCCINATE 125 MG/2ML
125 INJECTION INTRAMUSCULAR; INTRAVENOUS EVERY 8 HOURS
Status: COMPLETED | OUTPATIENT
Start: 2025-03-01 | End: 2025-03-01

## 2025-03-01 RX ORDER — GLYCOPYRROLATE 0.2 MG/ML
INJECTION, SOLUTION INTRAMUSCULAR; INTRAVENOUS PRN
Status: DISCONTINUED | OUTPATIENT
Start: 2025-03-01 | End: 2025-03-01

## 2025-03-01 RX ORDER — IPRATROPIUM BROMIDE AND ALBUTEROL SULFATE 2.5; .5 MG/3ML; MG/3ML
3 SOLUTION RESPIRATORY (INHALATION) EVERY 6 HOURS
Status: DISCONTINUED | OUTPATIENT
Start: 2025-03-01 | End: 2025-03-01

## 2025-03-01 RX ORDER — PROPOFOL 10 MG/ML
INJECTION, EMULSION INTRAVENOUS PRN
Status: DISCONTINUED | OUTPATIENT
Start: 2025-03-01 | End: 2025-03-01

## 2025-03-01 RX ADMIN — IPRATROPIUM BROMIDE AND ALBUTEROL SULFATE 3 ML: .5; 3 SOLUTION RESPIRATORY (INHALATION) at 04:02

## 2025-03-01 RX ADMIN — GLYCOPYRROLATE 0.1 MG: 0.2 INJECTION, SOLUTION INTRAMUSCULAR; INTRAVENOUS at 10:24

## 2025-03-01 RX ADMIN — SODIUM CHLORIDE 500 ML: 0.9 INJECTION, SOLUTION INTRAVENOUS at 11:28

## 2025-03-01 RX ADMIN — NOREPINEPHRINE BITARTRATE 0.03 MCG/KG/MIN: 0.02 INJECTION, SOLUTION INTRAVENOUS at 14:35

## 2025-03-01 RX ADMIN — METHYLPREDNISOLONE SODIUM SUCCINATE 125 MG: 125 INJECTION, POWDER, FOR SOLUTION INTRAMUSCULAR; INTRAVENOUS at 08:07

## 2025-03-01 RX ADMIN — LORAZEPAM 1 MG: 2 INJECTION INTRAMUSCULAR; INTRAVENOUS at 08:37

## 2025-03-01 RX ADMIN — SODIUM CHLORIDE, POTASSIUM CHLORIDE, SODIUM LACTATE AND CALCIUM CHLORIDE 500 ML: 600; 310; 30; 20 INJECTION, SOLUTION INTRAVENOUS at 13:33

## 2025-03-01 RX ADMIN — MIDAZOLAM HYDROCHLORIDE 1 MG/HR: 1 INJECTION, SOLUTION INTRAVENOUS at 11:29

## 2025-03-01 RX ADMIN — METHYLPREDNISOLONE SODIUM SUCCINATE 125 MG: 125 INJECTION, POWDER, FOR SOLUTION INTRAMUSCULAR; INTRAVENOUS at 15:07

## 2025-03-01 RX ADMIN — ALBUTEROL SULFATE 2.5 MG: 2.5 SOLUTION RESPIRATORY (INHALATION) at 22:44

## 2025-03-01 RX ADMIN — IPRATROPIUM BROMIDE AND ALBUTEROL SULFATE 3 ML: .5; 3 SOLUTION RESPIRATORY (INHALATION) at 06:19

## 2025-03-01 RX ADMIN — LORAZEPAM 1 MG: 2 INJECTION INTRAMUSCULAR; INTRAVENOUS at 04:52

## 2025-03-01 RX ADMIN — ENOXAPARIN SODIUM 30 MG: 30 INJECTION SUBCUTANEOUS at 09:13

## 2025-03-01 RX ADMIN — ONDANSETRON 4 MG: 2 INJECTION INTRAMUSCULAR; INTRAVENOUS at 09:36

## 2025-03-01 RX ADMIN — Medication 50 MG: at 10:24

## 2025-03-01 RX ADMIN — DEXMEDETOMIDINE HYDROCHLORIDE 0.9 MCG/KG/HR: 4 INJECTION, SOLUTION INTRAVENOUS at 17:55

## 2025-03-01 RX ADMIN — ROCURONIUM BROMIDE 50 MG: 50 INJECTION, SOLUTION INTRAVENOUS at 10:24

## 2025-03-01 RX ADMIN — MIDAZOLAM HYDROCHLORIDE 2 MG: 1 INJECTION, SOLUTION INTRAMUSCULAR; INTRAVENOUS at 10:23

## 2025-03-01 RX ADMIN — IPRATROPIUM BROMIDE AND ALBUTEROL SULFATE 3 ML: .5; 3 SOLUTION RESPIRATORY (INHALATION) at 11:08

## 2025-03-01 RX ADMIN — PROPOFOL 50 MG: 10 INJECTION, EMULSION INTRAVENOUS at 10:24

## 2025-03-01 RX ADMIN — FENTANYL CITRATE 25 MCG: 50 INJECTION INTRAMUSCULAR; INTRAVENOUS at 12:42

## 2025-03-01 RX ADMIN — PROPOFOL 10 MCG/KG/MIN: 10 INJECTION, EMULSION INTRAVENOUS at 10:42

## 2025-03-01 RX ADMIN — LORAZEPAM 1 MG: 2 INJECTION INTRAMUSCULAR; INTRAVENOUS at 01:09

## 2025-03-01 RX ADMIN — IPRATROPIUM BROMIDE AND ALBUTEROL SULFATE 3 ML: .5; 3 SOLUTION RESPIRATORY (INHALATION) at 18:39

## 2025-03-01 RX ADMIN — Medication 50 MCG/HR: at 13:31

## 2025-03-01 RX ADMIN — AZITHROMYCIN MONOHYDRATE 250 MG: 500 INJECTION, POWDER, LYOPHILIZED, FOR SOLUTION INTRAVENOUS at 09:13

## 2025-03-01 RX ADMIN — DEXMEDETOMIDINE HYDROCHLORIDE 1 MCG/KG/HR: 4 INJECTION, SOLUTION INTRAVENOUS at 06:57

## 2025-03-01 RX ADMIN — IPRATROPIUM BROMIDE AND ALBUTEROL SULFATE 3 ML: .5; 3 SOLUTION RESPIRATORY (INHALATION) at 15:11

## 2025-03-01 RX ADMIN — SODIUM CHLORIDE: 0.9 INJECTION, SOLUTION INTRAVENOUS at 02:42

## 2025-03-01 RX ADMIN — Medication 50 MCG: at 13:31

## 2025-03-01 RX ADMIN — METHYLPREDNISOLONE SODIUM SUCCINATE 125 MG: 125 INJECTION, POWDER, FOR SOLUTION INTRAMUSCULAR; INTRAVENOUS at 22:08

## 2025-03-01 ASSESSMENT — ACTIVITIES OF DAILY LIVING (ADL)
ADLS_ACUITY_SCORE: 64
ADLS_ACUITY_SCORE: 59
ADLS_ACUITY_SCORE: 59
ADLS_ACUITY_SCORE: 64
ADLS_ACUITY_SCORE: 59
ADLS_ACUITY_SCORE: 60
ADLS_ACUITY_SCORE: 61
ADLS_ACUITY_SCORE: 64
ADLS_ACUITY_SCORE: 59
ADLS_ACUITY_SCORE: 61
ADLS_ACUITY_SCORE: 64
ADLS_ACUITY_SCORE: 59

## 2025-03-01 ASSESSMENT — COPD QUESTIONNAIRES
COPD: 1
COPD: 1

## 2025-03-01 NOTE — ANESTHESIA PREPROCEDURE EVALUATION
Anesthesia Pre-Procedure Evaluation    Patient: Ember Tavares   MRN: [id number] : 1960        Procedure : Arterial Line Placement          Past Medical History:   Diagnosis Date    Alpha-1-antitrypsin deficiency carrier 2017    Atherosclerosis of abdominal aorta 2017    Overview:  [facility] Studies: 2013 -- CTA ABDOMEN AND PELVIS WITH BILATERAL LOWER EXTREMITY RUNOFF  CLINICAL HISTORY: Iliac and mesenteric ischemia.  TECHNIQUE: 125 mL Omnipaque 300 IV contrast was administered. 3-D arterial reformations were performed.  FINDINGS: There is a well-defined ovoid density at the medial right costophrenic angle. This measures 2.3 x 0.9 x 0.6 cm. It demonstra    Chronic sinusitis     No Comments Provided    Closed fracture of skull (H)     1972    COPD, severe (H) 2017    Overview:  2014 -- PULMONARY FUNCTION  SITE:  [facility] ORDERED BY:  VANNESA LUNA  CLINICAL SUMMARY: 53-year-old female with a history of severe COPD.   TECHNICIAN NOTE: Good effort.   DATA: FVC 1.85 (61%). FEV1 1.04 (45%). FEV1/FVC ratio 56%. DLCO 13.5 to 62%.   Flow volume curve is consistent with airway obstruction.   IMPRESSION: 1. Severe obstructive pulmon    Dental abscess 2019    GERD (gastroesophageal reflux disease) 2017    Hiatal hernia 2017    PAD (peripheral artery disease) 10/13/2015    Psoriasis 2017    Psoriatic arthritis (H) 2017    Schatzki's ring of distal esophagus 2017    Ulcer of right cornea 2018    Vitamin B12 deficiency 2018    Vitamin D deficiency 2017      Past Surgical History:   Procedure Laterality Date    AS UGI ENDOSCOPY W ESOPHAGEAL DILATION BALLOON <30MM  10/30/2015    ESOPHAGEAL DILATION,Dr. Rainer Allen, [facility]    BIOPSY BREAST Bilateral     , , BIOPSY BREAST,benign    CHOLECYSTECTOMY      Laparoscopic    COLONOSCOPY  2016    x's 3 negative    COLONOSCOPY N/A  04/29/2021    hyperplastic follow up 10 years, 4/29/2031    COLONOSCOPY N/A 9/26/2024    Procedure: Colonoscopy;  Surgeon: Jagdish Ruiz MD;  Location:  OR    CT CORONARY ANGIOGRAM  2009    CT CORONARY ANGIOGRAM (IA),negative    ESOPHAGOSCOPY, GASTROSCOPY, DUODENOSCOPY (EGD), COMBINED  04/07/2009    dilated, Dr. Allen    ESOPHAGOSCOPY, GASTROSCOPY, DUODENOSCOPY (EGD), COMBINED N/A 05/30/2019    Pickering's esophagus, 3 year follow up    ESOPHAGOSCOPY, GASTROSCOPY, DUODENOSCOPY (EGD), COMBINED N/A 10/23/2023    Procedure: Esophagoscopy, gastroscopy, duodenoscopy (EGD)with biospy and Dilation.;  Surgeon: Jagdish Ruiz MD;  Location:  OR    HYSTERECTOMY VAGINAL  1994    ovaries remain    LAPAROSCOPIC NISSEN FUNDOPLICATION N/A 03/26/2018    Procedure: LAPAROSCOPIC NISSEN FUNDOPLICATION;  Laparoscopic Nissen Fundoplication;  Surgeon: Jagdish Ruiz MD;  Location:  OR    LAPAROTOMY EXPLORATORY  1990    lysis of adhesions/endometriosis    RECTOCELE REPAIR  07/29/2009      Allergies   Allergen Reactions    Penicillins Other (See Comments)     Other reaction(s): Respiratory Arrest    Atorvastatin Muscle Pain (Myalgia)    Morphine Other (See Comments)     Other reaction(s): Chest Pain    Levofloxacin Nausea and Vomiting    Rosuvastatin Nausea and Vomiting    Sucralfate Nausea and Vomiting    Sulfamethoxazole-Trimethoprim Nausea and Vomiting     Yeast infection      Social History     Tobacco Use    Smoking status: Every Day     Current packs/day: 0.33     Average packs/day: 0.6 packs/day for 44.2 years (28.5 ttl pk-yrs)     Types: Cigarettes, Cigars     Start date: 8/18/1980     Last attempt to quit: 8/18/2024    Smokeless tobacco: Never   Substance Use Topics    Alcohol use: Yes     Alcohol/week: 2.0 standard drinks of alcohol     Comment: Alcoholic Drinks/day: 1-2 drinks every 1-2 weeks      Wt Readings from Last 1 Encounters:   03/01/25 45.4 kg (100 lb 1.4 oz)        Anesthesia Evaluation   Pt has had prior  "anesthetic. Type: MAC.        ROS/MED HX  ENT/Pulmonary:     (+)                          COPD,              Neurologic:       Cardiovascular:       METS/Exercise Tolerance:     Hematologic:       Musculoskeletal:       GI/Hepatic: Comment: Alpha 1 antitrypsin     (+) GERD,     hiatal hernia,              Renal/Genitourinary:       Endo:       Psychiatric/Substance Use:       Infectious Disease:       Malignancy:       Other:               OUTSIDE LABS:  CBC:   Lab Results   Component Value Date    WBC 6.4 03/01/2025    WBC 14.2 (H) 02/28/2025    HGB 12.8 03/01/2025    HGB 14.4 02/28/2025    HCT 39.9 03/01/2025    HCT 42.9 02/28/2025     03/01/2025     02/28/2025     BMP:   Lab Results   Component Value Date     03/01/2025     02/28/2025    POTASSIUM 4.5 03/01/2025    POTASSIUM 4.4 02/28/2025    CHLORIDE 104 03/01/2025    CHLORIDE 100 02/28/2025    CO2 26 03/01/2025    CO2 29 02/28/2025    BUN 24.7 (H) 03/01/2025    BUN 17.5 02/28/2025    CR 0.53 03/01/2025    CR 0.64 02/28/2025     (H) 03/01/2025     (H) 02/28/2025     COAGS:   Lab Results   Component Value Date    PTT 39 (H) 10/11/2019    INR 0.96 10/11/2019     POC: No results found for: \"BGM\", \"HCG\", \"HCGS\"  HEPATIC:   Lab Results   Component Value Date    ALBUMIN 4.5 12/31/2024    PROTTOTAL 7.0 12/31/2024    ALT 19 12/31/2024    AST 21 12/31/2024    ALKPHOS 75 12/31/2024    BILITOTAL 0.4 12/31/2024     OTHER:   Lab Results   Component Value Date    PH 7.07 (LL) 03/01/2025    LACT 1.3 12/02/2019    COLEMAN 8.7 (L) 03/01/2025    MAG 2.3 12/31/2024    LIPASE 21 08/30/2024    TSH 0.64 02/07/2025    CRP 4.1 (H) 12/12/2019    SED 82 (H) 12/12/2019       Anesthesia Plan    ASA Status:  4    NPO Status:  NPO Appropriate                  Consents    Anesthesia Plan(s) and associated risks, benefits, and realistic alternatives discussed. Questions answered and patient/representative(s) expressed understanding.     - Discussed: Risks, " Benefits and Alternatives for the PROCEDURE were discussed     - Discussed with:  Spouse               Suspend during perioperative period? No.         Postoperative Care            Comments:    Other Comments: Written consent obtained by MD for central line and arterial line           АЛЕКСАНДР Morocho CRNA    I have reviewed the pertinent notes and labs in the chart from the past 30 days and (re)examined the patient.  Any updates or changes from those notes are reflected in this note.    Clinically Significant Risk Factors                       # Acute Hypercapnic Respiratory Failure: based on arterial blood gas results.  Continue supplemental oxygen and ventilatory support as indicated.          # Severe Malnutrition: based on nutrition assessment, PRESENT ON ADMISSION   # COPD: noted on problem list

## 2025-03-01 NOTE — PROGRESS NOTES
Pt has been increasingly anxious throughout the end of this shift even with medications. Pt continues to try and pull BIPAP mask off intermittently. Q4H mask changes made. Changes made based off of ABG's throughout the night. Pt remains on BIPAP 24/5, 25%. PRN neb's given due to wheezing with minimal effect. No other respiratory interventions this shift.    RT Fela.

## 2025-03-01 NOTE — PROCEDURES
CENTRALVENOUS LINE INSERTION PROCEDURE NOTE    Indication: Acute respiratory failure, intubation, hypotension    Location: right internal jugular    Anesthesia: none, patient is on midazolam and ventilated    The procedure, benefits, risks, and alternatives were explained to the patient's  and daughter. They voiced understanding of the information and agreed to proceed with the procedure.    The skin overlying the right neck was prepped and draped in normal sterile manner. A vascular ultrasound device was used to locate the vein Internal Jugular. The first attempt cannulated the right carotid artery, the needle was withdrawn and pressure placed on the neck. A small hematoma formed. A second attempt finder needle was used to enter the vein, through which a guidewire was inserted. The finder needle was then removed and the tract was dilated. The 3 lumen central venous catheter was inserted over the guidewire without difficulty. The guidewire was removed and the catheter was sutured to the overlying skin. The patient tolerated the procedure well and there were no immediate complications, except for the hematoma that was dressed with a pressure bandage. A post-procedure chest radiograph showed proper placement.     Jose De Jesus Hannah M.D. 3/1/2025  2:18 PM

## 2025-03-01 NOTE — ANESTHESIA CARE TRANSFER NOTE
Patient: Ember Tavares    Procedure: * No procedures listed *       Diagnosis: * No pre-op diagnosis entered *  Diagnosis Additional Information: No value filed.    Anesthesia Type:   No value filed.     Note:    Oropharynx: endotracheal tube in place, oral gastic tube in place and ventilatory support  Level of Consciousness: iatrogenic sedation  Patient oxygen source: Ventilator.    Independent Airway: airway patency not satisfactory and stable  Dentition: dentition changed      Patient transferred to: ICU    ICU Handoff: Call for PAUSE to initiate/utilize ICU HANDOFF, Identified Patient, Identified Responsible Provider, Reviewed the Pertinent Medical History, Discussed Surgical Course, Reviewed Intra-OP Anesthesia Management and Issues during Anesthesia, Set Expectations for Post Procedure Period and Allowed Opportunity for Questions and Acknowledgement of Understanding      Vitals:  Vitals Value Taken Time   BP 78/43 03/01/25 1045   Temp     Pulse 82 03/01/25 1051   Resp 16 03/01/25 1051   SpO2 97 % 03/01/25 1051   Vitals shown include unfiled device data.    Electronically Signed By: АЛЕКСАНДР Morocho CRNA  March 1, 2025  10:51 AM

## 2025-03-01 NOTE — ANESTHESIA PREPROCEDURE EVALUATION
Anesthesia Pre-Procedure Evaluation    Patient: Ember Tavares   MRN: [id number] : 1960        Procedure : * No procedures listed *          Past Medical History:   Diagnosis Date     Alpha-1-antitrypsin deficiency carrier 2017     Atherosclerosis of abdominal aorta 2017    Overview:  [facility] Studies: 2013 -- CTA ABDOMEN AND PELVIS WITH BILATERAL LOWER EXTREMITY RUNOFF  CLINICAL HISTORY: Iliac and mesenteric ischemia.  TECHNIQUE: 125 mL Omnipaque 300 IV contrast was administered. 3-D arterial reformations were performed.  FINDINGS: There is a well-defined ovoid density at the medial right costophrenic angle. This measures 2.3 x 0.9 x 0.6 cm. It demonstra     Chronic sinusitis     No Comments Provided     Closed fracture of skull (H)     1972     COPD, severe (H) 2017    Overview:  2014 -- PULMONARY FUNCTION  SITE:  [facility] ORDERED BY:  VANNESA LUNA  CLINICAL SUMMARY: 53-year-old female with a history of severe COPD.   TECHNICIAN NOTE: Good effort.   DATA: FVC 1.85 (61%). FEV1 1.04 (45%). FEV1/FVC ratio 56%. DLCO 13.5 to 62%.   Flow volume curve is consistent with airway obstruction.   IMPRESSION: 1. Severe obstructive pulmon     Dental abscess 2019     GERD (gastroesophageal reflux disease) 2017     Hiatal hernia 2017     PAD (peripheral artery disease) 10/13/2015     Psoriasis 2017     Psoriatic arthritis (H) 2017     Schatzki's ring of distal esophagus 2017     Ulcer of right cornea 2018     Vitamin B12 deficiency 2018     Vitamin D deficiency 2017      Past Surgical History:   Procedure Laterality Date     AS UGI ENDOSCOPY W ESOPHAGEAL DILATION BALLOON <30MM  10/30/2015    ESOPHAGEAL DILATION,Dr. Rainer Allen, [facility]     BIOPSY BREAST Bilateral     , , BIOPSY BREAST,benign     CHOLECYSTECTOMY      Laparoscopic     COLONOSCOPY  2016    x's 3 negative      COLONOSCOPY N/A 04/29/2021    hyperplastic follow up 10 years, 4/29/2031     COLONOSCOPY N/A 9/26/2024    Procedure: Colonoscopy;  Surgeon: Jagdish Ruiz MD;  Location:  OR     CT CORONARY ANGIOGRAM  2009    CT CORONARY ANGIOGRAM (IA),negative     ESOPHAGOSCOPY, GASTROSCOPY, DUODENOSCOPY (EGD), COMBINED  04/07/2009    dilated, Dr. Allen     ESOPHAGOSCOPY, GASTROSCOPY, DUODENOSCOPY (EGD), COMBINED N/A 05/30/2019    Pickering's esophagus, 3 year follow up     ESOPHAGOSCOPY, GASTROSCOPY, DUODENOSCOPY (EGD), COMBINED N/A 10/23/2023    Procedure: Esophagoscopy, gastroscopy, duodenoscopy (EGD)with biospy and Dilation.;  Surgeon: Jagdish Ruiz MD;  Location:  OR     HYSTERECTOMY VAGINAL  1994    ovaries remain     LAPAROSCOPIC NISSEN FUNDOPLICATION N/A 03/26/2018    Procedure: LAPAROSCOPIC NISSEN FUNDOPLICATION;  Laparoscopic Nissen Fundoplication;  Surgeon: Jagdish Ruiz MD;  Location:  OR     LAPAROTOMY EXPLORATORY  1990    lysis of adhesions/endometriosis     RECTOCELE REPAIR  07/29/2009      Allergies   Allergen Reactions     Penicillins Other (See Comments)     Other reaction(s): Respiratory Arrest     Atorvastatin Muscle Pain (Myalgia)     Morphine Other (See Comments)     Other reaction(s): Chest Pain     Levofloxacin Nausea and Vomiting     Rosuvastatin Nausea and Vomiting     Sucralfate Nausea and Vomiting     Sulfamethoxazole-Trimethoprim Nausea and Vomiting     Yeast infection      Social History     Tobacco Use     Smoking status: Every Day     Current packs/day: 0.33     Average packs/day: 0.6 packs/day for 44.2 years (28.5 ttl pk-yrs)     Types: Cigarettes, Cigars     Start date: 8/18/1980     Last attempt to quit: 8/18/2024     Smokeless tobacco: Never   Substance Use Topics     Alcohol use: Yes     Alcohol/week: 2.0 standard drinks of alcohol     Comment: Alcoholic Drinks/day: 1-2 drinks every 1-2 weeks      Wt Readings from Last 1 Encounters:   03/01/25 45.4 kg (100 lb 1.4 oz)        Anesthesia  "Evaluation   Pt has had prior anesthetic. Type: MAC.        ROS/MED HX  ENT/Pulmonary:     (+)                          COPD,              Neurologic:       Cardiovascular:       METS/Exercise Tolerance:     Hematologic:       Musculoskeletal:       GI/Hepatic: Comment: Alpha 1 antitrypsin     (+) GERD,     hiatal hernia,              Renal/Genitourinary:       Endo:       Psychiatric/Substance Use:       Infectious Disease:       Malignancy:       Other:               OUTSIDE LABS:  CBC:   Lab Results   Component Value Date    WBC 6.4 03/01/2025    WBC 14.2 (H) 02/28/2025    HGB 12.8 03/01/2025    HGB 14.4 02/28/2025    HCT 39.9 03/01/2025    HCT 42.9 02/28/2025     03/01/2025     02/28/2025     BMP:   Lab Results   Component Value Date     03/01/2025     02/28/2025    POTASSIUM 4.5 03/01/2025    POTASSIUM 4.4 02/28/2025    CHLORIDE 104 03/01/2025    CHLORIDE 100 02/28/2025    CO2 26 03/01/2025    CO2 29 02/28/2025    BUN 24.7 (H) 03/01/2025    BUN 17.5 02/28/2025    CR 0.53 03/01/2025    CR 0.64 02/28/2025     (H) 03/01/2025     (H) 02/28/2025     COAGS:   Lab Results   Component Value Date    PTT 39 (H) 10/11/2019    INR 0.96 10/11/2019     POC: No results found for: \"BGM\", \"HCG\", \"HCGS\"  HEPATIC:   Lab Results   Component Value Date    ALBUMIN 4.5 12/31/2024    PROTTOTAL 7.0 12/31/2024    ALT 19 12/31/2024    AST 21 12/31/2024    ALKPHOS 75 12/31/2024    BILITOTAL 0.4 12/31/2024     OTHER:   Lab Results   Component Value Date    PH 7.29 (L) 03/01/2025    LACT 1.3 12/02/2019    COLEMAN 8.7 (L) 03/01/2025    MAG 2.3 12/31/2024    LIPASE 21 08/30/2024    TSH 0.64 02/07/2025    CRP 4.1 (H) 12/12/2019    SED 82 (H) 12/12/2019       Anesthesia Plan    ASA Status:  4, emergent    NPO Status:  NPO Appropriate                  Consents    Anesthesia Plan(s) and associated risks, benefits, and realistic alternatives discussed. Questions answered and patient/representative(s) expressed " understanding.     - Discussed: Risks, Benefits and Alternatives for the PROCEDURE were discussed     - Discussed with:  Spouse      - Specific Concerns: Airway complication, death, stroke, or heart attack..     - Extended Intubation/Ventilatory Support Discussed: Yes.      - Patient is DNR/DNI Status: No             Suspend during perioperative period? No.         Postoperative Care            Comments:               АЛЕКСАНДР Morocho CRNA    I have reviewed the pertinent notes and labs in the chart from the past 30 days and (re)examined the patient.  Any updates or changes from those notes are reflected in this note.    Clinically Significant Risk Factors                       # Acute Hypercapnic Respiratory Failure: based on arterial blood gas results.  Continue supplemental oxygen and ventilatory support as indicated.          # Severe Malnutrition: based on nutrition assessment, PRESENT ON ADMISSION   # COPD: noted on problem list

## 2025-03-01 NOTE — ANESTHESIA CARE TRANSFER NOTE
Patient: Ember Tavares    Procedure: * No procedures listed *       Diagnosis: * No pre-op diagnosis entered *  Diagnosis Additional Information: No value filed.    Anesthesia Type:   No value filed.     Note:    Oropharynx: ventilatory support, oral gastic tube in place and endotracheal tube in place  Level of Consciousness: iatrogenic sedation  Patient oxygen source: ventilator.    Independent Airway: airway patency not satisfactory and stable  Dentition: dentition unchanged    Report to RN Given: handoff report given  Patient transferred to: ICU    ICU Handoff: Call for PAUSE to initiate/utilize ICU HANDOFF, Identified Patient, Identified Responsible Provider, Reviewed the Pertinent Medical History, Set Expectations for Post Procedure Period and Allowed Opportunity for Questions and Acknowledgement of Understanding      Vitals:  Vitals Value Taken Time   BP 66/29 03/01/25 1440   Temp     Pulse 68 03/01/25 1446   Resp 18 03/01/25 1446   SpO2 95 % 03/01/25 1446   Vitals shown include unfiled device data.    Electronically Signed By: АЛЕКСАНДР Morocho CRNA  March 1, 2025  2:48 PM

## 2025-03-01 NOTE — PROGRESS NOTES
[facility]    Medicine Progress Note - Hospitalist Service    Date of Admission:  2/27/2025    Assessment & Plan   Principal Problem:    Acute respiratory failure with hypoxia and hypercapnia (H)    COPD with acute exacerbation (H)    Assessment: acute decompensation this AM. VBG shows PCO2 60, hypoxic and anxious. Became more lethargic and needing 15 liters oxygen to maintain sats in 80's. On admit, normal troponin x2 and negative D dimer, and repeat chest xray shows no infiltrate, effusion or pneumothorax. She is DNI, confirmed with patient.   She was transferred to ICU on 2/28 and started on Bipap. She required precedex and ativan to keep the mask on and for agitation. I talked with daughter and  Johan evening of 2/28. CT chest 2/28 showed no infiltrate or pulmonary embolism. ABG's improved but ph 7.29 and PCO2 61 this AM. On 3/1 I spoke with  in the room who understands that she has not resolved the respiratory failure, but wants to wake her up to talk goals of care. I confirmed again she is DO NOT INTUBATE with . I explained we will attempt vapotherm and lighten precedex. I cautioned him that she may not tolerate vapotherm, may worsen and require sedation and bipap again, AND that she would be at high risk for respiratory failure that I cannot reverse with Bipap. He understands this and wishes to proceed.   Plan: Lighten sedation   Off bipap   Vapotherm   Serial ABG   Continue solumedrol, Breo, Duonebs and Incruse   Azithromycin day 4    Active Problems:    Severe malnutrition    Assessment: chronic    Plan: nutritional supplements            Diet: Snacks/Supplements Adult: Ensure Enlive; With Meals  NPO for Medical/Clinical Reasons Except for: Meds, Ice Chips    DVT Prophylaxis: Enoxaparin (Lovenox) SQ  Peters Catheter: Not present  Lines: None     Cardiac Monitoring: None  Code Status:  DO NOT INTUBATE    Clinically Significant Risk Factors                       # Acute  Hypercapnic Respiratory Failure: based on arterial blood gas results.  Continue supplemental oxygen and ventilatory support as indicated.          # Severe Malnutrition: based on nutrition assessment, PRESENT ON ADMISSION   # COPD: noted on problem list        Social Drivers of Health    Depression: At risk (1/28/2025)    PHQ-2     PHQ-2 Score: 3   Housing Stability: High Risk (2/27/2025)    Housing Stability     Do you have housing? : No     Are you worried about losing your housing?: No   Tobacco Use: High Risk (2/27/2025)    Patient History     Smoking Tobacco Use: Every Day     Smokeless Tobacco Use: Never   Physical Activity: Insufficiently Active (12/26/2024)    Exercise Vital Sign     Days of Exercise per Week: 2 days     Minutes of Exercise per Session: 10 min   Social Connections: Unknown (12/26/2024)    Social Connection and Isolation Panel [NHANES]     Frequency of Social Gatherings with Friends and Family: More than three times a week          Disposition Plan     Medically Ready for Discharge: Anticipated in 2-4 Days             Jose De Jesus Hannah MD  Hospitalist Service  [facility]  Securely message with [employer] (more info)  Text page via [employer] Paging/Directory   ______________________________________________________________________    Interval History   Sedated and restless    Physical Exam   Vital Signs: Temp: 96.8  F (36  C) Temp src: Tympanic BP: 126/79 Pulse: 67   Resp: 24 SpO2: 97 % O2 Device: BiPAP/CPAP Oxygen Delivery: 35 LPM  Weight: 100 lbs 1.42 oz    GENERAL: Sedated  HEENT: Anicteric, non-injected sclera, mouth moist.   NECK: No JVD.  CARDIOVASCULAR: regular rate and rhythm, no murmur. No lower extremity edema   RESPIRATORY: Poor bilateral air movement  GI: Non-distended, normal bowel sounds, soft, non-tender.  SKIN: No rashes, sores.   NEUROLOGY: Alert and oriented x3, follows commands, speech and language normal.       Medical Decision Making       60 CRITICAL CARE  MINUTES SPENT BY ME on the date of service doing bipap management, vapotherm management, chart review, history, exam, documentation & further activities per the note.      Data     I have personally reviewed the following data over the past 24 hrs:    6.4  \   12.8   / 226     141 104 24.7 (H) /  128 (H)   4.5 26 0.53 \

## 2025-03-01 NOTE — ANESTHESIA PROCEDURE NOTES
Arterial Line Procedure Note    Pre-Procedure   Staff -        CRNA: Joni Blackmon APRN CRNA       Other Anesthesia Staff: Deandra Ruiz       Performed By: PENG       Location: ICU       Pre-Anesthestic Checklist: patient identified, IV checked, risks and benefits discussed, informed consent, monitors and equipment checked and at physician/surgeon's request  Timeout:       Correct Patient: Yes        Correct Procedure: Yes        Correct Site: Yes        Correct Position: Yes   Line Placement:   This line was placed Post Induction starting at 3/1/2025 2:20 PM and ending at 3/1/2025 2:30 PM  Procedure   Procedure: arterial line       Diagnosis: Hypotension, hemodynamic monitoring in a critically ill patient       Laterality: right       Insertion Site: radial.  Sterile Prep        Standard elements of sterile barrier followed       Skin prep: Chloraprep  Insertion/Injection        Technique: ultrasound guided and Seldinger Technique (Ulnar artery patency assessed)        1. Ultrasound was used to evaluate the access site.       2. Artery evaluated via ultrasound for patency/adequacy.       3. Using real-time ultrasound the needle/catheter was observed entering the artery/vein.       5. The visualized structures were anatomically normal.       6. There were no apparent abnormal pathologic findings.       Catheter Type/Size: 20 G, 1.75 in/4.5 cm quick cath (integral wire)  Narrative         Secured by: suture and anchor securement device       Tegaderm dressing used.       Complications: None apparent,        Arterial waveform: Yes        IBP within 10% of NIBP: Yes

## 2025-03-01 NOTE — PROGRESS NOTES
Arterial Blood Gases:    Recent Labs   Lab 03/01/25  0916 03/01/25  0706 03/01/25  0317 02/28/25  2347   PH 7.29* 7.29* 7.32* 7.30*   PCO2 64* 61* 60* 63*   PO2 67* 85 87 83   HCO3 31* 30* 30* 31*   O2PER 30 36 30 30     No improvement in respiratory status. Patient has mentally cleared with stopping precedex. Able to answer questions. With  Johan present, we discussed intubation vs going back on Bipap. I explained poor prognosis without intubation, and her current increased work of breathing. My recommendation is intubation. Patient is agreeable to intubation at this time, given poor prognosis without.  in agreement. Changing code status to FULL CODE. Plan intubation at this time.    Jose De Jesus Hannah M.D. 3/1/2025  9:44 AM

## 2025-03-01 NOTE — PROGRESS NOTES
Pt remains on BiPAP 25/10, 30%. Neb tx given with no change to B/S. Will continue to titrate BiPAP as tolerated.    Hayde Galvan, RT

## 2025-03-01 NOTE — PLAN OF CARE
Patient is confused x4. Precedex drip running at 0.7. Pt is pulling at tubes/Bipap mask when awake. PRN ativan utilized for anxiety/agitation that can not be resolved with therapeutic communication. Bipap continuous.     Goal Outcome Evaluation:    Problem: Gas Exchange Impaired  Goal: Optimal Gas Exchange  Intervention: Optimize Oxygenation and Ventilation  Recent Flowsheet Documentation  Taken 2/28/2025 1800 by Daryn Esteves RN  Head of Bed (HOB) Positioning: HOB at 20-30 degrees     Problem: Adult Inpatient Plan of Care  Goal: Absence of Hospital-Acquired Illness or Injury  Intervention: Identify and Manage Fall Risk  Recent Flowsheet Documentation  Taken 2/28/2025 1530 by Daryn Esteves RN  Safety Promotion/Fall Prevention:   activity supervised   bedside attendant  Intervention: Prevent Skin Injury  Recent Flowsheet Documentation  Taken 2/28/2025 1800 by Daryn Esteves RN  Body Position: position changed independently  Taken 2/28/2025 1530 by Daryn Esteves RN  Body Position: position changed independently

## 2025-03-01 NOTE — PLAN OF CARE
Pt tolerating bipap with intermittent agitation.  PRN medications adequate for relief of anxiety.  Sitter at bedside for safety.  Insp and exp wheezes with course lung sounds.  Tele ICU notified, please see JAILYN Redmond RN.............................3/1/2025 1:22 AM

## 2025-03-01 NOTE — PROGRESS NOTES
Consult  Surgery      SUBJECTIVE:     Reason for Consult: L Buttock Abscess    History of Present Illness: Delicia Walton is a 9 y.o. female with history of L flank cellulitis a month ago, prior UTIs, who presents with her mom and dad and an enlarging L buttock abscess. Worse over the past day. Started about a day ago. Spontaneously drained today prior to this clinic visit.     Has been using Bactroban at home without resolution. Had a fever last night to 101.2. Has been behaving normally, child states she feels ok except for pain with sitting flat on her buttock   No N/V, D/C, changes in activity, malaise, or fatigue     Current Outpatient Medications on File Prior to Visit   Medication Sig    clindamycin (CLEOCIN) 75 mg/5 mL SolR Take 3 tsp three times a day for 10 days     No current facility-administered medications on file prior to visit.        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  Family History   Problem Relation Age of Onset    Cancer Maternal Grandmother         pancreatic cancer    Cancer Paternal Grandfather         melanoma    Lopez's esophagus Maternal Grandfather     Osteoporosis Maternal Grandfather     Hypertension Father     Mental illness Maternal Uncle     Migraines Paternal Grandmother     Osteogenesis imperfecta Brother      Social History     Tobacco Use    Smoking status: Passive Smoke Exposure - Never Smoker    Smokeless tobacco: Never Used   Substance Use Topics    Alcohol use: No    Drug use: Never        Review of Systems  Otherwise negative except as listed above    OBJECTIVE:     Vital Signs (Most Recent)       Physical Exam   A&O, NAD  RRR  No Respiratory Distress  L inferior gluteal cleft with small abscess and surrounding erythema. Scant purulent  Patient intubated by anesthesia with size 7 ET tube secured 22 cm at teeth.  Chest xray taken with good tube placement.   drainage    Laboratory  none    Diagnostic Results:  none    ASSESSMENT/PLAN:     A/P:  Delicia Walton is a 9 y.o. female with small L inferior gluteal abscess. Non toxic. Spontaneously draining already    - Start Clinda as prescribed by PCP  - No need for I&D  - Warm compresses and Tylenol PRN  - RTC PRN    Mehul Charles MD   Pager: [phone]  General Surgery PGY-IV  [facility]    Staff    Small abscess of the left buttock.  Not perianal.  No perianal lesions.    Drained some pus earlier today.    Some surrounding erythema.    Not on antibiotics yet.    Has a Rx.    Did not recommend I&D at this time.      The area of induration is small.    Should respond to antibiotics and warm compresses.

## 2025-03-01 NOTE — ADDENDUM NOTE
Addendum  created 03/01/25 1053 by Joni Blackmon APRN CRNA    Clinical Note Signed, Intraprocedure Blocks edited, LDA updated via procedure documentation, SmartForm saved

## 2025-03-01 NOTE — ANESTHESIA PROCEDURE NOTES
Airway       Patient location during procedure: ICU       Procedure Start/Stop Times: 3/1/2025 10:25 AM and 3/1/2025 10:25 AM  Staff -        CRNA: Joni Blackmon APRN CRNA       Other Anesthesia Staff: Deandra Ruiz       Performed By: PENG and CRNA  Consent for Airway        Urgency: emergent       Consent: No emergent situation. Verbal consent obtained. Written consent not obtained.       Consent given by: spouse       Risks and benefits: risks, benefits and alternatives were discussed       Patient identity confirmed: verbally with patient, arm band, hospital-assigned identification number and provided demographic data  Report Obtained from Primary Care Team       History regarding most recent potassium obtained: Yes       History regarding presence/absence of renal failure obtained:Yes       History regarding stroke/CVA obtained:Yes       History regarding presence/absence of NM disorder: YesIndications and Patient Condition       Indications for airway management: respiratory insufficiency       Mallampati: Not Assessed     Induction type:intravenous       Mask difficulty assessment: 0 - not attempted    Final Airway Details       Final airway type: endotracheal airway       Successful airway: ETT - single  Endotracheal Airway Details        ETT size (mm): 7.0       Cuffed: yes       Successful intubation technique: video laryngoscopy (elective)       VL Blade Size: Glidescope 3       Grade View of Cords: 1       Adjucts: stylet       Position: Right       Measured from: gums/teeth       Secured at (cm): 22       Bite block used: None    Post intubation assessment        ETT secured, Vent settings by primary/ICU team, Primary/ICU team to review CXR, Sedation to be ordered by primary/ICU team and No apparent complications       Placement verified by: capnometry, equal breath sounds, chest rise and x-ray        Number of attempts at approach: 1       Number of other approaches attempted: 0       Secured with:  tape       Ease of procedure: easy       Dentition: Intact and Unchanged    Medication(s) Administered   Medication Administration Time: 3/1/2025 10:25 AM

## 2025-03-01 NOTE — PROGRESS NOTES
Patient on vent with settings  FIO2 30% Peep7 RR 16.  ETCO2 in place, ambu at bedside.  Cuffed checked.  Size 7 ET tube secured 22 at teeth.  Suctioned for scant secretions through ET tube.  Oral cares done.

## 2025-03-02 ENCOUNTER — APPOINTMENT (OUTPATIENT)
Dept: GENERAL RADIOLOGY | Facility: OTHER | Age: 65
End: 2025-03-02
Attending: INTERNAL MEDICINE
Payer: COMMERCIAL

## 2025-03-02 LAB
ALLEN'S TEST: ABNORMAL
ALLEN'S TEST: NO
ALLEN'S TEST: NO
ANION GAP SERPL CALCULATED.3IONS-SCNC: 6 MMOL/L (ref 7–15)
ATRIAL RATE - MUSE: 102 BPM
ATRIAL RATE - MUSE: 106 BPM
BASE EXCESS BLDA CALC-SCNC: -0.4 MMOL/L (ref -3–3)
BASE EXCESS BLDA CALC-SCNC: -0.4 MMOL/L (ref -3–3)
BASE EXCESS BLDA CALC-SCNC: -0.7 MMOL/L (ref -3–3)
BASE EXCESS BLDA CALC-SCNC: -0.9 MMOL/L (ref -3–3)
BASE EXCESS BLDA CALC-SCNC: -1.6 MMOL/L (ref -3–3)
BASE EXCESS BLDA CALC-SCNC: -2 MMOL/L (ref -3–3)
BUN SERPL-MCNC: 20.6 MG/DL (ref 8–23)
CALCIUM SERPL-MCNC: 8.1 MG/DL (ref 8.8–10.4)
CHLORIDE SERPL-SCNC: 111 MMOL/L (ref 98–107)
CREAT SERPL-MCNC: 0.46 MG/DL (ref 0.51–0.95)
DIASTOLIC BLOOD PRESSURE - MUSE: NORMAL MMHG
DIASTOLIC BLOOD PRESSURE - MUSE: NORMAL MMHG
EGFRCR SERPLBLD CKD-EPI 2021: >90 ML/MIN/1.73M2
ERYTHROCYTE [DISTWIDTH] IN BLOOD BY AUTOMATED COUNT: 12.9 % (ref 10–15)
GLUCOSE BLDC GLUCOMTR-MCNC: 99 MG/DL (ref 70–99)
GLUCOSE SERPL-MCNC: 153 MG/DL (ref 70–99)
HCO3 BLD-SCNC: 27 MMOL/L (ref 21–28)
HCO3 BLD-SCNC: 27 MMOL/L (ref 21–28)
HCO3 BLD-SCNC: 28 MMOL/L (ref 21–28)
HCO3 SERPL-SCNC: 27 MMOL/L (ref 22–29)
HCT VFR BLD AUTO: 40.7 % (ref 35–47)
HGB BLD-MCNC: 12.8 G/DL (ref 11.7–15.7)
INTERPRETATION ECG - MUSE: NORMAL
INTERPRETATION ECG - MUSE: NORMAL
MAGNESIUM SERPL-MCNC: 2.3 MG/DL (ref 1.7–2.3)
MCH RBC QN AUTO: 30.8 PG (ref 26.5–33)
MCHC RBC AUTO-ENTMCNC: 31.4 G/DL (ref 31.5–36.5)
MCV RBC AUTO: 98 FL (ref 78–100)
O2/TOTAL GAS SETTING VFR VENT: 25 %
OXYHGB MFR BLDA: 91 % (ref 92–100)
OXYHGB MFR BLDA: 94 % (ref 92–100)
OXYHGB MFR BLDA: 95 % (ref 92–100)
OXYHGB MFR BLDA: 96 % (ref 92–100)
P AXIS - MUSE: 89 DEGREES
P AXIS - MUSE: 95 DEGREES
PCO2 BLD: 52 MM HG (ref 35–45)
PCO2 BLD: 58 MM HG (ref 35–45)
PCO2 BLD: 62 MM HG (ref 35–45)
PCO2 BLD: 63 MM HG (ref 35–45)
PCO2 BLD: 65 MM HG (ref 35–45)
PCO2 BLD: 70 MM HG (ref 35–45)
PEEP: 5 CM H2O
PH BLD: 7.21 [PH] (ref 7.35–7.45)
PH BLD: 7.23 [PH] (ref 7.35–7.45)
PH BLD: 7.25 [PH] (ref 7.35–7.45)
PH BLD: 7.26 [PH] (ref 7.35–7.45)
PH BLD: 7.28 [PH] (ref 7.35–7.45)
PH BLD: 7.32 [PH] (ref 7.35–7.45)
PLATELET # BLD AUTO: 193 10E3/UL (ref 150–450)
PO2 BLD: 101 MM HG (ref 80–105)
PO2 BLD: 63 MM HG (ref 80–105)
PO2 BLD: 66 MM HG (ref 80–105)
PO2 BLD: 68 MM HG (ref 80–105)
PO2 BLD: 75 MM HG (ref 80–105)
PO2 BLD: 78 MM HG (ref 80–105)
POTASSIUM SERPL-SCNC: 4 MMOL/L (ref 3.4–5.3)
PR INTERVAL - MUSE: 130 MS
PR INTERVAL - MUSE: 152 MS
QRS DURATION - MUSE: 112 MS
QRS DURATION - MUSE: 116 MS
QT - MUSE: 336 MS
QT - MUSE: 340 MS
QTC - MUSE: 437 MS
QTC - MUSE: 451 MS
R AXIS - MUSE: 243 DEGREES
R AXIS - MUSE: 266 DEGREES
RBC # BLD AUTO: 4.16 10E6/UL (ref 3.8–5.2)
SAO2 % BLDA: 92.4 % (ref 96–97)
SAO2 % BLDA: 92.4 % (ref 96–97)
SAO2 % BLDA: 92.6 % (ref 96–97)
SAO2 % BLDA: 95.4 % (ref 96–97)
SAO2 % BLDA: 96.4 % (ref 96–97)
SAO2 % BLDA: 97.6 % (ref 96–97)
SODIUM SERPL-SCNC: 144 MMOL/L (ref 135–145)
SYSTOLIC BLOOD PRESSURE - MUSE: NORMAL MMHG
SYSTOLIC BLOOD PRESSURE - MUSE: NORMAL MMHG
T AXIS - MUSE: 68 DEGREES
T AXIS - MUSE: 71 DEGREES
VENTRICULAR RATE- MUSE: 102 BPM
VENTRICULAR RATE- MUSE: 106 BPM
WBC # BLD AUTO: 9 10E3/UL (ref 4–11)

## 2025-03-02 PROCEDURE — 94003 VENT MGMT INPAT SUBQ DAY: CPT

## 2025-03-02 PROCEDURE — 94640 AIRWAY INHALATION TREATMENT: CPT | Mod: 76

## 2025-03-02 PROCEDURE — 999N000157 HC STATISTIC RCP TIME EA 10 MIN

## 2025-03-02 PROCEDURE — 99291 CRITICAL CARE FIRST HOUR: CPT | Performed by: INTERNAL MEDICINE

## 2025-03-02 PROCEDURE — 36600 WITHDRAWAL OF ARTERIAL BLOOD: CPT | Performed by: INTERNAL MEDICINE

## 2025-03-02 PROCEDURE — 99233 SBSQ HOSP IP/OBS HIGH 50: CPT | Performed by: INTERNAL MEDICINE

## 2025-03-02 PROCEDURE — 85027 COMPLETE CBC AUTOMATED: CPT | Performed by: INTERNAL MEDICINE

## 2025-03-02 PROCEDURE — 83735 ASSAY OF MAGNESIUM: CPT | Performed by: INTERNAL MEDICINE

## 2025-03-02 PROCEDURE — 250N000009 HC RX 250: Performed by: INTERNAL MEDICINE

## 2025-03-02 PROCEDURE — 200N000001 HC R&B ICU

## 2025-03-02 PROCEDURE — 82805 BLOOD GASES W/O2 SATURATION: CPT | Performed by: INTERNAL MEDICINE

## 2025-03-02 PROCEDURE — 82310 ASSAY OF CALCIUM: CPT | Performed by: INTERNAL MEDICINE

## 2025-03-02 PROCEDURE — 80048 BASIC METABOLIC PNL TOTAL CA: CPT | Performed by: INTERNAL MEDICINE

## 2025-03-02 PROCEDURE — 250N000011 HC RX IP 250 OP 636: Performed by: STUDENT IN AN ORGANIZED HEALTH CARE EDUCATION/TRAINING PROGRAM

## 2025-03-02 PROCEDURE — 71045 X-RAY EXAM CHEST 1 VIEW: CPT

## 2025-03-02 PROCEDURE — 82565 ASSAY OF CREATININE: CPT | Performed by: INTERNAL MEDICINE

## 2025-03-02 PROCEDURE — 250N000011 HC RX IP 250 OP 636: Performed by: INTERNAL MEDICINE

## 2025-03-02 PROCEDURE — 258N000003 HC RX IP 258 OP 636: Performed by: INTERNAL MEDICINE

## 2025-03-02 RX ORDER — BUDESONIDE 0.5 MG/2ML
1 INHALANT ORAL 2 TIMES DAILY
Status: DISCONTINUED | OUTPATIENT
Start: 2025-03-02 | End: 2025-03-12 | Stop reason: HOSPADM

## 2025-03-02 RX ADMIN — SODIUM CHLORIDE: 0.9 INJECTION, SOLUTION INTRAVENOUS at 04:00

## 2025-03-02 RX ADMIN — SODIUM CHLORIDE: 0.9 INJECTION, SOLUTION INTRAVENOUS at 20:19

## 2025-03-02 RX ADMIN — PANTOPRAZOLE SODIUM 40 MG: 40 INJECTION, POWDER, FOR SOLUTION INTRAVENOUS at 07:35

## 2025-03-02 RX ADMIN — SODIUM CHLORIDE: 0.9 INJECTION, SOLUTION INTRAVENOUS at 09:15

## 2025-03-02 RX ADMIN — ENOXAPARIN SODIUM 30 MG: 30 INJECTION SUBCUTANEOUS at 09:15

## 2025-03-02 RX ADMIN — Medication 50 MCG: at 15:22

## 2025-03-02 RX ADMIN — MIDAZOLAM HYDROCHLORIDE 5 MG/HR: 1 INJECTION, SOLUTION INTRAVENOUS at 21:12

## 2025-03-02 RX ADMIN — BUDESONIDE 1 MG: 0.5 INHALANT RESPIRATORY (INHALATION) at 11:14

## 2025-03-02 RX ADMIN — IPRATROPIUM BROMIDE AND ALBUTEROL SULFATE 3 ML: .5; 3 SOLUTION RESPIRATORY (INHALATION) at 06:14

## 2025-03-02 RX ADMIN — Medication 100 MCG/HR: at 11:30

## 2025-03-02 RX ADMIN — MIDAZOLAM HYDROCHLORIDE 5 MG/HR: 1 INJECTION, SOLUTION INTRAVENOUS at 04:55

## 2025-03-02 RX ADMIN — DEXMEDETOMIDINE HYDROCHLORIDE 0.7 MCG/KG/HR: 4 INJECTION, SOLUTION INTRAVENOUS at 04:46

## 2025-03-02 RX ADMIN — Medication 50 MCG: at 11:29

## 2025-03-02 RX ADMIN — AZITHROMYCIN MONOHYDRATE 250 MG: 500 INJECTION, POWDER, LYOPHILIZED, FOR SOLUTION INTRAVENOUS at 09:15

## 2025-03-02 RX ADMIN — Medication 50 MCG: at 20:07

## 2025-03-02 RX ADMIN — Medication 50 MCG: at 18:19

## 2025-03-02 RX ADMIN — Medication 50 MCG: at 22:12

## 2025-03-02 RX ADMIN — IPRATROPIUM BROMIDE AND ALBUTEROL SULFATE 3 ML: .5; 3 SOLUTION RESPIRATORY (INHALATION) at 18:06

## 2025-03-02 RX ADMIN — BUDESONIDE 1 MG: 0.5 INHALANT RESPIRATORY (INHALATION) at 21:33

## 2025-03-02 RX ADMIN — SODIUM CHLORIDE 1000 ML: 0.9 INJECTION, SOLUTION INTRAVENOUS at 17:11

## 2025-03-02 RX ADMIN — IPRATROPIUM BROMIDE AND ALBUTEROL SULFATE 3 ML: .5; 3 SOLUTION RESPIRATORY (INHALATION) at 14:02

## 2025-03-02 RX ADMIN — IPRATROPIUM BROMIDE AND ALBUTEROL SULFATE 3 ML: .5; 3 SOLUTION RESPIRATORY (INHALATION) at 11:05

## 2025-03-02 ASSESSMENT — ACTIVITIES OF DAILY LIVING (ADL)
ADLS_ACUITY_SCORE: 58
ADLS_ACUITY_SCORE: 60
ADLS_ACUITY_SCORE: 49
ADLS_ACUITY_SCORE: 58
ADLS_ACUITY_SCORE: 60
ADLS_ACUITY_SCORE: 58
ADLS_ACUITY_SCORE: 60
ADLS_ACUITY_SCORE: 58
ADLS_ACUITY_SCORE: 60
ADLS_ACUITY_SCORE: 49
ADLS_ACUITY_SCORE: 60
ADLS_ACUITY_SCORE: 58
ADLS_ACUITY_SCORE: 60
ADLS_ACUITY_SCORE: 58
ADLS_ACUITY_SCORE: 60
ADLS_ACUITY_SCORE: 49

## 2025-03-02 NOTE — PROGRESS NOTES
[facility]    Medicine Progress Note - Hospitalist Service    Date of Admission:  2/27/2025    Assessment & Plan   Principal Problem:    Acute respiratory failure with hypoxia and hypercapnia (H)    COPD with acute exacerbation (H)    Assessment: acute decompensation this AM. VBG shows PCO2 60, hypoxic and anxious. Became more lethargic and needing 15 liters oxygen to maintain sats in 80's. On admit, normal troponin x2 and negative D dimer, and repeat chest xray shows no infiltrate, effusion or pneumothorax. She is DNI, confirmed with patient.   She was transferred to ICU on 2/28 and started on Bipap. She required precedex and ativan to keep the mask on and for agitation. I talked with daughter and  Johan evening of 2/28. CT chest 2/28 showed no infiltrate or pulmonary embolism. On 3/1 condition worsened. Off of sedation, she understood and agreed to intubation. Thank you to Dr. Reeves and tele-intensivists for recommendations and guidance on vent settings. Since that time her ABG has continually improved. She was weaned off norepi night of 3/1.     Plan: continue sedation with midazolam and fentanyl    Ventilator today, plan weaning trial in AM if ABG allows   Serial ABG   Solumedrol, budesonide neb, Duonebs, protonix   Complete 5 days azithromycin today.    Active Problems:    Severe malnutrition    Assessment: chronic    Plan: nutritional supplements            Diet: Snacks/Supplements Adult: Ensure Enlive; With Meals  NPO for Medical/Clinical Reasons Except for: Meds, Ice Chips    DVT Prophylaxis: Enoxaparin (Lovenox) SQ  Peters Catheter: Not present  Lines: PRESENT      CVC Triple Lumen Right Internal jugular-Site Assessment: WDL  Arterial Line 03/01/25 Radial-Site Assessment: WDL      Cardiac Monitoring: None  Code Status: Full Code      Clinically Significant Risk Factors          # Hyperchloremia: Highest Cl = 111 mmol/L in last 2 days, will monitor as appropriate                  #  Acute Hypercapnic Respiratory Failure: based on arterial blood gas results.  Continue supplemental oxygen and ventilatory support as indicated.          # Severe Malnutrition: based on nutrition assessment, PRESENT ON ADMISSION   # COPD: noted on problem list        Social Drivers of Health    Depression: At risk (1/28/2025)    PHQ-2     PHQ-2 Score: 3   Housing Stability: High Risk (2/27/2025)    Housing Stability     Do you have housing? : No     Are you worried about losing your housing?: No   Tobacco Use: High Risk (2/27/2025)    Patient History     Smoking Tobacco Use: Every Day     Smokeless Tobacco Use: Never   Physical Activity: Insufficiently Active (12/26/2024)    Exercise Vital Sign     Days of Exercise per Week: 2 days     Minutes of Exercise per Session: 10 min   Social Connections: Unknown (12/26/2024)    Social Connection and Isolation Panel [NHANES]     Frequency of Social Gatherings with Friends and Family: More than three times a week          Disposition Plan     Medically Ready for Discharge: Anticipated in 2-4 Days             Jose De Jesus Hannah MD  Hospitalist Service  [facility]  Securely message with [employer] (more info)  Text page via [employer] Paging/Directory   ______________________________________________________________________    Interval History   Sedated. Did well on ventilator overnight.     Physical Exam   Vital Signs: Temp: 96.8  F (36  C) Temp src: Tympanic BP: 111/72 Pulse: 66   Resp: 18 SpO2: 96 % O2 Device: Mechanical Ventilator    Weight: 106 lbs .66 oz    GENERAL: Comfortable, sedated  HEENT: Anicteric, non-injected sclera, mouth moist.   NECK: No JVD. R internal jugular intact, hematoma from carotid stick not enlarged and swelling less.  CARDIOVASCULAR: regular rate and rhythm, no murmur. No lower extremity edema   RESPIRATORY: Clear to auscultation bilaterally, no wheezes, no crackles.  GI: Non-distended, normal bowel sounds, soft, non-tender.  SKIN: No  rashes, sores.       Medical Decision Making       60 minutes of critical care time spent with management of drips and vent today.    Data     I have personally reviewed the following data over the past 24 hrs:    9.0  \   12.8   / 193     144 111 (H) 20.6 /  153 (H)   4.0 27 0.46 (L) \       Imaging results reviewed over the past 24 hrs:   Recent Results (from the past 24 hours)   XR Chest 1 View    Narrative    EXAM: XR CHEST 1 VIEW  LOCATION: [facility]  DATE: 3/1/2025    INDICATION: leelee ?  COMPARISON: 3/1/2025, 1003 hours      Impression    IMPRESSION: Endotracheal tube tip approximately 2.7 cm above the joselin. Right neck central venous catheter tip in the upper SVC. Enteric tube descends below the diaphragm, tip outside the field-of-view.    Lungs and pleural spaces are clear, although the costophrenic angles were excluded from the field-of-view. No pneumothorax. Normal size cardiomediastinal silhouette.   XR Chest Port 1 View    Narrative    EXAM: XR CHEST PORTABLE 1 VIEW  LOCATION: [facility]  DATE: 03/02/2025    INDICATION: Respiratory failure.  COMPARISON: 03/01/2025.      Impression    IMPRESSION: Endotracheal tube 3.5 cm above joselin. Right IJ line tip SVC. Mild vascular congestion. Stable cardiomediastinal silhouette. Small pleural effusions.

## 2025-03-02 NOTE — PROGRESS NOTES
Shift Summary    Pt is currently intubated and being mechanically ventilated. Pt has 7 MM tube marked 22 cm at the teeth/gumline. Settings on arrival of shift CMV 16/300/+7/30%. At 1800 Tele-ICU had writer measure Auto-PEEP and ordered RR to be changed from 16 to 12 with ABG ordered 30 minutes later. ABG results post changes 7.20/70/77/27. Writer contacted Tele-ICU about results and changes. Tele-ICU had writer measure auto-PEEP once again, however patient would trigger a spontaneous breath during maneuvers and was not able to get an accurate result. Tele-ICU ordered PEEP settings to be decreased from +7 and RR down to 10 BPM with ABG ordered within the hour. Oncoming RT and RN's told of plan. No further interventions at this time.    Edilia Maza, RT

## 2025-03-02 NOTE — PROGRESS NOTES
Shift Summary    Pt is currently intubated and being mechanically ventilated. Pt is intubated with a 7.0 MM tube marked 22 CM at the Teeth/Gumline. No Spontaneous Breathing trials attempted this shift per MD. Pt started out today's shift with Vent settings of CMV-18/320/25%/+5. DuoNeb and Pulmicort given at 1105 and 1114 AM respectively. SpO2 93% at that time on 25% FiO2. ABG Drawn at 1113 with results of 7.21/70/101/28. Tele-ICU contacted and Tele-ICU ordered vent changes of Vt increased from 320 to 400 mL's with the Inspiratory time adjusted to 0.8 seconds. 1235 ABG drawn with results of 7.25/63/68/28. Tele-ICU contacted and no vent changes made per Tele-ICU. DuoNeb given at 1402 PM with SpO2 94% on Lung Sounding Wheezy. Post Treatment SpO2 94% with Lung sounds remaining unchanged. Suctioning presented a small amount of thick clear secretions. ABG Drawn at 1607 with results of 7.32/52/63/27. No Ventilator changes made post ABG results. DuoNeb given at 1806 PM with SpO2 95% with lung sounding wheezy. Post treatment SpO2 94% and Lung sounds remain unchanged post treatment. Vent checks performed per order and schedule.    Edilia Maza, RT

## 2025-03-02 NOTE — PROGRESS NOTES
TELE ICU Progress Note     Ember Tavares is a 64 year old patient being cared for in the ICU for acute hypercapneic respiratory failure.    Patient was admitted with COPD exacerbation and was intubated this morning for respiratory failure. Initial ABG after intubation was 7.07/105/82/31 on vent settings of , RR 16, FiO2 40%, PEEP 7. On exam, she was sedated with precedex and versed, but was not synchronous with the ventilator. At TV of 300 she had autopeep of 1, with TV of 350, this increased to autopeep around 8-10. Orders placed for fentanyl drip for goal RASS -4 to -5, continue versed drip, wean off precedex once she is synchronous with ventilator, and recheck ABG. I've also requested arterial line be placed. She was starting to become hypotensive at this point as well. 500cc LR bolus ordered and hospitalist is also planning on placing central line.     After above interventions, repeat ABG was 7.21/68/80/27. She now appears deeply sedated and synchronous with vent. AutoPEEP of 3. RR increased to 20 with AutoPEEP increasing to 4. RR decreased to 12 with AutoPEEP decreased to 2. Will change RR to 12 and recheck ABG in 30 minutes.       Mignon Reeves MD  3/1/2025      Total Critical Care Time: 45 minutes    6:50PM Addendum: Repeat ABG unchanged. Will decrease PEEP to 5 and decrease RR to 10 and recheck ABG in 30 minutes.     Vitals and Exam:       Vital Sign Ranges  Temperature Temp  Av.4  F (36.3  C)  Min: 96.8  F (36  C)  Max: 98.6  F (37  C)   Blood pressure Systolic (24hrs), Av , Min:69 , Max:144        Diastolic (24hrs), Av, Min:53, Max:90      Pulse Pulse  Av.1  Min: 65  Max: 101   Respirations Resp  Av  Min: 12  Max: 41   Pulse oximetry SpO2  Av.5 %  Min: 87 %  Max: 97 %       I/O    Intake/Output Summary (Last 24 hours) at 3/1/2025 1803  Last data filed at 3/1/2025 1620  Gross per 24 hour   Intake 2099.45 ml   Output 500 ml   Net 1599.45 ml       FiO2 (%): 30 %, Resp:  13, Vent Mode: CMV/AC, Resp Rate (Set): 16 breaths/min, Tidal Volume (Set, mL): 300 mL, PEEP (cm H2O): 7 cmH2O, Resp Rate (Set): 16 breaths/min, Tidal Volume (Set, mL): 300 mL, PEEP (cm H2O): 7 cmH2O    Physical Examination:   I examined this patient remotely using the telemedicine camera in the [facility]. The patient is located at [facility]    Prior to increased sedation  General Appearance:   Laying in bed   HEENT:   ETT in place     Respiratory:   Dyssynchronous with ventilator     Neuro:   Sedated            Pertinent Data:     All pertinent labs and diagnostic studies were reviewed    Labs:  CMP  Recent Labs   Lab 03/01/25 0317 02/28/25  0302 02/27/25  0256    139 138   POTASSIUM 4.5 4.4 4.4   CHLORIDE 104 100 102   CO2 26 29 25   ANIONGAP 11 10 11   * 112* 107*   BUN 24.7* 17.5 13.1   CR 0.53 0.64 0.66   GFRESTIMATED >90 >90 >90   COLEMAN 8.7* 9.7 9.3     CBC  Recent Labs   Lab 03/01/25 0317 02/28/25  0302 02/27/25  0256   WBC 6.4 14.2* 16.4*   RBC 4.28 4.72 4.75   HGB 12.8 14.4 14.5   HCT 39.9 42.9 42.8   MCV 93 91 90   MCH 29.9 30.5 30.5   MCHC 32.1 33.6 33.9   RDW 12.9 12.9 13.0    274 278     INRNo lab results found in last 7 days.  Arterial Blood Gas  Recent Labs   Lab 03/01/25  1739 03/01/25  1454 03/01/25  1236 03/01/25  0916   PH 7.21* 7.13* 7.07* 7.29*   PCO2 68* 86* 105* 64*   PO2 80 71* 82 67*   HCO3 27 29* 31* 31*   O2PER 30 30 40 30     Lactic Acid   Date Value Ref Range Status   12/02/2019 1.3 0.5 - 2.2 mmol/L Final       Imaging:  XR Chest 1 View  Narrative: EXAM: XR CHEST 1 VIEW  LOCATION: [facility]  DATE: 3/1/2025    INDICATION: leelee ?  COMPARISON: 3/1/2025, 1003 hours  Impression: IMPRESSION: Endotracheal tube tip approximately 2.7 cm above the joselin. Right neck central venous catheter tip in the upper SVC. Enteric tube descends below the diaphragm, tip outside the field-of-view.    Lungs and pleural spaces are  clear, although the costophrenic angles were excluded from the field-of-view. No pneumothorax. Normal size cardiomediastinal silhouette.  XR Chest Port 1 View  Narrative: EXAM: XR CHEST PORTABLE 1 VIEW  LOCATION: [facility]  DATE: 03/01/2025    INDICATION: Endotracheal tube positioning.  COMPARISON: 02/28/2025.  Impression: IMPRESSION: There is a new endotracheal tube with tip in good position above the joselin. NG tube courses below the diaphragm. Lungs are hyperinflated but otherwise clear.

## 2025-03-02 NOTE — PLAN OF CARE
Goal Outcome Evaluation:       Close monitoring of labile ABGs. Ventilator and gtt adjustments as appropriate.

## 2025-03-02 NOTE — PROGRESS NOTES
TELE ICU Progress Note          Assessment and Plan:     Ember Tavares is a 64 year old patient being cared for in the ICU for acute hypercapnic respiratory failure due to COPD exacerbation.    I managed the ventilator with assistance from the onsite respiratory therapist, the patient had no significant air trapping on tidal volume 320, and respiratory rate 18, increased the  minute ventilation by increasing the tidal volume to 400 and keeping the respiratory rate at 18, and counteract possible potential air trapping I decreased the Ti to 0.8, and increase the flow to 75 L/h.  With expiratory pause the total PEEP was 7 accounting for auto PEEP of 2 only which is acceptable.  Repeat blood gas showed improvement of her respiratory acidosis so we will continue the settings as long as there is no significant air trapping and auto peeping.    FiO2 (%): 25 %, Resp: 18, Vent Mode: CMV/AC, Resp Rate (Set): 18 breaths/min, Tidal Volume (Set, mL): 400 mL, PEEP (cm H2O): 5 cmH2O, Resp Rate (Set): 18 breaths/min, Tidal Volume (Set, mL): 400 mL, PEEP (cm H2O): 5 cmH2O    Recent Labs   Lab 03/02/25  1607 03/02/25  1235 03/02/25  1113 03/02/25  0641   PH 7.32* 7.25* 7.21* 7.28*   PCO2 52* 63* 70* 58*   PO2 63* 68* 101 78*   HCO3 27 28 28 28   O2PER 25 25 25 25         Chart documentation was completed, in part, with [employer] voice-recognition software. Even though reviewed, some grammatical, spelling, and word errors may remain.      Critical Care Time: 30 min.  I spent this time (excluding procedures) personally providing and directing critical care services at the bedside and on the critical care unit.     Asa Go MD   Pulmonary and Critical Care               Hospital Course and Key events     The patient has been managed on the mechanical ventilator for COPD exacerbation, she is continuing to have wheezing.  Her blood gas showed worsening respiratory acidosis.  Telemetry ICU was called due to that.              Medications:     I have reviewed this patient's current medications  Current Facility-Administered Medications   Medication Dose Route Frequency Provider Last Rate Last Admin    dexmedeTOMIDine (PRECEDEX) 4 mcg/mL in sodium chloride 0.9 % 100 mL infusion  0.1-1.2 mcg/kg/hr Intravenous Continuous Jose De Jesus Hannah MD   Stopped at 25 0914    fentaNYL (SUBLIMAZE) PCA 50 mcg/mL OPIOID NAIVE   mcg/hr Intravenous Continuous Mignon Reeves MD 2 mL/hr at 25 1130 100 mcg/hr at 25 1130    propofol (DIPRIVAN) infusion  5-75 mcg/kg/min Intravenous Continuous Jose De Jesus Hannah MD   Stopped at 25 1109    And    Medication Instruction   Does not apply Continuous PRN Jose De Jesus Hannah MD        midazolam (VERSED) 100 mg/100 mL NS infusion - ADULT  1-8 mg/hr Intravenous Continuous Jose De Jesus Hannah MD 5 mL/hr at 25 1144 5 mg/hr at 25 1144    No lozenges or gum should be given while patient on BIPAP/AVAPS/AVAPS AE   Does not apply Continuous PRN Jose De Jesus Hannah MD        norepinephrine (LEVOPHED) 4 mg in  mL infusion PREMIX  0.01-0.6 mcg/kg/min Intravenous Continuous Jose De Jesus Hannah MD 6.8 mL/hr at 25 1115 0.04 mcg/kg/min at 25 1115    Patient may continue current oral medications   Does not apply Continuous PRN Jose De Jesus Hannah MD        sodium chloride 0.9 % infusion   Intravenous Continuous Jose De Jesus Hannah  mL/hr at 25 0915 New Bag at 25 0915          Vitals and Exam:       Vital Sign Ranges  Temperature Temp  Av.3  F (36.3  C)  Min: 96.8  F (36  C)  Max: 98.6  F (37  C)   Blood pressure Systolic (24hrs), Av , Min:80 , Max:130        Diastolic (24hrs), Av, Min:56, Max:80      Pulse Pulse  Av.7  Min: 57  Max: 92   Respirations Resp  Av.7  Min: 8  Max: 19   Pulse oximetry SpO2  Av.8 %  Min: 90 %  Max: 100 %       I/O    Intake/Output Summary (Last 24 hours) at 3/2/2025 1149  Last data filed at 3/2/2025  1000  Gross per 24 hour   Intake 4486.33 ml   Output 745 ml   Net 3741.33 ml       FiO2 (%): 25 %, Resp: 18, Vent Mode: CMV/AC, Resp Rate (Set): 18 breaths/min, Tidal Volume (Set, mL): 400 mL, PEEP (cm H2O): 5 cmH2O, Resp Rate (Set): 18 breaths/min, Tidal Volume (Set, mL): 400 mL, PEEP (cm H2O): 5 cmH2O    Physical Examination:   I examined this patient remotely using the telemedicine camera in the [facility]. The patient is located at [facility]    General Appearance:   Critically ill   HEENT:   ET tube noted     Respiratory:   Synchronous with the vent     Neuro:   Sedated   Other   N/A          Pertinent Data:     All pertinent labs and diagnostic studies were reviewed    Labs:  CMP  Recent Labs   Lab 03/02/25  0517 03/01/25 2116 03/01/25 0317 02/28/25  0302 02/27/25  0256     --  141 139 138   POTASSIUM 4.0  --  4.5 4.4 4.4   CHLORIDE 111*  --  104 100 102   CO2 27  --  26 29 25   ANIONGAP 6*  --  11 10 11   * 139* 128* 112* 107*   BUN 20.6  --  24.7* 17.5 13.1   CR 0.46*  --  0.53 0.64 0.66   GFRESTIMATED >90  --  >90 >90 >90   COLEMAN 8.1*  --  8.7* 9.7 9.3     CBC  Recent Labs   Lab 03/02/25  0517 03/01/25 0317 02/28/25  0302 02/27/25  0256   WBC 9.0 6.4 14.2* 16.4*   RBC 4.16 4.28 4.72 4.75   HGB 12.8 12.8 14.4 14.5   HCT 40.7 39.9 42.9 42.8   MCV 98 93 91 90   MCH 30.8 29.9 30.5 30.5   MCHC 31.4* 32.1 33.6 33.9   RDW 12.9 12.9 12.9 13.0    226 274 278     INRNo lab results found in last 7 days.  Arterial Blood Gas  Recent Labs   Lab 03/02/25  1113 03/02/25  0641 03/02/25  0519 03/02/25  0024   PH 7.21* 7.28* 7.26* 7.23*   PCO2 70* 58* 62* 65*   PO2 101 78* 75* 66*   HCO3 28 28 28 27   O2PER 25 25 25 25     Lactic Acid   Date Value Ref Range Status   12/02/2019 1.3 0.5 - 2.2 mmol/L Final       Imaging:  XR Chest Port 1 View  Narrative: EXAM: XR CHEST PORTABLE 1 VIEW  LOCATION: [facility]  DATE: 03/02/2025    INDICATION: Respiratory  failure.  COMPARISON: 03/01/2025.  Impression: IMPRESSION: Endotracheal tube 3.5 cm above joselin. Right IJ line tip SVC. Mild vascular congestion. Stable cardiomediastinal silhouette. Small pleural effusions.

## 2025-03-03 ENCOUNTER — APPOINTMENT (OUTPATIENT)
Dept: CT IMAGING | Facility: OTHER | Age: 65
DRG: 208 | End: 2025-03-03
Attending: INTERNAL MEDICINE
Payer: COMMERCIAL

## 2025-03-03 ENCOUNTER — APPOINTMENT (OUTPATIENT)
Dept: CARDIOLOGY | Facility: OTHER | Age: 65
DRG: 208 | End: 2025-03-03
Attending: INTERNAL MEDICINE
Payer: COMMERCIAL

## 2025-03-03 PROBLEM — R64 PULMONARY CACHEXIA DUE TO CHRONIC OBSTRUCTIVE PULMONARY DISEASE (H): Status: ACTIVE | Noted: 2024-12-31

## 2025-03-03 PROBLEM — J44.9 PULMONARY CACHEXIA DUE TO CHRONIC OBSTRUCTIVE PULMONARY DISEASE (H): Status: ACTIVE | Noted: 2024-12-31

## 2025-03-03 LAB
ALLEN'S TEST: ABNORMAL
ALLEN'S TEST: ABNORMAL
ALLEN'S TEST: NO
ALLEN'S TEST: NO
ANION GAP SERPL CALCULATED.3IONS-SCNC: 6 MMOL/L (ref 7–15)
ATRIAL RATE - MUSE: 140 BPM
BASE EXCESS BLDA CALC-SCNC: -0.5 MMOL/L (ref -3–3)
BASE EXCESS BLDA CALC-SCNC: -0.7 MMOL/L (ref -3–3)
BASE EXCESS BLDA CALC-SCNC: -1.1 MMOL/L (ref -3–3)
BASE EXCESS BLDA CALC-SCNC: 4.8 MMOL/L (ref -3–3)
BUN SERPL-MCNC: 21.9 MG/DL (ref 8–23)
CALCIUM SERPL-MCNC: 8 MG/DL (ref 8.8–10.4)
CHLORIDE SERPL-SCNC: 115 MMOL/L (ref 98–107)
CREAT SERPL-MCNC: 0.55 MG/DL (ref 0.51–0.95)
DIASTOLIC BLOOD PRESSURE - MUSE: NORMAL MMHG
EGFRCR SERPLBLD CKD-EPI 2021: >90 ML/MIN/1.73M2
ERYTHROCYTE [DISTWIDTH] IN BLOOD BY AUTOMATED COUNT: 13.6 % (ref 10–15)
GLUCOSE BLDC GLUCOMTR-MCNC: 107 MG/DL (ref 70–99)
GLUCOSE BLDC GLUCOMTR-MCNC: 174 MG/DL (ref 70–99)
GLUCOSE SERPL-MCNC: 86 MG/DL (ref 70–99)
HCO3 BLD-SCNC: 25 MMOL/L (ref 21–28)
HCO3 BLD-SCNC: 27 MMOL/L (ref 21–28)
HCO3 BLD-SCNC: 27 MMOL/L (ref 21–28)
HCO3 BLD-SCNC: 29 MMOL/L (ref 21–28)
HCO3 SERPL-SCNC: 25 MMOL/L (ref 22–29)
HCT VFR BLD AUTO: 36.4 % (ref 35–47)
HGB BLD-MCNC: 11.8 G/DL (ref 11.7–15.7)
HOLD SPECIMEN: NORMAL
HOLD SPECIMEN: NORMAL
INTERPRETATION ECG - MUSE: NORMAL
LACTATE SERPL-SCNC: 1.1 MMOL/L (ref 0.7–2)
LVEF ECHO: NORMAL
MAGNESIUM SERPL-MCNC: 2.4 MG/DL (ref 1.7–2.3)
MCH RBC QN AUTO: 30.8 PG (ref 26.5–33)
MCHC RBC AUTO-ENTMCNC: 32.4 G/DL (ref 31.5–36.5)
MCV RBC AUTO: 95 FL (ref 78–100)
O2/TOTAL GAS SETTING VFR VENT: 25 %
O2/TOTAL GAS SETTING VFR VENT: 30 %
O2/TOTAL GAS SETTING VFR VENT: 35 %
O2/TOTAL GAS SETTING VFR VENT: 40 %
OXYHGB MFR BLDA: 86 % (ref 92–100)
OXYHGB MFR BLDA: 89 % (ref 92–100)
OXYHGB MFR BLDA: 92 % (ref 92–100)
OXYHGB MFR BLDA: 99 % (ref 92–100)
P AXIS - MUSE: 84 DEGREES
PCO2 BLD: 41 MM HG (ref 35–45)
PCO2 BLD: 49 MM HG (ref 35–45)
PCO2 BLD: 54 MM HG (ref 35–45)
PCO2 BLD: 55 MM HG (ref 35–45)
PEEP: 5 CM H2O
PEEP: 8 CM H2O
PEEP: 8 CM H2O
PH BLD: 7.29 [PH] (ref 7.35–7.45)
PH BLD: 7.3 [PH] (ref 7.35–7.45)
PH BLD: 7.32 [PH] (ref 7.35–7.45)
PH BLD: 7.46 [PH] (ref 7.35–7.45)
PLATELET # BLD AUTO: 193 10E3/UL (ref 150–450)
PO2 BLD: 147 MM HG (ref 80–105)
PO2 BLD: 55 MM HG (ref 80–105)
PO2 BLD: 56 MM HG (ref 80–105)
PO2 BLD: 61 MM HG (ref 80–105)
POTASSIUM SERPL-SCNC: 3.7 MMOL/L (ref 3.4–5.3)
PR INTERVAL - MUSE: 120 MS
PROCALCITONIN SERPL IA-MCNC: 0.12 NG/ML
QRS DURATION - MUSE: 108 MS
QT - MUSE: 288 MS
QTC - MUSE: 439 MS
R AXIS - MUSE: 264 DEGREES
RBC # BLD AUTO: 3.83 10E6/UL (ref 3.8–5.2)
SAO2 % BLDA: 87.6 % (ref 96–97)
SAO2 % BLDA: 90.5 % (ref 96–97)
SAO2 % BLDA: 93.8 % (ref 96–97)
SAO2 % BLDA: >100 % (ref 96–97)
SODIUM SERPL-SCNC: 146 MMOL/L (ref 135–145)
SYSTOLIC BLOOD PRESSURE - MUSE: NORMAL MMHG
T AXIS - MUSE: 67 DEGREES
TRIGL SERPL-MCNC: 107 MG/DL
TROPONIN T SERPL HS-MCNC: 14 NG/L
VENTRICULAR RATE- MUSE: 140 BPM
WBC # BLD AUTO: 13.3 10E3/UL (ref 4–11)

## 2025-03-03 PROCEDURE — 93010 ELECTROCARDIOGRAM REPORT: CPT | Performed by: INTERNAL MEDICINE

## 2025-03-03 PROCEDURE — 82805 BLOOD GASES W/O2 SATURATION: CPT | Performed by: INTERNAL MEDICINE

## 2025-03-03 PROCEDURE — 250N000011 HC RX IP 250 OP 636: Performed by: INTERNAL MEDICINE

## 2025-03-03 PROCEDURE — 93306 TTE W/DOPPLER COMPLETE: CPT | Mod: 26 | Performed by: STUDENT IN AN ORGANIZED HEALTH CARE EDUCATION/TRAINING PROGRAM

## 2025-03-03 PROCEDURE — 250N000009 HC RX 250: Performed by: INTERNAL MEDICINE

## 2025-03-03 PROCEDURE — 84478 ASSAY OF TRIGLYCERIDES: CPT | Performed by: INTERNAL MEDICINE

## 2025-03-03 PROCEDURE — 83605 ASSAY OF LACTIC ACID: CPT | Performed by: INTERNAL MEDICINE

## 2025-03-03 PROCEDURE — 200N000001 HC R&B ICU

## 2025-03-03 PROCEDURE — 87040 BLOOD CULTURE FOR BACTERIA: CPT | Performed by: INTERNAL MEDICINE

## 2025-03-03 PROCEDURE — 84145 PROCALCITONIN (PCT): CPT | Performed by: INTERNAL MEDICINE

## 2025-03-03 PROCEDURE — 250N000011 HC RX IP 250 OP 636: Performed by: STUDENT IN AN ORGANIZED HEALTH CARE EDUCATION/TRAINING PROGRAM

## 2025-03-03 PROCEDURE — 250N000013 HC RX MED GY IP 250 OP 250 PS 637: Performed by: INTERNAL MEDICINE

## 2025-03-03 PROCEDURE — 999N000157 HC STATISTIC RCP TIME EA 10 MIN

## 2025-03-03 PROCEDURE — 94003 VENT MGMT INPAT SUBQ DAY: CPT

## 2025-03-03 PROCEDURE — 84484 ASSAY OF TROPONIN QUANT: CPT | Performed by: INTERNAL MEDICINE

## 2025-03-03 PROCEDURE — 94640 AIRWAY INHALATION TREATMENT: CPT | Mod: 76

## 2025-03-03 PROCEDURE — 36415 COLL VENOUS BLD VENIPUNCTURE: CPT | Performed by: INTERNAL MEDICINE

## 2025-03-03 PROCEDURE — 36600 WITHDRAWAL OF ARTERIAL BLOOD: CPT | Performed by: INTERNAL MEDICINE

## 2025-03-03 PROCEDURE — 82310 ASSAY OF CALCIUM: CPT | Performed by: INTERNAL MEDICINE

## 2025-03-03 PROCEDURE — 99291 CRITICAL CARE FIRST HOUR: CPT | Performed by: INTERNAL MEDICINE

## 2025-03-03 PROCEDURE — 3E0G76Z INTRODUCTION OF NUTRITIONAL SUBSTANCE INTO UPPER GI, VIA NATURAL OR ARTIFICIAL OPENING: ICD-10-PCS | Performed by: INTERNAL MEDICINE

## 2025-03-03 PROCEDURE — 93306 TTE W/DOPPLER COMPLETE: CPT

## 2025-03-03 PROCEDURE — 80048 BASIC METABOLIC PNL TOTAL CA: CPT | Performed by: INTERNAL MEDICINE

## 2025-03-03 PROCEDURE — 258N000003 HC RX IP 258 OP 636: Performed by: INTERNAL MEDICINE

## 2025-03-03 PROCEDURE — 85027 COMPLETE CBC AUTOMATED: CPT | Performed by: INTERNAL MEDICINE

## 2025-03-03 PROCEDURE — 94640 AIRWAY INHALATION TREATMENT: CPT

## 2025-03-03 PROCEDURE — 87205 SMEAR GRAM STAIN: CPT | Performed by: INTERNAL MEDICINE

## 2025-03-03 PROCEDURE — 71275 CT ANGIOGRAPHY CHEST: CPT

## 2025-03-03 PROCEDURE — 87070 CULTURE OTHR SPECIMN AEROBIC: CPT | Performed by: INTERNAL MEDICINE

## 2025-03-03 PROCEDURE — 83735 ASSAY OF MAGNESIUM: CPT | Performed by: INTERNAL MEDICINE

## 2025-03-03 RX ORDER — FUROSEMIDE 10 MG/ML
20 INJECTION INTRAMUSCULAR; INTRAVENOUS EVERY 12 HOURS
Status: DISCONTINUED | OUTPATIENT
Start: 2025-03-03 | End: 2025-03-05

## 2025-03-03 RX ORDER — METHYLPREDNISOLONE SODIUM SUCCINATE 125 MG/2ML
60 INJECTION INTRAMUSCULAR; INTRAVENOUS 3 TIMES DAILY
Status: DISCONTINUED | OUTPATIENT
Start: 2025-03-03 | End: 2025-03-05

## 2025-03-03 RX ORDER — DEXTROSE MONOHYDRATE, SODIUM CHLORIDE, AND POTASSIUM CHLORIDE 50; 1.49; 4.5 G/1000ML; G/1000ML; G/1000ML
INJECTION, SOLUTION INTRAVENOUS CONTINUOUS
Status: DISCONTINUED | OUTPATIENT
Start: 2025-03-03 | End: 2025-03-03

## 2025-03-03 RX ORDER — PROPOFOL 10 MG/ML
5-75 INJECTION, EMULSION INTRAVENOUS CONTINUOUS
Status: DISCONTINUED | OUTPATIENT
Start: 2025-03-03 | End: 2025-03-05

## 2025-03-03 RX ORDER — LEVALBUTEROL 1.25 MG/.5ML
1.25 SOLUTION, CONCENTRATE RESPIRATORY (INHALATION)
Status: DISCONTINUED | OUTPATIENT
Start: 2025-03-03 | End: 2025-03-05

## 2025-03-03 RX ORDER — IOPAMIDOL 755 MG/ML
63 INJECTION, SOLUTION INTRAVASCULAR ONCE
Status: COMPLETED | OUTPATIENT
Start: 2025-03-03 | End: 2025-03-03

## 2025-03-03 RX ORDER — POLYETHYLENE GLYCOL 3350 17 G/17G
17 POWDER, FOR SOLUTION ORAL DAILY
Status: DISCONTINUED | OUTPATIENT
Start: 2025-03-03 | End: 2025-03-04

## 2025-03-03 RX ORDER — DEXTROSE MONOHYDRATE 100 MG/ML
INJECTION, SOLUTION INTRAVENOUS CONTINUOUS PRN
Status: DISCONTINUED | OUTPATIENT
Start: 2025-03-03 | End: 2025-03-12 | Stop reason: HOSPADM

## 2025-03-03 RX ADMIN — Medication 50 MCG: at 04:23

## 2025-03-03 RX ADMIN — DEXMEDETOMIDINE HYDROCHLORIDE 0.5 MCG/KG/HR: 4 INJECTION, SOLUTION INTRAVENOUS at 07:18

## 2025-03-03 RX ADMIN — LEVALBUTEROL 1.25 MG: 1.25 SOLUTION, CONCENTRATE RESPIRATORY (INHALATION) at 18:09

## 2025-03-03 RX ADMIN — ENOXAPARIN SODIUM 30 MG: 30 INJECTION SUBCUTANEOUS at 10:49

## 2025-03-03 RX ADMIN — NOREPINEPHRINE BITARTRATE 0.03 MCG/KG/MIN: 0.02 INJECTION, SOLUTION INTRAVENOUS at 14:46

## 2025-03-03 RX ADMIN — Medication 150 MCG/HR: at 01:32

## 2025-03-03 RX ADMIN — METHYLPREDNISOLONE SODIUM SUCCINATE 62.5 MG: 125 INJECTION, POWDER, FOR SOLUTION INTRAMUSCULAR; INTRAVENOUS at 11:13

## 2025-03-03 RX ADMIN — IOPAMIDOL 63 ML: 755 INJECTION, SOLUTION INTRAVENOUS at 10:12

## 2025-03-03 RX ADMIN — AZITHROMYCIN MONOHYDRATE 250 MG: 500 INJECTION, POWDER, LYOPHILIZED, FOR SOLUTION INTRAVENOUS at 10:50

## 2025-03-03 RX ADMIN — ALBUTEROL SULFATE 2.5 MG: 2.5 SOLUTION RESPIRATORY (INHALATION) at 00:34

## 2025-03-03 RX ADMIN — POTASSIUM CHLORIDE, DEXTROSE MONOHYDRATE AND SODIUM CHLORIDE: 150; 5; 450 INJECTION, SOLUTION INTRAVENOUS at 08:38

## 2025-03-03 RX ADMIN — NOREPINEPHRINE BITARTRATE 0.04 MCG/KG/MIN: 0.02 INJECTION, SOLUTION INTRAVENOUS at 06:11

## 2025-03-03 RX ADMIN — PANTOPRAZOLE SODIUM 40 MG: 40 INJECTION, POWDER, FOR SOLUTION INTRAVENOUS at 11:03

## 2025-03-03 RX ADMIN — POLYETHYLENE GLYCOL 3350 17 G: 17 POWDER, FOR SOLUTION ORAL at 11:05

## 2025-03-03 RX ADMIN — METHYLPREDNISOLONE SODIUM SUCCINATE 62.5 MG: 125 INJECTION, POWDER, FOR SOLUTION INTRAMUSCULAR; INTRAVENOUS at 22:33

## 2025-03-03 RX ADMIN — FUROSEMIDE 20 MG: 10 INJECTION, SOLUTION INTRAMUSCULAR; INTRAVENOUS at 13:32

## 2025-03-03 RX ADMIN — Medication 150 MCG/HR: at 16:09

## 2025-03-03 RX ADMIN — BUDESONIDE 1 MG: 0.5 INHALANT RESPIRATORY (INHALATION) at 18:11

## 2025-03-03 RX ADMIN — IPRATROPIUM BROMIDE AND ALBUTEROL SULFATE 3 ML: .5; 3 SOLUTION RESPIRATORY (INHALATION) at 06:18

## 2025-03-03 RX ADMIN — METHYLPREDNISOLONE SODIUM SUCCINATE 62.5 MG: 125 INJECTION, POWDER, FOR SOLUTION INTRAMUSCULAR; INTRAVENOUS at 13:32

## 2025-03-03 RX ADMIN — Medication 50 MCG: at 06:30

## 2025-03-03 RX ADMIN — BUDESONIDE 0.5 MG: 0.5 INHALANT RESPIRATORY (INHALATION) at 06:19

## 2025-03-03 RX ADMIN — LEVALBUTEROL 1.25 MG: 1.25 SOLUTION, CONCENTRATE RESPIRATORY (INHALATION) at 15:26

## 2025-03-03 RX ADMIN — LEVALBUTEROL 1.25 MG: 1.25 SOLUTION, CONCENTRATE RESPIRATORY (INHALATION) at 12:42

## 2025-03-03 RX ADMIN — ALBUTEROL SULFATE 2.5 MG: 2.5 SOLUTION RESPIRATORY (INHALATION) at 02:45

## 2025-03-03 RX ADMIN — Medication 50 MCG: at 07:34

## 2025-03-03 RX ADMIN — PROPOFOL 10 MCG/KG/MIN: 10 INJECTION, EMULSION INTRAVENOUS at 13:49

## 2025-03-03 RX ADMIN — LEVALBUTEROL 1.25 MG: 1.25 SOLUTION, CONCENTRATE RESPIRATORY (INHALATION) at 22:04

## 2025-03-03 RX ADMIN — SODIUM CHLORIDE 80 ML: 9 INJECTION, SOLUTION INTRAVENOUS at 10:31

## 2025-03-03 RX ADMIN — Medication 50 MCG: at 00:23

## 2025-03-03 RX ADMIN — SODIUM CHLORIDE: 0.9 INJECTION, SOLUTION INTRAVENOUS at 06:48

## 2025-03-03 RX ADMIN — Medication 50 MCG: at 02:03

## 2025-03-03 ASSESSMENT — ACTIVITIES OF DAILY LIVING (ADL)
ADLS_ACUITY_SCORE: 53
ADLS_ACUITY_SCORE: 53
ADLS_ACUITY_SCORE: 55
ADLS_ACUITY_SCORE: 49
ADLS_ACUITY_SCORE: 53
ADLS_ACUITY_SCORE: 55
ADLS_ACUITY_SCORE: 55
ADLS_ACUITY_SCORE: 53
ADLS_ACUITY_SCORE: 49
ADLS_ACUITY_SCORE: 55
ADLS_ACUITY_SCORE: 53
ADLS_ACUITY_SCORE: 55
ADLS_ACUITY_SCORE: 53
ADLS_ACUITY_SCORE: 55
ADLS_ACUITY_SCORE: 55
ADLS_ACUITY_SCORE: 53

## 2025-03-03 NOTE — PROGRESS NOTES
Clinical Nutrition / reassessment     Reason for Assessment:  Enteral feeding recommendations:   *House formula of Osmolite 1.5 at goal rate of 35 ml/hour, continuous. Tap water flushes once off IV fluids at 100 ml every 4 hours. Monitor for refeeding syndrome, high potential. *Recommend initiating feeding at 20 ml/hour and monitor tolerance for at least 4 hours. Increase by 5-10 ml/hour until goal is reached as tolerated.     Assessment:   Client History:  pt now intubated, enteral feeding recommendations as per above.   Diet Order: NPO  Oral Intake:  na  Supplement Intake: reinstate after oral intakes resume   Weight:   Wt Readings from Last 10 Encounters:   03/03/25 51 kg (112 lb 7 oz)   02/07/25 42.6 kg (94 lb)   01/28/25 41.2 kg (90 lb 12.8 oz)   12/31/24 40 kg (88 lb 3.2 oz)   11/22/24 38.1 kg (84 lb)   09/26/24 40.4 kg (89 lb)   08/30/24 40.4 kg (89 lb)   02/18/24 41.2 kg (90 lb 12.8 oz)   10/23/23 41.7 kg (92 lb)   10/10/23 41.7 kg (92 lb)    Body mass index is 20.56 kg/m .    Estimated nutritional needs based on:  ideal body weight 50 kg / 110 lbs   Estimated energy needs:  2022-9262 kcal/day (25-30 kcal/kg)  Estimated protein needs:  60-90 gm/day (1.2-1.5 g/kg)    Malnutrition Criteria:  (Need to have 2 indicators to qualify recommendation)  Energy Intake:  Chronic Moderate: < 75% of estimated energy requirement for >/= 1 month  Interpretation of Weight Loss:  Acute Severe:   > 5% in 1 month  Physical Findings:  Chronic Body Fat Loss:  severe and Chronic Muscle Mass Loss:  severe  Reduced  Strength:  Not Measured    Recommended Nutrition Diagnosis:   Severe Malnutrition in the context of chronic illness - based on AND/ASPEN Clinical Characterstics of Malnutrition May 2012  Malnutrition:    - Level of malnutrition: Severe       Nutrition Education: Nutrition education will be provided as appropriate.    Nutrition Diagnosis: Weight:  weight loss/underweight related to inadequate energy intakes vs  expenditure as evidenced by BMI at 15, ~7# wt loss in past month.    Intervention:  Nutrition Prescription:     Nutrition Intervention(s):Enteral feeding recommendations:   1. House formula of Osmolite 1.5 at goal rate of 35 ml/hour would provide: 840 ml formula, 1260 kcal, 53 g protein, 640 ml fluids from formula. Tap water flushes at 100 ml every 4 hours would provide an additional 600 ml fluids. This is on the light end of her estimated needs using her IBW. Her current wt is well above her admit wt/previous wts.     Nutrition Goal(s):  1. Pt will tolerate feeding as ordered per MD.   2. Pt will not have s/s refeeding syndrome     Monitoring and Evaluation:   Feeding tolerance, weights, diet advancement     Discharge Recommendation:   Nutrition Discharge Planning  Will address closer to discharge     RD will reassess in within 1-5 days or sooner.  Theresa Lopez RD on 3/3/2025 at 11:08 AM

## 2025-03-03 NOTE — PROGRESS NOTES
Interdisciplinary Discharge Planning Note    Anticipated Discharge Date: 2-5 days    Anticipated Discharge Location: Home    Clinical Needs Before Discharge:   ECHO, steroids     Treatment Needs After Discharge:  None identified    Potential Barriers to Discharge: none identified     Criss Medrano  3/3/2025,  12:13 PM   LAURA Ferraro on 3/3/2025 at 3:52 PM

## 2025-03-03 NOTE — TELECONSULT
TELE ICU Progress Note          Assessment and Plan:     Ember Tavares is a 64 year old patient being cared for in the ICU for Respiratory distress secondary to COPD exacerbation. Pertinent assessment and recommendations include:       Interval Hx:  Recommendations for today:   -Increased respiratory rate from 18-22 to maintain a pH between 7.32-.735  -Start diuresis with a net daily fluid balance of -500ml (currently 6L positive)  -Consider weaning off versed and moving to propofol with a RASS goal of 0 to -1      Plan:  # Neuro  # Acute Agitation Requiring Sedation  Would recommend transitioning away from Versed to propofol for goal RASS of 0 to -1  Versed is concerning for prolonged ICU and vent days d/t long half life.    #Acute Pain  Fentanyl for goal pain score < 4  Daily Sedation Holidays    # CV  MAP goal: >65   F/U Echo    # Pulm     FiO2 (%): 30 %, Resp: 22, Vent Mode: CMV/AC, Resp Rate (Set): 18 breaths/min, Tidal Volume (Set, mL): 400 mL, PEEP (cm H2O): 8 cmH2O, Resp Rate (Set): 18 breaths/min, Tidal Volume (Set, mL): 400 mL, PEEP (cm H2O): 8 cmH2O    Increase Respiratory rate from 18-22 for respiratory acidosis and improvement of CO2 retention     Pt continues to require ventilator support  Wean to extubate  Titrate FiO2 for SpO2 > 90  Continue Bronchodilators and steroids as appropriate  Daily SBTs once appropriate    # GI and F/E/N    Continue TF    # Renal    Recent Labs   Lab 03/03/25  0518 03/02/25  0517 03/01/25  0317   CR 0.55 0.46* 0.53   BUN 21.9 20.6 24.7*   * 144 141   CO2 25 27 26       Intake/Output Summary (Last 24 hours) at 3/3/2025 1225  Last data filed at 3/3/2025 1000  Gross per 24 hour   Intake 2059.99 ml   Output 860 ml   Net 1199.99 ml     Vitals:    03/01/25 0036 03/02/25 0327 03/03/25 0200   Weight: 45.4 kg (100 lb 1.4 oz) 48.1 kg (106 lb 0.7 oz) 51 kg (112 lb 7 oz)     Monitor renal function, UOP, and electrolytes  -Avoid giving fluid boluses unless clearly volume  depleted.  -Follow I/O balance daily and avoid progressive volume overload      Fluid Balance Goal: net negative    # Heme    Recent Labs   Lab 25  0518 2517 25   HCT 36.4 40.7 39.9   HGB 11.8 12.8 12.8     No acute indication for transfusion  Daily CBC    # Endo     ICU glycemic protocol        # Microbiology:    Temp (24hrs), Av.6  F (36.4  C), Min:97.4  F (36.3  C), Max:97.9  F (36.6  C)    Recent Labs   Lab 25   WBC 13.3* 9.0 6.4     I have reviewed the relevant microbiology.  Blood culture:  Results for orders placed or performed during the hospital encounter of 22   Blood Culture Peripheral Blood    Collection Time: 22 11:56 AM    Specimen: Peripheral Blood   Result Value Ref Range    Culture No Growth    Blood Culture Peripheral Blood    Collection Time: 22 11:56 AM    Specimen: Peripheral Blood   Result Value Ref Range    Culture No Growth    Results for orders placed or performed during the hospital encounter of 19   Blood culture    Collection Time: 19  5:17 PM    Specimen: Blood   Result Value Ref Range    Specimen Description Blood     Culture Micro No growth after 6 days    Blood culture    Collection Time: 19  5:17 PM    Specimen: Blood   Result Value Ref Range    Specimen Description Blood     Culture Micro No growth after 6 days       Urine culture:  Results for orders placed or performed in visit on 23   Urine Culture    Collection Time: 23 12:14 PM    Specimen: Urine, Clean Catch   Result Value Ref Range    Culture Mixed Urogenital Lillie        # Leukocytosis  Monitoring fever curve, WBC, Inflammatory Markers  ABx Azithromycin        Primary Team Communication: Pending conversation   Billing: Total critical care time spent by me, excluding procedures, was 45 minutes.      Harrison Lee MD  3/3/2025           Hospital Course and Key events                   Medications:      I have reviewed this patient's current medications  Current Facility-Administered Medications   Medication Dose Route Frequency Provider Last Rate Last Admin    dexmedeTOMIDine (PRECEDEX) 4 mcg/mL in sodium chloride 0.9 % 100 mL infusion  0.1-1.2 mcg/kg/hr Intravenous Continuous Jose De Jesus Hannah MD   Stopped at 25 0839    dextrose 5% and 0.45% NaCl + KCl 20 mEq/L infusion   Intravenous Continuous Crispin Jacobson MD 75 mL/hr at 25 0838 New Bag at 25 0838    fentaNYL (SUBLIMAZE) PCA 50 mcg/mL OPIOID NAIVE   mcg/hr Intravenous Continuous Mignon Reeves MD 3.5 mL/hr at 25 0853 175 mcg/hr at 25 0853    midazolam (VERSED) 100 mg/100 mL NS infusion - ADULT  1-8 mg/hr Intravenous Continuous Jose De Jesus Hannah MD 5 mL/hr at 252 5 mg/hr at 25 2112    No lozenges or gum should be given while patient on BIPAP/AVAPS/AVAPS AE   Does not apply Continuous PRN Jose De Jesus Hannah MD        [Held by provider] norepinephrine (LEVOPHED) 4 mg in  mL infusion PREMIX  0.01-0.6 mcg/kg/min Intravenous Continuous Jose De Jesus Hannah MD   Paused at 25 0734    Patient may continue current oral medications   Does not apply Continuous PRN Jose De Jesus Hannah MD              Vitals and Exam:       Vital Sign Ranges  Temperature Temp  Av.6  F (36.4  C)  Min: 97.4  F (36.3  C)  Max: 97.9  F (36.6  C)   Blood pressure Systolic (24hrs), Av , Min:84 , Max:114        Diastolic (24hrs), Av, Min:48, Max:67      Pulse Pulse  Av.3  Min: 79  Max: 137   Respirations Resp  Av  Min: 11  Max: 22   Pulse oximetry SpO2  Av.2 %  Min: 91 %  Max: 100 %       I/O    Intake/Output Summary (Last 24 hours) at 3/3/2025 1224  Last data filed at 3/3/2025 1000  Gross per 24 hour   Intake 2059.99 ml   Output 860 ml   Net 1199.99 ml       FiO2 (%): 30 %, Resp: 22, Vent Mode: CMV/AC, Resp Rate (Set): 18 breaths/min, Tidal Volume (Set, mL): 400 mL, PEEP (cm H2O): 8 cmH2O, Resp Rate  (Set): 18 breaths/min, Tidal Volume (Set, mL): 400 mL, PEEP (cm H2O): 8 cmH2O    Physical Examination:   I examined this patient remotely using the telemedicine camera in the [facility]. The patient is located at [facility]    General Appearance:   Sedated and intubated    HEENT:   Endotrachael tube is present     Respiratory:   Good patient-ventilator synchrony     Neuro:   Sedation: heavily sedated and unawakable   Other             Pertinent Data:     All pertinent labs and diagnostic studies were reviewed    Labs:  CMP  Recent Labs   Lab 03/03/25 0518 03/02/25 2126 03/02/25 0517 03/01/25 2116 03/01/25 0317 02/28/25  0302   *  --  144  --  141 139   POTASSIUM 3.7  --  4.0  --  4.5 4.4   CHLORIDE 115*  --  111*  --  104 100   CO2 25  --  27  --  26 29   ANIONGAP 6*  --  6*  --  11 10   GLC 86 99 153* 139* 128* 112*   BUN 21.9  --  20.6  --  24.7* 17.5   CR 0.55  --  0.46*  --  0.53 0.64   GFRESTIMATED >90  --  >90  --  >90 >90   COLEMAN 8.0*  --  8.1*  --  8.7* 9.7   MAG 2.4*  --  2.3  --   --   --      CBC  Recent Labs   Lab 03/03/25 0518 03/02/25 0517 03/01/25 0317 02/28/25  0302   WBC 13.3* 9.0 6.4 14.2*   RBC 3.83 4.16 4.28 4.72   HGB 11.8 12.8 12.8 14.4   HCT 36.4 40.7 39.9 42.9   MCV 95 98 93 91   MCH 30.8 30.8 29.9 30.5   MCHC 32.4 31.4* 32.1 33.6   RDW 13.6 12.9 12.9 12.9    193 226 274     INRNo lab results found in last 7 days.  Arterial Blood Gas  Recent Labs   Lab 03/03/25  1205 03/03/25  1041 03/03/25 0518 03/02/25  1607   PH 7.32* 7.29* 7.30* 7.32*   PCO2 49* 55* 54* 52*   PO2 56* 147* 55* 63*   HCO3 25 27 27 27   O2PER 35 40 25 25     Lactic Acid   Date Value Ref Range Status   03/03/2025 1.1 0.7 - 2.0 mmol/L Final   12/02/2019 1.3 0.5 - 2.2 mmol/L Final       Imaging:  CT Chest Pulmonary Embolism w Contrast  Narrative: EXAM: CT CHEST PULMONARY EMBOLISM W CONTRAST, 3/3/2025 10:31 AM    HISTORY: copd exacerbation, intubated, now with worsening  hypoxia and  tachycardia    COMPARISON: Chest radiographs 3/2/2025; CT chest 2/28/2025    TECHNIQUE:   Imaging protocol: Multiplanar CT images of the chest after  administration of intravenous contrast. Meds given: 63 ml Isovue 370.  Acquisition: This CT exam was performed using one or more the  following dose reduction techniques: automated exposure control,  adjustment of the mA and/or kV according to patient size, and/or  iterative reconstruction technique.  Processing: 3D rendering on independent workstation using Maximum  Intensity Projection (MIP) was performed and archived to PACS. 3D  reconstructions are interpreted and reported by supervising  radiologist.    FINDINGS:     CHEST:    VESSELS AND HEART: There is adequate contrast bolus in the study.  Contrast bolus adequately opacifies the pulmonary arteries. No acute  pulmonary emboli to the subsegmental level. Atherosclerotic  calcification of the aorta without aneurysmal dilation. No  cardiomegaly. No significant pericardial effusion. Right internal  jugular central venous catheter tip terminates in the SVC.    LUNGS: Endotracheal tube tip terminates in the midthoracic trachea,  within normal limits. Mild bibasilar bronchial wall thickening.  Moderate centrilobular emphysematous changes, greater in the apices.  No pulmonary mass. Scattered discoid and subpleural atelectasis.  Patchy bibasilar centrilobular and tree-in-bud opacities. A few patchy  bilateral consolidations. Benign fat-containing right lower lobe  subpleural 1.4 cm focus.  PLEURA: Trace bilateral effusions. No pneumothorax.  LYMPH NODES: Enlarged mediastinal lymph nodes.  THYROID: Within normal limits.    BONES AND SOFT TISSUES:  No suspicious osseous lesions. Asymmetric right neck stranding, likely  from recent right IJ central line placement. Mild generalized  anasarca. Degenerative periarticular changes and spinal spondylosis.    UPPER ABDOMEN:  Limited evaluation of the upper abdomen  demonstrates no acute  parenchymal abnormalities, nonobstructive bowel gas pattern, and no  free fluid or free air. Cholecystomy changes. Enteric tube tip  terminates in the stomach. Distal esophageal circumferential mucosal  thickening, likely sequela of esophagitis. Postsurgical changes of  prior Nissen fundoplication.  Impression: IMPRESSION:     No acute pulmonary emboli to the subsegmental level.    Multifocal pulmonary opacities, new since comparison, consistent with  infection.     OH HALEY MD         SYSTEM ID:  [id number]

## 2025-03-03 NOTE — PROGRESS NOTES
Pt ventilator settings remain at 400/18/+5/25%. PIP at the start of this shift was 41. HME was changed along with suctioning, removing build up water from humidity in the circuit, and breathing treatments throughout shift which resulted in a higher volume being delivered. This only lasted for awhile. Now pt has a PIP of 40 and volumes of 320-335 being delivered. Tube moved Q2H. Tube remains 22 cm at the lip. No other respiratory interventions this shift.    Jayla Goodman, RT

## 2025-03-03 NOTE — PROGRESS NOTES
[facility]    Hospitalist Progress Note      Assessment & Plan   Ember Tavares is a 64 year old female who was admitted on 2/27/2025.     Clinically Significant Risk Factors         # Hypernatremia: Highest Na = 146 mmol/L in last 2 days, will monitor as appropriate  # Hyperchloremia: Highest Cl = 115 mmol/L in last 2 days, will monitor as appropriate                  # Acute Hypoxic Respiratory Failure: Documented O2 saturation < 90%. Continue supplemental oxygen as needed  # Acute Hypercapnic Respiratory Failure: based on arterial blood gas results.  Continue supplemental oxygen and ventilatory support as indicated.          # Severe Malnutrition: based on nutrition assessment    # COPD: noted on problem list       Principal Problem:    Acute respiratory failure with hypoxia and hypercapnia (H)   COPD exacerbation    Alpha-1-antitrypsin deficiency carrier   Tobacco abuse   Shock    Assessment: Presented with initial COPD exacerbation, started on antibiotics steroids and nebulizers, respiratory status deteriorated and intubated 3/1.  Remains intubated persistent respiratory acidosis, developed sinus tachycardia this morning with slight worsening and hypoxia.    Plan:   -Given maps consistent greater than 65 discontinue Levophed  Transition albuterol to Xopenex  -Electrolytes/mag and troponin acceptable  -Repeat CT PE protocol given tachycardia and worsening hypoxia  -TTE  -Discontinue Precedex  -Fentanyl as needed for pain  -Continue fentanyl/Versed for sedation  -IV Methylpred day 1  -Azithromycin day 5: Broaden antibiotics if spikes fever or worsening white count  -Obtain. sputum culture if able  -Appreciate telemetry ICU Vent management     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)   Severe protein calorie malnutrition    Assessment: Present on admission    Plan:   -Appreciate inpatient dietary consult for initiation of tube feeds    Hypernatremia  Assessment:  Transition normal saline to D5 half with 20 of K  Plan:  -Repeat BMP  -Bolus fluids as needed for goal urine output of 0.5 cc/kg/h      Hiatal hernia    Assessment: Stable, present on admission, status post Nissen in the past    Plan:   -IV PPI  -Start scheduled bowel regimen     Diet: NPO for Medical/Clinical Reasons Except for: Meds, Ice Chips    DVT Prophylaxis: Enoxaparin (Lovenox) SQ and Pneumatic Compression Devices  Peters Catheter: Not present  Code Status: Full Code           Disposition Plan      Entered: Crispin Jacobson MD 03/03/2025, 9:59 AM       The patient's care was discussed with the Bedside Nurse, Patient, and Patient's Family.    Crispin Jacobson MD  Hospitalist Service  [facility]  Contact information available via [facility] Paging/Directory    ______________________________________________________________________    Interval History   CC: COPD exacerbation    Overnight afebrile, this morning developed acute onset of sinus tachycardia after episode of suctioning, blood pressures have been stable, ongoing continued wheezing, no vent setting changes since overnight, vent dyssynchrony, urine output at goal.    -Data reviewed today: I reviewed all new labs and imaging results over the last 24 hours. I personally reviewed the EKG tracing showing sinus tachycardia, right bundle branch block, more prominent T wave inversions in anterior leads but in comparison to prior no significant change .    Physical Exam   Temp: 97.8  F (36.6  C) Temp src: Tympanic BP: 94/58 Pulse: (!) 124   Resp: 11 SpO2: 97 % O2 Device: Mechanical Ventilator    Vitals:    03/01/25 0036 03/02/25 0327 03/03/25 0200   Weight: 45.4 kg (100 lb 1.4 oz) 48.1 kg (106 lb 0.7 oz) 51 kg (112 lb 7 oz)     Vital Signs with Ranges  Temp:  [96.8  F (36  C)-97.9  F (36.6  C)] 97.8  F (36.6  C)  Pulse:  [] 124  Resp:  [11-18] 11  BP: ()/(48-72) 94/58  MAP:  [60 mmHg-88 mmHg] 86 mmHg  Arterial Line BP: ()/(45-65)  139/59  FiO2 (%):  [25 %-30 %] 30 %  SpO2:  [91 %-100 %] 97 %  I/O last 3 completed shifts:  In: 3007.07 [I.V.:2757.07; IV Piggyback:250]  Out: 760 [Urine:610; Emesis/NG output:150]    GENERAL: Intubated and sedated, in no apparent distress.  HEENT: Right IJ CVC covered Tegaderm, clean dry and intact, ET tube in place with OG present  CARDIOVASCULAR: Tachycardic rate and regular rhythm, no murmurs, rubs, or gallops. Normal S1/S2. No lower extremity edema.   RESPIRATORY: Diffuse harsh expiratory wheezes in all fields, no localized rhonchi, no vent dyssynchrony.    GI: soft, non-tender, non-distended, normoactive bowel sounds.  : Peters catheter in place with clear yellow urine  MUSCULOSKELETAL: warm and well perfused, 2+ dorsalis pedis pulses bilaterally.    SKIN: no pallor,jaundice, or rashes      Medications   Current Facility-Administered Medications   Medication Dose Route Frequency Provider Last Rate Last Admin    dexmedeTOMIDine (PRECEDEX) 4 mcg/mL in sodium chloride 0.9 % 100 mL infusion  0.1-1.2 mcg/kg/hr Intravenous Continuous Jose De Jesus Hannah MD   Stopped at 03/03/25 0839    dextrose 5% and 0.45% NaCl + KCl 20 mEq/L infusion   Intravenous Continuous Crispin Jacobson MD 75 mL/hr at 03/03/25 0838 New Bag at 03/03/25 0838    fentaNYL (SUBLIMAZE) PCA 50 mcg/mL OPIOID NAIVE   mcg/hr Intravenous Continuous Mignon Reeves MD 3.5 mL/hr at 03/03/25 0853 175 mcg/hr at 03/03/25 0853    midazolam (VERSED) 100 mg/100 mL NS infusion - ADULT  1-8 mg/hr Intravenous Continuous Jose De Jesus Hannah MD 5 mL/hr at 03/02/25 2112 5 mg/hr at 03/02/25 2112    No lozenges or gum should be given while patient on BIPAP/AVAPS/AVAPS AE   Does not apply Continuous PRN Jose De Jesus Hannah MD        [Held by provider] norepinephrine (LEVOPHED) 4 mg in  mL infusion PREMIX  0.01-0.6 mcg/kg/min Intravenous Continuous Jose De Jesus Hannah MD   Paused at 03/03/25 5025    Patient may continue current oral medications   Does not apply  Continuous PRN Jose De Jesus Hannah MD         Current Facility-Administered Medications   Medication Dose Route Frequency Provider Last Rate Last Admin    azithromycin (ZITHROMAX) 250 mg in  mL intermittent infusion  250 mg Intravenous Q24H Jose De Jesus Hannah MD   250 mg at 03/02/25 0915    budesonide (PULMICORT) neb solution 1 mg  1 mg Nebulization BID Jose De Jesus Hannah MD   0.5 mg at 03/03/25 0619    enoxaparin ANTICOAGULANT (LOVENOX) injection 30 mg  30 mg Subcutaneous Q24H Jose De Jesus Hannah MD   30 mg at 03/02/25 0915    iopamidol (ISOVUE-370) solution 63 mL  63 mL Intravenous Once Crispin Jacobson MD        levalbuterol (XOPENEX CONC) neb solution 1.25 mg  1.25 mg Nebulization Q4H Crispin Jacobson MD        pantoprazole (PROTONIX) IV push injection 40 mg  40 mg Intravenous Daily Jose De Jesus Hannah MD   40 mg at 03/02/25 0735    polyethylene glycol (MIRALAX) Packet 17 g  17 g Oral or OG tube Daily Crispin Jacobson MD        sodium chloride 0.9 % bag 500 mL for CT scan flush use  80 mL Intravenous Once Crispin Jacobson MD        umeclidinium (INCRUSE ELLIPTA) 62.5 MCG/ACT inhaler 1 puff  1 puff Inhalation Daily Jose De Jesus Hannah MD           Data   Recent Labs   Lab 03/03/25  0518 03/02/25 2126 03/02/25  0517 03/01/25 2116 03/01/25  0317   WBC 13.3*  --  9.0  --  6.4   HGB 11.8  --  12.8  --  12.8   MCV 95  --  98  --  93     --  193  --  226   *  --  144  --  141   POTASSIUM 3.7  --  4.0  --  4.5   CHLORIDE 115*  --  111*  --  104   CO2 25  --  27  --  26   BUN 21.9  --  20.6  --  24.7*   CR 0.55  --  0.46*  --  0.53   ANIONGAP 6*  --  6*  --  11   COLEMAN 8.0*  --  8.1*  --  8.7*   GLC 86 99 153*   < > 128*    < > = values in this interval not displayed.       No results found for this or any previous visit (from the past 24 hours).     I have spent greater than 60 minutes ofcritical care time related to direct patient care, care coordination, and communication exclusive of procedures with focus of  care related to respiratory failure and current management as outlined above.

## 2025-03-04 LAB
ALBUMIN SERPL BCG-MCNC: 3.2 G/DL (ref 3.5–5.2)
ALLEN'S TEST: ABNORMAL
ALLEN'S TEST: NO
ALP SERPL-CCNC: 67 U/L (ref 40–150)
ALT SERPL W P-5'-P-CCNC: 43 U/L (ref 0–50)
ANION GAP SERPL CALCULATED.3IONS-SCNC: 10 MMOL/L (ref 7–15)
AST SERPL W P-5'-P-CCNC: 21 U/L (ref 0–45)
BASE EXCESS BLDA CALC-SCNC: 11.7 MMOL/L (ref -3–3)
BASE EXCESS BLDA CALC-SCNC: 9 MMOL/L (ref -3–3)
BILIRUB SERPL-MCNC: 0.3 MG/DL
BUN SERPL-MCNC: 23 MG/DL (ref 8–23)
CALCIUM SERPL-MCNC: 8.3 MG/DL (ref 8.8–10.4)
CHLORIDE SERPL-SCNC: 108 MMOL/L (ref 98–107)
CK SERPL-CCNC: 154 U/L (ref 26–192)
CREAT SERPL-MCNC: 0.54 MG/DL (ref 0.51–0.95)
EGFRCR SERPLBLD CKD-EPI 2021: >90 ML/MIN/1.73M2
ERYTHROCYTE [DISTWIDTH] IN BLOOD BY AUTOMATED COUNT: 13.6 % (ref 10–15)
GLUCOSE BLDC GLUCOMTR-MCNC: 199 MG/DL (ref 70–99)
GLUCOSE BLDC GLUCOMTR-MCNC: 239 MG/DL (ref 70–99)
GLUCOSE SERPL-MCNC: 271 MG/DL (ref 70–99)
HCO3 BLD-SCNC: 34 MMOL/L (ref 21–28)
HCO3 BLD-SCNC: 36 MMOL/L (ref 21–28)
HCO3 SERPL-SCNC: 28 MMOL/L (ref 22–29)
HCT VFR BLD AUTO: 36.4 % (ref 35–47)
HGB BLD-MCNC: 12.1 G/DL (ref 11.7–15.7)
MAGNESIUM SERPL-MCNC: 2.3 MG/DL (ref 1.7–2.3)
MCH RBC QN AUTO: 30.6 PG (ref 26.5–33)
MCHC RBC AUTO-ENTMCNC: 33.2 G/DL (ref 31.5–36.5)
MCV RBC AUTO: 92 FL (ref 78–100)
O2/TOTAL GAS SETTING VFR VENT: 25 %
O2/TOTAL GAS SETTING VFR VENT: 30 %
OXYHGB MFR BLDA: 91 % (ref 92–100)
OXYHGB MFR BLDA: 95 % (ref 92–100)
PCO2 BLD: 45 MM HG (ref 35–45)
PCO2 BLD: 45 MM HG (ref 35–45)
PEEP: 5 CM H2O
PEEP: 8 CM H2O
PH BLD: 7.49 [PH] (ref 7.35–7.45)
PH BLD: 7.51 [PH] (ref 7.35–7.45)
PLATELET # BLD AUTO: 181 10E3/UL (ref 150–450)
PO2 BLD: 55 MM HG (ref 80–105)
PO2 BLD: 73 MM HG (ref 80–105)
POTASSIUM SERPL-SCNC: 3.5 MMOL/L (ref 3.4–5.3)
PROT SERPL-MCNC: 5.4 G/DL (ref 6.4–8.3)
RBC # BLD AUTO: 3.95 10E6/UL (ref 3.8–5.2)
SAO2 % BLDA: 92 % (ref 96–97)
SAO2 % BLDA: 96.8 % (ref 96–97)
SODIUM SERPL-SCNC: 146 MMOL/L (ref 135–145)
TRIGL SERPL-MCNC: 115 MG/DL
WBC # BLD AUTO: 13.3 10E3/UL (ref 4–11)

## 2025-03-04 PROCEDURE — 999N000253 HC STATISTIC WEANING TRIALS

## 2025-03-04 PROCEDURE — 999N000157 HC STATISTIC RCP TIME EA 10 MIN

## 2025-03-04 PROCEDURE — 94003 VENT MGMT INPAT SUBQ DAY: CPT

## 2025-03-04 PROCEDURE — 200N000001 HC R&B ICU

## 2025-03-04 PROCEDURE — 99291 CRITICAL CARE FIRST HOUR: CPT | Performed by: INTERNAL MEDICINE

## 2025-03-04 PROCEDURE — 250N000011 HC RX IP 250 OP 636: Performed by: INTERNAL MEDICINE

## 2025-03-04 PROCEDURE — 83735 ASSAY OF MAGNESIUM: CPT | Performed by: INTERNAL MEDICINE

## 2025-03-04 PROCEDURE — 82805 BLOOD GASES W/O2 SATURATION: CPT | Performed by: INTERNAL MEDICINE

## 2025-03-04 PROCEDURE — 36600 WITHDRAWAL OF ARTERIAL BLOOD: CPT | Performed by: INTERNAL MEDICINE

## 2025-03-04 PROCEDURE — 250N000009 HC RX 250: Performed by: INTERNAL MEDICINE

## 2025-03-04 PROCEDURE — 80053 COMPREHEN METABOLIC PANEL: CPT | Performed by: INTERNAL MEDICINE

## 2025-03-04 PROCEDURE — 82550 ASSAY OF CK (CPK): CPT | Performed by: INTERNAL MEDICINE

## 2025-03-04 PROCEDURE — 94640 AIRWAY INHALATION TREATMENT: CPT | Mod: 76

## 2025-03-04 PROCEDURE — 36415 COLL VENOUS BLD VENIPUNCTURE: CPT | Performed by: INTERNAL MEDICINE

## 2025-03-04 PROCEDURE — 85014 HEMATOCRIT: CPT | Performed by: INTERNAL MEDICINE

## 2025-03-04 PROCEDURE — 250N000011 HC RX IP 250 OP 636: Mod: JW | Performed by: INTERNAL MEDICINE

## 2025-03-04 PROCEDURE — 84478 ASSAY OF TRIGLYCERIDES: CPT | Performed by: INTERNAL MEDICINE

## 2025-03-04 PROCEDURE — 87899 AGENT NOS ASSAY W/OPTIC: CPT | Performed by: INTERNAL MEDICINE

## 2025-03-04 PROCEDURE — 258N000003 HC RX IP 258 OP 636: Performed by: INTERNAL MEDICINE

## 2025-03-04 RX ADMIN — METHYLPREDNISOLONE SODIUM SUCCINATE 62.5 MG: 125 INJECTION, POWDER, FOR SOLUTION INTRAMUSCULAR; INTRAVENOUS at 14:06

## 2025-03-04 RX ADMIN — BUDESONIDE 1 MG: 0.5 INHALANT RESPIRATORY (INHALATION) at 18:19

## 2025-03-04 RX ADMIN — ENOXAPARIN SODIUM 30 MG: 30 INJECTION SUBCUTANEOUS at 10:07

## 2025-03-04 RX ADMIN — Medication 50 MCG: at 16:05

## 2025-03-04 RX ADMIN — PANTOPRAZOLE SODIUM 40 MG: 40 INJECTION, POWDER, FOR SOLUTION INTRAVENOUS at 10:07

## 2025-03-04 RX ADMIN — Medication 10 MG: at 10:37

## 2025-03-04 RX ADMIN — Medication 10 MG: at 20:05

## 2025-03-04 RX ADMIN — Medication 10 MG: at 10:10

## 2025-03-04 RX ADMIN — Medication 50 MCG: at 02:24

## 2025-03-04 RX ADMIN — Medication 10 MG: at 08:22

## 2025-03-04 RX ADMIN — PROPOFOL 45 MCG/KG/MIN: 10 INJECTION, EMULSION INTRAVENOUS at 16:33

## 2025-03-04 RX ADMIN — Medication 10 MG: at 22:54

## 2025-03-04 RX ADMIN — Medication 10 MG: at 16:31

## 2025-03-04 RX ADMIN — AZITHROMYCIN MONOHYDRATE 250 MG: 500 INJECTION, POWDER, LYOPHILIZED, FOR SOLUTION INTRAVENOUS at 10:07

## 2025-03-04 RX ADMIN — Medication 10 MG: at 16:04

## 2025-03-04 RX ADMIN — LEVALBUTEROL 1.25 MG: 1.25 SOLUTION, CONCENTRATE RESPIRATORY (INHALATION) at 22:38

## 2025-03-04 RX ADMIN — LEVALBUTEROL 1.25 MG: 1.25 SOLUTION, CONCENTRATE RESPIRATORY (INHALATION) at 06:03

## 2025-03-04 RX ADMIN — PROPOFOL 30 MCG/KG/MIN: 10 INJECTION, EMULSION INTRAVENOUS at 01:18

## 2025-03-04 RX ADMIN — PROPOFOL 20 MCG/KG/MIN: 10 INJECTION, EMULSION INTRAVENOUS at 07:49

## 2025-03-04 RX ADMIN — FUROSEMIDE 20 MG: 10 INJECTION, SOLUTION INTRAMUSCULAR; INTRAVENOUS at 01:19

## 2025-03-04 RX ADMIN — PROPOFOL 45 MCG/KG/MIN: 10 INJECTION, EMULSION INTRAVENOUS at 23:54

## 2025-03-04 RX ADMIN — Medication 10 MG: at 14:13

## 2025-03-04 RX ADMIN — NOREPINEPHRINE BITARTRATE 0.07 MCG/KG/MIN: 0.02 INJECTION, SOLUTION INTRAVENOUS at 16:03

## 2025-03-04 RX ADMIN — Medication 10 MG: at 12:36

## 2025-03-04 RX ADMIN — LEVALBUTEROL 1.25 MG: 1.25 SOLUTION, CONCENTRATE RESPIRATORY (INHALATION) at 10:23

## 2025-03-04 RX ADMIN — Medication 50 MCG: at 11:07

## 2025-03-04 RX ADMIN — LEVALBUTEROL 1.25 MG: 1.25 SOLUTION, CONCENTRATE RESPIRATORY (INHALATION) at 18:19

## 2025-03-04 RX ADMIN — METHYLPREDNISOLONE SODIUM SUCCINATE 62.5 MG: 125 INJECTION, POWDER, FOR SOLUTION INTRAMUSCULAR; INTRAVENOUS at 06:15

## 2025-03-04 RX ADMIN — LEVALBUTEROL 1.25 MG: 1.25 SOLUTION, CONCENTRATE RESPIRATORY (INHALATION) at 15:39

## 2025-03-04 RX ADMIN — LEVALBUTEROL 1.25 MG: 1.25 SOLUTION, CONCENTRATE RESPIRATORY (INHALATION) at 02:46

## 2025-03-04 RX ADMIN — FUROSEMIDE 20 MG: 10 INJECTION, SOLUTION INTRAMUSCULAR; INTRAVENOUS at 12:33

## 2025-03-04 RX ADMIN — BUDESONIDE 0.5 MG: 0.5 INHALANT RESPIRATORY (INHALATION) at 06:03

## 2025-03-04 RX ADMIN — METHYLPREDNISOLONE SODIUM SUCCINATE 62.5 MG: 125 INJECTION, POWDER, FOR SOLUTION INTRAMUSCULAR; INTRAVENOUS at 22:37

## 2025-03-04 ASSESSMENT — ACTIVITIES OF DAILY LIVING (ADL)
ADLS_ACUITY_SCORE: 55
ADLS_ACUITY_SCORE: 59
ADLS_ACUITY_SCORE: 55
ADLS_ACUITY_SCORE: 55
ADLS_ACUITY_SCORE: 59

## 2025-03-04 NOTE — PROGRESS NOTES
03/03/25 1809   Ventilator Settings    Vent Mode CMV/AC   Resp Rate (Set) 22 breaths/min   Tidal Volume (Set, mL) 400 mL   FiO2 35 %   PEEP (cm H2O) 8 cmH2O   Inspiratory Flow (l/min) 75 l/min   Inspiratory Time (sec) 1 sec   Sensitivity Flow Triggered (L/min) 2 L/min   Vent Humidifier - Heater/HME Heater   Heater Temperature 36.8     Patient remains intubated and mechanically ventilated with the following settings.  Xoponex and pulmicort nebs given as ordered with no change in b/s post tx. Pt b/c wheezy throughout.   7.0 tube 22 @ gums, secured by tube banuelos.  Respiratory will continue to wean vent settings as tolerated.    Hayde Galvan, RT

## 2025-03-04 NOTE — PROGRESS NOTES
[facility]    Hospitalist Progress Note      Assessment & Plan   Ember Tavares is a 64 year old female who was admitted on 2/27/2025.     Clinically Significant Risk Factors         # Hypernatremia: Highest Na = 146 mmol/L in last 2 days, will monitor as appropriate  # Hyperchloremia: Highest Cl = 115 mmol/L in last 2 days, will monitor as appropriate      # Hypocalcemia: Lowest Ca = 8 mg/dL in last 2 days, will monitor and replace as appropriate     # Hypoalbuminemia: Lowest albumin = 3.2 g/dL at 3/4/2025  5:27 AM, will monitor as appropriate         # Acute Hypoxic Respiratory Failure: Documented O2 saturation < 90%. Continue supplemental oxygen as needed  # Acute Hypercapnic Respiratory Failure: based on arterial blood gas results.  Continue supplemental oxygen and ventilatory support as indicated.          # Severe Malnutrition: based on nutrition assessment and treatment provided per dietitian's recommendations.    # COPD: noted on problem list       Principal Problem:    Acute respiratory failure with hypoxia and hypercapnia (H)   COPD exacerbation    Alpha-1-antitrypsin deficiency carrier   Tobacco abuse   Shock    Assessment: Presented with initial COPD exacerbation, started on antibiotics steroids and nebulizers, respiratory status deteriorated and intubated 3/1.  Respiratory acidosis improved, net negative with diuresis and tolerating. No worsening hypoxia, productive sputum or progressive leukocytosis and no indication to broaden antibiotics for a bacterial pneumonia at this point.  Spontaneous breathing trial failed due to severe tachycardia and poor tidal volumes.    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)   Severe protein calorie malnutrition    Assessment: Present on admission    Plan:   -Appreciate inpatient dietary consult for initiation of tube feeds on 3/3    Hypernatremia  Assessment: stable  Plan:  -monitor  -lasix 20mg iv q12      Hiatal hernia    Assessment: Stable, present on admission, status post Nissen in the past    Plan:   -IV PPI     Diet: NPO for Medical/Clinical Reasons Except for: Meds, Ice Chips  Adult Formula Drip Feeding: Continuous Osmolite 1.5; Orogastric tube; Goal Rate: 35; mL/hr    DVT Prophylaxis: Enoxaparin (Lovenox) SQ and Pneumatic Compression Devices  Peters Catheter: Not present  Code Status: Full Code           Disposition Plan      Entered: Crispin Jacobson MD 03/04/2025, 9:35 AM       The patient's care was discussed with the Bedside Nurse, Patient, and Patient's Family with .     Crispin Jacobson MD  Hospitalist Service  [facility]  Contact information available via [facility] Paging/Directory    ______________________________________________________________________    Interval History   CC: COPD exacerbation    Overnight no acute events and afebrile, transitioned yesterday from Versed to propofol, primary intermittent use of Levophed for blood pressure support, urine output at goal, resolved tachycardia, tolerating tube feeds without significant residuals.      -Data reviewed today: I reviewed all new labs and imaging results over the last 24 hours.     Physical Exam   Temp: 97.8  F (36.6  C) Temp src: Tympanic BP: 109/68 Pulse: 93   Resp: 20 SpO2: 92 % O2 Device: Mechanical Ventilator    Vitals:    03/02/25 0327 03/03/25 0200 03/04/25 0600   Weight: 48.1 kg (106 lb 0.7 oz) 51 kg (112 lb 7 oz) 47.8 kg (105 lb 6.1 oz)     Vital Signs with Ranges  Temp:  [97.1  F (36.2  C)-98.2  F (36.8  C)] 97.8  F (36.6  C)  Pulse:  [] 93  Resp:  [10-22] 20  BP: ()/(53-82) 109/68  MAP:   [59 mmHg-119 mmHg] 74 mmHg  Arterial Line BP: ()/(43-86) 112/52  FiO2 (%):  [25 %-35 %] 25 %  SpO2:  [90 %-100 %] 92 %  I/O last 3 completed shifts:  In: 1694.45 [I.V.:743.95; NG/GT:330; IV Piggyback:253]  Out: 2700 [Urine:2600; Emesis/NG output:100]    GENERAL: Intubated and sedated, but opens eyes to voice and light touch in no apparent distress.  HEENT: Right IJ CVC covered Tegaderm, clean dry and intact, ET tube in place with OG present  CARDIOVASCULAR: Regular rate and regular rhythm, no murmurs, rubs, or gallops. Normal S1/S2. No lower extremity edema.   RESPIRATORY: Resolving expiratory wheezes in all fields, no localized rhonchi, no vent dyssynchrony.    GI: soft, non-tender, non-distended, normoactive bowel sounds.  : Peters catheter in place with clear yellow urine  MUSCULOSKELETAL: warm and well perfused, 2+ dorsalis pedis pulses bilaterally.    SKIN: no pallor,jaundice, or rashes.      Medications   Current Facility-Administered Medications   Medication Dose Route Frequency Provider Last Rate Last Admin    dextrose 10% infusion   Intravenous Continuous PRN Crispin Jacobson MD        fentaNYL (SUBLIMAZE) PCA 50 mcg/mL OPIOID NAIVE   mcg/hr Intravenous Continuous Mignon Reeves MD   Stopped at 03/04/25 0837    propofol (DIPRIVAN) infusion  5-75 mcg/kg/min Intravenous Continuous Crispin Jacobson MD 7.7 mL/hr at 03/04/25 0920 25 mcg/kg/min at 03/04/25 0920    And    Medication Instruction   Does not apply Continuous PRN Crispin Jacobson MD        No lozenges or gum should be given while patient on BIPAP/AVAPS/AVAPS AE   Does not apply Continuous PRN Jose De Jesus Hannah MD        norepinephrine (LEVOPHED) 4 mg in  mL infusion PREMIX  0.01-0.6 mcg/kg/min Intravenous Continuous Crispin Jacobson MD 6.8 mL/hr at 03/04/25 0909 0.04 mcg/kg/min at 03/04/25 0909    Patient may continue current oral medications   Does not apply Continuous PRN Jose De Jesus Hannah MD         Current Facility-Administered  Medications   Medication Dose Route Frequency Provider Last Rate Last Admin    azithromycin (ZITHROMAX) 250 mg in  mL intermittent infusion  250 mg Intravenous Q24H Jose De Jesus Hannah MD   250 mg at 03/03/25 1050    budesonide (PULMICORT) neb solution 1 mg  1 mg Nebulization BID Jose De Jesus Hannah MD   0.5 mg at 03/04/25 0603    enoxaparin ANTICOAGULANT (LOVENOX) injection 30 mg  30 mg Subcutaneous Q24H Jose De Jesus Hannah MD   30 mg at 03/03/25 1049    furosemide (LASIX) injection 20 mg  20 mg Intravenous Q12H Crispin Jacobson MD   20 mg at 03/04/25 0119    levalbuterol (XOPENEX CONC) neb solution 1.25 mg  1.25 mg Nebulization Q4H Crispin Jacobson MD   1.25 mg at 03/04/25 0603    methylPREDNISolone Na Suc (solu-MEDROL) injection 62.5 mg  62.5 mg Intravenous TID Crispin Jacobson MD   62.5 mg at 03/04/25 0615    pantoprazole (PROTONIX) IV push injection 40 mg  40 mg Intravenous Daily Jose De Jesus Hannah MD   40 mg at 03/03/25 1103    umeclidinium (INCRUSE ELLIPTA) 62.5 MCG/ACT inhaler 1 puff  1 puff Inhalation Daily Jose De Jesus Hannah MD           Data   Recent Labs   Lab 03/04/25  0527 03/04/25  0223 03/03/25  2053 03/03/25  1404 03/03/25  0518 03/02/25  2126 03/02/25  0517   WBC 13.3*  --   --   --  13.3*  --  9.0   HGB 12.1  --   --   --  11.8  --  12.8   MCV 92  --   --   --  95  --  98     --   --   --  193  --  193   *  --   --   --  146*  --  144   POTASSIUM 3.5  --   --   --  3.7  --  4.0   CHLORIDE 108*  --   --   --  115*  --  111*   CO2 28  --   --   --  25  --  27   BUN 23.0  --   --   --  21.9  --  20.6   CR 0.54  --   --   --  0.55  --  0.46*   ANIONGAP 10  --   --   --  6*  --  6*   COLEMAN 8.3*  --   --   --  8.0*  --  8.1*   * 239* 174*   < > 86   < > 153*   ALBUMIN 3.2*  --   --   --   --   --   --    PROTTOTAL 5.4*  --   --   --   --   --   --    BILITOTAL 0.3  --   --   --   --   --   --    ALKPHOS 67  --   --   --   --   --   --    ALT 43  --   --   --   --   --   --    AST 21  --    --   --   --   --   --     < > = values in this interval not displayed.       Recent Results (from the past 24 hours)   CT Chest Pulmonary Embolism w Contrast    Narrative    EXAM: CT CHEST PULMONARY EMBOLISM W CONTRAST, 3/3/2025 10:31 AM    HISTORY: copd exacerbation, intubated, now with worsening hypoxia and  tachycardia    COMPARISON: Chest radiographs 3/2/2025; CT chest 2/28/2025    TECHNIQUE:   Imaging protocol: Multiplanar CT images of the chest after  administration of intravenous contrast. Meds given: 63 ml Isovue 370.  Acquisition: This CT exam was performed using one or more the  following dose reduction techniques: automated exposure control,  adjustment of the mA and/or kV according to patient size, and/or  iterative reconstruction technique.  Processing: 3D rendering on independent workstation using Maximum  Intensity Projection (MIP) was performed and archived to PACS. 3D  reconstructions are interpreted and reported by supervising  radiologist.    FINDINGS:     CHEST:    VESSELS AND HEART: There is adequate contrast bolus in the study.  Contrast bolus adequately opacifies the pulmonary arteries. No acute  pulmonary emboli to the subsegmental level. Atherosclerotic  calcification of the aorta without aneurysmal dilation. No  cardiomegaly. No significant pericardial effusion. Right internal  jugular central venous catheter tip terminates in the SVC.    LUNGS: Endotracheal tube tip terminates in the midthoracic trachea,  within normal limits. Mild bibasilar bronchial wall thickening.  Moderate centrilobular emphysematous changes, greater in the apices.  No pulmonary mass. Scattered discoid and subpleural atelectasis.  Patchy bibasilar centrilobular and tree-in-bud opacities. A few patchy  bilateral consolidations. Benign fat-containing right lower lobe  subpleural 1.4 cm focus.  PLEURA: Trace bilateral effusions. No pneumothorax.  LYMPH NODES: Enlarged mediastinal lymph nodes.  THYROID: Within normal  limits.    BONES AND SOFT TISSUES:  No suspicious osseous lesions. Asymmetric right neck stranding, likely  from recent right IJ central line placement. Mild generalized  anasarca. Degenerative periarticular changes and spinal spondylosis.    UPPER ABDOMEN:  Limited evaluation of the upper abdomen demonstrates no acute  parenchymal abnormalities, nonobstructive bowel gas pattern, and no  free fluid or free air. Cholecystomy changes. Enteric tube tip  terminates in the stomach. Distal esophageal circumferential mucosal  thickening, likely sequela of esophagitis. Postsurgical changes of  prior Nissen fundoplication.      Impression    IMPRESSION:     No acute pulmonary emboli to the subsegmental level.    Multifocal pulmonary opacities, new since comparison, consistent with  infection.     OH HALEY MD         SYSTEM ID:  [id number]   Echocardiogram Complete   Result Value    LVEF  60-65%    Narrative    [id number]  [id number]  [id number]  [id number]^MONSE^DORIS^EZRA     [facility]  [address] [place] [address]     Name: ZBIGNIEW JOHNSON  MRN: [id number]  : 1960  Study Date: 2025 11:53 AM  Age: 64 yrs  Gender: Female  Patient Location: [facility]  Reason For Study: Respiratory Failure  Ordering Physician: DORIS SHEA  Performed By: TRU Herman, RDCS, RVT     BSA: 1.5 m2  Height: 62 in  Weight: 112 lb  HR: 103  BP: 93/59 mmHg  ______________________________________________________________________________  Procedure  Echocardiogram with two-dimensional, color and spectral Doppler.  ______________________________________________________________________________  Interpretation Summary  Global and regional left ventricular function is normal with an EF of 60-65%.  Global right ventricular function is normal.  No significant valvular abnormalities present.  No pericardial effusion is present.  There has been no  change.  ______________________________________________________________________________  Left Ventricle  Left ventricular wall thickness is normal. Left ventricular size is normal.  Global and regional left ventricular function is normal with an EF of 60-65%.  No regional wall motion abnormalities are seen.     Right Ventricle  The right ventricle is normal size. Global right ventricular function is  normal.     Atria  Both atria appear normal.     Mitral Valve  The mitral valve is normal.     Aortic Valve  Aortic valve is normal in structure and function.     Tricuspid Valve  Trace tricuspid insufficiency is present. The peak velocity of the tricuspid  regurgitant jet is not obtainable. Pulmonary artery systolic pressure cannot  be assessed.     Pulmonic Valve  The valve leaflets are not well visualized. Trace pulmonic insufficiency is  present.     Vessels  The aorta root is normal. Unable to assess mean RA pressure given the patient  is on a ventilator.     Pericardium  No pericardial effusion is present.     Miscellaneous  No significant valvular abnormalities present.     Compared to Previous Study  There has been no change.  ______________________________________________________________________________  MMode/2D Measurements & Calculations  IVSd: 0.61 cm  LVIDd: 3.7 cm  LVIDs: 2.6 cm  LVPWd: 0.57 cm  FS: 29.6 %  LV mass(C)d: 55.8 grams  LV mass(C)dI: 37.4 grams/m2  Ao root diam: 2.5 cm  LVOT diam: 2.0 cm  LVOT area: 3.0 cm2  Ao root diam index Ht(cm/m): 1.6  Ao root diam index BSA (cm/m2): 1.7  LA Volume (BP): 13.4 ml     LA Volume Index (BP): 9.0 ml/m2  RWT: 0.30  TAPSE: 1.8 cm     Doppler Measurements & Calculations  MV E max tate: 75.8 cm/sec  MV A max tate: 89.4 cm/sec  MV E/A: 0.85  MV dec time: 0.14 sec  Ao V2 max: 102.0 cm/sec  Ao max P.0 mmHg  Ao V2 mean: 70.6 cm/sec  Ao mean P.0 mmHg  Ao V2 VTI: 16.3 cm  DARCIE(I,D): 2.3 cm2  DARCIE(V,D): 2.7 cm2  LV V1 max PG: 3.5 mmHg  LV V1 max: 93.0 cm/sec  LV V1  VTI: 12.6 cm  SV(LVOT): 37.9 ml  SI(LVOT): 25.3 ml/m2  PA acc time: 0.04 sec  AV Ralph Ratio (DI): 0.91  DARCIE Index (cm2/m2): 1.6  RV S Ralph: 9.8 cm/sec     ______________________________________________________________________________  Report approved by: Sonia Brennan Dr on 03/03/2025 01:43 PM              I have spent greater than 40 minutes ofcritical care time related to direct patient care, care coordination, and communication exclusive of procedures with focus of care related to respiratory failure and current management as outlined above.

## 2025-03-04 NOTE — PROGRESS NOTES
Interdisciplinary Discharge Planning Note    Anticipated Discharge Date: 2-5 days    Anticipated Discharge Location: home    Clinical Needs Before Discharge:   medically cleared    Treatment Needs After Discharge:  None identified    Potential Barriers to Discharge: none identified    Criss Medrano  3/4/2025,  12:21 PM   LAURA Ferraro on 3/4/2025 at 1:38 PM       The patient's goals for the shift include      The clinical goals for the shift include No signs of RDS until 0700 12/2    Pt weaned to 1 liter of O2 by MD at beginning of shift. Pt received albuterol this morning scheduled.

## 2025-03-04 NOTE — PROGRESS NOTES
03/04/25 0408   Ventilator Settings    Vent Mode CMV/AC   Resp Rate (Set) 20 breaths/min   Tidal Volume (Set, mL) 400 mL   FiO2 30 %   PEEP (cm H2O) 8 cmH2O   Inspiratory Flow (l/min) 75 l/min   Inspiratory Time (sec) 1 sec   Sensitivity Flow Triggered (L/min) 2 L/min   Vent Humidifier - Heater/HME Heater   Heater Temperature 36.9     Pt now on above settings. ABG done last night which showed improvement. Peak inspiratory pressures have improved this shift as well. Per tele ICU provider, RR was changed to 20. FI02 weaned as tolerated. Tube moved Q2H. Humidity is good. No other respiratory interventions.     Jayla Goodman, RT

## 2025-03-04 NOTE — PROGRESS NOTES
0814- Wean trail attempted. Pt was only able to perform for 4 minutes, due to increased agitation, HR and BP. Transitioned back to AC 20/400/8/35%.     1301- Wean trail performed and pt tolerated for 25 minutes, showing improvement. See flowsheet for pt data. Placed back on AC 20/400/5/30%.     1918- Pt remains on AC 20/400/5/30% and tolerating well. ETT secure and repositioned Q4 with oral cares. Nebs given inline as scheduled. Lynne Bliss, RRT

## 2025-03-04 NOTE — PLAN OF CARE
Pt remains on propofol, Levophed, and Fentanyl. Titrated as able to achieve parameters. Suctioned and swabbed Q2 hours, most recent suction @ 6 am, pt became brittanie in the 50's. Tube feeding continuous throughout night, tolerating well, increased by 5 mL Q4 hours to get to goal rate of 35mL/HR. Flushed w/ 100mL Q4 hours. BS active, no BM. Lasix 20 mg IV given x 1 as scheduled w/ good results. She continues to have 2-3+ edema in trunk. ABGS showing alkalosis, Tele ICU instructed to change RR from 22 to 20.     Problem: Fall Injury Risk  Goal: Absence of Fall and Fall-Related Injury  Outcome: Progressing     Problem: Gas Exchange Impaired  Goal: Optimal Gas Exchange  Outcome: Progressing  Intervention: Optimize Oxygenation and Ventilation  Recent Flowsheet Documentation  Taken 3/4/2025 0400 by Bora Nelson RN  Head of Bed (HOB) Positioning: HOB at 30 degrees  Taken 3/4/2025 0200 by Bora Nelson RN  Head of Bed (HOB) Positioning: HOB at 30 degrees  Taken 3/4/2025 0000 by Bora Nelson RN  Head of Bed (HOB) Positioning: HOB at 30 degrees  Taken 3/3/2025 2200 by Bora Nelson RN  Head of Bed (HOB) Positioning: HOB at 30 degrees  Taken 3/3/2025 2000 by Bora Nelson RN  Head of Bed (HOB) Positioning: HOB at 30 degrees     Problem: Restraint, Nonviolent  Goal: Absence of Harm or Injury  Outcome: Progressing  Intervention: Implement Least Restrictive Safety Strategies  Recent Flowsheet Documentation  Taken 3/4/2025 0000 by Bora Nelson RN  Medical Device Protection: tubing secured  Intervention: Protect Skin and Joint Integrity  Recent Flowsheet Documentation  Taken 3/4/2025 0400 by Bora Nelson RN  Body Position:   turned   right  Taken 3/4/2025 0200 by Bora Nelson RN  Body Position:   turned   left  Taken 3/4/2025 0000 by Bora Nelson RN  Body Position:   turned   supine, head elevated   heels elevated  Taken 3/3/2025 2200 by Bora Nelson RN  Body  Position:   turned   right  Taken 3/3/2025 2000 by Bora Nelson, RN  Body Position:   turned   left   Goal Outcome Evaluation:               Bora Nelson RN on 3/4/2025 at 5:56 AM

## 2025-03-04 NOTE — PLAN OF CARE
Goal Outcome Evaluation: HR improved today, now sinus rhythm with rates from 70-90. Pt tolerating ventilator well. UO increased after lasix given.     Problem: Fall Injury Risk  Goal: Absence of Fall and Fall-Related Injury  Outcome: Progressing  Intervention: Identify and Manage Contributors    Intervention: Promote Injury-Free Environment       Problem: Gas Exchange Impaired  Goal: Optimal Gas Exchange  Outcome: Progressing  Intervention: Optimize Oxygenation and Ventilation    Problem: Restraint, Nonviolent  Goal: Absence of Harm or Injury  Outcome: Progressing  Intervention: Implement Least Restrictive Safety Strategies    Intervention: Protect Dignity, Rights and Personal Wellbeing    Intervention: Protect Skin and Joint Integrity

## 2025-03-04 NOTE — PROGRESS NOTES
Tele-Intensivist note  Patient is in the [facility] ICU  I am in the tele-ICU at [facility]    Current problem list  Acute respiratory failure - due to COPD exacerbation, alpha-1-antitrypsin deficiency- she is currently on vent at 30%, +8 PEEP, budesonide, Xopenex, steroids, and Zithromax. She seems to be slowly improving and nearly ready for a trial of extubation. I would recommend dropping her PEEP to +5 and continuing other care as is.   Alpha-1-antitrypsin deficiency   Shock - cultures largely negative, on Zithromax, and continue to monitor. She is intermittently requiring small doses of levophed. I would continue zithromax and limiting sedation as able. She seems well compensated at this time. I would recommend re-culturing and broadening antibiotics if she has significant fever.   Severe protein calorie malnutrition   Hiatal hernia/GERD  History of psoriatic arthritis     I discussed case with Dr. Jacobson and appreciate his input and fine care.     I spent 35 minutes of critical care time with her at bedside.     Oanh hernandez  March 4, 2025

## 2025-03-05 ENCOUNTER — APPOINTMENT (OUTPATIENT)
Dept: SPEECH THERAPY | Facility: OTHER | Age: 65
End: 2025-03-05
Attending: INTERNAL MEDICINE
Payer: COMMERCIAL

## 2025-03-05 ENCOUNTER — APPOINTMENT (OUTPATIENT)
Dept: GENERAL RADIOLOGY | Facility: OTHER | Age: 65
DRG: 208 | End: 2025-03-05
Attending: INTERNAL MEDICINE
Payer: COMMERCIAL

## 2025-03-05 LAB
ALLEN'S TEST: ABNORMAL
ALLEN'S TEST: NO
ALLEN'S TEST: NO
ANION GAP SERPL CALCULATED.3IONS-SCNC: 7 MMOL/L (ref 7–15)
BACTERIA SPT CULT: ABNORMAL
BASE EXCESS BLDA CALC-SCNC: 13.8 MMOL/L (ref -3–3)
BASE EXCESS BLDA CALC-SCNC: 6.4 MMOL/L (ref -3–3)
BASE EXCESS BLDA CALC-SCNC: 7.9 MMOL/L (ref -3–3)
BUN SERPL-MCNC: 25.9 MG/DL (ref 8–23)
CALCIUM SERPL-MCNC: 8.3 MG/DL (ref 8.8–10.4)
CHLORIDE SERPL-SCNC: 101 MMOL/L (ref 98–107)
CREAT SERPL-MCNC: 0.53 MG/DL (ref 0.51–0.95)
EGFRCR SERPLBLD CKD-EPI 2021: >90 ML/MIN/1.73M2
ERYTHROCYTE [DISTWIDTH] IN BLOOD BY AUTOMATED COUNT: 14 % (ref 10–15)
GLUCOSE BLDC GLUCOMTR-MCNC: 191 MG/DL (ref 70–99)
GLUCOSE SERPL-MCNC: 218 MG/DL (ref 70–99)
GRAM STAIN RESULT: ABNORMAL
HCO3 BLD-SCNC: 36 MMOL/L (ref 21–28)
HCO3 BLD-SCNC: 38 MMOL/L (ref 21–28)
HCO3 BLD-SCNC: 39 MMOL/L (ref 21–28)
HCO3 SERPL-SCNC: 35 MMOL/L (ref 22–29)
HCT VFR BLD AUTO: 36.1 % (ref 35–47)
HGB BLD-MCNC: 11.8 G/DL (ref 11.7–15.7)
L PNEUMO1 AG UR QL IA: NEGATIVE
MAGNESIUM SERPL-MCNC: 2.4 MG/DL (ref 1.7–2.3)
MCH RBC QN AUTO: 30.4 PG (ref 26.5–33)
MCHC RBC AUTO-ENTMCNC: 32.7 G/DL (ref 31.5–36.5)
MCV RBC AUTO: 93 FL (ref 78–100)
O2/TOTAL GAS SETTING VFR VENT: 30 %
O2/TOTAL GAS SETTING VFR VENT: 35 %
O2/TOTAL GAS SETTING VFR VENT: 36 %
OXYHGB MFR BLDA: 91 % (ref 92–100)
OXYHGB MFR BLDA: 95 % (ref 92–100)
OXYHGB MFR BLDA: 96 % (ref 92–100)
PCO2 BLD: 52 MM HG (ref 35–45)
PCO2 BLD: 69 MM HG (ref 35–45)
PCO2 BLD: 91 MM HG (ref 35–45)
PEEP: 0 CM H2O
PEEP: 5 CM H2O
PH BLD: 7.22 [PH] (ref 7.35–7.45)
PH BLD: 7.33 [PH] (ref 7.35–7.45)
PH BLD: 7.48 [PH] (ref 7.35–7.45)
PLATELET # BLD AUTO: 196 10E3/UL (ref 150–450)
PO2 BLD: 103 MM HG (ref 80–105)
PO2 BLD: 65 MM HG (ref 80–105)
PO2 BLD: 74 MM HG (ref 80–105)
POTASSIUM SERPL-SCNC: 3.8 MMOL/L (ref 3.4–5.3)
RBC # BLD AUTO: 3.88 10E6/UL (ref 3.8–5.2)
S PNEUM AG SPEC QL: NEGATIVE
SAO2 % BLDA: 92.2 % (ref 96–97)
SAO2 % BLDA: 96.4 % (ref 96–97)
SAO2 % BLDA: 97.5 % (ref 96–97)
SODIUM SERPL-SCNC: 143 MMOL/L (ref 135–145)
SPECIMEN TYPE: NORMAL
WBC # BLD AUTO: 18.2 10E3/UL (ref 4–11)

## 2025-03-05 PROCEDURE — 250N000009 HC RX 250: Performed by: INTERNAL MEDICINE

## 2025-03-05 PROCEDURE — 85018 HEMOGLOBIN: CPT | Performed by: INTERNAL MEDICINE

## 2025-03-05 PROCEDURE — 99291 CRITICAL CARE FIRST HOUR: CPT | Performed by: INTERNAL MEDICINE

## 2025-03-05 PROCEDURE — 82805 BLOOD GASES W/O2 SATURATION: CPT | Performed by: INTERNAL MEDICINE

## 2025-03-05 PROCEDURE — 250N000013 HC RX MED GY IP 250 OP 250 PS 637: Performed by: INTERNAL MEDICINE

## 2025-03-05 PROCEDURE — 94640 AIRWAY INHALATION TREATMENT: CPT | Mod: 76

## 2025-03-05 PROCEDURE — 94640 AIRWAY INHALATION TREATMENT: CPT

## 2025-03-05 PROCEDURE — 5A09357 ASSISTANCE WITH RESPIRATORY VENTILATION, LESS THAN 24 CONSECUTIVE HOURS, CONTINUOUS POSITIVE AIRWAY PRESSURE: ICD-10-PCS | Performed by: INTERNAL MEDICINE

## 2025-03-05 PROCEDURE — 36600 WITHDRAWAL OF ARTERIAL BLOOD: CPT | Performed by: INTERNAL MEDICINE

## 2025-03-05 PROCEDURE — 999N000259 HC STATISTIC EXTUBATION

## 2025-03-05 PROCEDURE — 83735 ASSAY OF MAGNESIUM: CPT | Performed by: INTERNAL MEDICINE

## 2025-03-05 PROCEDURE — 250N000011 HC RX IP 250 OP 636: Performed by: STUDENT IN AN ORGANIZED HEALTH CARE EDUCATION/TRAINING PROGRAM

## 2025-03-05 PROCEDURE — 250N000011 HC RX IP 250 OP 636: Performed by: INTERNAL MEDICINE

## 2025-03-05 PROCEDURE — 258N000003 HC RX IP 258 OP 636: Performed by: INTERNAL MEDICINE

## 2025-03-05 PROCEDURE — 999N000253 HC STATISTIC WEANING TRIALS

## 2025-03-05 PROCEDURE — 999N000157 HC STATISTIC RCP TIME EA 10 MIN

## 2025-03-05 PROCEDURE — 94003 VENT MGMT INPAT SUBQ DAY: CPT

## 2025-03-05 PROCEDURE — 82310 ASSAY OF CALCIUM: CPT | Performed by: INTERNAL MEDICINE

## 2025-03-05 PROCEDURE — 200N000001 HC R&B ICU

## 2025-03-05 PROCEDURE — 71045 X-RAY EXAM CHEST 1 VIEW: CPT

## 2025-03-05 PROCEDURE — 82565 ASSAY OF CREATININE: CPT | Performed by: INTERNAL MEDICINE

## 2025-03-05 PROCEDURE — 80048 BASIC METABOLIC PNL TOTAL CA: CPT | Performed by: INTERNAL MEDICINE

## 2025-03-05 PROCEDURE — 999N000104 HC STATISTIC NO CHARGE

## 2025-03-05 RX ORDER — ALBUTEROL SULFATE 0.83 MG/ML
2.5 SOLUTION RESPIRATORY (INHALATION)
Status: COMPLETED | OUTPATIENT
Start: 2025-03-05 | End: 2025-03-05

## 2025-03-05 RX ORDER — METHYLPREDNISOLONE SODIUM SUCCINATE 125 MG/2ML
60 INJECTION INTRAMUSCULAR; INTRAVENOUS EVERY 12 HOURS
Status: DISCONTINUED | OUTPATIENT
Start: 2025-03-05 | End: 2025-03-06

## 2025-03-05 RX ORDER — ACETAZOLAMIDE 500 MG/5ML
500 INJECTION, POWDER, LYOPHILIZED, FOR SOLUTION INTRAVENOUS ONCE
Status: COMPLETED | OUTPATIENT
Start: 2025-03-05 | End: 2025-03-05

## 2025-03-05 RX ORDER — SODIUM CHLORIDE FOR INHALATION 10 %
3-5 VIAL, NEBULIZER (ML) INHALATION ONCE
Status: COMPLETED | OUTPATIENT
Start: 2025-03-05 | End: 2025-03-05

## 2025-03-05 RX ORDER — ACETAZOLAMIDE 250 MG/1
250 TABLET ORAL
Status: DISCONTINUED | OUTPATIENT
Start: 2025-03-05 | End: 2025-03-05

## 2025-03-05 RX ORDER — GLIPIZIDE 10 MG/1
1 TABLET ORAL
Status: DISCONTINUED | OUTPATIENT
Start: 2025-03-05 | End: 2025-03-05

## 2025-03-05 RX ORDER — FUROSEMIDE 10 MG/ML
40 INJECTION INTRAMUSCULAR; INTRAVENOUS EVERY 12 HOURS
Status: DISCONTINUED | OUTPATIENT
Start: 2025-03-06 | End: 2025-03-08

## 2025-03-05 RX ORDER — BISACODYL 10 MG
10 SUPPOSITORY, RECTAL RECTAL ONCE
Status: COMPLETED | OUTPATIENT
Start: 2025-03-05 | End: 2025-03-05

## 2025-03-05 RX ORDER — IPRATROPIUM BROMIDE AND ALBUTEROL SULFATE 2.5; .5 MG/3ML; MG/3ML
3 SOLUTION RESPIRATORY (INHALATION)
Status: DISCONTINUED | OUTPATIENT
Start: 2025-03-05 | End: 2025-03-12 | Stop reason: HOSPADM

## 2025-03-05 RX ORDER — SODIUM CHLORIDE FOR INHALATION 3 %
4 VIAL, NEBULIZER (ML) INHALATION
Status: DISCONTINUED | OUTPATIENT
Start: 2025-03-05 | End: 2025-03-12 | Stop reason: HOSPADM

## 2025-03-05 RX ORDER — MODAFINIL 200 MG/1
200 TABLET ORAL DAILY
Status: DISCONTINUED | OUTPATIENT
Start: 2025-03-06 | End: 2025-03-07

## 2025-03-05 RX ADMIN — ALBUTEROL SULFATE 2.5 MG: 2.5 SOLUTION RESPIRATORY (INHALATION) at 15:41

## 2025-03-05 RX ADMIN — FUROSEMIDE 20 MG: 10 INJECTION, SOLUTION INTRAMUSCULAR; INTRAVENOUS at 16:02

## 2025-03-05 RX ADMIN — PANTOPRAZOLE SODIUM 40 MG: 40 INJECTION, POWDER, FOR SOLUTION INTRAVENOUS at 10:07

## 2025-03-05 RX ADMIN — IPRATROPIUM BROMIDE AND ALBUTEROL SULFATE 3 ML: .5; 3 SOLUTION RESPIRATORY (INHALATION) at 18:09

## 2025-03-05 RX ADMIN — LEVALBUTEROL 1.25 MG: 1.25 SOLUTION, CONCENTRATE RESPIRATORY (INHALATION) at 10:14

## 2025-03-05 RX ADMIN — METHYLPREDNISOLONE SODIUM SUCCINATE 62.5 MG: 125 INJECTION, POWDER, FOR SOLUTION INTRAMUSCULAR; INTRAVENOUS at 17:29

## 2025-03-05 RX ADMIN — IPRATROPIUM BROMIDE AND ALBUTEROL SULFATE 3 ML: .5; 3 SOLUTION RESPIRATORY (INHALATION) at 22:40

## 2025-03-05 RX ADMIN — Medication 10 MG: at 02:24

## 2025-03-05 RX ADMIN — Medication 10 MG: at 11:43

## 2025-03-05 RX ADMIN — FUROSEMIDE 20 MG: 10 INJECTION, SOLUTION INTRAMUSCULAR; INTRAVENOUS at 01:36

## 2025-03-05 RX ADMIN — Medication 10 MG: at 03:31

## 2025-03-05 RX ADMIN — Medication 10 MG: at 03:16

## 2025-03-05 RX ADMIN — Medication 10 MG: at 03:50

## 2025-03-05 RX ADMIN — BUDESONIDE 1 MG: 0.5 INHALANT RESPIRATORY (INHALATION) at 18:10

## 2025-03-05 RX ADMIN — IPRATROPIUM BROMIDE AND ALBUTEROL SULFATE 3 ML: .5; 3 SOLUTION RESPIRATORY (INHALATION) at 14:20

## 2025-03-05 RX ADMIN — Medication 50 MCG/HR: at 05:25

## 2025-03-05 RX ADMIN — ENOXAPARIN SODIUM 30 MG: 30 INJECTION SUBCUTANEOUS at 10:10

## 2025-03-05 RX ADMIN — METHYLPREDNISOLONE SODIUM SUCCINATE 62.5 MG: 125 INJECTION, POWDER, FOR SOLUTION INTRAMUSCULAR; INTRAVENOUS at 05:25

## 2025-03-05 RX ADMIN — Medication 10 MG: at 06:58

## 2025-03-05 RX ADMIN — Medication 5 ML: at 14:20

## 2025-03-05 RX ADMIN — Medication 10 MG: at 07:58

## 2025-03-05 RX ADMIN — LEVALBUTEROL 1.25 MG: 1.25 SOLUTION, CONCENTRATE RESPIRATORY (INHALATION) at 02:33

## 2025-03-05 RX ADMIN — BUDESONIDE 1 MG: 0.5 INHALANT RESPIRATORY (INHALATION) at 06:12

## 2025-03-05 RX ADMIN — AZITHROMYCIN MONOHYDRATE 250 MG: 500 INJECTION, POWDER, LYOPHILIZED, FOR SOLUTION INTRAVENOUS at 10:10

## 2025-03-05 RX ADMIN — LEVALBUTEROL 1.25 MG: 1.25 SOLUTION, CONCENTRATE RESPIRATORY (INHALATION) at 06:12

## 2025-03-05 RX ADMIN — ACETAZOLAMIDE 500 MG: 500 INJECTION, POWDER, LYOPHILIZED, FOR SOLUTION INTRAVENOUS at 13:11

## 2025-03-05 RX ADMIN — ALBUTEROL SULFATE 2.5 MG: 2.5 SOLUTION RESPIRATORY (INHALATION) at 16:22

## 2025-03-05 RX ADMIN — ALBUTEROL SULFATE 2.5 MG: 2.5 SOLUTION RESPIRATORY (INHALATION) at 15:57

## 2025-03-05 RX ADMIN — PROPOFOL 60 MCG/KG/MIN: 10 INJECTION, EMULSION INTRAVENOUS at 05:25

## 2025-03-05 RX ADMIN — ALBUTEROL SULFATE 2.5 MG: 2.5 SOLUTION RESPIRATORY (INHALATION) at 15:23

## 2025-03-05 ASSESSMENT — ACTIVITIES OF DAILY LIVING (ADL)
ADLS_ACUITY_SCORE: 59
ADLS_ACUITY_SCORE: 55
ADLS_ACUITY_SCORE: 59
ADLS_ACUITY_SCORE: 59
ADLS_ACUITY_SCORE: 55
ADLS_ACUITY_SCORE: 59
ADLS_ACUITY_SCORE: 55
ADLS_ACUITY_SCORE: 55
ADLS_ACUITY_SCORE: 59
ADLS_ACUITY_SCORE: 55
ADLS_ACUITY_SCORE: 59
ADLS_ACUITY_SCORE: 55
ADLS_ACUITY_SCORE: 59
ADLS_ACUITY_SCORE: 59
ADLS_ACUITY_SCORE: 55

## 2025-03-05 NOTE — PROGRESS NOTES
Pt extubated at 0858 this morning. Pt tolerated procedure well, now on 5L oxy-mask and oxygenating well. Pt drowsy but able to follow commands.  Johna present and attentive to pt. Will continue to monitor.

## 2025-03-05 NOTE — PROGRESS NOTES
Interdisciplinary Discharge Planning Note    Anticipated Discharge Date: TBD    Anticipated Discharge Location: Home     Clinical Needs Before Discharge:  stable functional status, stable oxygen requirement, and stable vital signs    Treatment Needs After Discharge:  None identified    Potential Barriers to Discharge: None identified at this time     LAURA Ferraro  3/5/2025,  11:56 AM

## 2025-03-05 NOTE — PROGRESS NOTES
SLP attempted swallow eval but patient not alert long enough to participate in bolus trials.  Recommend NPO until patient gains alertness and is seen by SLP due to prolonged NPO status and extubation.

## 2025-03-05 NOTE — PLAN OF CARE
"Goal Outcome Evaluation:      Plan of Care Reviewed With: patient    Overall Patient Progress: improvingOverall Patient Progress: improving    Outcome Evaluation: Mechanically ventilated, 30% FIO2. Peters, vasopressors, sedation, and tube feeds    RASS -1, -2. JOHN, does not follow commands  VSS, ART line w/ appropriate waveforms. Hypertensive with stimulation.   Vent 30% FIO2, R 18, , Peep 5. Minimal secretions  TF 35 mL/hr-- Goal rate, Q4 100 mL free water flushes. No BM overnight  Peters CDI, minimal UO before lasix   Skin CDI    /70   Pulse 60   Temp 98.9  F (37.2  C) (Temporal)   Resp 18   Ht 1.575 m (5' 2\")   Wt 47.8 kg (105 lb 6.1 oz)   LMP 01/01/1996 (Approximate)   SpO2 94%   BMI 19.27 kg/m        Randy Yoon RN on 3/5/2025 at 6:02 AM      "

## 2025-03-05 NOTE — TELECONSULT
TELE ICU Progress Note          Assessment and Plan:     Ember Tavares is a 64 year old patient being cared for in the ICU for Respiratory distress secondary to COPD exacerbation. Pertinent assessment and recommendations include:       Interval Hx:  Recommendations for today:   -recommend adding diamox for diuresis, likely from contraction metabolic alkalosis  -would add oxycodone for improved pain control and sedation to help with high levels of propofol  -consider adding precedex in addition for extubation planning  -before extubation would consider getting nasal feeding tube for supplemental nutrition  -Recommend decreasing respiratory rate to maintain pH of around 7.35 (RR 16)    Plan:  # Neuro  # Acute Agitation Requiring Sedation  -Continue fpropofol infusion RASS goal 0 to -1      #Acute Pain  Fentanyl for goal pain score < 4  Daily Sedation Holidays    # CV  MAP goal: >65   F/U Echo    # Pulm     FiO2 (%): 30 %, Resp: 11, Vent Mode: CMV/AC, Resp Rate (Set): 18 breaths/min, Tidal Volume (Set, mL): 400 mL, PEEP (cm H2O): 5 cmH2O, Pressure Support (cm H2O): 7 cmH2O, Resp Rate (Set): 18 breaths/min, Tidal Volume (Set, mL): 400 mL, PEEP (cm H2O): 5 cmH2O       Pt continues to require ventilator support  Wean to extubate  Titrate FiO2 for SpO2 > 90  Continue Bronchodilators and steroids as appropriate  Daily SBTs once appropriate    # GI and F/E/N    Continue TF    # Renal    Recent Labs   Lab 03/05/25  0427 03/04/25  0527 03/03/25  0518   CR 0.53 0.54 0.55   BUN 25.9* 23.0 21.9    146* 146*   CO2 35* 28 25       Intake/Output Summary (Last 24 hours) at 3/3/2025 1225  Last data filed at 3/3/2025 1000  Gross per 24 hour   Intake 2059.99 ml   Output 860 ml   Net 1199.99 ml     Vitals:    03/02/25 0327 03/03/25 0200 03/04/25 0600   Weight: 48.1 kg (106 lb 0.7 oz) 51 kg (112 lb 7 oz) 47.8 kg (105 lb 6.1 oz)     Monitor renal function, UOP, and electrolytes  -Avoid giving fluid boluses unless clearly volume  depleted.  -Follow I/O balance daily and avoid progressive volume overload      Fluid Balance Goal: net negative    # Heme    Recent Labs   Lab 25   HCT 36.1 36.4 36.4   HGB 11.8 12.1 11.8     No acute indication for transfusion  Daily CBC    # Endo     ICU glycemic protocol        # Microbiology:    Temp (24hrs), Av.6  F (36.4  C), Min:97.4  F (36.3  C), Max:97.9  F (36.6  C)    Recent Labs   Lab 25   WBC 18.2* 13.3* 13.3*     I have reviewed the relevant microbiology.  Blood culture:  Results for orders placed or performed during the hospital encounter of 25   Blood Culture Foot, Left    Collection Time: 25 12:06 PM    Specimen: Foot, Left; Blood   Result Value Ref Range    Culture No growth after 1 day    Blood Culture Foot, Left    Collection Time: 25 12:06 PM    Specimen: Foot, Left; Blood   Result Value Ref Range    Culture No growth after 1 day    Results for orders placed or performed during the hospital encounter of 22   Blood Culture Peripheral Blood    Collection Time: 22 11:56 AM    Specimen: Peripheral Blood   Result Value Ref Range    Culture No Growth    Blood Culture Peripheral Blood    Collection Time: 22 11:56 AM    Specimen: Peripheral Blood   Result Value Ref Range    Culture No Growth    Results for orders placed or performed during the hospital encounter of 19   Blood culture    Collection Time: 19  5:17 PM    Specimen: Blood   Result Value Ref Range    Specimen Description Blood     Culture Micro No growth after 6 days    Blood culture    Collection Time: 19  5:17 PM    Specimen: Blood   Result Value Ref Range    Specimen Description Blood     Culture Micro No growth after 6 days       Urine culture:  Results for orders placed or performed in visit on 23   Urine Culture    Collection Time: 23 12:14 PM    Specimen: Urine, Clean Catch   Result  Value Ref Range    Culture Mixed Urogenital Lillie        # Leukocytosis  Monitoring fever curve, WBC, Inflammatory Markers  ABx Azithromycin        Primary Team Communication: Pending conversation   Billing: Total critical care time spent by me, excluding procedures, was 45 minutes.      Harrison Lee MD  3/3/2025           Hospital Course and Key events                   Medications:     I have reviewed this patient's current medications  Current Facility-Administered Medications   Medication Dose Route Frequency Provider Last Rate Last Admin    dextrose 10% infusion   Intravenous Continuous PRN Crispin Jacobson MD        fentaNYL (SUBLIMAZE) PCA 50 mcg/mL OPIOID NAIVE   mcg/hr Intravenous Continuous Mignon Reeves MD 0.5 mL/hr at 25 0526 25 mcg/hr at 25 0526    propofol (DIPRIVAN) infusion  5-75 mcg/kg/min Intravenous Continuous Crispin Jacobson MD 16.8 mL/hr at 25 0618 55 mcg/kg/min at 25 0618    And    Medication Instruction   Does not apply Continuous PRN Crispin Jacobson MD        No lozenges or gum should be given while patient on BIPAP/AVAPS/AVAPS AE   Does not apply Continuous PRN Jose De Jesus Hannah MD        norepinephrine (LEVOPHED) 4 mg in  mL infusion PREMIX  0.01-0.6 mcg/kg/min Intravenous Continuous Crispin Jacobson MD 8.5 mL/hr at 25 0701 0.05 mcg/kg/min at 25 0701    Patient may continue current oral medications   Does not apply Continuous PRN Jose De Jesus Hannah MD              Vitals and Exam:       Vital Sign Ranges  Temperature Temp  Av.6  F (36.4  C)  Min: 97.4  F (36.3  C)  Max: 97.9  F (36.6  C)   Blood pressure Systolic (24hrs), Av , Min:84 , Max:114        Diastolic (24hrs), Av, Min:48, Max:67      Pulse Pulse  Av.3  Min: 79  Max: 137   Respirations Resp  Av  Min: 11  Max: 22   Pulse oximetry SpO2  Av.2 %  Min: 91 %  Max: 100 %       I/O    Intake/Output Summary (Last 24 hours) at 3/3/2025 1224  Last data filed at  3/3/2025 1000  Gross per 24 hour   Intake 2059.99 ml   Output 860 ml   Net 1199.99 ml       FiO2 (%): 30 %, Resp: 11, Vent Mode: CMV/AC, Resp Rate (Set): 18 breaths/min, Tidal Volume (Set, mL): 400 mL, PEEP (cm H2O): 5 cmH2O, Pressure Support (cm H2O): 7 cmH2O, Resp Rate (Set): 18 breaths/min, Tidal Volume (Set, mL): 400 mL, PEEP (cm H2O): 5 cmH2O    Physical Examination:   I examined this patient remotely using the telemedicine camera in the [facility]. The patient is located at [facility]    General Appearance:   Sedated and intubated    HEENT:   Endotrachael tube is present     Respiratory:   Good patient-ventilator synchrony     Neuro:   Sedation: heavily sedated and unawakable   Other             Pertinent Data:     All pertinent labs and diagnostic studies were reviewed    Labs:  CMP  Recent Labs   Lab 03/05/25  0427 03/04/25  1226 03/04/25  0527 03/04/25  0223 03/03/25  1404 03/03/25  0518 03/02/25  2126 03/02/25  0517     --  146*  --   --  146*  --  144   POTASSIUM 3.8  --  3.5  --   --  3.7  --  4.0   CHLORIDE 101  --  108*  --   --  115*  --  111*   CO2 35*  --  28  --   --  25  --  27   ANIONGAP 7  --  10  --   --  6*  --  6*   * 199* 271* 239*   < > 86   < > 153*   BUN 25.9*  --  23.0  --   --  21.9  --  20.6   CR 0.53  --  0.54  --   --  0.55  --  0.46*   GFRESTIMATED >90  --  >90  --   --  >90  --  >90   COLEMAN 8.3*  --  8.3*  --   --  8.0*  --  8.1*   MAG 2.4*  --  2.3  --   --  2.4*  --  2.3   PROTTOTAL  --   --  5.4*  --   --   --   --   --    ALBUMIN  --   --  3.2*  --   --   --   --   --    BILITOTAL  --   --  0.3  --   --   --   --   --    ALKPHOS  --   --  67  --   --   --   --   --    AST  --   --  21  --   --   --   --   --    ALT  --   --  43  --   --   --   --   --     < > = values in this interval not displayed.     CBC  Recent Labs   Lab 03/05/25  0427 03/04/25  0527 03/03/25  0518 03/02/25  0517   WBC 18.2* 13.3* 13.3* 9.0   RBC 3.88 3.95 3.83  4.16   HGB 11.8 12.1 11.8 12.8   HCT 36.1 36.4 36.4 40.7   MCV 93 92 95 98   MCH 30.4 30.6 30.8 30.8   MCHC 32.7 33.2 32.4 31.4*   RDW 14.0 13.6 13.6 12.9    181 193 193     INRNo lab results found in last 7 days.  Arterial Blood Gas  Recent Labs   Lab 25  0524 25  2154 25  0525  2323   PH 7.48* 7.51* 7.49* 7.46*   PCO2 52* 45 45 41   PO2 74* 73* 55* 61*   HCO3 39* 36* 34* 29*   O2PER 30 30 25 30     Lactic Acid   Date Value Ref Range Status   2025 1.1 0.7 - 2.0 mmol/L Final   2019 1.3 0.5 - 2.2 mmol/L Final       Imaging:  Echocardiogram Complete  [id number]  [id number]  [id number]  [id number]^MONSE^DORIS^EZRA     [facility]  [address] [place] [address]     Name: ZBIGNIEW JOHNSON  MRN: [id number]  : 1960  Study Date: 2025 11:53 AM  Age: 64 yrs  Gender: Female  Patient Location: [facility]  Reason For Study: Respiratory Failure  Ordering Physician: DORIS SHEA  Performed By: Katelyn Shaver, BS, RDCS, RVT     BSA: 1.5 m2  Height: 62 in  Weight: 112 lb  HR: 103  BP: 93/59 mmHg  ______________________________________________________________________________  Procedure  Echocardiogram with two-dimensional, color and spectral Doppler.  ______________________________________________________________________________  Interpretation Summary  Global and regional left ventricular function is normal with an EF of 60-65%.  Global right ventricular function is normal.  No significant valvular abnormalities present.  No pericardial effusion is present.  There has been no change.  ______________________________________________________________________________  Left Ventricle  Left ventricular wall thickness is normal. Left ventricular size is normal.  Global and regional left ventricular function is normal with an EF of 60-65%.  No regional wall motion abnormalities are seen.     Right Ventricle  The right ventricle is normal size. Global right  ventricular function is  normal.     Atria  Both atria appear normal.     Mitral Valve  The mitral valve is normal.     Aortic Valve  Aortic valve is normal in structure and function.     Tricuspid Valve  Trace tricuspid insufficiency is present. The peak velocity of the tricuspid  regurgitant jet is not obtainable. Pulmonary artery systolic pressure cannot  be assessed.     Pulmonic Valve  The valve leaflets are not well visualized. Trace pulmonic insufficiency is  present.     Vessels  The aorta root is normal. Unable to assess mean RA pressure given the patient  is on a ventilator.     Pericardium  No pericardial effusion is present.     Miscellaneous  No significant valvular abnormalities present.     Compared to Previous Study  There has been no change.  ______________________________________________________________________________  MMode/2D Measurements & Calculations  IVSd: 0.61 cm  LVIDd: 3.7 cm  LVIDs: 2.6 cm  LVPWd: 0.57 cm  FS: 29.6 %  LV mass(C)d: 55.8 grams  LV mass(C)dI: 37.4 grams/m2  Ao root diam: 2.5 cm  LVOT diam: 2.0 cm  LVOT area: 3.0 cm2  Ao root diam index Ht(cm/m): 1.6  Ao root diam index BSA (cm/m2): 1.7  LA Volume (BP): 13.4 ml     LA Volume Index (BP): 9.0 ml/m2  RWT: 0.30  TAPSE: 1.8 cm     Doppler Measurements & Calculations  MV E max ralph: 75.8 cm/sec  MV A max ralph: 89.4 cm/sec  MV E/A: 0.85  MV dec time: 0.14 sec  Ao V2 max: 102.0 cm/sec  Ao max P.0 mmHg  Ao V2 mean: 70.6 cm/sec  Ao mean P.0 mmHg  Ao V2 VTI: 16.3 cm  DARCIE(I,D): 2.3 cm2  DARCIE(V,D): 2.7 cm2  LV V1 max PG: 3.5 mmHg  LV V1 max: 93.0 cm/sec  LV V1 VTI: 12.6 cm  SV(LVOT): 37.9 ml  SI(LVOT): 25.3 ml/m2  PA acc time: 0.04 sec  AV Ralph Ratio (DI): 0.91  DARCIE Index (cm2/m2): 1.6  RV S Ralph: 9.8 cm/sec     ______________________________________________________________________________  Report approved by: Sonia Brennan Dr on 2025 01:43 PM        CT Chest Pulmonary Embolism w Contrast  Narrative: EXAM: CT CHEST  PULMONARY EMBOLISM W CONTRAST, 3/3/2025 10:31 AM    HISTORY: copd exacerbation, intubated, now with worsening hypoxia and  tachycardia    COMPARISON: Chest radiographs 3/2/2025; CT chest 2/28/2025    TECHNIQUE:   Imaging protocol: Multiplanar CT images of the chest after  administration of intravenous contrast. Meds given: 63 ml Isovue 370.  Acquisition: This CT exam was performed using one or more the  following dose reduction techniques: automated exposure control,  adjustment of the mA and/or kV according to patient size, and/or  iterative reconstruction technique.  Processing: 3D rendering on independent workstation using Maximum  Intensity Projection (MIP) was performed and archived to PACS. 3D  reconstructions are interpreted and reported by supervising  radiologist.    FINDINGS:     CHEST:    VESSELS AND HEART: There is adequate contrast bolus in the study.  Contrast bolus adequately opacifies the pulmonary arteries. No acute  pulmonary emboli to the subsegmental level. Atherosclerotic  calcification of the aorta without aneurysmal dilation. No  cardiomegaly. No significant pericardial effusion. Right internal  jugular central venous catheter tip terminates in the SVC.    LUNGS: Endotracheal tube tip terminates in the midthoracic trachea,  within normal limits. Mild bibasilar bronchial wall thickening.  Moderate centrilobular emphysematous changes, greater in the apices.  No pulmonary mass. Scattered discoid and subpleural atelectasis.  Patchy bibasilar centrilobular and tree-in-bud opacities. A few patchy  bilateral consolidations. Benign fat-containing right lower lobe  subpleural 1.4 cm focus.  PLEURA: Trace bilateral effusions. No pneumothorax.  LYMPH NODES: Enlarged mediastinal lymph nodes.  THYROID: Within normal limits.    BONES AND SOFT TISSUES:  No suspicious osseous lesions. Asymmetric right neck stranding, likely  from recent right IJ central line placement. Mild generalized  anasarca. Degenerative  periarticular changes and spinal spondylosis.    UPPER ABDOMEN:  Limited evaluation of the upper abdomen demonstrates no acute  parenchymal abnormalities, nonobstructive bowel gas pattern, and no  free fluid or free air. Cholecystomy changes. Enteric tube tip  terminates in the stomach. Distal esophageal circumferential mucosal  thickening, likely sequela of esophagitis. Postsurgical changes of  prior Nissen fundoplication.  Impression: IMPRESSION:     No acute pulmonary emboli to the subsegmental level.    Multifocal pulmonary opacities, new since comparison, consistent with  infection.     OH HALEY MD         SYSTEM ID:  [id number]

## 2025-03-05 NOTE — PLAN OF CARE
Goal Outcome Evaluation: Pt improving, tolerating vent well. Pt had two spontaneous awakening trials today, but unable to extubate due to tachycardia ranging from 130-165 as well as agitation. Pt remains on propofol, fentanyl for comfort, and norepinephrine. Vitals stable. ART line remains in place.

## 2025-03-05 NOTE — PROGRESS NOTES
Patient current vent settings are AC 18/400/+5/30%.  7.0 ETT secured at 22 cm at the teeth/gums.  ETT repositioned and oral cares done Q2.  Suctioned as need.  Nebs given as scheduled.

## 2025-03-05 NOTE — PROGRESS NOTES
[facility]    Hospitalist Progress Note      Assessment & Plan   Ember Tavares is a 64 year old female who was admitted on 2/27/2025.     Clinically Significant Risk Factors         # Hypernatremia: Highest Na = 146 mmol/L in last 2 days, will monitor as appropriate  # Hyperchloremia: Highest Cl = 108 mmol/L in last 2 days, will monitor as appropriate      # Hypocalcemia: Lowest Ca = 8.3 mg/dL in last 2 days, will monitor and replace as appropriate     # Hypoalbuminemia: Lowest albumin = 3.2 g/dL at 3/4/2025  5:27 AM, will monitor as appropriate         # Acute Hypoxic Respiratory Failure: Documented O2 saturation < 90%. Continue supplemental oxygen as needed  # Acute Hypercapnic Respiratory Failure: based on arterial blood gas results.  Continue supplemental oxygen and ventilatory support as indicated.          # Severe Malnutrition: based on nutrition assessment and treatment provided per dietitian's recommendations.    # COPD: noted on problem list       Principal Problem:    Acute respiratory failure with hypoxia and hypercapnia (H)   COPD exacerbation    Alpha-1-antitrypsin deficiency carrier   Tobacco abuse   Shock    Assessment: Presented with initial COPD exacerbation, started on antibiotics steroids and nebulizers, respiratory status deteriorated and intubated 3/1.  Net negative with IV diuresis diuresis on 3/3.  Successful spontaneous breathing trial today, following commands, stable vitals, tidal volumes in the 4-5 100s and successfully extubated.  Able to clear cough without difficulty, need close monitoring of oral intake and SLP evaluation.    Plan:   -continue scheduled Xopenex  -Decrease IV Methylpred from every 8 to every 12 given worsening leukocytosis day 3  -Day 6 of Azithromycin   -Sputum culture normal hayden, afebrile and no evidence of developing pneumonia at this point.  -SLP evaluation, aspiration precaution and routine oral cares  -will add pain and anxiety  medication if requested     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)   Severe protein calorie malnutrition    Assessment: Present on admission she had tube feeds on 3/3.    Plan:   -SLP evaluation and scheduled supplements  -N[place] feedings not available here    Hypernatremia  Assessment: Resolved with diuresis  Plan:  -monitor  -lasix 20mg iv q12  -Add Diamox to 50 mg p.o. twice daily per telemetry ICU recommendation      Hiatal hernia    Assessment: Stable, present on admission, status post Nissen in the past    Plan:   -IV PPI     Diet: NPO for Medical/Clinical Reasons Except for: Meds, Ice Chips  Snacks/Supplements Adult: Ensure Enlive; With Meals    DVT Prophylaxis: Enoxaparin (Lovenox) SQ and Pneumatic Compression Devices  Peters Catheter: Not present  Code Status: Full Code           Disposition Plan      Entered: Crispin Jacobson MD 03/05/2025, 10:24 AM       The patient's care was discussed with the Bedside Nurse, Patient, and Patient's Family with  again today.     Crispin Jacobson MD  Hospitalist Service  [facility]  Contact information available via [facility] Paging/Directory    ______________________________________________________________________    Interval History   CC: COPD exacerbation    Overnight no acute events, this morning successfully extubated with successful spontaneous breathing trial, currently denies any pain coughing independently, unable to give any other meaningful history at this point.    -Data reviewed today: I reviewed all new labs and imaging results over the last 24 hours.     Physical Exam   Temp: 98.5  F (36.9  C) Temp src: Tympanic BP: (!) 141/87 Pulse: (!) 126   Resp: 27 SpO2: 96 % O2 Device: Oxymask Oxygen Delivery: 5 LPM (decreased to 3L)  Vitals:    03/02/25 0327 03/03/25 0200 03/04/25 0600   Weight: 48.1 kg (106 lb 0.7 oz) 51 kg (112 lb 7 oz) 47.8 kg (105 lb 6.1 oz)     Vital Signs with Ranges  Temp:  [98.5  F (36.9  C)-99.2  F (37.3   C)] 98.5  F (36.9  C)  Pulse:  [] 126  Resp:  [10-27] 27  BP: (104-160)/() 141/87  MAP:  [53 mmHg-125 mmHg] 120 mmHg  Arterial Line BP: ()/(34-92) 170/92  FiO2 (%):  [30 %] 30 %  SpO2:  [90 %-96 %] 96 %  I/O last 3 completed shifts:  In: 1837.64 [I.V.:519.64; NG/GT:500; IV Piggyback:4]  Out: 2210 [Urine:2210]    GENERAL: Spontaneously awake, tracking, in no apparent distress.    HEENT: Right IJ CVC covered Tegaderm, clean dry and intact, ET tube out.  CARDIOVASCULAR: Regular rate and regular rhythm, no murmurs, rubs, or gallops. Normal S1/S2. No lower extremity edema.   RESPIRATORY: Now resolved expiratory wheezes, diffuse air movement all fields, no accessory muscle use or evidence respiratory distress.    GI: soft, non-tender, non-distended, normoactive bowel sounds.  : Peters catheter in place with clear yellow urine  MUSCULOSKELETAL: warm and well perfused, 2+ dorsalis pedis pulses bilaterally.    SKIN: no pallor,jaundice, or rashes.      Medications   Current Facility-Administered Medications   Medication Dose Route Frequency Provider Last Rate Last Admin    dextrose 10% infusion   Intravenous Continuous PRN Crispin Jacobson MD        No lozenges or gum should be given while patient on BIPAP/AVAPS/AVAPS AE   Does not apply Continuous PRN Jose De Jesus Hannah MD        norepinephrine (LEVOPHED) 4 mg in  mL infusion PREMIX  0.01-0.6 mcg/kg/min Intravenous Continuous Crispin Jacobson MD   Paused at 03/05/25 0721    Patient may continue current oral medications   Does not apply Continuous PRN Jose De Jesus Hannah MD         Current Facility-Administered Medications   Medication Dose Route Frequency Provider Last Rate Last Admin    azithromycin (ZITHROMAX) 250 mg in  mL intermittent infusion  250 mg Intravenous Q24H Jose De Jesus Hannah MD   250 mg at 03/05/25 1010    budesonide (PULMICORT) neb solution 1 mg  1 mg Nebulization BID Jose De Jesus Hannah MD   1 mg at 03/05/25 0612    enoxaparin  ANTICOAGULANT (LOVENOX) injection 30 mg  30 mg Subcutaneous Q24H Jose De Jesus Hannah MD   30 mg at 03/05/25 1010    furosemide (LASIX) injection 20 mg  20 mg Intravenous Q12H Crispin Jacobson MD   20 mg at 03/05/25 0136    levalbuterol (XOPENEX CONC) neb solution 1.25 mg  1.25 mg Nebulization Q4H Crispin Jacobson MD   1.25 mg at 03/05/25 1014    methylPREDNISolone Na Suc (solu-MEDROL) injection 62.5 mg  62.5 mg Intravenous Q12H Crispin Jacobson MD        pantoprazole (PROTONIX) IV push injection 40 mg  40 mg Intravenous Daily Jose De Jesus Hannah MD   40 mg at 03/05/25 1007    umeclidinium (INCRUSE ELLIPTA) 62.5 MCG/ACT inhaler 1 puff  1 puff Inhalation Daily Jose De Jesus Hannah MD           Data   Recent Labs   Lab 03/05/25  0758 03/05/25  0427 03/04/25  1226 03/04/25  0527 03/03/25  1404 03/03/25  0518   WBC  --  18.2*  --  13.3*  --  13.3*   HGB  --  11.8  --  12.1  --  11.8   MCV  --  93  --  92  --  95   PLT  --  196  --  181  --  193   NA  --  143  --  146*  --  146*   POTASSIUM  --  3.8  --  3.5  --  3.7   CHLORIDE  --  101  --  108*  --  115*   CO2  --  35*  --  28  --  25   BUN  --  25.9*  --  23.0  --  21.9   CR  --  0.53  --  0.54  --  0.55   ANIONGAP  --  7  --  10  --  6*   COLEMAN  --  8.3*  --  8.3*  --  8.0*   * 218* 199* 271*   < > 86   ALBUMIN  --   --   --  3.2*  --   --    PROTTOTAL  --   --   --  5.4*  --   --    BILITOTAL  --   --   --  0.3  --   --    ALKPHOS  --   --   --  67  --   --    ALT  --   --   --  43  --   --    AST  --   --   --  21  --   --     < > = values in this interval not displayed.       No results found for this or any previous visit (from the past 24 hours).       I have spent greater than 60 minutes ofcritical care time related to direct patient care, care coordination, and communication exclusive of procedures with focus of care related to respiratory failure, extubation and current management as outlined above.

## 2025-03-05 NOTE — PROGRESS NOTES
0858- Wean trial completed and pt extubated. See flow sheet for pt values. Pt placed on 5L Oxymask post extubation. Will wean O2 as able. Pt does not wear home O2.     1523- ABG results show CO2 91. Pt placed on BiPAP 16/6 35% and tolerating well. See flowsheet for pt data. Multiple nebs tx given inline. Pt still has inspiratory nad expiratory wheezes. Will continue with neb tx as scheduled and PRN tx when needed.      Lynne Bliss, RRT

## 2025-03-06 ENCOUNTER — APPOINTMENT (OUTPATIENT)
Dept: SPEECH THERAPY | Facility: OTHER | Age: 65
End: 2025-03-06
Payer: COMMERCIAL

## 2025-03-06 LAB
ALLEN'S TEST: ABNORMAL
ALLEN'S TEST: ABNORMAL
ANION GAP SERPL CALCULATED.3IONS-SCNC: 10 MMOL/L (ref 7–15)
ATRIAL RATE - MUSE: 110 BPM
ATRIAL RATE - MUSE: 92 BPM
BACTERIA BLD CULT: NORMAL
BACTERIA BLD CULT: NORMAL
BASE EXCESS BLDA CALC-SCNC: 5.3 MMOL/L (ref -3–3)
BASE EXCESS BLDA CALC-SCNC: 5.4 MMOL/L (ref -3–3)
BUN SERPL-MCNC: 27.2 MG/DL (ref 8–23)
CALCIUM SERPL-MCNC: 9.1 MG/DL (ref 8.8–10.4)
CHLORIDE SERPL-SCNC: 105 MMOL/L (ref 98–107)
CREAT SERPL-MCNC: 0.57 MG/DL (ref 0.51–0.95)
DIASTOLIC BLOOD PRESSURE - MUSE: NORMAL MMHG
DIASTOLIC BLOOD PRESSURE - MUSE: NORMAL MMHG
EGFRCR SERPLBLD CKD-EPI 2021: >90 ML/MIN/1.73M2
ERYTHROCYTE [DISTWIDTH] IN BLOOD BY AUTOMATED COUNT: 13.6 % (ref 10–15)
GLUCOSE SERPL-MCNC: 101 MG/DL (ref 70–99)
HCO3 BLD-SCNC: 31 MMOL/L (ref 21–28)
HCO3 BLD-SCNC: 32 MMOL/L (ref 21–28)
HCO3 SERPL-SCNC: 29 MMOL/L (ref 22–29)
HCT VFR BLD AUTO: 37.6 % (ref 35–47)
HGB BLD-MCNC: 12.1 G/DL (ref 11.7–15.7)
INTERPRETATION ECG - MUSE: NORMAL
INTERPRETATION ECG - MUSE: NORMAL
MAGNESIUM SERPL-MCNC: 2.3 MG/DL (ref 1.7–2.3)
MCH RBC QN AUTO: 30.5 PG (ref 26.5–33)
MCHC RBC AUTO-ENTMCNC: 32.2 G/DL (ref 31.5–36.5)
MCV RBC AUTO: 95 FL (ref 78–100)
O2/TOTAL GAS SETTING VFR VENT: 35 %
O2/TOTAL GAS SETTING VFR VENT: 35 %
OXYHGB MFR BLDA: 95 % (ref 92–100)
OXYHGB MFR BLDA: 97 % (ref 92–100)
P AXIS - MUSE: 88 DEGREES
P AXIS - MUSE: 89 DEGREES
PCO2 BLD: 47 MM HG (ref 35–45)
PCO2 BLD: 50 MM HG (ref 35–45)
PEEP: 6 CM H2O
PH BLD: 7.41 [PH] (ref 7.35–7.45)
PH BLD: 7.43 [PH] (ref 7.35–7.45)
PLATELET # BLD AUTO: 193 10E3/UL (ref 150–450)
PO2 BLD: 72 MM HG (ref 80–105)
PO2 BLD: 83 MM HG (ref 80–105)
POTASSIUM SERPL-SCNC: 3.8 MMOL/L (ref 3.4–5.3)
PR INTERVAL - MUSE: 124 MS
PR INTERVAL - MUSE: 128 MS
QRS DURATION - MUSE: 108 MS
QRS DURATION - MUSE: 108 MS
QT - MUSE: 314 MS
QT - MUSE: 330 MS
QTC - MUSE: 408 MS
QTC - MUSE: 424 MS
R AXIS - MUSE: -39 DEGREES
R AXIS - MUSE: -84 DEGREES
RBC # BLD AUTO: 3.97 10E6/UL (ref 3.8–5.2)
SAO2 % BLDA: 95.9 % (ref 96–97)
SAO2 % BLDA: 97.9 % (ref 96–97)
SODIUM SERPL-SCNC: 144 MMOL/L (ref 135–145)
SYSTOLIC BLOOD PRESSURE - MUSE: NORMAL MMHG
SYSTOLIC BLOOD PRESSURE - MUSE: NORMAL MMHG
T AXIS - MUSE: 60 DEGREES
T AXIS - MUSE: 63 DEGREES
VENTRICULAR RATE- MUSE: 110 BPM
VENTRICULAR RATE- MUSE: 92 BPM
WBC # BLD AUTO: 19.4 10E3/UL (ref 4–11)

## 2025-03-06 PROCEDURE — 83735 ASSAY OF MAGNESIUM: CPT | Performed by: INTERNAL MEDICINE

## 2025-03-06 PROCEDURE — 250N000013 HC RX MED GY IP 250 OP 250 PS 637: Performed by: INTERNAL MEDICINE

## 2025-03-06 PROCEDURE — 82435 ASSAY OF BLOOD CHLORIDE: CPT | Performed by: INTERNAL MEDICINE

## 2025-03-06 PROCEDURE — 250N000013 HC RX MED GY IP 250 OP 250 PS 637: Performed by: FAMILY MEDICINE

## 2025-03-06 PROCEDURE — 250N000009 HC RX 250: Performed by: INTERNAL MEDICINE

## 2025-03-06 PROCEDURE — 92610 EVALUATE SWALLOWING FUNCTION: CPT | Mod: GN

## 2025-03-06 PROCEDURE — 258N000003 HC RX IP 258 OP 636: Performed by: INTERNAL MEDICINE

## 2025-03-06 PROCEDURE — 200N000001 HC R&B ICU

## 2025-03-06 PROCEDURE — 94660 CPAP INITIATION&MGMT: CPT

## 2025-03-06 PROCEDURE — 82565 ASSAY OF CREATININE: CPT | Performed by: INTERNAL MEDICINE

## 2025-03-06 PROCEDURE — 82805 BLOOD GASES W/O2 SATURATION: CPT | Performed by: INTERNAL MEDICINE

## 2025-03-06 PROCEDURE — 85014 HEMATOCRIT: CPT | Performed by: INTERNAL MEDICINE

## 2025-03-06 PROCEDURE — 80048 BASIC METABOLIC PNL TOTAL CA: CPT | Performed by: INTERNAL MEDICINE

## 2025-03-06 PROCEDURE — 93005 ELECTROCARDIOGRAM TRACING: CPT

## 2025-03-06 PROCEDURE — 99233 SBSQ HOSP IP/OBS HIGH 50: CPT | Performed by: INTERNAL MEDICINE

## 2025-03-06 PROCEDURE — 250N000011 HC RX IP 250 OP 636: Performed by: INTERNAL MEDICINE

## 2025-03-06 PROCEDURE — 93010 ELECTROCARDIOGRAM REPORT: CPT | Performed by: INTERNAL MEDICINE

## 2025-03-06 PROCEDURE — 94640 AIRWAY INHALATION TREATMENT: CPT

## 2025-03-06 PROCEDURE — 999N000157 HC STATISTIC RCP TIME EA 10 MIN

## 2025-03-06 PROCEDURE — 94640 AIRWAY INHALATION TREATMENT: CPT | Mod: 76

## 2025-03-06 RX ORDER — BISACODYL 10 MG
10 SUPPOSITORY, RECTAL RECTAL DAILY PRN
Status: DISCONTINUED | OUTPATIENT
Start: 2025-03-06 | End: 2025-03-12 | Stop reason: HOSPADM

## 2025-03-06 RX ORDER — CALCIUM CARBONATE 500 MG/1
500 TABLET, CHEWABLE ORAL DAILY PRN
Status: DISCONTINUED | OUTPATIENT
Start: 2025-03-06 | End: 2025-03-12 | Stop reason: HOSPADM

## 2025-03-06 RX ORDER — METHYLPREDNISOLONE SODIUM SUCCINATE 125 MG/2ML
60 INJECTION INTRAMUSCULAR; INTRAVENOUS EVERY 24 HOURS
Status: DISCONTINUED | OUTPATIENT
Start: 2025-03-07 | End: 2025-03-07

## 2025-03-06 RX ORDER — LIDOCAINE HYDROCHLORIDE 20 MG/ML
15 SOLUTION OROPHARYNGEAL ONCE
Status: COMPLETED | OUTPATIENT
Start: 2025-03-06 | End: 2025-03-06

## 2025-03-06 RX ORDER — LACTULOSE 10 G/15ML
20 SOLUTION ORAL ONCE
Status: COMPLETED | OUTPATIENT
Start: 2025-03-06 | End: 2025-03-06

## 2025-03-06 RX ORDER — MAGNESIUM HYDROXIDE/ALUMINUM HYDROXICE/SIMETHICONE 120; 1200; 1200 MG/30ML; MG/30ML; MG/30ML
15 SUSPENSION ORAL ONCE
Status: COMPLETED | OUTPATIENT
Start: 2025-03-06 | End: 2025-03-06

## 2025-03-06 RX ORDER — LACTULOSE 10 G/15ML
20 SOLUTION ORAL
Status: COMPLETED | OUTPATIENT
Start: 2025-03-06 | End: 2025-03-06

## 2025-03-06 RX ADMIN — METHYLPREDNISOLONE SODIUM SUCCINATE 62.5 MG: 125 INJECTION, POWDER, FOR SOLUTION INTRAMUSCULAR; INTRAVENOUS at 05:15

## 2025-03-06 RX ADMIN — IPRATROPIUM BROMIDE AND ALBUTEROL SULFATE 3 ML: .5; 3 SOLUTION RESPIRATORY (INHALATION) at 22:11

## 2025-03-06 RX ADMIN — PANTOPRAZOLE SODIUM 40 MG: 40 INJECTION, POWDER, FOR SOLUTION INTRAVENOUS at 09:45

## 2025-03-06 RX ADMIN — IPRATROPIUM BROMIDE AND ALBUTEROL SULFATE 3 ML: .5; 3 SOLUTION RESPIRATORY (INHALATION) at 18:26

## 2025-03-06 RX ADMIN — IPRATROPIUM BROMIDE AND ALBUTEROL SULFATE 3 ML: .5; 3 SOLUTION RESPIRATORY (INHALATION) at 10:00

## 2025-03-06 RX ADMIN — Medication 10 MG: at 11:22

## 2025-03-06 RX ADMIN — IPRATROPIUM BROMIDE AND ALBUTEROL SULFATE 3 ML: .5; 3 SOLUTION RESPIRATORY (INHALATION) at 14:12

## 2025-03-06 RX ADMIN — ACETAMINOPHEN 650 MG: 650 SUPPOSITORY RECTAL at 13:48

## 2025-03-06 RX ADMIN — BUDESONIDE 1 MG: 0.5 INHALANT RESPIRATORY (INHALATION) at 06:23

## 2025-03-06 RX ADMIN — FUROSEMIDE 40 MG: 10 INJECTION, SOLUTION INTRAVENOUS at 15:30

## 2025-03-06 RX ADMIN — AZITHROMYCIN MONOHYDRATE 250 MG: 500 INJECTION, POWDER, LYOPHILIZED, FOR SOLUTION INTRAVENOUS at 10:42

## 2025-03-06 RX ADMIN — MINERAL OIL 226 ML: 1 OIL ORAL at 12:39

## 2025-03-06 RX ADMIN — LACTULOSE 20 G: 20 SOLUTION ORAL at 08:02

## 2025-03-06 RX ADMIN — LACTULOSE 20 G: 20 SOLUTION ORAL at 19:17

## 2025-03-06 RX ADMIN — ENOXAPARIN SODIUM 30 MG: 30 INJECTION SUBCUTANEOUS at 09:44

## 2025-03-06 RX ADMIN — ALUMINUM HYDROXIDE, MAGNESIUM HYDROXIDE, AND SIMETHICONE 15 ML: 200; 200; 20 SUSPENSION ORAL at 16:05

## 2025-03-06 RX ADMIN — LIDOCAINE HYDROCHLORIDE 15 ML: 20 SOLUTION ORAL at 16:41

## 2025-03-06 RX ADMIN — PROCHLORPERAZINE EDISYLATE 10 MG: 5 INJECTION INTRAMUSCULAR; INTRAVENOUS at 09:44

## 2025-03-06 RX ADMIN — BUDESONIDE 1 MG: 0.5 INHALANT RESPIRATORY (INHALATION) at 18:27

## 2025-03-06 RX ADMIN — CALCIUM CARBONATE (ANTACID) CHEW TAB 500 MG 500 MG: 500 CHEW TAB at 18:11

## 2025-03-06 RX ADMIN — ONDANSETRON 4 MG: 2 INJECTION INTRAMUSCULAR; INTRAVENOUS at 15:30

## 2025-03-06 RX ADMIN — IPRATROPIUM BROMIDE AND ALBUTEROL SULFATE 3 ML: .5; 3 SOLUTION RESPIRATORY (INHALATION) at 06:23

## 2025-03-06 RX ADMIN — ALBUTEROL SULFATE 2.5 MG: 2.5 SOLUTION RESPIRATORY (INHALATION) at 12:48

## 2025-03-06 RX ADMIN — FUROSEMIDE 40 MG: 10 INJECTION, SOLUTION INTRAVENOUS at 04:07

## 2025-03-06 RX ADMIN — PROCHLORPERAZINE EDISYLATE 10 MG: 5 INJECTION INTRAMUSCULAR; INTRAVENOUS at 23:07

## 2025-03-06 RX ADMIN — IPRATROPIUM BROMIDE AND ALBUTEROL SULFATE 3 ML: .5; 3 SOLUTION RESPIRATORY (INHALATION) at 02:12

## 2025-03-06 RX ADMIN — ONDANSETRON 4 MG: 2 INJECTION INTRAMUSCULAR; INTRAVENOUS at 08:18

## 2025-03-06 ASSESSMENT — ACTIVITIES OF DAILY LIVING (ADL)
ADLS_ACUITY_SCORE: 59
ADLS_ACUITY_SCORE: 58
ADLS_ACUITY_SCORE: 58
ADLS_ACUITY_SCORE: 68
ADLS_ACUITY_SCORE: 59
ADLS_ACUITY_SCORE: 68
ADLS_ACUITY_SCORE: 59
ADLS_ACUITY_SCORE: 64
ADLS_ACUITY_SCORE: 64
ADLS_ACUITY_SCORE: 59
ADLS_ACUITY_SCORE: 64
ADLS_ACUITY_SCORE: 64
ADLS_ACUITY_SCORE: 59
ADLS_ACUITY_SCORE: 68
ADLS_ACUITY_SCORE: 68
ADLS_ACUITY_SCORE: 59
ADLS_ACUITY_SCORE: 59
ADLS_ACUITY_SCORE: 64
ADLS_ACUITY_SCORE: 58
ADLS_ACUITY_SCORE: 68
ADLS_ACUITY_SCORE: 68
ADLS_ACUITY_SCORE: 59
ADLS_ACUITY_SCORE: 59

## 2025-03-06 NOTE — PROGRESS NOTES
Patient reevaluated after resuming BiPAP for progressive hypercarbia.  No more awake, turning head side-to-side but otherwise comfortable, no increased respiratory effort or distress.  HEENT hemodynamically stable, repeat chest x-ray with persistent small bilateral pleural effusions.  Wheezing improved.   personally updated.  -Increase Lasix to 40 mg IV every 12  -Continue aggressive pulmonary toilet   -repeat ABG at midnight and in a.m.  -Provigil at 6 AM    Crispin Jacobson MD

## 2025-03-06 NOTE — PLAN OF CARE
"Goal Outcome Evaluation:      Plan of Care Reviewed With: patient    Overall Patient Progress: improvingOverall Patient Progress: improving    Outcome Evaluation: BiPAP continuous 35%, respiratory improvement    Pt Aox 2-4, now alert and able to make needs known   VSS, tachycardic in 100-110's.   Tolerating continuous BiPAP, 35% FIO2, 16/6. LS clear   NPO at this time, able to take in ice chips  No BM, passing gas, small mucous smear overnight  Peters patent w/ adequate UO  Skin CDI    /85   Pulse 111   Temp 98.6  F (37  C) (Temporal)   Resp 19   Ht 1.575 m (5' 2\")   Wt 47.8 kg (105 lb 6.1 oz)   LMP 01/01/1996 (Approximate)   SpO2 94%   BMI 19.27 kg/m        "

## 2025-03-06 NOTE — PROGRESS NOTES
Assumed patient observation at 2300. Patient appears calm and stable at this time per ICU RN. Spouse present.

## 2025-03-06 NOTE — PROGRESS NOTES
03/06/25 1200   Appointment Info   Signing Clinician's Name / Credentials (SLP) Lesvia Edge MA, CCC-SLP   General Information   Onset of Illness/Injury or Date of Surgery 03/03/25   Referring Physician Dr. Crispin Jacobson   Patient/Family Therapy Goal Statement (SLP) Patient unable to state goals due to fatigue.   Pertinent History of Current Problem Patient on prolonged NPO status due o respiratory failure requiring intubation (3/1/25-3/4/25).  Clinician attempted evaluation previous day but patient not able to maintain alertness to accept oral bolus.   General Observations Patient unable to speak due to recent extubation, on vapotherm 35%, patient able to gesture and oral posture answers.  White board and static AAC board provided, clinician assisted patient in placement of dry erase marker, but patient unable to follow through with writing to communicate (fatigue vs weakness).   Type of Evaluation   Type of Evaluation Swallow Evaluation   Oral Motor   Oral Musculature other (see comments)  (unable to assess due to high level of fatigue)   Mucosal Quality dry   Oral Motor Deficits Observed   (Unknown due to inability to participate in oral mechanism examination)   Dentition (Oral Motor)   Comment, Dentition (Oral Motor) unable to assess due to high level of fatigue   Dentition (Oral Motor) edentulous  (Dentures not at facility)   Facial Symmetry (Oral Motor)   Comment, Facial Symmetry (Oral Motor) unable to assess due to high level of fatigue   Lip Function (Oral Motor)   Comment, Lip Function (Oral Motor) unable to assess due to high level of fatigue   Tongue Function (Oral Motor)   Comment, Tongue Function (Oral Motor) unable to assess due to high level of fatigue   Jaw Function (Oral Motor)   Comment, Jaw Function (Oral Motor) unable to assess due to high level of fatigue   Cough/Swallow/Gag Reflex (Oral Motor)   Soft Palate/Velum (Oral Motor) other (see comments)  (unable to assess due to high level of  fatigue)   Volitional Throat Clear/Cough (Oral Motor) other (see comments)  (unable to assess due to high level of fatigue)   Vocal Quality/Secretion Management (Oral Motor)   Vocal Quality (Oral Motor) aphonia   Secretion Management (Oral Motor)   (No drooling, unable to determine vocal quality)   General Swallowing Observations   Past History of Dysphagia History of esophageal dysphagia with distal dilations, hiatal hernia, passed gastric emptying test   Respiratory Support other (see comments)  (35% vapotherm)   Current Diet/Method of Nutritional Intake (General Swallowing Observations, NIS) other (see comments)  (Regular diet prior, current NPO status)   Swallowing Evaluation Clinical swallow evaluation   Clinical Swallow Evaluation   Feeding Assistance dependent   Swallowing Recommendations   Diet Consistency Recommendations full liquid diet   Supervision Level for Intake 1:1 supervision needed   Mode of Delivery Recommendations bolus size, small;slow rate of intake  (assist with spoon drinking liquids, progress to cup or straw with careful monitoring)   Monitoring/Assistance Required (Eating/Swallowing) check mouth frequently for oral residue/pocketing;monitor for cough or change in vocal quality with intake   Recommended Feeding/Eating Techniques (Swallow Eval) maintain upright sitting position for eating;maintain upright posture during/after eating for 30 minutes;provide assist with feeding;provide oral hygiene prior to intake   Medication Administration Recommendations, Swallowing (SLP) Crushed in medium as needed   Instrumental Assessment Recommendations   (Patient unable to participate in VFSS at this time due to low activity tolerance)   Comment, Swallowing Recommendations Patient participated in minimal oral bolus trials  with acceptance of 5 ml bite of pureed and sips of water.  Due to small sip size, a full liquid diet, any liquid level, (not restricted to clears) is recommended with SLP to follow up  tomorrow when patient has higher level of acceptance of oral intake, limited today due to fatigue and nausea. Patient has not had a bowel movement, per nursing report, since 2/28/25 which may be contributing to her nausea and disinterest in food.   General Therapy Interventions   Planned Therapy Interventions Dysphagia Treatment   Dysphagia treatment   (Unclear at this time, patient experienced prolonged NPO past 72 hours and intubation which warrant further SLP services, SLP to follow up tomorrow)   Clinical Impression   Criteria for Skilled Therapeutic Interventions Met (SLP Eval) Yes, treatment indicated   SLP Diagnosis Dysphagia, unspecified   Activity Limitations Related to Problem List (SLP) Patient unable to maintain nutrition orally due to limited oral intake secondary to low activity tolerance, her chew and swallow function has not been fully explored due to minimal acceptance of food/liquid intake during today's evaluation.   Risks & Benefits of therapy have been explained evaluation/treatment results reviewed;risks/benefits reviewed;participants included;patient  (spouse sleeping)   Clinical Impression Comments Patient presenting with poor endurance and low activity tolerance thus her participation in today's evaluation was limited.  Patient requires close monitoring and follow up by SLP for opitmal safety.  Patient only accepting 5 ml of boluses at a time and clearing liquids from oral cavity.  Due to acceptance of small careful boluses (patient hand signaling pacing), a full liquid diet (any liquid, not restricted to clears) is recommended with 1:1 assist for help and monitoring, along with Aspiration Precautions.   SLP Total Evaluation Time   Eval: oral/pharyngeal swallow function, clinical swallow Minutes ([id number]) 23   SLP Goals   Therapy Frequency (SLP Eval) 5 times/week   SLP Predicted Duration/Target Date for Goal Attainment 03/14/25   SLP Goals Swallow   SLP: Safely tolerate diet without  signs/symptoms of aspiration Soft & bite sized diet;Thin liquids;Independently   SLP Discharge Planning   SLP Plan Follow up with oral bolus trails tomorrow to determine if patient can tolerate more textures and determine mealtime activity tolerance   SLP Discharge Recommendation other (see comments)  (Unclear, likely will require SLP services due to prolonged NPO status and intubation/extubation)   SLP Rationale for DC Rec See above   SLP Brief overview of current status  Severely low activity (mealtime) tolerance, accepting tiny amounts (5 ml) of bolus spoon sips with patient use of hand gestures for pacing.

## 2025-03-06 NOTE — PROGRESS NOTES
Interdisciplinary Discharge Planning Note    Anticipated Discharge Date: TBD    Anticipated Discharge Location: Home    Clinical Needs Before Discharge:  stable functional status, stable oxygen requirement, and stable vital signs    Treatment Needs After Discharge:   PT/OT will evaluate and identify treatment needs    Potential Barriers to Discharge: None identified at this time     LAURA Ferraro  3/6/2025,  12:31 PM

## 2025-03-06 NOTE — PROGRESS NOTES
Pt is nauseated.Throughout the am. Medicated. Unable to drink all the lactulose. Up in chair for 2 hours. 02 sats 94% on high flow. Dry non productive cough. Enema compound given.

## 2025-03-06 NOTE — PROGRESS NOTES
"Pt chest pain is gone. Attempting to drink the GI cocktail slowly with lots of encouragement. \"Tastes terrible.\"  "

## 2025-03-06 NOTE — PROGRESS NOTES
Pt complaining of chest pain. Dr Carter called. Orders obtained.VSS. EKG obtained.Daughter at bedside.

## 2025-03-06 NOTE — PROGRESS NOTES
Pt Alert and able to make needs known. Reporting pain in RUQ w/ palpation. Last BM 2/28/25. Bowel sounds present in all quadrants.    Randy Yoon RN on 3/6/2025 at 3:34 AM

## 2025-03-06 NOTE — PROGRESS NOTES
Patient on BiPAP 16/6, 35% overnight.  Had a break from BiPAP, after being placed back on BiPAP per patient not tolerating pressures.  Decreased settings to 14/6 per patient a little better.  Placed on Vapotherm after RN spoke with Tele-ICU.  Vapotherm settings of 40L, 50% tolerating at this time.  Dayshift RN updated.  Scheduled nebs given, increased aeration post tx.

## 2025-03-06 NOTE — PLAN OF CARE
"  Problem: Adult Inpatient Plan of Care  Goal: Plan of Care Review  Description: The Plan of Care Review/Shift note should be completed every shift.  The Outcome Evaluation is a brief statement about your assessment that the patient is improving, declining, or no change.  This information will be displayed automatically on your shift  note.  Recent Flowsheet Documentation  Taken 3/6/2025 1422 by Uma Staples RN  Plan of Care Reviewed With: patient  Overall Patient Progress: improving  Goal: Patient-Specific Goal (Individualized)  Description: You can add care plan individualizations to a care plan. Examples of Individualization might be:  \"Parent requests to be called daily at 9am for status\", \"I have a hard time hearing out of my right ear\", or \"Do not touch me to wake me up as it startles  me\".  Recent Flowsheet Documentation  Taken 3/6/2025 0730 by Uma Staples RN  Individualized Care Needs: respiratory monitoring  Anxieties, Fears or Concerns: breathing  Goal: Absence of Hospital-Acquired Illness or Injury  Intervention: Identify and Manage Fall Risk  Recent Flowsheet Documentation  Taken 3/6/2025 0730 by Uma Staples RN  Safety Promotion/Fall Prevention:   activity supervised   clutter free environment maintained   lighting adjusted   nonskid shoes/slippers when out of bed   patient and family education   supervised activity  Intervention: Prevent Skin Injury  Recent Flowsheet Documentation  Taken 3/6/2025 1400 by Uma Staples RN  Body Position:   left   turned  Taken 3/6/2025 1200 by Uma Staples RN  Body Position:   right   turned  Taken 3/6/2025 1030 by Uma Staples RN  Body Position:   side-lying   left  Taken 3/6/2025 0730 by Uma Staples RN  Body Position:   turned   supine   neutral head position  Intervention: Prevent and Manage VTE (Venous Thromboembolism) Risk  Recent Flowsheet Documentation  Taken 3/6/2025 0730 by Jhoan" Uma SNYDER, RN  VTE Prevention/Management: SCDs on (sequential compression devices)  Goal: Optimal Comfort and Wellbeing  Intervention: Provide Person-Centered Care  Recent Flowsheet Documentation  Taken 3/6/2025 0730 by Uma Staples RN  Trust Relationship/Rapport:   care explained   emotional support provided   empathic listening provided   questions answered   thoughts/feelings acknowledged   Goal Outcome Evaluation:      Plan of Care Reviewed With: patient    Overall Patient Progress: improvingOverall Patient Progress: improving

## 2025-03-07 ENCOUNTER — APPOINTMENT (OUTPATIENT)
Dept: OCCUPATIONAL THERAPY | Facility: OTHER | Age: 65
DRG: 208 | End: 2025-03-07
Attending: INTERNAL MEDICINE
Payer: COMMERCIAL

## 2025-03-07 ENCOUNTER — APPOINTMENT (OUTPATIENT)
Dept: SPEECH THERAPY | Facility: OTHER | Age: 65
End: 2025-03-07
Payer: COMMERCIAL

## 2025-03-07 ENCOUNTER — APPOINTMENT (OUTPATIENT)
Dept: PHYSICAL THERAPY | Facility: OTHER | Age: 65
DRG: 208 | End: 2025-03-07
Payer: COMMERCIAL

## 2025-03-07 LAB
ALLEN'S TEST: NO
ANION GAP SERPL CALCULATED.3IONS-SCNC: 10 MMOL/L (ref 7–15)
ATRIAL RATE - MUSE: 110 BPM
ATRIAL RATE - MUSE: 140 BPM
ATRIAL RATE - MUSE: 92 BPM
BASE EXCESS BLDA CALC-SCNC: 13.7 MMOL/L (ref -3–3)
BUN SERPL-MCNC: 30 MG/DL (ref 8–23)
CALCIUM SERPL-MCNC: 9.1 MG/DL (ref 8.8–10.4)
CHLORIDE SERPL-SCNC: 96 MMOL/L (ref 98–107)
CREAT SERPL-MCNC: 0.63 MG/DL (ref 0.51–0.95)
DIASTOLIC BLOOD PRESSURE - MUSE: NORMAL MMHG
EGFRCR SERPLBLD CKD-EPI 2021: >90 ML/MIN/1.73M2
ERYTHROCYTE [DISTWIDTH] IN BLOOD BY AUTOMATED COUNT: 13 % (ref 10–15)
GLUCOSE BLDC GLUCOMTR-MCNC: 163 MG/DL (ref 70–99)
GLUCOSE SERPL-MCNC: 80 MG/DL (ref 70–99)
HCO3 BLD-SCNC: 41 MMOL/L (ref 21–28)
HCO3 SERPL-SCNC: 37 MMOL/L (ref 22–29)
HCT VFR BLD AUTO: 37 % (ref 35–47)
HGB BLD-MCNC: 12.3 G/DL (ref 11.7–15.7)
INTERPRETATION ECG - MUSE: NORMAL
MAGNESIUM SERPL-MCNC: 2.5 MG/DL (ref 1.7–2.3)
MCH RBC QN AUTO: 30.4 PG (ref 26.5–33)
MCHC RBC AUTO-ENTMCNC: 33.2 G/DL (ref 31.5–36.5)
MCV RBC AUTO: 91 FL (ref 78–100)
O2/TOTAL GAS SETTING VFR VENT: 40 %
OXYHGB MFR BLDA: 89 % (ref 92–100)
P AXIS - MUSE: 84 DEGREES
P AXIS - MUSE: 88 DEGREES
P AXIS - MUSE: 89 DEGREES
PCO2 BLD: 60 MM HG (ref 35–45)
PEEP: 5 CM H2O
PH BLD: 7.44 [PH] (ref 7.35–7.45)
PLATELET # BLD AUTO: 213 10E3/UL (ref 150–450)
PO2 BLD: 55 MM HG (ref 80–105)
POTASSIUM SERPL-SCNC: 3.4 MMOL/L (ref 3.4–5.3)
PR INTERVAL - MUSE: 120 MS
PR INTERVAL - MUSE: 124 MS
PR INTERVAL - MUSE: 128 MS
QRS DURATION - MUSE: 108 MS
QT - MUSE: 288 MS
QT - MUSE: 314 MS
QT - MUSE: 330 MS
QTC - MUSE: 408 MS
QTC - MUSE: 424 MS
QTC - MUSE: 439 MS
R AXIS - MUSE: -39 DEGREES
R AXIS - MUSE: -84 DEGREES
R AXIS - MUSE: 264 DEGREES
RBC # BLD AUTO: 4.05 10E6/UL (ref 3.8–5.2)
SAO2 % BLDA: 90.3 % (ref 96–97)
SODIUM SERPL-SCNC: 143 MMOL/L (ref 135–145)
SYSTOLIC BLOOD PRESSURE - MUSE: NORMAL MMHG
T AXIS - MUSE: 60 DEGREES
T AXIS - MUSE: 63 DEGREES
T AXIS - MUSE: 67 DEGREES
VENTRICULAR RATE- MUSE: 110 BPM
VENTRICULAR RATE- MUSE: 140 BPM
VENTRICULAR RATE- MUSE: 92 BPM
WBC # BLD AUTO: 13.9 10E3/UL (ref 4–11)

## 2025-03-07 PROCEDURE — 97165 OT EVAL LOW COMPLEX 30 MIN: CPT | Mod: GO | Performed by: OCCUPATIONAL THERAPIST

## 2025-03-07 PROCEDURE — 94640 AIRWAY INHALATION TREATMENT: CPT

## 2025-03-07 PROCEDURE — 84520 ASSAY OF UREA NITROGEN: CPT | Performed by: INTERNAL MEDICINE

## 2025-03-07 PROCEDURE — 85027 COMPLETE CBC AUTOMATED: CPT | Performed by: INTERNAL MEDICINE

## 2025-03-07 PROCEDURE — 36600 WITHDRAWAL OF ARTERIAL BLOOD: CPT | Performed by: INTERNAL MEDICINE

## 2025-03-07 PROCEDURE — 250N000011 HC RX IP 250 OP 636: Performed by: INTERNAL MEDICINE

## 2025-03-07 PROCEDURE — 80048 BASIC METABOLIC PNL TOTAL CA: CPT | Performed by: INTERNAL MEDICINE

## 2025-03-07 PROCEDURE — 250N000013 HC RX MED GY IP 250 OP 250 PS 637: Performed by: INTERNAL MEDICINE

## 2025-03-07 PROCEDURE — 250N000012 HC RX MED GY IP 250 OP 636 PS 637: Performed by: INTERNAL MEDICINE

## 2025-03-07 PROCEDURE — 92610 EVALUATE SWALLOWING FUNCTION: CPT | Mod: GN | Performed by: SPEECH-LANGUAGE PATHOLOGIST

## 2025-03-07 PROCEDURE — 97116 GAIT TRAINING THERAPY: CPT | Mod: GP

## 2025-03-07 PROCEDURE — 97530 THERAPEUTIC ACTIVITIES: CPT | Mod: GP

## 2025-03-07 PROCEDURE — 99233 SBSQ HOSP IP/OBS HIGH 50: CPT | Performed by: INTERNAL MEDICINE

## 2025-03-07 PROCEDURE — 999N000157 HC STATISTIC RCP TIME EA 10 MIN

## 2025-03-07 PROCEDURE — 200N000001 HC R&B ICU

## 2025-03-07 PROCEDURE — 97535 SELF CARE MNGMENT TRAINING: CPT | Mod: GO | Performed by: OCCUPATIONAL THERAPIST

## 2025-03-07 PROCEDURE — 82805 BLOOD GASES W/O2 SATURATION: CPT | Performed by: INTERNAL MEDICINE

## 2025-03-07 PROCEDURE — 94640 AIRWAY INHALATION TREATMENT: CPT | Mod: 76

## 2025-03-07 PROCEDURE — 94799 UNLISTED PULMONARY SVC/PX: CPT

## 2025-03-07 PROCEDURE — 97161 PT EVAL LOW COMPLEX 20 MIN: CPT | Mod: GP

## 2025-03-07 PROCEDURE — 250N000009 HC RX 250: Performed by: INTERNAL MEDICINE

## 2025-03-07 PROCEDURE — 83735 ASSAY OF MAGNESIUM: CPT | Performed by: INTERNAL MEDICINE

## 2025-03-07 RX ORDER — PANTOPRAZOLE SODIUM 40 MG/1
40 TABLET, DELAYED RELEASE ORAL
Status: DISCONTINUED | OUTPATIENT
Start: 2025-03-07 | End: 2025-03-12 | Stop reason: HOSPADM

## 2025-03-07 RX ORDER — AZITHROMYCIN 250 MG/1
250 TABLET, FILM COATED ORAL DAILY
Status: DISCONTINUED | OUTPATIENT
Start: 2025-03-07 | End: 2025-03-07

## 2025-03-07 RX ORDER — MAGNESIUM HYDROXIDE/ALUMINUM HYDROXICE/SIMETHICONE 120; 1200; 1200 MG/30ML; MG/30ML; MG/30ML
15 SUSPENSION ORAL 3 TIMES DAILY PRN
Status: DISCONTINUED | OUTPATIENT
Start: 2025-03-07 | End: 2025-03-12 | Stop reason: HOSPADM

## 2025-03-07 RX ORDER — PREDNISONE 20 MG/1
40 TABLET ORAL DAILY
Status: DISCONTINUED | OUTPATIENT
Start: 2025-03-07 | End: 2025-03-09

## 2025-03-07 RX ORDER — AZITHROMYCIN 250 MG/1
250 TABLET, FILM COATED ORAL DAILY
Status: DISCONTINUED | OUTPATIENT
Start: 2025-03-08 | End: 2025-03-12 | Stop reason: HOSPADM

## 2025-03-07 RX ADMIN — PANTOPRAZOLE SODIUM 40 MG: 40 TABLET, DELAYED RELEASE ORAL at 09:26

## 2025-03-07 RX ADMIN — BUDESONIDE 0.5 MG: 0.5 INHALANT RESPIRATORY (INHALATION) at 18:10

## 2025-03-07 RX ADMIN — IPRATROPIUM BROMIDE AND ALBUTEROL SULFATE 3 ML: .5; 3 SOLUTION RESPIRATORY (INHALATION) at 06:07

## 2025-03-07 RX ADMIN — METHYLPREDNISOLONE SODIUM SUCCINATE 62.5 MG: 125 INJECTION, POWDER, FOR SOLUTION INTRAMUSCULAR; INTRAVENOUS at 05:23

## 2025-03-07 RX ADMIN — IPRATROPIUM BROMIDE AND ALBUTEROL SULFATE 3 ML: .5; 3 SOLUTION RESPIRATORY (INHALATION) at 18:10

## 2025-03-07 RX ADMIN — IPRATROPIUM BROMIDE AND ALBUTEROL SULFATE 3 ML: .5; 3 SOLUTION RESPIRATORY (INHALATION) at 10:46

## 2025-03-07 RX ADMIN — PREDNISONE 40 MG: 20 TABLET ORAL at 09:26

## 2025-03-07 RX ADMIN — ONDANSETRON 4 MG: 2 INJECTION INTRAMUSCULAR; INTRAVENOUS at 00:29

## 2025-03-07 RX ADMIN — IPRATROPIUM BROMIDE AND ALBUTEROL SULFATE 3 ML: .5; 3 SOLUTION RESPIRATORY (INHALATION) at 23:06

## 2025-03-07 RX ADMIN — ONDANSETRON 4 MG: 2 INJECTION INTRAMUSCULAR; INTRAVENOUS at 09:39

## 2025-03-07 RX ADMIN — ENOXAPARIN SODIUM 30 MG: 30 INJECTION SUBCUTANEOUS at 09:21

## 2025-03-07 RX ADMIN — IPRATROPIUM BROMIDE AND ALBUTEROL SULFATE 3 ML: .5; 3 SOLUTION RESPIRATORY (INHALATION) at 02:40

## 2025-03-07 RX ADMIN — ALBUTEROL SULFATE 2.5 MG: 2.5 SOLUTION RESPIRATORY (INHALATION) at 01:25

## 2025-03-07 RX ADMIN — IPRATROPIUM BROMIDE AND ALBUTEROL SULFATE 3 ML: .5; 3 SOLUTION RESPIRATORY (INHALATION) at 14:36

## 2025-03-07 RX ADMIN — BUDESONIDE 1 MG: 0.5 INHALANT RESPIRATORY (INHALATION) at 06:07

## 2025-03-07 RX ADMIN — FUROSEMIDE 40 MG: 10 INJECTION, SOLUTION INTRAVENOUS at 01:37

## 2025-03-07 ASSESSMENT — ACTIVITIES OF DAILY LIVING (ADL)
ADLS_ACUITY_SCORE: 59
ADLS_ACUITY_SCORE: 57
ADLS_ACUITY_SCORE: 59
ADLS_ACUITY_SCORE: 57
ADLS_ACUITY_SCORE: 59
ADLS_ACUITY_SCORE: 57
ADLS_ACUITY_SCORE: 59
ADLS_ACUITY_SCORE: 57
ADLS_ACUITY_SCORE: 57
ADLS_ACUITY_SCORE: 59
ADLS_ACUITY_SCORE: 57
ADLS_ACUITY_SCORE: 57

## 2025-03-07 NOTE — PROGRESS NOTES
Pt weaned from Vapotherm to HFNC at 6 LPM. SpO2 currently 97%.   Madonna Keating RN on 3/7/2025 at 5:58 PM    Pt weaned to 2 LPM via NC. SpO2 97%.  Madonna Keating RN on 3/7/2025 at 6:17 PM

## 2025-03-07 NOTE — PLAN OF CARE
Goal Outcome Evaluation:      Plan of Care Reviewed With: patient    Overall Patient Progress: improvingOverall Patient Progress: improving  Nods with all questions. She continues to whisper. Daughter brought in Banana popsicles. Continues to drink her GI cocktail slowly. Will attempt her lactulose after.Abdomen is . Chest pain is gone. Family at bedside. 1 loose watery stool.

## 2025-03-07 NOTE — PROGRESS NOTES
Clinical Nutrition / reassessment     Assessment:   Client History:  pt extubated 3/5. Enteral feedings were provided until this date as well. SLP eval indicating pureed diet at this time. Encourage small frequent high rhea/high protein meals/snacks and supplements.   Diet Order: pureed, thin liquids  Oral Intake:  monitor and record please  Supplement Intake: Ensure TID on trays   Weight:   Vitals:    02/27/25 0247 02/27/25 0519 03/01/25 0036 03/02/25 0327   Weight: 39.9 kg (88 lb) 37.9 kg (83 lb 8 oz) 45.4 kg (100 lb 1.4 oz) 48.1 kg (106 lb 0.7 oz)    03/03/25 0200 03/04/25 0600 03/07/25 0600   Weight: 51 kg (112 lb 7 oz) 47.8 kg (105 lb 6.1 oz) 40.4 kg (89 lb 1.1 oz)    Body mass index is 16.29 kg/m .    Estimated nutritional needs based on:  ideal body weight 50 kg / 110 lbs   Estimated energy needs:  2560-4444 kcal/day (25-30 kcal/kg)  Estimated protein needs:  60-90 gm/day (1.2-1.5 g/kg)    Malnutrition Criteria:  (Need to have 2 indicators to qualify recommendation)  Energy Intake:  Chronic Moderate: < 75% of estimated energy requirement for >/= 1 month  Interpretation of Weight Loss:  Acute Severe:   > 5% in 1 month  Physical Findings:  Chronic Body Fat Loss:  severe and Chronic Muscle Mass Loss:  severe  Reduced  Strength:  Not Measured    Recommended Nutrition Diagnosis:   Severe Malnutrition in the context of chronic illness - based on AND/ASPEN Clinical Characterstics of Malnutrition May 2012  Malnutrition:    - Level of malnutrition: Severe       Nutrition Education: Nutrition education will be provided as appropriate.    Nutrition Diagnosis: Weight:  weight loss/underweight related to inadequate energy intakes vs expenditure as evidenced by BMI at 15, ~7# wt loss in past month.    Intervention:  Nutrition Intervention(s):Recommended general, healthful diet-encourage small/frequent meals and snacks, high protein, high calorie  1. Meals and Snacks: pureed diet texture at this time, thin liquids  2.  Medical Food Supplement: Ensure TID on trays    Nutrition Goal(s):  1. Pt will tolerate diet as ordered, texture per speech therapy recommendations  2. Pt will consume 50% or more of meals and supplements  3. Pt will not have unplanned wt loss during hospitalization      Monitoring and Evaluation:   Feeding tolerance, weights, diet advancement     Discharge Recommendation:   Nutrition Discharge Planning  Supplements on discharge to promote nutrition status.     RD will reassess in within 1-5 days or sooner.  Theresa Loepz RD on 3/7/2025 at 1:41 PM

## 2025-03-07 NOTE — PROGRESS NOTES
Pt remains on Vapotherm 30L 40%. Will continue to titrate as able. Nebs give inline as scheduled. Pt tolerating well. Unable to perform IS and Aerobika today. No further respiratory interventions. Lynne Bliss, RRT

## 2025-03-07 NOTE — PROGRESS NOTES
Interdisciplinary Discharge Planning Note    Anticipated Discharge Date: TBD    Anticipated Discharge Location: TBD    Clinical Needs Before Discharge:  adequate fluid and/or nutritional intake, stable functional status, and stable oxygen requirement    Treatment Needs After Discharge:   PT/OT Eval to identify treatment needs    Potential Barriers to Discharge: None identified at this time     Eugene Dominguez LG  3/7/2025,  3:21 PM

## 2025-03-07 NOTE — PROGRESS NOTES
Patient has increased shortness of breath, breath sounds coarse and diminished throughout.  PRN Albuterol given.  No change in breath sounds or shortness of breath.  RN stated she will see about giving lasix.  Remains on Vapotherm 30L, 40%, SpO2 of 94%.

## 2025-03-07 NOTE — PROGRESS NOTES
Patient remained on Vapotherm 30L, 40% overnight.  Breath sounds diminished with crackles.  Had a productive cough twice overnight.  1 PRN nebulizer given.  Scheduled nebs given as ordered  Flutter valve done on level 1 for 6 breaths this morning.  Patient unable to use incentive spirometer this morning.

## 2025-03-07 NOTE — PROGRESS NOTES
"[facility]    Hospitalist Progress Note      Assessment & Plan   Ember Tavares is a 64 year old female who was admitted on 2/27/2025.     Clinically Significant Risk Factors          # Hypochloremia: Lowest Cl = 96 mmol/L in last 2 days, will monitor as appropriate      # Hypoalbuminemia: Lowest albumin = 3.2 g/dL at 3/4/2025  5:27 AM, will monitor as appropriate         # Acute Hypoxic Respiratory Failure: Documented O2 saturation < 90%. Continue supplemental oxygen as needed  # Acute Hypercapnic Respiratory Failure: based on arterial blood gas results.  Continue supplemental oxygen and ventilatory support as indicated.         # Cachexia: Estimated body mass index is 16.29 kg/m  as calculated from the following:    Height as of this encounter: 1.575 m (5' 2\").    Weight as of this encounter: 40.4 kg (89 lb 1.1 oz).   # Severe Malnutrition: based on nutrition assessment and treatment provided per dietitian's recommendations.    # COPD: noted on problem list       Principal Problem:    Acute respiratory failure with hypoxia and hypercapnia (H)   COPD exacerbation    Alpha-1-antitrypsin deficiency carrier  Tobacco abuse   Shock  Assessment: Improving. Presented with initial COPD exacerbation, started on antibiotics steroids and nebulizers, respiratory status deteriorated and intubated 3/1.  Net negative with IV diuresis diuresis on 3/3.  Successful spontaneous breathing trial and extubation on 3/5, required bipap postextubation and transition to Vapotherm.  Mental status significantly improved and able to tolerate p.o. intake today.  Moved bowels on 3/6.    Plan:   -continue scheduled DuoNebs  -Hypertonic nebs as needed  -Acapella and IS today  -Transition IV Methylpred to p.o. prednisone 40 mg daily day 5   -Discontinue azithromycin after completing 7-day course  -SLP evaluation, aspiration precaution and routine oral cares  -Diuretic holiday today  -Discontinue Peters     Pulmonary cachexia " due to chronic obstructive pulmonary disease (H)   Severe protein calorie malnutrition    Assessment: Present on admission she had tube feeds on 3/3.    Plan:   -SLP evaluation and scheduled supplements  -Scheduled supplements and advance diet as tolerated today  -Hold bowel regiment given frequent loose stools on 3/6      Hiatal hernia    Assessment: Stable, present on admission, status post Nissen in the past    Plan:   -continue PPI  -maalox     Diet: Full Liquid Diet  Snacks/Supplements Adult: Ensure Enlive; With Meals    DVT Prophylaxis: Enoxaparin (Lovenox) SQ and Pneumatic Compression Devices  Peters Catheter: Not present  Code Status: Full Code           Disposition Plan      Entered: Crispin Jacobson MD 03/07/2025, 12:55 PM       The patient's care was discussed with the Bedside Nurse, Patient, and Patient's Family with .    Crispin Jacobson MD  Hospitalist Service  [facility]  Contact information available via [facility] Paging/Directory    ______________________________________________________________________    Interval History   CC: COPD exacerbation    Overnight no acute events and afebrile, frequent loose stools overnight, now her abdominal pain is resolved, she feels hungry this morning, eager to try advancing her diet no new shortness of breath cough wheezing orthopnea chest pain further nausea Peters catheter is comfortable but she would like it removed no increased lower extremity edema or other complaints.    -Data reviewed today: I reviewed all new labs and imaging results over the last 24 hours.     Physical Exam   Temp: 98.8  F (37.1  C) Temp src: Tympanic BP: 133/76 Pulse: 112   Resp: 26 SpO2: (!) 90 % O2 Device: High Flow Nasal Cannula (HFNC) Oxygen Delivery: 25 LPM  Vitals:    03/03/25 0200 03/04/25 0600 03/07/25 0600   Weight: 51 kg (112 lb 7 oz) 47.8 kg (105 lb 6.1 oz) 40.4 kg (89 lb 1.1 oz)     Vital Signs with Ranges  Temp:  [97  F (36.1  C)-99.2  F (37.3  C)] 98.8  F (37.1   C)  Pulse:  [] 112  Resp:  [14-38] 26  BP: (114-142)/(63-89) 133/76  FiO2 (%):  [35 %-40 %] 40 %  SpO2:  [86 %-96 %] 90 %  I/O last 3 completed shifts:  In: 312 [P.O.:58; IV Piggyback:254]  Out: 3950 [Urine:3950]    GENERAL: Sitting up in bed, spontaneously awake,, talking, tracking, no apparent distress.  HEENT: Right IJ CVC covered Tegaderm, clean dry.  CARDIOVASCULAR: Regular rate and regular rhythm, no murmurs, rubs, or gallops. Normal S1/S2. No lower extremity edema.   RESPIRATORY: Diffuse air movement in all fields, no significant wheezing or rhonchi.  GI: soft, non-tender the palpation in all quadrants, hyperactive bowel sounds.    : Peters catheter in place with clear yellow urine  MUSCULOSKELETAL: warm and well perfused, 2+ dorsalis pedis pulses bilaterally.    SKIN: no pallor,jaundice, or rashes.       Medications   Current Facility-Administered Medications   Medication Dose Route Frequency Provider Last Rate Last Admin    dextrose 10% infusion   Intravenous Continuous PRN Crispin Jacobson MD        Patient may continue current oral medications   Does not apply Continuous PRN Crispin Jacobson MD         Current Facility-Administered Medications   Medication Dose Route Frequency Provider Last Rate Last Admin    budesonide (PULMICORT) neb solution 1 mg  1 mg Nebulization BID Jose De Jesus Hannah MD   1 mg at 03/07/25 0607    enoxaparin ANTICOAGULANT (LOVENOX) injection 30 mg  30 mg Subcutaneous Q24H Jose De Jesus Hannah MD   30 mg at 03/07/25 0921    [Held by provider] furosemide (LASIX) injection 40 mg  40 mg Intravenous Q12H Crispin Jacobson MD   40 mg at 03/07/25 0137    ipratropium - albuterol 0.5 mg/2.5 mg/3 mL (DUONEB) neb solution 3 mL  3 mL Nebulization Q4H Crispin Jacobson MD   3 mL at 03/07/25 1046    pantoprazole (PROTONIX) EC tablet 40 mg  40 mg Oral QAM AC Crispin Jacobson MD   40 mg at 03/07/25 0926    predniSONE (DELTASONE) tablet 40 mg  40 mg Oral Daily Crispin Jacobson MD   40 mg at 03/07/25 0953     umeclidinium (INCRUSE ELLIPTA) 62.5 MCG/ACT inhaler 1 puff  1 puff Inhalation Daily Jose De Jesus Hannah MD           Data   Recent Labs   Lab 03/07/25  0505 03/06/25  0503 03/05/25  0758 03/05/25  0427 03/04/25  1226 03/04/25  0527   WBC 13.9* 19.4*  --  18.2*  --  13.3*   HGB 12.3 12.1  --  11.8  --  12.1   MCV 91 95  --  93  --  92    193  --  196  --  181    144  --  143  --  146*   POTASSIUM 3.4 3.8  --  3.8  --  3.5   CHLORIDE 96* 105  --  101  --  108*   CO2 37* 29  --  35*  --  28   BUN 30.0* 27.2*  --  25.9*  --  23.0   CR 0.63 0.57  --  0.53  --  0.54   ANIONGAP 10 10  --  7  --  10   COLEMAN 9.1 9.1  --  8.3*  --  8.3*   GLC 80 101* 191* 218*   < > 271*   ALBUMIN  --   --   --   --   --  3.2*   PROTTOTAL  --   --   --   --   --  5.4*   BILITOTAL  --   --   --   --   --  0.3   ALKPHOS  --   --   --   --   --  67   ALT  --   --   --   --   --  43   AST  --   --   --   --   --  21    < > = values in this interval not displayed.       No results found for this or any previous visit (from the past 24 hours).

## 2025-03-07 NOTE — PLAN OF CARE
Problem: Fall Injury Risk  Goal: Absence of Fall and Fall-Related Injury  Outcome: Progressing  Intervention: Promote Injury-Free Environment  Recent Flowsheet Documentation  Taken 3/6/2025 2000 by Bora Nelson RN  Safety Promotion/Fall Prevention:   activity supervised   clutter free environment maintained   increased rounding and observation   increase visualization of patient   lighting adjusted   nonskid shoes/slippers when out of bed   room near nurse's station   room door open     Problem: Gas Exchange Impaired  Goal: Optimal Gas Exchange  Outcome: Progressing  Intervention: Optimize Oxygenation and Ventilation  Recent Flowsheet Documentation  Taken 3/7/2025 0500 by Bora Nelson RN  Head of Bed (HOB) Positioning: HOB at 45 degrees  Taken 3/7/2025 0300 by Bora Nelson RN  Head of Bed (HOB) Positioning: HOB at 45 degrees  Taken 3/7/2025 0100 by Bora Nelson RN  Head of Bed (HOB) Positioning: HOB at 30-45 degrees  Taken 3/6/2025 2330 by Bora Nelson RN  Head of Bed (HOB) Positioning: HOB at 30-45 degrees  Taken 3/6/2025 2100 by Bora Nelson RN  Head of Bed (HOB) Positioning: HOB at 30 degrees  Taken 3/6/2025 2000 by Bora Nelson RN  Head of Bed (HOB) Positioning: HOB at 20-30 degrees     Problem: Adult Inpatient Plan of Care  Goal: Plan of Care Review  Outcome: Progressing  Flowsheets (Taken 3/7/2025 0616)  Outcome Evaluation: On Vapotherm throughout night w/ settings as followed 30 L, 40% FiO2 to maintain O2 sats >88%. Lung sounds ausculated w/ intermittent wheezes noted and coarse crackles. Pt felt an increase in WOB and SOB, IV Lasix given early w/ improvement. Peters intact w/ approximately 1.5 L out overnight. Pt reported headache but refused Tylenol. Bowel sounds hyperactive w/ multiple loose stools throughout night. Pt did not sleep well. She is able to use call light, but has been difficult to understand her needs, she is unable to speak in a regular tone  (she has been whispering), communication board used along w/ spouse's assistance for communication.  Plan of Care Reviewed With:   patient   spouse  Overall Patient Progress: improving  Goal: Patient-Specific Goal (Individualized)  Outcome: Progressing  Goal: Absence of Hospital-Acquired Illness or Injury  Outcome: Progressing  Intervention: Identify and Manage Fall Risk  Recent Flowsheet Documentation  Taken 3/6/2025 2000 by Bora Nelson RN  Safety Promotion/Fall Prevention:   activity supervised   clutter free environment maintained   increased rounding and observation   increase visualization of patient   lighting adjusted   nonskid shoes/slippers when out of bed   room near nurse's station   room door open  Intervention: Prevent Skin Injury  Recent Flowsheet Documentation  Taken 3/7/2025 0500 by Bora Nelson RN  Body Position: position maintained  Taken 3/7/2025 0300 by Bora Nelson RN  Body Position: position maintained  Taken 3/7/2025 0100 by Bora Nelson RN  Body Position: supine, head elevated  Taken 3/6/2025 2330 by Bora Nelson RN  Body Position:   turned   right  Taken 3/6/2025 2100 by Bora Nelson RN  Body Position:   turned   left  Taken 3/6/2025 2000 by Bora Nelson RN  Body Position: position maintained  Intervention: Prevent and Manage VTE (Venous Thromboembolism) Risk  Recent Flowsheet Documentation  Taken 3/6/2025 2000 by Bora Nelson RN  VTE Prevention/Management: SCDs on (sequential compression devices)  Goal: Optimal Comfort and Wellbeing  Outcome: Progressing  Intervention: Provide Person-Centered Care  Recent Flowsheet Documentation  Taken 3/6/2025 2000 by Bora Nelson RN  Trust Relationship/Rapport:   care explained   emotional support provided   empathic listening provided   questions answered   thoughts/feelings acknowledged  Goal: Readiness for Transition of Care  Outcome: Progressing     Problem: Risk for Delirium  Goal:  Optimal Coping  Outcome: Progressing  Intervention: Optimize Psychosocial Adjustment to Delirium  Recent Flowsheet Documentation  Taken 3/6/2025 2000 by Bora Nelson RN  Supportive Measures:   active listening utilized   decision-making supported  Goal: Improved Behavioral Control  Outcome: Progressing  Intervention: Prevent and Manage Agitation  Recent Flowsheet Documentation  Taken 3/6/2025 2000 by Bora Nelson RN  Environment Familiarity/Consistency: familiar objects from home provided  Intervention: Minimize Safety Risk  Recent Flowsheet Documentation  Taken 3/6/2025 2000 by Bora Nelson RN  Enhanced Safety Measures: pain management  Trust Relationship/Rapport:   care explained   emotional support provided   empathic listening provided   questions answered   thoughts/feelings acknowledged  Goal: Improved Attention and Thought Clarity  Outcome: Progressing  Intervention: Maximize Cognitive Function  Recent Flowsheet Documentation  Taken 3/6/2025 2000 by Bora Nelson RN  Sensory Stimulation Regulation: quiet environment promoted  Reorientation Measures:   calendar in view   clock in view   reorientation provided  Goal: Improved Sleep  Outcome: Progressing   Goal Outcome Evaluation:      Plan of Care Reviewed With: patient, spouse    Overall Patient Progress: improvingOverall Patient Progress: improving    Outcome Evaluation: On Vapotherm throughout night w/ settings as followed 30 L, 40% FiO2 to maintain O2 sats >88%. Lung sounds ausculated w/ intermittent wheezes noted and coarse crackles. Pt felt an increase in WOB and SOB, IV Lasix given early w/ improvement. Peters intact w/ approximately 1.5 L out overnight. Pt reported headache but refused Tylenol. Bowel sounds hyperactive w/ multiple loose stools throughout night. Pt did not sleep well. She is able to use call light, but has been difficult to understand her needs, she is unable to speak in a regular tone (she has been whispering),  communication board used along w/ spouse's assistance for communication.    Bora Nelson RN on 3/7/2025 at 6:22 AM

## 2025-03-07 NOTE — PROGRESS NOTES
03/07/25 1632   Appointment Info   Signing Clinician's Name / Credentials (OT) Jordana Spence OTR/L   Living Environment   People in Home spouse   Current Living Arrangements house   Living Environment Comments pt was active and independent with all activities prior to admit, did not use AD for mobility   Self-Care   Usual Activity Tolerance good   Current Activity Tolerance poor   General Information   Referring Physician Lick   General Observations and Info pt on 20 lpm vapotherm, 90% sats at rest and with activity.   Cognitive Status Examination   Orientation Status orientation to person, place and time   Bed Mobility   Bed Mobility supine-sit   Supine-Sit [place] (Bed Mobility) moderate assist (50% patient effort);2 person assist   Assistive Device (Bed Mobility) bed rails;draw sheet   Transfers   Transfers sit-stand transfer;toilet transfer   Sit-Stand Transfer   Sit-Stand [place] (Transfers) moderate assist (50% patient effort);2 person assist   Toilet Transfer   Type (Toilet Transfer) stand pivot/stand step   [place] Level (Toilet Transfer) moderate assist (50% patient effort);2 person assist;1 person to manage equipment   Clinical Impression   Criteria for Skilled Therapeutic Interventions Met (OT) Yes, treatment indicated   OT Problem List-Impairments impacting ADL mobility;activity tolerance impaired;strength   Identified Performance Deficits self cares,mobility   Planned Therapy Interventions (OT) ADL retraining;bed mobility training;progressive activity/exercise   Clinical Decision Making Complexity (OT) problem focused assessment/low complexity   Risk & Benefits of therapy have been explained patient   OT Total Evaluation Time   OT Eval, Low Complexity Minutes (70558) 15   OT Goals   Therapy Frequency (OT) Daily   OT Predicted Duration/Target Date for Goal Attainment 03/11/25   OT Goals Toilet Transfer/Toileting;Lower Body Dressing;Bed Mobility   OT: Lower Body Dressing Minimal assist    OT: Bed Mobility Supervision/stand-by assist   OT: Toilet Transfer/Toileting Supervision/stand-by assist   Interventions   Interventions Quick Adds Self-Care/Home Management   Self-Care/Home Management   Self-Care/Home Mgmt/ADL, Compensatory, Meal Prep Minutes (29276) 25   Symptoms Noted During/After Treatment (Meal Preparation/Planning Training) shortness of breath;fatigue   Treatment Detail/Skilled Intervention bed mobility mod assist x 2; transfer to bedside commode with assist x 2 to manage the equipment and for support in standing.  Pt very weak, only able to take a few steps towards commode   OT Discharge Planning   OT Plan increase functional activity as able   OT Discharge Recommendation (DC Rec) Transitional Care Facility;home with home care occupational therapy   OT Rationale for DC Rec pt currently deconditioned and requiring extensive assist due to high volume of oxygen needs and SOB. Will continue to monitor and assist with discharge planning as needed.   OT Brief overview of current status see above

## 2025-03-08 ENCOUNTER — APPOINTMENT (OUTPATIENT)
Dept: PHYSICAL THERAPY | Facility: OTHER | Age: 65
DRG: 208 | End: 2025-03-08
Attending: INTERNAL MEDICINE
Payer: COMMERCIAL

## 2025-03-08 ENCOUNTER — APPOINTMENT (OUTPATIENT)
Dept: OCCUPATIONAL THERAPY | Facility: OTHER | Age: 65
DRG: 208 | End: 2025-03-08
Payer: COMMERCIAL

## 2025-03-08 LAB
ANION GAP SERPL CALCULATED.3IONS-SCNC: 8 MMOL/L (ref 7–15)
BACTERIA BLD CULT: NO GROWTH
BACTERIA BLD CULT: NO GROWTH
BUN SERPL-MCNC: 34 MG/DL (ref 8–23)
CALCIUM SERPL-MCNC: 9.3 MG/DL (ref 8.8–10.4)
CHLORIDE SERPL-SCNC: 101 MMOL/L (ref 98–107)
CREAT SERPL-MCNC: 0.54 MG/DL (ref 0.51–0.95)
EGFRCR SERPLBLD CKD-EPI 2021: >90 ML/MIN/1.73M2
ERYTHROCYTE [DISTWIDTH] IN BLOOD BY AUTOMATED COUNT: 12.8 % (ref 10–15)
GLUCOSE SERPL-MCNC: 90 MG/DL (ref 70–99)
HCO3 SERPL-SCNC: 36 MMOL/L (ref 22–29)
HCT VFR BLD AUTO: 39.2 % (ref 35–47)
HGB BLD-MCNC: 12.7 G/DL (ref 11.7–15.7)
MAGNESIUM SERPL-MCNC: 2.6 MG/DL (ref 1.7–2.3)
MCH RBC QN AUTO: 30.5 PG (ref 26.5–33)
MCHC RBC AUTO-ENTMCNC: 32.4 G/DL (ref 31.5–36.5)
MCV RBC AUTO: 94 FL (ref 78–100)
PLATELET # BLD AUTO: 240 10E3/UL (ref 150–450)
POTASSIUM SERPL-SCNC: 3.7 MMOL/L (ref 3.4–5.3)
PREALB SERPL-MCNC: 20 MG/DL (ref 20–40)
RBC # BLD AUTO: 4.17 10E6/UL (ref 3.8–5.2)
SODIUM SERPL-SCNC: 145 MMOL/L (ref 135–145)
WBC # BLD AUTO: 13.7 10E3/UL (ref 4–11)

## 2025-03-08 PROCEDURE — 97530 THERAPEUTIC ACTIVITIES: CPT | Mod: GO

## 2025-03-08 PROCEDURE — 82310 ASSAY OF CALCIUM: CPT | Performed by: INTERNAL MEDICINE

## 2025-03-08 PROCEDURE — 250N000011 HC RX IP 250 OP 636: Performed by: INTERNAL MEDICINE

## 2025-03-08 PROCEDURE — 83735 ASSAY OF MAGNESIUM: CPT | Performed by: INTERNAL MEDICINE

## 2025-03-08 PROCEDURE — 120N000001 HC R&B MED SURG/OB

## 2025-03-08 PROCEDURE — 94640 AIRWAY INHALATION TREATMENT: CPT | Mod: 76

## 2025-03-08 PROCEDURE — 85018 HEMOGLOBIN: CPT | Performed by: INTERNAL MEDICINE

## 2025-03-08 PROCEDURE — 80048 BASIC METABOLIC PNL TOTAL CA: CPT | Performed by: INTERNAL MEDICINE

## 2025-03-08 PROCEDURE — 999N000157 HC STATISTIC RCP TIME EA 10 MIN

## 2025-03-08 PROCEDURE — 94799 UNLISTED PULMONARY SVC/PX: CPT

## 2025-03-08 PROCEDURE — 250N000009 HC RX 250: Performed by: INTERNAL MEDICINE

## 2025-03-08 PROCEDURE — 250N000013 HC RX MED GY IP 250 OP 250 PS 637: Performed by: INTERNAL MEDICINE

## 2025-03-08 PROCEDURE — 250N000012 HC RX MED GY IP 250 OP 636 PS 637: Performed by: INTERNAL MEDICINE

## 2025-03-08 PROCEDURE — 84134 ASSAY OF PREALBUMIN: CPT | Performed by: INTERNAL MEDICINE

## 2025-03-08 PROCEDURE — 99233 SBSQ HOSP IP/OBS HIGH 50: CPT | Performed by: INTERNAL MEDICINE

## 2025-03-08 PROCEDURE — 97530 THERAPEUTIC ACTIVITIES: CPT | Mod: GP

## 2025-03-08 PROCEDURE — 94640 AIRWAY INHALATION TREATMENT: CPT

## 2025-03-08 RX ADMIN — IPRATROPIUM BROMIDE AND ALBUTEROL SULFATE 3 ML: .5; 3 SOLUTION RESPIRATORY (INHALATION) at 12:24

## 2025-03-08 RX ADMIN — BUDESONIDE 1 MG: 0.5 INHALANT RESPIRATORY (INHALATION) at 19:02

## 2025-03-08 RX ADMIN — BUDESONIDE 1 MG: 0.5 INHALANT RESPIRATORY (INHALATION) at 07:57

## 2025-03-08 RX ADMIN — ONDANSETRON 4 MG: 2 INJECTION INTRAMUSCULAR; INTRAVENOUS at 01:35

## 2025-03-08 RX ADMIN — PANTOPRAZOLE SODIUM 40 MG: 40 TABLET, DELAYED RELEASE ORAL at 09:21

## 2025-03-08 RX ADMIN — ENOXAPARIN SODIUM 30 MG: 30 INJECTION SUBCUTANEOUS at 09:21

## 2025-03-08 RX ADMIN — PREDNISONE 40 MG: 20 TABLET ORAL at 09:21

## 2025-03-08 RX ADMIN — AZITHROMYCIN DIHYDRATE 250 MG: 250 TABLET, FILM COATED ORAL at 11:46

## 2025-03-08 RX ADMIN — IPRATROPIUM BROMIDE AND ALBUTEROL SULFATE 3 ML: .5; 3 SOLUTION RESPIRATORY (INHALATION) at 04:13

## 2025-03-08 RX ADMIN — IPRATROPIUM BROMIDE AND ALBUTEROL SULFATE 3 ML: .5; 3 SOLUTION RESPIRATORY (INHALATION) at 07:57

## 2025-03-08 RX ADMIN — IPRATROPIUM BROMIDE AND ALBUTEROL SULFATE 3 ML: .5; 3 SOLUTION RESPIRATORY (INHALATION) at 23:39

## 2025-03-08 RX ADMIN — IPRATROPIUM BROMIDE AND ALBUTEROL SULFATE 3 ML: .5; 3 SOLUTION RESPIRATORY (INHALATION) at 19:04

## 2025-03-08 RX ADMIN — IPRATROPIUM BROMIDE AND ALBUTEROL SULFATE 3 ML: .5; 3 SOLUTION RESPIRATORY (INHALATION) at 17:02

## 2025-03-08 ASSESSMENT — ACTIVITIES OF DAILY LIVING (ADL)
ADLS_ACUITY_SCORE: 57
ADLS_ACUITY_SCORE: 61
ADLS_ACUITY_SCORE: 61
ADLS_ACUITY_SCORE: 57
ADLS_ACUITY_SCORE: 61
ADLS_ACUITY_SCORE: 61
ADLS_ACUITY_SCORE: 57
ADLS_ACUITY_SCORE: 61
ADLS_ACUITY_SCORE: 57
ADLS_ACUITY_SCORE: 61

## 2025-03-08 NOTE — PROGRESS NOTES
Admission Note    Data:  Ember Tavares admitted to [facility] from ICU at 1140.      Action:  Dr. Jacobson has been notified of admission. Pt oriented to unit, call light in reach.     Response:  Patient tolerated transfer to [facility].

## 2025-03-08 NOTE — PROGRESS NOTES
CVC removed. Pt tolerated well. VSS at this time. Occlusive/ pressure dressing is CDI.   Madonna Keating RN on 3/8/2025 at 10:22 AM

## 2025-03-08 NOTE — PROGRESS NOTES
Pt daughter lon called and asked for call to be transferred to pts room to speak to her mom. Daughter was informed pt is asleep and pt can be notified of phone call request. Daughter requested to come in to see pt. Staff told daughter that is not allowed at this time. Daughter asked for an update, staff told daughter we can not give any information at this time.

## 2025-03-08 NOTE — PROGRESS NOTES
Pt transferred to [facility] room 352. Report given to TASHA Ramirez. VSS at time of transfer. On 4 LPM via NC. All belongings sent with pt.   Madonna Keating RN on 3/8/2025 at 11:44 AM

## 2025-03-08 NOTE — PLAN OF CARE
"Goal Outcome Evaluation:  Pt is A&OX4. Speech is very soft. VSS. Denies pain. Afebrile. O2 was weaned to NC 2 LPM. SpO2 mid-high 90s. LS dim with exp wheezes. Pt reports SOB improving. Peters removed. Pt voided X1 post removal. Bladder scanning intermittently. Up to bedside commode with AX2 belt and walker. Blanchable redness to sacrum. Diet advanced to pureed, pudding consistency. Pt tolerating small amount of PO intake at a time. Appetite is still poor.       Plan of Care Reviewed With: patient, spouse    Overall Patient Progress: improving    Outcome Evaluation: Weaned from Vapotherm. SOB improved. Tolerating minimal amount of PO intake. Peters removed. Intermittent bladder scanning.      Problem: Adult Inpatient Plan of Care  Goal: Plan of Care Review  Description: The Plan of Care Review/Shift note should be completed every shift.  The Outcome Evaluation is a brief statement about your assessment that the patient is improving, declining, or no change.  This information will be displayed automatically on your shift  note.  Recent Flowsheet Documentation  Taken 3/7/2025 1828 by Madonna Keating, RN  Outcome Evaluation: Weaned from Vapotherm. SOB improved. Tolerating minimal amount of PO intake. Peters removed. Intermittent bladder scanning.  Plan of Care Reviewed With:   patient   spouse  Overall Patient Progress: improving  Goal: Patient-Specific Goal (Individualized)  Description: You can add care plan individualizations to a care plan. Examples of Individualization might be:  \"Parent requests to be called daily at 9am for status\", \"I have a hard time hearing out of my right ear\", or \"Do not touch me to wake me up as it startles  me\".  Recent Flowsheet Documentation  Taken 3/7/2025 1828 by Madonna Keating, RN  Individualized Care Needs: Bladder scans, O2 monitoring, increase activity OOB  Goal: Absence of Hospital-Acquired Illness or Injury  Intervention: Identify and Manage Fall Risk  Recent Flowsheet " Documentation  Taken 3/7/2025 1600 by Madonna Keating RN  Safety Promotion/Fall Prevention:   activity supervised   clutter free environment maintained   increased rounding and observation   increase visualization of patient   lighting adjusted   nonskid shoes/slippers when out of bed   room near nurse's station   room door open  Taken 3/7/2025 1200 by Madonna Keating RN  Safety Promotion/Fall Prevention:   activity supervised   clutter free environment maintained   increased rounding and observation   increase visualization of patient   lighting adjusted   nonskid shoes/slippers when out of bed   room near nurse's station   room door open  Taken 3/7/2025 0800 by Madonna Keating RN  Safety Promotion/Fall Prevention:   activity supervised   clutter free environment maintained   increased rounding and observation   increase visualization of patient   lighting adjusted   nonskid shoes/slippers when out of bed   room near nurse's station   room door open  Intervention: Prevent Skin Injury  Recent Flowsheet Documentation  Taken 3/7/2025 1600 by Madonna Keating RN  Body Position: weight shifting  Taken 3/7/2025 1200 by Madonna Keating RN  Body Position: weight shifting  Taken 3/7/2025 0800 by Madonna Keating RN  Body Position: supine, legs elevated  Intervention: Prevent and Manage VTE (Venous Thromboembolism) Risk  Recent Flowsheet Documentation  Taken 3/7/2025 1600 by Madonna Keating RN  VTE Prevention/Management: SCDs on (sequential compression devices)  Taken 3/7/2025 1200 by Madonna Keating RN  VTE Prevention/Management: SCDs on (sequential compression devices)  Taken 3/7/2025 0800 by Madonna Keating RN  VTE Prevention/Management: SCDs on (sequential compression devices)  Goal: Optimal Comfort and Wellbeing  Intervention: Provide Person-Centered Care  Recent Flowsheet Documentation  Taken 3/7/2025 1600 by Madonna Keating RN  Trust Relationship/Rapport:   care explained   emotional support provided   empathic listening  provided   questions answered   thoughts/feelings acknowledged  Taken 3/7/2025 1200 by Madonna Keating RN  Trust Relationship/Rapport:   care explained   emotional support provided   empathic listening provided   questions answered   thoughts/feelings acknowledged  Taken 3/7/2025 0800 by Madonna Keating RN  Trust Relationship/Rapport:   care explained   emotional support provided   empathic listening provided   questions answered   thoughts/feelings acknowledged  Goal: Readiness for Transition of Care  Recent Flowsheet Documentation  Taken 3/7/2025 1828 by Madonna Keating RN  Anticipated Changes Related to Illness: inability to care for self  Transportation Anticipated: family or friend will provide  Concerns to be Addressed: discharge planning  Intervention: Mutually Develop Transition Plan  Recent Flowsheet Documentation  Taken 3/7/2025 1828 by Madonna Keating RN  Anticipated Changes Related to Illness: inability to care for self  Transportation Anticipated: family or friend will provide  Concerns to be Addressed: discharge planning

## 2025-03-08 NOTE — PROGRESS NOTES
"[facility]    Hospitalist Progress Note      Assessment & Plan   Ember Tavares is a 64 year old female who was admitted on 2/27/2025.     Clinically Significant Risk Factors          # Hypochloremia: Lowest Cl = 96 mmol/L in last 2 days, will monitor as appropriate      # Hypoalbuminemia: Lowest albumin = 3.2 g/dL at 3/4/2025  5:27 AM, will monitor as appropriate         # Acute Hypoxic Respiratory Failure: Documented O2 saturation < 90%. Continue supplemental oxygen as needed  # Acute Hypercapnic Respiratory Failure: based on arterial blood gas results.  Continue supplemental oxygen and ventilatory support as indicated.         # Cachexia: Estimated body mass index is 15.36 kg/m  as calculated from the following:    Height as of this encounter: 1.575 m (5' 2\").    Weight as of this encounter: 38.1 kg (83 lb 15.9 oz).   # Severe Malnutrition: based on nutrition assessment and treatment provided per dietitian's recommendations.    # COPD: noted on problem list       Principal Problem:    Acute respiratory failure with hypoxia and hypercapnia (H)   COPD exacerbation    Alpha-1-antitrypsin deficiency carrier  Tobacco abuse   Shock  Assessment: resolved. Presented with initial COPD exacerbation, started on antibiotics steroids and nebulizers, respiratory status deteriorated and intubated 3/1.  Net negative with IV diuresis diuresis on 3/3.  Successful spontaneous breathing trial and extubation on 3/5, required bipap postextubation and transition to Vapotherm.  Mental status significantly improved and able to tolerate p.o. intake today.  Moved bowels on 3/6.    Plan:   -continue scheduled DuoNebs  -Hypertonic nebs as needed  -Acapella and IS   -Transition IV Methylpred to p.o. prednisone 40 mg daily day 6 with further taper in am  -resume home chronic azithromycin  -SLP evaluation, aspiration precaution and routine oral cares     Pulmonary cachexia due to chronic obstructive pulmonary disease " (H)   Severe protein calorie malnutrition  Present on admission. she had tube feeds on 3/3.  Progressing well with oral intake.      Plan:   -SLP evaluation and scheduled supplements  -Scheduled supplements and advance diet       Hiatal hernia    Assessment: Stable, present on admission, status post Nissen in the past    Plan:   -continue PPI  -maalox    Dysphonia  Assessment: Severely hoarse voice and possibly some vocal cord paralysis from endotracheal tube.  Plan:  -SLP evaluation and would benefit from ENT eval as an outpatient     Diet: Snacks/Supplements Adult: Ensure Enlive; With Meals  Pureed Diet (level 4) Thin Liquids (level 0)    DVT Prophylaxis: Enoxaparin (Lovenox) SQ and Pneumatic Compression Devices  Peters Catheter: Not present  Code Status: Full Code           Disposition Plan      Entered: Crispin Jacobson MD 03/08/2025, 11:27 AM       The patient's care was discussed with the Bedside Nurse, Patient, and Patient's Family with  again today.    Crispin Jacobson MD  Hospitalist Service  [facility]  Contact information available via [facility] Paging/Directory    ______________________________________________________________________    Interval History   CC: COPD exacerbation    Overnight no acute events, continues to feel better and better, tolerating her diet without difficulty, no aspiration symptoms, voice is extremely hoarse and she can barely talk, no shortness of breath productive cough, no increased lower extremity edema, grateful to have the Peters catheter out and eager to have her right IJ central line removed today, open to considering ARU placement upon discharge.    -Data reviewed today: I reviewed all new labs and imaging results over the last 24 hours.     Physical Exam   Temp: 98.9  F (37.2  C) Temp src: Temporal BP: 131/81 Pulse: 85   Resp: 26 SpO2: 97 % O2 Device: Nasal cannula with humidification Oxygen Delivery: 3 LPM  Vitals:    03/04/25 0600 03/07/25 0600 03/08/25  0438   Weight: 47.8 kg (105 lb 6.1 oz) 40.4 kg (89 lb 1.1 oz) 38.1 kg (83 lb 15.9 oz)     Vital Signs with Ranges  Temp:  [97.7  F (36.5  C)-99.1  F (37.3  C)] 98.9  F (37.2  C)  Pulse:  [] 85  Resp:  [18-47] 26  BP: ()/() 131/81  FiO2 (%):  [30 %-40 %] 30 %  SpO2:  [87 %-97 %] 97 %  I/O last 3 completed shifts:  In: 50 [P.O.:50]  Out: 700 [Urine:700]    GENERAL: Laying in bed, spontaneously awake, talking and interacting, tracking, no apparent distress.  HEENT: Right IJ CVC covered Tegaderm, clean dry.  CARDIOVASCULAR: Regular rate and regular rhythm, no murmurs, rubs, or gallops. Normal S1/S2. No lower extremity edema.   RESPIRATORY: Diffuse air movement in all fields, no significant wheezing or rhonchi.  GI: soft, non-tender the palpation in all quadrants, active bowel sounds.    : Peters catheter out.  MUSCULOSKELETAL: warm and well perfused, 2+ dorsalis pedis pulses bilaterally.    SKIN: no pallor,jaundice, or rashes.       Medications   Current Facility-Administered Medications   Medication Dose Route Frequency Provider Last Rate Last Admin    dextrose 10% infusion   Intravenous Continuous PRN Crispin Jacobson MD        Patient may continue current oral medications   Does not apply Continuous PRN Crispin Jacobson MD         Current Facility-Administered Medications   Medication Dose Route Frequency Provider Last Rate Last Admin    azithromycin (ZITHROMAX) tablet 250 mg  250 mg Oral Daily Crispin Jacobson MD        budesonide (PULMICORT) neb solution 1 mg  1 mg Nebulization BID Jose De Jesus Hannah MD   1 mg at 03/08/25 0757    enoxaparin ANTICOAGULANT (LOVENOX) injection 30 mg  30 mg Subcutaneous Q24H Jose De Jesus Hannah MD   30 mg at 03/08/25 0921    ipratropium - albuterol 0.5 mg/2.5 mg/3 mL (DUONEB) neb solution 3 mL  3 mL Nebulization Q4H Crispin Jacobson MD   3 mL at 03/08/25 0757    pantoprazole (PROTONIX) EC tablet 40 mg  40 mg Oral [facility] Indira, Crispin MUNGUIA MD   40 mg at 03/08/25 0921    predniSONE  (DELTASONE) tablet 40 mg  40 mg Oral Daily Crispin Jacobson MD   40 mg at 03/08/25 0921    umeclidinium (INCRUSE ELLIPTA) 62.5 MCG/ACT inhaler 1 puff  1 puff Inhalation Daily Jose De Jesus Hannah MD           Data   Recent Labs   Lab 03/08/25  0452 03/07/25  1304 03/07/25  0505 03/06/25  0503 03/04/25  1226 03/04/25  0527   WBC 13.7*  --  13.9* 19.4*   < > 13.3*   HGB 12.7  --  12.3 12.1   < > 12.1   MCV 94  --  91 95   < > 92     --  213 193   < > 181     --  143 144   < > 146*   POTASSIUM 3.7  --  3.4 3.8   < > 3.5   CHLORIDE 101  --  96* 105   < > 108*   CO2 36*  --  37* 29   < > 28   BUN 34.0*  --  30.0* 27.2*   < > 23.0   CR 0.54  --  0.63 0.57   < > 0.54   ANIONGAP 8  --  10 10   < > 10   COLEMAN 9.3  --  9.1 9.1   < > 8.3*   GLC 90 163* 80 101*   < > 271*   ALBUMIN  --   --   --   --   --  3.2*   PROTTOTAL  --   --   --   --   --  5.4*   BILITOTAL  --   --   --   --   --  0.3   ALKPHOS  --   --   --   --   --  67   ALT  --   --   --   --   --  43   AST  --   --   --   --   --  21    < > = values in this interval not displayed.       No results found for this or any previous visit (from the past 24 hours).

## 2025-03-08 NOTE — PLAN OF CARE
Problem: Fall Injury Risk  Goal: Absence of Fall and Fall-Related Injury  Outcome: Not Progressing  Intervention: Identify and Manage Contributors  Flowsheets  Taken 3/8/2025 0243  Medication Review/Management: medications reviewed  Taken 3/7/2025 2341  Medication Review/Management: medications reviewed  Intervention: Promote Injury-Free Environment  Flowsheets  Taken 3/8/2025 0243  Safety Promotion/Fall Prevention:   activity supervised   assistive device/personal items within reach   clutter free environment maintained   increased rounding and observation   increase visualization of patient   lighting adjusted   mobility aid in reach   nonskid shoes/slippers when out of bed   room door open   room near nurse's station   room organization consistent   safety round/check completed   supervised activity   treat reversible contributory factors   treat underlying cause  Taken 3/7/2025 2341  Safety Promotion/Fall Prevention:   activity supervised   assistive device/personal items within reach   clutter free environment maintained   increased rounding and observation   increase visualization of patient   lighting adjusted   mobility aid in reach   nonskid shoes/slippers when out of bed   room door open   room near nurse's station   room organization consistent   safety round/check completed   supervised activity   treat reversible contributory factors   treat underlying cause     Problem: Gas Exchange Impaired  Goal: Optimal Gas Exchange  Outcome: Not Progressing  Intervention: Optimize Oxygenation and Ventilation  Flowsheets  Taken 3/8/2025 0243  Airway/Ventilation Management: airway patency maintained  Head of Bed (HOB) Positioning: HOB at 45 degrees  Taken 3/7/2025 2341  Head of Bed (HOB) Positioning: HOB at 45 degrees     Problem: Risk for Delirium  Goal: Optimal Coping  Outcome: Not Progressing  Goal: Improved Behavioral Control  Outcome: Not Progressing  Intervention: Minimize Safety Risk  Recent Flowsheet  Documentation  Taken 3/7/2025 2341 by Hamzah Grajeda RN  Enhanced Safety Measures: pain management  Trust Relationship/Rapport:   care explained   choices provided   questions answered   questions encouraged  Goal: Improved Attention and Thought Clarity  Outcome: Not Progressing  Goal: Improved Sleep  Outcome: Not Progressing     Problem: Adult Inpatient Plan of Care  Goal: Plan of Care Review  Description: The Plan of Care Review/Shift note should be completed every shift.  The Outcome Evaluation is a brief statement about your assessment that the patient is improving, declining, or no change.  This information will be displayed automatically on your shift  note.  Outcome: Progressing  Flowsheets (Taken 3/8/2025 0243)  Outcome Evaluation: Patient on 5L O2 via humidified nasal cannula. No acute respiratory distress noted. Patient getting up to bedside commode to void.  Plan of Care Reviewed With:   patient   spouse  Overall Patient Progress: improving   Goal Outcome Evaluation:      Plan of Care Reviewed With: patient, spouse    Overall Patient Progress: improvingOverall Patient Progress: improving    Outcome Evaluation: Patient on 5L O2 via humidified nasal cannula. No acute respiratory distress noted. Patient getting up to bedside commode to void.

## 2025-03-08 NOTE — PROGRESS NOTES
03/08/25 1300   Appointment Info   Signing Clinician's Name / Credentials (PT) Radha Rojas, PT   Physical Therapy Goals   PT Frequency Daily   Interventions   Interventions Quick Adds Therapeutic Activity   Therapeutic Activity   Therapeutic Activities: dynamic activities to improve functional performance Minutes (14334) 30   Symptoms Noted During/After Treatment Fatigue;Shortness of breath   Treatment Detail/Skilled Intervention Supine to sit with head partially raised, scotted forward to edge of bed with SBA, sat edge of bed with v.c for breathing in through nose, O2 sats dropped to 86% with activity, sit to stand with CGA, able to stand 10 sec x2, seated rest between stands,sit to supine with CGA, scotting up in bed with max A of 2   PT Discharge Planning   PT Plan Continue PT   PT Discharge Recommendation (DC Rec) Transitional Care Facility;[facility]ute [facility]hab Center-Motivated patient will benefit from intensive, interdisciplinary therapy.  Anticipate will be able to tolerate 3 hours of therapy per day   PT Rationale for DC Rec to promote strength, stability and safe mobility prior to returning home   PT Brief overview of current status Patient requiring increased assistance with mobilities due to fatigue and weakness; patient able to demonstrate WFL AROM of her LEs, however, she fatigues easily with activity; she may benefit from consideration of an ARU upon discharge from this hospital admission   Physical Therapy Time and Intention   Timed Code Treatment Minutes 30   Total Session Time (sum of timed and untimed services) 30

## 2025-03-08 NOTE — PROGRESS NOTES
Pt was on Vapotherm at the start of shift. Pt was weaned as tolerated to settings where the high flow nasal cannula was indicated. Pt sats stayed 97% on 6 LPM high flow nasal cannula. Pt now rests on 4 LPM nasal cannula with bubbler sating in the low 90's. Vapotherm remains on standby. Nebs given as ordered. Worked on flutter as tolerated. WOB has remained the same throughout shift. No other respiratory therapy interventions at this time.    Jayla Goodman, RT

## 2025-03-08 NOTE — PROGRESS NOTES
03/07/25 1800   Appointment Info   Signing Clinician's Name / Credentials (SLP) GAGANDEEP Josue-DEMETRIUS   General Information   Onset of Illness/Injury or Date of Surgery 02/27/25   Referring Physician Dr. Crispin Jacobson   Patient/Family Therapy Goal Statement (SLP) Determine appropriate diet textures   Pertinent History of Current Problem Patient on prolonged NPO status due o respiratory failure requiring intubation (3/1/25-3/4/25).   General Observations Patient observed upright in bed with symptoms of fatigue. Patient able to interact with SLP and  and complete recommended swallowing textures.   Type of Evaluation   Type of Evaluation Swallow Evaluation   Oral Motor   Oral Musculature other (see comments)   Dentition (Oral Motor)   Dentition (Oral Motor) edentulous   Facial Symmetry (Oral Motor)   Facial Symmetry (Oral Motor) unable/difficult to assess   Lip Function (Oral Motor)   Lip Range of Motion (Oral Motor) unable/difficult to assess   Tongue Function (Oral Motor)   Tongue ROM (Oral Motor) unable/difficult to assess   Jaw Function (Oral Motor)   Jaw Function (Oral Motor) unable/difficult to assess   Vocal Quality/Secretion Management (Oral Motor)   Vocal Quality (Oral Motor) aphonia   General Swallowing Observations   Past History of Dysphagia History of esophageal dysphagia with distal dilations, hiatal hernia, passed gastric emptying test   Comment, General Swallowing Observations Patient demonstrating fatigue and weakness resulting in limited participating with oral mechanism exam.   Respiratory Support other (see comments)   Current Diet/Method of Nutritional Intake (General Swallowing Observations, NIS) full liquid diet   Swallowing Evaluation Clinical swallow evaluation   Clinical Swallow Evaluation   Feeding Assistance dependent   Clinical Swallow Evaluation Textures Trialed thin liquids;pureed;minced & moist   Clinical Swallow Eval: Thin Liquid Texture Trial   Mode of Presentation, Thin Liquids  other (see comments)  (Provale cup)   Volume of Liquid or Food Presented 4 oz.   Oral Phase of Swallow effortful AP movement;premature pharyngeal entry;WFL   Pharyngeal Phase of Swallow repeated swallows;coughing/choking   Diagnostic Statement Patient presented with 10 trials of thins via Provale 10cc cup to increase overall safety. Patient tolerating trials of lower volume, although some coughing present, although it is unknown whether from aspiration/penetration or effects of mucus and extubation. SLP recommending thin liquids to allow patient to maintain adequate hydration with use of Provale cup to increase safety.   Clinical Swallow Evaluation: Puree Solid Texture Trial   Mode of Presentation, Puree spoon;fed by clinician   Volume of Puree Presented 5 trials of pudding   Oral Phase, Puree WFL   Pharyngeal Phase, Puree intact   Diagnostic Statement Patient presented with 5 trials of pudding via spoon from SLP. Patient tolerating various sized bites from spoon well with no clinical signs of penetration or aspiration follwoing successful swallows. SLP recommending pureed textures to allow for consumption of more solid foods to reduce the effects of weakness caused by full liquid diet.   Clinical Swallow Eval: Minced & Moist   Mode of Presentation spoon;fed by clinician   Volume Presented 2 trials of oatmeal consistency   Oral Phase impaired mastication;effortful AP movement;residue in oral cavity   Oral Residue mid posterior tongue   Pharyngeal Phase feeling of something stuck in throat;repeated swallows;impaired   Diagnostic Statement Patient presented with 2 trials of minced and moist and demonstrating tongue residue from overall weakness. Patient declining further trials and voicing that she does not want this type of texture and would like to stay on pureed consistencies. Minced and moist not recommended at this time due to patient fatigue, with purees deemed safer.   Esophageal Phase of Swallow   Patient  reports or presents with symptoms of esophageal dysphagia Yes   Swallowing Recommendations   Diet Consistency Recommendations thin liquids (level 0);pureed (level 4)   Supervision Level for Intake 1:1 supervision needed   Mode of Delivery Recommendations bolus size, small;slow rate of intake  (Provale cup to be used)   Monitoring/Assistance Required (Eating/Swallowing) check mouth frequently for oral residue/pocketing;monitor for cough or change in vocal quality with intake   Recommended Feeding/Eating Techniques (Swallow Eval) maintain upright sitting position for eating;maintain upright posture during/after eating for 30 minutes;provide assist with feeding;provide oral hygiene prior to intake   Medication Administration Recommendations, Swallowing (SLP) With pudding   Comment, Swallowing Recommendations Recommend patient to adhere to diet recommended textures and use Provale cup to ensure safe liquid drinking.   General Therapy Interventions   Planned Therapy Interventions Dysphagia Treatment   Dysphagia treatment Modified diet education;Instruction of safe swallow strategies   Intervention Comments Patient presenting with weakness and modified diet requiring further education and progression of textures with family involvement.   Clinical Impression   Criteria for Skilled Therapeutic Interventions Met (SLP Eval) Yes, treatment indicated   SLP Diagnosis Dysphagia, unspecified   Problem List (SLP) Acute respiratory failure with hypoxia and hypercapnia   Risks & Benefits of therapy have been explained evaluation/treatment results reviewed;risks/benefits reviewed;participants included;patient   Clinical Impression Comments Patient presenting with weakness and fatigue impacing swallowing safety, recommend intervention and modified diet to reduce risk of aspiration.   SLP Total Evaluation Time   Eval: oral/pharyngeal swallow function, clinical swallow Minutes (93595) 45   SLP Goals   Therapy Frequency (SLP Eval) 5  times/week   SLP Predicted Duration/Target Date for Goal Attainment 03/14/25   SLP Goals Swallow   SLP: Safely tolerate diet without signs/symptoms of aspiration Soft & bite sized diet;Thin liquids;Independently   SLP Discharge Planning   SLP Plan Provide dysphagia treatment and education on 3/10/2025.   SLP Discharge Recommendation other (see comments)   SLP Rationale for DC Rec See above   SLP Brief overview of current status  Patient demonstrating some increased strength, although still having decreased activity for meals and requiring SLP intervention   Psychosocial Support   Trust Relationship/Rapport care explained;emotional support provided;empathic listening provided;questions answered;thoughts/feelings acknowledged   Safety Promotion   Medical Device Protection tubing secured

## 2025-03-08 NOTE — PROGRESS NOTES
03/08/25 1300   Appointment Info   Signing Clinician's Name / Credentials (OT) Skylar Pineda OTR/L   OT Goals   Therapy Frequency (OT) Daily   OT Predicted Duration/Target Date for Goal Attainment 03/11/25   OT Goals Toilet Transfer/Toileting;Lower Body Dressing;Bed Mobility   OT: Lower Body Dressing Minimal assist   OT: Bed Mobility Supervision/stand-by assist   OT: Toilet Transfer/Toileting Supervision/stand-by assist   Interventions   Interventions Quick Adds Self-Care/Home Management   Self-Care/Home Management   Symptoms Noted During/After Treatment (Meal Preparation/Planning Training) shortness of breath;fatigue   Treatment Detail/Skilled Intervention bed mobility mod assist x 2; transfer to bedside commode with assist x 2 to manage the equipment and for support in standing.  Pt very weak, only able to take a few steps towards commode   Therapeutic Activities   Therapeutic Activity Minutes (45799) 25   Symptoms noted during/after treatment fatigue;shortness of breath   Treatment Detail/Skilled Intervention Patient was upright in bed upon approach for OT/PT today and willing to participate.  Patient completed supine to sit with head partially raised, scooted forward to edge of bed with SBA, sat edge of bed with v.c for breathing in through nose, O2 sats dropped to 86% with activity, sit to stand with CGA, able to stand 10 sec x2, seated rest between stands,sit to supine with CGA, scooting up in bed with max A of 2, but is able to push with leg minimally.  provided patient and spouse with exercises to complete while in bed.   OT Discharge Planning   OT Plan increase functional activity as able   OT Discharge Recommendation (DC Rec) Transitional Care Facility;home with home care occupational therapy   OT Rationale for DC Rec pt currently deconditioned and requiring extensive assist due weakness and SOB. Will continue to monitor and assist with discharge planning as needed.   OT Brief overview of current status  See above for details   Total Session Time   Timed Code Treatment Minutes 25   Total Session Time (sum of timed and untimed services) 25

## 2025-03-09 ENCOUNTER — APPOINTMENT (OUTPATIENT)
Dept: OCCUPATIONAL THERAPY | Facility: OTHER | Age: 65
DRG: 208 | End: 2025-03-09
Payer: COMMERCIAL

## 2025-03-09 ENCOUNTER — APPOINTMENT (OUTPATIENT)
Dept: PHYSICAL THERAPY | Facility: OTHER | Age: 65
DRG: 208 | End: 2025-03-09
Payer: COMMERCIAL

## 2025-03-09 PROCEDURE — 94640 AIRWAY INHALATION TREATMENT: CPT

## 2025-03-09 PROCEDURE — 250N000009 HC RX 250: Performed by: INTERNAL MEDICINE

## 2025-03-09 PROCEDURE — 250N000011 HC RX IP 250 OP 636: Performed by: INTERNAL MEDICINE

## 2025-03-09 PROCEDURE — 999N000157 HC STATISTIC RCP TIME EA 10 MIN

## 2025-03-09 PROCEDURE — 97530 THERAPEUTIC ACTIVITIES: CPT | Mod: GP

## 2025-03-09 PROCEDURE — 120N000001 HC R&B MED SURG/OB

## 2025-03-09 PROCEDURE — 250N000013 HC RX MED GY IP 250 OP 250 PS 637: Performed by: INTERNAL MEDICINE

## 2025-03-09 PROCEDURE — 99232 SBSQ HOSP IP/OBS MODERATE 35: CPT | Performed by: INTERNAL MEDICINE

## 2025-03-09 PROCEDURE — 94640 AIRWAY INHALATION TREATMENT: CPT | Mod: 76

## 2025-03-09 PROCEDURE — 97530 THERAPEUTIC ACTIVITIES: CPT | Mod: GO

## 2025-03-09 PROCEDURE — 250N000012 HC RX MED GY IP 250 OP 636 PS 637: Performed by: INTERNAL MEDICINE

## 2025-03-09 RX ADMIN — AZITHROMYCIN DIHYDRATE 250 MG: 250 TABLET, FILM COATED ORAL at 13:53

## 2025-03-09 RX ADMIN — IPRATROPIUM BROMIDE AND ALBUTEROL SULFATE 3 ML: .5; 3 SOLUTION RESPIRATORY (INHALATION) at 03:35

## 2025-03-09 RX ADMIN — ONDANSETRON 4 MG: 2 INJECTION INTRAMUSCULAR; INTRAVENOUS at 10:43

## 2025-03-09 RX ADMIN — IPRATROPIUM BROMIDE AND ALBUTEROL SULFATE 3 ML: .5; 3 SOLUTION RESPIRATORY (INHALATION) at 20:00

## 2025-03-09 RX ADMIN — IPRATROPIUM BROMIDE AND ALBUTEROL SULFATE 3 ML: .5; 3 SOLUTION RESPIRATORY (INHALATION) at 06:20

## 2025-03-09 RX ADMIN — ENOXAPARIN SODIUM 30 MG: 30 INJECTION SUBCUTANEOUS at 14:00

## 2025-03-09 RX ADMIN — BUDESONIDE 1 MG: 0.5 INHALANT RESPIRATORY (INHALATION) at 20:01

## 2025-03-09 RX ADMIN — BUDESONIDE 1 MG: 0.5 INHALANT RESPIRATORY (INHALATION) at 06:21

## 2025-03-09 RX ADMIN — PREDNISONE 30 MG: 10 TABLET ORAL at 13:53

## 2025-03-09 RX ADMIN — IPRATROPIUM BROMIDE AND ALBUTEROL SULFATE 3 ML: .5; 3 SOLUTION RESPIRATORY (INHALATION) at 12:51

## 2025-03-09 RX ADMIN — PANTOPRAZOLE SODIUM 40 MG: 40 TABLET, DELAYED RELEASE ORAL at 07:31

## 2025-03-09 RX ADMIN — IPRATROPIUM BROMIDE AND ALBUTEROL SULFATE 3 ML: .5; 3 SOLUTION RESPIRATORY (INHALATION) at 23:50

## 2025-03-09 ASSESSMENT — ACTIVITIES OF DAILY LIVING (ADL)
ADLS_ACUITY_SCORE: 61
ADLS_ACUITY_SCORE: 57
ADLS_ACUITY_SCORE: 56
ADLS_ACUITY_SCORE: 56
ADLS_ACUITY_SCORE: 61
ADLS_ACUITY_SCORE: 56
ADLS_ACUITY_SCORE: 56
ADLS_ACUITY_SCORE: 61
ADLS_ACUITY_SCORE: 61
ADLS_ACUITY_SCORE: 56
ADLS_ACUITY_SCORE: 56
ADLS_ACUITY_SCORE: 61
ADLS_ACUITY_SCORE: 57
ADLS_ACUITY_SCORE: 56
ADLS_ACUITY_SCORE: 61
ADLS_ACUITY_SCORE: 57
ADLS_ACUITY_SCORE: 61
ADLS_ACUITY_SCORE: 56
ADLS_ACUITY_SCORE: 57
ADLS_ACUITY_SCORE: 61

## 2025-03-09 NOTE — PLAN OF CARE
"Goal Outcome Evaluation:    VSS. Afebrile. Denies pain. Difficulty speaking, will point and use white board in room, alert.   Lung sounds diminished. SPO2 92% on 2L NC. Weak, dry cough. Faint, hypoactive bowel sounds.   CCRQ2, mepliex in place to sacrum/coccyx, blanchable redness.  at bedside.         Plan of Care Reviewed With: patient, spouse    Overall Patient Progress: improving    /80 (BP Location: Right arm, Patient Position: Semi-Quintanilal's, Cuff Size: Adult Small)   Pulse 87   Temp 98.6  F (37  C) (Tympanic)   Resp 20   Ht 1.575 m (5' 2\")   Wt 38.1 kg (83 lb 15.9 oz)   LMP 01/01/1996 (Approximate)   SpO2 92%   BMI 15.36 kg/m        Aga Paige RN on 3/8/2025 at 6:21 PM      "

## 2025-03-09 NOTE — PROGRESS NOTES
03/09/25 1129   Appointment Info   Signing Clinician's Name / Credentials (PT) Radha Rojas, PT   Therapeutic Activity   Therapeutic Activities: dynamic activities to improve functional performance Minutes (04630) 25   Symptoms Noted During/After Treatment Fatigue   Treatment Detail/Skilled Intervention supine to/from sit with head of bed partially raised with use of bed rail with SBA, sat edge of bed for 15' with 3 stands up to 10 sec with CGA at walker, scooted up itn bed with mod A fo 2   PT Discharge Planning   PT Plan Continue PT   PT Discharge Recommendation (DC Rec) Transitional Care Facility;Acute Rehab Center-Motivated patient will benefit from intensive, interdisciplinary therapy.  Anticipate will be able to tolerate 3 hours of therapy per day   PT Rationale for DC Rec to promote strength, stability and safe mobility prior to returning home   PT Brief overview of current status Patient requiring SBA to CGA with mobilities due to fatigue and weakness. She tolerated sitting edge of bed 15' and stood for up to 10 sec 3x.  She may benefit from consideration of an ARU upon discharge from this hospital admission   Physical Therapy Time and Intention   Timed Code Treatment Minutes 25   Total Session Time (sum of timed and untimed services) 25

## 2025-03-09 NOTE — PROGRESS NOTES
Patient titrated to 2 lpm NC, SpO2 92%. Pt refused flutter. B/S diminished Neb tx given as ordered, no change in b/s. Will continue to wean O2 as tolerated.    Hayde Galvan, RT

## 2025-03-09 NOTE — PLAN OF CARE
"Goal Outcome Evaluation:    Pt A/O. VSS. Currently on 2L NC at 93%. LS diminished. Loose chronic cough. Pt tolerated boost shakes and popsicles. Talks quietly/whispers due to irritation of throat from tube. Takes meds crushed in liquid of choice and given with syringe. Assist of 2 to commode. Mepilex on bottom. , sister, and daughter Sejal at bedside.    /56 (BP Location: Left arm, Patient Position: Semi-Quintanilla's, Cuff Size: Adult Small)   Pulse 116   Temp 99.1  F (37.3  C) (Tympanic)   Resp 20   Ht 1.575 m (5' 2\")   Wt 37.1 kg (81 lb 12.8 oz)   LMP 01/01/1996 (Approximate)   SpO2 93%   BMI 14.96 kg/m     "

## 2025-03-09 NOTE — PROGRESS NOTES
Pt remains on 2L NC.  Pt received and tolerated DuoNeb and Pulmicort as ordered.  Pt refused Incruse inhaler; she states it makes it cough too much. Respiratory will continue to provide support as needed.    Zayra Lucero, RT

## 2025-03-09 NOTE — PROGRESS NOTES
03/09/25 1000   Appointment Info   Signing Clinician's Name / Credentials (OT) Skylar Pineda OTR/L   OT Goals   Therapy Frequency (OT) Daily   OT Predicted Duration/Target Date for Goal Attainment 03/11/25   OT Goals Toilet Transfer/Toileting;Lower Body Dressing;Bed Mobility   OT: Lower Body Dressing Minimal assist   OT: Bed Mobility Supervision/stand-by assist   OT: Toilet Transfer/Toileting Supervision/stand-by assist   Interventions   Interventions Quick Adds Self-Care/Home Management   Self-Care/Home Management   Symptoms Noted During/After Treatment (Meal Preparation/Planning Training) shortness of breath;fatigue   Treatment Detail/Skilled Intervention bed mobility mod assist x 2; transfer to bedside commode with assist x 2 to manage the equipment and for support in standing.  Pt very weak, only able to take a few steps towards commode   Therapeutic Activities   Therapeutic Activity Minutes (31479) 25   Symptoms noted during/after treatment fatigue;shortness of breath   Treatment Detail/Skilled Intervention Patient was resting in bed upon approach for OT/PT today and willing to participate.  Patient completed supine to sit with head partially raised, scooted forward to edge of bed with SBA, sat edge of bed with v.c for breathing in through nose, howver no SOB visibly noted this date. Completed sit to stand with CGA, able to stand 3x today.  2 5 second stand and 1 10 second stand.  seated rest  EOB between stands for 15 minutes total this date,sit to supine with CGA, scooting up in bed with max A of 2, but is able to push with leg minimally.  provided patient and spouse with exercises to complete while in bed.   OT Discharge Planning   OT Plan increase functional activity as able   OT Discharge Recommendation (DC Rec) Transitional Care Facility;home with home care occupational therapy   OT Rationale for DC Rec pt currently deconditioned and requiring extensive assist due weakness and SOB. Will continue to  monitor and assist with discharge planning as needed.   OT Brief overview of current status Activity tolerance and appetite slightly improving.  See above for additional details

## 2025-03-09 NOTE — PLAN OF CARE
Goal Outcome Evaluation:    Patient alert and oriented. Hard time speaking, whispers due to soreness of throat. Improving per patient. 2 LPM NC. Pivot to commode. CCRQ2H. Bowel sounds continue to be hypoactive. Mepilex on sacrum.  at patient bedside throughout shift.     Temp: 98.2  F (36.8  C) Temp src: Tympanic BP: 105/63 Pulse: 84   Resp: 20 SpO2: 92 % O2 Device: Nasal cannula Oxygen Delivery: 2 LPM

## 2025-03-09 NOTE — PROGRESS NOTES
"[facility]    Hospitalist Progress Note      Assessment & Plan   Ember Tavares is a 64 year old female who was admitted on 2/27/2025.     Clinically Significant Risk Factors               # Hypoalbuminemia: Lowest albumin = 3.2 g/dL at 3/4/2025  5:27 AM, will monitor as appropriate         # Acute Hypoxic Respiratory Failure: Documented O2 saturation < 90%. Continue supplemental oxygen as needed  # Acute Hypercapnic Respiratory Failure: based on arterial blood gas results.  Continue supplemental oxygen and ventilatory support as indicated.         # Cachexia: Estimated body mass index is 14.96 kg/m  as calculated from the following:    Height as of this encounter: 1.575 m (5' 2\").    Weight as of this encounter: 37.1 kg (81 lb 12.8 oz).   # Severe Malnutrition: based on nutrition assessment and treatment provided per dietitian's recommendations.    # COPD: noted on problem list        Acute respiratory failure with hypoxia and hypercapnia (H)   COPD exacerbation    Alpha-1-antitrypsin deficiency carrier  Tobacco abuse   Multifactorial Shock  Assessment: Resolved. Presented with initial COPD exacerbation, started on antibiotics steroids and nebulizers, respiratory status deteriorated and intubated 3/1.  Net negative with IV diuresis diuresis on 3/3.  Successful spontaneous breathing trial and extubation on 3/5, required bipap postextubation and transition to Vapotherm.  Now weaned to 2L via NC.  Mental status significantly improved and able to tolerate p.o. intake today.  Moved bowels on 3/6.    Plan:   -continue scheduled DuoNebs  -Hypertonic nebs as needed  -Acapella and IS   -Transition IV Methylpred to p.o. 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)   Severe protein calorie malnutrition  Present on admission. she had tube feeds on 3/3.  Progressing well with oral intake at this point.    Plan:   -SLP evaluation and scheduled supplements  -Scheduled supplements and advance diet       Hiatal hernia    Assessment: Stable, present on admission, status post Nissen in the past    Plan:   -continue PPI  -maalox    Dysphonia  Assessment: Severely hoarse voice and possibly some vocal cord paralysis from endotracheal tube.  Plan:  -SLP evaluation and would benefit from ENT eval as an outpatient     Diet: Snacks/Supplements Adult: Ensure Enlive; With Meals  Pureed Diet (level 4) Thin Liquids (level 0)    DVT Prophylaxis: Enoxaparin (Lovenox) SQ and Pneumatic Compression Devices  Peters Catheter: Not present  Code Status: Full Code           Disposition Plan      Entered: Crispin Jacobson MD 03/09/2025, 10:11 AM       The patient's care was discussed with the Bedside Nurse, Patient, and Patient's Family and with  again today.    Crispin Jacobson MD  Hospitalist Service  [facility]  Contact information available via [facility] Paging/Directory    ______________________________________________________________________    Interval History   CC: COPD exacerbation    Overnight feeling better than she has felt in a long time, breathing feels stable, no increased lower extremity edema or puffiness in her arms or legs, intermittent productive cough but she is not sure of sputum characteristic, no orthopnea, already drink an entire boost this morning and eager to try and eat more again today, wants to work with physical occupational therapy as well, no nausea vomiting diarrhea constipation  or other new complaints.    -Data reviewed today: I reviewed all new labs and imaging results over the last 24 hours.     Physical Exam   Temp: 99.1  F (37.3  C) Temp src: Tympanic BP: 106/56 Pulse: 116   Resp: 20 SpO2: (!) 91 % O2 Device: Nasal cannula Oxygen Delivery: 2 LPM  Vitals:    03/07/25 0600 03/08/25 0438 03/09/25 0500   Weight: 40.4 kg (89 lb 1.1 oz) 38.1 kg (83 lb 15.9 oz) 37.1 kg (81 lb 12.8 oz)     Vital Signs with Ranges  Temp:  [95.5  F (35.3  C)-99.1  F (37.3  C)] 99.1  F (37.3  C)  Pulse:  [] 116  Resp:  [20-22] 20  BP: (105-136)/(56-80) 106/56  SpO2:  [91 %-95 %] 91 %  I/O last 3 completed shifts:  In: -   Out: 450 [Urine:450]    GENERAL: Sitting up in bed, spontaneously awake, talking and interacting, tracking, no apparent distress.  HEENT: Right IJ CVC now removed.  CARDIOVASCULAR: Regular rate and regular rhythm, no murmurs, rubs, or gallops. Normal S1/S2. No lower extremity edema.   RESPIRATORY: Diffuse air movement in all fields, no significant wheezing or rhonchi.  GI: soft, non-tender the palpation in all quadrants, active bowel sounds.    MUSCULOSKELETAL: warm and well perfused, 2+ dorsalis pedis pulses bilaterally.    SKIN: no pallor,jaundice, or rashes.       Medications   Current Facility-Administered Medications   Medication Dose Route Frequency Provider Last Rate Last Admin    dextrose 10% infusion   Intravenous Continuous PRN Crispin Jacobson MD        Patient may continue current oral medications   Does not apply Continuous PRN Crispin Jacobson MD         Current Facility-Administered Medications   Medication Dose Route Frequency Provider Last Rate Last Admin    azithromycin (ZITHROMAX) tablet 250 mg  250 mg Oral Daily Crispin Jacobson MD   250 mg at 03/08/25 1146    budesonide (PULMICORT) neb solution 1 mg  1 mg Nebulization BID Jose De Jesus Hannah MD   1 mg at 03/09/25 0621    enoxaparin ANTICOAGULANT (LOVENOX) injection 30 mg  30 mg Subcutaneous Q24H Jose De Jesus Hannah MD   30 mg  at 03/08/25 0921    ipratropium - albuterol 0.5 mg/2.5 mg/3 mL (DUONEB) neb solution 3 mL  3 mL Nebulization Q4H Crispin Jacobson MD   3 mL at 03/09/25 0620    pantoprazole (PROTONIX) EC tablet 40 mg  40 mg Oral QAM AC Crispin Jacobson MD   40 mg at 03/09/25 0731    predniSONE (DELTASONE) tablet 30 mg  30 mg Oral Daily Crispin Jacobson MD        umeclidinium (INCRUSE ELLIPTA) 62.5 MCG/ACT inhaler 1 puff  1 puff Inhalation Daily Jose De Jesus Hannah MD           Data   Recent Labs   Lab 03/08/25  0452 03/07/25  1304 03/07/25  0505 03/06/25  0503 03/04/25  1226 03/04/25  0527   WBC 13.7*  --  13.9* 19.4*   < > 13.3*   HGB 12.7  --  12.3 12.1   < > 12.1   MCV 94  --  91 95   < > 92     --  213 193   < > 181     --  143 144   < > 146*   POTASSIUM 3.7  --  3.4 3.8   < > 3.5   CHLORIDE 101  --  96* 105   < > 108*   CO2 36*  --  37* 29   < > 28   BUN 34.0*  --  30.0* 27.2*   < > 23.0   CR 0.54  --  0.63 0.57   < > 0.54   ANIONGAP 8  --  10 10   < > 10   COLEMAN 9.3  --  9.1 9.1   < > 8.3*   GLC 90 163* 80 101*   < > 271*   ALBUMIN  --   --   --   --   --  3.2*   PROTTOTAL  --   --   --   --   --  5.4*   BILITOTAL  --   --   --   --   --  0.3   ALKPHOS  --   --   --   --   --  67   ALT  --   --   --   --   --  43   AST  --   --   --   --   --  21    < > = values in this interval not displayed.       No results found for this or any previous visit (from the past 24 hours).

## 2025-03-09 NOTE — PROGRESS NOTES
Shift Summary    Pt is currently on 2 LPM. DouNeb treatment given at 1702 PM with SpO2 92% at that time on 2 LPM with Lung Sounds Diminished throughout. Post treatment SpO2 95% with Lung sounds remaining unchanged post treatment. 1 mg Pulmicort and DuoNeb given at 1902 PM with SpO2 91% on 2 LPM with Lung sounds Diminished throughout. Post treatment SpO2 95% with Lung sounds remaining unchanged post treatment. Post Neb treatment patient and writer utilized Aerobika flutter valve set at level 2 for two minutes. No further interventions at this time.    Edilia Maza, RT

## 2025-03-10 ENCOUNTER — APPOINTMENT (OUTPATIENT)
Dept: OCCUPATIONAL THERAPY | Facility: OTHER | Age: 65
DRG: 208 | End: 2025-03-10
Payer: COMMERCIAL

## 2025-03-10 ENCOUNTER — APPOINTMENT (OUTPATIENT)
Dept: PHYSICAL THERAPY | Facility: OTHER | Age: 65
DRG: 208 | End: 2025-03-10
Payer: COMMERCIAL

## 2025-03-10 ENCOUNTER — APPOINTMENT (OUTPATIENT)
Dept: SPEECH THERAPY | Facility: OTHER | Age: 65
DRG: 208 | End: 2025-03-10
Payer: COMMERCIAL

## 2025-03-10 LAB
HOLD SPECIMEN: NORMAL
HOLD SPECIMEN: NORMAL
LACTATE SERPL-SCNC: 1 MMOL/L (ref 0.7–2)

## 2025-03-10 PROCEDURE — 97530 THERAPEUTIC ACTIVITIES: CPT | Mod: GO | Performed by: OCCUPATIONAL THERAPIST

## 2025-03-10 PROCEDURE — 250N000011 HC RX IP 250 OP 636: Performed by: INTERNAL MEDICINE

## 2025-03-10 PROCEDURE — 250N000009 HC RX 250: Performed by: INTERNAL MEDICINE

## 2025-03-10 PROCEDURE — 94640 AIRWAY INHALATION TREATMENT: CPT | Mod: 76

## 2025-03-10 PROCEDURE — 250N000012 HC RX MED GY IP 250 OP 636 PS 637: Performed by: INTERNAL MEDICINE

## 2025-03-10 PROCEDURE — 99232 SBSQ HOSP IP/OBS MODERATE 35: CPT | Performed by: STUDENT IN AN ORGANIZED HEALTH CARE EDUCATION/TRAINING PROGRAM

## 2025-03-10 PROCEDURE — 258N000003 HC RX IP 258 OP 636: Performed by: STUDENT IN AN ORGANIZED HEALTH CARE EDUCATION/TRAINING PROGRAM

## 2025-03-10 PROCEDURE — 999N000157 HC STATISTIC RCP TIME EA 10 MIN

## 2025-03-10 PROCEDURE — 36415 COLL VENOUS BLD VENIPUNCTURE: CPT | Performed by: STUDENT IN AN ORGANIZED HEALTH CARE EDUCATION/TRAINING PROGRAM

## 2025-03-10 PROCEDURE — 120N000001 HC R&B MED SURG/OB

## 2025-03-10 PROCEDURE — 97530 THERAPEUTIC ACTIVITIES: CPT | Mod: GP

## 2025-03-10 PROCEDURE — 250N000013 HC RX MED GY IP 250 OP 250 PS 637: Performed by: INTERNAL MEDICINE

## 2025-03-10 PROCEDURE — 94640 AIRWAY INHALATION TREATMENT: CPT

## 2025-03-10 PROCEDURE — 92526 ORAL FUNCTION THERAPY: CPT | Mod: GN

## 2025-03-10 PROCEDURE — 83605 ASSAY OF LACTIC ACID: CPT | Performed by: STUDENT IN AN ORGANIZED HEALTH CARE EDUCATION/TRAINING PROGRAM

## 2025-03-10 RX ADMIN — BUDESONIDE 1 MG: 0.5 INHALANT RESPIRATORY (INHALATION) at 06:35

## 2025-03-10 RX ADMIN — IPRATROPIUM BROMIDE AND ALBUTEROL SULFATE 3 ML: .5; 3 SOLUTION RESPIRATORY (INHALATION) at 19:37

## 2025-03-10 RX ADMIN — ENOXAPARIN SODIUM 30 MG: 30 INJECTION SUBCUTANEOUS at 10:44

## 2025-03-10 RX ADMIN — BUDESONIDE 1 MG: 0.5 INHALANT RESPIRATORY (INHALATION) at 19:36

## 2025-03-10 RX ADMIN — PREDNISONE 30 MG: 10 TABLET ORAL at 10:42

## 2025-03-10 RX ADMIN — IPRATROPIUM BROMIDE AND ALBUTEROL SULFATE 3 ML: .5; 3 SOLUTION RESPIRATORY (INHALATION) at 14:51

## 2025-03-10 RX ADMIN — IPRATROPIUM BROMIDE AND ALBUTEROL SULFATE 3 ML: .5; 3 SOLUTION RESPIRATORY (INHALATION) at 03:28

## 2025-03-10 RX ADMIN — IPRATROPIUM BROMIDE AND ALBUTEROL SULFATE 3 ML: .5; 3 SOLUTION RESPIRATORY (INHALATION) at 23:21

## 2025-03-10 RX ADMIN — IPRATROPIUM BROMIDE AND ALBUTEROL SULFATE 3 ML: .5; 3 SOLUTION RESPIRATORY (INHALATION) at 11:25

## 2025-03-10 RX ADMIN — IPRATROPIUM BROMIDE AND ALBUTEROL SULFATE 3 ML: .5; 3 SOLUTION RESPIRATORY (INHALATION) at 06:34

## 2025-03-10 RX ADMIN — AZITHROMYCIN DIHYDRATE 250 MG: 250 TABLET, FILM COATED ORAL at 10:42

## 2025-03-10 RX ADMIN — PANTOPRAZOLE SODIUM 40 MG: 40 TABLET, DELAYED RELEASE ORAL at 07:44

## 2025-03-10 RX ADMIN — SODIUM CHLORIDE, SODIUM LACTATE, POTASSIUM CHLORIDE, AND CALCIUM CHLORIDE 1000 ML: .6; .31; .03; .02 INJECTION, SOLUTION INTRAVENOUS at 14:09

## 2025-03-10 ASSESSMENT — ACTIVITIES OF DAILY LIVING (ADL)
ADLS_ACUITY_SCORE: 53
ADLS_ACUITY_SCORE: 55
ADLS_ACUITY_SCORE: 53
ADLS_ACUITY_SCORE: 53
ADLS_ACUITY_SCORE: 56
ADLS_ACUITY_SCORE: 56
ADLS_ACUITY_SCORE: 53
ADLS_ACUITY_SCORE: 55
ADLS_ACUITY_SCORE: 53
ADLS_ACUITY_SCORE: 56
ADLS_ACUITY_SCORE: 55
ADLS_ACUITY_SCORE: 53
ADLS_ACUITY_SCORE: 52
ADLS_ACUITY_SCORE: 53
ADLS_ACUITY_SCORE: 56
ADLS_ACUITY_SCORE: 56
ADLS_ACUITY_SCORE: 53
ADLS_ACUITY_SCORE: 52
ADLS_ACUITY_SCORE: 53

## 2025-03-10 NOTE — PROGRESS NOTES
Patient remains on 2 lpm, acute. SpO2 95%. Neb tx given as ordered. Pt still refusing flutter.    Hayde Galvan, RT

## 2025-03-10 NOTE — PROGRESS NOTES
Interdisciplinary Discharge Planning Note    Anticipated Discharge Date: TBD    Anticipated Discharge Location: ARU    Clinical Needs Before Discharge:  adequate comfort achieved, adequate fluid and/or nutritional intake, and stable oxygen requirement    Treatment Needs After Discharge:  rehab (PT, OT, ST)    Potential Barriers to Discharge: None identified at this time    NASEEM Poe  3/10/2025,  12:17 PM '

## 2025-03-10 NOTE — PROGRESS NOTES
Clinical Nutrition / reassessment     Assessment:   Client History:  pt refusing the Ensure, Boost brought in by family. Will discontinue Ensure order. Encourage supplement intake.   Diet Order: pureed, thin liquids  Oral Intake: 0-50%, monitor and record please  Supplement Intake: Ensure TID on trays - discontinue due to refusal, taking own boost from home   Weight:   Vitals:    02/27/25 0247 02/27/25 0519 03/01/25 0036 03/02/25 0327   Weight: 39.9 kg (88 lb) 37.9 kg (83 lb 8 oz) 45.4 kg (100 lb 1.4 oz) 48.1 kg (106 lb 0.7 oz)    03/03/25 0200 03/04/25 0600 03/07/25 0600 03/08/25 0438   Weight: 51 kg (112 lb 7 oz) 47.8 kg (105 lb 6.1 oz) 40.4 kg (89 lb 1.1 oz) 38.1 kg (83 lb 15.9 oz)    03/09/25 0500 03/10/25 0100   Weight: 37.1 kg (81 lb 12.8 oz) 38.8 kg (85 lb 9.6 oz)    Body mass index is 15.66 kg/m .    Estimated nutritional needs based on:  ideal body weight 50 kg / 110 lbs   Estimated energy needs:  8159-7944 kcal/day (25-30 kcal/kg)  Estimated protein needs:  60-90 gm/day (1.2-1.5 g/kg)    Malnutrition Criteria:  (Need to have 2 indicators to qualify recommendation)  Energy Intake:  Chronic Moderate: < 75% of estimated energy requirement for >/= 1 month  Interpretation of Weight Loss:  Acute Severe:   > 5% in 1 month  Physical Findings:  Chronic Body Fat Loss:  severe and Chronic Muscle Mass Loss:  severe  Reduced  Strength:  Not Measured    Recommended Nutrition Diagnosis:   Severe Malnutrition in the context of chronic illness - based on AND/ASPEN Clinical Characterstics of Malnutrition May 2012  Malnutrition:    - Level of malnutrition: Severe       Nutrition Education: Nutrition education will be provided as appropriate.    Nutrition Diagnosis: Weight:  weight loss/underweight related to inadequate energy intakes vs expenditure as evidenced by BMI at 15, ~7# wt loss in past month.    Intervention:  Nutrition Intervention(s):Recommended general, healthful diet-encourage small/frequent meals and  snacks, high protein, high calorie  1. Meals and Snacks: pureed diet texture at this time, thin liquids  2. Medical Food Supplement: Boost from home TID     Nutrition Goal(s):  1. Pt will tolerate diet as ordered, texture per speech therapy recommendations  2. Pt will consume 50% or more of meals and supplements  3. Pt will not have unplanned wt loss during hospitalization      Monitoring and Evaluation:   Feeding tolerance, weights, diet advancement     Discharge Recommendation:   Nutrition Discharge Planning  Supplements of choice on discharge to promote nutrition status.     RD will reassess in within 1-5 days or sooner.  Theresa Lopez RD on 3/10/2025 at 2:02 PM
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
## 2025-03-10 NOTE — PROGRESS NOTES
"Pt and  agreeable to IV \"only if theres not multiple tries and no digging around.\"  IV in right wrist area obtained, pt tolerated well. Fluids running.   "

## 2025-03-10 NOTE — PROGRESS NOTES
03/10/25 8465   Appointment Info   Signing Clinician's Name / Credentials (OT) Cristina Lee, OTR/L   Therapeutic Activities   Therapeutic Activity Minutes (08877) 20   Symptoms noted during/after treatment fatigue   Treatment Detail/Skilled Intervention pt attempted 3 sit to stand transfers today while seated at EOB, with Min assist.  Pt was able to trasnfer to recliner with min assist of 1-2 for balance. Pt wants to take a shower after she receives some fluids this afternoon, will check back with patient as schedule permits today.   OT Discharge Planning   OT Plan progress activity tolerance and strength   OT Discharge Recommendation (DC Rec) Acute Rehab Center-Motivated patient will benefit from intensive, interdisciplinary therapy.  Anticipate will be able to tolerate 3 hours of therapy per day   OT Rationale for DC Rec Pt has several factors that make her a good candidate for inpatient rehab, is motivated and very deconditioned over course of hospital stay   OT Brief overview of current status Pt was able to complete 3 sit to stand transfers from bed and with hand held assist able to take a few steps from bed to recliner with minimal assist.   Total Session Time   Timed Code Treatment Minutes 20   Total Session Time (sum of timed and untimed services) 20

## 2025-03-10 NOTE — PROGRESS NOTES
03/10/25 1100   Appointment Info   Signing Clinician's Name / Credentials (PT) Andre Leonardo MPT   Interventions   Interventions Quick Adds Gait Training;Therapeutic Activity;Therapeutic Procedure   Therapeutic Procedure/Exercise   Symptoms Noted During/After Treatment fatigue   Treatment Detail/Skilled Intervention multiple (3) sit to stands with minimal assist to CGA   Therapeutic Activity   Symptoms Noted During/After Treatment Fatigue   Treatment Detail/Skilled Intervention supine to sit with minimal assist of 1; sit to stand with minimal assist of 1; stand pivot with minimal assist of 2 for stability   Gait Training   Symptoms Noted During/After Treatment (Gait Training) fatigue   Treatment Detail/Skilled Intervention a few steps with bed to recliner transfer   Distance in Feet 2   [place] Level (Gait Training) minimum assist (75% patient effort)   Physical Assistance Level (Gait Training) 2 person assist   Weight Bearing (Gait Training) full weight-bearing   Assistive Device (Gait Training)   (hand held assist)   Pattern Analysis (Gait Training) 2-point gait   Gait Analysis Deviations increased time in double stance;decreased velocity of limb motion;decreased step length   Impairments (Gait Analysis/Training) balance impaired;strength decreased   PT Discharge Planning   PT Plan Continue PT   PT Discharge Recommendation (DC Rec) Acute Rehab Center-Motivated patient will benefit from intensive, interdisciplinary therapy.  Anticipate will be able to tolerate 3 hours of therapy per day   PT Rationale for DC Rec to promote strength, stability and safe mobility prior to returning home   PT Brief overview of current status Fatigued/weak patient exhibiting decreased functional activity tolerance and static/dynamic standing stability; progress with supine to sit today; she will certainly benefit from continued PT to promote return to her prior level of safe and independent function   PT Equipment Needed at  Discharge walker, rolling

## 2025-03-10 NOTE — PLAN OF CARE
Goal Outcome Evaluation:    Patient alert and oriented. Tachycardic this am. Speech volume improving. Expiratory wheezes in all lobes. On 2 LPM NC. Pivots to commode. Urinating throughout night. CCRQ2H.  at bedside during night. Mepilex on sacrum.    Temp: 97.7  F (36.5  C) Temp src: Tympanic BP: 115/60 Pulse: (!) 15   Resp: 18 SpO2: 93 % O2 Device: Nasal cannula Oxygen Delivery: 2 LPM

## 2025-03-10 NOTE — PROGRESS NOTES
Called pt's daughter Maru at 121Pm. This writer let her know that I spoke with her mom about phone calls. Patient stated that it is difficult for her to talk and that she had contacted her daughter this morning and asked her to use texting for communication.

## 2025-03-10 NOTE — PROGRESS NOTES
Patient is on 2L O2.  Does not wear O2 at home.  Nebs given as ordered.  Breath sounds clear and diminished.

## 2025-03-10 NOTE — PROGRESS NOTES
:    Met with patient and patient's spouse in room to discuss discharge planning needs.  It was mentioned patient may benefit from placement at an ARU at discharge for further rehab therapy.  Patient would like [facility] as a first choice and [facility] as a second choice.  Sent referrals to both facilities.  Patient and patient's spouse also requested a Health Care Directive.  Copies provided to both patient and patient's spouse.      NASEEM Poe on 3/10/2025 at 10:01 AM    Left message with Juan Pablo Robbins at [facility] to check status of referral.  Spoke with Denise at [facility] and they are screening patient.  Faxed additional therapy notes to both facilities.    NASEEM Poe on 3/10/2025 at 1:40 PM

## 2025-03-10 NOTE — PROGRESS NOTES
Pt remains on 2L NC.  Pt received and tolerated aerosol nebs as ordered.  Pt refused Incruse inhaler; she states it makes her cough too muchBS pretreatment diminished expiratory wheezes.  Post treatment expiratory wheezes with increased aeration.  Respiratory will continue to monitor and provide support.    Zayra Lucero, RT

## 2025-03-10 NOTE — PROGRESS NOTES
"Lost IV access due to infiltration. IV bolus stopped. MD Vargas aware. Educated pt on having IV access for fluids, being sick and due to the unknowns of discharging. Pt refusing IV access still at this time due to \"anxiety of IV's and having to be poked over and over due to them going bad.\"  stated he would have a conversation with pt during dinner and let nurse know on pts final decision.   "

## 2025-03-10 NOTE — PLAN OF CARE
"Goal Outcome Evaluation:      Plan of Care Reviewed With: patient, spouse    Overall Patient Progress: improvingOverall Patient Progress: improving       Pt a&o, VSS, herb hooper MD aware. Pt agreed to new IV start. Pt assist of 2 to commode with walker and belt. Pt has swallowing issues, aspiration precautions maintained. Pts voice is hoarse and a whisper \"pt stated from being intubated\" Pt has whiteboard at bedside.  continues to be at bedside. Pt on 2lpm o2 acute. Meplix to bottom due to some blanchable redness. Brusing scattered and on right side of neck where central line was removed previously.     /48 (BP Location: Right arm, Patient Position: Semi-Quintanilla's, Cuff Size: Adult Small)   Pulse 94   Temp 99  F (37.2  C) (Tympanic)   Resp 16   Ht 1.575 m (5' 2\")   Wt 38.8 kg (85 lb 9.6 oz)   LMP 01/01/1996 (Approximate)   SpO2 93%   BMI 15.66 kg/m        "

## 2025-03-10 NOTE — PROGRESS NOTES
03/07/25 1656   Appointment Info   Signing Clinician's Name / Credentials (PT) Andre Leonardo MPT   Living Environment   People in Home spouse   Current Living Arrangements house   Home Accessibility no concerns   Self-Care   Usual Activity Tolerance good   Current Activity Tolerance poor   Equipment Currently Used at Home none   Fall history within last six months no   General Information   Referring Physician Lick   Patient/Family Therapy Goals Statement (PT) short term rehab prior to returning home   Existing Precautions/Restrictions fall;oxygen therapy device and L/min   Weight-Bearing Status - LLE full weight-bearing   Weight-Bearing Status - RLE full weight-bearing   Cognition   Affect/Mental Status (Cognition) WFL   Orientation Status (Cognition) oriented x 4   Follows Commands (Cognition) WFL   Pain Assessment   Patient Currently in Pain Yes, see Vital Sign flowsheet   Integumentary/Edema   Integumentary/Edema no deficits were identifed   Posture    Posture Forward head position;Protracted shoulders   Range of Motion (ROM)   Range of Motion ROM is WFL   Strength (Manual Muscle Testing)   Strength Comments grossly 3/5 in LEs; patient fatigues quickly with activity   Bed Mobility   Impairments Contributing to Impaired Bed Mobility decreased strength   Comment, (Bed Mobility) supine<>sit with maximal assist of 2   Transfers   Impairments Contributing to Impaired Transfers impaired balance;decreased strength   Comment, (Transfers) sit to stand and stand pivot with moderate assist of 1-2 with hand held assist   Gait/Stairs (Locomotion)   Comment, (Gait/Stairs) patient presently not funcitonallyl ambulating due to fatigue   Balance   Balance Comments poor stand and dynamic stability due to fatigue and weakness   Sensory Examination   Sensory Perception WFL   Coordination   Coordination no deficits were identified   Muscle Tone   Muscle Tone no deficits were identified   Clinical Impression   Criteria for Skilled  Therapeutic Intervention Yes, treatment indicated   PT Diagnosis (PT) impaired mobility   Influenced by the following impairments fatigue and weakness   Functional limitations due to impairments activity/gait tolerance and stability   Clinical Presentation (PT Evaluation Complexity) evolving   Clinical Decision Making (Complexity) moderate complexity   Planned Therapy Interventions (PT) bed mobility training;gait training;transfer training   Risk & Benefits of therapy have been explained evaluation/treatment results reviewed;risks/benefits reviewed;patient   PT Total Evaluation Time   PT Eval, Low Complexity Minutes (50043) 15   Physical Therapy Goals   PT Frequency Daily   PT Predicted Duration/Target Date for Goal Attainment 03/14/25   PT Goals Bed Mobility;Transfers;Gait   PT: Bed Mobility Supervision/stand-by assist   PT: Transfers Supervision/stand-by assist;Assistive device   PT: Gait Supervision/stand-by assist;Rolling walker;10 feet   PT Discharge Planning   PT Plan Continue PT   PT Discharge Recommendation (DC Rec) Acute Rehab Center-Motivated patient will benefit from intensive, interdisciplinary therapy.  Anticipate will be able to tolerate 3 hours of therapy per day   PT Rationale for DC Rec to promote strength, stability and safe mobility prior to returning home   PT Brief overview of current status Patient requiring increased assistance with mobilities due to fatigue and weakness; patient able to demonstrate WFL AROM of her LEs, however, she fatigues easily with activity; she may benefit from consideration of an ARU upon discharge from this hospital admission for intense rehab with medical monitoring   PT Equipment Needed at Discharge walker, rolling   Physical Therapy Time and Intention   Total Session Time (sum of timed and untimed services) 15

## 2025-03-10 NOTE — PROGRESS NOTES
03/10/25 1200   Appointment Info   Signing Clinician's Name / Credentials (SLP) Lesvia Edge MA, CCC-SLP   Swallowing Recommendations   Diet Consistency Recommendations pureed (level 4);thin liquids (level 0)   Supervision Level for Intake distant supervision needed   Mode of Delivery Recommendations bolus size, small;slow rate of intake;other (see comments)  (No straw)   Swallowing Maneuver Recommendations effortful (hard) swallow  (Written cue in room)   Monitoring/Assistance Required (Eating/Swallowing) monitor for cough or change in vocal quality with intake;stop eating activities when fatigue is present  (Patient to self monitor)   Recommended Feeding/Eating Techniques (Swallow Eval) maintain upright sitting position for eating;maintain upright posture during/after eating for 30 minutes   Medication Administration Recommendations, Swallowing (SLP) Crushed in medium when possible   Instrumental Assessment Recommendations reassess via non-instrumental clinical swallow evaluation   Comment, Swallowing Recommendations Oral bolus trials performed today, elimination of clinical signs of aspiration with small single liquid boluses and elimination of straw use.  Minced trialed but sticking in pharynx and multiple swallows in an attempt to clear.   General Therapy Interventions   Planned Therapy Interventions Dysphagia Treatment   Dysphagia treatment Modified diet education;Instruction of safe swallow strategies;Oropharyngeal exercise training;Compensatory strategies for swallowing   Intervention Comments Patient demonstrates weakness that requires skilled therapy services to address   Clinical Impression   Criteria for Skilled Therapeutic Interventions Met (SLP Eval) Yes, treatment indicated   SLP Diagnosis Oropharyngeal dysphagia   Problem List (SLP) Oropharyngeal dysphagia   SLP Goals   Therapy Frequency (SLP Eval) 5 times/week   SLP Predicted Duration/Target Date for Goal Attainment 03/14/25   SLP Goals  Swallow   SLP: Safely tolerate diet without signs/symptoms of aspiration Soft & bite sized diet;Thin liquids;Independently   Interventions   Interventions Quick Adds Swallowing Dysfunction   Swallowing Intervention   Treatment of Swallowing Dysfunction &/or Oral Function for Feeding Minutes (26091) 34   Symptoms Noted During/After Treatment None   Treatment Detail/Skilled Intervention Oral bolus trials, IDDSI 5 Minced sticking in phaynx (per patient report), patient coughing with large sips of thin liquid and with straw use, coughing eliminated with small single sips and no straw.  IDDSI 6 Soft and Bite sized trialed but unsuccessful due to effortful transport and multiple swallows (triple) with reports of pharyngeal residue.  Bolus weighted effortful swallow introduced with patient reported elimination of pharyngeal retention, instructed to use as both compensation and rehabilitative technique.   SLP Discharge Planning   SLP Plan Follow up with additional education and exercise   SLP Discharge Recommendation Acute Rehab Center-Motivated patient will benefit from intensive, interdisciplinary therapy.  Anticipate will be able to tolerate 3 hours of therapy per day   SLP Rationale for DC Rec Patient requires intensive skilled therapy services to eliminate dysphagia   SLP Brief overview of current status  Patient improved oral bolus manipulation and transfer, continues to have clinical signs of pharyngeal weakness, elimination of clinical signs of aspiration with small single sips and no straw use.

## 2025-03-11 ENCOUNTER — APPOINTMENT (OUTPATIENT)
Dept: PHYSICAL THERAPY | Facility: OTHER | Age: 65
DRG: 208 | End: 2025-03-11
Payer: COMMERCIAL

## 2025-03-11 ENCOUNTER — APPOINTMENT (OUTPATIENT)
Dept: SPEECH THERAPY | Facility: OTHER | Age: 65
DRG: 208 | End: 2025-03-11
Payer: COMMERCIAL

## 2025-03-11 ENCOUNTER — APPOINTMENT (OUTPATIENT)
Dept: OCCUPATIONAL THERAPY | Facility: OTHER | Age: 65
DRG: 208 | End: 2025-03-11
Payer: COMMERCIAL

## 2025-03-11 PROBLEM — E43 SEVERE PROTEIN-CALORIE MALNUTRITION: Status: ACTIVE | Noted: 2022-12-06

## 2025-03-11 LAB
ALBUMIN SERPL BCG-MCNC: 3.3 G/DL (ref 3.5–5.2)
ALP SERPL-CCNC: 73 U/L (ref 40–150)
ALT SERPL W P-5'-P-CCNC: 61 U/L (ref 0–50)
ANION GAP SERPL CALCULATED.3IONS-SCNC: 10 MMOL/L (ref 7–15)
AST SERPL W P-5'-P-CCNC: 34 U/L (ref 0–45)
BILIRUB SERPL-MCNC: 0.7 MG/DL
BUN SERPL-MCNC: 20.9 MG/DL (ref 8–23)
CALCIUM SERPL-MCNC: 8.9 MG/DL (ref 8.8–10.4)
CHLORIDE SERPL-SCNC: 101 MMOL/L (ref 98–107)
CREAT SERPL-MCNC: 0.48 MG/DL (ref 0.51–0.95)
EGFRCR SERPLBLD CKD-EPI 2021: >90 ML/MIN/1.73M2
ERYTHROCYTE [DISTWIDTH] IN BLOOD BY AUTOMATED COUNT: 12.6 % (ref 10–15)
GLUCOSE SERPL-MCNC: 87 MG/DL (ref 70–99)
HCO3 SERPL-SCNC: 28 MMOL/L (ref 22–29)
HCT VFR BLD AUTO: 36 % (ref 35–47)
HGB BLD-MCNC: 11.9 G/DL (ref 11.7–15.7)
INR PPP: 0.96 (ref 0.85–1.15)
MAGNESIUM SERPL-MCNC: 2 MG/DL (ref 1.7–2.3)
MCH RBC QN AUTO: 30.1 PG (ref 26.5–33)
MCHC RBC AUTO-ENTMCNC: 33.1 G/DL (ref 31.5–36.5)
MCV RBC AUTO: 91 FL (ref 78–100)
PHOSPHATE SERPL-MCNC: 2.9 MG/DL (ref 2.5–4.5)
PLATELET # BLD AUTO: 261 10E3/UL (ref 150–450)
POTASSIUM SERPL-SCNC: 3.9 MMOL/L (ref 3.4–5.3)
PROT SERPL-MCNC: 5.9 G/DL (ref 6.4–8.3)
RBC # BLD AUTO: 3.95 10E6/UL (ref 3.8–5.2)
SODIUM SERPL-SCNC: 139 MMOL/L (ref 135–145)
WBC # BLD AUTO: 14.4 10E3/UL (ref 4–11)

## 2025-03-11 PROCEDURE — 250N000011 HC RX IP 250 OP 636: Performed by: INTERNAL MEDICINE

## 2025-03-11 PROCEDURE — 97530 THERAPEUTIC ACTIVITIES: CPT | Mod: GP

## 2025-03-11 PROCEDURE — 84100 ASSAY OF PHOSPHORUS: CPT | Performed by: STUDENT IN AN ORGANIZED HEALTH CARE EDUCATION/TRAINING PROGRAM

## 2025-03-11 PROCEDURE — 120N000001 HC R&B MED SURG/OB

## 2025-03-11 PROCEDURE — 92524 BEHAVRAL QUALIT ANALYS VOICE: CPT | Mod: GN

## 2025-03-11 PROCEDURE — 82435 ASSAY OF BLOOD CHLORIDE: CPT | Performed by: STUDENT IN AN ORGANIZED HEALTH CARE EDUCATION/TRAINING PROGRAM

## 2025-03-11 PROCEDURE — 85027 COMPLETE CBC AUTOMATED: CPT | Performed by: STUDENT IN AN ORGANIZED HEALTH CARE EDUCATION/TRAINING PROGRAM

## 2025-03-11 PROCEDURE — 36415 COLL VENOUS BLD VENIPUNCTURE: CPT | Performed by: STUDENT IN AN ORGANIZED HEALTH CARE EDUCATION/TRAINING PROGRAM

## 2025-03-11 PROCEDURE — 250N000012 HC RX MED GY IP 250 OP 636 PS 637: Performed by: STUDENT IN AN ORGANIZED HEALTH CARE EDUCATION/TRAINING PROGRAM

## 2025-03-11 PROCEDURE — 250N000009 HC RX 250: Performed by: INTERNAL MEDICINE

## 2025-03-11 PROCEDURE — 94640 AIRWAY INHALATION TREATMENT: CPT

## 2025-03-11 PROCEDURE — 94640 AIRWAY INHALATION TREATMENT: CPT | Mod: 76

## 2025-03-11 PROCEDURE — 97116 GAIT TRAINING THERAPY: CPT | Mod: GP

## 2025-03-11 PROCEDURE — 97530 THERAPEUTIC ACTIVITIES: CPT | Mod: GO | Performed by: OCCUPATIONAL THERAPIST

## 2025-03-11 PROCEDURE — 250N000013 HC RX MED GY IP 250 OP 250 PS 637: Performed by: INTERNAL MEDICINE

## 2025-03-11 PROCEDURE — 97535 SELF CARE MNGMENT TRAINING: CPT | Mod: GO | Performed by: OCCUPATIONAL THERAPIST

## 2025-03-11 PROCEDURE — 99233 SBSQ HOSP IP/OBS HIGH 50: CPT | Performed by: STUDENT IN AN ORGANIZED HEALTH CARE EDUCATION/TRAINING PROGRAM

## 2025-03-11 PROCEDURE — 84075 ASSAY ALKALINE PHOSPHATASE: CPT | Performed by: STUDENT IN AN ORGANIZED HEALTH CARE EDUCATION/TRAINING PROGRAM

## 2025-03-11 PROCEDURE — 85610 PROTHROMBIN TIME: CPT | Performed by: STUDENT IN AN ORGANIZED HEALTH CARE EDUCATION/TRAINING PROGRAM

## 2025-03-11 PROCEDURE — 97110 THERAPEUTIC EXERCISES: CPT | Mod: GP

## 2025-03-11 PROCEDURE — 999N000157 HC STATISTIC RCP TIME EA 10 MIN

## 2025-03-11 PROCEDURE — 83735 ASSAY OF MAGNESIUM: CPT | Performed by: STUDENT IN AN ORGANIZED HEALTH CARE EDUCATION/TRAINING PROGRAM

## 2025-03-11 RX ORDER — PREDNISONE 20 MG/1
20 TABLET ORAL DAILY
Status: DISCONTINUED | OUTPATIENT
Start: 2025-03-11 | End: 2025-03-12 | Stop reason: HOSPADM

## 2025-03-11 RX ADMIN — IPRATROPIUM BROMIDE AND ALBUTEROL SULFATE 3 ML: .5; 3 SOLUTION RESPIRATORY (INHALATION) at 12:08

## 2025-03-11 RX ADMIN — IPRATROPIUM BROMIDE AND ALBUTEROL SULFATE 3 ML: .5; 3 SOLUTION RESPIRATORY (INHALATION) at 06:35

## 2025-03-11 RX ADMIN — IPRATROPIUM BROMIDE AND ALBUTEROL SULFATE 3 ML: .5; 3 SOLUTION RESPIRATORY (INHALATION) at 03:20

## 2025-03-11 RX ADMIN — AZITHROMYCIN DIHYDRATE 250 MG: 250 TABLET, FILM COATED ORAL at 09:19

## 2025-03-11 RX ADMIN — PREDNISONE 20 MG: 20 TABLET ORAL at 09:19

## 2025-03-11 RX ADMIN — IPRATROPIUM BROMIDE AND ALBUTEROL SULFATE 3 ML: .5; 3 SOLUTION RESPIRATORY (INHALATION) at 23:38

## 2025-03-11 RX ADMIN — BUDESONIDE 1 MG: 0.5 INHALANT RESPIRATORY (INHALATION) at 06:35

## 2025-03-11 RX ADMIN — ENOXAPARIN SODIUM 30 MG: 30 INJECTION SUBCUTANEOUS at 09:21

## 2025-03-11 RX ADMIN — PANTOPRAZOLE SODIUM 40 MG: 40 TABLET, DELAYED RELEASE ORAL at 07:34

## 2025-03-11 ASSESSMENT — ACTIVITIES OF DAILY LIVING (ADL)
ADLS_ACUITY_SCORE: 50
ADLS_ACUITY_SCORE: 50
ADLS_ACUITY_SCORE: 52
ADLS_ACUITY_SCORE: 50
ADLS_ACUITY_SCORE: 52
ADLS_ACUITY_SCORE: 54
ADLS_ACUITY_SCORE: 52
ADLS_ACUITY_SCORE: 52
ADLS_ACUITY_SCORE: 50
ADLS_ACUITY_SCORE: 54
ADLS_ACUITY_SCORE: 50
ADLS_ACUITY_SCORE: 52
ADLS_ACUITY_SCORE: 50
ADLS_ACUITY_SCORE: 50
ADLS_ACUITY_SCORE: 54
ADLS_ACUITY_SCORE: 50
ADLS_ACUITY_SCORE: 52
ADLS_ACUITY_SCORE: 54
ADLS_ACUITY_SCORE: 50

## 2025-03-11 NOTE — PROGRESS NOTES
03/11/25 7190   Appointment Info   Signing Clinician's Name / Credentials (PT) Andre Leonardo MPT   Therapeutic Activity   Symptoms Noted During/After Treatment Fatigue   Treatment Detail/Skilled Intervention supine<>sit with minimal assist to SBA; sit to stand with minimal assist of 1; stand pivot with CGA   Gait Training   Symptoms Noted During/After Treatment (Gait Training) fatigue;shortness of breath   Treatment Detail/Skilled Intervention ambulation in hallway   Distance in Feet 40 x 2   Gray Level (Gait Training) contact guard   Physical Assistance Level (Gait Training) 1 person + 1 person to manage equipment   Weight Bearing (Gait Training) full weight-bearing   Assistive Device (Gait Training) rolling walker   Pattern Analysis (Gait Training) swing-through gait   Gait Analysis Deviations decreased vandana;decreased velocity of limb motion;decreased step length   Impairments (Gait Analysis/Training) balance impaired;strength decreased   PT Discharge Planning   PT Plan Continue PT   PT Discharge Recommendation (DC Rec) Transitional Care Facility;Acute Rehab Center-Motivated patient will benefit from intensive, interdisciplinary therapy.  Anticipate will be able to tolerate 3 hours of therapy per day   PT Rationale for DC Rec to promote strength, stability and safe mobility prior to returning home   PT Brief overview of current status Fatigued/weak patient exhibiting decreased functional activity tolerance and static/dynamic standing stability; progress with gait tolerance today, however; she will certainly benefit from continued PT to promote return to prior level of mobility allowing her to return home   PT Equipment Needed at Discharge walker, rolling

## 2025-03-11 NOTE — PROGRESS NOTES
Interdisciplinary Discharge Planning Note    Anticipated Discharge Date: 3/12-3/13/25    Anticipated Discharge Location: D    Clinical Needs Before Discharge:  adequate comfort achieved, adequate fluid and/or nutritional intake, and stable oxygen requirement    Treatment Needs After Discharge:  rehab (PT, OT, ST)    Potential Barriers to Discharge: None identified at this time.    NASEEM Poe  3/11/2025,  12:37 PM

## 2025-03-11 NOTE — PLAN OF CARE
Goal Outcome Evaluation:    Patient alert and oriented. VSS and afebrile. Stability improved with standing and transferring to commode. 2 LPM on NC. Denies pain. Voice level improving and able to speak for longer sentences. Taking pills by being crushed and mixed with liquid in syringe.     Temp: 98.5  F (36.9  C) Temp src: Tympanic BP: 107/57 Pulse: 93   Resp: 18 SpO2: 94 % O2 Device: Nasal cannula Oxygen Delivery: 2 LPM

## 2025-03-11 NOTE — PLAN OF CARE
"A/O x 4, SBA x 1 with gait belt/Rolling walker.  HX Copd, today weaned to room air by Respiratory therapy.  Ok if sating 88% or above.  Shower today.  IV is out as it was leaking & would no longer flush.  Pt refused to have another one placed.  Cough- weak/congested non productive.  LS: clear/dim.  Voice is improving, its been hoarse and having to whisper since she was intubated.  Today she was able to swallow her meds orally without difficulty.  Patient worked with physical therapy today and walked in hallway.  Pts family friend brought in mashed potatoes this afternoon.  Pts family is frustrated as they were told that she might have to go to a SNF.  Family states that if she has to go to a SNF they would rather just bring her home and figure out strength training on their own.   states that they have family that can help with this.       /60 (BP Location: Right arm, Patient Position: Chair, Cuff Size: Adult Small)   Pulse 98   Temp 99  F (37.2  C) (Tympanic)   Resp 18   Ht 1.575 m (5' 2\")   Wt 38.2 kg (84 lb 4.8 oz)   LMP 01/01/1996 (Approximate)   SpO2 92%   BMI 15.42 kg/m           Goal Outcome Evaluation:      Plan of Care Reviewed With: patient, spouse    Overall Patient Progress: improvingOverall Patient Progress: improving           "

## 2025-03-11 NOTE — PROGRESS NOTES
:    Left a voicemail with Rosendo Admissions Coordinator at [facility] and Juan Pablo Walker at [facility] to check status of patient referral.  Awaiting return calls.    NASEEM Poe on 3/11/2025 at 9:31 AM    Received a message from Juan Pablo Cabrera at [facility].  [facility] is offering a peer to peer review at ([phone] option 5) to be completed by MD by 1300 this date.  MD informed.    NASEEM Poe on 3/11/2025 at 10:07 AM    Per MD, patient's insurance ([facility]) declined ARU placement following peer to peer review.  Met with patient to discuss SNF placement options.  Patient stated she would like to further discuss options with her spouse when he returns from an appointment.   will continue to follow.    NASEEM Poe on 3/11/2025 at 12:21 PM    Per MD, patient would like a referral sent to [facility] in [place].  Spoke with patient and she stated she would still like to talk with her  regarding discharge plan, but would like to begin the referral process to [facility] SNF.  Referral sent and they are screening patient.    Received a call from Valente, Care Coordinator with [facility] stating they denied coverage for an IP level of care for ARU placement.  [facility] stated patient has a right to appeal this decision.  Appeal information given to patient.    Pt/family was given the Medicare Compare list for SNF, with associated star ratings to assist with choice for referrals/discharge planning Yes    Education was given to pt/family that star ratings are updated/maintained by Medicare and can be reviewed by visiting www.medicare.gov Yes    NASEEM Poe on 3/11/2025 at 2:57 PM    Spoke with patient and family in room to discuss discharge plan.  Patient stated she changed her mind and would like to cancel SNF referral to [facility].  Patient would like to go home at discharge with home care.  Discussed options from  list and patient chose [facility] from list.  Referral sent to [facility].        Pt/family was given the Medicare Compare list for Home Care, with associated star ratings to assist with choice for referrals/discharge planning Yes    Education was given to pt/family that star ratings are updated/maintained by Medicare and can be reviewed by visiting www.medicare.gov Yes    NASEEM Poe on 3/11/2025 at 3:27 PM    Received message from Radha at [facility] and they currently are at capacity for [facility] patients and cannot accept patient at this time.  Patient added to their wait list, which is possibly 4 weeks out per Radha.  Spoke with patient and family and they are aware.  Patient and family are wanting to be on [facility] wait list too.  Sent referral to GI.  Patient added to [facility] wait list.    NASEEM Poe on 3/11/2025 at 4:27 PM

## 2025-03-11 NOTE — PROGRESS NOTES
Advanced directive paper work, copies taken and placed in patient chart.  Pts husbands copies of advanced paper work put into the basket that discharged pt charts go.   Original copies placed back in pt room.    Power of  paper work for both patient and  printed out by social work.  Placed in pt room.

## 2025-03-11 NOTE — PROGRESS NOTES
03/11/25 1200   Appointment Info   Signing Clinician's Name / Credentials (SLP) Lesvia Edge MA, CCC-SLP   General Information   Onset of Illness/Injury or Date of Surgery 02/27/25   Referring Physician Dr. Crispin Jacobson   Patient/Family Therapy Goal Statement (SLP) Talk with voice   Pertinent History of Current Problem Patient on prolonged NPO status due o respiratory failure requiring intubation (3/1/25-3/4/25).   General Observations Patient was pleasant and engaged   Type of Evaluation   Type of Evaluation Speech, Language, Cognition   Vocal Quality/Secretion Management (Oral Motor)   Vocal Quality (Oral Motor) harsh;hoarse;breathy  (brief periods (1-2 seconds) of phonation but rough breathy quality.)   Secretion Management (Oral Motor) WNL   Comment, Vocal Quality/Secretion Management (Oral Motor) Voice probes implemented (see below)   Motor Speech   Vocal Loudness (Motor Speech) reduced loudness;other (see comments)  (aphonia)   Speech Intelligibility (Motor Speech) word level;phrase/sentence level;conversational level  (Breakdown in further communication due to aphonic quality)   Comment, Motor Speech Assessment Dysphonia present with reduced ability to phonate and rough breathy quality.  Patient's vocal folds are vibrating for shortened duration (up to 2 seconds) resulting in 1 audible word (depending on word context, i.e. length) and the harsh quality interferes with her communicative effectiveness.   Speech Fluency (Motor Speech) WNL   Rate/Prosody (Motor Speech) impaired prosody;monotone speech   Articulation (Motor Speech) WNL   Pitch glide unable;reduced pitch range   Articulatory rate of movement (number of repetitions in 5 seconds) 5   Sequential Motion Rates (number of repetitions in 5 seconds) 5   Respiration (motor speech) Shallow   Phonation (motor speech) Breathy quality;Harsh quality;Rough voice;Other (see comments)  (aphonic)   Maximum phonation time (seconds) 2   S/Z Ratio 0.25   Word  Level, Speech Intelligibility (Motor Speech) minimal impairment   Conversational Level, Speech Intelligibility (Motor Speech) severe impairment  (due to aphonia)   Phrase/Sentence Level, Speech Intelligibility (Motor Speech) moderate impairment   General Therapy Interventions   Planned Therapy Interventions Dysphagia Treatment;Voice   Dysphagia treatment Modified diet education;Instruction of safe swallow strategies;Oropharyngeal exercise training;Compensatory strategies for swallowing   Voice Breath flow to sound flow;Throat clearing elimination plan;Relaxed breath sequence techniques;Voice quality/pitch or volume tasks   Intervention Comments Patient requires vocal hygiene approach at this time due to barrier of accessibilty of FEES and ENT as an inpatient.   Clinical Impression   Criteria for Skilled Therapeutic Interventions Met (SLP Eval) Yes, treatment indicated   SLP Diagnosis Oropharyngeal Dysphagia, Dysphonia   Problem List (SLP) Oropharyngeal Dysphagia, Dysphonia   Activity Limitations Related to Problem List (SLP) Patient unable to communicate beyond single words due to impaired vocal function which acts as a barrier to her communicative effectiveness.  Patient is unable to swallow at prior level due to weakness.   Risks & Benefits of therapy have been explained evaluation/treatment results reviewed;risks/benefits reviewed;participants included;patient   Clinical Impression Comments Patient demonstrating severe dysphonia and oropharygeal dysphagia.   SLP Total Evaluation Time   ST eval/ Voice & Resonance Minutes (40047) 16   SLP Goals   Therapy Frequency (SLP Eval) 5 times/week   SLP Predicted Duration/Target Date for Goal Attainment 03/14/25   SLP Goals Swallow;Communication   SLP: Safely tolerate diet without signs/symptoms of aspiration Soft & bite sized diet;Thin liquids;Independently   SLP: Communicate basic wants and needs independent   Interventions   Interventions Quick Adds Swallowing  Dysfunction;Speech, Language, Voice & Communication   SLP Discharge Planning   SLP Plan Educate, exercise, vocal hygiene   SLP Discharge Recommendation Acute Rehab Center-Motivated patient will benefit from intensive, interdisciplinary therapy.  Anticipate will be able to tolerate 3 hours of therapy per day;sub acute care setting  (ACU ideal due to severity level, subacute within a SNF secondary recommendation)   SLP Rationale for DC Rec Patient requires intensive services to return to prior level of communication and swallowing   SLP Brief overview of current status  Patient demonstrating severe dysphonia and oropharyngeal dysphagia   SLP Time and Intention   Total Session Time (sum of timed and untimed services) 16

## 2025-03-11 NOTE — PROGRESS NOTES
"[facility]    Hospitalist Progress Note      Assessment & Plan   Ember Tavares is a 64 year old woman with a past medical history of COPD, psoriasis, and prior Nissen who was admitted to the hospital with COPD exacerbation.    The patient was initially admitted to the hospital on 2/27/25 due to shortness of breath and coughing.  The patient had recently accompanied her daughter to a breast biopsy appointment in [place] next to a building that was being torn down.  She also had some URI symptoms prior to arrival.  On presentation the emergency department saturations were 73%.  She was treated with methylprednisolone, DuoNebs and supplemental oxygen.  She eventually quired BiPAP and sedation to help her remain comfortable on BiPAP.  Unfortunately she was eventually intubated for approximately 5 days with difficult to manage hypercarbic respiratory failure.  She was subsequently extubated and has some hoarseness of the voice, dysphagia and weight loss.  [facility] rejected her ARU referral. Will look at TCU options on discharge.        Clinically Significant Risk Factors               # Hypoalbuminemia: Lowest albumin = 3.2 g/dL at 3/4/2025  5:27 AM, will monitor as appropriate         # Acute Hypoxic Respiratory Failure: Documented O2 saturation < 90%. Continue supplemental oxygen as needed  # Acute Hypercapnic Respiratory Failure: based on arterial blood gas results.  Continue supplemental oxygen and ventilatory support as indicated.         # Cachexia: Estimated body mass index is 15.42 kg/m  as calculated from the following:    Height as of this encounter: 1.575 m (5' 2\").    Weight as of this encounter: 38.2 kg (84 lb 4.8 oz).   # Severe Malnutrition: based on nutrition assessment and treatment provided per dietitian's recommendations.    # COPD: noted on problem list        Acute respiratory failure with hypoxia and hypercapnia (H)   COPD exacerbation    Alpha-1-antitrypsin deficiency " carrier  Tobacco abuse   Multifactorial Shock  Assessment: Resolved. Presented with initial COPD exacerbation, started on antibiotics steroids and nebulizers, respiratory status deteriorated and intubated 3/1.  Net negative with IV diuresis diuresis on 3/3.  Successful spontaneous breathing trial and extubation on 3/5, required bipap postextubation and transition to Vapotherm.  Now weaned to 2L via NC.  Mental status significantly improved and able to tolerate p.o. intake today.      Plan:   -continue scheduled DuoNebs  -Hypertonic nebs as needed  -Acapella and IS   -Transition IV Methylpred to p.o. 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)   Severe protein calorie malnutrition  Present on admission. she had tube feeds on 3/3.  Progressing well with oral intake at this point.    Plan:   -SLP evaluation and scheduled supplements  -Scheduled supplements and advance diet       Hiatal hernia    Assessment: Stable, present on admission, status post Nissen in the past    Plan:   -continue PPI  -maalox    Dysphonia  Assessment: Severely hoarse voice and possibly some vocal cord paralysis from endotracheal tube. Will need OP ENT eval.  Plan:  -SLP evaluation and would benefit from ENT eval as an outpatient     Diet: Pureed Diet (level 4) Thin Liquids (level 0)    DVT Prophylaxis: Enoxaparin (Lovenox) SQ and Pneumatic Compression  Devices  Peters Catheter: Not present  Code Status: Full Code         Total time spent on this encounter on the date of service: 71 minutes      Disposition Plan      Entered: Carlos Vargas MD 03/11/2025, 1:13 PM       The patient's care was discussed with the Bedside Nurse, Patient, and Patient's Family    Carlos Vargas MD  Hospitalist Service  [facility]  Contact information available via [facility] Paging/Directory    ______________________________________________________________________    Interval History   CC: COPD exacerbation    Extended period on the phone trying to do peer.  Peer to peer with [facility] and they graciously declined her from ARU on discharge.      Discussed options going forward patient.  She is looking at TCU options.  She is understandably frustrated.  She still requiring 2 L supplemental oxygen.  Still requiring assistance with transfers and was up in the chair this afternoon.    -Data reviewed today: I reviewed all new labs and imaging results over the last 24 hours.     Physical Exam   Temp: 98.7  F (37.1  C) Temp src: Tympanic BP: 125/71 Pulse: 86   Resp: 17 SpO2: (!) 89 % O2 Device: None (Room air) Oxygen Delivery: 2 LPM  Vitals:    03/09/25 0500 03/10/25 0100 03/11/25 0100   Weight: 37.1 kg (81 lb 12.8 oz) 38.8 kg (85 lb 9.6 oz) 38.2 kg (84 lb 4.8 oz)     Vital Signs with Ranges  Temp:  [98.5  F (36.9  C)-99.1  F (37.3  C)] 98.7  F (37.1  C)  Pulse:  [] 86  Resp:  [16-18] 17  BP: (100-125)/(48-76) 125/71  SpO2:  [89 %-96 %] 89 %  I/O last 3 completed shifts:  In: 369 [I.V.:369]  Out: 200 [Urine:200]    GENERAL: Sitting up in bed, spontaneously awake, talking and interacting, tracking, no apparent distress.  HEENT: Right IJ CVC now removed.  Bandage removed as well  CARDIOVASCULAR: Regular rate and regular rhythm, no murmurs, rubs, or gallops. Normal S1/S2. No lower extremity edema.   RESPIRATORY: Diffuse air movement in all fields, no significant  wheezing or rhonchi.  GI: soft, non-tender the palpation in all quadrants, active bowel sounds.    MUSCULOSKELETAL: warm and well perfused, 2+ dorsalis pedis pulses bilaterally.    SKIN: no pallor,jaundice, or rashes.       Medications   Current Facility-Administered Medications   Medication Dose Route Frequency Provider Last Rate Last Admin    dextrose 10% infusion   Intravenous Continuous PRN Crispin Jacobson MD        Patient may continue current oral medications   Does not apply Continuous PRN Crispin Jacobson MD         Current Facility-Administered Medications   Medication Dose Route Frequency Provider Last Rate Last Admin    azithromycin (ZITHROMAX) tablet 250 mg  250 mg Oral Daily Crispin Jacobson MD   250 mg at 03/11/25 0919    budesonide (PULMICORT) neb solution 1 mg  1 mg Nebulization BID Jose De Jesus Hannah MD   1 mg at 03/11/25 0635    enoxaparin ANTICOAGULANT (LOVENOX) injection 30 mg  30 mg Subcutaneous Q24H Jose De Jesus Hannah MD   30 mg at 03/11/25 0921    ipratropium - albuterol 0.5 mg/2.5 mg/3 mL (DUONEB) neb solution 3 mL  3 mL Nebulization Q4H Crispin Jacobson MD   3 mL at 03/11/25 1208    pantoprazole (PROTONIX) EC tablet 40 mg  40 mg Oral QAM AC Crispin Jacobson MD   40 mg at 03/11/25 0734    predniSONE (DELTASONE) tablet 20 mg  20 mg Oral Daily Carlos Vargas MD   20 mg at 03/11/25 0919    umeclidinium (INCRUSE ELLIPTA) 62.5 MCG/ACT inhaler 1 puff  1 puff Inhalation Daily Jose De Jesus Hannah MD           Data   Recent Labs   Lab 03/11/25  0705 03/08/25  0452 03/07/25  1304 03/07/25  0505   WBC 14.4* 13.7*  --  13.9*   HGB 11.9 12.7  --  12.3   MCV 91 94  --  91    240  --  213   INR 0.96  --   --   --     145  --  143   POTASSIUM 3.9 3.7  --  3.4   CHLORIDE 101 101  --  96*   CO2 28 36*  --  37*   BUN 20.9 34.0*  --  30.0*   CR 0.48* 0.54  --  0.63   ANIONGAP 10 8  --  10   COLEMAN 8.9 9.3  --  9.1   GLC 87 90 163* 80   ALBUMIN 3.3*  --   --   --    PROTTOTAL 5.9*  --   --   --    BILITOTAL 0.7   --   --   --    ALKPHOS 73  --   --   --    ALT 61*  --   --   --    AST 34  --   --   --        No results found for this or any previous visit (from the past 24 hours).

## 2025-03-11 NOTE — ED NOTES
I gave 0.5mg of IV ativan on 02/28/2025 at 0727 emergently.    Padmini Ribeiro RN on 3/10/2025 at 10:39 PM

## 2025-03-11 NOTE — PROGRESS NOTES
:    Patient requested tonya for Health Care Directive and POA paperwork. tonya Cortez, can see patient in 20 minutes    Criss Medrano on 3/11/2025 at 9:54 AM    NASEEM Hollingsworth on 3/11/2025 at 9:55 AM

## 2025-03-11 NOTE — PROGRESS NOTES
Pt IV will not flush at all, attempted several times.  IV removed, and pt is refusing to have another IV placed.

## 2025-03-11 NOTE — PROGRESS NOTES
03/11/25 2249   Appointment Info   Signing Clinician's Name / Credentials (OT) Cristina Lee, OTR/L   Self-Care/Home Management   Self-Care/Home Mgmt/ADL, Compensatory, Meal Prep Minutes (55300) 15   [place] Level (Toilet Training) contact guard   Assistance (Toilet Training) 1 person assist   Therapeutic Activities   Therapeutic Activity Minutes (81275) 15   Symptoms noted during/after treatment fatigue   Treatment Detail/Skilled Intervention Pt greatly improved today with ambulation and activity tolerance. Pt ambulated 40 feet x 2 in hallway using FWW and with W/C following behind for safety. Pt took a seated rest break in between walks, had no compalints of pain and was in good spirits   OT Discharge Planning   OT Plan continue to progress functional activity tolerance   OT Discharge Recommendation (DC Rec) Transitional Care Facility   OT Rationale for DC Rec Pt is making slow, steady progress, will benefit from continued therapies at [facility] to maximize level of safety and strength needed to return home with spouse as needed   OT Brief overview of current status see above for treatment details   Total Session Time   Timed Code Treatment Minutes 30   Total Session Time (sum of timed and untimed services) 30

## 2025-03-12 ENCOUNTER — APPOINTMENT (OUTPATIENT)
Dept: OCCUPATIONAL THERAPY | Facility: OTHER | Age: 65
DRG: 208 | End: 2025-03-12
Payer: COMMERCIAL

## 2025-03-12 ENCOUNTER — APPOINTMENT (OUTPATIENT)
Dept: PHYSICAL THERAPY | Facility: OTHER | Age: 65
DRG: 208 | End: 2025-03-12
Payer: COMMERCIAL

## 2025-03-12 VITALS
RESPIRATION RATE: 18 BRPM | HEIGHT: 62 IN | WEIGHT: 81 LBS | OXYGEN SATURATION: 91 % | HEART RATE: 85 BPM | BODY MASS INDEX: 14.91 KG/M2 | SYSTOLIC BLOOD PRESSURE: 116 MMHG | DIASTOLIC BLOOD PRESSURE: 58 MMHG | TEMPERATURE: 98.9 F

## 2025-03-12 PROCEDURE — 250N000009 HC RX 250: Performed by: INTERNAL MEDICINE

## 2025-03-12 PROCEDURE — 94640 AIRWAY INHALATION TREATMENT: CPT

## 2025-03-12 PROCEDURE — 250N000013 HC RX MED GY IP 250 OP 250 PS 637: Performed by: INTERNAL MEDICINE

## 2025-03-12 PROCEDURE — 99239 HOSP IP/OBS DSCHRG MGMT >30: CPT | Performed by: STUDENT IN AN ORGANIZED HEALTH CARE EDUCATION/TRAINING PROGRAM

## 2025-03-12 PROCEDURE — 999N000157 HC STATISTIC RCP TIME EA 10 MIN

## 2025-03-12 PROCEDURE — 94618 PULMONARY STRESS TESTING: CPT

## 2025-03-12 PROCEDURE — 250N000012 HC RX MED GY IP 250 OP 636 PS 637: Performed by: STUDENT IN AN ORGANIZED HEALTH CARE EDUCATION/TRAINING PROGRAM

## 2025-03-12 PROCEDURE — 97535 SELF CARE MNGMENT TRAINING: CPT | Mod: GO | Performed by: OCCUPATIONAL THERAPIST

## 2025-03-12 PROCEDURE — 250N000011 HC RX IP 250 OP 636: Performed by: INTERNAL MEDICINE

## 2025-03-12 PROCEDURE — 97530 THERAPEUTIC ACTIVITIES: CPT | Mod: GO | Performed by: OCCUPATIONAL THERAPIST

## 2025-03-12 RX ORDER — IPRATROPIUM BROMIDE AND ALBUTEROL SULFATE 2.5; .5 MG/3ML; MG/3ML
SOLUTION RESPIRATORY (INHALATION)
Qty: 360 ML | Refills: 4 | Status: SHIPPED | OUTPATIENT
Start: 2025-03-12

## 2025-03-12 RX ADMIN — IPRATROPIUM BROMIDE AND ALBUTEROL SULFATE 3 ML: .5; 3 SOLUTION RESPIRATORY (INHALATION) at 07:51

## 2025-03-12 RX ADMIN — PANTOPRAZOLE SODIUM 40 MG: 40 TABLET, DELAYED RELEASE ORAL at 08:02

## 2025-03-12 RX ADMIN — PREDNISONE 20 MG: 20 TABLET ORAL at 10:07

## 2025-03-12 RX ADMIN — ENOXAPARIN SODIUM 30 MG: 30 INJECTION SUBCUTANEOUS at 10:06

## 2025-03-12 RX ADMIN — AZITHROMYCIN DIHYDRATE 250 MG: 250 TABLET, FILM COATED ORAL at 10:07

## 2025-03-12 RX ADMIN — IPRATROPIUM BROMIDE AND ALBUTEROL SULFATE 3 ML: .5; 3 SOLUTION RESPIRATORY (INHALATION) at 03:42

## 2025-03-12 RX ADMIN — BUDESONIDE 1 MG: 0.5 INHALANT RESPIRATORY (INHALATION) at 07:51

## 2025-03-12 ASSESSMENT — ACTIVITIES OF DAILY LIVING (ADL)
ADLS_ACUITY_SCORE: 50

## 2025-03-12 NOTE — PROGRESS NOTES
Patient has been assessed for Home Oxygen needs. Oxygen readings:    *Pulse oximetry (SpO2) = 92% on room air at rest while awake.    *SpO2 = 86% on room air during activity/with exercise.    *SpO2 improved to 91% on 2 liters/minute during activity/with exercise.    Hayde Galvan, RT

## 2025-03-12 NOTE — PROGRESS NOTES
03/12/25 6870   Appointment Info   Signing Clinician's Name / Credentials (OT) Cristina Lee, OTR/L   Self-Care/Home Management   Self-Care/Home Mgmt/ADL, Compensatory, Meal Prep Minutes (88822) 15   Treatment Detail/Skilled Intervention Pt did not want to shower today, states she want to when she gets home and she will have assist from family   [place] Level (Toilet Training) stand-by assist   Assistance (Toilet Training) supervision   Therapeutic Activities   Therapeutic Activity Minutes (31878) 30   Symptoms noted during/after treatment fatigue   Treatment Detail/Skilled Intervention Pt ambulated in hallway today with 2 liters of O2 needed for O2 sats to stay above 90%. Pt ambulated 80-90 feet today without a rest break using FWW, tolerated well and improved from yesterday. Pt was also provided with and educated on and U/E home exercise program to complete while she is waiting for home care to be initiated. Pt and spouse were educated on the exercises and verbalized understanding.   OT Discharge Planning   OT Plan d/c home today with family assist and home care therapies   OT Discharge Recommendation (DC Rec) home with assist;home with home care occupational therapy   OT Rationale for DC Rec Pt will benefit from continued strengthening to restore level of function to baseline   OT Brief overview of current status see above for treatment details   Total Session Time   Timed Code Treatment Minutes 45   Total Session Time (sum of timed and untimed services) 45

## 2025-03-12 NOTE — PROGRESS NOTES
Discharge Planner SLP   Patient plan for discharge: Return home with outpatient services  Current status: Severe dysphagia and dysphonia, requiring ENT consult and likely imaging  Barriers to return to prior living situation: Severe swallow weakness with need for multiple (triple) swallows on Soft & Bite Sized, multiple swallows (double) on minced, recommendation of Pureed due to severity.  Medical chart review revealed that patient rejecting modified textures at discharge.  Patient nearly aphonic, 2 second duration of rough quality voice when attempting to speak.  Recommendations for discharge: Outpatient SLP services for voice and swallowing (patient rejected subacute rehab)  Rationale for recommendations: Significant change in function and safety       Entered by: DEMETRIUS Hauser 03/12/2025 11:58 AM

## 2025-03-12 NOTE — PHARMACY - DISCHARGE MEDICATION RECONCILIATION AND EDUCATION
Pharmacy:  Discharge Counseling and Medication Reconciliation    Ember Tavares  [address]   [place] [id number] [phone] (home)   64 year old female  PCP: Tim Boyd    Allergies: Penicillins, Atorvastatin, Morphine, Levofloxacin, Rosuvastatin, Sucralfate, and Sulfamethoxazole-trimethoprim    Discharge Counseling:    Pharmacist met with patient (and/or family) today to review the medication portion of the After Visit Summary (with an emphasis on NEW medications) and to address patient's questions/concerns.    Summary of Education: Discussed refill or Duonebs being sent for patient and offered Rx[employer]reach paperwork that patient declined d/t not qualifying for in the past. Patient attests that inhaler was ~600 dollars but would be hitting deducible soon. Denies any questions or concerns at this time.     Discharge Medication Reconciliation:    It has been determined that the patient has an adequate supply of medications available or which can be obtained from the patient's preferred pharmacy, which HE/SHE has confirmed as: [facility]     Thank you for the consult.    Zackery Rossi [facility]........March 12, 2025 1:25 PM

## 2025-03-12 NOTE — PROGRESS NOTES
:    Patient is discharging home this date.  Family will be providing transportation.  No further  needs at this time.    NASEEM Poe on 3/12/2025 at 9:24 AM

## 2025-03-12 NOTE — PLAN OF CARE
"Goal Outcome Evaluation:  VSS, alert and oriented. Patient back on 2 LPM nasal cannula. Congested non productive cough. LS diminished. Ambulates to bathroom asst 1 with walker. Denies pain.       Plan of Care Reviewed With: patient, spouse    Overall Patient Progress: improvingOverall Patient Progress: improving    Outcome Evaluation: VSS    Blood pressure 118/59, pulse 87, temperature 98.7  F (37.1  C), temperature source Tympanic, resp. rate 20, height 1.575 m (5' 2\"), weight 36.7 kg (81 lb), last menstrual period 01/01/1996, SpO2 93%, not currently breastfeeding.    Paulina Del Cid RN on 3/12/2025 at 5:49 AM    "

## 2025-03-12 NOTE — PROGRESS NOTES
03/12/25 1418   Appointment Info   Signing Clinician's Name / Credentials (PT) Andre Leonardo MPT   Therapeutic Procedure/Exercise   Treatment Detail/Skilled Intervention Review of home exercise program   Therapeutic Activity   Treatment Detail/Skilled Intervention bed mobilities with SBA; sit to stand and stand pivot with SBA and use of Fww   Gait Training   Symptoms Noted During/After Treatment (Gait Training) fatigue;shortness of breath   Treatment Detail/Skilled Intervention ambulation in hallway   Distance in Feet 80   [place] Level (Gait Training) stand-by assist   Physical Assistance Level (Gait Training) 1 person + 1 person to manage equipment   Weight Bearing (Gait Training) full weight-bearing   Assistive Device (Gait Training) rolling walker   Pattern Analysis (Gait Training) swing-through gait   Gait Analysis Deviations decreased vandana;decreased velocity of limb motion;decreased step length   Impairments (Gait Analysis/Training) balance impaired;strength decreased   PT Discharge Planning   PT Plan Patient discharging   PT Discharge Recommendation (DC Rec) home with assist;home with home care physical therapy   PT Rationale for DC Rec to promote strength, stability and safe mobility prior to returning home   PT Brief overview of current status Fatigued/weak patient exhibiting progress with functional activity tolerance and static/dynamic standing stability today; supplemental O2 continues to maintain O2 sats above 90% with activity; she will certainly benefit from continued PT to promote return to prior level of mobility

## 2025-03-12 NOTE — PROGRESS NOTES
Pt received on Room Air.  Pt refused treatment at 2100 while she was sleeping; Sats on Room Air 93% At 2330 RN called to inform writer that Pt Sats were at 86% while sleeping.  Placed on 2L NC.  Pt received and tolerated aerosol treatments as ordered.  Pt reports that her breathing is better post treatment. Respiratory will wean to Room Air as tolerated.      Zayra Lucero, RT

## 2025-03-12 NOTE — PROGRESS NOTES
Patient remains on room air, SpO2 92% at rest. Patient walked with PT and SpO2 dropped to 88%-90% with ambulation. Plan to ambulate again in the morning before discharge to assess for O2 needs.    Hayde Galvan, RT

## 2025-03-12 NOTE — DISCHARGE SUMMARY
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     The patient was initially admitted to the hospital on 2/27/25 due to shortness of breath and coughing.  The patient had recently accompanied her daughter to a breast biopsy appointment in [place] next to a building that was being torn down.  She also had some URI symptoms prior to arrival.  On presentation the emergency department saturations were 73%.  She was treated with methylprednisolone, DuoNebs and supplemental oxygen.  She eventually required BiPAP and sedation to help her remain comfortable on BiPAP.  Unfortunately she was eventually intubated for approximately 5 days with difficult to manage hypercarbic respiratory failure.  She was subsequently extubated and has some hoarseness of the voice, dysphagia and weight loss. She will be discharged home with 2 L of supplemental oxygen, outpatient therapy referrals and ENT referral for vocal cord evaluation.     She will complete a prednisone taper of 20mg x 3 additional days, 10mg x 3 days before returning to her baseline 5mg daily.     Hospital Course   Ember Tavares was  admitted on 2/27/2025.  The following problems were addressed during her hospitalization:     Acute respiratory failure with hypoxia and hypercapnia (H)   COPD exacerbation    Alpha-1-antitrypsin deficiency carrier  Tobacco abuse   Multifactorial Shock  Assessment: Resolved. Presented with initial COPD exacerbation, started on antibiotics steroids and nebulizers, respiratory status deteriorated and intubated 3/1.   Successful spontaneous breathing trial and extubation on 3/5, required bipap postextubation and transition to Vapotherm.  Now weaned to 2L via NC.  Will discharge with 2L supplemental oxygen on discharge.      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 she had tube feeds on 3/3.  Progressing well with oral intake at this point.    Plan:   -SLP evaluation and scheduled supplements  -Scheduled supplements and advance diet        Hiatal hernia    Assessment: Stable, present on admission, status post Nissen in the past    Plan:   -continue PPI  -maalox     Dysphonia  Assessment: Severely hoarse voice and possibly some vocal cord paralysis from endotracheal tube. Will need OP ENT eval. Improving.  Plan:  -SLP and ENT consultation as OP    Carlos Vargas MD    Significant Results and Procedures       Pending Results   These results will be followed up by NA  Unresulted Labs Ordered in the Past 30 Days of this Admission       No orders found from 1/28/2025 to 2/28/2025.            Code Status   Full Code       Primary Care Physician   Tim Boyd    Physical Exam   Temp: 98.9  F (37.2  C) Temp src: Tympanic BP: 116/58 Pulse: 85   Resp: 18 SpO2: (!) 91 % O2 Device: None (Room air) Oxygen Delivery: 1 LPM (titrated to room air)  Vitals:     03/10/25 0100 03/11/25 0100 03/12/25 0442   Weight: 38.8 kg (85 lb 9.6 oz) 38.2 kg (84 lb 4.8 oz) 36.7 kg (81 lb)     Vital Signs with Ranges  Temp:  [98.7  F (37.1  C)-99  F (37.2  C)] 98.9  F (37.2  C)  Pulse:  [85-98] 85  Resp:  [18-22] 18  BP: (115-118)/(56-63) 116/58  SpO2:  [86 %-93 %] 91 %  I/O last 3 completed shifts:  In: 480 [P.O.:480]  Out: -     GENERAL: Sitting up in bed, spontaneously awake, talking and interacting, tracking, no apparent distress.  HEENT: Right IJ CVC now removed.  Bandage removed as well  CARDIOVASCULAR: Regular rate and regular rhythm, no murmurs, rubs, or gallops. Normal S1/S2. No lower extremity edema.   RESPIRATORY: Diffuse air movement in all fields, no significant wheezing or rhonchi.  GI: soft, non-tender the palpation in all quadrants, active bowel sounds.    MUSCULOSKELETAL: warm and well perfused, 2+ dorsalis pedis pulses bilaterally.    SKIN: no pallor,jaundice, or rashes.        Discharge Disposition   Discharged to home  Condition at discharge: Stable    Consultations This Hospital Stay   RESPIRATORY CARE IP CONSULT  NUTRITION SERVICES ADULT IP CONSULT  PHARMACY IP CONSULT  SPEECH LANGUAGE PATH ADULT IP CONSULT  PHYSICAL THERAPY ADULT IP CONSULT  OCCUPATIONAL THERAPY ADULT IP CONSULT  SOCIAL WORK IP CONSULT  SMOKING CESSATION PROGRAM IP CONSULT    Time Spent on this Encounter   I, Carlos Vargas MD, personally saw the patient today and spent greater than 30 minutes discharging this patient.    Discharge Orders      Physical Therapy  Referral      Occupational Therapy  Referral      Speech Therapy  Referral      Adult ENT  Referral      Reason for your hospital stay    You were in the hospital for a COPD exacerbation.  You will discharge home on 2 L of oxygen.  Please take 3 additional days of 20 mg of prednisone followed by 3 additional days of 10 mg of prednisone and then return to your home 5 mg daily dosing.  I have ordered outpatient  physical therapy, Occupational Therapy and speech therapy.  I have also ordered an ENT referral for your vocal cord issues.  Please stop smoking.     Follow-up and recommended labs and tests     Follow up with primary care provider,  within 7 days for hospital follow- up.  No follow up labs or test are needed.     Activity    Your activity upon discharge: activity as tolerated     Oxygen Adult/Peds    Oxygen Documentation  I certify that this patient, Ember Tavares has been under my care (or a nurse practitioner or physican's assistant working with me). This is the face-to-face encounter for oxygen medical necessity.      At the time of this encounter, I have reviewed the qualifying testing and have determined that supplemental oxygen is reasonable and necessary and is expected to improve the patient's condition in a home setting.         Patient has continued oxygen desaturation due to COPD J44.9.    If portability is ordered, is the patient mobile within the home? yes    Was this visit performed as a telehealth visit: No     Diet    Follow this diet upon discharge: Current Diet:Orders Placed This Encounter      Regular Diet Adult Thin Liquids (level 0)     Discharge Medications   Discharge Medication List as of 3/12/2025 10:13 AM        CONTINUE these medications which have CHANGED    Details   ipratropium - albuterol 0.5 mg/2.5 mg/3 mL (DUONEB) 0.5-2.5 (3) MG/3ML neb solution INHALE CONTENTS OF 1 VIAL BY NEBULIZATION EVERY 6 HOURS AS NEEDED FOR SHORTNESS OF BREATH, DYSPNEA OR WHEEZING, Disp-360 mL, R-4, E-Prescribe           CONTINUE these medications which have NOT CHANGED    Details   albuterol (PROAIR HFA/PROVENTIL HFA/VENTOLIN HFA) 108 (90 Base) MCG/ACT inhaler INHALE 1 TO 2 PUFF(S) INTO THE LUNGS EVERY 4 HOURS AS NEEDED FOR SHORTNESS OF BREATH OR DIFFICULTY BREATHING, Disp-72 g, R-2, E-Prescribe      albuterol (PROVENTIL) (2.5 MG/3ML) 0.083% neb solution Take 1 vial (2.5 mg) by nebulization every 6 hours  as needed for shortness of breath or wheezing., Disp-300 mL, R-11, E-Prescribe      alum & mag hydroxide-simethicone (MAALOX MULTI SYMPTOM MAX ST) 400-400-40 MG/5ML SUSP suspension Take 30 mLs by mouth every 4 hours as needed for indigestion, OTC      azithromycin (ZITHROMAX) 250 MG tablet Take 1 tablet (250 mg) by mouth daily., Disp-90 tablet, R-4, E-Prescribe      calcium carbonate (TUMS) 500 MG chewable tablet Take 1 chew tab by mouth every 6 hours as needed for heartburn., Historical      mometasone-formoterol (DULERA) 200-5 MCG/ACT inhaler Inhale 2 puffs into the lungs 2 times daily., Disp-169 g, R-4, E-Prescribe      predniSONE (DELTASONE) 10 MG tablet Take 2 tablets (20 mg) by mouth daily for 5 days, THEN 0.5-1 tablets (5-10 mg) daily. - Adjust dose as tolerated for breathing / lungs and Psoriasis., Disp-100 tablet, R-4, E-Prescribe      red yeast rice 600 MG CAPS Take 1 capsule (600 mg) by mouth daily, Disp-90 capsule, R-4, Local Print      spacer (OPTICHAMBER SANJUANA) holding chamber -- Use with inhaler - please instruct on proper use -- (okay to sub for similar product), Disp-1 each, R-1, E-Prescribe** [facility] **      tiotropium (SPIRIVA RESPIMAT) 2.5 MCG/ACT inhaler INHALE 2 PUFFS INTO THE LUNGS EVERY EVENING, Disp-12 g, R-4, E-Prescribe      UNABLE TO FIND MEDICATION NAME: Vitamin D3-Vitamin K- Omega 3  Take 1 dropperful (5000 units of D3) daily, Historical           Allergies   Allergies   Allergen Reactions    Penicillins Other (See Comments)     Other reaction(s): Respiratory Arrest    Atorvastatin Muscle Pain (Myalgia)    Morphine Other (See Comments)     Other reaction(s): Chest Pain    Levofloxacin Nausea and Vomiting    Rosuvastatin Nausea and Vomiting    Sucralfate Nausea and Vomiting    Sulfamethoxazole-Trimethoprim Nausea and Vomiting     Yeast infection     Data   Most Recent 3 CBC's:  Recent Labs   Lab Test 03/11/25  0705 03/08/25  0452 03/07/25  0505   WBC 14.4*  13.7* 13.9*   HGB 11.9 12.7 12.3   MCV 91 94 91    240 213      Most Recent 3 BMP's:  Recent Labs   Lab Test 03/11/25  0705 03/08/25  0452 03/07/25  1304 03/07/25  0505    145  --  143   POTASSIUM 3.9 3.7  --  3.4   CHLORIDE 101 101  --  96*   CO2 28 36*  --  37*   BUN 20.9 34.0*  --  30.0*   CR 0.48* 0.54  --  0.63   ANIONGAP 10 8  --  10   COLEMAN 8.9 9.3  --  9.1   GLC 87 90 163* 80     Most Recent 2 LFT's:  Recent Labs   Lab Test 03/11/25  0705 03/04/25  0527   AST 34 21   ALT 61* 43   ALKPHOS 73 67   BILITOTAL 0.7 0.3     Most Recent INR's and Anticoagulation Dosing History:  Anticoagulation Dose History          Latest Ref Rng & Units 3/28/2019 10/11/2019 3/11/2025   Recent Dosing and Labs   INR 0.85 - 1.15 0.91  0.96  0.96      Most Recent 3 Troponin's:  Recent Labs   Lab Test 03/29/19  0555 03/29/19  0001 03/28/19  2030   TROPI <0.030 <0.030 <0.030     Most Recent Cholesterol Panel:  Recent Labs   Lab Test 03/04/25  1704 03/03/25  1206 08/30/24  0946   CHOL  --   --  191   LDL  --   --  116*   HDL  --   --  58   TRIG 115   < > 83    < > = values in this interval not displayed.     Most Recent 6 Bacteria Isolates From Any Culture (See EPIC Reports for Culture Details):  Recent Labs   Lab Test 12/02/19  1717   CULT No growth after 6 days  No growth after 6 days     Most Recent TSH, T4 and A1c Labs:  Recent Labs   Lab Test 02/07/25  1335   TSH 0.64     Results for orders placed or performed during the hospital encounter of 02/27/25   XR Chest Port 1 View    Narrative    EXAM: XR CHEST PORT 1 VIEW  LOCATION: [facility]  DATE: 2/27/2025    INDICATION: Shortness of breath  COMPARISON: Two-view chest radiograph 11/22/2024      Impression    IMPRESSION: Hyperinflation of the lungs with increased interstitial markings and flattening of the hemidiaphragms, findings compatible with underlying emphysematous changes. Small pleural effusions versus pleural thickening at the lung bases.  Heart size   is normal without pulmonary vascular congestion. No focal pulmonary consolidation otherwise. No pneumothorax. Atherosclerotic calcifications of the aortic arch.   XR Chest Port 1 View    Narrative    EXAM: XR CHEST PORT 1 VIEW  LOCATION: [facility]  DATE: 2/28/2025    INDICATION: Dyspnea  COMPARISON: Chest x-ray February 27, 2025 and November 22, 2024.      Impression    IMPRESSION: Pulmonary hyperexpansion similar as on the previous exams. No acute cardiopulmonary abnormality is identified.   CT Chest Pulmonary Embolism w Contrast    Narrative    CT CHEST PULMONARY EMBOLISM W CONTRAST    HISTORY: 64 years Female acute resp failure    TECHNIQUE: Axial CT imaging of the chest was performed with  intervenous contrast during arterial phase of enhancement. Multiplanar  reconstructions and 3-D MIP reconstructions were obtained on a 3-D  workstation.  This exam was performed using one or more of the following dose  reduction techniques:  Automated exposure control, adjustment of the MARIAH and/or KV according  to patient's size, and/or use of iterative reconstruction technique.    COMPARISON: Chest x-ray today    FINDINGS:  There is no evidence of thoracic aortic aneurysm or dissection. There  is no evidence of pulmonary embolism.   There is no mediastinal or hilar or axillary lymphadenopathy.      Advanced emphysematous changes are present. The lungs are otherwise  clear. Lung volumes are high suggestive of chronic air-trapping.         No concerning osseous lesions are identified      Impression    IMPRESSION: No evidence of pulmonary embolism. No evidence of thoracic  aortic aneurysm or dissection.    Advanced emphysematous changes and findings compatible with chronic  air trapping. Lungs otherwise clear.    SYDNI CARRIZALES MD         SYSTEM ID:  [id number]   XR Chest Port 1 View    Narrative    EXAM: XR CHEST PORTABLE 1 VIEW  LOCATION: [facility]  DATE:  03/01/2025    INDICATION: Endotracheal tube positioning.  COMPARISON: 02/28/2025.      Impression    IMPRESSION: There is a new endotracheal tube with tip in good position above the joselin. NG tube courses below the diaphragm. Lungs are hyperinflated but otherwise clear.     XR Chest 1 View    Narrative    EXAM: XR CHEST 1 VIEW  LOCATION: [facility]  DATE: 3/1/2025    INDICATION: leelee ?  COMPARISON: 3/1/2025, 1003 hours      Impression    IMPRESSION: Endotracheal tube tip approximately 2.7 cm above the joselin. Right neck central venous catheter tip in the upper SVC. Enteric tube descends below the diaphragm, tip outside the field-of-view.    Lungs and pleural spaces are clear, although the costophrenic angles were excluded from the field-of-view. No pneumothorax. Normal size cardiomediastinal silhouette.   XR Chest Port 1 View    Narrative    EXAM: XR CHEST PORTABLE 1 VIEW  LOCATION: [facility]  DATE: 03/02/2025    INDICATION: Respiratory failure.  COMPARISON: 03/01/2025.      Impression    IMPRESSION: Endotracheal tube 3.5 cm above joselin. Right IJ line tip SVC. Mild vascular congestion. Stable cardiomediastinal silhouette. Small pleural effusions.       CT Chest Pulmonary Embolism w Contrast    Narrative    EXAM: CT CHEST PULMONARY EMBOLISM W CONTRAST, 3/3/2025 10:31 AM    HISTORY: copd exacerbation, intubated, now with worsening hypoxia and  tachycardia    COMPARISON: Chest radiographs 3/2/2025; CT chest 2/28/2025    TECHNIQUE:   Imaging protocol: Multiplanar CT images of the chest after  administration of intravenous contrast. Meds given: 63 ml Isovue 370.  Acquisition: This CT exam was performed using one or more the  following dose reduction techniques: automated exposure control,  adjustment of the mA and/or kV according to patient size, and/or  iterative reconstruction technique.  Processing: 3D rendering on independent workstation using Maximum  Intensity  Projection (MIP) was performed and archived to PACS. 3D  reconstructions are interpreted and reported by supervising  radiologist.    FINDINGS:     CHEST:    VESSELS AND HEART: There is adequate contrast bolus in the study.  Contrast bolus adequately opacifies the pulmonary arteries. No acute  pulmonary emboli to the subsegmental level. Atherosclerotic  calcification of the aorta without aneurysmal dilation. No  cardiomegaly. No significant pericardial effusion. Right internal  jugular central venous catheter tip terminates in the SVC.    LUNGS: Endotracheal tube tip terminates in the midthoracic trachea,  within normal limits. Mild bibasilar bronchial wall thickening.  Moderate centrilobular emphysematous changes, greater in the apices.  No pulmonary mass. Scattered discoid and subpleural atelectasis.  Patchy bibasilar centrilobular and tree-in-bud opacities. A few patchy  bilateral consolidations. Benign fat-containing right lower lobe  subpleural 1.4 cm focus.  PLEURA: Trace bilateral effusions. No pneumothorax.  LYMPH NODES: Enlarged mediastinal lymph nodes.  THYROID: Within normal limits.    BONES AND SOFT TISSUES:  No suspicious osseous lesions. Asymmetric right neck stranding, likely  from recent right IJ central line placement. Mild generalized  anasarca. Degenerative periarticular changes and spinal spondylosis.    UPPER ABDOMEN:  Limited evaluation of the upper abdomen demonstrates no acute  parenchymal abnormalities, nonobstructive bowel gas pattern, and no  free fluid or free air. Cholecystomy changes. Enteric tube tip  terminates in the stomach. Distal esophageal circumferential mucosal  thickening, likely sequela of esophagitis. Postsurgical changes of  prior Nissen fundoplication.      Impression    IMPRESSION:     No acute pulmonary emboli to the subsegmental level.    Multifocal pulmonary opacities, new since comparison, consistent with  infection.     OH HALEY MD         SYSTEM ID:  [id number]    XR Chest Port 1 View    Narrative    EXAM: XR CHEST PORT 1 VIEW  LOCATION: [facility]  DATE: 3/5/2025    INDICATION: Worsening hypercarbia.  COMPARISON: CT chest 3/3/2025. Chest radiograph 3/2/2025.      Impression    IMPRESSION:     Upper lobe predominant emphysema with flattening of both hemidiaphragms, as can be seen with COPD. Small right and possible trace left pleural effusions with adjacent atelectasis. No focal airspace opacities or pneumothorax.    Right internal jugular approach central venous catheter tip is in the mid SVC.    Stable, nonenlarged cardiac silhouette.   Echocardiogram Complete     Value    LVEF  60-65%    Narrative    [id number]  [id number]  [id number]  [id number]^MONSE^DORIS^EZRA     [facility]  [address] [place] [address]     Name: ZBIGNIEW JOHNSON  MRN: [id number]  : 1960  Study Date: 2025 11:53 AM  Age: 64 yrs  Gender: Female  Patient Location: [facility]  Reason For Study: Respiratory Failure  Ordering Physician: DORIS SHEA  Performed By: TRU Herman, RDCS, RVT     BSA: 1.5 m2  Height: 62 in  Weight: 112 lb  HR: 103  BP: 93/59 mmHg  ______________________________________________________________________________  Procedure  Echocardiogram with two-dimensional, color and spectral Doppler.  ______________________________________________________________________________  Interpretation Summary  Global and regional left ventricular function is normal with an EF of 60-65%.  Global right ventricular function is normal.  No significant valvular abnormalities present.  No pericardial effusion is present.  There has been no change.  ______________________________________________________________________________  Left Ventricle  Left ventricular wall thickness is normal. Left ventricular size is normal.  Global and regional left ventricular function is normal with an EF of 60-65%.  No regional wall motion abnormalities are seen.      Right Ventricle  The right ventricle is normal size. Global right ventricular function is  normal.     Atria  Both atria appear normal.     Mitral Valve  The mitral valve is normal.     Aortic Valve  Aortic valve is normal in structure and function.     Tricuspid Valve  Trace tricuspid insufficiency is present. The peak velocity of the tricuspid  regurgitant jet is not obtainable. Pulmonary artery systolic pressure cannot  be assessed.     Pulmonic Valve  The valve leaflets are not well visualized. Trace pulmonic insufficiency is  present.     Vessels  The aorta root is normal. Unable to assess mean RA pressure given the patient  is on a ventilator.     Pericardium  No pericardial effusion is present.     Miscellaneous  No significant valvular abnormalities present.     Compared to Previous Study  There has been no change.  ______________________________________________________________________________  MMode/2D Measurements & Calculations  IVSd: 0.61 cm  LVIDd: 3.7 cm  LVIDs: 2.6 cm  LVPWd: 0.57 cm  FS: 29.6 %  LV mass(C)d: 55.8 grams  LV mass(C)dI: 37.4 grams/m2  Ao root diam: 2.5 cm  LVOT diam: 2.0 cm  LVOT area: 3.0 cm2  Ao root diam index Ht(cm/m): 1.6  Ao root diam index BSA (cm/m2): 1.7  LA Volume (BP): 13.4 ml     LA Volume Index (BP): 9.0 ml/m2  RWT: 0.30  TAPSE: 1.8 cm     Doppler Measurements & Calculations  MV E max ralph: 75.8 cm/sec  MV A max ralph: 89.4 cm/sec  MV E/A: 0.85  MV dec time: 0.14 sec  Ao V2 max: 102.0 cm/sec  Ao max P.0 mmHg  Ao V2 mean: 70.6 cm/sec  Ao mean P.0 mmHg  Ao V2 VTI: 16.3 cm  DARCIE(I,D): 2.3 cm2  DARCIE(V,D): 2.7 cm2  LV V1 max PG: 3.5 mmHg  LV V1 max: 93.0 cm/sec  LV V1 VTI: 12.6 cm  SV(LVOT): 37.9 ml  SI(LVOT): 25.3 ml/m2  PA acc time: 0.04 sec  AV Ralph Ratio (DI): 0.91  DARCIE Index (cm2/m2): 1.6  RV S Ralph: 9.8 cm/sec     ______________________________________________________________________________  Report approved by: Sonia Brennan Dr on 2025 01:43 PM

## 2025-03-12 NOTE — PLAN OF CARE
[facility] DISCHARGE NOTE    Patient discharged to home at 10:40 AM via wheel chair. Accompanied by spouse and staff. Discharge instructions reviewed with patient, opportunity offered to ask questions. Prescriptions - None ordered for discharge. All belongings sent with patient.    Neal Holcomb RN        Goal Outcome Evaluation:      Plan of Care Reviewed With: patient, spouse    Overall Patient Progress: improvingOverall Patient Progress: improving    Outcome Evaluation: VSS; Home oxygen set up

## 2025-03-13 ENCOUNTER — PATIENT OUTREACH (OUTPATIENT)
Dept: FAMILY MEDICINE | Facility: OTHER | Age: 65
End: 2025-03-13
Payer: COMMERCIAL

## 2025-03-13 NOTE — TELEPHONE ENCOUNTER
Transitions of Care Outreach  Chief Complaint   Patient presents with    Hospital F/U       Most Recent Admission Date: 2/27/2025   Most Recent Admission Diagnosis: COPD with acute exacerbation (H) - J44.1  Acute respiratory failure with hypoxia and hypercapnia (H) - J96.01, J96.02     Most Recent Discharge Date: 3/12/2025   Most Recent Discharge Diagnosis: COPD with acute exacerbation (H) - J44.1  Acute respiratory failure with hypoxia and hypercapnia (H) - J96.01, J96.02  Lab test negative for COVID-19 virus - Z20.822  COPD, severe (H) - J44.9  Panlobular emphysema (H) - J43.1  Acute and chronic respiratory failure with hypoxia (H) - J96.21  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 (Red Flag symptoms escalate to triage hotline per guidelines): Improved  Do you know how to contact your clinic care team if you have future questions or changes to your health status? : Yes  Does the patient have their discharge instructions? : Yes  Does the patient have questions regarding their discharge instructions? : No  Were you started on any new medications or were there changes to any of your previous medications? : Yes  Does the patient have all of their medications?: Yes  Do you have questions regarding any of your medications? : No  Do you have all of your needed medical supplies or equipment (DME)?  (i.e. oxygen tank, CPAP, cane, etc.): Yes    Follow up Plan     Discharge Follow-Up  Discharge follow up appointment scheduled in alignment with recommended follow up timeframe or Transitions of Risk Category? (Low = within 30 days; Moderate= within 14 days; High=  within 7 days): Yes  Discharge Follow Up Appointment Date: 03/20/25  Discharge Follow Up Appointment Scheduled with?: Primary Care Provider    Future Appointments   Date Time Provider Department Center   3/20/2025 10:00 AM Jae Rodriguez DO [facility] [place]   12/11/2025 10:40 AM Tim Boyd MD [facility] [place]       Outpatient Plan as outlined on AVS reviewed with patient.    For any urgent concerns, please contact our 24 hour nurse triage line: [phone] ([phone])       Brenda Sena RN

## 2025-03-20 ENCOUNTER — OFFICE VISIT (OUTPATIENT)
Dept: FAMILY MEDICINE | Facility: OTHER | Age: 65
End: 2025-03-20
Attending: FAMILY MEDICINE
Payer: COMMERCIAL

## 2025-03-20 VITALS
HEART RATE: 104 BPM | WEIGHT: 84.8 LBS | OXYGEN SATURATION: 97 % | SYSTOLIC BLOOD PRESSURE: 102 MMHG | BODY MASS INDEX: 15.51 KG/M2 | TEMPERATURE: 97.8 F | DIASTOLIC BLOOD PRESSURE: 70 MMHG | RESPIRATION RATE: 22 BRPM

## 2025-03-20 DIAGNOSIS — K22.2 SCHATZKI'S RING OF DISTAL ESOPHAGUS: ICD-10-CM

## 2025-03-20 DIAGNOSIS — J43.1 PANLOBULAR EMPHYSEMA (H): Primary | ICD-10-CM

## 2025-03-20 DIAGNOSIS — Z87.891 HISTORY OF TOBACCO ABUSE: ICD-10-CM

## 2025-03-20 DIAGNOSIS — Z14.8 ALPHA-1-ANTITRYPSIN DEFICIENCY CARRIER: ICD-10-CM

## 2025-03-20 PROCEDURE — 99496 TRANSJ CARE MGMT HIGH F2F 7D: CPT | Performed by: FAMILY MEDICINE

## 2025-03-20 PROCEDURE — G0463 HOSPITAL OUTPT CLINIC VISIT: HCPCS

## 2025-03-20 ASSESSMENT — ENCOUNTER SYMPTOMS
SHORTNESS OF BREATH: 1
HEMATOLOGIC/LYMPHATIC NEGATIVE: 1
MUSCULOSKELETAL NEGATIVE: 1
CONSTITUTIONAL NEGATIVE: 1
PSYCHIATRIC NEGATIVE: 1
GASTROINTESTINAL NEGATIVE: 1
CARDIOVASCULAR NEGATIVE: 1
WHEEZING: 0
EYES NEGATIVE: 1

## 2025-03-20 ASSESSMENT — PAIN SCALES - GENERAL: PAINLEVEL_OUTOF10: NO PAIN (0)

## 2025-03-20 NOTE — NURSING NOTE
"Chief Complaint   Patient presents with    Hospital F/U     Patient presents with  for hospital follow up.  Denies pain at this time.      Initial /70 (BP Location: Right arm, Patient Position: Sitting, Cuff Size: Adult Regular)   Pulse 104   Temp 97.8  F (36.6  C) (Temporal)   Resp 22   Wt 38.5 kg (84 lb 12.8 oz)   LMP 01/01/1996 (Approximate)   SpO2 97%   BMI 15.51 kg/m   Estimated body mass index is 15.51 kg/m  as calculated from the following:    Height as of 2/27/25: 1.575 m (5' 2\").    Weight as of this encounter: 38.5 kg (84 lb 12.8 oz).  Medication Review: complete    The next two questions are to help us understand your food security.  If you are feeling you need any assistance in this area, we have resources available to support you today.          2/27/2025   SDOH- Food Insecurity   Within the past 12 months, did you worry that your food would run out before you got money to buy more? N   Within the past 12 months, did the food you bought just not last and you didn t have money to get more? N         Health Care Directive:  Patient has a Health Care Directive on file    Jeannette López LPN on 3/20/2025 at 10:10 AM      "

## 2025-03-20 NOTE — PROGRESS NOTES
3/13/2025   Post Discharge Outreach   How are you doing now that you are home? Better   How are your symptoms? (Red Flag symptoms escalate to triage hotline per guidelines) Improved   Does the patient have their discharge instructions?  Yes   Does the patient have questions regarding their discharge instructions?  No   Were you started on any new medications or were there changes to any of your previous medications?  Yes   Does the patient have all of their medications? Yes   Do you have questions regarding any of your medications?  No   Do you have all of your needed medical supplies or equipment (DME)?  (i.e. oxygen tank, CPAP, cane, etc.) Yes   Discharge Follow Up Appointment Date 3/20/2025   Discharge Follow Up Appointment Scheduled with? Primary Care Provider       Hospital Follow-up Visit:    Hospital/Nursing Home/ Rehab Facility: [facility]  Most Recent Admission Date: 2/27/2025   Most Recent Admission Diagnosis: COPD with acute exacerbation (H) - J44.1  Acute respiratory failure with hypoxia and hypercapnia (H) - J96.01, J96.02     Most Recent Discharge Date: 3/12/2025   Most Recent Discharge Diagnosis: COPD with acute exacerbation (H) - J44.1  Acute respiratory failure with hypoxia and hypercapnia (H) - J96.01, J96.02  Lab test negative for COVID-19 virus - Z20.822  COPD, severe (H) - J44.9  Panlobular emphysema (H) - J43.1  Acute and chronic respiratory failure with hypoxia (H) - J96.21  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  No  Do you have any other stressors you would like to discuss with your provider? No    Problems taking medications regularly:   None  Medication changes since discharge: None  Problems adhering to non-medication therapy:  None    Summary of hospitalization:  [facility] hospital discharge summary reviewed  Diagnostic Tests/Treatments reviewed.  Follow up needed: none  Other Healthcare Providers Involved in Patient s Care:         Homecare and Physical Therapy  Update since discharge: improved.         Plan of care communicated with patient               Assessment & Plan   Problem List Items Addressed This Visit       Alpha-1-antitrypsin deficiency carrier    History of tobacco abuse    Schatzki's ring of distal esophagus    Panlobular emphysema (H) - Primary         Diagnoses and all orders for this visit:  Panlobular emphysema (H)  Alpha-1-antitrypsin deficiency carrier  History of tobacco abuse  Schatzki's ring of distal esophagus      Patient does have a history of a Schatzki's ring and was supposed to be seen by GI this month.  She delayed this to her hospitalization and will follow-up with GI in April.  Other than this I encouraged to not overdo it with the boost supplementations and try to eat more high density carbs.        Subjective   Ember EZRA Bailon A 64 year old female    Presents for hospital follow-up she was hospitalized starting on February 27.  She had a COPD exacerbation.  Eventually she was intubated for approximately 5 days.  She states she is doing rather as part his breathing.  She is using 1 L oxygen at night.  She does have COPD but currently has quit smoking now since the hospital visit.  She states her mentation was a little bit foggy initially but is getting better.  She has been scheduled to see physical therapy speech therapy and OT.  She still waiting for that to start.  She states she is lost 5 pounds during her hospitalization but is trying to gain that back.  She is consuming 2 or 3 protein supplements in the form of a boost shake daily.  She finds she is not very hungry.  She would like to have a  handicap placard form filled out.    History of Present Illness       COPD:  She presents for follow up of COPD.   Overall, COPD symptoms are slightly worse since last visit. She has more than usual fatigue or shortness of breath with exertion and same as usual shortness of breath at rest.  She sometimes coughs and does not have change in sputum. No recent fever. She can walk less than 10 feet without stopping to rest. She can not walk a flight of stairs without resting. The patient has had an ED, urgent care, or hospital admission because of COPD since the last visit. She states she has had 1 visit(s) to an ED, Urgent Care, or Hospital due to her COPD.    She eats 2-3 servings of fruits and vegetables daily.She consumes 3 sweetened beverage(s) daily.She exercises with enough effort to increase her heart rate 30 to 60 minutes per day.  She exercises with enough effort to increase her heart rate 3 or less days per week.   She is taking medications regularly.    Past Medical History:   Diagnosis Date    Alpha-1-antitrypsin deficiency carrier 08/23/2017    Atherosclerosis of abdominal aorta 08/24/2017    Overview:  [facility] Studies: 2/19/2013 -- CTA ABDOMEN AND PELVIS WITH BILATERAL LOWER EXTREMITY RUNOFF  CLINICAL HISTORY: Iliac and mesenteric ischemia.  TECHNIQUE: 125 mL Omnipaque 300 IV contrast was administered. 3-D arterial reformations were performed.  FINDINGS: There is a well-defined ovoid density at the medial right costophrenic angle. This measures 2.3 x 0.9 x 0.6 cm. It demonstra    Chronic sinusitis     No Comments Provided    Closed fracture of skull (H)     1972    COPD, severe (H) 08/24/2017    Overview:  8/4/2014 -- PULMONARY FUNCTION  SITE:  [facility] ORDERED BY:  VANNESA LUNA  CLINICAL SUMMARY: 53-year-old female with a history of severe COPD.   TECHNICIAN NOTE: Good effort.   DATA: FVC 1.85 (61%). FEV1 1.04 (45%). FEV1/FVC ratio 56%. DLCO 13.5 to 62%.   Flow  volume curve is consistent with airway obstruction.   IMPRESSION: 1. Severe obstructive pulmon    Dental abscess 12/02/2019    GERD (gastroesophageal reflux disease) 11/29/2017    Hiatal hernia 08/24/2017    PAD (peripheral artery disease) 10/13/2015    Psoriasis 08/24/2017    Psoriatic arthritis (H) 11/20/2017    Schatzki's ring of distal esophagus 08/24/2017    Ulcer of right cornea 08/18/2018    Vitamin B12 deficiency 04/17/2018    Vitamin D deficiency 08/24/2017      reports that she quit smoking about 7 months ago. Her smoking use included cigarettes and cigars. She started smoking about 44 years ago. She has a 28.5 pack-year smoking history. She has never used smokeless tobacco. She reports current alcohol use of about 2.0 standard drinks of alcohol per week. She reports that she does not use drugs.  Family History   Problem Relation Age of Onset    Arthritis Father         Arthritis    Emphysema Father 51        Alpha-1-AT deficiency    Peripheral Vascular Disease Mother         Peripheral vascular disease    Diabetes Mother         Diabetes    Arthritis Mother         Arthritis    Breast Cancer Sister         Premenopausal breast cancer    Eczema Brother         Eczema    Narcolepsy Brother     Other - See Comments Daughter         Narcolepsy    Narcolepsy Daughter     Brain Tumor Daughter     Seizure Disorder Daughter     Depression Daughter     Graves' disease Daughter      Allergies   Allergen Reactions    Penicillins Other (See Comments)     Other reaction(s): Respiratory Arrest    Atorvastatin Muscle Pain (Myalgia)    Morphine Other (See Comments)     Other reaction(s): Chest Pain    Levofloxacin Nausea and Vomiting    Rosuvastatin Nausea and Vomiting    Sucralfate Nausea and Vomiting    Sulfamethoxazole-Trimethoprim Nausea and Vomiting     Yeast infection       Current Outpatient Medications:     albuterol (PROAIR HFA/PROVENTIL HFA/VENTOLIN HFA) 108 (90 Base) MCG/ACT inhaler, INHALE 1 TO 2 PUFF(S) INTO  THE LUNGS EVERY 4 HOURS AS NEEDED FOR SHORTNESS OF BREATH OR DIFFICULTY BREATHING, Disp: 72 g, Rfl: 2    albuterol (PROVENTIL) (2.5 MG/3ML) 0.083% neb solution, Take 1 vial (2.5 mg) by nebulization every 6 hours as needed for shortness of breath or wheezing., Disp: 300 mL, Rfl: 11    alum & mag hydroxide-simethicone (MAALOX MULTI SYMPTOM MAX ST) 400-400-40 MG/5ML SUSP suspension, Take 30 mLs by mouth every 4 hours as needed for indigestion, Disp: , Rfl:     calcium carbonate (TUMS) 500 MG chewable tablet, Take 1 chew tab by mouth every 6 hours as needed for heartburn., Disp: , Rfl:     ipratropium - albuterol 0.5 mg/2.5 mg/3 mL (DUONEB) 0.5-2.5 (3) MG/3ML neb solution, INHALE CONTENTS OF 1 VIAL BY NEBULIZATION EVERY 6 HOURS AS NEEDED FOR SHORTNESS OF BREATH, DYSPNEA OR WHEEZING, Disp: 360 mL, Rfl: 4    mometasone-formoterol (DULERA) 200-5 MCG/ACT inhaler, Inhale 2 puffs into the lungs 2 times daily., Disp: 169 g, Rfl: 4    red yeast rice 600 MG CAPS, Take 1 capsule (600 mg) by mouth daily, Disp: 90 capsule, Rfl: 4    spacer (OPTICHAMBER SANJUANA) holding chamber, -- Use with inhaler - please instruct on proper use -- (okay to sub for similar product), Disp: 1 each, Rfl: 1    tiotropium (SPIRIVA RESPIMAT) 2.5 MCG/ACT inhaler, INHALE 2 PUFFS INTO THE LUNGS EVERY EVENING, Disp: 12 g, Rfl: 4    UNABLE TO FIND, MEDICATION NAME: Vitamin D3-Vitamin K- Omega 3 Take 1 dropperful (5000 units of D3) daily, Disp: , Rfl:     azithromycin (ZITHROMAX) 250 MG tablet, Take 1 tablet (250 mg) by mouth daily. (Patient not taking: Reported on 3/20/2025), Disp: 90 tablet, Rfl: 4    predniSONE (DELTASONE) 10 MG tablet, Take 2 tablets (20 mg) by mouth daily for 5 days, THEN 0.5-1 tablets (5-10 mg) daily. - Adjust dose as tolerated for breathing / lungs and Psoriasis. (Patient not taking: Reported on 3/20/2025), Disp: 100 tablet, Rfl: 4  Review of Systems   Constitutional: Negative.    HENT: Negative.     Eyes: Negative.    Respiratory:   Positive for shortness of breath. Negative for wheezing.    Cardiovascular: Negative.    Gastrointestinal: Negative.    Musculoskeletal: Negative.    Skin: Negative.    Hematological: Negative.    Psychiatric/Behavioral: Negative.           Objective      /70 (BP Location: Right arm, Patient Position: Sitting, Cuff Size: Adult Regular)   Pulse 104   Temp 97.8  F (36.6  C) (Temporal)   Resp 22   Wt 38.5 kg (84 lb 12.8 oz)   LMP 01/01/1996 (Approximate)   SpO2 97%   BMI 15.51 kg/m    Body mass index is 15.51 kg/m .  Physical Exam  Constitutional:       General: She is not in acute distress.     Appearance: Normal appearance. She is ill-appearing. She is not toxic-appearing.   HENT:      Head: Normocephalic.      Right Ear: External ear normal.      Left Ear: External ear normal.      Mouth/Throat:      Mouth: Mucous membranes are moist.      Pharynx: Oropharynx is clear.   Eyes:      Conjunctiva/sclera: Conjunctivae normal.   Cardiovascular:      Rate and Rhythm: Normal rate and regular rhythm.      Heart sounds: No murmur heard.  Pulmonary:      Effort: Pulmonary effort is normal.      Breath sounds: Normal breath sounds.   Abdominal:      General: Abdomen is flat.      Palpations: Abdomen is soft.   Musculoskeletal:      Right lower leg: No edema.      Left lower leg: No edema.   Skin:     General: Skin is warm and dry.   Neurological:      General: No focal deficit present.      Mental Status: She is alert and oriented to person, place, and time. Mental status is at baseline.   Psychiatric:         Mood and Affect: Mood normal.         Behavior: Behavior normal.         Lab Results   Component Value Date    WBC 14.4 (H) 03/11/2025    HGB 11.9 03/11/2025     03/11/2025    CHOL 191 08/30/2024    TRIG 115 03/04/2025    HDL 58 08/30/2024    ALT 61 (H) 03/11/2025    AST 34 03/11/2025     03/11/2025    BUN 20.9 03/11/2025    CO2 28 03/11/2025    TSH 0.64 02/07/2025       No results displayed  "because visit has over 200 results.            No results for input(s): \"TSH\", \"UA\", \"PSA\", \"HGBA1C\" in the last 336 hours.    Invalid input(s): \"CBC\", \"CMP\", \"LIPIDS\", \"BMP\", \"MICROALBU\"         "

## 2025-04-25 PROBLEM — J96.02 ACUTE RESPIRATORY FAILURE WITH HYPOXIA AND HYPERCAPNIA (H): Status: RESOLVED | Noted: 2025-02-27 | Resolved: 2025-04-25

## 2025-04-25 PROBLEM — J96.01 ACUTE RESPIRATORY FAILURE WITH HYPOXIA AND HYPERCAPNIA (H): Status: RESOLVED | Noted: 2025-02-27 | Resolved: 2025-04-25

## 2025-04-25 PROBLEM — L40.50 PSORIATIC ARTHRITIS (H): Status: RESOLVED | Noted: 2017-11-20 | Resolved: 2025-04-25

## 2025-05-25 ENCOUNTER — MYC MEDICAL ADVICE (OUTPATIENT)
Dept: FAMILY MEDICINE | Facility: OTHER | Age: 65
End: 2025-05-25
Payer: COMMERCIAL

## 2025-05-27 NOTE — TELEPHONE ENCOUNTER
Patient was contacted and an appointment was made with Dr. RUIZ for 5.29.2025.  Elise Diamond on 5/27/2025 at 11:00 AM

## 2025-05-29 ENCOUNTER — OFFICE VISIT (OUTPATIENT)
Dept: FAMILY MEDICINE | Facility: OTHER | Age: 65
End: 2025-05-29
Attending: FAMILY MEDICINE
Payer: COMMERCIAL

## 2025-05-29 VITALS
RESPIRATION RATE: 20 BRPM | BODY MASS INDEX: 16.21 KG/M2 | WEIGHT: 88.6 LBS | HEART RATE: 96 BPM | TEMPERATURE: 97.6 F | DIASTOLIC BLOOD PRESSURE: 85 MMHG | OXYGEN SATURATION: 95 % | SYSTOLIC BLOOD PRESSURE: 116 MMHG

## 2025-05-29 DIAGNOSIS — K21.00 GASTROESOPHAGEAL REFLUX DISEASE WITH ESOPHAGITIS WITHOUT HEMORRHAGE: ICD-10-CM

## 2025-05-29 DIAGNOSIS — J44.9 COPD, SEVERE (H): ICD-10-CM

## 2025-05-29 DIAGNOSIS — Z87.891 HISTORY OF TOBACCO ABUSE: ICD-10-CM

## 2025-05-29 DIAGNOSIS — J32.0 CHRONIC MAXILLARY SINUSITIS: Primary | ICD-10-CM

## 2025-05-29 PROCEDURE — G0463 HOSPITAL OUTPT CLINIC VISIT: HCPCS

## 2025-05-29 RX ORDER — PREDNISONE 10 MG/1
10 TABLET ORAL DAILY
Qty: 90 TABLET | Refills: 3 | Status: CANCELLED | OUTPATIENT
Start: 2025-05-29

## 2025-05-29 RX ORDER — AZITHROMYCIN 250 MG/1
250 TABLET, FILM COATED ORAL DAILY
COMMUNITY

## 2025-05-29 RX ORDER — FLUTICASONE PROPIONATE 50 MCG
2 SPRAY, SUSPENSION (ML) NASAL DAILY
Qty: 18.2 ML | Refills: 3 | Status: SHIPPED | OUTPATIENT
Start: 2025-05-29 | End: 2026-05-24

## 2025-05-29 RX ORDER — PREDNISONE 10 MG/1
10 TABLET ORAL DAILY
COMMUNITY

## 2025-05-29 RX ORDER — MONTELUKAST SODIUM 10 MG/1
10 TABLET ORAL AT BEDTIME
Qty: 90 TABLET | Refills: 3 | Status: SHIPPED | OUTPATIENT
Start: 2025-05-29 | End: 2026-05-24

## 2025-05-29 RX ORDER — DIPHENHYDRAMINE HCL 25 MG
25 CAPSULE ORAL AT BEDTIME
Qty: 90 CAPSULE | Refills: 3 | Status: SHIPPED | OUTPATIENT
Start: 2025-05-29 | End: 2026-05-24

## 2025-05-29 RX ORDER — AZITHROMYCIN 250 MG/1
250 TABLET, FILM COATED ORAL DAILY
Qty: 90 TABLET | Refills: 3 | Status: CANCELLED | OUTPATIENT
Start: 2025-05-29

## 2025-05-29 RX ORDER — LORATADINE 10 MG/1
10 TABLET ORAL EVERY MORNING
Qty: 90 TABLET | Refills: 3 | Status: SHIPPED | OUTPATIENT
Start: 2025-05-29 | End: 2026-05-24

## 2025-05-29 ASSESSMENT — ENCOUNTER SYMPTOMS
DYSURIA: 0
ABDOMINAL PAIN: 0
VOMITING: 0
HEMATURIA: 0
CHILLS: 0
SINUS PRESSURE: 1
SINUS PAIN: 1
COUGH: 0
SORE THROAT: 0
FEVER: 0
COLOR CHANGE: 0
PALPITATIONS: 0
BACK PAIN: 0
SEIZURES: 0
EYE PAIN: 0
SHORTNESS OF BREATH: 0
ARTHRALGIAS: 0

## 2025-05-29 ASSESSMENT — PAIN SCALES - GENERAL: PAINLEVEL_OUTOF10: NO PAIN (0)

## 2025-05-29 NOTE — PROGRESS NOTES
Assessment & Plan   Problem List Items Addressed This Visit       COPD, severe (H)    Relevant Medications    predniSONE (DELTASONE) 10 MG tablet    loratadine (CLARITIN) 10 MG tablet    diphenhydrAMINE (BENADRYL) 25 MG capsule    fluticasone (FLONASE) 50 MCG/ACT nasal spray    montelukast (SINGULAIR) 10 MG tablet    Gastroesophageal reflux disease with esophagitis without hemorrhage    Relevant Medications    loratadine (CLARITIN) 10 MG tablet    diphenhydrAMINE (BENADRYL) 25 MG capsule    fluticasone (FLONASE) 50 MCG/ACT nasal spray    montelukast (SINGULAIR) 10 MG tablet    History of tobacco abuse    Sinusitis, chronic - Primary    Relevant Medications    azithromycin (ZITHROMAX) 250 MG tablet    predniSONE (DELTASONE) 10 MG tablet    loratadine (CLARITIN) 10 MG tablet    diphenhydrAMINE (BENADRYL) 25 MG capsule    fluticasone (FLONASE) 50 MCG/ACT nasal spray    montelukast (SINGULAIR) 10 MG tablet    Other Relevant Orders    CT Sinus w/o Contrast         Diagnoses and all orders for this visit:  Chronic maxillary sinusitis  -     loratadine (CLARITIN) 10 MG tablet; Take 1 tablet (10 mg) by mouth every morning.  -     diphenhydrAMINE (BENADRYL) 25 MG capsule; Take 1 capsule (25 mg) by mouth at bedtime.  -     fluticasone (FLONASE) 50 MCG/ACT nasal spray; Spray 2 sprays into both nostrils daily.  -     montelukast (SINGULAIR) 10 MG tablet; Take 1 tablet (10 mg) by mouth at bedtime.  -     CT Sinus w/o Contrast; Future  COPD, severe (H)  Gastroesophageal reflux disease with esophagitis without hemorrhage  History of tobacco abuse      Dr. Saul not sure if she has a chronic sinus infection that may be fungal versus allergic rhinitis.  I am going to start her on Flonase Claritin Benadryl and montelukast.  I am also getting a CT of her sinuses.  If the CT of the sinuses shows a chronic sinusitis she will be needing a culture and possibly referral to ENT.  I note her GERD has been controlled and her COPD is doing  better although the patient does have her oxygen with her today.  She does have a lengthy history of tobacco use        Subjective   Ember MUNGUIA Uvaldo A 64 year old female    Patient presents for recheck of her acute sinus infection.  She states the doxycycline and the prednisone seem to make the sinus infection somewhat better but now it is worse that she stopped the medication.  She states that time she has a copious release of secretions from her sinuses that either recall the front of her nose or down the back of her nose causing her to gag.  She feels like there is something stuck in the back of her throat.  Essentially all started since she was intubated for respiratory failure.  She continues to have increased pressure in nasal secretions.    Sinus Problem   Associated symptoms include sinus pressure. Pertinent negatives include no chills, no ear pain, no sore throat, no cough and no shortness of breath.   History of Present Illness       Reason for visit:  Sinus issues    She eats 2-3 servings of fruits and vegetables daily.She consumes 2 sweetened beverage(s) daily.She exercises with enough effort to increase her heart rate 9 or less minutes per day.  She exercises with enough effort to increase her heart rate 3 or less days per week.   She is taking medications regularly.    Past Medical History:   Diagnosis Date    Alpha-1-antitrypsin deficiency carrier 08/23/2017    Atherosclerosis of abdominal aorta 08/24/2017    Overview:  [facility] Studies: 2/19/2013 -- CTA ABDOMEN AND PELVIS WITH BILATERAL LOWER EXTREMITY RUNOFF  CLINICAL HISTORY: Iliac and mesenteric ischemia.  TECHNIQUE: 125 mL Omnipaque 300 IV contrast was administered. 3-D arterial reformations were performed.  FINDINGS: There is a well-defined ovoid density at the medial right costophrenic angle. This measures 2.3 x 0.9 x 0.6 cm. It demonstra    Chronic sinusitis     No Comments Provided    Closed fracture of skull (H)     1972    COPD,  severe (H) 08/24/2017    Overview:  8/4/2014 -- PULMONARY FUNCTION  SITE:  [facility] ORDERED BY:  VANNESA LUNA  CLINICAL SUMMARY: 53-year-old female with a history of severe COPD.   TECHNICIAN NOTE: Good effort.   DATA: FVC 1.85 (61%). FEV1 1.04 (45%). FEV1/FVC ratio 56%. DLCO 13.5 to 62%.   Flow volume curve is consistent with airway obstruction.   IMPRESSION: 1. Severe obstructive pulmon    Dental abscess 12/02/2019    GERD (gastroesophageal reflux disease) 11/29/2017    Hiatal hernia 08/24/2017    PAD (peripheral artery disease) 10/13/2015    Psoriasis 08/24/2017    Psoriatic arthritis (H) 11/20/2017    Schatzki's ring of distal esophagus 08/24/2017    Ulcer of right cornea 08/18/2018    Vitamin B12 deficiency 04/17/2018    Vitamin D deficiency 08/24/2017      reports that she quit smoking about 9 months ago. Her smoking use included cigarettes and cigars. She started smoking about 44 years ago. She has a 28.5 pack-year smoking history. She has never used smokeless tobacco. She reports current alcohol use of about 2.0 standard drinks of alcohol per week. She reports that she does not use drugs.  Family History   Problem Relation Age of Onset    Arthritis Father         Arthritis    Emphysema Father 51        Alpha-1-AT deficiency    Peripheral Vascular Disease Mother         Peripheral vascular disease    Diabetes Mother         Diabetes    Arthritis Mother         Arthritis    Breast Cancer Sister         Premenopausal breast cancer    Eczema Brother         Eczema    Narcolepsy Brother     Other - See Comments Daughter         Narcolepsy    Narcolepsy Daughter     Brain Tumor Daughter     Seizure Disorder Daughter     Depression Daughter     Graves' disease Daughter      Allergies   Allergen Reactions    Penicillins Other (See Comments)     Other reaction(s): Respiratory Arrest    Atorvastatin Muscle Pain (Myalgia)    Morphine Other (See Comments)     Other reaction(s): Chest  Pain    Levofloxacin Nausea and Vomiting    Rosuvastatin Nausea and Vomiting    Sucralfate Nausea and Vomiting    Sulfamethoxazole-Trimethoprim Nausea and Vomiting     Yeast infection       Current Outpatient Medications:     albuterol (PROAIR HFA/PROVENTIL HFA/VENTOLIN HFA) 108 (90 Base) MCG/ACT inhaler, INHALE 1 TO 2 PUFF(S) INTO THE LUNGS EVERY 4 HOURS AS NEEDED FOR SHORTNESS OF BREATH OR DIFFICULTY BREATHING, Disp: 72 g, Rfl: 2    albuterol (PROVENTIL) (2.5 MG/3ML) 0.083% neb solution, Take 1 vial (2.5 mg) by nebulization every 6 hours as needed for shortness of breath or wheezing., Disp: 300 mL, Rfl: 11    alum & mag hydroxide-simethicone (MAALOX MULTI SYMPTOM MAX ST) 400-400-40 MG/5ML SUSP suspension, Take 30 mLs by mouth every 4 hours as needed for indigestion, Disp: , Rfl:     azithromycin (ZITHROMAX) 250 MG tablet, Take 250 mg by mouth daily., Disp: , Rfl:     calcium carbonate (TUMS) 500 MG chewable tablet, Take 1 chew tab by mouth every 6 hours as needed for heartburn., Disp: , Rfl:     diphenhydrAMINE (BENADRYL) 25 MG capsule, Take 1 capsule (25 mg) by mouth at bedtime., Disp: 90 capsule, Rfl: 3    fluticasone (FLONASE) 50 MCG/ACT nasal spray, Spray 2 sprays into both nostrils daily., Disp: 18.2 mL, Rfl: 3    ipratropium - albuterol 0.5 mg/2.5 mg/3 mL (DUONEB) 0.5-2.5 (3) MG/3ML neb solution, INHALE CONTENTS OF 1 VIAL BY NEBULIZATION EVERY 6 HOURS AS NEEDED FOR SHORTNESS OF BREATH, DYSPNEA OR WHEEZING, Disp: 360 mL, Rfl: 4    loratadine (CLARITIN) 10 MG tablet, Take 1 tablet (10 mg) by mouth every morning., Disp: 90 tablet, Rfl: 3    mometasone-formoterol (DULERA) 200-5 MCG/ACT inhaler, Inhale 2 puffs into the lungs 2 times daily., Disp: 169 g, Rfl: 4    montelukast (SINGULAIR) 10 MG tablet, Take 1 tablet (10 mg) by mouth at bedtime., Disp: 90 tablet, Rfl: 3    predniSONE (DELTASONE) 10 MG tablet, Take 10 mg by mouth daily., Disp: , Rfl:     red yeast rice 600 MG CAPS, Take 1 capsule (600 mg) by mouth  daily, Disp: 90 capsule, Rfl: 4    spacer (OPTICHAMBER SANJUANA) holding chamber, -- Use with inhaler - please instruct on proper use -- (okay to sub for similar product), Disp: 1 each, Rfl: 1    tiotropium (SPIRIVA RESPIMAT) 2.5 MCG/ACT inhaler, INHALE 2 PUFFS INTO THE LUNGS EVERY EVENING, Disp: 12 g, Rfl: 4    UNABLE TO FIND, MEDICATION NAME: Vitamin D3-Vitamin K- Omega 3 Take 1 dropperful (5000 units of D3) daily, Disp: , Rfl:   Review of Systems   Constitutional:  Negative for chills and fever.   HENT:  Positive for postnasal drip, sinus pressure and sinus pain. Negative for ear pain and sore throat.    Eyes:  Negative for pain and visual disturbance.   Respiratory:  Negative for cough and shortness of breath.    Cardiovascular:  Negative for chest pain and palpitations.   Gastrointestinal:  Negative for abdominal pain and vomiting.   Genitourinary:  Negative for dysuria and hematuria.   Musculoskeletal:  Negative for arthralgias and back pain.   Skin:  Negative for color change and rash.   Neurological:  Negative for seizures and syncope.   All other systems reviewed and are negative.        Objective      /85 (BP Location: Right arm, Patient Position: Sitting, Cuff Size: Child)   Pulse 96   Temp 97.6  F (36.4  C) (Temporal)   Resp 20   Wt 40.2 kg (88 lb 9.6 oz)   LMP 01/01/1996 (Approximate)   SpO2 95%   BMI 16.21 kg/m    Body mass index is 16.21 kg/m .  Physical Exam  Constitutional:       Appearance: Normal appearance. She is normal weight.   HENT:      Head: Normocephalic and atraumatic.      Right Ear: Tympanic membrane and ear canal normal.      Left Ear: Tympanic membrane and ear canal normal.      Ears:      Comments: Small hematoma in the right auditory canal.     Nose: Nose normal. No congestion or rhinorrhea.      Mouth/Throat:      Mouth: Mucous membranes are moist.      Pharynx: Oropharynx is clear.   Eyes:      Conjunctiva/sclera: Conjunctivae normal.      Pupils: Pupils are equal,  "round, and reactive to light.   Cardiovascular:      Rate and Rhythm: Normal rate and regular rhythm.      Heart sounds: Normal heart sounds.   Pulmonary:      Effort: Pulmonary effort is normal.      Breath sounds: Normal breath sounds.   Abdominal:      General: Abdomen is flat.      Palpations: Abdomen is soft.   Musculoskeletal:         General: Normal range of motion.      Cervical back: Neck supple.   Skin:     General: Skin is warm and dry.   Neurological:      General: No focal deficit present.      Mental Status: She is alert and oriented to person, place, and time.   Psychiatric:         Mood and Affect: Mood normal.         Lab Results   Component Value Date    WBC 14.4 (H) 03/11/2025    HGB 11.9 03/11/2025     03/11/2025    CHOL 191 08/30/2024    TRIG 115 03/04/2025    HDL 58 08/30/2024    ALT 61 (H) 03/11/2025    AST 34 03/11/2025     03/11/2025    BUN 20.9 03/11/2025    CO2 28 03/11/2025    TSH 0.64 02/07/2025       No results displayed because visit has over 200 results.            No results for input(s): \"TSH\", \"UA\", \"PSA\", \"HGBA1C\" in the last 336 hours.    Invalid input(s): \"CBC\", \"CMP\", \"LIPIDS\", \"BMP\", \"MICROALBU\"       "

## 2025-05-29 NOTE — NURSING NOTE
"Chief Complaint   Patient presents with    Sinus Problem     Patient present for continued sinus problems.  Denies pain at this time.      Initial /85 (BP Location: Right arm, Patient Position: Sitting, Cuff Size: Child)   Pulse 96   Temp 97.6  F (36.4  C) (Temporal)   Resp 20   Wt 40.2 kg (88 lb 9.6 oz)   LMP 01/01/1996 (Approximate)   SpO2 95%   BMI 16.21 kg/m   Estimated body mass index is 16.21 kg/m  as calculated from the following:    Height as of 2/27/25: 1.575 m (5' 2\").    Weight as of this encounter: 40.2 kg (88 lb 9.6 oz).  Medication Review: complete    The next two questions are to help us understand your food security.  If you are feeling you need any assistance in this area, we have resources available to support you today.          2/27/2025   SDOH- Food Insecurity   Within the past 12 months, did you worry that your food would run out before you got money to buy more? N   Within the past 12 months, did the food you bought just not last and you didn t have money to get more? N        Data saved with a previous flowsheet row definition         Health Care Directive:  Patient has a Health Care Directive on file    Jeannette López LPN on 5/29/2025 at 9:34 AM      "

## 2025-06-19 ENCOUNTER — HOSPITAL ENCOUNTER (OUTPATIENT)
Dept: CT IMAGING | Facility: OTHER | Age: 65
End: 2025-06-19
Attending: FAMILY MEDICINE
Payer: COMMERCIAL

## 2025-06-19 ENCOUNTER — RESULTS FOLLOW-UP (OUTPATIENT)
Dept: FAMILY MEDICINE | Facility: OTHER | Age: 65
End: 2025-06-19

## 2025-06-19 DIAGNOSIS — J32.0 CHRONIC MAXILLARY SINUSITIS: ICD-10-CM

## 2025-06-19 PROCEDURE — 70486 CT MAXILLOFACIAL W/O DYE: CPT

## (undated) DEVICE — ENDO KIT COMPLIANCE DYKENDOCMPLY

## (undated) DEVICE — DRSG MEDIPORE 2X2 3/4" 3562

## (undated) DEVICE — SU ENDO STITICH SURGIDAC 0 ES-9 48"  173024

## (undated) DEVICE — SUCTION STRYKERFLOW II 250-070-500

## (undated) DEVICE — ENDO FORCEP ENDOJAW BIOPSY 2.8MMX230CM FB-220U

## (undated) DEVICE — ESU LIGASURE MARYLAND JAW OPEN SEALER/DVDR 5MMX37CM LF1737

## (undated) DEVICE — TUBING SUCTION 10'X3/16" N510

## (undated) DEVICE — Device

## (undated) DEVICE — CATH BALLOON DILATOR WIRE OLYMPUS 18-20 BD-410X-2055

## (undated) DEVICE — ENDO TRAP POLYP E-TRAP 00711099

## (undated) DEVICE — DEVICE ENDO STITCH APPLIER 10MM 173016

## (undated) DEVICE — SU VICRYL 3-0 CT-3 27" J327H

## (undated) DEVICE — PREP CHLORAPREP 26ML TINTED ORANGE  260815

## (undated) DEVICE — SUCTION MANIFOLD NEPTUNE 2 SYS 4 PORT 0702-020-000

## (undated) DEVICE — SLEEVE COMPRESSION SCD KNEE MED 74022

## (undated) DEVICE — ENDO BRUSH CHANNEL MASTER CLEANING 2-4.2MM BW-412T

## (undated) DEVICE — LUBRICATING JELLY 2.7GM T00137

## (undated) DEVICE — BLADE KNIFE SURG 11 371111

## (undated) DEVICE — SYR 50ML LL W/O NDL 309653

## (undated) DEVICE — SOL WATER 1500ML

## (undated) DEVICE — ESU GROUND PAD ADULT W/CORD E7507

## (undated) DEVICE — TUBING INSUFFLATOR W/FILTER OLYMPUS WA95005A

## (undated) DEVICE — ENDO SNARE EXACTO COLD 9MM LOOP 2.4MMX230CM 00711115

## (undated) DEVICE — ENDO BITE BLOCK 60 MAXI LF 00712804

## (undated) DEVICE — VERRES NEEDLE 120MM DISPOSABLE 12/BX

## (undated) DEVICE — GLOVE BIOGEL INDICATOR 7.5 LF 41675

## (undated) DEVICE — LIGHT HANDLES PLASTIC

## (undated) DEVICE — CATH BALLOON DILATOR WIRE OLYMPUS 13.5-15.5 BD-410X-1555

## (undated) DEVICE — SU MONOCRYL 4-0 PC-3 18" UND Y845G

## (undated) DEVICE — SLEEVE KII 5 X 100MM BLD

## (undated) DEVICE — SU VICRYL 0 UR-6 27" J603H

## (undated) DEVICE — ENDO TROCAR FIRST ENTRY KII FIOS Z-THRD 11X100MM CTF33

## (undated) DEVICE — PACK LAPAROSCOPY LF SBA15LPFCA

## (undated) DEVICE — SLEEVE KII 5 X 100MM 6/BX

## (undated) DEVICE — DRSG MEDIPORE 3 1/2X4" 3566

## (undated) DEVICE — ENDO INFLATION DEVICE FOR BALLOON CATH 60ML MAJ-1740

## (undated) DEVICE — GLOVE PROTEXIS POWDER FREE SMT 7.5  2D72PT75X

## (undated) DEVICE — DRAIN PENROSE 1/2X12" SILICONE GR103

## (undated) RX ORDER — SODIUM CHLORIDE FOR INHALATION 0.9 %
VIAL, NEBULIZER (ML) INHALATION
Status: DISPENSED
Start: 2018-08-18

## (undated) RX ORDER — ONDANSETRON 2 MG/ML
INJECTION INTRAMUSCULAR; INTRAVENOUS
Status: DISPENSED
Start: 2018-03-26

## (undated) RX ORDER — IPRATROPIUM BROMIDE AND ALBUTEROL SULFATE 2.5; .5 MG/3ML; MG/3ML
SOLUTION RESPIRATORY (INHALATION)
Status: DISPENSED
Start: 2022-11-27

## (undated) RX ORDER — ASPIRIN 81 MG/1
TABLET, CHEWABLE ORAL
Status: DISPENSED
Start: 2019-03-28

## (undated) RX ORDER — CYANOCOBALAMIN 1000 UG/ML
INJECTION, SOLUTION INTRAMUSCULAR; SUBCUTANEOUS
Status: DISPENSED
Start: 2018-04-17

## (undated) RX ORDER — METHYLPREDNISOLONE SODIUM SUCCINATE 125 MG/2ML
INJECTION INTRAMUSCULAR; INTRAVENOUS
Status: DISPENSED
Start: 2025-02-27

## (undated) RX ORDER — DEXMEDETOMIDINE HYDROCHLORIDE 4 UG/ML
INJECTION, SOLUTION INTRAVENOUS
Status: DISPENSED
Start: 2025-02-28

## (undated) RX ORDER — ACETAMINOPHEN 10 MG/ML
INJECTION, SOLUTION INTRAVENOUS
Status: DISPENSED
Start: 2018-03-26

## (undated) RX ORDER — KETOROLAC TROMETHAMINE 30 MG/ML
INJECTION, SOLUTION INTRAMUSCULAR; INTRAVENOUS
Status: DISPENSED
Start: 2019-11-30

## (undated) RX ORDER — GLYCOPYRROLATE 0.2 MG/ML
INJECTION, SOLUTION INTRAMUSCULAR; INTRAVENOUS
Status: DISPENSED
Start: 2018-03-26

## (undated) RX ORDER — LIDOCAINE HYDROCHLORIDE 10 MG/ML
INJECTION, SOLUTION EPIDURAL; INFILTRATION; INTRACAUDAL; PERINEURAL
Status: DISPENSED
Start: 2019-05-30

## (undated) RX ORDER — FENTANYL CITRATE 50 UG/ML
INJECTION, SOLUTION INTRAMUSCULAR; INTRAVENOUS
Status: DISPENSED
Start: 2019-12-02

## (undated) RX ORDER — PROPOFOL 10 MG/ML
INJECTION, EMULSION INTRAVENOUS
Status: DISPENSED
Start: 2018-03-26

## (undated) RX ORDER — FENTANYL CITRATE 50 UG/ML
INJECTION, SOLUTION INTRAMUSCULAR; INTRAVENOUS
Status: DISPENSED
Start: 2018-03-26

## (undated) RX ORDER — AZITHROMYCIN 500 MG/5ML
INJECTION, POWDER, LYOPHILIZED, FOR SOLUTION INTRAVENOUS
Status: DISPENSED
Start: 2022-11-27

## (undated) RX ORDER — ONDANSETRON 2 MG/ML
INJECTION INTRAMUSCULAR; INTRAVENOUS
Status: DISPENSED
Start: 2022-11-27

## (undated) RX ORDER — IPRATROPIUM BROMIDE AND ALBUTEROL SULFATE 2.5; .5 MG/3ML; MG/3ML
SOLUTION RESPIRATORY (INHALATION)
Status: DISPENSED
Start: 2025-02-27

## (undated) RX ORDER — BUPIVACAINE HYDROCHLORIDE AND EPINEPHRINE 5; 5 MG/ML; UG/ML
INJECTION, SOLUTION EPIDURAL; INTRACAUDAL; PERINEURAL
Status: DISPENSED
Start: 2018-03-26

## (undated) RX ORDER — SODIUM CHLORIDE FOR INHALATION 0.9 %
VIAL, NEBULIZER (ML) INHALATION
Status: DISPENSED
Start: 2025-03-03

## (undated) RX ORDER — SODIUM CHLORIDE 9 MG/ML
INJECTION, SOLUTION INTRAVENOUS
Status: DISPENSED
Start: 2019-12-02

## (undated) RX ORDER — DIPHENHYDRAMINE HYDROCHLORIDE 50 MG/ML
INJECTION INTRAMUSCULAR; INTRAVENOUS
Status: DISPENSED
Start: 2019-10-11

## (undated) RX ORDER — ACETAMINOPHEN 500 MG
TABLET ORAL
Status: DISPENSED
Start: 2022-11-27

## (undated) RX ORDER — PREDNISONE 20 MG/1
TABLET ORAL
Status: DISPENSED
Start: 2021-02-23

## (undated) RX ORDER — DEXTROSE MONOHYDRATE, SODIUM CHLORIDE, AND POTASSIUM CHLORIDE 50; 1.49; 4.5 G/1000ML; G/1000ML; G/1000ML
INJECTION, SOLUTION INTRAVENOUS
Status: DISPENSED
Start: 2019-03-28

## (undated) RX ORDER — SODIUM CHLORIDE FOR INHALATION 0.9 %
VIAL, NEBULIZER (ML) INHALATION
Status: DISPENSED
Start: 2025-03-04

## (undated) RX ORDER — DEXAMETHASONE SODIUM PHOSPHATE 4 MG/ML
INJECTION, SOLUTION INTRA-ARTICULAR; INTRALESIONAL; INTRAMUSCULAR; INTRAVENOUS; SOFT TISSUE
Status: DISPENSED
Start: 2018-03-26

## (undated) RX ORDER — DEXAMETHASONE SODIUM PHOSPHATE 4 MG/ML
INJECTION, SOLUTION INTRA-ARTICULAR; INTRALESIONAL; INTRAMUSCULAR; INTRAVENOUS; SOFT TISSUE
Status: DISPENSED
Start: 2019-10-11

## (undated) RX ORDER — HYDROMORPHONE HYDROCHLORIDE 1 MG/ML
INJECTION, SOLUTION INTRAMUSCULAR; INTRAVENOUS; SUBCUTANEOUS
Status: DISPENSED
Start: 2019-11-30

## (undated) RX ORDER — LORAZEPAM 2 MG/ML
INJECTION INTRAMUSCULAR
Status: DISPENSED
Start: 2025-02-28

## (undated) RX ORDER — SODIUM CHLORIDE, SODIUM LACTATE, POTASSIUM CHLORIDE, CALCIUM CHLORIDE 600; 310; 30; 20 MG/100ML; MG/100ML; MG/100ML; MG/100ML
INJECTION, SOLUTION INTRAVENOUS
Status: DISPENSED
Start: 2018-03-26

## (undated) RX ORDER — LIDOCAINE HYDROCHLORIDE 20 MG/ML
INJECTION, SOLUTION EPIDURAL; INFILTRATION; INTRACAUDAL; PERINEURAL
Status: DISPENSED
Start: 2018-03-26

## (undated) RX ORDER — PROPOFOL 10 MG/ML
INJECTION, EMULSION INTRAVENOUS
Status: DISPENSED
Start: 2021-04-29

## (undated) RX ORDER — SODIUM CHLORIDE 9 MG/ML
INJECTION, SOLUTION INTRAVENOUS
Status: DISPENSED
Start: 2019-10-11

## (undated) RX ORDER — IPRATROPIUM BROMIDE AND ALBUTEROL SULFATE 2.5; .5 MG/3ML; MG/3ML
SOLUTION RESPIRATORY (INHALATION)
Status: DISPENSED
Start: 2021-02-23

## (undated) RX ORDER — PROPOFOL 10 MG/ML
INJECTION, EMULSION INTRAVENOUS
Status: DISPENSED
Start: 2019-05-30

## (undated) RX ORDER — PROPOFOL 10 MG/ML
INJECTION, EMULSION INTRAVENOUS
Status: DISPENSED
Start: 2024-09-26

## (undated) RX ORDER — SODIUM CHLORIDE FOR INHALATION 0.9 %
VIAL, NEBULIZER (ML) INHALATION
Status: DISPENSED
Start: 2025-03-05

## (undated) RX ORDER — TETRACAINE HYDROCHLORIDE 5 MG/ML
SOLUTION OPHTHALMIC
Status: DISPENSED
Start: 2018-08-18

## (undated) RX ORDER — PROPOFOL 10 MG/ML
INJECTION, EMULSION INTRAVENOUS
Status: DISPENSED
Start: 2023-10-23

## (undated) RX ORDER — KETOROLAC TROMETHAMINE 30 MG/ML
INJECTION, SOLUTION INTRAMUSCULAR; INTRAVENOUS
Status: DISPENSED
Start: 2018-03-26

## (undated) RX ORDER — OSELTAMIVIR PHOSPHATE 75 MG/1
CAPSULE ORAL
Status: DISPENSED
Start: 2022-11-27

## (undated) RX ORDER — METHYLPREDNISOLONE SODIUM SUCCINATE 125 MG/2ML
INJECTION, POWDER, LYOPHILIZED, FOR SOLUTION INTRAMUSCULAR; INTRAVENOUS
Status: DISPENSED
Start: 2022-11-27

## (undated) RX ORDER — AZITHROMYCIN 250 MG/1
TABLET, FILM COATED ORAL
Status: DISPENSED
Start: 2025-02-27

## (undated) RX ORDER — METOCLOPRAMIDE HYDROCHLORIDE 5 MG/ML
INJECTION INTRAMUSCULAR; INTRAVENOUS
Status: DISPENSED
Start: 2019-10-11

## (undated) RX ORDER — LIDOCAINE HYDROCHLORIDE 20 MG/ML
INJECTION, SOLUTION EPIDURAL; INFILTRATION; INTRACAUDAL; PERINEURAL
Status: DISPENSED
Start: 2021-04-29

## (undated) RX ORDER — ASPIRIN 81 MG/1
TABLET, CHEWABLE ORAL
Status: DISPENSED
Start: 2021-02-23

## (undated) RX ORDER — NEOSTIGMINE METHYLSULFATE 0.5 MG/ML
INJECTION INTRAVENOUS
Status: DISPENSED
Start: 2018-03-26

## (undated) RX ORDER — KETAMINE HYDROCHLORIDE 50 MG/ML
INJECTION, SOLUTION INTRAMUSCULAR; INTRAVENOUS
Status: DISPENSED
Start: 2018-03-26